# Patient Record
Sex: MALE | Race: WHITE | HISPANIC OR LATINO | Employment: UNEMPLOYED | URBAN - METROPOLITAN AREA
[De-identification: names, ages, dates, MRNs, and addresses within clinical notes are randomized per-mention and may not be internally consistent; named-entity substitution may affect disease eponyms.]

---

## 2017-08-07 ENCOUNTER — TELEPHONE (OUTPATIENT)
Dept: CARDIOLOGY | Facility: MEDICAL CENTER | Age: 52
End: 2017-08-07

## 2017-08-07 NOTE — TELEPHONE ENCOUNTER
I called [facility] HOPES/ TEL: [phone] and left a voicemail for  Al who arranged the visit/appointment. We have no records for the new patient referral and I asked Al to fax us the records for the referral to FAX: [phone]. I asked for a callback.NOLAN.

## 2017-08-10 ENCOUNTER — TELEPHONE (OUTPATIENT)
Dept: CARDIOLOGY | Facility: MEDICAL CENTER | Age: 52
End: 2017-08-10

## 2017-08-10 NOTE — TELEPHONE ENCOUNTER
I called [facility] Medical Records Department: [phone], EXT [phone] and left a voicemail regarding the referral and needing records for the visit. I asked Medical Records to call me back with this information.NOLAN.

## 2017-08-16 ENCOUNTER — OFFICE VISIT (OUTPATIENT)
Dept: CARDIOLOGY | Facility: MEDICAL CENTER | Age: 52
End: 2017-08-16
Payer: MEDICAID

## 2017-08-16 VITALS
OXYGEN SATURATION: 99 % | DIASTOLIC BLOOD PRESSURE: 84 MMHG | HEART RATE: 72 BPM | SYSTOLIC BLOOD PRESSURE: 134 MMHG | HEIGHT: 64 IN | BODY MASS INDEX: 24.41 KG/M2 | WEIGHT: 143 LBS

## 2017-08-16 DIAGNOSIS — B20 HIV (HUMAN IMMUNODEFICIENCY VIRUS INFECTION) (HCC): ICD-10-CM

## 2017-08-16 DIAGNOSIS — Z86.73 HISTORY OF CVA (CEREBROVASCULAR ACCIDENT): ICD-10-CM

## 2017-08-16 DIAGNOSIS — I50.20 ACC/AHA STAGE C SYSTOLIC HEART FAILURE (HCC): ICD-10-CM

## 2017-08-16 DIAGNOSIS — R06.09 DYSPNEA ON EXERTION: ICD-10-CM

## 2017-08-16 DIAGNOSIS — I50.9 HEART FAILURE, NYHA CLASS 2 (HCC): ICD-10-CM

## 2017-08-16 PROCEDURE — 99244 OFF/OP CNSLTJ NEW/EST MOD 40: CPT | Mod: 25 | Performed by: INTERNAL MEDICINE

## 2017-08-16 PROCEDURE — 94620 PR PULMONARY STRESS TESTING,SIMPLE: CPT | Performed by: INTERNAL MEDICINE

## 2017-08-16 RX ORDER — SIMVASTATIN 10 MG
10 TABLET ORAL
Refills: 2 | COMMUNITY
Start: 2017-08-10 | End: 2017-08-23 | Stop reason: CLARIF

## 2017-08-16 RX ORDER — LISINOPRIL 20 MG/1
TABLET ORAL
Refills: 2 | COMMUNITY
Start: 2017-06-01 | End: 2017-09-13

## 2017-08-16 RX ORDER — ABACAVIR SULFATE, DOLUTEGRAVIR SODIUM, LAMIVUDINE 600; 50; 300 MG/1; MG/1; MG/1
1 TABLET, FILM COATED ORAL DAILY
Refills: 5 | COMMUNITY
Start: 2017-08-10 | End: 2019-03-13 | Stop reason: CLARIF

## 2017-08-16 RX ORDER — SULFAMETHOXAZOLE AND TRIMETHOPRIM 800; 160 MG/1; MG/1
1 TABLET ORAL DAILY
COMMUNITY
End: 2019-03-13

## 2017-08-16 ASSESSMENT — MINNESOTA LIVING WITH HEART FAILURE QUESTIONNAIRE (MLHF)
HAVING TO SIT OR LIE DOWN DURING THE DAY: 0
GIVING YOU SIDE EFFECTS FROM TREATMENTS: 0
DIFFICULTY TO CONCENTRATE OR REMEMBERING THINGS: 0
DIFFICULTY SLEEPING WELL AT NIGHT: 1
WALKING ABOUT OR CLIMBING STAIRS DIFFICULT: 0
MAKING YOU STAY IN A HOSPITAL: 0
DIFFICULTY SOCIALIZING WITH FAMILY OR FRIENDS: 0
DIFFICULTY WITH RECREATIONAL PASTIMES, SPORTS, HOBBIES: 0
DIFFICULTY WORKING TO EARN A LIVING: 0
DIFFICULTY WITH SEXUAL ACTIVITIES: 1
MAKING YOU FEEL DEPRESSED: 0
MAKING YOU SHORT OF BREATH: 0
TOTAL_SCORE: 5
COSTING YOU MONEY FOR MEDICAL CARE: 0
DIFFICULTY GOING AWAY FROM HOME: 0
WORKING AROUND THE HOUSE OR YARD DIFFICULT: 0
LOSS OF SELF CONTROL IN YOUR LIFE: 1
FEELING LIKE A BURDEN TO FAMILY AND FRIENDS: 0
EATING LESS FOODS YOU LIKE: 0
SWELLING IN ANKLES OR LEGS: 1
MAKING YOU WORRY: 1
TIRED, FATIGUED OR LOW ON ENERGY: 0

## 2017-08-16 ASSESSMENT — ENCOUNTER SYMPTOMS
WEIGHT LOSS: 0
FALLS: 0
CLAUDICATION: 0
FEVER: 0
EYE PAIN: 0
COUGH: 0
DEPRESSION: 0
ABDOMINAL PAIN: 0
MYALGIAS: 0
BLURRED VISION: 0
BLOOD IN STOOL: 0
CHILLS: 0
PALPITATIONS: 0
SPEECH CHANGE: 0
SHORTNESS OF BREATH: 1
DOUBLE VISION: 0
LOSS OF CONSCIOUSNESS: 0
VOMITING: 0
EYE DISCHARGE: 0
NAUSEA: 0
HALLUCINATIONS: 0
PND: 0
HEADACHES: 0
BRUISES/BLEEDS EASILY: 0
SENSORY CHANGE: 0
ORTHOPNEA: 0
DIZZINESS: 0

## 2017-08-16 ASSESSMENT — NEW YORK HEART ASSOCIATION (NYHA) CLASSIFICATION: NYHA FUNCTIONAL CLASS: CLASS II

## 2017-08-16 ASSESSMENT — 6 MINUTE WALK TEST (6MWT): TOTAL DISTANCE WALKED (METERS): 396

## 2017-08-16 NOTE — MR AVS SNAPSHOT
"        Seamus Palencia   2017 12:45 PM   Office Visit   MRN: [id number]    Department:  Heart [facility]   Dept Phone:  [phone]    Description:  Male : 1965   Provider:  Scooby Marie M.D.           Reason for Visit     New Patient           Allergies as of 2017     No Known Allergies      You were diagnosed with     ACC/AHA stage C systolic heart failure (CMS-[facility])   [0805930]       Heart failure, NYHA class 2 (CMS-[facility])   [928456]       HIV (human immunodeficiency virus infection) (CMS-HCC)   [340377]       History of CVA (cerebrovascular accident)   [270892]       Dyspnea on exertion   [969409]         Vital Signs     Blood Pressure Pulse Height Weight Body Mass Index Oxygen Saturation    134/84 mmHg 72 1.626 m (5' 4.02\") 64.864 kg (143 lb) 24.53 kg/m2 99%    Smoking Status                   Former Smoker           Basic Information     Date Of Birth Sex Race Ethnicity Preferred Language    1965 Male  or , Other  Origin (Latvian,Palauan,Belarusian,Rolando, etc) English      Your appointments     Aug 23, 2017  3:20 PM   Heart Failure Established with EDEN Bell   [facility] (--)    [address]  [place] [address] [phone]            Aug 30, 2017  4:15 PM   ECHO with ECHO [facility] EXAM 10   ECHOCARDIOLOGY [facility] ([facility])    [address]  [place] [address]   [phone]            Aug 31, 2017  3:15 PM   Heart Failure Established with Joni Jaime M.D.   [facility] (--)    [address]  [place] [address] [phone]            Sep 13, 2017  3:30 PM   Heart Failure Established with Scooby Marie M.D.   [facility] (--)    [address]  [place] [address]   [phone]              Problem List              ICD-10-CM Priority Class Noted - Resolved    Hyponatremia E87.1   " 8/5/2013 - Present    Pneumonia J18.9   8/5/2013 - Present    HIV (human immunodeficiency virus infection) (CMS-HCC) Z21 High  8/11/2013 - Present    Stroke (CMS-HCC) I63.9 High  12/25/2015 - Present    Normochromic normocytic anemia D64.9 High  12/26/2015 - Present    Elevated troponin R74.8 Medium  12/26/2015 - Present    Encephalopathy G93.40 Low  12/26/2015 - Present    Hypocalcemia E83.51 Low  12/26/2015 - Present    Hypoalbuminemia E88.09 Low  12/26/2015 - Present    Elevated liver enzymes R74.8 Medium  12/26/2015 - Present    Scabies infestation B86 Medium  12/26/2015 - Present    Hematuria R31.9   1/11/2016 - Present    Seizure (CMS-HCC) R56.9   1/11/2016 - Present    Thrombocytopenia (CMS-HCC) D69.6   1/11/2016 - Present    PAD (peripheral artery disease) (CMS-HCC) I73.9   1/11/2016 - Present    Hypomagnesemia E83.42   1/11/2016 - Present    HTN (hypertension), malignant I10   1/11/2016 - Present    Cerebral infarction (CMS-HCC) I63.9   1/11/2016 - Present    Transient weakness of left lower extremity R29.898 High  8/18/2016 - Present    Hypokalemia E87.6 High  8/18/2016 - Present    Leukocytosis D72.829 High  8/18/2016 - Present    Thrombocytosis (CMS-HCC) D47.3 High  8/18/2016 - Present      Health Maintenance        Date Due Completion Dates    IMM DTaP/Tdap/Td Vaccine (1 - Tdap) 4/29/1983 ---    IMM PNEUMOCOCCAL 19-64 (ADULT) HIGHEST RISK SERIES (2 of 3 - PCV13) 2/5/2014 2/5/2013    COLONOSCOPY 4/29/2014 ---    IMM INFLUENZA (1) 9/1/2017 1/1/2016, 2/5/2013            Current Immunizations     Influenza TIV (IM) 2/5/2013    Influenza Vaccine Quad Inj (Preserved) 1/1/2016  5:25 PM    Pneumococcal polysaccharide vaccine (PPSV-23) 2/5/2013      Below and/or attached are the medications your provider expects you to take. Review all of your home medications and newly ordered medications with your provider and/or pharmacist. Follow medication instructions as directed by your provider and/or pharmacist. Please  keep your medication list with you and share with your provider. Update the information when medications are discontinued, doses are changed, or new medications (including over-the-counter products) are added; and carry medication information at all times in the event of emergency situations     Allergies:  No Known Allergies          Medications  Valid as of: August 16, 2017 -  1:51 PM    Generic Name Brand Name Tablet Size Instructions for use    Abacavir-Dolutegravir-Lamivud (Tab) TRIUMEQ 600- MG Take 1 Tab by mouth.        AmLODIPine Besylate (Tab) NORVASC 10 MG Take 10 mg by mouth every day.        Atorvastatin Calcium (Tab) LIPITOR 40 MG Take 1 Tab by mouth every bedtime.        Carvedilol (Tab) COREG 3.125 MG Take 2 Tabs by mouth 2 times a day, with meals.        LevETIRAcetam (Tab) KEPPRA 500 MG Take 1 Tab by mouth every 12 hours.        Lisinopril (Tab) PRINIVIL 20 MG TAKE ONE TABLET BY MOUTH DAILY        RisperiDONE (Tab) RISPERDAL 0.25 MG Take 1 Tab by mouth ONE-HALF HOUR AFTER DINNER.        Simvastatin (Tab) ZOCOR 10 MG Take 10 mg by mouth.        Sulfamethoxazole-Trimethoprim (Tab) BACTRIM -160 MG Take 1 Tab by mouth 2 times a day.        Terazosin HCl (Cap) HYTRIN 2 MG Take 1 Cap by mouth every evening.        .                 Medicines prescribed today were sent to:     [id number] - [facility] [address] [facility] [address] [facility]    [address] [place] [address]    Phone: [phone] Fax: [phone]    Open 24 Hours?: No    [facility] [place] - [address]    [address] [place] [address]    Phone: [phone] Fax: [phone]    Open 24 Hours?: No      Medication refill instructions:       If your prescription bottle indicates you have medication refills left, it is not necessary to call your provider’s office. Please contact your pharmacy and they will refill your medication.    If your prescription bottle indicates you  do not have any refills left, you may request refills at any time through one of the following ways: The online [employer] system (except Urgent Care), by calling your provider’s office, or by asking your pharmacy to contact your provider’s office with a refill request. Medication refills are processed only during regular business hours and may not be available until the next business day. Your provider may request additional information or to have a follow-up visit with you prior to refilling your medication.   *Please Note: Medication refills are assigned a new Rx number when refilled electronically. Your pharmacy may indicate that no refills were authorized even though a new prescription for the same medication is available at the pharmacy. Please request the medicine by name with the pharmacy before contacting your provider for a refill.        Your To Do List     Future Labs/Procedures Complete By Expires    ECHOCARDIOGRAM COMP W/O CONT  As directed 8/17/2018      Instructions    c4            [employer] Access Code: [email or web address]  Expires: 9/15/2017  1:51 PM    [employer] [facility]  A secure, online tool to manage your health information     [employer]’s [employer]® is a secure, online tool that connects you to your personalized health information from the privacy of your home -- day or night - making it very easy for you to manage your healthcare. Once the activation process is completed, you can even access your medical information using the [employer] fer, which is available for free in the Apple Fer store or Google Play store.     [employer] provides the following levels of access (as shown below):   My Chart Features   [facility] Doctor [facility]  Specialists [facility]  Urgent  Care Non-[facility]  Primary Care  Doctor   Email your healthcare team securely and privately 24/7 X X X    Manage appointments: schedule your next appointment; view details of past/upcoming appointments X      Request prescription refills. X      View  recent personal medical records, including lab and immunizations X X X X   View health record, including health history, allergies, medications X X X X   Read reports about your outpatient visits, procedures, consult and ER notes X X X X   See your discharge summary, which is a recap of your hospital and/or ER visit that includes your diagnosis, lab results, and care plan. X X       How to register for [employer]:  1. Go to  [email or web address].  2. Click on the Sign Up Now box, which takes you to the New Member Sign Up page. You will need to provide the following information:  a. Enter your [employer] Access Code exactly as it appears at the top of this page. (You will not need to use this code after you’ve completed the sign-up process. If you do not sign up before the expiration date, you must request a new code.)   b. Enter your date of birth.   c. Enter your home email address.   d. Click Submit, and follow the next screen’s instructions.  3. Create a [employer] ID. This will be your [employer] login ID and cannot be changed, so think of one that is secure and easy to remember.  4. Create a [employer] password. You can change your password at any time.  5. Enter your Password Reset Question and Answer. This can be used at a later time if you forget your password.   6. Enter your e-mail address. This allows you to receive e-mail notifications when new information is available in [employer].  7. Click Sign Up. You can now view your health information.    For assistance activating your [employer] account, call [phone]

## 2017-08-16 NOTE — PROGRESS NOTES
Subjective:   Seamus Palencia is a 52 y.o. male who presents today for cardiac care and evaluation in our heart failure clinic as a referral from [facility] clinic. Patient does have history of hypertension, HIV infection, history of stroke. Patient is actually a poor historian. He does not know what medication he is taking. He does not know the details of his medical history. He doesn't have any residual neurological deficits however. He does report of shortness of breath if walking upstairs. No symptom at rest or with daily living activities. He did have a transthoracic echocardiogram done in 2015 which shows LV function of 45%. No significant valvular disease seems at that time.    Patient was able to complete 396 m during his 6 minute walk test. his O2 saturation at baseline was 99% and at the end of the test, the O2 saturation was 100%. he reported 3 level of dyspnea on Danny scale.      Past Medical History   Diagnosis Date   • Hypertension    • HIV disease (CMS-HCC) 1995   • Seizure (CMS-HCC)    • Stroke (CMS-HCC)    • Heart failure (CMS-HCC)      Past Surgical History   Procedure Laterality Date   • Cholecystectomy  1980s     Family History   Problem Relation Age of Onset   • Diabetes Sister    • Other Brother      down syndrome   • No Known Problems Sister    • No Known Problems Sister    • No Known Problems Sister      History   Smoking status   • Former Smoker -- 0.50 packs/day   • Types: Cigarettes   • Quit date: 08/05/2011   Smokeless tobacco   • Never Used     No Known Allergies  Outpatient Encounter Prescriptions as of 8/16/2017   Medication Sig Dispense Refill   • TRIUMEQ 600- MG Tab Take 1 Tab by mouth.  5   • lisinopril (PRINIVIL) 20 MG Tab TAKE ONE TABLET BY MOUTH DAILY  2   • sulfamethoxazole-trimethoprim (BACTRIM DS) 800-160 MG tablet Take 1 Tab by mouth 2 times a day.     • levetiracetam (KEPPRA) 500 MG Tab Take 1 Tab by mouth every 12 hours. 60 Tab 3   • carvedilol (COREG) 3.125 MG Tab Take  "2 Tabs by mouth 2 times a day, with meals. 60 Tab 3   • amlodipine (NORVASC) 10 MG Tab Take 10 mg by mouth every day.     • simvastatin (ZOCOR) 10 MG Tab Take 10 mg by mouth.  2   • atorvastatin (LIPITOR) 40 MG Tab Take 1 Tab by mouth every bedtime. 30 Tab 1   • terazosin (HYTRIN) 2 MG Cap Take 1 Cap by mouth every evening. 30 Cap 3   • risperidone (RISPERDAL) 0.25 MG Tab Take 1 Tab by mouth ONE-HALF HOUR AFTER DINNER. 60 Tab 3   • [DISCONTINUED] lisinopril (PRINIVIL, ZESTRIL) 40 MG tablet Take 1 Tab by mouth every day. (Patient not taking: Reported on 8/16/2017) 30 Tab 1   • [DISCONTINUED] aspirin EC (ECOTRIN) 81 MG Tablet Delayed Response Take 81 mg by mouth every day.       No facility-administered encounter medications on file as of 8/16/2017.     Review of Systems   Constitutional: Negative for fever, chills, weight loss and malaise/fatigue.   HENT: Negative for ear discharge, ear pain, hearing loss and nosebleeds.    Eyes: Negative for blurred vision, double vision, pain and discharge.   Respiratory: Positive for shortness of breath. Negative for cough.    Cardiovascular: Negative for chest pain, palpitations, orthopnea, claudication, leg swelling and PND.   Gastrointestinal: Negative for nausea, vomiting, abdominal pain, blood in stool and melena.   Genitourinary: Negative for dysuria and hematuria.   Musculoskeletal: Negative for myalgias, joint pain and falls.   Skin: Negative for itching and rash.   Neurological: Negative for dizziness, sensory change, speech change, loss of consciousness and headaches.   Endo/Heme/Allergies: Negative for environmental allergies. Does not bruise/bleed easily.   Psychiatric/Behavioral: Negative for depression, suicidal ideas and hallucinations.        Objective:   /84 mmHg  Pulse 72  Ht 1.626 m (5' 4.02\")  Wt 64.864 kg (143 lb)  BMI 24.53 kg/m2  SpO2 99%    Physical Exam   Constitutional: He is oriented to person, place, and time. He appears well-developed and " well-nourished.   HENT:   Head: Normocephalic and atraumatic.   Eyes: EOM are normal.   Neck: Normal range of motion. No JVD present.   Cardiovascular: Normal rate, regular rhythm, normal heart sounds and intact distal pulses.  Exam reveals no gallop and no friction rub.    No murmur heard.  Bilateral femoral pulses are 2+, bilateral dorsalis pedis pulses are 2+, bilateral posterior tibialis pulses are 2+.   Pulmonary/Chest: No respiratory distress. He has no wheezes. He has no rales. He exhibits no tenderness.   Abdominal: Soft. Bowel sounds are normal. There is no tenderness. There is no rebound and no guarding.   The is no presence of abdominal bruits   Musculoskeletal: Normal range of motion.   Neurological: He is alert and oriented to person, place, and time.   Skin: Skin is warm and dry.   Psychiatric: He has a normal mood and affect.   Nursing note and vitals reviewed.      Assessment:     1. ACC/AHA stage C systolic heart failure (CMS-[facility])  ECHOCARDIOGRAM COMP W/O CONT    KS PULMONARY STRESS TESTING,SIMPLE   2. Heart failure, NYHA class 2 (CMS-[facility])  ECHOCARDIOGRAM COMP W/O CONT    KS PULMONARY STRESS TESTING,SIMPLE   3. HIV (human immunodeficiency virus infection) (CMS-HCC)     4. History of CVA (cerebrovascular accident)     5. Dyspnea on exertion  KS PULMONARY STRESS TESTING,SIMPLE       Medical Decision Making:  Today's Assessment / Status / Plan:     Today, based on physical examination findings, patient is euvolemic. No JVD, lungs are clear to auscultation, no pitting edema in bilateral lower extremities, no ascites.    At this time, I am not sure about his cardiac condition. Patient again is a poor historian. I think the most reasonable thing is to obtain a transthoracic echocardiogram to assess his cardiac functions and valvular functions.    In the meantime, I am not going to change his medications at this time because patient does not know what his taking at home. Advised patient to bring his  medication here next time he is here.

## 2017-08-23 ENCOUNTER — TELEPHONE (OUTPATIENT)
Dept: CARDIOLOGY | Facility: MEDICAL CENTER | Age: 52
End: 2017-08-23

## 2017-08-23 ENCOUNTER — OFFICE VISIT (OUTPATIENT)
Dept: CARDIOLOGY | Facility: MEDICAL CENTER | Age: 52
End: 2017-08-23
Payer: MEDICAID

## 2017-08-23 VITALS
HEIGHT: 64 IN | BODY MASS INDEX: 24.41 KG/M2 | HEART RATE: 69 BPM | OXYGEN SATURATION: 99 % | DIASTOLIC BLOOD PRESSURE: 92 MMHG | SYSTOLIC BLOOD PRESSURE: 142 MMHG | WEIGHT: 143 LBS

## 2017-08-23 DIAGNOSIS — I50.9 HEART FAILURE, NYHA CLASS 2 (HCC): ICD-10-CM

## 2017-08-23 DIAGNOSIS — B20 HIV (HUMAN IMMUNODEFICIENCY VIRUS INFECTION) (HCC): ICD-10-CM

## 2017-08-23 DIAGNOSIS — I50.20 ACC/AHA STAGE C SYSTOLIC HEART FAILURE (HCC): ICD-10-CM

## 2017-08-23 DIAGNOSIS — Z86.73 HISTORY OF CVA (CEREBROVASCULAR ACCIDENT): ICD-10-CM

## 2017-08-23 PROCEDURE — 99214 OFFICE O/P EST MOD 30 MIN: CPT | Performed by: NURSE PRACTITIONER

## 2017-08-23 RX ORDER — ACETAMINOPHEN 325 MG/1
650 TABLET ORAL EVERY 4 HOURS PRN
Status: ON HOLD | COMMUNITY
End: 2020-07-10

## 2017-08-23 RX ORDER — CARVEDILOL 6.25 MG/1
6.25 TABLET ORAL 2 TIMES DAILY WITH MEALS
Qty: 60 TAB | Refills: 11 | Status: SHIPPED | OUTPATIENT
Start: 2017-08-23 | End: 2017-08-31

## 2017-08-23 RX ORDER — ATORVASTATIN CALCIUM 10 MG/1
10 TABLET, FILM COATED ORAL NIGHTLY
COMMUNITY
End: 2018-02-22 | Stop reason: SDUPTHER

## 2017-08-23 ASSESSMENT — ENCOUNTER SYMPTOMS
SHORTNESS OF BREATH: 0
PND: 0
ORTHOPNEA: 0
COUGH: 0
MYALGIAS: 0
FEVER: 0
PALPITATIONS: 0
DIZZINESS: 0
CLAUDICATION: 0
ABDOMINAL PAIN: 0

## 2017-08-23 NOTE — PROGRESS NOTES
Subjective:   Seamus Palencia is a 52 y.o. male who presents today for follow up on his heart failure.    Patient of Dr. Marie. Patient was last seen 8/16/17 initially in the Heart Failure. Pt to be sent for Echo 8/30/17.  Over the week, pt continues to feel well. Patient denies chest pain, shortness of breath at rest, ADLs, Exertion, palpitations, dizziness/lightheadedness, orthopnea, PND or Edema.     Pt not taking Home weights.     Additonally, patient has the following medical problems:    -HIV infection followed by [facility]    -HTN: takes carvedilol, lisinopril, amlodipine      Past Medical History   Diagnosis Date   • Hypertension    • HIV disease (CMS-[facility]) 1995   • Seizure (CMS-[facility])    • Stroke (CMS-[facility])    • Heart failure (CMS-HCC)      Past Surgical History   Procedure Laterality Date   • Cholecystectomy  1980s     Family History   Problem Relation Age of Onset   • Diabetes Sister    • Other Brother      down syndrome   • No Known Problems Sister    • No Known Problems Sister    • No Known Problems Sister      History   Smoking status   • Former Smoker -- 0.50 packs/day   • Types: Cigarettes   • Quit date: 08/05/2011   Smokeless tobacco   • Never Used     No Known Allergies  Outpatient Encounter Prescriptions as of 8/23/2017   Medication Sig Dispense Refill   • acetaminophen (TYLENOL) 325 MG Tab Take 650 mg by mouth every four hours as needed.     • atorvastatin (LIPITOR) 10 MG Tab Take 10 mg by mouth every evening.     • TRIUMEQ 600- MG Tab Take 1 Tab by mouth.  5   • lisinopril (PRINIVIL) 20 MG Tab TAKE ONE TABLET BY MOUTH DAILY  2   • sulfamethoxazole-trimethoprim (BACTRIM DS) 800-160 MG tablet Take 1 Tab by mouth every day.     • levetiracetam (KEPPRA) 500 MG Tab Take 1 Tab by mouth every 12 hours. 60 Tab 3   • carvedilol (COREG) 3.125 MG Tab Take 2 Tabs by mouth 2 times a day, with meals. 60 Tab 3   • amlodipine (NORVASC) 10 MG Tab Take 10 mg by mouth every day.     • simvastatin (ZOCOR) 10  "MG Tab Take 10 mg by mouth.  2   • terazosin (HYTRIN) 2 MG Cap Take 1 Cap by mouth every evening. 30 Cap 3   • risperidone (RISPERDAL) 0.25 MG Tab Take 1 Tab by mouth ONE-HALF HOUR AFTER DINNER. 60 Tab 3   • [DISCONTINUED] atorvastatin (LIPITOR) 40 MG Tab Take 1 Tab by mouth every bedtime. 30 Tab 1     No facility-administered encounter medications on file as of 8/23/2017.     Review of Systems   Constitutional: Negative for fever and malaise/fatigue.   Respiratory: Negative for cough and shortness of breath.    Cardiovascular: Negative for chest pain, palpitations, orthopnea, claudication, leg swelling and PND.   Gastrointestinal: Negative for abdominal pain.   Musculoskeletal: Negative for myalgias.   Neurological: Negative for dizziness.   All other systems reviewed and are negative.       Objective:   /92 mmHg  Pulse 69  Ht 1.626 m (5' 4\")  Wt 64.864 kg (143 lb)  BMI 24.53 kg/m2  SpO2 99%    Physical Exam   Constitutional: He is oriented to person, place, and time. He appears well-developed and well-nourished.   HENT:   Head: Normocephalic and atraumatic.   Eyes: EOM are normal.   Neck: Normal range of motion. Neck supple. No JVD present.   Cardiovascular: Normal rate, regular rhythm, normal heart sounds and intact distal pulses.    No murmur heard.  Pulmonary/Chest: Effort normal and breath sounds normal. No respiratory distress. He has no wheezes. He has no rales.   Abdominal: Soft. Bowel sounds are normal.   Musculoskeletal: Normal range of motion. He exhibits no edema.   Neurological: He is alert and oriented to person, place, and time.   Skin: Skin is warm and dry.   Psychiatric: He has a normal mood and affect.   Nursing note and vitals reviewed.    Lab Results   Component Value Date/Time    CHOLESTEROL,TOT 49* 08/19/2016 01:15 AM    LDL 16 08/19/2016 01:15 AM    HDL 17* 08/19/2016 01:15 AM    TRIGLYCERIDES 80 08/19/2016 01:15 AM       Lab Results   Component Value Date/Time    SODIUM 139 " 08/22/2016 04:31 AM    POTASSIUM 4.0 08/22/2016 04:31 AM    CHLORIDE 110 08/22/2016 04:31 AM    CO2 22 08/22/2016 04:31 AM    GLUCOSE 96 08/22/2016 04:31 AM    BUN 5* 08/22/2016 04:31 AM    CREATININE 0.67 08/22/2016 04:31 AM     Lab Results   Component Value Date/Time    ALKALINE PHOSPHATASE 89 08/18/2016 02:38 AM    AST(SGOT) 7* 08/18/2016 02:38 AM    ALT(SGPT) 7 08/18/2016 02:38 AM    TOTAL BILIRUBIN 0.3 08/18/2016 02:38 AM      Transthoracic Echo Report 12/27/15  Left ventricular ejection fraction is visually estimated to be 45%.  Septum hyopkinesis, Basal inferiolateral and basal to mid anterior wall   akinesis, grade III diastolic dysfunction.  Mild concentric left ventricular hypertrophy.  Small pericardial effusion without evidence of hemodynamic compromise.  Estimated right ventricular systolic pressure  is 60 mmHg.  Moderate mitral regurgitation.  Normal inferior vena cava size and inspiratory collapse.  No prior study is available for comparison.     Assessment:     1. ACC/AHA stage C systolic heart failure (CMS-HCC)     2. Heart failure, NYHA class 2 (CMS-HCC)     3. HIV (human immunodeficiency virus infection) (CMS-HCC)     4. History of CVA (cerebrovascular accident)         Medical Decision Making:  Today's Assessment / Status / Plan:   1.HFrEF, Stage C, class 1: Based on physical examination findings, patient is euvolemic. No JVD, lungs are clear to auscultation, no pitting edema in bilateral lower extremities, no ascites.  -Increase carvedilol to 6.25 mg twice a day  -Continue lisinopril 20 mg daily  -We'll wait for the results of echo this next week  -Encouraged patient to start weighing himself. Discussed importance of weight monitoring. Patient to monitor weights at home daily. Pt to call office if weight increasing >3 lbs in 1 day or >5 lbs in 1 week.   -Reinforced s/sx of worsening heart failure with patient and weight monitoring. Pt verbalizes understanding. Pt to call office or RTC if  present.     FU in clinic in 1 week with Dr. Jaime. Sooner if needed.    Patient verbalizes understanding and agrees with the plan of care.     Collaborating MD: Lm Marie MD

## 2017-08-23 NOTE — MR AVS SNAPSHOT
"        Seamus Palencia   2017 3:20 PM   Office Visit   MRN: [id number]    Department:  [facility]   Dept Phone:  [phone]    Description:  Male : 1965   Provider:  EDEN Bell           Reason for Visit     Follow-Up medication update      Allergies as of 2017     No Known Allergies      You were diagnosed with     ACC/AHA stage C systolic heart failure (CMS-HCC)   [2262777]       Heart failure, NYHA class 2 (CMS-HCC)   [787370]       HIV (human immunodeficiency virus infection) (CMS-HCC)   [083646]       History of CVA (cerebrovascular accident)   [095199]         Vital Signs     Blood Pressure Pulse Height Weight Body Mass Index Oxygen Saturation    142/92 mmHg 69 1.626 m (5' 4\") 64.864 kg (143 lb) 24.53 kg/m2 99%    Smoking Status                   Former Smoker           Basic Information     Date Of Birth Sex Race Ethnicity Preferred Language    1965 Male  or , Other  Origin (Romanian,Citizen of [place], etc) English      Your appointments     Aug 30, 2017  4:15 PM   ECHO with [facility]CHO [facility] EXAM 10   ECHOCARDIOLOGY [facility] ([facility])    [address] [facility]  [place] [address]   [phone]            Aug 31, 2017  3:15 PM   Heart Failure Established with Joni Jaime M.D.   [facility] (--)    [address] , [facility] [address]  [place] [id number] [phone]            Sep 13, 2017  3:30 PM   Heart Failure Established with Scooby Marie M.D.   [facility] (--)    [address], [address]  [place] [address] [phone]              Problem List              ICD-10-CM Priority Class Noted - Resolved    Hyponatremia E87.1   2013 - Present    Pneumonia J18.9   2013 - Present    HIV (human immunodeficiency virus infection) (CMS-HCC) Z21 High  2013 - Present    Stroke (CMS-HCC) I63.9 High  2015 - Present    Normochromic normocytic anemia D64.9 High  " 12/26/2015 - Present    Elevated troponin R74.8 Medium  12/26/2015 - Present    Encephalopathy G93.40 Low  12/26/2015 - Present    Hypocalcemia E83.51 Low  12/26/2015 - Present    Hypoalbuminemia E88.09 Low  12/26/2015 - Present    Elevated liver enzymes R74.8 Medium  12/26/2015 - Present    Scabies infestation B86 Medium  12/26/2015 - Present    Hematuria R31.9   1/11/2016 - Present    Seizure (CMS-HCC) R56.9   1/11/2016 - Present    Thrombocytopenia (CMS-HCC) D69.6   1/11/2016 - Present    PAD (peripheral artery disease) (CMS-HCC) I73.9   1/11/2016 - Present    Hypomagnesemia E83.42   1/11/2016 - Present    HTN (hypertension), malignant I10   1/11/2016 - Present    Cerebral infarction (CMS-HCC) I63.9   1/11/2016 - Present    Transient weakness of left lower extremity R29.898 High  8/18/2016 - Present    Hypokalemia E87.6 High  8/18/2016 - Present    Leukocytosis D72.829 High  8/18/2016 - Present    Thrombocytosis (CMS-HCC) D47.3 High  8/18/2016 - Present      Health Maintenance        Date Due Completion Dates    IMM DTaP/Tdap/Td Vaccine (1 - Tdap) 4/29/1983 ---    IMM PNEUMOCOCCAL 19-64 (ADULT) HIGHEST RISK SERIES (2 of 3 - PCV13) 2/5/2014 2/5/2013    COLONOSCOPY 4/29/2014 ---    IMM INFLUENZA (1) 9/1/2017 1/1/2016, 2/5/2013            Current Immunizations     Influenza TIV (IM) 2/5/2013    Influenza Vaccine Quad Inj (Preserved) 1/1/2016  5:25 PM    Pneumococcal polysaccharide vaccine (PPSV-23) 2/5/2013      Below and/or attached are the medications your provider expects you to take. Review all of your home medications and newly ordered medications with your provider and/or pharmacist. Follow medication instructions as directed by your provider and/or pharmacist. Please keep your medication list with you and share with your provider. Update the information when medications are discontinued, doses are changed, or new medications (including over-the-counter products) are added; and carry medication information at  all times in the event of emergency situations     Allergies:  No Known Allergies          Medications  Valid as of: August 23, 2017 -  3:54 PM    Generic Name Brand Name Tablet Size Instructions for use    Abacavir-Dolutegravir-Lamivud (Tab) TRIUMEQ 600- MG Take 1 Tab by mouth.        Acetaminophen (Tab) TYLENOL 325 MG Take 650 mg by mouth every four hours as needed.        AmLODIPine Besylate (Tab) NORVASC 10 MG Take 10 mg by mouth every day.        Atorvastatin Calcium (Tab) LIPITOR 10 MG Take 10 mg by mouth every evening.        Carvedilol (Tab) COREG 6.25 MG Take 1 Tab by mouth 2 times a day, with meals.        LevETIRAcetam (Tab) KEPPRA 500 MG Take 1 Tab by mouth every 12 hours.        Lisinopril (Tab) PRINIVIL 20 MG TAKE ONE TABLET BY MOUTH DAILY        Sulfamethoxazole-Trimethoprim (Tab) BACTRIM -160 MG Take 1 Tab by mouth every day.        .                 Medicines prescribed today were sent to:     [address] [address]    Phone: [phone] Fax: [phone]    Open 24 Hours?: No      Medication refill instructions:       If your prescription bottle indicates you have medication refills left, it is not necessary to call your provider’s office. Please contact your pharmacy and they will refill your medication.    If your prescription bottle indicates you do not have any refills left, you may request refills at any time through one of the following ways: The online [employer] system (except Urgent Care), by calling your provider’s office, or by asking your pharmacy to contact your provider’s office with a refill request. Medication refills are processed only during regular business hours and may not be available until the next business day. Your provider may request additional information or to have a follow-up visit with you prior to refilling your medication.   *Please Note: Medication refills are assigned a new Rx number  when refilled electronically. Your pharmacy may indicate that no refills were authorized even though a new prescription for the same medication is available at the pharmacy. Please request the medicine by name with the pharmacy before contacting your provider for a refill.           [employer] Access Code: [email or web address]  Expires: 9/15/2017  1:51 PM    [employer]  A secure, online tool to manage your health information     [employer]’s [employer]® is a secure, online tool that connects you to your personalized health information from the privacy of your home -- day or night - making it very easy for you to manage your healthcare. Once the activation process is completed, you can even access your medical information using the [employer] fer, which is available for free in the Apple Fer store or Google Play store.     [employer] provides the following levels of access (as shown below):   My Chart Features   [facility] Primary Care Doctor [facility]  Specialists [facility]  Urgent  Care Non-[facility]  Primary Care  Doctor   Email your healthcare team securely and privately 24/7 X X X    Manage appointments: schedule your next appointment; view details of past/upcoming appointments X      Request prescription refills. X      View recent personal medical records, including lab and immunizations X X X X   View health record, including health history, allergies, medications X X X X   Read reports about your outpatient visits, procedures, consult and ER notes X X X X   See your discharge summary, which is a recap of your hospital and/or ER visit that includes your diagnosis, lab results, and care plan. X X       How to register for [employer]:  1. Go to  [email or web address].  2. Click on the Sign Up Now box, which takes you to the New Member Sign Up page. You will need to provide the following information:  a. Enter your [employer] Access Code exactly as it appears at the top of this page. (You will not need to use this code after you’ve  completed the sign-up process. If you do not sign up before the expiration date, you must request a new code.)   b. Enter your date of birth.   c. Enter your home email address.   d. Click Submit, and follow the next screen’s instructions.  3. Create a [employer] ID. This will be your [employer] login ID and cannot be changed, so think of one that is secure and easy to remember.  4. Create a [employer] password. You can change your password at any time.  5. Enter your Password Reset Question and Answer. This can be used at a later time if you forget your password.   6. Enter your e-mail address. This allows you to receive e-mail notifications when new information is available in [employer].  7. Click Sign Up. You can now view your health information.    For assistance activating your [employer] account, call [phone]

## 2017-08-23 NOTE — TELEPHONE ENCOUNTER
pharmacy can't fill prescription for Carvedilol  Received: Today       Christelle Pennington R.N.                     NEREIDA/Carli Paula @ [facility] pharmacy called, she said patient hasn't been seen since 2012 and they aren't able to fill the Carvedilol prescription. She can be reached at [phone].       Pt called to find out preferred pharmacy to send scripts to. Pt has a voicemail box that has not been set up and unable to leave a message.

## 2017-08-26 NOTE — PROGRESS NOTES
"Heart Failure New Appointment     6MWT- 396 meters  MLWHF- 5    OP Heart Failure  Vitals  Appointment Type: Heart Failure New  Weight: 64.9 kg (143 lb)  Height: 162.6 cm (5' 4.02\")  BMI (Calculated): 24.53  Blood Pressure: 134/84  Pulse: 72    System Assessment  NYHA Functional Class Assessment: Class II  ACC/AHA HF Stage: C    Smoking Hx  Have you Ever Smoked: Yes  Have you Smoked in the Last 12 Mos: No  Confirm Quit Date: 11     Alcohol Hx  Do you Drink?: No     Illicit Drug Hx  Illicit Drug History: No    Social Hx  Social History: Lives Alone  Level of Support: Unknown  Advance Directives: Began discussion, Paperwork provided    Education  Symptoms: Verbalizes understanding  Weighing: Verbalizes Understanding  Weight Gain Response: Verbalizes Understanding   Recording Data: Verbalizes understanding  Teach Back Failures: Teach Back Successful  Compliance: Patient is Compliant  Comments: Plans to be compliant from now on    Medications  Medication Reconciliation : Clarification Required  Medication Counseling Provided: Needs Reinforcement  Able to Accurately Identify Medication Indications: Some  Medication Discrepancies: None  Is Patient on an Evidence Based Beta Blocker: Yes  Is Patient on ACE-1 or ARB: Yes    Dietary Assessment  Food Labels: Verbalizes Understanding  Foods High in Sodium: Verbalizes Understanding  Daily Sodium Intake: Needs Reinforcement  Diet: Needs Reinforcement  Food Preparation: Needs Reinforcement  Eating Out Plan: Needs Reinforcement  Healthier Options: Needs Reinforcement  Fluid Restriction: Not Applicable    MN Living with Heart Failure  Swelling in Ankles or Legs: 1  Having to Sit or Lie Down During the Day: 0  Walking About or Climbing Stairs Difficult: 0  Working Around the House or Yard Difficult: 0  Difficulty Going Away from Home: 0  Difficulty Sleeping Well at Night: 1  Difficulty Socializing with Family or Friends: 0  Difficulty Working to Earn a Livin  Difficulty with " Recreational Pastimes, Sports, Hobbies: 0  Difficulty with Sexual Activities: 1  Eating Less Foods You Like: 0  Making you Short of Breath: 0  Tired, Fatigued or Low on Energy: 0  Making you Stay in a Hospital: 0  Costing you Money for Medical Care?: 0  Giving you Side Effects from Treatments: 0  Feeling like a [place] to Family and Friends: 0  Loss of Self Control in your Life: 1  Making You Worry: 1  Difficulty to Concentrate or Remembering Things: 0  Making you Feel Depressed: 0  MLHF Total Score : 5    6 Minute Walk Test  Baseline to end of test: 6:00  Total meters walked: 396     Education Narrative  Reviewed anatomy and physiology of heart failure with patient. Went over heart failure worksheet and patient's individual HF diagnosis, EF, risk factors, general medication classes and indications, as well as personal goals.  Goals: Patient's primary goal is to improve his EF by being compliant with his medications.    Discussed daily weights, sodium restriction, worsening signs and symptoms to report to physician, heart medications, and importance of adherence to medication regimen. Emphasized recommendation from AHA/AAHFN to keep daily sodium intake between 1500mg-2000mg.     Reviewed dietary handouts, advance directive planning handout, and informed patient of HF new appointment follow-up phone call. Discussed dietary considerations and reviewed Seven Day Heart Healthy Meal Plan by [employer].    It is unclear as to what type of HF patient has, the MD has ordered an Echo. Discussed HF in general terms and told patient more specific education will be performed once the MD reads the Echo-patient v/u.    Invited patient and family members/friends to HF support group and encouraged patient to call Heart Failure clinic during normal business hours with any questions.  Heart Failure program card with number given to patient.        Patient states full understanding of all information given.     ESTEBAN Saunders  NAMRATA  [phone]

## 2017-08-30 ENCOUNTER — HOSPITAL ENCOUNTER (OUTPATIENT)
Dept: CARDIOLOGY | Facility: MEDICAL CENTER | Age: 52
End: 2017-08-30
Attending: INTERNAL MEDICINE
Payer: MEDICAID

## 2017-08-30 DIAGNOSIS — I50.9 HEART FAILURE, NYHA CLASS 2 (HCC): ICD-10-CM

## 2017-08-30 DIAGNOSIS — I50.20 ACC/AHA STAGE C SYSTOLIC HEART FAILURE (HCC): ICD-10-CM

## 2017-08-30 LAB
LV EJECT FRACT  99904: 65
LV EJECT FRACT MOD 2C 99903: 62.08
LV EJECT FRACT MOD 4C 99902: 71.33
LV EJECT FRACT MOD BP 99901: 68.07

## 2017-08-30 PROCEDURE — 93306 TTE W/DOPPLER COMPLETE: CPT

## 2017-08-30 PROCEDURE — 93306 TTE W/DOPPLER COMPLETE: CPT | Mod: 26 | Performed by: INTERNAL MEDICINE

## 2017-08-31 ENCOUNTER — OFFICE VISIT (OUTPATIENT)
Dept: CARDIOLOGY | Facility: MEDICAL CENTER | Age: 52
End: 2017-08-31
Payer: MEDICAID

## 2017-08-31 VITALS
HEART RATE: 72 BPM | BODY MASS INDEX: 24.59 KG/M2 | HEIGHT: 64 IN | OXYGEN SATURATION: 97 % | WEIGHT: 144 LBS | SYSTOLIC BLOOD PRESSURE: 140 MMHG | DIASTOLIC BLOOD PRESSURE: 92 MMHG

## 2017-08-31 DIAGNOSIS — I10 ESSENTIAL HYPERTENSION, BENIGN: ICD-10-CM

## 2017-08-31 DIAGNOSIS — R93.1 ABNORMAL ECHOCARDIOGRAM: ICD-10-CM

## 2017-08-31 PROCEDURE — 99214 OFFICE O/P EST MOD 30 MIN: CPT | Performed by: INTERNAL MEDICINE

## 2017-08-31 RX ORDER — CARVEDILOL 12.5 MG/1
12.5 TABLET ORAL 2 TIMES DAILY WITH MEALS
Qty: 60 TAB | Refills: 11 | Status: SHIPPED | OUTPATIENT
Start: 2017-08-31 | End: 2017-09-13

## 2017-08-31 NOTE — PROGRESS NOTES
"Subjective:   Seamus Palencia is a 52 y.o. male who presents today For follow-up evaluation of heart failure clinic. He was found to have reduced left ventricle systolic function on echocardiography in 2015. He has a history of hypertension but was not taking medication until about a year ago.    He denies any chest discomfort, dyspnea, edema, palpitations or lightheadedness.    Past Medical History:   Diagnosis Date   • HIV disease (CMS-HCC) 1995   • Heart failure (CMS-HCC)    • Hypertension    • Seizure (CMS-HCC)    • Stroke (CMS-HCC)      Past Surgical History:   Procedure Laterality Date   • CHOLECYSTECTOMY  1980s     Family History   Problem Relation Age of Onset   • Diabetes Sister    • Other Brother      down syndrome   • No Known Problems Sister    • No Known Problems Sister    • No Known Problems Sister      History   Smoking Status   • Current Some Day Smoker   • Packs/day: 0.25   • Types: Cigarettes   • Last attempt to quit: 8/5/2011   Smokeless Tobacco   • Never Used     No Known Allergies  Outpatient Encounter Prescriptions as of 8/31/2017   Medication Sig Dispense Refill   • acetaminophen (TYLENOL) 325 MG Tab Take 650 mg by mouth every four hours as needed.     • atorvastatin (LIPITOR) 10 MG Tab Take 10 mg by mouth every evening.     • carvedilol (COREG) 6.25 MG Tab Take 1 Tab by mouth 2 times a day, with meals. 60 Tab 11   • TRIUMEQ 600- MG Tab Take 1 Tab by mouth.  5   • lisinopril (PRINIVIL) 20 MG Tab TAKE ONE TABLET BY MOUTH DAILY  2   • sulfamethoxazole-trimethoprim (BACTRIM DS) 800-160 MG tablet Take 1 Tab by mouth every day.     • levetiracetam (KEPPRA) 500 MG Tab Take 1 Tab by mouth every 12 hours. 60 Tab 3   • amlodipine (NORVASC) 10 MG Tab Take 10 mg by mouth every day.       No facility-administered encounter medications on file as of 8/31/2017.      ROS     Objective:   /92   Pulse 72   Ht 1.626 m (5' 4\")   Wt 65.3 kg (144 lb)   SpO2 97%   BMI 24.72 kg/m²     Physical Exam "   Neck: No JVD present.   Cardiovascular: Normal rate and regular rhythm.  Exam reveals no gallop.    Murmur (2/6 systolic at the base) heard.  Pulmonary/Chest: Effort normal. He has no rales.   Abdominal: Soft. There is no tenderness.   Musculoskeletal: He exhibits no edema.     Echocardiogram:  CONCLUSIONS  No prior study is available for comparison.   Normal left ventricular systolic function.   No evidence of valvular abnormality based on Doppler evaluation.     Exam Date:         08/30/2017    Echocardiogram review: Patient's echocardiogram was reviewed by myself and he has normal diastolic function based on 2016 criteria.                  Assessment:     1. Essential hypertension, benign     2. Abnormal echocardiogram: LVEF 45% in 2015         Medical Decision Making:  Today's Assessment / Status / Plan:     Mr. Palencia has had significant difficulty with hypertension. He did have LV dysfunction in 2015 when his hypertension was not controlled. He has been taking medication and his blood pressure appears to be controlled. He is euvolemic on examination and his echocardiogram shows no evidence of systolic or diastolic dysfunction. His blood pressure is not under optimal control and we will have him increase carvedilol to 12.5 mg twice a day. I would like him to check his blood pressures 3 times a week at various times a day. He will follow-up in about a month and we'll consider further adjustment of his antihypertensive therapy.

## 2017-09-13 ENCOUNTER — OFFICE VISIT (OUTPATIENT)
Dept: CARDIOLOGY | Facility: MEDICAL CENTER | Age: 52
End: 2017-09-13
Payer: MEDICAID

## 2017-09-13 VITALS
SYSTOLIC BLOOD PRESSURE: 180 MMHG | WEIGHT: 146 LBS | HEIGHT: 64 IN | HEART RATE: 80 BPM | BODY MASS INDEX: 24.92 KG/M2 | RESPIRATION RATE: 9 BRPM | OXYGEN SATURATION: 97 % | DIASTOLIC BLOOD PRESSURE: 100 MMHG

## 2017-09-13 DIAGNOSIS — I51.89 LEFT VENTRICULAR DIASTOLIC DYSFUNCTION, NYHA CLASS 1: ICD-10-CM

## 2017-09-13 DIAGNOSIS — Z86.73 HISTORY OF CVA (CEREBROVASCULAR ACCIDENT): ICD-10-CM

## 2017-09-13 DIAGNOSIS — I50.30 ACC/AHA STAGE C HEART FAILURE WITH PRESERVED EJECTION FRACTION (HCC): ICD-10-CM

## 2017-09-13 DIAGNOSIS — I10 HTN (HYPERTENSION), MALIGNANT: ICD-10-CM

## 2017-09-13 PROCEDURE — 99214 OFFICE O/P EST MOD 30 MIN: CPT | Performed by: INTERNAL MEDICINE

## 2017-09-13 RX ORDER — CARVEDILOL 25 MG/1
25 TABLET ORAL 2 TIMES DAILY WITH MEALS
Qty: 180 TAB | Refills: 3 | Status: SHIPPED | OUTPATIENT
Start: 2017-09-13 | End: 2017-10-20 | Stop reason: SDUPTHER

## 2017-09-13 RX ORDER — LISINOPRIL 40 MG/1
40 TABLET ORAL DAILY
Qty: 90 TAB | Refills: 3 | Status: SHIPPED | OUTPATIENT
Start: 2017-09-13 | End: 2017-10-11 | Stop reason: SDUPTHER

## 2017-09-13 ASSESSMENT — ENCOUNTER SYMPTOMS
SHORTNESS OF BREATH: 0
PND: 0
EYE PAIN: 0
WEIGHT LOSS: 0
VOMITING: 0
HEADACHES: 0
EYE DISCHARGE: 0
DIZZINESS: 0
MYALGIAS: 0
NAUSEA: 0
FALLS: 0
CLAUDICATION: 0
CHILLS: 0
PALPITATIONS: 0
DOUBLE VISION: 0
HALLUCINATIONS: 0
BLOOD IN STOOL: 0
ORTHOPNEA: 0
SPEECH CHANGE: 0
BRUISES/BLEEDS EASILY: 0
BLURRED VISION: 0
COUGH: 0
DEPRESSION: 0
ABDOMINAL PAIN: 0
FEVER: 0
SENSORY CHANGE: 0
LOSS OF CONSCIOUSNESS: 0

## 2017-10-11 ENCOUNTER — OFFICE VISIT (OUTPATIENT)
Dept: CARDIOLOGY | Facility: MEDICAL CENTER | Age: 52
End: 2017-10-11
Payer: MEDICAID

## 2017-10-11 VITALS
SYSTOLIC BLOOD PRESSURE: 188 MMHG | HEIGHT: 64 IN | BODY MASS INDEX: 25.1 KG/M2 | OXYGEN SATURATION: 97 % | HEART RATE: 64 BPM | WEIGHT: 147 LBS | DIASTOLIC BLOOD PRESSURE: 106 MMHG

## 2017-10-11 DIAGNOSIS — I51.89 LEFT VENTRICULAR DIASTOLIC DYSFUNCTION, NYHA CLASS 1: ICD-10-CM

## 2017-10-11 DIAGNOSIS — I50.30 ACC/AHA STAGE C HEART FAILURE WITH PRESERVED EJECTION FRACTION (HCC): ICD-10-CM

## 2017-10-11 DIAGNOSIS — I10 HTN (HYPERTENSION), MALIGNANT: ICD-10-CM

## 2017-10-11 PROCEDURE — 99214 OFFICE O/P EST MOD 30 MIN: CPT | Performed by: NURSE PRACTITIONER

## 2017-10-11 RX ORDER — LISINOPRIL 40 MG/1
40 TABLET ORAL 2 TIMES DAILY
Qty: 60 TAB | Refills: 11 | Status: SHIPPED | OUTPATIENT
Start: 2017-10-11 | End: 2018-02-22 | Stop reason: SDUPTHER

## 2017-10-11 RX ORDER — SPIRONOLACTONE 50 MG/1
50 TABLET, FILM COATED ORAL DAILY
Qty: 30 TAB | Refills: 3 | Status: SHIPPED | OUTPATIENT
Start: 2017-10-11 | End: 2018-02-22 | Stop reason: SDUPTHER

## 2017-10-11 ASSESSMENT — ENCOUNTER SYMPTOMS
DIZZINESS: 0
CLAUDICATION: 0
FEVER: 0
ABDOMINAL PAIN: 0
MYALGIAS: 0
PND: 0
ORTHOPNEA: 0
PALPITATIONS: 0
SHORTNESS OF BREATH: 0
COUGH: 0

## 2017-10-11 NOTE — PROGRESS NOTES
Subjective:   Seamus Palencia is a 52 y.o. male who presents today for follow up on his HTN and heart failure.    Patient of Dr. Marie. Patient was last seen 9/13/17.  During his last visit, his medications were titrated for his HTN. Lisinopril increased to 40 mg daily and carvedilol to 25 mg BID. Pt reports he was sent to ER by someone for elevated BP at [place]. Pt reports they didn't do anything.     Patient denies chest pain, shortness of breath at rest, ADLs, Exertion, palpitations, dizziness/lightheadedness, orthopnea, PND or Edema.     Pt not taking Home weights.     Additonally, patient has the following medical problems:    -HIV infection [facility] by [facility]    -HTN: takes carvedilol, lisinopril, amlodipine    Past Medical History:   Diagnosis Date   • HIV disease (CMS-HCC) 1995   • Heart failure (CMS-HCC)    • Hypertension    • Seizure (CMS-HCC)    • Stroke (CMS-HCC)      Past Surgical History:   Procedure Laterality Date   • CHOLECYSTECTOMY  1980s     Family History   Problem Relation Age of Onset   • Diabetes Sister    • Other Brother      down syndrome   • No Known Problems Sister    • No Known Problems Sister    • No Known Problems Sister      History   Smoking Status   • Current Some Day Smoker   • Packs/day: 0.25   • Types: Cigarettes   • Last attempt to quit: 8/5/2011   Smokeless Tobacco   • Never Used     No Known Allergies  Outpatient Encounter Prescriptions as of 10/11/2017   Medication Sig Dispense Refill   • carvedilol (COREG) 25 MG Tab Take 1 Tab by mouth 2 times a day, with meals. 180 Tab 3   • lisinopril (PRINIVIL, ZESTRIL) 40 MG tablet Take 1 Tab by mouth every day. 90 Tab 3   • acetaminophen (TYLENOL) 325 MG Tab Take 650 mg by mouth every four hours as needed.     • atorvastatin (LIPITOR) 10 MG Tab Take 10 mg by mouth every evening.     • TRIUMEQ 600- MG Tab Take 1 Tab by mouth.  5   • sulfamethoxazole-trimethoprim (BACTRIM DS) 800-160 MG tablet Take 1 Tab by mouth every  "day.     • levetiracetam (KEPPRA) 500 MG Tab Take 1 Tab by mouth every 12 hours. 60 Tab 3   • amlodipine (NORVASC) 10 MG Tab Take 10 mg by mouth every day.       No facility-administered encounter medications on file as of 10/11/2017.      Review of Systems   Constitutional: Negative for fever and malaise/fatigue.   Respiratory: Negative for cough and shortness of breath.    Cardiovascular: Negative for chest pain, palpitations, orthopnea, claudication, leg swelling and PND.   Gastrointestinal: Negative for abdominal pain.   Musculoskeletal: Negative for myalgias.   Neurological: Negative for dizziness.   All other systems reviewed and are negative.       Objective:   BP (!) 188/106   Pulse 64   Ht 1.626 m (5' 4\")   Wt 66.7 kg (147 lb)   SpO2 97%   BMI 25.23 kg/m²     Physical Exam   Constitutional: He is oriented to person, place, and time. He appears well-developed and well-nourished.   HENT:   Head: Normocephalic and atraumatic.   Eyes: EOM are normal.   Neck: Normal range of motion. Neck supple. No JVD present.   Cardiovascular: Normal rate, regular rhythm, normal heart sounds and intact distal pulses.    No murmur heard.  Pulmonary/Chest: Effort normal and breath sounds normal. No respiratory distress. He has no wheezes. He has no rales.   Abdominal: Soft. Bowel sounds are normal.   Musculoskeletal: Normal range of motion. He exhibits no edema.   Neurological: He is alert and oriented to person, place, and time.   Skin: Skin is warm and dry.   Psychiatric: He has a normal mood and affect.   Nursing note and vitals reviewed.    Lab Results   Component Value Date/Time    CHOLSTRLTOT 49 (L) 08/19/2016 01:15 AM    LDL 16 08/19/2016 01:15 AM    HDL 17 (A) 08/19/2016 01:15 AM    TRIGLYCERIDE 80 08/19/2016 01:15 AM       Lab Results   Component Value Date/Time    SODIUM 139 08/22/2016 04:31 AM    POTASSIUM 4.0 08/22/2016 04:31 AM    CHLORIDE 110 08/22/2016 04:31 AM    CO2 22 08/22/2016 04:31 AM    GLUCOSE 96 " 08/22/2016 04:31 AM    BUN 5 (L) 08/22/2016 04:31 AM    CREATININE 0.67 08/22/2016 04:31 AM     Lab Results   Component Value Date/Time    ALKPHOSPHAT 89 08/18/2016 02:38 AM    ASTSGOT 7 (L) 08/18/2016 02:38 AM    ALTSGPT 7 08/18/2016 02:38 AM    TBILIRUBIN 0.3 08/18/2016 02:38 AM      Transthoracic Echo Report 12/27/15  Left ventricular ejection fraction is visually estimated to be 45%.  Septum hyopkinesis, Basal inferiolateral and basal to mid anterior wall   akinesis, grade III diastolic dysfunction.  Mild concentric left ventricular hypertrophy.  Small pericardial effusion without evidence of hemodynamic compromise.  Estimated right ventricular systolic pressure  is 60 mmHg.  Moderate mitral regurgitation.  Normal inferior vena cava size and inspiratory collapse.  No prior study is available for comparison.       Transthoracic Echo Report 8/30/17  No prior study is available for comparison.   Normal left ventricular systolic function.   No evidence of valvular abnormality based on Doppler evaluation.     Assessment:     1. HTN (hypertension), malignant  spironolactone (ALDACTONE) 50 MG Tab    lisinopril (PRINIVIL, ZESTRIL) 40 MG tablet    BASIC METABOLIC PANEL   2. ACC/AHA stage C heart failure with preserved ejection fraction (CMS-HCC)  lisinopril (PRINIVIL, ZESTRIL) 40 MG tablet    BASIC METABOLIC PANEL   3. Left ventricular diastolic dysfunction, NYHA class 1  lisinopril (PRINIVIL, ZESTRIL) 40 MG tablet    BASIC METABOLIC PANEL       Medical Decision Making:  Today's Assessment / Status / Plan:   1. HTN: BP is elevated today, asymptomatic, Discussed patient with Dr. Marie.  -Increase Lisinopril to 40 mg BID  -Start Spironolactone 50 mg Daily  -Continue Carvedilol 25 mg twice a day  -Continue amlodipine 10 mg daily  -BMP in 1 week  -Monitor and log Blood pressures at home. Call office or RTC if BP increasing or >180/100 or if symptoms of elevated blood pressure present. Reviewed s/sx of stroke and heart attack.  Patient to go to ER or call 911 if present.   -will obtain records from [place] [address]. HFrEF, Stage C, class 1: Based on physical examination findings, patient is euvolemic. No JVD, lungs are clear to auscultation, no pitting edema in bilateral lower extremities, no ascites.  -Continue carvedilol 25 mg twice a day  -Continue lisinopril per above  -Reinforced s/sx of worsening heart failure with patient and weight monitoring. Pt verbalizes understanding. Pt to call office or RTC if present.      FU in clinic in 1 week. Sooner if needed.    Patient verbalizes understanding and agrees with the plan of care.     Collaborating MD: Lm Marie MD

## 2017-10-20 ENCOUNTER — OFFICE VISIT (OUTPATIENT)
Dept: CARDIOLOGY | Facility: MEDICAL CENTER | Age: 52
End: 2017-10-20
Payer: MEDICAID

## 2017-10-20 VITALS
HEART RATE: 72 BPM | WEIGHT: 148 LBS | BODY MASS INDEX: 25.27 KG/M2 | DIASTOLIC BLOOD PRESSURE: 90 MMHG | SYSTOLIC BLOOD PRESSURE: 150 MMHG | HEIGHT: 64 IN | OXYGEN SATURATION: 98 %

## 2017-10-20 DIAGNOSIS — I51.89 LEFT VENTRICULAR DIASTOLIC DYSFUNCTION, NYHA CLASS 1: ICD-10-CM

## 2017-10-20 DIAGNOSIS — I10 HTN (HYPERTENSION), MALIGNANT: ICD-10-CM

## 2017-10-20 DIAGNOSIS — I50.30 ACC/AHA STAGE C HEART FAILURE WITH PRESERVED EJECTION FRACTION (HCC): ICD-10-CM

## 2017-10-20 PROCEDURE — 99214 OFFICE O/P EST MOD 30 MIN: CPT | Performed by: NURSE PRACTITIONER

## 2017-10-20 RX ORDER — CARVEDILOL 25 MG/1
37.5 TABLET ORAL 2 TIMES DAILY WITH MEALS
Qty: 90 TAB | Refills: 11 | Status: SHIPPED | OUTPATIENT
Start: 2017-10-20 | End: 2017-11-03 | Stop reason: SDUPTHER

## 2017-10-20 ASSESSMENT — ENCOUNTER SYMPTOMS
ORTHOPNEA: 0
SHORTNESS OF BREATH: 0
FEVER: 0
MYALGIAS: 0
ABDOMINAL PAIN: 0
PND: 0
CLAUDICATION: 0
PALPITATIONS: 0
COUGH: 0
DIZZINESS: 0

## 2017-10-20 NOTE — PROGRESS NOTES
Subjective:   Seamus Palencia is a 52 y.o. male who presents today for follow up on his HTN and heart failure.    Patient of Dr. Marie. Patient was last seen 10/11/17.  During his last visit, his medications were titrated for his HTN. Lisinopril increased to 40 mg BID and started on Spironolactone 50 mg Daily.  Pt continues taking carvedilol 25 mg BID. Pt reportsNo problems with new medications.     Patient denies chest pain, shortness of breath at rest, ADLs, Exertion, palpitations, dizziness/lightheadedness, orthopnea, PND or Edema.     Home weight have been stable at 147 lbs. His blood pressure has been decreasing when he checks it at home.    Additonally, patient has the following medical problems:    -HIV infection followed by the [facility] clinic    -HTN: takes carvedilol, lisinopril, amlodipine    Past Medical History:   Diagnosis Date   • Heart failure (CMS-[facility])    • HIV disease (CMS-[facility]) 1995   • Hypertension    • Seizure (CMS-[facility])    • Stroke (CMS-HCC)      Past Surgical History:   Procedure Laterality Date   • CHOLECYSTECTOMY  1980s     Family History   Problem Relation Age of Onset   • Diabetes Sister    • Other Brother      down syndrome   • No Known Problems Sister    • No Known Problems Sister    • No Known Problems Sister      History   Smoking Status   • Current Some Day Smoker   • Packs/day: 0.25   • Types: Cigarettes   • Last attempt to quit: 8/5/2011   Smokeless Tobacco   • Never Used     No Known Allergies  Outpatient Encounter Prescriptions as of 10/20/2017   Medication Sig Dispense Refill   • carvedilol (COREG) 25 MG Tab Take 1.5 Tabs by mouth 2 times a day, with meals. 90 Tab 11   • spironolactone (ALDACTONE) 50 MG Tab Take 1 Tab by mouth every day. 30 Tab 3   • lisinopril (PRINIVIL, ZESTRIL) 40 MG tablet Take 1 Tab by mouth 2 times a day. 60 Tab 11   • acetaminophen (TYLENOL) 325 MG Tab Take 650 mg by mouth every four hours as needed.     • atorvastatin (LIPITOR) 10 MG Tab Take 10 mg by mouth  "every evening.     • TRIUMEQ 600- MG Tab Take 1 Tab by mouth.  5   • sulfamethoxazole-trimethoprim (BACTRIM DS) 800-160 MG tablet Take 1 Tab by mouth every day.     • levetiracetam (KEPPRA) 500 MG Tab Take 1 Tab by mouth every 12 hours. 60 Tab 3   • amlodipine (NORVASC) 10 MG Tab Take 10 mg by mouth every day.     • [DISCONTINUED] carvedilol (COREG) 25 MG Tab Take 1 Tab by mouth 2 times a day, with meals. 180 Tab 3     No facility-administered encounter medications on file as of 10/20/2017.      Review of Systems   Constitutional: Negative for fever and malaise/fatigue.   Respiratory: Negative for cough and shortness of breath.    Cardiovascular: Negative for chest pain, palpitations, orthopnea, claudication, leg swelling and PND.   Gastrointestinal: Negative for abdominal pain.   Musculoskeletal: Negative for myalgias.   Neurological: Negative for dizziness.   All other systems reviewed and are negative.       Objective:   /90   Pulse 72   Ht 1.626 m (5' 4\")   Wt 67.1 kg (148 lb)   SpO2 98%   BMI 25.40 kg/m²     Physical Exam   Constitutional: He is oriented to person, place, and time. He appears well-developed and well-nourished.   HENT:   Head: Normocephalic and atraumatic.   Eyes: EOM are normal.   Neck: Normal range of motion. Neck supple. No JVD present.   Cardiovascular: Normal rate, regular rhythm and intact distal pulses.    Murmur heard.   Systolic murmur is present with a grade of 2/6   Pulmonary/Chest: Effort normal and breath sounds normal. No respiratory distress. He has no wheezes. He has no rales.   Abdominal: Soft. Bowel sounds are normal.   Musculoskeletal: Normal range of motion. He exhibits no edema.   Neurological: He is alert and oriented to person, place, and time.   Skin: Skin is warm and dry.   Psychiatric: He has a normal mood and affect.   Nursing note and vitals reviewed.    Lab Results   Component Value Date/Time    CHOLSTRLTOT 49 (L) 08/19/2016 01:15 AM    LDL 16 " 08/19/2016 01:15 AM    HDL 17 (A) 08/19/2016 01:15 AM    TRIGLYCERIDE 80 08/19/2016 01:15 AM       Lab Results   Component Value Date/Time    SODIUM 139 08/22/2016 04:31 AM    POTASSIUM 4.0 08/22/2016 04:31 AM    CHLORIDE 110 08/22/2016 04:31 AM    CO2 22 08/22/2016 04:31 AM    GLUCOSE 96 08/22/2016 04:31 AM    BUN 5 (L) 08/22/2016 04:31 AM    CREATININE 0.67 08/22/2016 04:31 AM     Lab Results   Component Value Date/Time    ALKPHOSPHAT 89 08/18/2016 02:38 AM    ASTSGOT 7 (L) 08/18/2016 02:38 AM    ALTSGPT 7 08/18/2016 02:38 AM    TBILIRUBIN 0.3 08/18/2016 02:38 AM      Transthoracic Echo Report 12/27/15  Left ventricular ejection fraction is visually estimated to be 45%.  Septum hyopkinesis, Basal inferiolateral and basal to mid anterior wall   akinesis, grade III diastolic dysfunction.  Mild concentric left ventricular hypertrophy.  Small pericardial effusion without evidence of hemodynamic compromise.  Estimated right ventricular systolic pressure  is 60 mmHg.  Moderate mitral regurgitation.  Normal inferior vena cava size and inspiratory collapse.  No prior study is available for comparison.       Transthoracic Echo Report 8/30/17  No prior study is available for comparison.   Normal left ventricular systolic function.   No evidence of valvular abnormality based on Doppler evaluation.     Assessment:     1. HTN (hypertension), malignant  BASIC METABOLIC PANEL    carvedilol (COREG) 25 MG Tab   2. ACC/AHA stage C heart failure with preserved ejection fraction (CMS-HCC)  BASIC METABOLIC PANEL    carvedilol (COREG) 25 MG Tab   3. Left ventricular diastolic dysfunction, NYHA class 1  BASIC METABOLIC PANEL    carvedilol (COREG) 25 MG Tab       Medical Decision Making:  Today's Assessment / Status / Plan:   1. HTN: BP is still elevated today, asymptomatic, Discussed patient with Dr. Preciado.  -Increase Carvedilol to 37.5 mg twice a day  -Continue Lisinopril 40 mg BID  -Continue Spironolactone 50 mg Daily  -Continue  amlodipine 10 mg daily  -BMP in 1 week ( monitor Kidney function)  -Monitor and log Blood pressures at home. Call office or RTC if BP increasing or >180/100 or if symptoms of elevated blood pressure present. Reviewed s/sx of stroke and heart attack. Patient to go to ER or call 911 if present.     2. HFrEF, Stage C, class 1: Based on physical examination findings, patient is euvolemic. No JVD, lungs are clear to auscultation, no pitting edema in bilateral lower extremities, no ascites.  -Increase carvedilol to 37.5 mg twice a day  -Continue lisinopril 40 mg twice a day  -Reinforced s/sx of worsening heart failure with patient and weight monitoring. Pt verbalizes understanding. Pt to call office or RTC if present.     Maintain adequate hydration up to 2 L fluid per day.     FU in clinic in 2 weeks. Sooner if needed.    Patient verbalizes understanding and agrees with the plan of care.     Collaborating MD: Asiya Preciado MD

## 2017-10-20 NOTE — PATIENT INSTRUCTIONS
Increase carvedilol to 37.5 twice a day.   BMP in 1 week Increase fluid intake to approximately 2L (64 oz ) per day.

## 2017-10-30 ENCOUNTER — TELEPHONE (OUTPATIENT)
Dept: CARDIOLOGY | Facility: MEDICAL CENTER | Age: 52
End: 2017-10-30

## 2017-10-30 NOTE — TELEPHONE ENCOUNTER
Called patient to find out if he has had the blood work done that was ordered by Gloria hines on 10/20/17. Patient states he has not but will be going in tomorrow afternoon to get them done., Patient has a FV with Gloria on 11/3/17 @ 4:00pm*IRON

## 2017-11-03 ENCOUNTER — OFFICE VISIT (OUTPATIENT)
Dept: CARDIOLOGY | Facility: MEDICAL CENTER | Age: 52
End: 2017-11-03
Payer: MEDICAID

## 2017-11-03 VITALS
HEART RATE: 80 BPM | OXYGEN SATURATION: 98 % | SYSTOLIC BLOOD PRESSURE: 160 MMHG | DIASTOLIC BLOOD PRESSURE: 100 MMHG | HEIGHT: 64 IN | BODY MASS INDEX: 24.52 KG/M2 | WEIGHT: 143.6 LBS

## 2017-11-03 DIAGNOSIS — I51.89 LEFT VENTRICULAR DIASTOLIC DYSFUNCTION, NYHA CLASS 1: ICD-10-CM

## 2017-11-03 DIAGNOSIS — R06.09 DYSPNEA ON EXERTION: ICD-10-CM

## 2017-11-03 DIAGNOSIS — I10 HTN (HYPERTENSION), MALIGNANT: ICD-10-CM

## 2017-11-03 DIAGNOSIS — I50.30 ACC/AHA STAGE C HEART FAILURE WITH PRESERVED EJECTION FRACTION (HCC): ICD-10-CM

## 2017-11-03 DIAGNOSIS — B20 HIV (HUMAN IMMUNODEFICIENCY VIRUS INFECTION) (HCC): ICD-10-CM

## 2017-11-03 PROCEDURE — 99214 OFFICE O/P EST MOD 30 MIN: CPT | Mod: 25 | Performed by: NURSE PRACTITIONER

## 2017-11-03 PROCEDURE — 94620 PR PULMONARY STRESS TESTING,SIMPLE: CPT | Performed by: NURSE PRACTITIONER

## 2017-11-03 RX ORDER — CARVEDILOL 25 MG/1
50 TABLET ORAL 2 TIMES DAILY WITH MEALS
Qty: 180 TAB | Refills: 3 | Status: SHIPPED | OUTPATIENT
Start: 2017-11-03 | End: 2018-02-07 | Stop reason: SDUPTHER

## 2017-11-03 ASSESSMENT — MINNESOTA LIVING WITH HEART FAILURE QUESTIONNAIRE (MLHF)
DIFFICULTY WORKING TO EARN A LIVING: 0
FEELING LIKE A BURDEN TO FAMILY AND FRIENDS: 1
TOTAL_SCORE: 18
DIFFICULTY WITH RECREATIONAL PASTIMES, SPORTS, HOBBIES: 1
TIRED, FATIGUED OR LOW ON ENERGY: 1
DIFFICULTY SLEEPING WELL AT NIGHT: 0
LOSS OF SELF CONTROL IN YOUR LIFE: 2
DIFFICULTY TO CONCENTRATE OR REMEMBERING THINGS: 1
DIFFICULTY WITH SEXUAL ACTIVITIES: 0
WALKING ABOUT OR CLIMBING STAIRS DIFFICULT: 3
MAKING YOU STAY IN A HOSPITAL: 0
SWELLING IN ANKLES OR LEGS: 0
MAKING YOU SHORT OF BREATH: 1
MAKING YOU WORRY: 3
EATING LESS FOODS YOU LIKE: 0
GIVING YOU SIDE EFFECTS FROM TREATMENTS: 0
MAKING YOU FEEL DEPRESSED: 2
HAVING TO SIT OR LIE DOWN DURING THE DAY: 1
WORKING AROUND THE HOUSE OR YARD DIFFICULT: 0
COSTING YOU MONEY FOR MEDICAL CARE: 0
DIFFICULTY GOING AWAY FROM HOME: 2
DIFFICULTY SOCIALIZING WITH FAMILY OR FRIENDS: 0

## 2017-11-03 ASSESSMENT — ENCOUNTER SYMPTOMS
CLAUDICATION: 0
ABDOMINAL PAIN: 0
ORTHOPNEA: 0
DIZZINESS: 0
SHORTNESS OF BREATH: 0
FEVER: 0
MYALGIAS: 0
COUGH: 0
PND: 0
PALPITATIONS: 0

## 2017-11-03 ASSESSMENT — NEW YORK HEART ASSOCIATION (NYHA) CLASSIFICATION: NYHA FUNCTIONAL CLASS: CLASS L

## 2017-11-03 ASSESSMENT — 6 MINUTE WALK TEST (6MWT): TOTAL DISTANCE WALKED (METERS): 377.95

## 2017-11-03 NOTE — PROGRESS NOTES
"Subjective:   Seamus Palencia is a 52 y.o. male who presents today for follow up on his HTN and heart failure.    Patient of Dr. Marie. Patient was last seen 10/20/17.  During his last visit, his medications were titrated for his HTN. Carvedilol was increased to 37.5 mg BID. Pt was continued on Lisinopril 40 mg BID, Spironolactone 50 mg Daily and amlodipine 10 mg daily. Pt reports no problems with medication changes.    Pt has been  Checking his BP at home and they remain > 150 systolic after his medications.     For his heart failure, he is essentially at NYHA class 1 symptoms.      Patient denies chest pain, shortness of breath at rest, ADLs, Exertion, palpitations, dizziness/lightheadedness, orthopnea, PND or Edema.     Home weight have been stable at 147 lbs.     At 6 minute walk test re-evaluation, patient was able to complete 378 m during his 6 minute walk test. His O2 saturation at baseline was 98% and at the end of the test, the O2 saturation was 98%. He reported level 0 of dyspnea on Danny scale. MLWHF 18    Pt does report that he quit smoking \"cold turkey\" last week.     Additonally, patient has the following medical problems:    -HIV infection followed by the [facility] clinic    -HTN: takes carvedilol, lisinopril, amlodipine    Past Medical History:   Diagnosis Date   • Heart failure (CMS-[facility])    • HIV disease (CMS-HCC) 1995   • Hypertension    • Seizure (CMS-HCC)    • Stroke (CMS-HCC)      Past Surgical History:   Procedure Laterality Date   • CHOLECYSTECTOMY  1980s     Family History   Problem Relation Age of Onset   • Diabetes Sister    • Other Brother      down syndrome   • No Known Problems Sister    • No Known Problems Sister    • No Known Problems Sister      History   Smoking Status   • Former Smoker   • Packs/day: 0.25   • Types: Cigarettes   • Quit date: 10/27/2017   Smokeless Tobacco   • Never Used     Comment: Stopped smoking late OCt 2017     No Known Allergies  Outpatient Encounter Prescriptions as " "of 11/3/2017   Medication Sig Dispense Refill   • carvedilol (COREG) 25 MG Tab Take 2 Tabs by mouth 2 times a day, with meals. 180 Tab 3   • spironolactone (ALDACTONE) 50 MG Tab Take 1 Tab by mouth every day. 30 Tab 3   • lisinopril (PRINIVIL, ZESTRIL) 40 MG tablet Take 1 Tab by mouth 2 times a day. 60 Tab 11   • atorvastatin (LIPITOR) 10 MG Tab Take 10 mg by mouth every evening.     • TRIUMEQ 600- MG Tab Take 1 Tab by mouth.  5   • levetiracetam (KEPPRA) 500 MG Tab Take 1 Tab by mouth every 12 hours. 60 Tab 3   • amlodipine (NORVASC) 10 MG Tab Take 10 mg by mouth 2 Times a Day.     • [DISCONTINUED] carvedilol (COREG) 25 MG Tab Take 1.5 Tabs by mouth 2 times a day, with meals. 90 Tab 11   • acetaminophen (TYLENOL) 325 MG Tab Take 650 mg by mouth every four hours as needed.     • sulfamethoxazole-trimethoprim (BACTRIM DS) 800-160 MG tablet Take 1 Tab by mouth every day.       No facility-administered encounter medications on file as of 11/3/2017.      Review of Systems   Constitutional: Negative for fever and malaise/fatigue.   Respiratory: Negative for cough and shortness of breath.    Cardiovascular: Negative for chest pain, palpitations, orthopnea, claudication, leg swelling and PND.   Gastrointestinal: Negative for abdominal pain.   Musculoskeletal: Negative for myalgias.   Neurological: Negative for dizziness.   All other systems reviewed and are negative.       Objective:   /100   Pulse 80   Ht 1.626 m (5' 4\")   Wt 65.1 kg (143 lb 9.6 oz)   SpO2 98%   BMI 24.65 kg/m²     Physical Exam   Constitutional: He is oriented to person, place, and time. He appears well-developed and well-nourished.   HENT:   Head: Normocephalic and atraumatic.   Eyes: EOM are normal.   Neck: Normal range of motion. Neck supple. No JVD present.   Cardiovascular: Normal rate, regular rhythm and intact distal pulses.    Murmur heard.   Systolic murmur is present with a grade of 2/6   Pulmonary/Chest: Effort normal and " breath sounds normal. No respiratory distress. He has no wheezes. He has no rales.   Abdominal: Soft. Bowel sounds are normal.   Musculoskeletal: Normal range of motion. He exhibits no edema.   Neurological: He is alert and oriented to person, place, and time.   Neuro Grossly intact   Skin: Skin is warm and dry.   Psychiatric: He has a normal mood and affect.   Nursing note and vitals reviewed.    Lab Results   Component Value Date/Time    CHOLSTRLTOT 49 (L) 08/19/2016 01:15 AM    LDL 16 08/19/2016 01:15 AM    HDL 17 (A) 08/19/2016 01:15 AM    TRIGLYCERIDE 80 08/19/2016 01:15 AM       Lab Results   Component Value Date/Time    SODIUM 139 08/22/2016 04:31 AM    POTASSIUM 4.0 08/22/2016 04:31 AM    CHLORIDE 110 08/22/2016 04:31 AM    CO2 22 08/22/2016 04:31 AM    GLUCOSE 96 08/22/2016 04:31 AM    BUN 5 (L) 08/22/2016 04:31 AM    CREATININE 0.67 08/22/2016 04:31 AM     Lab Results   Component Value Date/Time    ALKPHOSPHAT 89 08/18/2016 02:38 AM    ASTSGOT 7 (L) 08/18/2016 02:38 AM    ALTSGPT 7 08/18/2016 02:38 AM    TBILIRUBIN 0.3 08/18/2016 02:38 AM      Transthoracic Echo Report 12/27/15  Left ventricular ejection fraction is visually estimated to be 45%.  Septum hyopkinesis, Basal inferiolateral and basal to mid anterior wall   akinesis, grade III diastolic dysfunction.  Mild concentric left ventricular hypertrophy.  Small pericardial effusion without evidence of hemodynamic compromise.  Estimated right ventricular systolic pressure  is 60 mmHg.  Moderate mitral regurgitation.  Normal inferior vena cava size and inspiratory collapse.  No prior study is available for comparison.       Transthoracic Echo Report 8/30/17  No prior study is available for comparison.   Normal left ventricular systolic function.   No evidence of valvular abnormality based on Doppler evaluation.     Assessment:     1. HTN (hypertension), malignant  carvedilol (COREG) 25 MG Tab   2. ACC/AHA stage C heart failure with preserved ejection  fraction (CMS-HCC)  carvedilol (COREG) 25 MG Tab   3. Left ventricular diastolic dysfunction, NYHA class 1  carvedilol (COREG) 25 MG Tab   4. HIV (human immunodeficiency virus infection) (CMS-HCC)     5. Dyspnea on exertion         Medical Decision Making:  Today's Assessment / Status / Plan:   1. HTN: BP is still elevated today, asymptomatic, Discussed patient with Dr. Marie.  -Increase Carvedilol to 50 mg twice a day  -Increase amlodipine to 10 mg BID  -Continue Lisinopril 40 mg BID  -Continue Spironolactone 50 mg Daily  -Continue to Monitor and log Blood pressures at home. Call office or RTC if BP increasing, decreasing or >180/100, <90/50 or if symptoms of elevated/low blood pressure present. Reviewed s/sx of stroke and heart attack. Patient to go to ER or call 911 if present.     2. HFrEF, Stage C, class 1: Based on physical examination findings, patient is euvolemic. No JVD, lungs are clear to auscultation, no pitting edema in bilateral lower extremities, no ascites.  -Increase carvedilol to 50 mg twice a day  -Continue lisinopril 40 mg twice a day  -Reinforced s/sx of worsening heart failure with patient and weight monitoring. Pt verbalizes understanding. Pt to call office or RTC if present.     3. HIV:  -Continue follow up with [facility]    Continue with smoking Cessation.     Maintain adequate hydration up to 2 L fluid per day.     FU in clinic in 2 weeks. Sooner if needed.    Patient verbalizes understanding and agrees with the plan of care.     Collaborating MD: Lm Marie MD

## 2017-11-03 NOTE — PATIENT INSTRUCTIONS
Increase amlodipine to 10 mg Twice a day  Increase Carvedilol to 50 mg Twice a day  Monitor BP and HR at home, call office if low or problems

## 2017-11-17 ENCOUNTER — OFFICE VISIT (OUTPATIENT)
Dept: CARDIOLOGY | Facility: MEDICAL CENTER | Age: 52
End: 2017-11-17
Payer: MEDICAID

## 2017-11-17 VITALS
DIASTOLIC BLOOD PRESSURE: 90 MMHG | OXYGEN SATURATION: 97 % | HEART RATE: 78 BPM | BODY MASS INDEX: 24.75 KG/M2 | WEIGHT: 145 LBS | RESPIRATION RATE: 14 BRPM | SYSTOLIC BLOOD PRESSURE: 144 MMHG | HEIGHT: 64 IN

## 2017-11-17 DIAGNOSIS — I10 HTN (HYPERTENSION), MALIGNANT: ICD-10-CM

## 2017-11-17 PROCEDURE — 99214 OFFICE O/P EST MOD 30 MIN: CPT | Performed by: NURSE PRACTITIONER

## 2017-11-17 RX ORDER — ASPIRIN 81 MG
1 TABLET, DELAYED RELEASE (ENTERIC COATED) ORAL DAILY
Refills: 2 | Status: ON HOLD | COMMUNITY
Start: 2017-11-03 | End: 2020-12-20

## 2017-11-17 ASSESSMENT — ENCOUNTER SYMPTOMS
MYALGIAS: 0
CLAUDICATION: 0
COUGH: 0
ABDOMINAL PAIN: 0
DIZZINESS: 0
FEVER: 0
PALPITATIONS: 0
PND: 0
ORTHOPNEA: 0
SHORTNESS OF BREATH: 0

## 2017-11-17 NOTE — PROGRESS NOTES
Subjective:   Seamus Palencia is a 52 y.o. male who presents today for follow up on his HTN and heart failure.    Patient of Dr. Marie. Patient was last seen 11/3/17.  During his last visit, his medications were titrated for his HTN. Carvedilol was increased to 50 mg BID and amlodipine to 10 mg BID. Pt was continued on Lisinopril 40 mg BID, Spironolactone 50 mg Daily. Pt reports no problems with medication changes.    Pt has been Checking his BP at home and they are between  systolic after his medications.     For his heart failure, he is essentially at NYHA class 1 symptoms.      Patient denies chest pain, shortness of breath at rest, ADLs, Exertion, palpitations, dizziness/lightheadedness, orthopnea, PND or Edema.     Home weight have been stable at 147 lbs.     At 6 minute walk test re-evaluation, patient was able to complete 378 m during his 6 minute walk test. His O2 saturation at baseline was 98% and at the end of the test, the O2 saturation was 98%. He reported level 0 of dyspnea on Danny scale. MLWHF 18    Pt does reports the he is smoking a few cigarettes per day and is going to smoking cessation program.     Additonally, patient has the following medical problems:    -HIV infection followed by the [facility]    -HTN: takes carvedilol, lisinopril, amlodipine    Past Medical History:   Diagnosis Date   • Heart failure (CMS-[facility])    • HIV disease (CMS-HCC) 1995   • Hypertension    • Seizure (CMS-HCC)    • Stroke (CMS-HCC)      Past Surgical History:   Procedure Laterality Date   • CHOLECYSTECTOMY  1980s     Family History   Problem Relation Age of Onset   • Diabetes Sister    • Other Brother      down syndrome   • No Known Problems Sister    • No Known Problems Sister    • No Known Problems Sister      History   Smoking Status   • Former Smoker   • Packs/day: 0.25   • Types: Cigarettes   • Quit date: 10/27/2017   Smokeless Tobacco   • Never Used     Comment: Stopped smoking late OCt 2017     No Known  "Allergies  Outpatient Encounter Prescriptions as of 11/17/2017   Medication Sig Dispense Refill   • ASPIRIN LOW DOSE 81 MG EC tablet Take 1 Tab by mouth every day.  2   • carvedilol (COREG) 25 MG Tab Take 2 Tabs by mouth 2 times a day, with meals. 180 Tab 3   • spironolactone (ALDACTONE) 50 MG Tab Take 1 Tab by mouth every day. 30 Tab 3   • lisinopril (PRINIVIL, ZESTRIL) 40 MG tablet Take 1 Tab by mouth 2 times a day. 60 Tab 11   • atorvastatin (LIPITOR) 10 MG Tab Take 10 mg by mouth every evening.     • TRIUMEQ 600- MG Tab Take 1 Tab by mouth every day.  5   • sulfamethoxazole-trimethoprim (BACTRIM DS) 800-160 MG tablet Take 1 Tab by mouth every day.     • levetiracetam (KEPPRA) 500 MG Tab Take 1 Tab by mouth every 12 hours. 60 Tab 3   • amlodipine (NORVASC) 10 MG Tab Take 10 mg by mouth 2 Times a Day.     • acetaminophen (TYLENOL) 325 MG Tab Take 650 mg by mouth every four hours as needed.       No facility-administered encounter medications on file as of 11/17/2017.      Review of Systems   Constitutional: Negative for fever and malaise/fatigue.   Respiratory: Negative for cough and shortness of breath.    Cardiovascular: Negative for chest pain, palpitations, orthopnea, claudication, leg swelling and PND.   Gastrointestinal: Negative for abdominal pain.   Musculoskeletal: Negative for myalgias.   Neurological: Negative for dizziness.   All other systems reviewed and are negative.       Objective:   /90   Pulse 78   Resp 14   Ht 1.626 m (5' 4\")   Wt 65.8 kg (145 lb)   SpO2 97%   BMI 24.89 kg/m²     Physical Exam   Constitutional: He is oriented to person, place, and time. He appears well-developed and well-nourished.   HENT:   Head: Normocephalic and atraumatic.   Eyes: EOM are normal.   Neck: Normal range of motion. Neck supple. No JVD present.   Cardiovascular: Normal rate, regular rhythm and intact distal pulses.    Murmur heard.   Systolic murmur is present with a grade of 2/6 "   Pulmonary/Chest: Effort normal and breath sounds normal. No respiratory distress. He has no wheezes. He has no rales.   Abdominal: Soft. Bowel sounds are normal.   Musculoskeletal: Normal range of motion. He exhibits no edema.   Neurological: He is alert and oriented to person, place, and time.   Neuro Grossly intact   Skin: Skin is warm and dry.   Psychiatric: He has a normal mood and affect.   Nursing note and vitals reviewed.    Lab Results   Component Value Date/Time    CHOLSTRLTOT 49 (L) 08/19/2016 01:15 AM    LDL 16 08/19/2016 01:15 AM    HDL 17 (A) 08/19/2016 01:15 AM    TRIGLYCERIDE 80 08/19/2016 01:15 AM       Lab Results   Component Value Date/Time    SODIUM 139 08/22/2016 04:31 AM    POTASSIUM 4.0 08/22/2016 04:31 AM    CHLORIDE 110 08/22/2016 04:31 AM    CO2 22 08/22/2016 04:31 AM    GLUCOSE 96 08/22/2016 04:31 AM    BUN 5 (L) 08/22/2016 04:31 AM    CREATININE 0.67 08/22/2016 04:31 AM     Lab Results   Component Value Date/Time    ALKPHOSPHAT 89 08/18/2016 02:38 AM    ASTSGOT 7 (L) 08/18/2016 02:38 AM    ALTSGPT 7 08/18/2016 02:38 AM    TBILIRUBIN 0.3 08/18/2016 02:38 AM      Transthoracic Echo Report 12/27/15  Left ventricular ejection fraction is visually estimated to be 45%.  Septum hyopkinesis, Basal inferiolateral and basal to mid anterior wall   akinesis, grade III diastolic dysfunction.  Mild concentric left ventricular hypertrophy.  Small pericardial effusion without evidence of hemodynamic compromise.  Estimated right ventricular systolic pressure  is 60 mmHg.  Moderate mitral regurgitation.  Normal inferior vena cava size and inspiratory collapse.  No prior study is available for comparison.       Transthoracic Echo Report 8/30/17  No prior study is available for comparison.   Normal left ventricular systolic function.   No evidence of valvular abnormality based on Doppler evaluation.     Assessment:     No diagnosis found.    Medical Decision Making:  Today's Assessment / Status / Plan:   1.  HTN: BP is better today, asymptomatic, Discussed patient with Dr. Marie.  -Obtain Renal Ultrasound to look for Renal artery stenosis.  -Continue Carvedilol 50 mg twice a day  -Continue amlodipine 10 mg BID  -Continue Lisinopril 40 mg BID  -Continue Spironolactone 50 mg Daily  -Continue to Monitor and log Blood pressures at home. Call office or RTC if BP increasing, decreasing or >180/100, <90/50 or if symptoms of elevated/low blood pressure present. Reviewed s/sx of stroke and heart attack. Patient to go to ER or call 911 if present.     2. HFrEF, Stage C, class 1: Based on physical examination findings, patient is euvolemic. No JVD, lungs are clear to auscultation, no pitting edema in bilateral lower extremities, no ascites.  -Continue carvedilol 50 mg twice a day  -Continue lisinopril 40 mg twice a day  -Reinforced s/sx of worsening heart failure with patient and weight monitoring. Pt verbalizes understanding. Pt to call office or RTC if present.     3. HIV:  -Continue follow up with [facility]    Continue with smoking Cessation. Pt to start class in December    Maintain adequate hydration up to 2 L fluid per day.     FU in clinic in 2 months with Dr. Marie. Sooner if needed.    Patient verbalizes understanding and agrees with the plan of care.     Collaborating MD: Lm Marie MD

## 2017-11-30 ENCOUNTER — HOSPITAL ENCOUNTER (OUTPATIENT)
Dept: RADIOLOGY | Facility: MEDICAL CENTER | Age: 52
End: 2017-11-30
Attending: NURSE PRACTITIONER
Payer: MEDICAID

## 2017-11-30 DIAGNOSIS — I10 HTN (HYPERTENSION), MALIGNANT: ICD-10-CM

## 2017-11-30 PROCEDURE — 93975 VASCULAR STUDY: CPT

## 2017-12-06 ENCOUNTER — TELEPHONE (OUTPATIENT)
Dept: CARDIOLOGY | Facility: MEDICAL CENTER | Age: 52
End: 2017-12-06

## 2017-12-07 NOTE — TELEPHONE ENCOUNTER
clarification asap on amlodipine   Received: Today   Message Contents   Lyla Pennington R.N.   Phone Number: [phone]             NEREIDA/hannah     (Pt at [facility] before 5pm to  script)   Marcellus at [facility] calling for clarification of NEREIDA orders for amlodipine 10 mg written as BID.   10 mg QD is typical dose.     [phone] hit prompt for pharmacy.    Please call asap.      Returned call. Confirmed dosing. Marcellus questions re: no benefit above 10 mg daily. He would like [facility] to call him back to discuss. Advised Marcellus that NEREIDA will be back in office tomorrow and I would alert her to request. He states he will hold off on fill until he speaks to her.

## 2018-02-06 ENCOUNTER — OFFICE VISIT (OUTPATIENT)
Dept: CARDIOLOGY | Facility: MEDICAL CENTER | Age: 53
End: 2018-02-06
Payer: MEDICAID

## 2018-02-06 ENCOUNTER — TELEPHONE (OUTPATIENT)
Dept: CARDIOLOGY | Facility: MEDICAL CENTER | Age: 53
End: 2018-02-06

## 2018-02-06 VITALS
HEART RATE: 78 BPM | DIASTOLIC BLOOD PRESSURE: 100 MMHG | OXYGEN SATURATION: 97 % | SYSTOLIC BLOOD PRESSURE: 210 MMHG | WEIGHT: 143 LBS | HEIGHT: 64 IN | BODY MASS INDEX: 24.41 KG/M2

## 2018-02-06 DIAGNOSIS — I10 HTN (HYPERTENSION), MALIGNANT: ICD-10-CM

## 2018-02-06 DIAGNOSIS — I50.30 ACC/AHA STAGE C HEART FAILURE WITH PRESERVED EJECTION FRACTION (HCC): ICD-10-CM

## 2018-02-06 DIAGNOSIS — B20 HIV (HUMAN IMMUNODEFICIENCY VIRUS INFECTION) (HCC): ICD-10-CM

## 2018-02-06 DIAGNOSIS — I51.89 LEFT VENTRICULAR DIASTOLIC DYSFUNCTION, NYHA CLASS 1: ICD-10-CM

## 2018-02-06 PROCEDURE — 99215 OFFICE O/P EST HI 40 MIN: CPT | Performed by: INTERNAL MEDICINE

## 2018-02-06 ASSESSMENT — ENCOUNTER SYMPTOMS
COUGH: 0
ABDOMINAL PAIN: 0
VOMITING: 0
LOSS OF CONSCIOUSNESS: 0
FEVER: 0
FALLS: 0
EYE DISCHARGE: 0
BLOOD IN STOOL: 0
PND: 0
SENSORY CHANGE: 0
MYALGIAS: 0
CLAUDICATION: 0
DIZZINESS: 0
ORTHOPNEA: 0
DOUBLE VISION: 0
WEIGHT LOSS: 0
BLURRED VISION: 0
EYE PAIN: 0
SPEECH CHANGE: 0
HEADACHES: 0
HALLUCINATIONS: 0
NAUSEA: 0
DEPRESSION: 0
SHORTNESS OF BREATH: 0
PALPITATIONS: 0
CHILLS: 0
BRUISES/BLEEDS EASILY: 0

## 2018-02-06 NOTE — PROGRESS NOTES
Subjective:   Seamus Palencia is a 52 y.o. male who presents today for cardiac care and evaluation in our heart failure clinic as a referral from [facility]. Patient does have history of hypertension, HIV infection, history of stroke. Patient is actually a poor historian. He does not know what medication he is taking. He does not know the details of his medical history. He doesn't have any residual neurological deficits however. He does report of shortness of breath if walking upstairs. No symptom at rest or with daily living activities. He did have a transthoracic echocardiogram done in 2015 which shows LV function of 45%. No significant valvular disease seems at that time.     Patient was able to complete 396 m during his 6 minute walk test. his O2 saturation at baseline was 99% and at the end of the test, the O2 saturation was 100%. he reported 3 level of dyspnea on Danny scale.     LV function has improved and normalized after medical therapy.     Today his blood pressure is not controlled again. I suspect the patient is not taking his medications.    He also smells like ETOH intoxication.    Chief Complaint: HTN.    Past Medical History:   Diagnosis Date   • Heart failure (CMS-HCC)    • HIV disease (CMS-HCC) 1995   • Hypertension    • Seizure (CMS-HCC)    • Stroke (CMS-HCC)      Past Surgical History:   Procedure Laterality Date   • CHOLECYSTECTOMY  1980s     Family History   Problem Relation Age of Onset   • Diabetes Sister    • Other Brother      down syndrome   • No Known Problems Sister    • No Known Problems Sister    • No Known Problems Sister      History   Smoking Status   • Former Smoker   • Packs/day: 0.25   • Types: Cigarettes   • Quit date: 10/27/2017   Smokeless Tobacco   • Never Used     Comment: Stopped smoking late OCt 2017     No Known Allergies  Outpatient Encounter Prescriptions as of 2/6/2018   Medication Sig Dispense Refill   • carvedilol (COREG) 25 MG Tab Take 2 Tabs by mouth 2 times a  "day, with meals. 180 Tab 3   • spironolactone (ALDACTONE) 50 MG Tab Take 1 Tab by mouth every day. 30 Tab 3   • lisinopril (PRINIVIL, ZESTRIL) 40 MG tablet Take 1 Tab by mouth 2 times a day. 60 Tab 11   • atorvastatin (LIPITOR) 10 MG Tab Take 10 mg by mouth every evening.     • TRIUMEQ 600- MG Tab Take 1 Tab by mouth every day.  5   • levetiracetam (KEPPRA) 500 MG Tab Take 1 Tab by mouth every 12 hours. 60 Tab 3   • amlodipine (NORVASC) 10 MG Tab Take 10 mg by mouth 2 Times a Day.     • ASPIRIN LOW DOSE 81 MG EC tablet Take 1 Tab by mouth every day.  2   • acetaminophen (TYLENOL) 325 MG Tab Take 650 mg by mouth every four hours as needed.     • sulfamethoxazole-trimethoprim (BACTRIM DS) 800-160 MG tablet Take 1 Tab by mouth every day.       No facility-administered encounter medications on file as of 2/6/2018.      Review of Systems   Constitutional: Negative for chills, fever, malaise/fatigue and weight loss.   HENT: Negative for ear discharge, ear pain, hearing loss and nosebleeds.    Eyes: Negative for blurred vision, double vision, pain and discharge.   Respiratory: Negative for cough and shortness of breath.    Cardiovascular: Negative for chest pain, palpitations, orthopnea, claudication, leg swelling and PND.   Gastrointestinal: Negative for abdominal pain, blood in stool, melena, nausea and vomiting.   Genitourinary: Negative for dysuria and hematuria.   Musculoskeletal: Negative for falls, joint pain and myalgias.   Skin: Negative for itching and rash.   Neurological: Negative for dizziness, sensory change, speech change, loss of consciousness and headaches.   Endo/Heme/Allergies: Negative for environmental allergies. Does not bruise/bleed easily.   Psychiatric/Behavioral: Negative for depression, hallucinations and suicidal ideas.        Objective:   BP (!) 210/100   Pulse 78   Ht 1.626 m (5' 4\")   Wt 64.9 kg (143 lb)   SpO2 97%   BMI 24.55 kg/m²     Physical Exam   Constitutional: He is " oriented to person, place, and time. No distress.   Not well kept. Smells like alcohol.   HENT:   Head: Normocephalic and atraumatic.   Eyes: EOM are normal.   Neck: Normal range of motion. No JVD present.   Cardiovascular: Normal rate, regular rhythm, normal heart sounds and intact distal pulses.  Exam reveals no gallop and no friction rub.    No murmur heard.  Bilateral femoral pulses are 2+, bilateral dorsalis pedis pulses are 2+, bilateral posterior tibialis pulses are 2+.   Pulmonary/Chest: No respiratory distress. He has no wheezes. He has no rales. He exhibits no tenderness.   Abdominal: Soft. Bowel sounds are normal. There is no tenderness. There is no rebound and no guarding.   The is no presence of abdominal bruits   Musculoskeletal: Normal range of motion.   Neurological: He is alert and oriented to person, place, and time.   Skin: Skin is warm and dry.   Psychiatric: He has a normal mood and affect.   Nursing note and vitals reviewed.      Assessment:     1. HTN (hypertension), malignant     2. ACC/AHA stage C heart failure with preserved ejection fraction (CMS-[facility])     3. Left ventricular diastolic dysfunction, NYHA class 1     4. HIV (human immunodeficiency virus infection) (CMS-HCC)         Medical Decision Making:  Today's Assessment / Status / Plan:   Patient does not have any symptoms at this point.  I do think that his blood pressure is very dangerously high. I advised patient to go to the ER for further evaluation and treatment. However, patient refused to do so. He does not want to go to the ER. I explained to him the risk of having a big stroke and potentially death however patient accepts it and do not want to go to the ER.    At this point, the best thing I could think of is to bring him back tomorrow and have him bring all of his bottles of medications with him. This way, I would know for sure if he is taking his medications or not and also ??? at the correct dosage.    Again, overall, I do  suspect that patient is not taking his medication on a regular basis and he is actively using alcohol.    Very challenging patients. Poor prognosis overall.

## 2018-02-06 NOTE — TELEPHONE ENCOUNTER
Per APRN, call patient to remind him to bring all his medication bottles with him to tomorrow's appointment.     Pt called. Pt advised of above. Appt date/time confirmed with patient. Pt states he remembers but thank you for calling to remind him again. Very pleasant man.

## 2018-02-06 NOTE — TELEPHONE ENCOUNTER
Per APRN, call patient to remind him to bring all his medication bottles with him to tomorrow's appointment.     Attempted to contact patient, no answer.  No voicemail set up

## 2018-02-06 NOTE — LETTER
[facility]   [address] [place] [address]  LAURY Mckeon [address]  Phone: [phone]  Fax: [phone]              Seamus Palencia  1965    Encounter Date: 2/6/2018    Scooby Marie M.D.          PROGRESS NOTE:  Subjective:   Seamus Palencia is a 52 y.o. male who presents today for cardiac care and evaluation in our heart failure clinic as a referral from [facility]. Patient does have history of hypertension, HIV infection, history of stroke. Patient is actually a poor historian. He does not know what medication he is taking. He does not know the details of his medical history. He doesn't have any residual neurological deficits however. He does report of shortness of breath if walking upstairs. No symptom at rest or with daily living activities. He did have a transthoracic echocardiogram done in 2015 which shows LV function of 45%. No significant valvular disease seems at that time.     Patient was able to complete 396 m during his 6 minute walk test. his O2 saturation at baseline was 99% and at the end of the test, the O2 saturation was 100%. he reported 3 level of dyspnea on Danny scale.     LV function has improved and normalized after medical therapy.     Today his blood pressure is not controlled again. I suspect the patient is not taking his medications.    He also smells like ETOH intoxication.    Chief Complaint: HTN.    Past Medical History:   Diagnosis Date   • Heart failure (CMS-[facility])    • HIV disease (CMS-[facility]) 1995   • Hypertension    • Seizure (CMS-[facility])    • Stroke (CMS-[facility])      Past Surgical History:   Procedure Laterality Date   • CHOLECYSTECTOMY  1980s     Family History   Problem Relation Age of Onset   • Diabetes Sister    • Other Brother      down syndrome   • No Known Problems Sister    • No Known Problems Sister    • No Known Problems Sister      History   Smoking Status   • Former Smoker   • Packs/day: 0.25   • Types: Cigarettes   • Quit  date: 10/27/2017   Smokeless Tobacco   • Never Used     Comment: Stopped smoking late OCt 2017     No Known Allergies  Outpatient Encounter Prescriptions as of 2/6/2018   Medication Sig Dispense Refill   • carvedilol (COREG) 25 MG Tab Take 2 Tabs by mouth 2 times a day, with meals. 180 Tab 3   • spironolactone (ALDACTONE) 50 MG Tab Take 1 Tab by mouth every day. 30 Tab 3   • lisinopril (PRINIVIL, ZESTRIL) 40 MG tablet Take 1 Tab by mouth 2 times a day. 60 Tab 11   • atorvastatin (LIPITOR) 10 MG Tab Take 10 mg by mouth every evening.     • TRIUMEQ 600- MG Tab Take 1 Tab by mouth every day.  5   • levetiracetam (KEPPRA) 500 MG Tab Take 1 Tab by mouth every 12 hours. 60 Tab 3   • amlodipine (NORVASC) 10 MG Tab Take 10 mg by mouth 2 Times a Day.     • ASPIRIN LOW DOSE 81 MG EC tablet Take 1 Tab by mouth every day.  2   • acetaminophen (TYLENOL) 325 MG Tab Take 650 mg by mouth every four hours as needed.     • sulfamethoxazole-trimethoprim (BACTRIM DS) 800-160 MG tablet Take 1 Tab by mouth every day.       No facility-administered encounter medications on file as of 2/6/2018.      Review of Systems   Constitutional: Negative for chills, fever, malaise/fatigue and weight loss.   HENT: Negative for ear discharge, ear pain, hearing loss and nosebleeds.    Eyes: Negative for blurred vision, double vision, pain and discharge.   Respiratory: Negative for cough and shortness of breath.    Cardiovascular: Negative for chest pain, palpitations, orthopnea, claudication, leg swelling and PND.   Gastrointestinal: Negative for abdominal pain, blood in stool, melena, nausea and vomiting.   Genitourinary: Negative for dysuria and hematuria.   Musculoskeletal: Negative for falls, joint pain and myalgias.   Skin: Negative for itching and rash.   Neurological: Negative for dizziness, sensory change, speech change, loss of consciousness and headaches.   Endo/Heme/Allergies: Negative for environmental allergies. Does not bruise/bleed  "easily.   Psychiatric/Behavioral: Negative for depression, hallucinations and suicidal ideas.        Objective:   BP (!) 210/100   Pulse 78   Ht 1.626 m (5' 4\")   Wt 64.9 kg (143 lb)   SpO2 97%   BMI 24.55 kg/m²      Physical Exam   Constitutional: He is oriented to person, place, and time. No distress.   Not well kept. Smells like alcohol.   HENT:   Head: Normocephalic and atraumatic.   Eyes: EOM are normal.   Neck: Normal range of motion. No JVD present.   Cardiovascular: Normal rate, regular rhythm, normal heart sounds and intact distal pulses.  Exam reveals no gallop and no friction rub.    No murmur heard.  Bilateral femoral pulses are 2+, bilateral dorsalis pedis pulses are 2+, bilateral posterior tibialis pulses are 2+.   Pulmonary/Chest: No respiratory distress. He has no wheezes. He has no rales. He exhibits no tenderness.   Abdominal: Soft. Bowel sounds are normal. There is no tenderness. There is no rebound and no guarding.   The is no presence of abdominal bruits   Musculoskeletal: Normal range of motion.   Neurological: He is alert and oriented to person, place, and time.   Skin: Skin is warm and dry.   Psychiatric: He has a normal mood and affect.   Nursing note and vitals reviewed.      Assessment:     1. HTN (hypertension), malignant     2. ACC/AHA stage C heart failure with preserved ejection fraction (CMS-HCC)     3. Left ventricular diastolic dysfunction, NYHA class 1     4. HIV (human immunodeficiency virus infection) (CMS-HCC)         Medical Decision Making:  Today's Assessment / Status / Plan:   Patient does not have any symptoms at this point.  I do think that his blood pressure is very dangerously high. I advised patient to go to the ER for further evaluation and treatment. However, patient refused to do so. He does not want to go to the ER. I explained to him the risk of having a big stroke and potentially death however patient accepts it and do not want to go to the ER.    At this " point, the best thing I could think of is to bring him back tomorrow and have him bring all of his bottles of medications with him. This way, I would know for sure if he is taking his medications or not and also ??? at the correct dosage.    Again, overall, I do suspect that patient is not taking his medication on a regular basis and he is actively using alcohol.    Very challenging patients. Poor prognosis overall.      KERRI Corbett  [address] [place] [address]  Mike SCHAEFFER [id number]  VIA Facsimile: [phone]

## 2018-02-07 ENCOUNTER — OFFICE VISIT (OUTPATIENT)
Dept: CARDIOLOGY | Facility: MEDICAL CENTER | Age: 53
End: 2018-02-07
Payer: MEDICAID

## 2018-02-07 ENCOUNTER — TELEPHONE (OUTPATIENT)
Dept: CARDIOLOGY | Facility: MEDICAL CENTER | Age: 53
End: 2018-02-07

## 2018-02-07 VITALS
HEIGHT: 64 IN | WEIGHT: 146 LBS | BODY MASS INDEX: 24.92 KG/M2 | HEART RATE: 80 BPM | DIASTOLIC BLOOD PRESSURE: 90 MMHG | OXYGEN SATURATION: 97 % | SYSTOLIC BLOOD PRESSURE: 170 MMHG

## 2018-02-07 DIAGNOSIS — I51.89 LEFT VENTRICULAR DIASTOLIC DYSFUNCTION, NYHA CLASS 1: ICD-10-CM

## 2018-02-07 DIAGNOSIS — I10 HTN (HYPERTENSION), MALIGNANT: ICD-10-CM

## 2018-02-07 DIAGNOSIS — I50.30 ACC/AHA STAGE C HEART FAILURE WITH PRESERVED EJECTION FRACTION (HCC): ICD-10-CM

## 2018-02-07 PROCEDURE — 99213 OFFICE O/P EST LOW 20 MIN: CPT | Performed by: NURSE PRACTITIONER

## 2018-02-07 RX ORDER — CARVEDILOL 25 MG/1
75 TABLET ORAL 2 TIMES DAILY WITH MEALS
Qty: 180 TAB | Refills: 3 | Status: SHIPPED | OUTPATIENT
Start: 2018-02-07 | End: 2018-02-08 | Stop reason: SDUPTHER

## 2018-02-07 ASSESSMENT — ENCOUNTER SYMPTOMS
SPUTUM PRODUCTION: 0
VOMITING: 0
COUGH: 0
DIAPHORESIS: 0
INSOMNIA: 0
WHEEZING: 0
DIARRHEA: 0
ORTHOPNEA: 0
PALPITATIONS: 0
SHORTNESS OF BREATH: 0
NAUSEA: 0
CHILLS: 0
HEADACHES: 0
DIZZINESS: 0
ABDOMINAL PAIN: 0
BLOOD IN STOOL: 0
FOCAL WEAKNESS: 0
FEVER: 0
FALLS: 0
HEMOPTYSIS: 0
PND: 0
SPEECH CHANGE: 0
BRUISES/BLEEDS EASILY: 0
SINUS PAIN: 0
SENSORY CHANGE: 0
MYALGIAS: 0
NERVOUS/ANXIOUS: 0
SORE THROAT: 0
CONSTIPATION: 0
CLAUDICATION: 0
WEAKNESS: 0
DEPRESSION: 0

## 2018-02-07 NOTE — PROGRESS NOTES
Subjective:     Chief Complaint:  Uncontrolled hypertension    Seamus Palencia is a 52 y.o. male who presents today at the request of Dr. Marie for follow up of his uncontrolled HTN. He was just seen by Dr. Marie in the heart failure clinic yesterday, where he was profoundly hypertensive (/100) but refused to go to the ER despite having the risks of not doing so explained to him.  There is question of compliance with his medication regime in addition to ETOH abuse. He has a PMH of HIV, heart failure (ACC/AHA stage C with preserved EF, NYHA class 1), HTN, stroke (no residual deficits), & seizures.    Some improvement in BP today, currently 170/90 .   Says he has been feeling great.  Home BPs have been mostly 140s.  Denies ETOH & recreational drugs. Still smoking, appox 3-4 cigarettes/day.  Not following a salt restricted diet.  Works out regularly.  Denies CP, SOB, palpitations, HA. No stroke s/s.    Current BP regime is as follows:  Coreg 50 mg BID  Spironolactone 50 mg daily  Lisinopril 40 mg BID  Amlodipine 10 mg BID    States he has been compliant with all his medications.  Has not missed any doses.    Past Medical History:   Diagnosis Date   • Heart failure (CMS-HCC)    • HIV disease (CMS-HCC) 1995   • Hypertension    • Seizure (CMS-HCC)    • Stroke (CMS-HCC)      Past Surgical History:   Procedure Laterality Date   • CHOLECYSTECTOMY  1980s     Family History   Problem Relation Age of Onset   • Diabetes Sister    • Other Brother      down syndrome   • No Known Problems Sister    • No Known Problems Sister    • No Known Problems Sister      History   Smoking Status   • Former Smoker   • Packs/day: 0.25   • Types: Cigarettes   • Quit date: 10/27/2017   Smokeless Tobacco   • Never Used     Comment: Stopped smoking late OCt 2017     No Known Allergies  Outpatient Encounter Prescriptions as of 2/7/2018   Medication Sig Dispense Refill   • carvedilol (COREG) 25 MG Tab Take 3 Tabs by mouth 2 times a day, with meals.  "180 Tab 3   • ASPIRIN LOW DOSE 81 MG EC tablet Take 1 Tab by mouth every day.  2   • [DISCONTINUED] carvedilol (COREG) 25 MG Tab Take 2 Tabs by mouth 2 times a day, with meals. 180 Tab 3   • spironolactone (ALDACTONE) 50 MG Tab Take 1 Tab by mouth every day. 30 Tab 3   • lisinopril (PRINIVIL, ZESTRIL) 40 MG tablet Take 1 Tab by mouth 2 times a day. 60 Tab 11   • acetaminophen (TYLENOL) 325 MG Tab Take 650 mg by mouth every four hours as needed.     • atorvastatin (LIPITOR) 10 MG Tab Take 10 mg by mouth every evening.     • TRIUMEQ 600- MG Tab Take 1 Tab by mouth every day.  5   • sulfamethoxazole-trimethoprim (BACTRIM DS) 800-160 MG tablet Take 1 Tab by mouth every day.     • levetiracetam (KEPPRA) 500 MG Tab Take 1 Tab by mouth every 12 hours. 60 Tab 3   • amlodipine (NORVASC) 10 MG Tab Take 10 mg by mouth 2 Times a Day.       No facility-administered encounter medications on file as of 2/7/2018.      Review of Systems   Constitutional: Negative for chills, diaphoresis, fever and malaise/fatigue.   HENT: Negative for congestion, nosebleeds, sinus pain and sore throat.    Respiratory: Negative for cough, hemoptysis, sputum production, shortness of breath and wheezing.    Cardiovascular: Negative for chest pain, palpitations, orthopnea, claudication, leg swelling and PND.   Gastrointestinal: Negative for abdominal pain, blood in stool, constipation, diarrhea, melena, nausea and vomiting.   Genitourinary: Negative for dysuria, frequency, hematuria and urgency.   Musculoskeletal: Negative for falls and myalgias.   Neurological: Negative for dizziness, sensory change, speech change, focal weakness, weakness and headaches.   Endo/Heme/Allergies: Does not bruise/bleed easily.   Psychiatric/Behavioral: Negative for depression. The patient is not nervous/anxious and does not have insomnia.    All other systems reviewed and are negative.     Objective:   BP (!) 170/90   Pulse 80   Ht 1.626 m (5' 4.02\")   Wt 66.2 kg " (146 lb)   SpO2 97%   BMI 25.05 kg/m²     Physical Exam   Constitutional: He is oriented to person, place, and time. He appears well-developed and well-nourished. No distress.   Poor dentition   HENT:   Head: Normocephalic and atraumatic.   Eyes: EOM are normal. Pupils are equal, round, and reactive to light. Right eye exhibits no discharge. Left eye exhibits no discharge. No scleral icterus.   Neck: Normal range of motion. Neck supple. No JVD present.   Cardiovascular: Normal rate, regular rhythm and intact distal pulses.  Exam reveals no gallop and no friction rub.    Murmur heard.  Pulmonary/Chest: Effort normal and breath sounds normal. No stridor. No respiratory distress. He has no wheezes. He has no rales.   Abdominal: Soft. Bowel sounds are normal. He exhibits no distension. There is no tenderness. There is no rebound and no guarding.   Musculoskeletal: Normal range of motion. He exhibits no edema.   Neurological: He is alert and oriented to person, place, and time.   Skin: Skin is warm and dry. No rash noted. He is not diaphoretic. No erythema. No pallor.   Psychiatric: He has a normal mood and affect. His behavior is normal. Judgment and thought content normal.   Nursing note and vitals reviewed.    Assessment:     1. HTN (hypertension), malignant  carvedilol (COREG) 25 MG Tab   2. ACC/AHA stage C heart failure with preserved ejection fraction (CMS-HCC)  carvedilol (COREG) 25 MG Tab   3. Left ventricular diastolic dysfunction, NYHA class 1  carvedilol (COREG) 25 MG Tab     Medical Decision Making:  Today's Assessment / Status / Plan:     Case discussed with Dr. Marie.  Increased coreg to 75 mg BID.  Continue all other meds as listed above for now.  Encouraged low-sodium diet & smoking cessation.    RTC in 2 weeks for follow up or sooner if needed.      Collaborating MD:  Dr. Mata Walker

## 2018-02-07 NOTE — TELEPHONE ENCOUNTER
Received Rx List from [facility]. Patient is given Rx via Pill box medication adherence  Prepared by nurse see attached records sent to [facility]-am

## 2018-02-07 NOTE — TELEPHONE ENCOUNTER
Patient came to appointment with medication pill box with week worth of medications am/pm states he dispensed as such from the [facility]. I called to verify as patient is extremely angry pharmacy confirms such and transferred me to (Kiarra) whom I left a message for along with request for Rx list and how patients Rx is dispensed.-am

## 2018-02-07 NOTE — LETTER
[facility]   [address] [place] [address] 400  [place] [address]  Phone: [phone]  Fax: [phone]              Seamus Palencia  1965    Encounter Date: 2/7/2018    EDEN Escalera          PROGRESS NOTE:  Subjective:     Chief Complaint:  Uncontrolled hypertension    Seamus Palencia is a 52 y.o. male who presents today at the request of Dr. Marie for follow up of his uncontrolled HTN. He was just seen by Dr. Marie in the heart failure clinic yesterday, where he was profoundly hypertensive (/100) but refused to go to the ER despite having the risks of not doing so explained to him.  There is question of compliance with his medication regime in addition to ETOH abuse. He has a PMH of HIV, heart failure (ACC/AHA stage C with preserved EF, NYHA class 1), HTN, stroke (no residual deficits), & seizures.    Some improvement in BP today, currently 170/90 .   Says he has been feeling great.  Home BPs have been mostly 140s.  Denies ETOH & recreational drugs. Still smoking, appox 3-4 cigarettes/day.  Not following a salt restricted diet.  Works out regularly.  Denies CP, SOB, palpitations, HA. No stroke s/s.    Current BP regime is as follows:  Coreg 50 mg BID  Spironolactone 50 mg daily  Lisinopril 40 mg BID  Amlodipine 10 mg BID    States he has been compliant with all his medications.  Has not missed any doses.    Past Medical History:   Diagnosis Date   • Heart failure (CMS-HCC)    • HIV disease (CMS-HCC) 1995   • Hypertension    • Seizure (CMS-HCC)    • Stroke (CMS-HCC)      Past Surgical History:   Procedure Laterality Date   • CHOLECYSTECTOMY  1980s     Family History   Problem Relation Age of Onset   • Diabetes Sister    • Other Brother      down syndrome   • No Known Problems Sister    • No Known Problems Sister    • No Known Problems Sister      History   Smoking Status   • Former Smoker   • Packs/day: 0.25   • Types: Cigarettes   • Quit date:  10/27/2017   Smokeless Tobacco   • Never Used     Comment: Stopped smoking late OCt 2017     No Known Allergies  Outpatient Encounter Prescriptions as of 2/7/2018   Medication Sig Dispense Refill   • carvedilol (COREG) 25 MG Tab Take 3 Tabs by mouth 2 times a day, with meals. 180 Tab 3   • ASPIRIN LOW DOSE 81 MG EC tablet Take 1 Tab by mouth every day.  2   • [DISCONTINUED] carvedilol (COREG) 25 MG Tab Take 2 Tabs by mouth 2 times a day, with meals. 180 Tab 3   • spironolactone (ALDACTONE) 50 MG Tab Take 1 Tab by mouth every day. 30 Tab 3   • lisinopril (PRINIVIL, ZESTRIL) 40 MG tablet Take 1 Tab by mouth 2 times a day. 60 Tab 11   • acetaminophen (TYLENOL) 325 MG Tab Take 650 mg by mouth every four hours as needed.     • atorvastatin (LIPITOR) 10 MG Tab Take 10 mg by mouth every evening.     • TRIUMEQ 600- MG Tab Take 1 Tab by mouth every day.  5   • sulfamethoxazole-trimethoprim (BACTRIM DS) 800-160 MG tablet Take 1 Tab by mouth every day.     • levetiracetam (KEPPRA) 500 MG Tab Take 1 Tab by mouth every 12 hours. 60 Tab 3   • amlodipine (NORVASC) 10 MG Tab Take 10 mg by mouth 2 Times a Day.       No facility-administered encounter medications on file as of 2/7/2018.      Review of Systems   Constitutional: Negative for chills, diaphoresis, fever and malaise/fatigue.   HENT: Negative for congestion, nosebleeds, sinus pain and sore throat.    Respiratory: Negative for cough, hemoptysis, sputum production, shortness of breath and wheezing.    Cardiovascular: Negative for chest pain, palpitations, orthopnea, claudication, leg swelling and PND.   Gastrointestinal: Negative for abdominal pain, blood in stool, constipation, diarrhea, melena, nausea and vomiting.   Genitourinary: Negative for dysuria, frequency, hematuria and urgency.   Musculoskeletal: Negative for falls and myalgias.   Neurological: Negative for dizziness, sensory change, speech change, focal weakness, weakness and headaches.    "  Endo/Heme/Allergies: Does not bruise/bleed easily.   Psychiatric/Behavioral: Negative for depression. The patient is not nervous/anxious and does not have insomnia.    All other systems reviewed and are negative.     Objective:   BP (!) 170/90   Pulse 80   Ht 1.626 m (5' 4.02\")   Wt 66.2 kg (146 lb)   SpO2 97%   BMI 25.05 kg/m²      Physical Exam   Constitutional: He is oriented to person, place, and time. He appears well-developed and well-nourished. No distress.   Poor dentition   HENT:   Head: Normocephalic and atraumatic.   Eyes: EOM are normal. Pupils are equal, round, and reactive to light. Right eye exhibits no discharge. Left eye exhibits no discharge. No scleral icterus.   Neck: Normal range of motion. Neck supple. No JVD present.   Cardiovascular: Normal rate, regular rhythm and intact distal pulses.  Exam reveals no gallop and no friction rub.    Murmur heard.  Pulmonary/Chest: Effort normal and breath sounds normal. No stridor. No respiratory distress. He has no wheezes. He has no rales.   Abdominal: Soft. Bowel sounds are normal. He exhibits no distension. There is no tenderness. There is no rebound and no guarding.   Musculoskeletal: Normal range of motion. He exhibits no edema.   Neurological: He is alert and oriented to person, place, and time.   Skin: Skin is warm and dry. No rash noted. He is not diaphoretic. No erythema. No pallor.   Psychiatric: He has a normal mood and affect. His behavior is normal. Judgment and thought content normal.   Nursing note and vitals reviewed.    Assessment:     1. HTN (hypertension), malignant  carvedilol (COREG) 25 MG Tab   2. ACC/AHA stage C heart failure with preserved ejection fraction (CMS-HCC)  carvedilol (COREG) 25 MG Tab   3. Left ventricular diastolic dysfunction, NYHA class 1  carvedilol (COREG) 25 MG Tab     Medical Decision Making:  Today's Assessment / Status / Plan:     Case discussed with Dr. Marie.  Increased coreg to 75 mg BID.  Continue all " other meds as listed above for now.  Encouraged low-sodium diet & smoking cessation.    RTC in 2 weeks for follow up or sooner if needed.      Collaborating MD:  Dr. Mata Walker      No Recipients

## 2018-02-08 ENCOUNTER — TELEPHONE (OUTPATIENT)
Dept: CARDIOLOGY | Facility: MEDICAL CENTER | Age: 53
End: 2018-02-08

## 2018-02-08 DIAGNOSIS — I51.89 LEFT VENTRICULAR DIASTOLIC DYSFUNCTION, NYHA CLASS 1: ICD-10-CM

## 2018-02-08 DIAGNOSIS — I10 HTN (HYPERTENSION), MALIGNANT: ICD-10-CM

## 2018-02-08 DIAGNOSIS — I50.30 ACC/AHA STAGE C HEART FAILURE WITH PRESERVED EJECTION FRACTION (HCC): ICD-10-CM

## 2018-02-08 RX ORDER — CARVEDILOL 25 MG/1
50 TABLET ORAL 2 TIMES DAILY WITH MEALS
Qty: 180 TAB | Refills: 3
Start: 2018-02-08 | End: 2018-02-22 | Stop reason: SDUPTHER

## 2018-02-08 NOTE — TELEPHONE ENCOUNTER
Coreg 75 mg BID exceeds max dose.  Discussed with Dr. Marie.  He will evaluate the patient's medication regime & make additional adjustments to his antihypertensives as he deems necessary.

## 2018-02-22 ENCOUNTER — OFFICE VISIT (OUTPATIENT)
Dept: CARDIOLOGY | Facility: MEDICAL CENTER | Age: 53
End: 2018-02-22
Payer: MEDICAID

## 2018-02-22 VITALS
SYSTOLIC BLOOD PRESSURE: 132 MMHG | DIASTOLIC BLOOD PRESSURE: 80 MMHG | HEART RATE: 60 BPM | WEIGHT: 146 LBS | HEIGHT: 64 IN | OXYGEN SATURATION: 98 % | BODY MASS INDEX: 24.92 KG/M2

## 2018-02-22 DIAGNOSIS — I51.89 LEFT VENTRICULAR DIASTOLIC DYSFUNCTION, NYHA CLASS 1: ICD-10-CM

## 2018-02-22 DIAGNOSIS — I50.30 ACC/AHA STAGE C HEART FAILURE WITH PRESERVED EJECTION FRACTION (HCC): ICD-10-CM

## 2018-02-22 DIAGNOSIS — I10 HTN (HYPERTENSION), MALIGNANT: ICD-10-CM

## 2018-02-22 DIAGNOSIS — B20 HIV (HUMAN IMMUNODEFICIENCY VIRUS INFECTION) (HCC): ICD-10-CM

## 2018-02-22 PROCEDURE — 99214 OFFICE O/P EST MOD 30 MIN: CPT | Performed by: INTERNAL MEDICINE

## 2018-02-22 RX ORDER — AMLODIPINE BESYLATE 10 MG/1
10 TABLET ORAL 2 TIMES DAILY
Qty: 30 TAB | Refills: 11 | Status: SHIPPED | OUTPATIENT
Start: 2018-02-22 | End: 2018-05-11 | Stop reason: SDUPTHER

## 2018-02-22 RX ORDER — ATORVASTATIN CALCIUM 10 MG/1
10 TABLET, FILM COATED ORAL DAILY
Qty: 30 TAB | Refills: 11 | Status: SHIPPED | OUTPATIENT
Start: 2018-02-22 | End: 2018-10-18

## 2018-02-22 RX ORDER — LISINOPRIL 40 MG/1
40 TABLET ORAL 2 TIMES DAILY
Qty: 60 TAB | Refills: 11 | Status: SHIPPED | OUTPATIENT
Start: 2018-02-22 | End: 2018-05-04 | Stop reason: SDUPTHER

## 2018-02-22 RX ORDER — SPIRONOLACTONE 50 MG/1
50 TABLET, FILM COATED ORAL DAILY
Qty: 30 TAB | Refills: 3 | Status: SHIPPED | OUTPATIENT
Start: 2018-02-22 | End: 2018-05-04 | Stop reason: SDUPTHER

## 2018-02-22 RX ORDER — CARVEDILOL 25 MG/1
50 TABLET ORAL 2 TIMES DAILY WITH MEALS
Qty: 180 TAB | Refills: 3 | Status: SHIPPED | OUTPATIENT
Start: 2018-02-22 | End: 2018-05-30 | Stop reason: SDUPTHER

## 2018-02-22 ASSESSMENT — ENCOUNTER SYMPTOMS
WEIGHT LOSS: 0
FEVER: 0
EYE DISCHARGE: 0
BRUISES/BLEEDS EASILY: 0
PALPITATIONS: 0
DIZZINESS: 0
SENSORY CHANGE: 0
CHILLS: 0
HEADACHES: 0
COUGH: 0
VOMITING: 0
FALLS: 0
CLAUDICATION: 0
BLOOD IN STOOL: 0
SPEECH CHANGE: 0
LOSS OF CONSCIOUSNESS: 0
PND: 0
DEPRESSION: 0
DOUBLE VISION: 0
EYE PAIN: 0
BLURRED VISION: 0
NAUSEA: 0
ORTHOPNEA: 0
SHORTNESS OF BREATH: 0
HALLUCINATIONS: 0
MYALGIAS: 0
ABDOMINAL PAIN: 0

## 2018-02-22 NOTE — PROGRESS NOTES
Subjective:   Seamus Palencia is a 52 y.o. male who presents today for cardiac care and evaluation in our heart failure clinic as a referral from [facility] clinic. Patient does have history of hypertension, HIV infection, history of stroke. Patient is actually a poor historian. He does not know what medication he is taking. He does not know the details of his medical history. He doesn't have any residual neurological deficits however. He does report of shortness of breath if walking upstairs. No symptom at rest or with daily living activities. He did have a transthoracic echocardiogram done in 2015 which shows LV function of 45%. No significant valvular disease seems at that time.     Patient was able to complete 396 m during his 6 minute walk test. his O2 saturation at baseline was 99% and at the end of the test, the O2 saturation was 100%. he reported 3 level of dyspnea on Danny scale.     LV function has improved and normalized after medical therapy.     Today his blood pressure is better. He again did not bring his medications with him.    Past Medical History:   Diagnosis Date   • Heart failure (CMS-HCC)    • HIV disease (CMS-HCC) 1995   • Hypertension    • Seizure (CMS-HCC)    • Stroke (CMS-HCC)      Past Surgical History:   Procedure Laterality Date   • CHOLECYSTECTOMY  1980s     Family History   Problem Relation Age of Onset   • Diabetes Sister    • Other Brother      down syndrome   • No Known Problems Sister    • No Known Problems Sister    • No Known Problems Sister      History   Smoking Status   • Former Smoker   • Packs/day: 0.25   • Types: Cigarettes   • Quit date: 10/27/2017   Smokeless Tobacco   • Never Used     Comment: Stopped smoking late OCt 2017     No Known Allergies  Outpatient Encounter Prescriptions as of 2/22/2018   Medication Sig Dispense Refill   • amLODIPine (NORVASC) 10 MG Tab Take 1 Tab by mouth 2 Times a Day. 30 Tab 11   • atorvastatin (LIPITOR) 10 MG Tab Take 1 Tab by mouth every day.  30 Tab 11   • carvedilol (COREG) 25 MG Tab Take 2 Tabs by mouth 2 times a day, with meals. 180 Tab 3   • lisinopril (PRINIVIL, ZESTRIL) 40 MG tablet Take 1 Tab by mouth 2 times a day. 60 Tab 11   • spironolactone (ALDACTONE) 50 MG Tab Take 1 Tab by mouth every day. 30 Tab 3   • [DISCONTINUED] carvedilol (COREG) 25 MG Tab Take 2 Tabs by mouth 2 times a day, with meals. 180 Tab 3   • [DISCONTINUED] carvedilol (COREG) 25 MG Tab Take 3 Tabs by mouth 2 times a day, with meals. 180 Tab 3   • ASPIRIN LOW DOSE 81 MG EC tablet Take 1 Tab by mouth every day.  2   • [DISCONTINUED] spironolactone (ALDACTONE) 50 MG Tab Take 1 Tab by mouth every day. 30 Tab 3   • [DISCONTINUED] lisinopril (PRINIVIL, ZESTRIL) 40 MG tablet Take 1 Tab by mouth 2 times a day. 60 Tab 11   • acetaminophen (TYLENOL) 325 MG Tab Take 650 mg by mouth every four hours as needed.     • [DISCONTINUED] atorvastatin (LIPITOR) 10 MG Tab Take 10 mg by mouth every evening.     • TRIUMEQ 600- MG Tab Take 1 Tab by mouth every day.  5   • sulfamethoxazole-trimethoprim (BACTRIM DS) 800-160 MG tablet Take 1 Tab by mouth every day.     • levetiracetam (KEPPRA) 500 MG Tab Take 1 Tab by mouth every 12 hours. 60 Tab 3   • [DISCONTINUED] amlodipine (NORVASC) 10 MG Tab Take 10 mg by mouth 2 Times a Day.       No facility-administered encounter medications on file as of 2/22/2018.      Review of Systems   Constitutional: Negative for chills, fever, malaise/fatigue and weight loss.   HENT: Negative for ear discharge, ear pain, hearing loss and nosebleeds.    Eyes: Negative for blurred vision, double vision, pain and discharge.   Respiratory: Negative for cough and shortness of breath.    Cardiovascular: Negative for chest pain, palpitations, orthopnea, claudication, leg swelling and PND.   Gastrointestinal: Negative for abdominal pain, blood in stool, melena, nausea and vomiting.   Genitourinary: Negative for dysuria and hematuria.   Musculoskeletal: Negative for falls,  "joint pain and myalgias.   Skin: Negative for itching and rash.   Neurological: Negative for dizziness, sensory change, speech change, loss of consciousness and headaches.   Endo/Heme/Allergies: Negative for environmental allergies. Does not bruise/bleed easily.   Psychiatric/Behavioral: Negative for depression, hallucinations and suicidal ideas.        Objective:   /80   Pulse 60   Ht 1.626 m (5' 4\")   Wt 66.2 kg (146 lb)   SpO2 98%   BMI 25.06 kg/m²     Physical Exam   Constitutional: He is oriented to person, place, and time. He appears well-developed and well-nourished.   HENT:   Head: Normocephalic and atraumatic.   Eyes: EOM are normal.   Neck: Normal range of motion. No JVD present.   Cardiovascular: Normal rate, regular rhythm, normal heart sounds and intact distal pulses.  Exam reveals no gallop and no friction rub.    No murmur heard.  Bilateral femoral pulses are 2+, bilateral dorsalis pedis pulses are 2+, bilateral posterior tibialis pulses are 2+.   Pulmonary/Chest: No respiratory distress. He has no wheezes. He has no rales. He exhibits no tenderness.   Abdominal: Soft. Bowel sounds are normal. There is no tenderness. There is no rebound and no guarding.   The is no presence of abdominal bruits   Musculoskeletal: Normal range of motion.   Neurological: He is alert and oriented to person, place, and time.   Skin: Skin is warm and dry.   Psychiatric: He has a normal mood and affect.   Nursing note and vitals reviewed.      Assessment:     1. HTN (hypertension), malignant  carvedilol (COREG) 25 MG Tab    lisinopril (PRINIVIL, ZESTRIL) 40 MG tablet    spironolactone (ALDACTONE) 50 MG Tab   2. ACC/AHA stage C heart failure with preserved ejection fraction (CMS-HCC)  carvedilol (COREG) 25 MG Tab    lisinopril (PRINIVIL, ZESTRIL) 40 MG tablet   3. Left ventricular diastolic dysfunction, NYHA class 1  carvedilol (COREG) 25 MG Tab    lisinopril (PRINIVIL, ZESTRIL) 40 MG tablet   4. HIV (human " immunodeficiency virus infection) (CMS-[facility])         Medical Decision Making:  Today's Assessment / Status / Plan:   Today, based on physical examination findings, patient is euvolemic. No JVD, lungs are clear to auscultation, no pitting edema in bilateral lower extremities, no ascites.    Dry weight is 146 lbs.    Continue current meds Coreg 50 mg po bid, Norvasc 10 mg po daily, Lisinopril 40 mg po daily, Spironolactone 25 mg po daily.    I will see patient back in clinic with lab tests and studies results in 12 months.    I thank you for referring patient to our Cardiology Clinic today.

## 2018-02-22 NOTE — LETTER
[facility]   [address] [facility] [address]  [place] [address]  Phone: [phone]  Fax: [phone]              Seamus Palencia  1965    Encounter Date: 2/22/2018    Scooby Marie M.D.          PROGRESS NOTE:  Subjective:   Seamus Palencia is a 52 y.o. male who presents today for cardiac care and evaluation in our heart failure clinic as a referral from [facility]. Patient does have history of hypertension, HIV infection, history of stroke. Patient is actually a poor historian. He does not know what medication he is taking. He does not know the details of his medical history. He doesn't have any residual neurological deficits however. He does report of shortness of breath if walking upstairs. No symptom at rest or with daily living activities. He did have a transthoracic echocardiogram done in 2015 which shows LV function of 45%. No significant valvular disease seems at that time.     Patient was able to complete 396 m during his 6 minute walk test. his O2 saturation at baseline was 99% and at the end of the test, the O2 saturation was 100%. he reported 3 level of dyspnea on Danny scale.     LV function has improved and normalized after medical therapy.     Today his blood pressure is better. He again did not bring his medications with him.    Past Medical History:   Diagnosis Date   • Heart failure (CMS-[facility])    • HIV disease (CMS-HCC) 1995   • Hypertension    • Seizure (CMS-HCC)    • Stroke (CMS-HCC)      Past Surgical History:   Procedure Laterality Date   • CHOLECYSTECTOMY  1980s     Family History   Problem Relation Age of Onset   • Diabetes Sister    • Other Brother      down syndrome   • No Known Problems Sister    • No Known Problems Sister    • No Known Problems Sister      History   Smoking Status   • Former Smoker   • Packs/day: 0.25   • Types: Cigarettes   • Quit date: 10/27/2017   Smokeless Tobacco   • Never Used     Comment: Stopped smoking late  OCt 2017     No Known Allergies  Outpatient Encounter Prescriptions as of 2/22/2018   Medication Sig Dispense Refill   • amLODIPine (NORVASC) 10 MG Tab Take 1 Tab by mouth 2 Times a Day. 30 Tab 11   • atorvastatin (LIPITOR) 10 MG Tab Take 1 Tab by mouth every day. 30 Tab 11   • carvedilol (COREG) 25 MG Tab Take 2 Tabs by mouth 2 times a day, with meals. 180 Tab 3   • lisinopril (PRINIVIL, ZESTRIL) 40 MG tablet Take 1 Tab by mouth 2 times a day. 60 Tab 11   • spironolactone (ALDACTONE) 50 MG Tab Take 1 Tab by mouth every day. 30 Tab 3   • [DISCONTINUED] carvedilol (COREG) 25 MG Tab Take 2 Tabs by mouth 2 times a day, with meals. 180 Tab 3   • [DISCONTINUED] carvedilol (COREG) 25 MG Tab Take 3 Tabs by mouth 2 times a day, with meals. 180 Tab 3   • ASPIRIN LOW DOSE 81 MG EC tablet Take 1 Tab by mouth every day.  2   • [DISCONTINUED] spironolactone (ALDACTONE) 50 MG Tab Take 1 Tab by mouth every day. 30 Tab 3   • [DISCONTINUED] lisinopril (PRINIVIL, ZESTRIL) 40 MG tablet Take 1 Tab by mouth 2 times a day. 60 Tab 11   • acetaminophen (TYLENOL) 325 MG Tab Take 650 mg by mouth every four hours as needed.     • [DISCONTINUED] atorvastatin (LIPITOR) 10 MG Tab Take 10 mg by mouth every evening.     • TRIUMEQ 600- MG Tab Take 1 Tab by mouth every day.  5   • sulfamethoxazole-trimethoprim (BACTRIM DS) 800-160 MG tablet Take 1 Tab by mouth every day.     • levetiracetam (KEPPRA) 500 MG Tab Take 1 Tab by mouth every 12 hours. 60 Tab 3   • [DISCONTINUED] amlodipine (NORVASC) 10 MG Tab Take 10 mg by mouth 2 Times a Day.       No facility-administered encounter medications on file as of 2/22/2018.      Review of Systems   Constitutional: Negative for chills, fever, malaise/fatigue and weight loss.   HENT: Negative for ear discharge, ear pain, hearing loss and nosebleeds.    Eyes: Negative for blurred vision, double vision, pain and discharge.   Respiratory: Negative for cough and shortness of breath.    Cardiovascular:  "Negative for chest pain, palpitations, orthopnea, claudication, leg swelling and PND.   Gastrointestinal: Negative for abdominal pain, blood in stool, melena, nausea and vomiting.   Genitourinary: Negative for dysuria and hematuria.   Musculoskeletal: Negative for falls, joint pain and myalgias.   Skin: Negative for itching and rash.   Neurological: Negative for dizziness, sensory change, speech change, loss of consciousness and headaches.   Endo/Heme/Allergies: Negative for environmental allergies. Does not bruise/bleed easily.   Psychiatric/Behavioral: Negative for depression, hallucinations and suicidal ideas.        Objective:   /80   Pulse 60   Ht 1.626 m (5' 4\")   Wt 66.2 kg (146 lb)   SpO2 98%   BMI 25.06 kg/m²      Physical Exam   Constitutional: He is oriented to person, place, and time. He appears well-developed and well-nourished.   HENT:   Head: Normocephalic and atraumatic.   Eyes: EOM are normal.   Neck: Normal range of motion. No JVD present.   Cardiovascular: Normal rate, regular rhythm, normal heart sounds and intact distal pulses.  Exam reveals no gallop and no friction rub.    No murmur heard.  Bilateral femoral pulses are 2+, bilateral dorsalis pedis pulses are 2+, bilateral posterior tibialis pulses are 2+.   Pulmonary/Chest: No respiratory distress. He has no wheezes. He has no rales. He exhibits no tenderness.   Abdominal: Soft. Bowel sounds are normal. There is no tenderness. There is no rebound and no guarding.   The is no presence of abdominal bruits   Musculoskeletal: Normal range of motion.   Neurological: He is alert and oriented to person, place, and time.   Skin: Skin is warm and dry.   Psychiatric: He has a normal mood and affect.   Nursing note and vitals reviewed.      Assessment:     1. HTN (hypertension), malignant  carvedilol (COREG) 25 MG Tab    lisinopril (PRINIVIL, ZESTRIL) 40 MG tablet    spironolactone (ALDACTONE) 50 MG Tab   2. ACC/AHA stage C heart failure with " preserved ejection fraction (CMS-[facility])  carvedilol (COREG) 25 MG Tab    lisinopril (PRINIVIL, ZESTRIL) 40 MG tablet   3. Left ventricular diastolic dysfunction, NYHA class 1  carvedilol (COREG) 25 MG Tab    lisinopril (PRINIVIL, ZESTRIL) 40 MG tablet   4. HIV (human immunodeficiency virus infection) (CMS-HCC)         Medical Decision Making:  Today's Assessment / Status / Plan:   Today, based on physical examination findings, patient is euvolemic. No JVD, lungs are clear to auscultation, no pitting edema in bilateral lower extremities, no ascites.    Dry weight is 146 lbs.    Continue current meds Coreg 50 mg po bid, Norvasc 10 mg po daily, Lisinopril 40 mg po daily, Spironolactone 25 mg po daily.    I will see patient back in clinic with lab tests and studies results in 12 months.    I thank you for referring patient to our Cardiology Clinic today.          KERRI Corbett  [address] [place] [address] [place] [id number]  VIA Facsimile: [phone]

## 2018-04-02 NOTE — PROGRESS NOTES
"Reevaluation of 6MWT/MLWHF Questionnaire    Date: 2017  6MWT: 396 meters  MLWHF: 5    Date: 11/3/2017  6MWT: 377.95 meters  MLWHF: 18    OP Heart Failure  Vitals  Appointment Type: Heart Failure Established  Weight: 65.1 kg (143 lb 9.6 oz)  How Weight Obtained: Stand Up Scale  Height: 162.6 cm (5' 4\")  BMI (Calculated): 24.65  Blood Pressure: 160/100  Pulse: 80    System Assessment  NYHA Functional Class Assessment: Class l  ACC/AHA HF Stage: C    Smoking Hx  Have you Ever Smoked: Yes  Have you Smoked in the Last 12 Mos: Yes  Have you Quit Smoking: Yes  Confirm Quit Date: 10/27/17     Alcohol Hx  Do you Drink?: No     Illicit Drug Hx  Illicit Drug History: No    Medications  Is Patient on an Evidence Based Beta Blocker: Yes  Is Patient on ACE-1 or ARB: Yes    MN Living with Heart Failure  Swelling in Ankles or Legs: 0  Having to Sit or Lie Down During the Day: 1  Walking About or Climbing Stairs Difficult: 3  Working Around the House or Yard Difficult: 0  Difficulty Going Away from Home: 2  Difficulty Sleeping Well at Night: 0  Difficulty Socializing with Family or Friends: 0  Difficulty Working to Earn a Livin  Difficulty with Recreational Pastimes, Sports, Hobbies: 1  Difficulty with Sexual Activities: 0  Eating Less Foods You Like: 0  Making you Short of Breath: 1  Tired, Fatigued or Low on Energy: 1  Making you Stay in a Hospital: 0  Costing you Money for Medical Care?: 0  Giving you Side Effects from Treatments: 0  Feeling like a [place] to Family and Friends: 1  Loss of Self Control in your Life: 2  Making You Worry: 3  Difficulty to Concentrate or Remembering Things: 1  Making you Feel Depressed: 2  MLHF Total Score : 18    6 Minute Walk Test  Baseline to end of test: 6:00  Total meters walked: 377.95       ESTEBAN Saunders RN  [phone]  "

## 2018-04-25 ENCOUNTER — OFFICE VISIT (OUTPATIENT)
Dept: CARDIOLOGY | Facility: MEDICAL CENTER | Age: 53
End: 2018-04-25
Payer: MEDICAID

## 2018-04-25 VITALS
WEIGHT: 148.2 LBS | BODY MASS INDEX: 25.3 KG/M2 | SYSTOLIC BLOOD PRESSURE: 218 MMHG | HEIGHT: 64 IN | DIASTOLIC BLOOD PRESSURE: 130 MMHG | HEART RATE: 98 BPM | OXYGEN SATURATION: 96 %

## 2018-04-25 DIAGNOSIS — Z79.899 HIGH RISK MEDICATION USE: ICD-10-CM

## 2018-04-25 DIAGNOSIS — I10 HTN (HYPERTENSION), MALIGNANT: ICD-10-CM

## 2018-04-25 DIAGNOSIS — Z91.148 NON COMPLIANCE W MEDICATION REGIMEN: ICD-10-CM

## 2018-04-25 DIAGNOSIS — I50.30 ACC/AHA STAGE C HEART FAILURE WITH PRESERVED EJECTION FRACTION (HCC): ICD-10-CM

## 2018-04-25 DIAGNOSIS — Z86.73 HISTORY OF CVA (CEREBROVASCULAR ACCIDENT): ICD-10-CM

## 2018-04-25 DIAGNOSIS — I51.89 LEFT VENTRICULAR DIASTOLIC DYSFUNCTION, NYHA CLASS 1: ICD-10-CM

## 2018-04-25 DIAGNOSIS — B20 HIV (HUMAN IMMUNODEFICIENCY VIRUS INFECTION) (HCC): ICD-10-CM

## 2018-04-25 PROCEDURE — 99215 OFFICE O/P EST HI 40 MIN: CPT | Performed by: INTERNAL MEDICINE

## 2018-04-25 RX ORDER — HYDRALAZINE HYDROCHLORIDE 50 MG/1
50 TABLET, FILM COATED ORAL 3 TIMES DAILY
Qty: 90 TAB | Refills: 11 | Status: SHIPPED | OUTPATIENT
Start: 2018-04-25 | End: 2018-06-15 | Stop reason: DRUGHIGH

## 2018-04-25 RX ORDER — ISOSORBIDE DINITRATE 20 MG/1
20 TABLET ORAL 3 TIMES DAILY
Qty: 90 TAB | Refills: 11 | Status: SHIPPED | OUTPATIENT
Start: 2018-04-25 | End: 2018-12-13 | Stop reason: SDUPTHER

## 2018-04-25 ASSESSMENT — ENCOUNTER SYMPTOMS
LOSS OF CONSCIOUSNESS: 0
BLOOD IN STOOL: 0
DEPRESSION: 0
HALLUCINATIONS: 0
ABDOMINAL PAIN: 0
CLAUDICATION: 0
DOUBLE VISION: 0
BRUISES/BLEEDS EASILY: 0
VOMITING: 0
EYE PAIN: 0
FALLS: 0
EYE DISCHARGE: 0
WEIGHT LOSS: 0
CHILLS: 0
FEVER: 0
PALPITATIONS: 0
ORTHOPNEA: 0
COUGH: 0
DIZZINESS: 0
MYALGIAS: 0
BLURRED VISION: 0
NAUSEA: 0
HEADACHES: 0
PND: 0
SENSORY CHANGE: 0
SHORTNESS OF BREATH: 1
SPEECH CHANGE: 0

## 2018-04-25 NOTE — LETTER
[facility]   [address] [place] [address] [address]  LA[place] [address]  Phone: [phone]  Fax: [phone]              Seamus Palencia  1965    Encounter Date: 4/25/2018    Scooby Marie M.D.          PROGRESS NOTE:  Chief Complaint   Patient presents with   • Hypertension       Subjective:   Seamus Palencia is a 52 y.o. male who presents today for cardiac care and evaluation in our heart failure clinic as a referral from hospitals clinic. Patient does have history of hypertension, HIV infection, history of stroke. Patient is actually a poor historian. He does not know what medication he is taking. He does not know the details of his medical history. He doesn't have any residual neurological deficits however. He does report of shortness of breath if walking upstairs. No symptom at rest or with daily living activities. He did have a transthoracic echocardiogram done in 2015 which shows LV function of 45%. No significant valvular disease seems at that time.     Patient was able to complete 396 m during his 6 minute walk test. his O2 saturation at baseline was 99% and at the end of the test, the O2 saturation was 100%. he reported 3 level of dyspnea on Danny scale.     LV function has improved and normalized after medical therapy.     Today his blood pressure is not controlled again. I suspect the patient is not taking his medications. Even though patient says that he has been taking all of his medications. In the past, I had asked him to come back to our clinic with the actual bottles of his medications. Again today, patient does not have his bottles with him.    Past Medical History:   Diagnosis Date   • Heart failure (CMS-[facility])    • HIV disease (CMS-[facility]) 1995   • Hypertension    • Seizure (CMS-[facility])    • Stroke (CMS-[facility])      Past Surgical History:   Procedure Laterality Date   • CHOLECYSTECTOMY  1980s     Family History   Problem Relation Age of Onset   • Diabetes Sister     • Other Brother      down syndrome   • No Known Problems Sister    • No Known Problems Sister    • No Known Problems Sister      Social History     Social History   • Marital status: Single     Spouse name: N/A   • Number of children: N/A   • Years of education: N/A     Occupational History   • Not on file.     Social History Main Topics   • Smoking status: Former Smoker     Packs/day: 0.25     Types: Cigarettes     Quit date: 10/27/2017   • Smokeless tobacco: Never Used      Comment: Stopped smoking late OCt 2017   • Alcohol use No   • Drug use: No   • Sexual activity: Not on file     Other Topics Concern   • Not on file     Social History Narrative   • No narrative on file     No Known Allergies  Outpatient Encounter Prescriptions as of 4/25/2018   Medication Sig Dispense Refill   • amLODIPine (NORVASC) 10 MG Tab Take 1 Tab by mouth 2 Times a Day. 30 Tab 11   • atorvastatin (LIPITOR) 10 MG Tab Take 1 Tab by mouth every day. 30 Tab 11   • carvedilol (COREG) 25 MG Tab Take 2 Tabs by mouth 2 times a day, with meals. 180 Tab 3   • lisinopril (PRINIVIL, ZESTRIL) 40 MG tablet Take 1 Tab by mouth 2 times a day. 60 Tab 11   • spironolactone (ALDACTONE) 50 MG Tab Take 1 Tab by mouth every day. 30 Tab 3   • ASPIRIN LOW DOSE 81 MG EC tablet Take 1 Tab by mouth every day.  2   • acetaminophen (TYLENOL) 325 MG Tab Take 650 mg by mouth every four hours as needed.     • TRIUMEQ 600- MG Tab Take 1 Tab by mouth every day.  5   • sulfamethoxazole-trimethoprim (BACTRIM DS) 800-160 MG tablet Take 1 Tab by mouth every day.     • levetiracetam (KEPPRA) 500 MG Tab Take 1 Tab by mouth every 12 hours. 60 Tab 3     No facility-administered encounter medications on file as of 4/25/2018.      Review of Systems   Constitutional: Negative for chills, fever, malaise/fatigue and weight loss.   HENT: Negative for ear discharge, ear pain, hearing loss and nosebleeds.    Eyes: Negative for blurred vision, double vision, pain and discharge.   "  Respiratory: Positive for shortness of breath. Negative for cough.    Cardiovascular: Negative for chest pain, palpitations, orthopnea, claudication, leg swelling and PND.   Gastrointestinal: Negative for abdominal pain, blood in stool, melena, nausea and vomiting.   Genitourinary: Negative for dysuria and hematuria.   Musculoskeletal: Negative for falls, joint pain and myalgias.   Skin: Negative for itching and rash.   Neurological: Negative for dizziness, sensory change, speech change, loss of consciousness and headaches.   Endo/Heme/Allergies: Negative for environmental allergies. Does not bruise/bleed easily.   Psychiatric/Behavioral: Negative for depression, hallucinations and suicidal ideas.        Objective:   BP (!) 218/130   Pulse 98   Ht 1.626 m (5' 4\")   Wt 67.2 kg (148 lb 3.2 oz)   SpO2 96%   BMI 25.44 kg/m²      Physical Exam   Constitutional: He is oriented to person, place, and time. No distress.   HENT:   Head: Normocephalic and atraumatic.   Right Ear: External ear normal.   Left Ear: External ear normal.   Eyes: Right eye exhibits no discharge. Left eye exhibits no discharge.   Neck: No JVD present. No thyromegaly present.   Cardiovascular: Normal rate, regular rhythm, normal heart sounds and intact distal pulses.  Exam reveals no gallop and no friction rub.    No murmur heard.  Pulmonary/Chest: Breath sounds normal. No respiratory distress.   Abdominal: Bowel sounds are normal. He exhibits no distension. There is no tenderness.   Musculoskeletal: He exhibits no edema or tenderness.   Neurological: He is alert and oriented to person, place, and time. No cranial nerve deficit.   Skin: Skin is warm and dry. He is not diaphoretic.   Psychiatric: He has a normal mood and affect. His behavior is normal.   Nursing note and vitals reviewed.      Assessment:     1. ACC/AHA stage C heart failure with preserved ejection fraction (CMS-HCC)     2. Left ventricular diastolic dysfunction, NYHA class 1   "   3. HIV (human immunodeficiency virus infection) (CMS-[facility])     4. HTN (hypertension), malignant     5. History of CVA (cerebrovascular accident)     6. High risk medication use         Medical Decision Making:  Today's Assessment / Status / Plan:   Patient is asymptomatic.  No evidence of renal arterial stenosis on duplex.  No indication for hospitalization at this time.  I will give patient the benefit of the doubt that he has been taking his medications. Therefore, I will add Isordil 20 mg by mouth 3 times a day and hydralazine 50 mg 3 times a day.    I will need to see him back in one week. I also asked patient to bring all the bottles of medication with him at the next visit.    Today, based on physical examination findings, patient is euvolemic. No JVD, lungs are clear to auscultation, no pitting edema in bilateral lower extremities, no ascites.    Dry weight is 148 lbs.            Ellie Brown, A.P.N.  [address] [place] [address]  Mike SCHAEFFER [id number]  VIA Facsimile: [phone]

## 2018-04-25 NOTE — PROGRESS NOTES
Chief Complaint   Patient presents with   • Hypertension       Subjective:   Seamus Palencia is a 52 y.o. male who presents today for cardiac care and evaluation in our heart failure clinic as a referral from \[facility]\"" clinic. Patient does have history of hypertension, HIV infection, history of stroke. Patient is actually a poor historian. He does not know what medication he is taking. He does not know the details of his medical history. He doesn't have any residual neurological deficits however. He does report of shortness of breath if walking upstairs. No symptom at rest or with daily living activities. He did have a transthoracic echocardiogram done in 2015 which shows LV function of 45%. No significant valvular disease seems at that time.     Patient was able to complete 396 m during his 6 minute walk test. his O2 saturation at baseline was 99% and at the end of the test, the O2 saturation was 100%. he reported 3 level of dyspnea on Danny scale.     LV function has improved and normalized after medical therapy.     Today his blood pressure is not controlled again. I suspect the patient is not taking his medications. Even though patient says that he has been taking all of his medications. In the past, I had asked him to come back to our clinic with the actual bottles of his medications. Again today, patient does not have his bottles with him.    Past Medical History:   Diagnosis Date   • Heart failure (CMS-[facility])    • HIV disease (CMS-HCC) 1995   • Hypertension    • Seizure (CMS-[facility])    • Stroke (CMS-HCC)      Past Surgical History:   Procedure Laterality Date   • CHOLECYSTECTOMY  1980s     Family History   Problem Relation Age of Onset   • Diabetes Sister    • Other Brother      down syndrome   • No Known Problems Sister    • No Known Problems Sister    • No Known Problems Sister      Social History     Social History   • Marital status: Single     Spouse name: N/A   • Number of children: N/A   • Years of education:  N/A     Occupational History   • Not on file.     Social History Main Topics   • Smoking status: Former Smoker     Packs/day: 0.25     Types: Cigarettes     Quit date: 10/27/2017   • Smokeless tobacco: Never Used      Comment: Stopped smoking late OCt 2017   • Alcohol use No   • Drug use: No   • Sexual activity: Not on file     Other Topics Concern   • Not on file     Social History Narrative   • No narrative on file     No Known Allergies  Outpatient Encounter Prescriptions as of 4/25/2018   Medication Sig Dispense Refill   • amLODIPine (NORVASC) 10 MG Tab Take 1 Tab by mouth 2 Times a Day. 30 Tab 11   • atorvastatin (LIPITOR) 10 MG Tab Take 1 Tab by mouth every day. 30 Tab 11   • carvedilol (COREG) 25 MG Tab Take 2 Tabs by mouth 2 times a day, with meals. 180 Tab 3   • lisinopril (PRINIVIL, ZESTRIL) 40 MG tablet Take 1 Tab by mouth 2 times a day. 60 Tab 11   • spironolactone (ALDACTONE) 50 MG Tab Take 1 Tab by mouth every day. 30 Tab 3   • ASPIRIN LOW DOSE 81 MG EC tablet Take 1 Tab by mouth every day.  2   • acetaminophen (TYLENOL) 325 MG Tab Take 650 mg by mouth every four hours as needed.     • TRIUMEQ 600- MG Tab Take 1 Tab by mouth every day.  5   • sulfamethoxazole-trimethoprim (BACTRIM DS) 800-160 MG tablet Take 1 Tab by mouth every day.     • levetiracetam (KEPPRA) 500 MG Tab Take 1 Tab by mouth every 12 hours. 60 Tab 3     No facility-administered encounter medications on file as of 4/25/2018.      Review of Systems   Constitutional: Negative for chills, fever, malaise/fatigue and weight loss.   HENT: Negative for ear discharge, ear pain, hearing loss and nosebleeds.    Eyes: Negative for blurred vision, double vision, pain and discharge.   Respiratory: Positive for shortness of breath. Negative for cough.    Cardiovascular: Negative for chest pain, palpitations, orthopnea, claudication, leg swelling and PND.   Gastrointestinal: Negative for abdominal pain, blood in stool, melena, nausea and  "vomiting.   Genitourinary: Negative for dysuria and hematuria.   Musculoskeletal: Negative for falls, joint pain and myalgias.   Skin: Negative for itching and rash.   Neurological: Negative for dizziness, sensory change, speech change, loss of consciousness and headaches.   Endo/Heme/Allergies: Negative for environmental allergies. Does not bruise/bleed easily.   Psychiatric/Behavioral: Negative for depression, hallucinations and suicidal ideas.        Objective:   BP (!) 218/130   Pulse 98   Ht 1.626 m (5' 4\")   Wt 67.2 kg (148 lb 3.2 oz)   SpO2 96%   BMI 25.44 kg/m²     Physical Exam   Constitutional: He is oriented to person, place, and time. No distress.   HENT:   Head: Normocephalic and atraumatic.   Right Ear: External ear normal.   Left Ear: External ear normal.   Eyes: Right eye exhibits no discharge. Left eye exhibits no discharge.   Neck: No JVD present. No thyromegaly present.   Cardiovascular: Normal rate, regular rhythm, normal heart sounds and intact distal pulses.  Exam reveals no gallop and no friction rub.    No murmur heard.  Pulmonary/Chest: Breath sounds normal. No respiratory distress.   Abdominal: Bowel sounds are normal. He exhibits no distension. There is no tenderness.   Musculoskeletal: He exhibits no edema or tenderness.   Neurological: He is alert and oriented to person, place, and time. No cranial nerve deficit.   Skin: Skin is warm and dry. He is not diaphoretic.   Psychiatric: He has a normal mood and affect. His behavior is normal.   Nursing note and vitals reviewed.      Assessment:     1. ACC/AHA stage C heart failure with preserved ejection fraction (CMS-HCC)     2. Left ventricular diastolic dysfunction, NYHA class 1     3. HIV (human immunodeficiency virus infection) (CMS-[facility])     4. HTN (hypertension), malignant     5. History of CVA (cerebrovascular accident)     6. High risk medication use         Medical Decision Making:  Today's Assessment / Status / Plan:   Patient is " asymptomatic.  No evidence of renal arterial stenosis on duplex.  No indication for hospitalization at this time.  I will give patient the benefit of the doubt that he has been taking his medications. Therefore, I will add Isordil 20 mg by mouth 3 times a day and hydralazine 50 mg 3 times a day.    I will need to see him back in one week. I also asked patient to bring all the bottles of medication with him at the next visit.    Today, based on physical examination findings, patient is euvolemic. No JVD, lungs are clear to auscultation, no pitting edema in bilateral lower extremities, no ascites.    Dry weight is 148 lbs.

## 2018-05-04 ENCOUNTER — OFFICE VISIT (OUTPATIENT)
Dept: CARDIOLOGY | Facility: MEDICAL CENTER | Age: 53
End: 2018-05-04
Payer: MEDICAID

## 2018-05-04 VITALS
HEIGHT: 64 IN | BODY MASS INDEX: 26.29 KG/M2 | SYSTOLIC BLOOD PRESSURE: 146 MMHG | OXYGEN SATURATION: 95 % | WEIGHT: 154 LBS | DIASTOLIC BLOOD PRESSURE: 88 MMHG | HEART RATE: 92 BPM

## 2018-05-04 DIAGNOSIS — I10 HTN (HYPERTENSION), MALIGNANT: ICD-10-CM

## 2018-05-04 DIAGNOSIS — B20 HIV (HUMAN IMMUNODEFICIENCY VIRUS INFECTION) (HCC): ICD-10-CM

## 2018-05-04 DIAGNOSIS — Z79.899 HIGH RISK MEDICATION USE: ICD-10-CM

## 2018-05-04 DIAGNOSIS — I50.30 ACC/AHA STAGE C HEART FAILURE WITH PRESERVED EJECTION FRACTION (HCC): ICD-10-CM

## 2018-05-04 DIAGNOSIS — I51.89 LEFT VENTRICULAR DIASTOLIC DYSFUNCTION, NYHA CLASS 1: ICD-10-CM

## 2018-05-04 PROCEDURE — 99214 OFFICE O/P EST MOD 30 MIN: CPT | Performed by: NURSE PRACTITIONER

## 2018-05-04 RX ORDER — SPIRONOLACTONE 50 MG/1
50 TABLET, FILM COATED ORAL DAILY
Qty: 30 TAB | Refills: 3 | Status: SHIPPED | OUTPATIENT
Start: 2018-05-04 | End: 2018-09-04 | Stop reason: SDUPTHER

## 2018-05-04 RX ORDER — LISINOPRIL 40 MG/1
40 TABLET ORAL DAILY
Qty: 90 TAB | Refills: 3 | Status: SHIPPED | OUTPATIENT
Start: 2018-05-04 | End: 2018-06-15 | Stop reason: DRUGHIGH

## 2018-05-04 ASSESSMENT — ENCOUNTER SYMPTOMS
COUGH: 0
FEVER: 0
PND: 0
SHORTNESS OF BREATH: 0
MYALGIAS: 0
ABDOMINAL PAIN: 0
CLAUDICATION: 0
PALPITATIONS: 0
ORTHOPNEA: 0
DIZZINESS: 0

## 2018-05-04 NOTE — PROGRESS NOTES
Chief Complaint   Patient presents with   • HTN (Uncontrolled)     F/V: 1 WEEK       Subjective:   Seamus Palencia is a 53 y.o. male who presents today for follow up on his HTN and heart failure.     Patient of Dr. Marie. Patient was last seen 4/25/18.  During his last visit, patient continued to have elevated blood pressure.  Patient was started on hydralazine 50 mg 3 times daily and isosorbide dinitrate 20 mg 3 times daily.  Patient reports no problems with the new medications.      Patient did bring in his medications today.  All were verified except spironolactone.  Patient reports he may have run out of this a few weeks ago.        For his heart failure, he is essentially at NYHA class 1 symptoms.    Patient denies chest pain, shortness of breath at rest, ADLs, Exertion, palpitations, dizziness/lightheadedness, orthopnea, PND or Edema.      Home weights have been stable.      Pt does reports the he is smoking a few cigarettes per day and will try to quit again.     Additonally, patient has the following medical problems:     -HIV infection followed by the [facility]     -HTN: takes carvedilol, lisinopril, amlodipine, hydralazine, isosorbide, Spironolactone    Past Medical History:   Diagnosis Date   • Heart failure (HCC)    • HIV disease (HCC) 1995   • Hypertension    • Seizure (HCC)    • Stroke (HCC)      Past Surgical History:   Procedure Laterality Date   • CHOLECYSTECTOMY  1980s     Family History   Problem Relation Age of Onset   • Diabetes Sister    • Other Brother      down syndrome   • No Known Problems Sister    • No Known Problems Sister    • No Known Problems Sister      Social History     Social History   • Marital status: Single     Spouse name: N/A   • Number of children: N/A   • Years of education: N/A     Occupational History   • Not on file.     Social History Main Topics   • Smoking status: Former Smoker     Packs/day: 0.25     Types: Cigarettes     Quit date: 10/27/2017   • Smokeless tobacco:  Never Used      Comment: Stopped smoking late OCt 2017   • Alcohol use No   • Drug use: No   • Sexual activity: Not on file     Other Topics Concern   • Not on file     Social History Narrative   • No narrative on file     No Known Allergies  Outpatient Encounter Prescriptions as of 5/4/2018   Medication Sig Dispense Refill   • hydrALAZINE (APRESOLINE) 50 MG Tab Take 1 Tab by mouth 3 times a day. 90 Tab 11   • isosorbide dinitrate (ISORDIL) 20 MG Tab Take 1 Tab by mouth 3 times a day. 90 Tab 11   • amLODIPine (NORVASC) 10 MG Tab Take 1 Tab by mouth 2 Times a Day. 30 Tab 11   • atorvastatin (LIPITOR) 10 MG Tab Take 1 Tab by mouth every day. 30 Tab 11   • carvedilol (COREG) 25 MG Tab Take 2 Tabs by mouth 2 times a day, with meals. 180 Tab 3   • lisinopril (PRINIVIL, ZESTRIL) 40 MG tablet Take 1 Tab by mouth 2 times a day. (Patient taking differently: Take 40 mg by mouth every day.) 60 Tab 11   • ASPIRIN LOW DOSE 81 MG EC tablet Take 1 Tab by mouth every day.  2   • TRIUMEQ 600- MG Tab Take 1 Tab by mouth every day.  5   • sulfamethoxazole-trimethoprim (BACTRIM DS) 800-160 MG tablet Take 1 Tab by mouth every day.     • levetiracetam (KEPPRA) 500 MG Tab Take 1 Tab by mouth every 12 hours. 60 Tab 3   • spironolactone (ALDACTONE) 50 MG Tab Take 1 Tab by mouth every day. 30 Tab 3   • acetaminophen (TYLENOL) 325 MG Tab Take 650 mg by mouth every four hours as needed.       No facility-administered encounter medications on file as of 5/4/2018.      Review of Systems   Constitutional: Negative for fever and malaise/fatigue.   Respiratory: Negative for cough and shortness of breath.    Cardiovascular: Negative for chest pain, palpitations, orthopnea, claudication, leg swelling and PND.   Gastrointestinal: Negative for abdominal pain.   Musculoskeletal: Negative for myalgias.   Neurological: Negative for dizziness.   All other systems reviewed and are negative.       Objective:   /88   Pulse 92   Ht 1.626 m (5'  "4\")   Wt 69.9 kg (154 lb)   SpO2 95%   BMI 26.43 kg/m²     Physical Exam   Constitutional: He is oriented to person, place, and time. He appears well-developed and well-nourished.   HENT:   Head: Normocephalic and atraumatic.   Eyes: EOM are normal. Pupils are equal, round, and reactive to light.   Neck: Normal range of motion. Neck supple. No JVD present.   Cardiovascular: Normal rate, regular rhythm and normal heart sounds.    Pulmonary/Chest: Effort normal and breath sounds normal. No respiratory distress. He has no wheezes. He has no rales.   Musculoskeletal: He exhibits no edema.   Neurological: He is alert and oriented to person, place, and time.   Skin: Skin is warm and dry.   Psychiatric: He has a normal mood and affect. His behavior is normal.     Lab Results   Component Value Date/Time    CHOLSTRLTOT 49 (L) 08/19/2016 01:15 AM    LDL 16 08/19/2016 01:15 AM    HDL 17 (A) 08/19/2016 01:15 AM    TRIGLYCERIDE 80 08/19/2016 01:15 AM       Lab Results   Component Value Date/Time    SODIUM 139 08/22/2016 04:31 AM    POTASSIUM 4.0 08/22/2016 04:31 AM    CHLORIDE 110 08/22/2016 04:31 AM    CO2 22 08/22/2016 04:31 AM    GLUCOSE 96 08/22/2016 04:31 AM    BUN 5 (L) 08/22/2016 04:31 AM    CREATININE 0.67 08/22/2016 04:31 AM     Lab Results   Component Value Date/Time    ALKPHOSPHAT 89 08/18/2016 02:38 AM    ASTSGOT 7 (L) 08/18/2016 02:38 AM    ALTSGPT 7 08/18/2016 02:38 AM    TBILIRUBIN 0.3 08/18/2016 02:38 AM        Transthoracic Echo Report 12/27/15  Left ventricular ejection fraction is visually estimated to be 45%.  Septum hyopkinesis, Basal inferiolateral and basal to mid anterior wall   akinesis, grade III diastolic dysfunction.  Mild concentric left ventricular hypertrophy.  Small pericardial effusion without evidence of hemodynamic compromise.  Estimated right ventricular systolic pressure  is 60 mmHg.  Moderate mitral regurgitation.  Normal inferior vena cava size and inspiratory collapse.  No prior study " is available for comparison.         Transthoracic Echo Report 8/30/17  No prior study is available for comparison.   Normal left ventricular systolic function.   No evidence of valvular abnormality based on Doppler evaluation  Assessment:     1. HTN (hypertension), malignant  lisinopril (PRINIVIL, ZESTRIL) 40 MG tablet    spironolactone (ALDACTONE) 50 MG Tab    BASIC METABOLIC PANEL   2. ACC/AHA stage C heart failure with preserved ejection fraction (HCC)  lisinopril (PRINIVIL, ZESTRIL) 40 MG tablet    BASIC METABOLIC PANEL   3. Left ventricular diastolic dysfunction, NYHA class 1  lisinopril (PRINIVIL, ZESTRIL) 40 MG tablet    BASIC METABOLIC PANEL   4. HIV (human immunodeficiency virus infection) (HCC)     5. High risk medication use         Medical Decision Making:  Today's Assessment / Status / Plan:   1. HTN: BP is better today, asymptomatic.  -Resume Spironolactone 50 mg Daily (Refill sent to Pharmacy)  -Continue Carvedilol 50 mg twice a day  -Continue amlodipine 10 mg BID  -Continue Lisinopril 40 mg Daily  -Continue Hydralazine 50 mg TID  -Continue Isosorbide dinitrate 20 mg TID  -BMP in 1 week  -Continue to Monitor and log Blood pressures at home. Call office or RTC if BP increasing, decreasing or >180/100, <90/50 or if symptoms of elevated/low blood pressure present. Reviewed s/sx of stroke and heart attack. Patient to go to ER or call 911 if present.      2. HFrEF, Stage C, class 1: Based on physical examination findings, patient is euvolemic. No JVD, lungs are clear to auscultation, no pitting edema in bilateral lower extremities, no ascites.  -Continue carvedilol 50 mg twice a day  -Continue lisinopril 40 mg twice a day  -Reinforced s/sx of worsening heart failure with patient and weight monitoring. Pt verbalizes understanding. Pt to call office or RTC if present.      3. HIV:  -Continue follow up with [facility]     Continue with smoking Cessation.     FU in clinic in 1 month. Sooner if  needed.     Patient verbalizes understanding and agrees with the plan of care.      Collaborating MD: Yannick Rodgers MD

## 2018-05-11 DIAGNOSIS — I10 ESSENTIAL HYPERTENSION: Primary | ICD-10-CM

## 2018-05-11 RX ORDER — AMLODIPINE BESYLATE 10 MG/1
10 TABLET ORAL 2 TIMES DAILY
Qty: 60 TAB | Refills: 11 | Status: SHIPPED | OUTPATIENT
Start: 2018-05-11 | End: 2018-12-07 | Stop reason: SDUPTHER

## 2018-05-14 ENCOUNTER — HOSPITAL ENCOUNTER (OUTPATIENT)
Dept: LAB | Facility: MEDICAL CENTER | Age: 53
End: 2018-05-14
Attending: NURSE PRACTITIONER
Payer: MEDICAID

## 2018-05-14 DIAGNOSIS — I50.30 ACC/AHA STAGE C HEART FAILURE WITH PRESERVED EJECTION FRACTION (HCC): ICD-10-CM

## 2018-05-14 DIAGNOSIS — I51.89 LEFT VENTRICULAR DIASTOLIC DYSFUNCTION, NYHA CLASS 1: ICD-10-CM

## 2018-05-14 DIAGNOSIS — I10 HTN (HYPERTENSION), MALIGNANT: ICD-10-CM

## 2018-05-14 PROCEDURE — 80048 BASIC METABOLIC PNL TOTAL CA: CPT

## 2018-05-14 PROCEDURE — 36415 COLL VENOUS BLD VENIPUNCTURE: CPT

## 2018-05-15 LAB
ANION GAP SERPL CALC-SCNC: 8 MMOL/L (ref 0–11.9)
BUN SERPL-MCNC: 11 MG/DL (ref 8–22)
CALCIUM SERPL-MCNC: 9.6 MG/DL (ref 8.5–10.5)
CHLORIDE SERPL-SCNC: 108 MMOL/L (ref 96–112)
CO2 SERPL-SCNC: 22 MMOL/L (ref 20–33)
CREAT SERPL-MCNC: 1.52 MG/DL (ref 0.5–1.4)
GLUCOSE SERPL-MCNC: 100 MG/DL (ref 65–99)
POTASSIUM SERPL-SCNC: 3.9 MMOL/L (ref 3.6–5.5)
SODIUM SERPL-SCNC: 138 MMOL/L (ref 135–145)

## 2018-05-18 ENCOUNTER — OFFICE VISIT (OUTPATIENT)
Dept: NEPHROLOGY | Facility: MEDICAL CENTER | Age: 53
End: 2018-05-18
Payer: MEDICAID

## 2018-05-18 VITALS
OXYGEN SATURATION: 97 % | BODY MASS INDEX: 26.12 KG/M2 | DIASTOLIC BLOOD PRESSURE: 88 MMHG | RESPIRATION RATE: 14 BRPM | HEIGHT: 64 IN | TEMPERATURE: 98.2 F | HEART RATE: 78 BPM | WEIGHT: 153 LBS | SYSTOLIC BLOOD PRESSURE: 188 MMHG

## 2018-05-18 DIAGNOSIS — I10 HTN (HYPERTENSION), MALIGNANT: ICD-10-CM

## 2018-05-18 DIAGNOSIS — N18.30 STAGE 3 CHRONIC KIDNEY DISEASE (HCC): ICD-10-CM

## 2018-05-18 DIAGNOSIS — B20 HIV (HUMAN IMMUNODEFICIENCY VIRUS INFECTION) (HCC): ICD-10-CM

## 2018-05-18 PROCEDURE — 99202 OFFICE O/P NEW SF 15 MIN: CPT | Performed by: INTERNAL MEDICINE

## 2018-05-18 RX ORDER — FUROSEMIDE 20 MG/1
20 TABLET ORAL 2 TIMES DAILY
Qty: 60 TAB | Refills: 11 | Status: SHIPPED | OUTPATIENT
Start: 2018-05-18 | End: 2018-09-14

## 2018-05-18 ASSESSMENT — ENCOUNTER SYMPTOMS
CHILLS: 0
FEVER: 0
PALPITATIONS: 0

## 2018-05-18 NOTE — PROGRESS NOTES
"Subjective:      Seamus Palencia is a 53 y.o. male who presents with New Patient            HPI  53 year old with HIV, HTN, CKD 3. He started treatment for HIV in 2010. He is currently taking Triumeq (600-) daily. He does not remember when he was first told he had an abnormal Cr. In our system he has a Cr of 1.5 this month. Last Cr values are from 2016.     He has no problems urinating. No nausea or vomiting. No blood in the urine. He does not take any advil. States his BP has been high for a long time. Despite the antihypertensives, he still has high Blood pressure. Of note, takes carvedilol, lisinopril, Norvasc, hydralazine, and spirinolactone.    Review of Systems   Constitutional: Negative for chills and fever.   Cardiovascular: Negative for chest pain and palpitations.   All other systems reviewed and are negative.         Objective:     BP (!) 188/88   Pulse 78   Temp 36.8 °C (98.2 °F)   Resp 14   Ht 1.626 m (5' 4\")   Wt 69.4 kg (153 lb)   SpO2 97%   BMI 26.26 kg/m²      Physical Exam   Constitutional: He is oriented to person, place, and time. He appears well-developed and well-nourished.   HENT:   Head: Normocephalic and atraumatic.   Cardiovascular: Normal rate and regular rhythm.    Pulmonary/Chest: Effort normal and breath sounds normal.   Musculoskeletal: He exhibits no edema or deformity.   Neurological: He is alert and oriented to person, place, and time.   Skin: Skin is warm and dry.               Assessment/Plan:     1. Stage 3 chronic kidney disease  Suspect this is due to HTN renal disease. Although CKD due to medications has not been ruled out. Given the findings, will follow with CKD labs, repeat UA. Will see if we can get some additional labs to see the progression.    2. HIV (human immunodeficiency virus infection) (HCC)  On triumeq, though has been stable.     3. HTN  Add a low dose diuretic to help with BP control.   Check BP at home, and bring to next appointment    "

## 2018-05-30 DIAGNOSIS — I10 HTN (HYPERTENSION), MALIGNANT: ICD-10-CM

## 2018-05-30 DIAGNOSIS — I50.30 ACC/AHA STAGE C HEART FAILURE WITH PRESERVED EJECTION FRACTION (HCC): ICD-10-CM

## 2018-05-30 DIAGNOSIS — I51.89 LEFT VENTRICULAR DIASTOLIC DYSFUNCTION, NYHA CLASS 1: ICD-10-CM

## 2018-05-30 RX ORDER — CARVEDILOL 25 MG/1
50 TABLET ORAL 2 TIMES DAILY WITH MEALS
Qty: 360 TAB | Refills: 3 | Status: SHIPPED | OUTPATIENT
Start: 2018-05-30 | End: 2018-09-14

## 2018-06-15 ENCOUNTER — OFFICE VISIT (OUTPATIENT)
Dept: CARDIOLOGY | Facility: MEDICAL CENTER | Age: 53
End: 2018-06-15
Payer: MEDICAID

## 2018-06-15 VITALS
OXYGEN SATURATION: 97 % | HEART RATE: 77 BPM | HEIGHT: 64 IN | BODY MASS INDEX: 25.44 KG/M2 | DIASTOLIC BLOOD PRESSURE: 102 MMHG | SYSTOLIC BLOOD PRESSURE: 188 MMHG | WEIGHT: 149 LBS

## 2018-06-15 DIAGNOSIS — I51.89 LEFT VENTRICULAR DIASTOLIC DYSFUNCTION, NYHA CLASS 1: ICD-10-CM

## 2018-06-15 DIAGNOSIS — I50.30 ACC/AHA STAGE C HEART FAILURE WITH PRESERVED EJECTION FRACTION (HCC): ICD-10-CM

## 2018-06-15 DIAGNOSIS — I10 HTN (HYPERTENSION), MALIGNANT: ICD-10-CM

## 2018-06-15 PROCEDURE — 99214 OFFICE O/P EST MOD 30 MIN: CPT | Performed by: NURSE PRACTITIONER

## 2018-06-15 RX ORDER — HYDRALAZINE HYDROCHLORIDE 100 MG/1
100 TABLET, FILM COATED ORAL 3 TIMES DAILY
Qty: 90 TAB | Refills: 11 | Status: SHIPPED | OUTPATIENT
Start: 2018-06-15 | End: 2018-12-13 | Stop reason: SDUPTHER

## 2018-06-15 RX ORDER — LISINOPRIL 40 MG/1
40 TABLET ORAL 2 TIMES DAILY
Qty: 60 TAB | Refills: 11 | Status: SHIPPED | OUTPATIENT
Start: 2018-06-15 | End: 2018-07-25 | Stop reason: SDUPTHER

## 2018-06-15 ASSESSMENT — ENCOUNTER SYMPTOMS
ORTHOPNEA: 0
ROS SKIN COMMENTS: HAIR LOSS
CLAUDICATION: 0
FEVER: 0
DIZZINESS: 0
ABDOMINAL PAIN: 0
PALPITATIONS: 0
MYALGIAS: 0
PND: 0
COUGH: 0
SHORTNESS OF BREATH: 0

## 2018-06-15 NOTE — PROGRESS NOTES
Chief Complaint   Patient presents with   • HTN (Controlled)     sudden hair loss(1 month ago-continuous), high blood pressure       Subjective:   Seamus Palencia is a 53 y.o. male who presents today for follow up on his HTN and heart failure.     Patient of Dr. Marie. Patient was last seen 5/4/18.  During his last visit, patient continued to have elevated blood pressure.  Patient was restarted on Spironolactone 50 mg daily.  Patient reported no problems restarting the medication.     Since his last visit, patient did establish with a nephrologist.  Patient was seen on 5/18/2018, patient was was started on furosemide 20 mg twice a day for hypertension.    His BP in the clinic continues to be elevated.  Patient reports he is taking all of his medications as directed.        For his heart failure, he is essentially at NYHA class 1 symptoms.    Patient denies chest pain, shortness of breath at rest, ADLs, Exertion, palpitations, dizziness/lightheadedness, orthopnea, PND or Edema.  Patient also denies headaches, vision changes.    Patient does report he has been experiencing hair loss.  Patient is following up with his PCP regarding this.     Patient has not been monitoring his home weights.      Pt does reports the he is smoking a few cigarettes per day and will try to quit again.     Additonally, patient has the following medical problems:     -HIV infection followed by the [facility] clinic     -HTN: takes carvedilol, lisinopril, amlodipine, hydralazine, isosorbide, Spironolactone    Past Medical History:   Diagnosis Date   • Heart failure (HCC)    • HIV disease (HCC) 1995   • Hypertension    • Seizure (HCC)    • Stroke (HCC)      Past Surgical History:   Procedure Laterality Date   • CHOLECYSTECTOMY  1980s     Family History   Problem Relation Age of Onset   • Diabetes Sister    • Other Brother      down syndrome   • No Known Problems Sister    • No Known Problems Sister    • No Known Problems Sister      Social History      Social History   • Marital status: Single     Spouse name: N/A   • Number of children: N/A   • Years of education: N/A     Occupational History   • Not on file.     Social History Main Topics   • Smoking status: Current Every Day Smoker     Packs/day: 0.25     Types: Cigarettes     Last attempt to quit: 10/27/2017   • Smokeless tobacco: Never Used      Comment: 3-4 CIGARETTES A DAY   • Alcohol use No   • Drug use: No   • Sexual activity: Not on file     Other Topics Concern   • Not on file     Social History Narrative   • No narrative on file     No Known Allergies  Outpatient Encounter Prescriptions as of 6/15/2018   Medication Sig Dispense Refill   • carvedilol (COREG) 25 MG Tab Take 2 Tabs by mouth 2 times a day, with meals. 360 Tab 3   • furosemide (LASIX) 20 MG Tab Take 1 Tab by mouth 2 times a day. 60 Tab 11   • amLODIPine (NORVASC) 10 MG Tab Take 1 Tab by mouth 2 Times a Day. 60 Tab 11   • lisinopril (PRINIVIL, ZESTRIL) 40 MG tablet Take 1 Tab by mouth every day. (Patient taking differently: Take 40 mg by mouth 2 Times a Day.) 90 Tab 3   • spironolactone (ALDACTONE) 50 MG Tab Take 1 Tab by mouth every day. 30 Tab 3   • hydrALAZINE (APRESOLINE) 50 MG Tab Take 1 Tab by mouth 3 times a day. 90 Tab 11   • isosorbide dinitrate (ISORDIL) 20 MG Tab Take 1 Tab by mouth 3 times a day. 90 Tab 11   • atorvastatin (LIPITOR) 10 MG Tab Take 1 Tab by mouth every day. 30 Tab 11   • ASPIRIN LOW DOSE 81 MG EC tablet Take 1 Tab by mouth every day.  2   • TRIUMEQ 600- MG Tab Take 1 Tab by mouth every day.  5   • sulfamethoxazole-trimethoprim (BACTRIM DS) 800-160 MG tablet Take 1 Tab by mouth every day.     • levetiracetam (KEPPRA) 500 MG Tab Take 1 Tab by mouth every 12 hours. 60 Tab 3   • acetaminophen (TYLENOL) 325 MG Tab Take 650 mg by mouth every four hours as needed.       No facility-administered encounter medications on file as of 6/15/2018.      Review of Systems   Constitutional: Negative for fever and  "malaise/fatigue.   Respiratory: Negative for cough and shortness of breath.    Cardiovascular: Negative for chest pain, palpitations, orthopnea, claudication, leg swelling and PND.   Gastrointestinal: Negative for abdominal pain.   Musculoskeletal: Negative for myalgias.   Skin:        Hair loss   Neurological: Negative for dizziness.   All other systems reviewed and are negative.       Objective:   BP (!) 188/102   Pulse 77   Ht 1.626 m (5' 4\")   Wt 67.6 kg (149 lb)   SpO2 97%   BMI 25.58 kg/m²     Physical Exam   Constitutional: He is oriented to person, place, and time. He appears well-developed and well-nourished.   HENT:   Head: Normocephalic and atraumatic.   Eyes: EOM are normal. Pupils are equal, round, and reactive to light.   Neck: Normal range of motion. Neck supple. No JVD present.   Cardiovascular: Normal rate, regular rhythm and normal heart sounds.    Pulmonary/Chest: Effort normal and breath sounds normal. No respiratory distress. He has no wheezes. He has no rales.   Musculoskeletal: He exhibits no edema.   Neurological: He is alert and oriented to person, place, and time.   Skin: Skin is warm and dry.   Psychiatric: He has a normal mood and affect. His behavior is normal.     Lab Results   Component Value Date/Time    CHOLSTRLTOT 49 (L) 08/19/2016 01:15 AM    LDL 16 08/19/2016 01:15 AM    HDL 17 (A) 08/19/2016 01:15 AM    TRIGLYCERIDE 80 08/19/2016 01:15 AM       Lab Results   Component Value Date/Time    SODIUM 138 05/14/2018 01:32 PM    POTASSIUM 3.9 05/14/2018 01:32 PM    CHLORIDE 108 05/14/2018 01:32 PM    CO2 22 05/14/2018 01:32 PM    GLUCOSE 100 (H) 05/14/2018 01:32 PM    BUN 11 05/14/2018 01:32 PM    CREATININE 1.52 (H) 05/14/2018 01:32 PM     Lab Results   Component Value Date/Time    ALKPHOSPHAT 89 08/18/2016 02:38 AM    ASTSGOT 7 (L) 08/18/2016 02:38 AM    ALTSGPT 7 08/18/2016 02:38 AM    TBILIRUBIN 0.3 08/18/2016 02:38 AM        Transthoracic Echo Report 12/27/15  Left ventricular " ejection fraction is visually estimated to be 45%.  Septum hyopkinesis, Basal inferiolateral and basal to mid anterior wall   akinesis, grade III diastolic dysfunction.  Mild concentric left ventricular hypertrophy.  Small pericardial effusion without evidence of hemodynamic compromise.  Estimated right ventricular systolic pressure  is 60 mmHg.  Moderate mitral regurgitation.  Normal inferior vena cava size and inspiratory collapse.  No prior study is available for comparison.         Transthoracic Echo Report 8/30/17  No prior study is available for comparison.   Normal left ventricular systolic function.   No evidence of valvular abnormality based on Doppler evaluation  Assessment:     1. ACC/AHA stage C heart failure with preserved ejection fraction (HCC)     2. Left ventricular diastolic dysfunction, NYHA class 1     3. HTN (hypertension), malignant         Medical Decision Making:  Today's Assessment / Status / Plan:   1. HTN: BP elevated, asymptomatic.  -Increase Hydralazine to 100 mg TID  -Continue spironolactone 50 mg Daily (Refill sent to Pharmacy)  -Continue Carvedilol 50 mg twice a day  -Continue amlodipine 10 mg BID  -Continue Lisinopril 40 mg BID  -Continue isosorbide dinitrate 20 mg 3 times a day  -Continue furosemide 20 mg twice a day  -Monitor and log Blood pressures at home. Call office or RTC if BP increasing or >180/100 or if symptoms of elevated blood pressure present. Reviewed s/sx of stroke and heart attack. Patient to go to ER or call 911 if present.   -Will refer patient over to hypertension clinic for further evaluation and management.  -Patient reports that he might be seeing somebody at the [facility] clinic for his blood pressure as well on 7/6/2018.       2. HFrEF, Stage C, class 1: Based on physical examination findings, patient is euvolemic. No JVD, lungs are clear to auscultation, no pitting edema in bilateral lower extremities, no ascites.  -Continue carvedilol 50 mg twice a  day  -Continue lisinopril 40 mg twice a day  -Reinforced s/sx of worsening heart failure with patient and weight monitoring. Pt verbalizes understanding. Pt to call office or RTC if present.      3. HIV:  -Continue follow up with [facility] clinic    Recommend to patient to ask for referral to dermatology for hair loss with his PCP     Continue with smoking Cessation.     FU in clinic in 1 month if no appointment with hypertension clinic, 3 months in heart failure clinic. Sooner if needed.     Patient verbalizes understanding and agrees with the plan of care.      Collaborating MD: Yannick Rodgers MD

## 2018-06-15 NOTE — LETTER
[facility]   [address] [place] [address]  LA[place] Mckeon [address]  Phone: [phone]  Fax: [phone]              Seamus Palencia  1965    Encounter Date: 6/15/2018    EDEN Bell          PROGRESS NOTE:  Chief Complaint   Patient presents with   • HTN (Controlled)     sudden hair loss(1 month ago-continuous), high blood pressure       Subjective:   Seamus Palencia is a 53 y.o. male who presents today for follow up on his HTN and heart failure.     Patient of Dr. Marie. Patient was last seen 5/4/18.  During his last visit, patient continued to have elevated blood pressure.  Patient was restarted on Spironolactone 50 mg daily.  Patient reported no problems restarting the medication.     Since his last visit, patient did establish with a nephrologist.  Patient was seen on 5/18/2018, patient was was started on furosemide 20 mg twice a day for hypertension.    His BP in the clinic continues to be elevated.  Patient reports he is taking all of his medications as directed.        For his heart failure, he is essentially at NYHA class 1 symptoms.    Patient denies chest pain, shortness of breath at rest, ADLs, Exertion, palpitations, dizziness/lightheadedness, orthopnea, PND or Edema.  Patient also denies headaches, vision changes.    Patient does report he has been experiencing hair loss.  Patient is following up with his PCP regarding this.     Patient has not been monitoring his home weights.      Pt does reports the he is smoking a few cigarettes per day and will try to quit again.     Additonally, patient has the following medical problems:     -HIV infection followed by the [facility]     -HTN: takes carvedilol, lisinopril, amlodipine, hydralazine, isosorbide, Spironolactone    Past Medical History:   Diagnosis Date   • Heart failure (HCC)    • HIV disease (HCC) 1995   • Hypertension    • Seizure (HCC)    • Stroke (HCC)      Past Surgical History:      Procedure Laterality Date   • CHOLECYSTECTOMY  1980s     Family History   Problem Relation Age of Onset   • Diabetes Sister    • Other Brother      down syndrome   • No Known Problems Sister    • No Known Problems Sister    • No Known Problems Sister      Social History     Social History   • Marital status: Single     Spouse name: N/A   • Number of children: N/A   • Years of education: N/A     Occupational History   • Not on file.     Social History Main Topics   • Smoking status: Current Every Day Smoker     Packs/day: 0.25     Types: Cigarettes     Last attempt to quit: 10/27/2017   • Smokeless tobacco: Never Used      Comment: 3-4 CIGARETTES A DAY   • Alcohol use No   • Drug use: No   • Sexual activity: Not on file     Other Topics Concern   • Not on file     Social History Narrative   • No narrative on file     No Known Allergies  Outpatient Encounter Prescriptions as of 6/15/2018   Medication Sig Dispense Refill   • carvedilol (COREG) 25 MG Tab Take 2 Tabs by mouth 2 times a day, with meals. 360 Tab 3   • furosemide (LASIX) 20 MG Tab Take 1 Tab by mouth 2 times a day. 60 Tab 11   • amLODIPine (NORVASC) 10 MG Tab Take 1 Tab by mouth 2 Times a Day. 60 Tab 11   • lisinopril (PRINIVIL, ZESTRIL) 40 MG tablet Take 1 Tab by mouth every day. (Patient taking differently: Take 40 mg by mouth 2 Times a Day.) 90 Tab 3   • spironolactone (ALDACTONE) 50 MG Tab Take 1 Tab by mouth every day. 30 Tab 3   • hydrALAZINE (APRESOLINE) 50 MG Tab Take 1 Tab by mouth 3 times a day. 90 Tab 11   • isosorbide dinitrate (ISORDIL) 20 MG Tab Take 1 Tab by mouth 3 times a day. 90 Tab 11   • atorvastatin (LIPITOR) 10 MG Tab Take 1 Tab by mouth every day. 30 Tab 11   • ASPIRIN LOW DOSE 81 MG EC tablet Take 1 Tab by mouth every day.  2   • TRIUMEQ 600- MG Tab Take 1 Tab by mouth every day.  5   • sulfamethoxazole-trimethoprim (BACTRIM DS) 800-160 MG tablet Take 1 Tab by mouth every day.     • levetiracetam (KEPPRA) 500 MG Tab Take 1  "Tab by mouth every 12 hours. 60 Tab 3   • acetaminophen (TYLENOL) 325 MG Tab Take 650 mg by mouth every four hours as needed.       No facility-administered encounter medications on file as of 6/15/2018.      Review of Systems   Constitutional: Negative for fever and malaise/fatigue.   Respiratory: Negative for cough and shortness of breath.    Cardiovascular: Negative for chest pain, palpitations, orthopnea, claudication, leg swelling and PND.   Gastrointestinal: Negative for abdominal pain.   Musculoskeletal: Negative for myalgias.   Skin:        Hair loss   Neurological: Negative for dizziness.   All other systems reviewed and are negative.       Objective:   BP (!) 188/102   Pulse 77   Ht 1.626 m (5' 4\")   Wt 67.6 kg (149 lb)   SpO2 97%   BMI 25.58 kg/m²      Physical Exam   Constitutional: He is oriented to person, place, and time. He appears well-developed and well-nourished.   HENT:   Head: Normocephalic and atraumatic.   Eyes: EOM are normal. Pupils are equal, round, and reactive to light.   Neck: Normal range of motion. Neck supple. No JVD present.   Cardiovascular: Normal rate, regular rhythm and normal heart sounds.    Pulmonary/Chest: Effort normal and breath sounds normal. No respiratory distress. He has no wheezes. He has no rales.   Musculoskeletal: He exhibits no edema.   Neurological: He is alert and oriented to person, place, and time.   Skin: Skin is warm and dry.   Psychiatric: He has a normal mood and affect. His behavior is normal.     Lab Results   Component Value Date/Time    CHOLSTRLTOT 49 (L) 08/19/2016 01:15 AM    LDL 16 08/19/2016 01:15 AM    HDL 17 (A) 08/19/2016 01:15 AM    TRIGLYCERIDE 80 08/19/2016 01:15 AM       Lab Results   Component Value Date/Time    SODIUM 138 05/14/2018 01:32 PM    POTASSIUM 3.9 05/14/2018 01:32 PM    CHLORIDE 108 05/14/2018 01:32 PM    CO2 22 05/14/2018 01:32 PM    GLUCOSE 100 (H) 05/14/2018 01:32 PM    BUN 11 05/14/2018 01:32 PM    CREATININE 1.52 (H) " 05/14/2018 01:32 PM     Lab Results   Component Value Date/Time    ALKPHOSPHAT 89 08/18/2016 02:38 AM    ASTSGOT 7 (L) 08/18/2016 02:38 AM    ALTSGPT 7 08/18/2016 02:38 AM    TBILIRUBIN 0.3 08/18/2016 02:38 AM        Transthoracic Echo Report 12/27/15  Left ventricular ejection fraction is visually estimated to be 45%.  Septum hyopkinesis, Basal inferiolateral and basal to mid anterior wall   akinesis, grade III diastolic dysfunction.  Mild concentric left ventricular hypertrophy.  Small pericardial effusion without evidence of hemodynamic compromise.  Estimated right ventricular systolic pressure  is 60 mmHg.  Moderate mitral regurgitation.  Normal inferior vena cava size and inspiratory collapse.  No prior study is available for comparison.         Transthoracic Echo Report 8/30/17  No prior study is available for comparison.   Normal left ventricular systolic function.   No evidence of valvular abnormality based on Doppler evaluation  Assessment:     1. ACC/AHA stage C heart failure with preserved ejection fraction (HCC)     2. Left ventricular diastolic dysfunction, NYHA class 1     3. HTN (hypertension), malignant         Medical Decision Making:  Today's Assessment / Status / Plan:   1. HTN: BP elevated, asymptomatic.  -Increase Hydralazine to 100 mg TID  -Continue spironolactone 50 mg Daily (Refill sent to Pharmacy)  -Continue Carvedilol 50 mg twice a day  -Continue amlodipine 10 mg BID  -Continue Lisinopril 40 mg BID  -Continue isosorbide dinitrate 20 mg 3 times a day  -Continue furosemide 20 mg twice a day  -Monitor and log Blood pressures at home. Call office or RTC if BP increasing or >180/100 or if symptoms of elevated blood pressure present. Reviewed s/sx of stroke and heart attack. Patient to go to ER or call 911 if present.   -Will refer patient over to hypertension clinic for further evaluation and management.  -Patient reports that he might be seeing somebody at the [facility] clinic for his blood  pressure as well on 7/6/2018.       2. HFrEF, Stage C, class 1: Based on physical examination findings, patient is euvolemic. No JVD, lungs are clear to auscultation, no pitting edema in bilateral lower extremities, no ascites.  -Continue carvedilol 50 mg twice a day  -Continue lisinopril 40 mg twice a day  -Reinforced s/sx of worsening heart failure with patient and weight monitoring. Pt verbalizes understanding. Pt to call office or RTC if present.      3. HIV:  -Continue follow up with [facility]    Recommend to patient to ask for referral to dermatology for hair loss with his PCP     Continue with smoking Cessation.     FU in clinic in 1 month if no appointment with hypertension clinic, 3 months in heart failure clinic. Sooner if needed.     Patient verbalizes understanding and agrees with the plan of care.      Collaborating MD: MD Ellie Gonzalez A.P.N.  [address] [place] [id number]  VIA Facsimile: [phone]

## 2018-06-20 ENCOUNTER — TELEPHONE (OUTPATIENT)
Dept: CARDIOLOGY | Facility: MEDICAL CENTER | Age: 53
End: 2018-06-20

## 2018-06-20 NOTE — TELEPHONE ENCOUNTER
From: Mary Jo Briones   Sent: 6/18/2018  11:34 AM   To: Carli Pennington R.N.   Subject: REFERRAL TO VASCULAR MEDICINE                      The referral to vascular medicine will be sent to [facility]. The contact number is [phone].     S/W patient about above info. I also gave patient the contact info as above and he said that he will give them a call to set up an appt.

## 2018-07-16 ENCOUNTER — OFFICE VISIT (OUTPATIENT)
Dept: CARDIOLOGY | Facility: MEDICAL CENTER | Age: 53
End: 2018-07-16
Payer: MEDICAID

## 2018-07-16 VITALS
HEART RATE: 92 BPM | HEIGHT: 64 IN | OXYGEN SATURATION: 97 % | DIASTOLIC BLOOD PRESSURE: 130 MMHG | WEIGHT: 146 LBS | SYSTOLIC BLOOD PRESSURE: 226 MMHG | BODY MASS INDEX: 24.92 KG/M2

## 2018-07-16 DIAGNOSIS — I51.89 LEFT VENTRICULAR DIASTOLIC DYSFUNCTION, NYHA CLASS 1: ICD-10-CM

## 2018-07-16 DIAGNOSIS — I10 ESSENTIAL HYPERTENSION: ICD-10-CM

## 2018-07-16 DIAGNOSIS — I50.20 ACC/AHA STAGE C SYSTOLIC HEART FAILURE (HCC): ICD-10-CM

## 2018-07-16 PROCEDURE — 99214 OFFICE O/P EST MOD 30 MIN: CPT | Performed by: NURSE PRACTITIONER

## 2018-07-16 RX ORDER — CLONIDINE HYDROCHLORIDE 0.1 MG/1
0.1 TABLET ORAL 2 TIMES DAILY
Qty: 60 TAB | Refills: 11 | Status: SHIPPED | OUTPATIENT
Start: 2018-07-16 | End: 2018-07-24 | Stop reason: SDUPTHER

## 2018-07-16 ASSESSMENT — ENCOUNTER SYMPTOMS
PHOTOPHOBIA: 0
ORTHOPNEA: 0
MYALGIAS: 0
PALPITATIONS: 0
BLURRED VISION: 0
COUGH: 0
CLAUDICATION: 0
DIZZINESS: 0
PND: 0
DOUBLE VISION: 0
SHORTNESS OF BREATH: 0
FEVER: 0
HEADACHES: 0
ABDOMINAL PAIN: 0

## 2018-07-16 ASSESSMENT — MINNESOTA LIVING WITH HEART FAILURE QUESTIONNAIRE (MLHF)
DIFFICULTY SLEEPING WELL AT NIGHT: 1
FEELING LIKE A BURDEN TO FAMILY AND FRIENDS: 0
DIFFICULTY TO CONCENTRATE OR REMEMBERING THINGS: 0
MAKING YOU FEEL DEPRESSED: 0
TOTAL_SCORE: 7
DIFFICULTY WITH RECREATIONAL PASTIMES, SPORTS, HOBBIES: 0
WALKING ABOUT OR CLIMBING STAIRS DIFFICULT: 1
SWELLING IN ANKLES OR LEGS: 1
HAVING TO SIT OR LIE DOWN DURING THE DAY: 0
MAKING YOU STAY IN A HOSPITAL: 0
DIFFICULTY GOING AWAY FROM HOME: 0
MAKING YOU WORRY: 0
DIFFICULTY WORKING TO EARN A LIVING: 1
DIFFICULTY WITH SEXUAL ACTIVITIES: 2
EATING LESS FOODS YOU LIKE: 0
DIFFICULTY SOCIALIZING WITH FAMILY OR FRIENDS: 0
LOSS OF SELF CONTROL IN YOUR LIFE: 0
MAKING YOU SHORT OF BREATH: 0
GIVING YOU SIDE EFFECTS FROM TREATMENTS: 0
WORKING AROUND THE HOUSE OR YARD DIFFICULT: 0
TIRED, FATIGUED OR LOW ON ENERGY: 1
COSTING YOU MONEY FOR MEDICAL CARE: 0

## 2018-07-16 NOTE — PROGRESS NOTES
Chief Complaint   Patient presents with   • Congestive Heart Failure     F/V: 1 MO       Subjective:   Seamus Palencia is a 53 y.o. male who presents today for follow-up on his hypertension and his heart failure.    Patient Dr. Marie.  Patient was last seen in clinic on 6/15/2018.  During his last visit, hydralazine was increased to 100 mg 3 times daily and patient was referred over to the hypertension clinic for further evaluation and management.  Patient reports no problems with the dose increase.  Patient does state his appointment with the hypertension clinic is not until mid August.    Patient feels well today.  He denies fatigue, vision changes, chest pain, palpitations, orthopnea, PND, edema,, any shortness of breath, dizziness or lightheadedness or headaches.  Patient has not having any gait problems or unsteadiness.    Patient does report continued hair loss.  Patient has followed up with his PCP and is being referred over to dermatology.    His blood pressure is extremely elevated in the clinic today.    Patient reports he is taking medications as directed, is monitoring his sodium intake.    Patient is not monitoring his home weights.    Additonally, patient has the following medical problems:     -HIV infection followed by the [facility] clinic     -HTN: takes carvedilol, lisinopril, amlodipine, hydralazine, isosorbide, Spironolactone       Past Medical History:   Diagnosis Date   • Heart failure (HCC)    • HIV disease (HCC) 1995   • Hypertension    • Seizure (HCC)    • Stroke (HCC)      Past Surgical History:   Procedure Laterality Date   • CHOLECYSTECTOMY  1980s     Family History   Problem Relation Age of Onset   • Diabetes Sister    • Other Brother      down syndrome   • No Known Problems Sister    • No Known Problems Sister    • No Known Problems Sister      Social History     Social History   • Marital status: Single     Spouse name: N/A   • Number of children: N/A   • Years of education: N/A      Occupational History   • Not on file.     Social History Main Topics   • Smoking status: Current Every Day Smoker     Packs/day: 0.25     Types: Cigarettes   • Smokeless tobacco: Never Used      Comment: 3-4 CIGARETTES A DAY   • Alcohol use No   • Drug use: No   • Sexual activity: Not on file     Other Topics Concern   • Not on file     Social History Narrative   • No narrative on file     No Known Allergies  Outpatient Encounter Prescriptions as of 7/16/2018   Medication Sig Dispense Refill   • hydrALAZINE (APRESOLINE) 100 MG tablet Take 1 Tab by mouth 3 times a day. 90 Tab 11   • lisinopril (PRINIVIL, ZESTRIL) 40 MG tablet Take 1 Tab by mouth 2 times a day. 60 Tab 11   • carvedilol (COREG) 25 MG Tab Take 2 Tabs by mouth 2 times a day, with meals. 360 Tab 3   • furosemide (LASIX) 20 MG Tab Take 1 Tab by mouth 2 times a day. 60 Tab 11   • amLODIPine (NORVASC) 10 MG Tab Take 1 Tab by mouth 2 Times a Day. 60 Tab 11   • spironolactone (ALDACTONE) 50 MG Tab Take 1 Tab by mouth every day. 30 Tab 3   • isosorbide dinitrate (ISORDIL) 20 MG Tab Take 1 Tab by mouth 3 times a day. 90 Tab 11   • atorvastatin (LIPITOR) 10 MG Tab Take 1 Tab by mouth every day. 30 Tab 11   • ASPIRIN LOW DOSE 81 MG EC tablet Take 1 Tab by mouth every day.  2   • acetaminophen (TYLENOL) 325 MG Tab Take 650 mg by mouth every four hours as needed.     • TRIUMEQ 600- MG Tab Take 1 Tab by mouth every day.  5   • sulfamethoxazole-trimethoprim (BACTRIM DS) 800-160 MG tablet Take 1 Tab by mouth every day.     • levetiracetam (KEPPRA) 500 MG Tab Take 1 Tab by mouth every 12 hours. 60 Tab 3     No facility-administered encounter medications on file as of 7/16/2018.      Review of Systems   Constitutional: Negative for fever and malaise/fatigue.   HENT:        Hair loss   Eyes: Negative for blurred vision, double vision and photophobia.   Respiratory: Negative for cough and shortness of breath.    Cardiovascular: Negative for chest pain,  "palpitations, orthopnea, claudication, leg swelling and PND.   Gastrointestinal: Negative for abdominal pain.   Musculoskeletal: Negative for myalgias.   Neurological: Negative for dizziness and headaches.   All other systems reviewed and are negative.       Objective:   BP (!) 226/130   Pulse 92   Ht 1.626 m (5' 4\")   Wt 66.2 kg (146 lb)   SpO2 97%   BMI 25.06 kg/m²     Physical Exam   Constitutional: He is oriented to person, place, and time. He appears well-developed and well-nourished.   HENT:   Head: Normocephalic and atraumatic. Hair is abnormal (Patches of hair loss).   Eyes: EOM are normal. Pupils are equal, round, and reactive to light.   Neck: Normal range of motion. Neck supple. No JVD present.   Cardiovascular: Normal rate, regular rhythm and normal heart sounds.    Pulmonary/Chest: Effort normal and breath sounds normal. No respiratory distress. He has no wheezes. He has no rales.   Abdominal: Soft. Bowel sounds are normal.   Musculoskeletal: He exhibits no edema.   Neurological: He is alert and oriented to person, place, and time.   No focal neuro deficits   Skin: Skin is warm and dry.   Psychiatric: He has a normal mood and affect. His behavior is normal.   Vitals reviewed.    Lab Results   Component Value Date/Time    CHOLSTRLTOT 49 (L) 08/19/2016 01:15 AM    LDL 16 08/19/2016 01:15 AM    HDL 17 (A) 08/19/2016 01:15 AM    TRIGLYCERIDE 80 08/19/2016 01:15 AM       Lab Results   Component Value Date/Time    SODIUM 138 05/14/2018 01:32 PM    POTASSIUM 3.9 05/14/2018 01:32 PM    CHLORIDE 108 05/14/2018 01:32 PM    CO2 22 05/14/2018 01:32 PM    GLUCOSE 100 (H) 05/14/2018 01:32 PM    BUN 11 05/14/2018 01:32 PM    CREATININE 1.52 (H) 05/14/2018 01:32 PM     Lab Results   Component Value Date/Time    ALKPHOSPHAT 89 08/18/2016 02:38 AM    ASTSGOT 7 (L) 08/18/2016 02:38 AM    ALTSGPT 7 08/18/2016 02:38 AM    TBILIRUBIN 0.3 08/18/2016 02:38 AM    Transthoracic Echo Report 12/27/15  Left ventricular " ejection fraction is visually estimated to be 45%.  Septum hyopkinesis, Basal inferiolateral and basal to mid anterior wall   akinesis, grade III diastolic dysfunction.  Mild concentric left ventricular hypertrophy.  Small pericardial effusion without evidence of hemodynamic compromise.  Estimated right ventricular systolic pressure  is 60 mmHg.  Moderate mitral regurgitation.  Normal inferior vena cava size and inspiratory collapse.  No prior study is available for comparison.         Transthoracic Echo Report 8/30/17  No prior study is available for comparison.   Normal left ventricular systolic function.   No evidence of valvular abnormality based on Doppler evaluation    labs from lab core on 6/11/2018, labs reviewed and in media.    Assessment:     1. Essential hypertension  cloNIDine (CATAPRES) 0.1 MG Tab   2. ACC/AHA stage C systolic heart failure (HCC)     3. Left ventricular diastolic dysfunction, NYHA class 1         Medical Decision Making:  Today's Assessment / Status / Plan:   1.  Hypertension: BP continues to be extremely elevated in the clinic today.  BP recheck was 220/130  -Discussed patient with Dr. Preciado.  -Start clonidine 0.1 mg twice a day  -Continue hydralazine 100 mg 3 times daily  -Continue carvedilol 50 mg twice daily  -Continue amlodipine 10 mg twice daily  -Continue lisinopril 40 mg twice daily  -Continue isosorbide dinitrate 20 mg 3 times daily  -Continue spironolactone 50 mg daily  -Continue furosemide 20 mg twice a day  -Monitor and log Blood pressures at home. Call office or RTC if BP increasing or >180/100 or if symptoms of elevated blood pressure present. Reviewed s/sx of stroke and heart attack. Patient to go to ER or call 911 if present.   -Hypertension clinic notified and his appointment was moved up 1 week.    2. HFrEF, Stage C, Class 1, LVEF 65%: Based on physical examination findings, patient is euvolemic. No JVD, lungs are clear to auscultation, no pitting edema in bilateral  lower extremities, no ascites.  -Continue carvedilol, lisinopril and furosemide per above  -Reinforced s/sx of worsening heart failure with patient and weight monitoring. Pt verbalizes understanding. Pt to call office or RTC if present.     FU in clinic in 1.5 weeks. Sooner if needed.    Patient verbalizes understanding and agrees with the plan of care.     Collaborating MD: TATE Preciado MD

## 2018-07-24 ENCOUNTER — OFFICE VISIT (OUTPATIENT)
Dept: CARDIOLOGY | Facility: MEDICAL CENTER | Age: 53
End: 2018-07-24
Payer: MEDICAID

## 2018-07-24 VITALS
HEIGHT: 64 IN | DIASTOLIC BLOOD PRESSURE: 110 MMHG | OXYGEN SATURATION: 96 % | WEIGHT: 150 LBS | SYSTOLIC BLOOD PRESSURE: 202 MMHG | BODY MASS INDEX: 25.61 KG/M2 | HEART RATE: 108 BPM

## 2018-07-24 DIAGNOSIS — I10 ESSENTIAL HYPERTENSION: ICD-10-CM

## 2018-07-24 DIAGNOSIS — I50.9 HEART FAILURE, NYHA CLASS 1 (HCC): ICD-10-CM

## 2018-07-24 DIAGNOSIS — N18.30 STAGE 3 CHRONIC KIDNEY DISEASE (HCC): ICD-10-CM

## 2018-07-24 DIAGNOSIS — I50.20 ACC/AHA STAGE C SYSTOLIC HEART FAILURE (HCC): ICD-10-CM

## 2018-07-24 PROCEDURE — 99214 OFFICE O/P EST MOD 30 MIN: CPT | Performed by: NURSE PRACTITIONER

## 2018-07-24 RX ORDER — CLONIDINE HYDROCHLORIDE 0.2 MG/1
0.2 TABLET ORAL 2 TIMES DAILY
Qty: 60 TAB | Refills: 11 | Status: SHIPPED | OUTPATIENT
Start: 2018-07-24 | End: 2018-09-28

## 2018-07-24 ASSESSMENT — ENCOUNTER SYMPTOMS
COUGH: 0
CLAUDICATION: 0
DIZZINESS: 0
FEVER: 0
PALPITATIONS: 0
ORTHOPNEA: 0
MYALGIAS: 0
ROS SKIN COMMENTS: HAIR LOSS
PND: 0
SHORTNESS OF BREATH: 0
ABDOMINAL PAIN: 0

## 2018-07-24 NOTE — PROGRESS NOTES
Chief Complaint   Patient presents with   • Evaluation Of Medication Change       Subjective:   Seamus Palencia is a 53 y.o. male who presents today for follow-up on his hypertension and his heart failure.    Patient was last seen in clinic on 7/16/2018.  During his last visit, patient started on clonidine 0.1 mg twice a day for his hypertension.  Patient reports no problems with the new medication.    Patient reports his blood pressures continue to be elevated at home.  When he checks his BP is in the 200s, systolic.    Patient continues to remain symptomless. Patient feels well, denies chest pain, shortness of breath, palpitations, dizziness/lightheadedness, orthopnea, PND or Edema.     He does continue to have hair loss.    Patient states he is taking all of his medications as directed and prescribed.    His home weights remain stable at 147 pounds.    Additonally, patient has the following medical problems:     -HIV infection followed by the [facility] clinic     -HTN: takes carvedilol, lisinopril, amlodipine, hydralazine, isosorbide, Spironolactone, clonidine    Past Medical History:   Diagnosis Date   • Heart failure (HCC)    • HIV disease (HCC) 1995   • Hypertension    • Seizure (HCC)    • Stroke (HCC)      Past Surgical History:   Procedure Laterality Date   • CHOLECYSTECTOMY  1980s     Family History   Problem Relation Age of Onset   • Diabetes Sister    • Other Brother      down syndrome   • No Known Problems Sister    • No Known Problems Sister    • No Known Problems Sister      Social History     Social History   • Marital status: Single     Spouse name: N/A   • Number of children: N/A   • Years of education: N/A     Occupational History   • Not on file.     Social History Main Topics   • Smoking status: Current Every Day Smoker     Packs/day: 0.25     Types: Cigarettes   • Smokeless tobacco: Never Used      Comment: 3-4 CIGARETTES A DAY   • Alcohol use No   • Drug use: No   • Sexual activity: Not on file      Other Topics Concern   • Not on file     Social History Narrative   • No narrative on file     No Known Allergies  Outpatient Encounter Prescriptions as of 7/24/2018   Medication Sig Dispense Refill   • cloNIDine (CATAPRESS) 0.2 MG Tab Take 1 Tab by mouth 2 times a day. 60 Tab 11   • hydrALAZINE (APRESOLINE) 100 MG tablet Take 1 Tab by mouth 3 times a day. 90 Tab 11   • lisinopril (PRINIVIL, ZESTRIL) 40 MG tablet Take 1 Tab by mouth 2 times a day. 60 Tab 11   • carvedilol (COREG) 25 MG Tab Take 2 Tabs by mouth 2 times a day, with meals. 360 Tab 3   • furosemide (LASIX) 20 MG Tab Take 1 Tab by mouth 2 times a day. 60 Tab 11   • amLODIPine (NORVASC) 10 MG Tab Take 1 Tab by mouth 2 Times a Day. 60 Tab 11   • spironolactone (ALDACTONE) 50 MG Tab Take 1 Tab by mouth every day. 30 Tab 3   • isosorbide dinitrate (ISORDIL) 20 MG Tab Take 1 Tab by mouth 3 times a day. 90 Tab 11   • atorvastatin (LIPITOR) 10 MG Tab Take 1 Tab by mouth every day. 30 Tab 11   • ASPIRIN LOW DOSE 81 MG EC tablet Take 1 Tab by mouth every day.  2   • TRIUMEQ 600- MG Tab Take 1 Tab by mouth every day.  5   • sulfamethoxazole-trimethoprim (BACTRIM DS) 800-160 MG tablet Take 1 Tab by mouth every day.     • levetiracetam (KEPPRA) 500 MG Tab Take 1 Tab by mouth every 12 hours. 60 Tab 3   • [DISCONTINUED] cloNIDine (CATAPRES) 0.1 MG Tab Take 1 Tab by mouth 2 times a day. 60 Tab 11   • acetaminophen (TYLENOL) 325 MG Tab Take 650 mg by mouth every four hours as needed.       No facility-administered encounter medications on file as of 7/24/2018.      Review of Systems   Constitutional: Negative for fever and malaise/fatigue.   Respiratory: Negative for cough and shortness of breath.    Cardiovascular: Negative for chest pain, palpitations, orthopnea, claudication, leg swelling and PND.   Gastrointestinal: Negative for abdominal pain.   Musculoskeletal: Negative for myalgias.   Skin:        Hair loss   Neurological: Negative for dizziness.   All  "other systems reviewed and are negative.       Objective:   BP (!) 202/110   Pulse (!) 108   Ht 1.626 m (5' 4\")   Wt 68 kg (150 lb)   SpO2 96%   BMI 25.75 kg/m²     Physical Exam   Constitutional: He is oriented to person, place, and time. He appears well-developed and well-nourished.   HENT:   Head: Normocephalic and atraumatic.   Eyes: EOM are normal. Pupils are equal, round, and reactive to light.   Neck: Normal range of motion. Neck supple. No JVD present.   Cardiovascular: Normal rate, regular rhythm and normal heart sounds.    Pulmonary/Chest: Effort normal and breath sounds normal. No respiratory distress. He has no wheezes. He has no rales.   Abdominal: Soft. Bowel sounds are normal.   Musculoskeletal: He exhibits no edema.   Neurological: He is alert and oriented to person, place, and time.   Skin: Skin is warm and dry.   Patchy hair loss   Psychiatric: He has a normal mood and affect. His behavior is normal.   Vitals reviewed.      Lab Results   Component Value Date/Time    CHOLSTRLTOT 49 (L) 08/19/2016 01:15 AM    LDL 16 08/19/2016 01:15 AM    HDL 17 (A) 08/19/2016 01:15 AM    TRIGLYCERIDE 80 08/19/2016 01:15 AM       Lab Results   Component Value Date/Time    SODIUM 138 05/14/2018 01:32 PM    POTASSIUM 3.9 05/14/2018 01:32 PM    CHLORIDE 108 05/14/2018 01:32 PM    CO2 22 05/14/2018 01:32 PM    GLUCOSE 100 (H) 05/14/2018 01:32 PM    BUN 11 05/14/2018 01:32 PM    CREATININE 1.52 (H) 05/14/2018 01:32 PM     Lab Results   Component Value Date/Time    ALKPHOSPHAT 89 08/18/2016 02:38 AM    ASTSGOT 7 (L) 08/18/2016 02:38 AM    ALTSGPT 7 08/18/2016 02:38 AM    TBILIRUBIN 0.3 08/18/2016 02:38 AM      Transthoracic Echo Report 12/27/15  Left ventricular ejection fraction is visually estimated to be 45%.  Septum hyopkinesis, Basal inferiolateral and basal to mid anterior wall   akinesis, grade III diastolic dysfunction.  Mild concentric left ventricular hypertrophy.  Small pericardial effusion without evidence " of hemodynamic compromise.  Estimated right ventricular systolic pressure  is 60 mmHg.  Moderate mitral regurgitation.  Normal inferior vena cava size and inspiratory collapse.  No prior study is available for comparison.         Transthoracic Echo Report 8/30/17  No prior study is available for comparison.   Normal left ventricular systolic function.   No evidence of valvular abnormality based on Doppler evaluation     labs from lab core on 6/11/2018, labs reviewed and in media.  Assessment:     1. Essential hypertension  cloNIDine (CATAPRESS) 0.2 MG Tab   2. ACC/AHA stage C systolic heart failure (HCC)     3. Heart failure, NYHA class 1 (HCC)     4. Stage 3 chronic kidney disease         Medical Decision Making:  Today's Assessment / Status / Plan:   1.  Hypertension: BP continues to be elevated in clinic  -Increase clonidine to 0.2 mg twice a day  -Continue amlodipine 10 mg twice a day  -Continue carvedilol 50 mg twice a day  -Continue furosemide 20 mg twice a day  -Continue hydralazine 100 mg 3 times a day  -Continue isosorbide 20 mg 3 times a day  -Continue lisinopril 40 mg twice a day  -Continue spironolactone 50 mg daily  -Continue to monitor BP and if symptoms, patient to go to the emergency room  -Patient has follow-up with the hypertension clinic on 8/9/2018.    2. HFrEF, Stage C, Class 1, LVEF 65%: Based on physical examination findings, patient is euvolemic. No JVD, lungs are clear to auscultation, no pitting edema in bilateral lower extremities, no ascites.  -Continue carvedilol, lisinopril, furosemide per above  -Reinforced s/sx of worsening heart failure with patient and weight monitoring. Pt verbalizes understanding. Pt to call office or RTC if present.     3. CKD: Patient started to develop kidney disease  -Follow-up with nephrology on 8/24/2018    FU in clinic in 6 weeks in the heart failure clinic.  Patient to follow-up with hypertension clinic in 2 weeks. Sooner if needed.    Patient verbalizes  understanding and agrees with the plan of care.     Collaborating MD: Rajan Cesar MD

## 2018-07-24 NOTE — LETTER
[facility]   [address] [place] [address]  LAURY Mckeon [address]  Phone: [phone]  Fax: [phone]              Seamus Palencia  1965    Encounter Date: 7/24/2018    EDEN Bell          PROGRESS NOTE:  Chief Complaint   Patient presents with   • Evaluation Of Medication Change       Subjective:   Seamus Palencia is a 53 y.o. male who presents today for follow-up on his hypertension and his heart failure.    Patient was last seen in clinic on 7/16/2018.  During his last visit, patient started on clonidine 0.1 mg twice a day for his hypertension.  Patient reports no problems with the new medication.    Patient reports his blood pressures continue to be elevated at home.  When he checks his BP is in the 200s, systolic.    Patient continues to remain symptomless. Patient feels well, denies chest pain, shortness of breath, palpitations, dizziness/lightheadedness, orthopnea, PND or Edema.     He does continue to have hair loss.    Patient states he is taking all of his medications as directed and prescribed.    His home weights remain stable at 147 pounds.    Additonally, patient has the following medical problems:     -HIV infection followed by the [facility] clinic     -HTN: takes carvedilol, lisinopril, amlodipine, hydralazine, isosorbide, Spironolactone, clonidine    Past Medical History:   Diagnosis Date   • Heart failure (HCC)    • HIV disease (HCC) 1995   • Hypertension    • Seizure (HCC)    • Stroke (HCC)      Past Surgical History:   Procedure Laterality Date   • CHOLECYSTECTOMY  1980s     Family History   Problem Relation Age of Onset   • Diabetes Sister    • Other Brother      down syndrome   • No Known Problems Sister    • No Known Problems Sister    • No Known Problems Sister      Social History     Social History   • Marital status: Single     Spouse name: N/A   • Number of children: N/A   • Years of education: N/A     Occupational History   • Not on  file.     Social History Main Topics   • Smoking status: Current Every Day Smoker     Packs/day: 0.25     Types: Cigarettes   • Smokeless tobacco: Never Used      Comment: 3-4 CIGARETTES A DAY   • Alcohol use No   • Drug use: No   • Sexual activity: Not on file     Other Topics Concern   • Not on file     Social History Narrative   • No narrative on file     No Known Allergies  Outpatient Encounter Prescriptions as of 7/24/2018   Medication Sig Dispense Refill   • cloNIDine (CATAPRESS) 0.2 MG Tab Take 1 Tab by mouth 2 times a day. 60 Tab 11   • hydrALAZINE (APRESOLINE) 100 MG tablet Take 1 Tab by mouth 3 times a day. 90 Tab 11   • lisinopril (PRINIVIL, ZESTRIL) 40 MG tablet Take 1 Tab by mouth 2 times a day. 60 Tab 11   • carvedilol (COREG) 25 MG Tab Take 2 Tabs by mouth 2 times a day, with meals. 360 Tab 3   • furosemide (LASIX) 20 MG Tab Take 1 Tab by mouth 2 times a day. 60 Tab 11   • amLODIPine (NORVASC) 10 MG Tab Take 1 Tab by mouth 2 Times a Day. 60 Tab 11   • spironolactone (ALDACTONE) 50 MG Tab Take 1 Tab by mouth every day. 30 Tab 3   • isosorbide dinitrate (ISORDIL) 20 MG Tab Take 1 Tab by mouth 3 times a day. 90 Tab 11   • atorvastatin (LIPITOR) 10 MG Tab Take 1 Tab by mouth every day. 30 Tab 11   • ASPIRIN LOW DOSE 81 MG EC tablet Take 1 Tab by mouth every day.  2   • TRIUMEQ 600- MG Tab Take 1 Tab by mouth every day.  5   • sulfamethoxazole-trimethoprim (BACTRIM DS) 800-160 MG tablet Take 1 Tab by mouth every day.     • levetiracetam (KEPPRA) 500 MG Tab Take 1 Tab by mouth every 12 hours. 60 Tab 3   • [DISCONTINUED] cloNIDine (CATAPRES) 0.1 MG Tab Take 1 Tab by mouth 2 times a day. 60 Tab 11   • acetaminophen (TYLENOL) 325 MG Tab Take 650 mg by mouth every four hours as needed.       No facility-administered encounter medications on file as of 7/24/2018.      Review of Systems   Constitutional: Negative for fever and malaise/fatigue.   Respiratory: Negative for cough and shortness of breath.     "  Cardiovascular: Negative for chest pain, palpitations, orthopnea, claudication, leg swelling and PND.   Gastrointestinal: Negative for abdominal pain.   Musculoskeletal: Negative for myalgias.   Skin:        Hair loss   Neurological: Negative for dizziness.   All other systems reviewed and are negative.       Objective:   BP (!) 202/110   Pulse (!) 108   Ht 1.626 m (5' 4\")   Wt 68 kg (150 lb)   SpO2 96%   BMI 25.75 kg/m²      Physical Exam   Constitutional: He is oriented to person, place, and time. He appears well-developed and well-nourished.   HENT:   Head: Normocephalic and atraumatic.   Eyes: EOM are normal. Pupils are equal, round, and reactive to light.   Neck: Normal range of motion. Neck supple. No JVD present.   Cardiovascular: Normal rate, regular rhythm and normal heart sounds.    Pulmonary/Chest: Effort normal and breath sounds normal. No respiratory distress. He has no wheezes. He has no rales.   Abdominal: Soft. Bowel sounds are normal.   Musculoskeletal: He exhibits no edema.   Neurological: He is alert and oriented to person, place, and time.   Skin: Skin is warm and dry.   Patchy hair loss   Psychiatric: He has a normal mood and affect. His behavior is normal.   Vitals reviewed.      Lab Results   Component Value Date/Time    CHOLSTRLTOT 49 (L) 08/19/2016 01:15 AM    LDL 16 08/19/2016 01:15 AM    HDL 17 (A) 08/19/2016 01:15 AM    TRIGLYCERIDE 80 08/19/2016 01:15 AM       Lab Results   Component Value Date/Time    SODIUM 138 05/14/2018 01:32 PM    POTASSIUM 3.9 05/14/2018 01:32 PM    CHLORIDE 108 05/14/2018 01:32 PM    CO2 22 05/14/2018 01:32 PM    GLUCOSE 100 (H) 05/14/2018 01:32 PM    BUN 11 05/14/2018 01:32 PM    CREATININE 1.52 (H) 05/14/2018 01:32 PM     Lab Results   Component Value Date/Time    ALKPHOSPHAT 89 08/18/2016 02:38 AM    ASTSGOT 7 (L) 08/18/2016 02:38 AM    ALTSGPT 7 08/18/2016 02:38 AM    TBILIRUBIN 0.3 08/18/2016 02:38 AM      Transthoracic Echo Report 12/27/15  Left " ventricular ejection fraction is visually estimated to be 45%.  Septum hyopkinesis, Basal inferiolateral and basal to mid anterior wall   akinesis, grade III diastolic dysfunction.  Mild concentric left ventricular hypertrophy.  Small pericardial effusion without evidence of hemodynamic compromise.  Estimated right ventricular systolic pressure  is 60 mmHg.  Moderate mitral regurgitation.  Normal inferior vena cava size and inspiratory collapse.  No prior study is available for comparison.         Transthoracic Echo Report 8/30/17  No prior study is available for comparison.   Normal left ventricular systolic function.   No evidence of valvular abnormality based on Doppler evaluation     labs from lab core on 6/11/2018, labs reviewed and in media.  Assessment:     1. Essential hypertension  cloNIDine (CATAPRESS) 0.2 MG Tab   2. ACC/AHA stage C systolic heart failure (HCC)     3. Heart failure, NYHA class 1 (HCC)     4. Stage 3 chronic kidney disease         Medical Decision Making:  Today's Assessment / Status / Plan:   1.  Hypertension: BP continues to be elevated in clinic  -Increase clonidine to 0.2 mg twice a day  -Continue amlodipine 10 mg twice a day  -Continue carvedilol 50 mg twice a day  -Continue furosemide 20 mg twice a day  -Continue hydralazine 100 mg 3 times a day  -Continue isosorbide 20 mg 3 times a day  -Continue lisinopril 40 mg twice a day  -Continue spironolactone 50 mg daily  -Continue to monitor BP and if symptoms, patient to go to the emergency room  -Patient has follow-up with the hypertension clinic on 8/9/2018.    2. HFrEF, Stage C, Class 1, LVEF 65%: Based on physical examination findings, patient is euvolemic. No JVD, lungs are clear to auscultation, no pitting edema in bilateral lower extremities, no ascites.  -Continue carvedilol, lisinopril, furosemide per above  -Reinforced s/sx of worsening heart failure with patient and weight monitoring. Pt verbalizes understanding. Pt to call  office or RTC if present.     3. CKD: Patient started to develop kidney disease  -Follow-up with nephrology on 8/24/2018    FU in clinic in 6 weeks in the heart failure clinic.  Patient to follow-up with hypertension clinic in 2 weeks. Sooner if needed.    Patient verbalizes understanding and agrees with the plan of care.     Collaborating MD: MD Ellie Bush, A.PMeghnaNMeghna  [address] [place] [address] [place] [id number]  VIA Facsimile: [phone]

## 2018-07-25 DIAGNOSIS — I51.89 LEFT VENTRICULAR DIASTOLIC DYSFUNCTION, NYHA CLASS 1: ICD-10-CM

## 2018-07-25 DIAGNOSIS — I50.30 ACC/AHA STAGE C HEART FAILURE WITH PRESERVED EJECTION FRACTION (HCC): ICD-10-CM

## 2018-07-25 DIAGNOSIS — I10 HTN (HYPERTENSION), MALIGNANT: ICD-10-CM

## 2018-07-25 RX ORDER — LISINOPRIL 40 MG/1
40 TABLET ORAL 2 TIMES DAILY
Qty: 180 TAB | Refills: 3 | Status: SHIPPED | OUTPATIENT
Start: 2018-07-25 | End: 2018-12-07 | Stop reason: SDUPTHER

## 2018-08-02 ENCOUNTER — TELEPHONE (OUTPATIENT)
Dept: CARDIOLOGY | Facility: MEDICAL CENTER | Age: 53
End: 2018-08-02

## 2018-08-02 NOTE — TELEPHONE ENCOUNTER
"----- Message from Christelle Mejia sent at 8/2/2018 11:00 AM PDT -----  Regarding: [facility]ldon needs prescription faxed again        Ryder at Dr. Brown's office is requesting Clonidine prescription be faxed to [facility] again. He said his records show patient is still on 0.1, 2x's daily, but our records shows it was increased to 0.2. He was told it was faxed and confirmed on 07/24. He said \"I don't know anything about that.\" He would not give me his #, said he doesn't need a call back, just needs it faxed over.    "

## 2018-08-02 NOTE — TELEPHONE ENCOUNTER
Dose increased by Gloria on 7/24/18  Spoke with the pharmacy and they have the RX on file for the patient for the updated dose.    Our records show that the dose was increased from the 0.1mg BID

## 2018-08-09 ENCOUNTER — APPOINTMENT (OUTPATIENT)
Dept: VASCULAR LAB | Facility: MEDICAL CENTER | Age: 53
End: 2018-08-09
Payer: MEDICAID

## 2018-08-23 ENCOUNTER — APPOINTMENT (OUTPATIENT)
Dept: NEPHROLOGY | Facility: MEDICAL CENTER | Age: 53
End: 2018-08-23
Payer: COMMERCIAL

## 2018-09-04 DIAGNOSIS — I10 HTN (HYPERTENSION), MALIGNANT: ICD-10-CM

## 2018-09-04 RX ORDER — SPIRONOLACTONE 50 MG/1
TABLET, FILM COATED ORAL
Qty: 30 TAB | Refills: 6 | Status: SHIPPED | OUTPATIENT
Start: 2018-09-04 | End: 2018-12-13 | Stop reason: SDUPTHER

## 2018-09-14 ENCOUNTER — OFFICE VISIT (OUTPATIENT)
Dept: CARDIOLOGY | Facility: MEDICAL CENTER | Age: 53
End: 2018-09-14
Payer: MEDICAID

## 2018-09-14 VITALS
DIASTOLIC BLOOD PRESSURE: 124 MMHG | HEART RATE: 100 BPM | BODY MASS INDEX: 24.55 KG/M2 | WEIGHT: 143 LBS | OXYGEN SATURATION: 97 % | SYSTOLIC BLOOD PRESSURE: 232 MMHG

## 2018-09-14 DIAGNOSIS — I50.30 ACC/AHA STAGE C HEART FAILURE WITH PRESERVED EJECTION FRACTION (HCC): ICD-10-CM

## 2018-09-14 DIAGNOSIS — I10 HTN (HYPERTENSION), MALIGNANT: ICD-10-CM

## 2018-09-14 DIAGNOSIS — I51.89 LEFT VENTRICULAR DIASTOLIC DYSFUNCTION, NYHA CLASS 1: ICD-10-CM

## 2018-09-14 DIAGNOSIS — N18.30 STAGE 3 CHRONIC KIDNEY DISEASE (HCC): ICD-10-CM

## 2018-09-14 LAB — EKG IMPRESSION: NORMAL

## 2018-09-14 PROCEDURE — 99214 OFFICE O/P EST MOD 30 MIN: CPT | Performed by: NURSE PRACTITIONER

## 2018-09-14 PROCEDURE — 93000 ELECTROCARDIOGRAM COMPLETE: CPT | Performed by: NURSE PRACTITIONER

## 2018-09-14 RX ORDER — CHLORTHALIDONE 25 MG/1
25 TABLET ORAL DAILY
Qty: 30 TAB | Refills: 11 | Status: SHIPPED | OUTPATIENT
Start: 2018-09-14 | End: 2018-12-13 | Stop reason: SDUPTHER

## 2018-09-14 RX ORDER — METOPROLOL TARTRATE 100 MG/1
100 TABLET ORAL 2 TIMES DAILY
Qty: 60 TAB | Refills: 11 | Status: CANCELLED | OUTPATIENT
Start: 2018-09-14

## 2018-09-14 RX ORDER — CARVEDILOL 25 MG/1
50 TABLET ORAL 2 TIMES DAILY WITH MEALS
Qty: 360 TAB | Refills: 3 | Status: SHIPPED | OUTPATIENT
Start: 2018-09-14 | End: 2018-12-13 | Stop reason: SDUPTHER

## 2018-09-14 ASSESSMENT — MINNESOTA LIVING WITH HEART FAILURE QUESTIONNAIRE (MLHF)
DIFFICULTY WITH SEXUAL ACTIVITIES: 5
DIFFICULTY TO CONCENTRATE OR REMEMBERING THINGS: 0
EATING LESS FOODS YOU LIKE: 0
TOTAL_SCORE: 11
DIFFICULTY SOCIALIZING WITH FAMILY OR FRIENDS: 0
MAKING YOU FEEL DEPRESSED: 0
HAVING TO SIT OR LIE DOWN DURING THE DAY: 0
MAKING YOU STAY IN A HOSPITAL: 0
SWELLING IN ANKLES OR LEGS: 1
DIFFICULTY WITH RECREATIONAL PASTIMES, SPORTS, HOBBIES: 1
LOSS OF SELF CONTROL IN YOUR LIFE: 0
DIFFICULTY WORKING TO EARN A LIVING: 0
MAKING YOU SHORT OF BREATH: 0
FEELING LIKE A BURDEN TO FAMILY AND FRIENDS: 0
DIFFICULTY SLEEPING WELL AT NIGHT: 1
COSTING YOU MONEY FOR MEDICAL CARE: 0
WORKING AROUND THE HOUSE OR YARD DIFFICULT: 0
DIFFICULTY GOING AWAY FROM HOME: 1
GIVING YOU SIDE EFFECTS FROM TREATMENTS: 0
WALKING ABOUT OR CLIMBING STAIRS DIFFICULT: 0
TIRED, FATIGUED OR LOW ON ENERGY: 1
MAKING YOU WORRY: 1

## 2018-09-14 ASSESSMENT — ENCOUNTER SYMPTOMS
DIZZINESS: 0
COUGH: 0
SHORTNESS OF BREATH: 0
FEVER: 0
PND: 0
ABDOMINAL PAIN: 0
PALPITATIONS: 0
CLAUDICATION: 0
ORTHOPNEA: 0
MYALGIAS: 0

## 2018-09-14 ASSESSMENT — 6 MINUTE WALK TEST (6MWT): TOTAL DISTANCE WALKED (METERS): 0

## 2018-09-14 NOTE — PROGRESS NOTES
Chief Complaint   Patient presents with   • HTN (Controlled)       Subjective:   Seamus Palencia is a 53 y.o. male who presents today for follow up on his HTN, heart failure.    Patient was last seen in clinic on 7/24/2018.  During his last visit clonidine was increased to 0.2 mg twice a day.  Patient reports any taking medications as directed and notices no problems.    Patient does report his home blood pressures continue to be elevated above 200, systolic.    Patient reports he has been taking all of his medications as directed.    Patient did have an appointment with hypertension clinic but he was late and he had to be rescheduled.  His next appointment is not until 9/28/2018.    For symptoms, Patient feels well, denies chest pain, shortness of breath at rest, with ADLs and exertion, palpitations, dizziness/lightheadedness, orthopnea, PND or Edema.     Patient reports his home weights have been stable.    Additonally, patient has the following medical problems:     -HIV infection followed by the [facility] clinic     -HTN: takes carvedilol, lisinopril, amlodipine, hydralazine, isosorbide, Spironolactone, clonidine      Past Medical History:   Diagnosis Date   • Heart failure (HCC)    • HIV disease (HCC) 1995   • Hypertension    • Seizure (HCC)    • Stroke (HCC)      Past Surgical History:   Procedure Laterality Date   • CHOLECYSTECTOMY  1980s     Family History   Problem Relation Age of Onset   • Diabetes Sister    • Other Brother         down syndrome   • No Known Problems Sister    • No Known Problems Sister    • No Known Problems Sister      Social History     Social History   • Marital status: Single     Spouse name: N/A   • Number of children: N/A   • Years of education: N/A     Occupational History   • Not on file.     Social History Main Topics   • Smoking status: Current Every Day Smoker     Packs/day: 0.25     Types: Cigarettes   • Smokeless tobacco: Never Used      Comment: 3-4 CIGARETTES A DAY   • Alcohol  use No   • Drug use: No   • Sexual activity: Not on file     Other Topics Concern   • Not on file     Social History Narrative   • No narrative on file     No Known Allergies  Outpatient Encounter Prescriptions as of 9/14/2018   Medication Sig Dispense Refill   • spironolactone (ALDACTONE) 50 MG Tab TAKE ONE TABLET BY MOUTH DAILY 30 Tab 6   • lisinopril (PRINIVIL, ZESTRIL) 40 MG tablet Take 1 Tab by mouth 2 times a day. 180 Tab 3   • cloNIDine (CATAPRESS) 0.2 MG Tab Take 1 Tab by mouth 2 times a day. 60 Tab 11   • hydrALAZINE (APRESOLINE) 100 MG tablet Take 1 Tab by mouth 3 times a day. (Patient taking differently: Take 50 mg by mouth 3 times a day.) 90 Tab 11   • carvedilol (COREG) 25 MG Tab Take 2 Tabs by mouth 2 times a day, with meals. 360 Tab 3   • furosemide (LASIX) 20 MG Tab Take 1 Tab by mouth 2 times a day. 60 Tab 11   • amLODIPine (NORVASC) 10 MG Tab Take 1 Tab by mouth 2 Times a Day. 60 Tab 11   • isosorbide dinitrate (ISORDIL) 20 MG Tab Take 1 Tab by mouth 3 times a day. 90 Tab 11   • atorvastatin (LIPITOR) 10 MG Tab Take 1 Tab by mouth every day. 30 Tab 11   • ASPIRIN LOW DOSE 81 MG EC tablet Take 1 Tab by mouth every day.  2   • TRIUMEQ 600- MG Tab Take 1 Tab by mouth every day.  5   • sulfamethoxazole-trimethoprim (BACTRIM DS) 800-160 MG tablet Take 1 Tab by mouth every day.     • levetiracetam (KEPPRA) 500 MG Tab Take 1 Tab by mouth every 12 hours. 60 Tab 3   • acetaminophen (TYLENOL) 325 MG Tab Take 650 mg by mouth every four hours as needed.       No facility-administered encounter medications on file as of 9/14/2018.      Review of Systems   Constitutional: Negative for fever and malaise/fatigue.   Respiratory: Negative for cough and shortness of breath.    Cardiovascular: Negative for chest pain, palpitations, orthopnea, claudication, leg swelling and PND.   Gastrointestinal: Negative for abdominal pain.   Musculoskeletal: Negative for myalgias.   Neurological: Negative for dizziness.   All  other systems reviewed and are negative.       Objective:   BP (!) 232/124   Pulse 100   Wt 64.9 kg (143 lb)   SpO2 97%   BMI 24.55 kg/m²     Physical Exam   Constitutional: He is oriented to person, place, and time. He appears well-developed and well-nourished.   HENT:   Head: Normocephalic and atraumatic.   Eyes: Pupils are equal, round, and reactive to light. EOM are normal.   Neck: Normal range of motion. Neck supple. No JVD present.   Cardiovascular: Normal rate, regular rhythm and normal heart sounds.    Pulmonary/Chest: Effort normal and breath sounds normal. No respiratory distress. He has no wheezes. He has no rales.   Abdominal: Soft. Bowel sounds are normal.   Musculoskeletal: He exhibits no edema.   Neurological: He is alert and oriented to person, place, and time.   Skin: Skin is warm and dry.   Psychiatric: He has a normal mood and affect. His behavior is normal.   Vitals reviewed.    Lab Results   Component Value Date/Time    CHOLSTRLTOT 49 (L) 08/19/2016 01:15 AM    LDL 16 08/19/2016 01:15 AM    HDL 17 (A) 08/19/2016 01:15 AM    TRIGLYCERIDE 80 08/19/2016 01:15 AM       Lab Results   Component Value Date/Time    SODIUM 138 05/14/2018 01:32 PM    POTASSIUM 3.9 05/14/2018 01:32 PM    CHLORIDE 108 05/14/2018 01:32 PM    CO2 22 05/14/2018 01:32 PM    GLUCOSE 100 (H) 05/14/2018 01:32 PM    BUN 11 05/14/2018 01:32 PM    CREATININE 1.52 (H) 05/14/2018 01:32 PM     Lab Results   Component Value Date/Time    ALKPHOSPHAT 89 08/18/2016 02:38 AM    ASTSGOT 7 (L) 08/18/2016 02:38 AM    ALTSGPT 7 08/18/2016 02:38 AM    TBILIRUBIN 0.3 08/18/2016 02:38 AM      Transthoracic Echo Report 12/27/15  Left ventricular ejection fraction is visually estimated to be 45%.  Septum hyopkinesis, Basal inferiolateral and basal to mid anterior wall   akinesis, grade III diastolic dysfunction.  Mild concentric left ventricular hypertrophy.  Small pericardial effusion without evidence of hemodynamic compromise.  Estimated right  ventricular systolic pressure  is 60 mmHg.  Moderate mitral regurgitation.  Normal inferior vena cava size and inspiratory collapse.  No prior study is available for comparison.         Transthoracic Echo Report 8/30/17  No prior study is available for comparison.   Normal left ventricular systolic function.   No evidence of valvular abnormality based on Doppler evaluation     labs from [facility] on 6/11/2018    Assessment:     1. HTN (hypertension), malignant  EKG    carvedilol (COREG) 25 MG Tab    chlorthalidone (HYGROTON) 25 MG Tab    COMP METABOLIC PANEL   2. ACC/AHA stage C heart failure with preserved ejection fraction (HCC)  carvedilol (COREG) 25 MG Tab   3. Left ventricular diastolic dysfunction, NYHA class 1  carvedilol (COREG) 25 MG Tab   4. Stage 3 chronic kidney disease         Medical Decision Making:  Today's Assessment / Status / Plan:   1.  Hypertension: Patient's blood pressure continues to be extremely elevated.  -Discussed with patient about sending him to the emergency room for further management and evaluation.  Patient refusing to go to the emergency room.  Patient states he denies any symptoms and would like to continue to follow-up with the hypertension clinic.  Patient verbalizes risks and benefits.  Discussed patient with Dr. Rodgers.    -Patient to start chlorthalidone 25 mg daily  -Stop furosemide  -Continue amlodipine 10 mg twice a day  -Continue carvedilol 50 mg twice a day  -Continue clonidine 0.2 mg twice a day  -Continue hydralazine 100 mg 3 times a day  -Continue isosorbide 20 mg 3 times a day  -Continue lisinopril 40 mg twice a day  -Continue spironolactone 50 mg daily  -Patient obtain a CMP today or tomorrow  -Reinforced with patient to continue to monitor and log Blood pressures at home. Call office or RTC if BP increasing or >180/100 or if symptoms of elevated blood pressure present. Reviewed s/sx of stroke and heart attack. Patient to go to ER or call 911 if present.     2. HFrEF,  Stage C, Class 1, LVEF 65% improved from 45%: Based on physical examination findings, patient is euvolemic. No JVD, lungs are clear to auscultation, no pitting edema in bilateral lower extremities, no ascites.  -Continue carvedilol, lisinopril and spironolactone per above  -Reinforced s/sx of worsening heart failure with patient and weight monitoring. Pt verbalizes understanding. Pt to call office or RTC if present.     3. CKD:   -Continue to follow-up with nephrology    Discussed with patient that his heart failure is stable however his hypertension is uncontrolled.  Patient to follow-up with hypertension clinic. If follow-up does not occur, patient to come to cardiology office otherwise, follow up in heart failure clinic in 3 months with Dr. Marie.     Sooner if needed.    Patient verbalizes understanding and agrees with the plan of care.     Collaborating MD: Maurice Motley MD

## 2018-09-28 ENCOUNTER — OFFICE VISIT (OUTPATIENT)
Dept: VASCULAR LAB | Facility: MEDICAL CENTER | Age: 53
End: 2018-09-28
Attending: FAMILY MEDICINE
Payer: MEDICAID

## 2018-09-28 ENCOUNTER — HOSPITAL ENCOUNTER (OUTPATIENT)
Dept: LAB | Facility: MEDICAL CENTER | Age: 53
End: 2018-09-28
Attending: FAMILY MEDICINE
Payer: MEDICAID

## 2018-09-28 VITALS
WEIGHT: 144.3 LBS | HEIGHT: 64 IN | SYSTOLIC BLOOD PRESSURE: 241 MMHG | DIASTOLIC BLOOD PRESSURE: 139 MMHG | BODY MASS INDEX: 24.63 KG/M2 | HEART RATE: 104 BPM

## 2018-09-28 DIAGNOSIS — I16.0 ASYMPTOMATIC HYPERTENSIVE URGENCY: ICD-10-CM

## 2018-09-28 DIAGNOSIS — I1A.0 RESISTANT HYPERTENSION: ICD-10-CM

## 2018-09-28 DIAGNOSIS — B20 HIV (HUMAN IMMUNODEFICIENCY VIRUS INFECTION) (HCC): ICD-10-CM

## 2018-09-28 DIAGNOSIS — I73.9 PAD (PERIPHERAL ARTERY DISEASE) (HCC): ICD-10-CM

## 2018-09-28 DIAGNOSIS — N18.31 CHRONIC KIDNEY DISEASE (CKD) STAGE G3A/A1, MODERATELY DECREASED GLOMERULAR FILTRATION RATE (GFR) BETWEEN 45-59 ML/MIN/1.73 SQUARE METER AND ALBUMINURIA CREATININE RATIO LESS THAN 30 MG/G (HCC): ICD-10-CM

## 2018-09-28 DIAGNOSIS — Z86.73 HISTORY OF CVA (CEREBROVASCULAR ACCIDENT): ICD-10-CM

## 2018-09-28 DIAGNOSIS — E78.5 DYSLIPIDEMIA: ICD-10-CM

## 2018-09-28 LAB
APAP SERPL-MCNC: <10 UG/ML (ref 10–30)
BASOPHILS # BLD AUTO: 0.7 % (ref 0–1.8)
BASOPHILS # BLD: 0.08 K/UL (ref 0–0.12)
CREAT UR-MCNC: 197.4 MG/DL
EOSINOPHIL # BLD AUTO: 0.27 K/UL (ref 0–0.51)
EOSINOPHIL NFR BLD: 2.2 % (ref 0–6.9)
ERYTHROCYTE [DISTWIDTH] IN BLOOD BY AUTOMATED COUNT: 50.4 FL (ref 35.9–50)
HCT VFR BLD AUTO: 50 % (ref 42–52)
HGB BLD-MCNC: 16.9 G/DL (ref 14–18)
IMM GRANULOCYTES # BLD AUTO: 0.06 K/UL (ref 0–0.11)
IMM GRANULOCYTES NFR BLD AUTO: 0.5 % (ref 0–0.9)
LYMPHOCYTES # BLD AUTO: 2.65 K/UL (ref 1–4.8)
LYMPHOCYTES NFR BLD: 22 % (ref 22–41)
MCH RBC QN AUTO: 32.2 PG (ref 27–33)
MCHC RBC AUTO-ENTMCNC: 33.8 G/DL (ref 33.7–35.3)
MCV RBC AUTO: 95.2 FL (ref 81.4–97.8)
MICROALBUMIN UR-MCNC: 655.7 MG/DL
MICROALBUMIN/CREAT UR: 3322 MG/G (ref 0–30)
MONOCYTES # BLD AUTO: 0.63 K/UL (ref 0–0.85)
MONOCYTES NFR BLD AUTO: 5.2 % (ref 0–13.4)
NEUTROPHILS # BLD AUTO: 8.35 K/UL (ref 1.82–7.42)
NEUTROPHILS NFR BLD: 69.4 % (ref 44–72)
NRBC # BLD AUTO: 0 K/UL
NRBC BLD-RTO: 0 /100 WBC
PLATELET # BLD AUTO: 237 K/UL (ref 164–446)
PMV BLD AUTO: 11.1 FL (ref 9–12.9)
RBC # BLD AUTO: 5.25 M/UL (ref 4.7–6.1)
SALICYLATES SERPL-MCNC: 0 MG/DL (ref 15–25)
WBC # BLD AUTO: 12 K/UL (ref 4.8–10.8)

## 2018-09-28 PROCEDURE — 99204 OFFICE O/P NEW MOD 45 MIN: CPT | Performed by: FAMILY MEDICINE

## 2018-09-28 PROCEDURE — 85025 COMPLETE CBC W/AUTO DIFF WBC: CPT

## 2018-09-28 PROCEDURE — 82043 UR ALBUMIN QUANTITATIVE: CPT

## 2018-09-28 PROCEDURE — 82570 ASSAY OF URINE CREATININE: CPT | Mod: XU

## 2018-09-28 PROCEDURE — 80307 DRUG TEST PRSMV CHEM ANLYZR: CPT

## 2018-09-28 PROCEDURE — 84244 ASSAY OF RENIN: CPT

## 2018-09-28 PROCEDURE — 99212 OFFICE O/P EST SF 10 MIN: CPT | Performed by: FAMILY MEDICINE

## 2018-09-28 PROCEDURE — 83835 ASSAY OF METANEPHRINES: CPT

## 2018-09-28 PROCEDURE — 36415 COLL VENOUS BLD VENIPUNCTURE: CPT

## 2018-09-28 PROCEDURE — 82088 ASSAY OF ALDOSTERONE: CPT

## 2018-09-28 PROCEDURE — 84443 ASSAY THYROID STIM HORMONE: CPT

## 2018-09-28 PROCEDURE — 80061 LIPID PANEL: CPT

## 2018-09-28 PROCEDURE — G0480 DRUG TEST DEF 1-7 CLASSES: HCPCS

## 2018-09-28 PROCEDURE — 80053 COMPREHEN METABOLIC PANEL: CPT

## 2018-09-28 RX ORDER — DOXAZOSIN MESYLATE 1 MG/1
1 TABLET ORAL DAILY
Qty: 90 TAB | Refills: 3 | Status: SHIPPED | OUTPATIENT
Start: 2018-09-28 | End: 2018-12-13 | Stop reason: SDUPTHER

## 2018-09-28 ASSESSMENT — ENCOUNTER SYMPTOMS
BRUISES/BLEEDS EASILY: 0
PALPITATIONS: 0
SEIZURES: 0
DEPRESSION: 0
HEMOPTYSIS: 0
WHEEZING: 0
NERVOUS/ANXIOUS: 0
HEADACHES: 0
FOCAL WEAKNESS: 0
DOUBLE VISION: 0
ABDOMINAL PAIN: 0
TREMORS: 0
VOMITING: 0
DIZZINESS: 0
BLOOD IN STOOL: 0
INSOMNIA: 0
CHILLS: 0
SORE THROAT: 0
WEAKNESS: 0
FEVER: 0
BLURRED VISION: 0
NAUSEA: 0
DIARRHEA: 0
COUGH: 0
MYALGIAS: 0
ORTHOPNEA: 0
SHORTNESS OF BREATH: 0

## 2018-09-28 NOTE — PATIENT INSTRUCTIONS
1) check labwork   2) see pulmonology to evaluate for sleep apnea   3) start clonidine patch, once weekly.  Stop clonidine tablets   4) start doxazosin 1mg at bedtime   5) visit with pharmacist to review your medications   6) recommend evaluation for TRIO study - Dr. Bloch or Anay will call you about the information

## 2018-09-28 NOTE — PROGRESS NOTES
RESISTANT HTN CLINIC - INITIAL VISIT   9/28/18    Assessment / Plan:   1. Blood Pressure Control:  Office BP Goal Based ACC/AHA (2017) goal <130/80  Meets criteria for resistant HTN.  Has long-standing h/o severe HTN w/ CVA.  On > 5 meds w/o control.  Must eval for pseudoresistance.  Triumeq does not carry ADR of elevated BP.  Lengthy hx of uncontrolled, severe HTN w/o indications of end-organ damage.    Home BP at goal:  no  Office BP at goal:  no  Plan:   - continue home BP monitoring, reviewed correct technique:  Yes   - order 24h ABPM:  YES, to confirm out of ofiice BP range due to impressively high BPs    2. Work up of Secondary Causes of Resistant Hypertension:   Renovascular HTN: Excluded, renal arter duplex negative   Primary Aldosteronism: Not Yet Assessed  Thyroid Function: elevated TSH in 2016, no recent TSH  Obstructive Sleep Apnea: Not Yet Assessed, no prior testing, reported to be snoring  Pheochromocytoma: Not Yet Assessed  Other:  Recommended for eval for the TRIO [place]-HTN study and will review case with Dr. Bloch, appears to meet inclusion criteria, needs to have ROB ruled-out   Recommendations At This Visit: check labs and refer for sleep study to r/o ROB        3. Medication Use / Adherence:  Assessment: Complete  Recommendations: Instructed to Continue Taking All Medications As Prescribed and Referred for Evaluation with Clinical Pharmacist         4. End Organ Damage:   Left Ventricular Hypertrophy: Absent on  Echocardiogram Date: 8/2017  Albuminuria: Not Yet Assessed on Date: order  Renal Function: Chronic Kidney Disease  Stage 3a  Established Cardiovascular Disease: Present: hx of CVA    5. Lifestyle Recommendations:  Advised to engage in walking 1.5mi daily at the park  DASH Diet    6. Standard HTN Pharmacotherapy:  ACEI/ARB: continue lisinopril 40mg daily  Thiazide Type Diuretic: continue chlorthalidone 25mg daily   Calcium Channel Blocker (CCB): continue amlodipine 10mg daily     7.  Additional Agents:  Aldosterone Antagonist: continue spironolactone 50mg daily   Loop Diuretic: ? Furosemide in the future   Perpheral Alpha Blocker: add doxazosin 1mg QHS, titrate to 4mg   Other CCB:  Avoid due to use of carvedilol and atorvastatin   Direct Vasodilator: continue hydralazine 100mg TID with isodil 20mg TID  Centrally Acting Alpha Agonist:   Switch to from PO tabs to clonidine 0.3mg patch Qweek  Other:   - continue carvedilol 25mg BID, consider transition to bystolic   - consider addition of minoxidil 5mg daily monitoring of edema and tachycardia     8. Other CV Risk Factors:   Lipids:  NLA Risk Category:   Very high:  Evidence of ASCVD or T1/T2D with 2 or more RFs or evidence of end-org damage (CKD, ACR>29, retinopathy)  Tx threshold:  non-HDL-C >99, LDL-C >69  Tx goal: non-HDL-C <100,  LDL-C <70  (optional: apoB <80)  At goal:  Unknown   Tx plan:   - check lipids   - continue atorvastatin 10mg, increase to high-intensity pending labs     DM: update labs, continue TLC measures     Smoking: reviewed d/c of cigarettes, pt is contemplative, reviewed tobacco cessation program and other resources     Antiplatelet Therapy: continue ASA 81mg daily     9. Other Issues:  1) HIV - unknown viral load, does not appear to have AIDS-defining illness  - defer mgmt to [facility]     2) hx of CVA  - continue optimal management with aggressive BP lowering, statin, antiplat  - monitor for s/s of CVA and seek prompt attention at ED     3) abnormal TSH   - recommend recheck and consider tx for hypothyroidism based upon results     Studies Ordered At This Visit: None  Blood Work to Be Obtained Prior to Next Visit: PRA, ainsley, TSH, CBC, plasm metaneph, ACR, drugs of abuse screen, lipid, CMP  Disposition: RTC in 3 weeks    Diagnosis:  1. Resistant hypertension  REFERRAL TO PHARMACOTHERAPY SERVICE    RENIN PLASMA    ALDOSTERONE    TSH WITH REFLEX TO FT4    CBC WITH DIFFERENTIAL    REFERRAL TO PULMONOLOGY    METANEPHRINES PLASMA     LIPID PANEL    COMP METABOLIC PANEL    MICROALBUMIN CREAT RATIO URINE RANDOM    DRUGS OF ABUSE SERUM   2. History of CVA (cerebrovascular accident)     3. PAD (peripheral artery disease) ([facility])     4. HIV (human immunodeficiency virus infection) ([facility])     5. Dyslipidemia     6. Chronic kidney disease (CKD) stage G3a/A1, moderately decreased glomerular filtration rate (GFR) between 45-59 mL/min/1.73 square meter and albuminuria creatinine ratio less than 30 mg/g ([facility])     7. Asymptomatic hypertensive urgency  REFERRAL TO PHARMACOTHERAPY SERVICE    REFERRAL TO PULMONOLOGY    cloNIDine (CATAPRES) 0.3 MG/24HR PATCH WEEKLY    doxazosin (CARDURA) 1 MG Tab        History of Present Illness:   Seamus Palencia is a 53 y.o. male who was seen 9/28/2018 for evaluation of resistant HTN and management.     Seamus Palencia is referred by: Gloria Cole A.P.R.N.. (cardilogy)    Key HTN History:    Has been seeing cardiology (Dr. Marie) and nephrology (DR. Ray, 5/2018) and had worsening GFR    Current HTN concerns: Denies   HTN sx:  No current blurred or changed vision, chest pain, shortness of breath, headache, nausea, dizziness/vertigo   Home BP log:  Arm cuff - 280/146 most days   Adherence to current HTN meds: compliant all of the time, denies ADRs   Antiplatelet/anticoagulation: Yes, Details: ASA 81mg , no bleeding/bruising   Hyperlipidemia: Yes, Details: atorvastatin 10mg, no myalgias   24h ABPM:  Never checked     Pertinent HTN hx:   Age at HTN dx:   23 years     Antihypertensive medication review:    RAAS-mediators:   1) ACEI:  Lisinopril 40mg, no cough   2) ARB: No   3) DRI: No  CCBs:   1) DHP: amlodipine 10mg, no leg swelling    2) non-DHP:  No  Diuretics:   1) Thiazide-type diuretic: chlorthalidone 25mg, no issues    2) Loop diuretic:  No   3) Aldosterone antagonist: spironolactone 50mg daily   4) K+ - sparing diuretics: No  Beta-blockers:   1) cardioselective (atenolol, metoprolol): No    2)  cardioselective/vasodilatory (nebivolol): No   3) non-cardioselective (propranolol): No   4) combined alpha/beta (carvedilol, labetolol): carvedilol 25mg BID   5) intrinsic sympathomimetic (generally not recommended): No  Peripheral alpha-blocker:  No  Central-actin) clonidine: 0.2mg BID   2) methyldopa/guanfacine: No  Direct vasodilator:    1) hydralazine: hydralazine 100mg TID   2) minoxidil:  No   3) isodil:  20mg BID    HTN crises:   Multiple visits, hx of CVA  ASCVD risk factors:     DM: No   CKD:  Yes, Details: currently G3a, A?   Lifestyle factors:     High salt: No, no salt    EtOH: No, no stimulants or drugs   Interfering substances:     NSAIDs: No    Decongestants. No    ASCVD calculator (contraindicated if ASCVD+, [id number])   10yr-risk calc: n/a    Clinical evidence of ASCVD:     1) hx of MI/ACS:  No   2) coronary or other revascularization procedure: No   3) TIA/ischemic CVA: Yes, Details: , see MRI brain, no residual sx    4) PAD (including ADRIAN <0.9): No   5) documented (CAD, Renal athero, AA due to athero, carotid plaque >50% stenosis: No    Major RFs:     1) Age (Men>44, women>54):  yes   2) Fhx early CAD (<55 men, <65 women):  no   3) cigarette smoking:  Yes, Details: as noted    4) high BP >139/89 or on tx: yes   5) Low HDL-C (<40 men, <50 women):  no    Past Medical History:   Diagnosis Date   • HIV disease ([facility])    • Heart failure ([facility])    • Hypertension    • Seizure ([facility])    • Stroke ([facility])         Problem List:     Patient Active Problem List    Diagnosis Date Noted   • Transient weakness of left lower extremity 2016     Priority: High   • Hypokalemia 2016     Priority: High   • Leukocytosis 2016     Priority: High   • Thrombocytosis ([facility]) 2016     Priority: High   • Normochromic normocytic anemia 2015     Priority: High   • Stroke ([facility]) 2015     Priority: High   • HIV (human immunodeficiency virus infection) ([facility]) 2013     Priority: High    • Elevated troponin 12/26/2015     Priority: Medium   • Elevated liver enzymes 12/26/2015     Priority: Medium   • Scabies infestation 12/26/2015     Priority: Medium   • Encephalopathy 12/26/2015     Priority: Low   • Hypocalcemia 12/26/2015     Priority: Low   • Hypoalbuminemia 12/26/2015     Priority: Low   • Dyslipidemia 09/28/2018   • History of CVA (cerebrovascular accident) 09/28/2018   • Resistant hypertension 09/28/2018   • Stage 3 chronic kidney disease 05/18/2018   • Essential hypertension, benign 08/31/2017   • Abnormal echocardiogram: LVEF 45% in 2015 08/31/2017   • Hematuria 01/11/2016   • Seizure (HCC) 01/11/2016   • Thrombocytopenia (HCC) 01/11/2016   • PAD (peripheral artery disease) (HCC) 01/11/2016   • Hypomagnesemia 01/11/2016   • HTN (hypertension), malignant 01/11/2016   • Cerebral infarction (HCC) 01/11/2016   • Hyponatremia 08/05/2013   • Pneumonia 08/05/2013        Surgical History:     Past Surgical History:   Procedure Laterality Date   • CHOLECYSTECTOMY  1980s        Social History:     Social History     Social History   • Marital status: Single     Spouse name: N/A   • Number of children: N/A   • Years of education: N/A     Social History Main Topics   • Smoking status: Current Every Day Smoker     Packs/day: 0.25     Types: Cigarettes   • Smokeless tobacco: Never Used      Comment: 3-4 CIGARETTES A DAY   • Alcohol use No   • Drug use: No   • Sexual activity: Not on file     Other Topics Concern   • Not on file     Social History Narrative   • No narrative on file        Family History:     Family History   Problem Relation Age of Onset   • Diabetes Mother         cause of death   • Diabetes Father         cause of death   • Diabetes Sister    • Other Brother         down syndrome   • No Known Problems Sister    • No Known Problems Sister    • No Known Problems Sister    • Other Daughter         5 daughters, 11 sons    • Clotting Disorder Neg Hx    • Stroke Neg Hx         Review of  "Systems:   Review of Systems   Constitutional: Negative for chills, fever and malaise/fatigue.   HENT: Negative for nosebleeds, sore throat and tinnitus.    Eyes: Negative for blurred vision and double vision.   Respiratory: Negative for cough, hemoptysis, shortness of breath and wheezing.    Cardiovascular: Negative for chest pain, palpitations, orthopnea and leg swelling.   Gastrointestinal: Negative for abdominal pain, blood in stool, diarrhea, melena, nausea and vomiting.   Genitourinary: Negative for hematuria.   Musculoskeletal: Negative for joint pain and myalgias.   Skin: Negative for itching and rash.   Neurological: Negative for dizziness, tremors, focal weakness, seizures, weakness and headaches.   Endo/Heme/Allergies: Does not bruise/bleed easily.   Psychiatric/Behavioral: Negative for depression. The patient is not nervous/anxious and does not have insomnia.         Objective:   Allergies:  Patient has no known allergies.    Vitals:    09/28/18 1435 09/28/18 1444   BP: (!) 239/142 (!) 241/139   BP Location: Left arm Left arm   Patient Position: Sitting Sitting   BP Cuff Size: Small adult Small adult   Pulse: (!) 103 (!) 104   Weight: 65.4 kg (144 lb 3.2 oz) 65.5 kg (144 lb 4.8 oz)   Height: 1.626 m (5' 4\") 1.626 m (5' 4\")     Body mass index is 24.77 kg/m².   BMI Readings from Last 4 Encounters:   09/28/18 24.77 kg/m²   09/14/18 24.55 kg/m²   07/24/18 25.75 kg/m²   07/16/18 25.06 kg/m²      BP Readings from Last 4 Encounters:   09/28/18 (!) 241/139   09/14/18 (!) 232/124   07/24/18 (!) 202/110   07/16/18 (!) 226/130        Outpatient Encounter Prescriptions as of 9/28/2018   Medication Sig Dispense Refill   • cloNIDine (CATAPRES) 0.3 MG/24HR PATCH WEEKLY Apply 1 Patch to skin as directed every 7 days for 360 days. 12 Patch 3   • doxazosin (CARDURA) 1 MG Tab Take 1 Tab by mouth every day for 360 days. 90 Tab 3   • carvedilol (COREG) 25 MG Tab Take 2 Tabs by mouth 2 times a day, with meals. 360 Tab 3   • " chlorthalidone (HYGROTON) 25 MG Tab Take 1 Tab by mouth every day. 30 Tab 11   • spironolactone (ALDACTONE) 50 MG Tab TAKE ONE TABLET BY MOUTH DAILY 30 Tab 6   • lisinopril (PRINIVIL, ZESTRIL) 40 MG tablet Take 1 Tab by mouth 2 times a day. 180 Tab 3   • hydrALAZINE (APRESOLINE) 100 MG tablet Take 1 Tab by mouth 3 times a day. 90 Tab 11   • amLODIPine (NORVASC) 10 MG Tab Take 1 Tab by mouth 2 Times a Day. 60 Tab 11   • isosorbide dinitrate (ISORDIL) 20 MG Tab Take 1 Tab by mouth 3 times a day. 90 Tab 11   • atorvastatin (LIPITOR) 10 MG Tab Take 1 Tab by mouth every day. 30 Tab 11   • ASPIRIN LOW DOSE 81 MG EC tablet Take 1 Tab by mouth every day.  2   • TRIUMEQ 600- MG Tab Take 1 Tab by mouth every day.  5   • sulfamethoxazole-trimethoprim (BACTRIM DS) 800-160 MG tablet Take 1 Tab by mouth every day.     • levetiracetam (KEPPRA) 500 MG Tab Take 1 Tab by mouth every 12 hours. 60 Tab 3   • [DISCONTINUED] cloNIDine (CATAPRESS) 0.2 MG Tab Take 1 Tab by mouth 2 times a day. 60 Tab 11   • acetaminophen (TYLENOL) 325 MG Tab Take 650 mg by mouth every four hours as needed.       No facility-administered encounter medications on file as of 9/28/2018.      Physical Exam   Constitutional: He is oriented to person, place, and time. He appears well-developed and well-nourished. He is cooperative. No distress.   HENT:   Head: Normocephalic and atraumatic.   Mouth/Throat: Oropharynx is clear and moist and mucous membranes are normal.   Eyes: Pupils are equal, round, and reactive to light. Conjunctivae are normal.   Neck: Trachea normal and normal range of motion. Neck supple. Normal carotid pulses and no JVD present. Carotid bruit is not present. No thyromegaly present.   Cardiovascular: Normal rate, regular rhythm, normal heart sounds and intact distal pulses.  Exam reveals no gallop and no friction rub.    No murmur heard.  Pulses:       Carotid pulses are 2+ on the right side, and 2+ on the left side.       Radial pulses  are 2+ on the right side, and 2+ on the left side.        Dorsalis pedis pulses are 2+ on the right side, and 2+ on the left side.        Posterior tibial pulses are 2+ on the right side, and 2+ on the left side.   Edema:    RLE: none     LLE: none   Spider telangectasia:       RLE:  None      LLE: none   Varicosities:         RLE: none      LLE: none   Corona phlebectatica:      RLE:  None        LLE:  None   Cording:         RLE:  None     LLE: None    Pulmonary/Chest: Effort normal and breath sounds normal. No respiratory distress.   Abdominal: Soft. Bowel sounds are normal. He exhibits no distension and no mass. There is no hepatosplenomegaly. There is no tenderness. There is no rebound and no guarding.   Musculoskeletal: He exhibits no edema.   Lymphadenopathy:     He has no cervical adenopathy.   Neurological: He is alert and oriented to person, place, and time. No cranial nerve deficit. Coordination and gait normal.   Skin: Skin is warm and dry. He is not diaphoretic. No cyanosis. No pallor. Nails show no clubbing.   Psychiatric: He has a normal mood and affect.        Accessory Clinical Findings:   Echocardiogram:  Results for orders placed or performed during the hospital encounter of 17   ECHOCARDIOGRAM COMP W/O CONT   Result Value Ref Range    Eject.Frac. MOD BP 68.07     Eject.Frac. MOD 4C 71.33     Eject.Frac. MOD 2C 62.08     Left Ventrical Ejection Fraction 65                Lab Results   Component Value Date    SODIUM 138 2018    POTASSIUM 3.9 2018    CHLORIDE 108 2018    CO2 22 2018    GLUCOSE 100 (H) 2018    BUN 11 2018    CREATININE 1.52 (H) 2018           Lab Results   Component Value Date    STMTRPT  2018     [facility]    Test Date:  2018  Pt Name:    SONI ABBASI                  Department: [facility]  MRN:        [id number]                      Room:  Gender:     Male                         Technician: ANYI  :         1965                   Requested By:MARISELA MANE  Order #:    [id number]                    Reading MD: Asiya Preciado MD    Measurements  Intervals                                Axis  Rate:       83                           P:          42  GA:         157                          QRS:        45  QRSD:       95                           T:          205  QT:         382  QTc:        449    Interpretive Statements  Sinus rhythm  Probable left atrial enlargement  NON-SPECIFIC ST-T CHANGES  Compared to ECG 08/17/2016 21:09:42  T-wave abnormality no longer present  Possible ischemia no longer present    Electronically Signed On 9- 20:48:03 PDT by Asiya Preciado MD       VASCULAR IMAGING:     CT abd 8/2013  Low-attenuation liver.  Normal size spleen.  No pancreatic mass identified.  No hydronephrosis or urolithiasis.  No adrenal gland masses identified.  Surgical absence gallbladder.  No evidence of bowel obstruction.  Appendix not delineated.  No aneurysmal dilatation aorta.  No retroperitoneal adenopathy identified.  Mild mesenteric and retroperitoneal edema.  Diffuse subcutaneous edema.    Carotid duplex 12/27/15  50-69% right ICA stenosis  <50% left ICA stenosis  Right vertebral not seen  Antegrade left vertebral  Nl subclavians    Echo 12/27/15  Left ventricular ejection fraction is visually estimated to be 45%.  Septum hyopkinesis, Basal inferiolateral and basal to mid anterior wall   akinesis, grade III diastolic dysfunction.  Mild concentric left ventricular hypertrophy.  Small pericardial effusion without evidence of hemodynamic compromise.  Estimated right ventricular systolic pressure is 60 mmHg.  Moderate mitral regurgitation.  Normal inferior vena cava size and inspiratory collapse.  No prior study is available for comparison.     MR brain 1/2016  Multifocal areas of restricted diffusion in the right frontal, parietal, temporal and occipital lobes and rodney likely representing subacute infarcts. There  has been no significant interval change. In view of presence of subcortical edema and multiple   arterial distribution postcontrast MRI was recommended to rule out any underlying enhancing pathology. However the patient refused to undergo contrast-enhanced study. Followup study is recommended with and without contrast.    CT head 8/17/18  1. No acute intracranial abnormality. No evidence of acute hemorrhage or mass lesion.  2. Prior infarct in the right posterior parietal and occipital lobe. Please note, hyperacute ischemia can remain occult on CT. If ischemia is clinically suspected, MRI is suggested for further evaluation.    MRI brain 8/2016  1. Multifocal areas of chronic gray matter infarcts in the right frontal, parietal and occipital lobes and rodney. Minimal petechial hemorrhage is noted in the occipital infarct.  2. There is no acute infarct.  3. Mild-to-moderate cerebral atrophy.  4. Mucosal thickening in the bilateral maxillary sinuses and right mastoid air cells.    Echo 8/2017  No prior study is available for comparison.   Normal left ventricular systolic function.   No evidence of valvular abnormality based on Doppler evaluation.     Renal art duplex 11/2017   Normal bilateral renal arterial study with the exception that the right   kidney  is on the small side of normal.          Ulysses Watt M.D.

## 2018-09-29 LAB
ALBUMIN SERPL BCP-MCNC: 3.7 G/DL (ref 3.2–4.9)
ALBUMIN/GLOB SERPL: 0.7 G/DL
ALP SERPL-CCNC: 121 U/L (ref 30–99)
ALT SERPL-CCNC: 14 U/L (ref 2–50)
ANION GAP SERPL CALC-SCNC: 7 MMOL/L (ref 0–11.9)
AST SERPL-CCNC: 19 U/L (ref 12–45)
BILIRUB SERPL-MCNC: 0.8 MG/DL (ref 0.1–1.5)
BUN SERPL-MCNC: 15 MG/DL (ref 8–22)
CALCIUM SERPL-MCNC: 10 MG/DL (ref 8.5–10.5)
CHLORIDE SERPL-SCNC: 108 MMOL/L (ref 96–112)
CHOLEST SERPL-MCNC: 171 MG/DL (ref 100–199)
CO2 SERPL-SCNC: 25 MMOL/L (ref 20–33)
CREAT SERPL-MCNC: 1.8 MG/DL (ref 0.5–1.4)
GLOBULIN SER CALC-MCNC: 5.4 G/DL (ref 1.9–3.5)
GLUCOSE SERPL-MCNC: 89 MG/DL (ref 65–99)
HDLC SERPL-MCNC: 37 MG/DL
LDLC SERPL CALC-MCNC: 97 MG/DL
POTASSIUM SERPL-SCNC: 3.4 MMOL/L (ref 3.6–5.5)
PROT SERPL-MCNC: 9.1 G/DL (ref 6–8.2)
SODIUM SERPL-SCNC: 140 MMOL/L (ref 135–145)
TRIGL SERPL-MCNC: 186 MG/DL (ref 0–149)
TSH SERPL DL<=0.005 MIU/L-ACNC: 4.31 UIU/ML (ref 0.38–5.33)

## 2018-10-01 DIAGNOSIS — I10 ESSENTIAL HYPERTENSION: ICD-10-CM

## 2018-10-01 LAB
ALDOST SERPL-MCNC: 6.1 NG/DL
BENZODIAZ SERPL QL: NEGATIVE
TRICYCLICS SERPL QL: NEGATIVE

## 2018-10-01 NOTE — PROGRESS NOTES
Renal function deteriorated on most recent blood work.  Will recheck bmp in a few weeks.     Michael Bloch, MD  [facility]

## 2018-10-02 ENCOUNTER — TELEPHONE (OUTPATIENT)
Dept: VASCULAR LAB | Facility: MEDICAL CENTER | Age: 53
End: 2018-10-02

## 2018-10-02 LAB
METANEPHS SERPL-SCNC: 0.26 NMOL/L (ref 0–0.49)
NORMETANEPHRINE SERPL-SCNC: 0.54 NMOL/L (ref 0–0.89)
RENIN PLAS-CCNC: 2.8 NG/ML/HR

## 2018-10-02 NOTE — TELEPHONE ENCOUNTER
Called patient to inform he needs to get repeat labs done between 10/11 and 10/15. Pt did not answer and I was unable to LM. Will call back at a later time.    Rony Purcell. Ass't  [facility]

## 2018-10-04 ENCOUNTER — APPOINTMENT (OUTPATIENT)
Dept: VASCULAR LAB | Facility: MEDICAL CENTER | Age: 53
End: 2018-10-04
Attending: FAMILY MEDICINE
Payer: MEDICAID

## 2018-10-04 LAB
AMOBARBITAL SERPL-MCNC: <50 NG/ML
BUTALBITAL SERPL-MCNC: <50 NG/ML
PENTOBARB SERPL-MCNC: <50 NG/ML
PHENOBARB SERPL-MCNC: <50 NG/ML
SECOBARBITAL SERPL-MCNC: <50 NG/ML

## 2018-10-18 ENCOUNTER — OFFICE VISIT (OUTPATIENT)
Dept: VASCULAR LAB | Facility: MEDICAL CENTER | Age: 53
End: 2018-10-18
Attending: INTERNAL MEDICINE
Payer: MEDICAID

## 2018-10-18 VITALS
WEIGHT: 142.6 LBS | HEIGHT: 64 IN | SYSTOLIC BLOOD PRESSURE: 213 MMHG | DIASTOLIC BLOOD PRESSURE: 126 MMHG | HEART RATE: 91 BPM | BODY MASS INDEX: 24.34 KG/M2

## 2018-10-18 DIAGNOSIS — B20 HIV (HUMAN IMMUNODEFICIENCY VIRUS INFECTION) (HCC): ICD-10-CM

## 2018-10-18 DIAGNOSIS — E78.5 DYSLIPIDEMIA: ICD-10-CM

## 2018-10-18 DIAGNOSIS — Z86.73 HISTORY OF CVA (CEREBROVASCULAR ACCIDENT): ICD-10-CM

## 2018-10-18 DIAGNOSIS — I73.9 PAD (PERIPHERAL ARTERY DISEASE) (HCC): ICD-10-CM

## 2018-10-18 DIAGNOSIS — I1A.0 RESISTANT HYPERTENSION: ICD-10-CM

## 2018-10-18 DIAGNOSIS — N18.31 CHRONIC KIDNEY DISEASE (CKD) STAGE G3A/A1, MODERATELY DECREASED GLOMERULAR FILTRATION RATE (GFR) BETWEEN 45-59 ML/MIN/1.73 SQUARE METER AND ALBUMINURIA CREATININE RATIO LESS THAN 30 MG/G (HCC): ICD-10-CM

## 2018-10-18 PROCEDURE — 99212 OFFICE O/P EST SF 10 MIN: CPT | Performed by: FAMILY MEDICINE

## 2018-10-18 PROCEDURE — 99214 OFFICE O/P EST MOD 30 MIN: CPT | Performed by: FAMILY MEDICINE

## 2018-10-18 RX ORDER — ATORVASTATIN CALCIUM 40 MG/1
40 TABLET, FILM COATED ORAL DAILY
Qty: 90 TAB | Refills: 3 | Status: SHIPPED | OUTPATIENT
Start: 2018-10-18 | End: 2018-12-06

## 2018-10-18 ASSESSMENT — ENCOUNTER SYMPTOMS
FOCAL WEAKNESS: 0
SORE THROAT: 0
COUGH: 0
NAUSEA: 0
ABDOMINAL PAIN: 0
VOMITING: 0
HEMOPTYSIS: 0
DOUBLE VISION: 0
DEPRESSION: 0
WHEEZING: 0
BLOOD IN STOOL: 0
DIARRHEA: 0
INSOMNIA: 0
ORTHOPNEA: 0
TREMORS: 0
BRUISES/BLEEDS EASILY: 0
MYALGIAS: 0
CHILLS: 0
SHORTNESS OF BREATH: 0
WEAKNESS: 0
DIZZINESS: 0
HEADACHES: 0
SEIZURES: 0
NERVOUS/ANXIOUS: 0
BLURRED VISION: 0
FEVER: 0
PALPITATIONS: 0

## 2018-10-18 NOTE — PATIENT INSTRUCTIONS
1) see pharmacist for eval of medicationst  2) I have ordered a 24 hour BP monitor - [facility] will contact you, call [phone] if you do not get a call   3) check labs in 6 weeks   4) start atorvastatin 40mg   5) Anay or Dr. Bloch will talk to you about the TRIO study     1) sulaiman a la farmacéutica para la evaluación del medicamento  2) he pedido un monitor de BP de 24 horas-[facility] se pondrá en contacto con leny, llame [phone] si usted no recibe faby llamada 3) check Labs en 6 semanas 4) Start atorvastatin 40mg 5) Anay o el Dr. Bloch hablará con leny sobre el estudio del trío

## 2018-10-18 NOTE — PROGRESS NOTES
[facility]IST[facility] HTN CLINIC - INITIAL VISIT   10/18/18    Assessment / Plan:   1. Blood Pressure Control:  Office BP Goal Based ACC/AHA (2017) goal <130/80  Meets criteria for resistant HTN.  Has long-standing h/o severe HTN w/ CVA.  On > 5 meds w/o control.  Must eval for pseudoresistance.  Triumeq does not carry ADR of elevated BP.  Lengthy hx of uncontrolled, severe HTN w/o indications of end-organ damage.    Home BP at goal:  no  Office BP at goal:  no  Plan:   - continue home BP monitoring, reviewed correct technique:  Yes   - order 24h ABPM:  YES, to confirm out of ofiice BP range due to impressively high BPs  - recommend eval for entry into the TRIO trial     2. Work up of Secondary Causes of Resistant Hypertension:   Renovascular HTN: Excluded, renal arter duplex negative   Primary Aldosteronism: Excluded, ARR <20, normal PRA and ainsley, 9/2018   Thyroid Function: Excluded, normal TSH 9/2018  Obstructive Sleep Apnea: Not Yet Assessed, no prior testing, reported to be snoring  Pheochromocytoma: Excluded, normal screening 9/2018  Other:  Recommended for eval for the TRIO [place]-HTN study and will review case with Dr. Bloch, appears to meet inclusion criteria, needs to have ROB ruled-out   Recommendations At This Visit: pending ROB evaluation with kathleen LIND, needs eval prior to entry to TRIO trial         3. Medication Use / Adherence:  Assessment: Complete  Recommendations: Instructed to Continue Taking All Medications As Prescribed and Referred for Evaluation with Clinical Pharmacist pending 2 weeks          4. End Organ Damage:   Left Ventricular Hypertrophy: Absent on  Echocardiogram Date: 8/2017  Albuminuria: Macroalbuminuria on Date: 9/2018  Renal Function: Chronic Kidney Disease  Stage 3a/A3, 9/2018  (may preclude from TRIO)  Established Cardiovascular Disease: Present: hx of CVA    5. Lifestyle Recommendations:  Advised to engage in walking 1.5mi daily at the park  DASH Diet    6. Standard HTN  Pharmacotherapy:  ACEI/ARB: continue lisinopril 40mg daily  Thiazide Type Diuretic: continue chlorthalidone 25mg daily   Calcium Channel Blocker (CCB): continue amlodipine 10mg daily   - holding all meds stable until eval for entry into TRIO trial     7. Additional Agents:  Aldosterone Antagonist: continue spironolactone 50mg daily   Loop Diuretic: ? Furosemide in the future   Perpheral Alpha Blocker: add doxazosin 1mg QHS, titrate to 4mg   Other CCB:  Avoid due to use of carvedilol and atorvastatin   Direct Vasodilator: continue hydralazine 100mg TID with isodil 20mg TID  Centrally Acting Alpha Agonist:   Switch to from PO tabs to clonidine 0.3mg patch Qweek  B-blocker:  Continue carvedilol   Other:   - continue carvedilol 25mg BID, consider transition to bystolic   - consider addition of minoxidil 5mg daily monitoring of edema and tachycardia  - if does not qualify for TRIO will consider addition of bumex, stop chlorthiadone and consider switch to bystolic      8. Other CV Risk Factors:   Lipids:  NLA Risk Category:   Very high:  Evidence of ASCVD or T1/T2D with 2 or more RFs or evidence of end-org damage (CKD, ACR>29, retinopathy)  Tx threshold:  non-HDL-C >99, LDL-C >69  Tx goal: non-HDL-C <100,  LDL-C <70  (optional: apoB <80)  At goal:  No, per 9/2018   Tx plan:   - check lipids   - increase atorvastatin to 40mg, check labs in 6 weeks     DM: update labs, continue TLC measures     Smoking: reviewed d/c of cigarettes, pt is contemplative, reviewed tobacco cessation program and other resources     Antiplatelet Therapy: continue ASA 81mg daily     9. Other Issues:  1) HIV - unknown viral load, does not appear to have AIDS-defining illness  - defer mgmt to [facility]     2) hx of CVA  - continue optimal management with aggressive BP lowering, statin, antiplat  - monitor for s/s of CVA and seek prompt attention at ED     3) abnormal TSH, resolved, normal TSH     4) carotid artery stenosis, R > L.  50-69% right ICA  stenosis  <50% left ICA stenosis by 2015 duplex  - recommend interval f/u, ordered     Studies Ordered At This Visit: 24h ABPM, carotid duplex   Blood Work to Be Obtained Prior to Next Visit:  Repeat lipid in 6 weeks  Disposition: 4 weeks with BP log and ABPM     Diagnosis:  1. Resistant hypertension  REFERRAL TO PHARMACOTHERAPY SERVICE    REFERRAL TO 24-HOUR BLOOD PRESSURE MONITORING   2. History of CVA (cerebrovascular accident)     3. PAD (peripheral artery disease) ([facility])     4. HIV (human immunodeficiency virus infection) ([facility])     5. Dyslipidemia  atorvastatin (LIPITOR) 40 MG Tab    LIPID PROFILE    COMP METABOLIC PANEL   6. Chronic kidney disease (CKD) stage G3a/A1, moderately decreased glomerular filtration rate (GFR) between 45-59 mL/min/1.73 square meter and albuminuria creatinine ratio less than 30 mg/g ([facility])          History of Present Illness:   Seamus Palencia is a 53 y.o. male who was seen 9/28/2018 for evaluation of resistant HTN and management.     Seamus Palencia is referred by: Gloria Cole A.P.RMeghnaNMeghna. (cardilogy)    Reports no major c/o today.  Had labs completed.  Tolerating all meds including doxazosin.  Reports has not started clonidine patch as [facility] pharmacy did not have in stock and pending order.        Current HTN concerns: Denies   HTN sx:  No current blurred or changed vision, chest pain, shortness of breath, headache, nausea, dizziness/vertigo   Home BP log:  Arm cuff - 280/146 most days   Adherence to current HTN meds: compliant all of the time, denies ADRs   Antiplatelet/anticoagulation: Yes, Details: ASA 81mg , no bleeding/bruising   Hyperlipidemia: Yes, Details: atorvastatin 10mg, no myalgias   24h ABPM:  Ordered today     Key HTN History:  Has been seeing cardiology (Dr. Marie) and nephrology (DR. Ray, 5/2018) and had worsening GFR  Pertinent HTN hx:   Age at HTN dx:   23 years     Antihypertensive medication review:    RAAS-mediators:   1) ACEI:  Lisinopril 40mg, no cough   2)  ARB: No   3) DRI: No  CCBs:   1) DHP: amlodipine 10mg, no leg swelling    2) non-DHP:  No  Diuretics:   1) Thiazide-type diuretic: chlorthalidone 25mg, no issues    2) Loop diuretic:  No   3) Aldosterone antagonist: spironolactone 50mg daily   4) K+ - sparing diuretics: No  Beta-blockers:   1) cardioselective (atenolol, metoprolol): No    2) cardioselective/vasodilatory (nebivolol): No   3) non-cardioselective (propranolol): No   4) combined alpha/beta (carvedilol, labetolol): carvedilol 25mg BID   5) intrinsic sympathomimetic (generally not recommended): No  Peripheral alpha-blocker:  No  Central-actin) clonidine: 0.2mg BID   2) methyldopa/guanfacine: No  Direct vasodilator:    1) hydralazine: hydralazine 100mg TID   2) minoxidil:  No   3) isodil:  20mg BID    HTN crises:   Multiple visits, hx of CVA  ASCVD risk factors:     DM: No   CKD:  Yes, Details: currently G3a, A?   Lifestyle factors:     High salt: No, no salt    EtOH: No, no stimulants or drugs   Interfering substances:     NSAIDs: No    Decongestants. No    ASCVD calculator (contraindicated if ASCVD+, CKD4N-7)   10yr-risk calc: n/a    Clinical evidence of ASCVD:     1) hx of MI/ACS:  No   2) coronary or other revascularization procedure: No   3) TIA/ischemic CVA: Yes, Details: , see MRI brain, no residual sx    4) PAD (including ADRIAN <0.9): No   5) documented (CAD, Renal athero, AA due to athero, carotid plaque >50% stenosis: Yes, carotid stenosis     Major RFs:     1) Age (Men>44, women>54):  yes   2) Fhx early CAD (<55 men, <65 women):  no   3) cigarette smoking:  Yes, Details: as noted    4) high BP >139/89 or on tx: yes   5) Low HDL-C (<40 men, <50 women):  no    Past Medical History:   Diagnosis Date   • HIV disease (HCC)    • Heart failure (HCC)    • Hypertension    • Seizure (HCC)    • Stroke (HCC)         Problem List:     Patient Active Problem List    Diagnosis Date Noted   • Transient weakness of left lower extremity 2016      Priority: High   • Hypokalemia 08/18/2016     Priority: High   • Leukocytosis 08/18/2016     Priority: High   • Thrombocytosis (HCC) 08/18/2016     Priority: High   • Normochromic normocytic anemia 12/26/2015     Priority: High   • Stroke (HCC) 12/25/2015     Priority: High   • HIV (human immunodeficiency virus infection) ([facility]) 08/11/2013     Priority: High   • Elevated troponin 12/26/2015     Priority: Medium   • Elevated liver enzymes 12/26/2015     Priority: Medium   • Scabies infestation 12/26/2015     Priority: Medium   • Encephalopathy 12/26/2015     Priority: Low   • Hypocalcemia 12/26/2015     Priority: Low   • Hypoalbuminemia 12/26/2015     Priority: Low   • Dyslipidemia 09/28/2018   • History of CVA (cerebrovascular accident) 09/28/2018   • Resistant hypertension 09/28/2018   • Stage 3 chronic kidney disease ([facility]) 05/18/2018   • Essential hypertension, benign 08/31/2017   • Abnormal echocardiogram: LVEF 45% in 2015 08/31/2017   • Hematuria 01/11/2016   • Seizure ([facility]) 01/11/2016   • Thrombocytopenia (HCC) 01/11/2016   • PAD (peripheral artery disease) ([facility]) 01/11/2016   • Hypomagnesemia 01/11/2016   • HTN (hypertension), malignant 01/11/2016   • Cerebral infarction ([facility]) 01/11/2016   • Hyponatremia 08/05/2013   • Pneumonia 08/05/2013        Surgical History:     Past Surgical History:   Procedure Laterality Date   • CHOLECYSTECTOMY  1980s        Social History:     Social History     Social History   • Marital status: Single     Spouse name: N/A   • Number of children: N/A   • Years of education: N/A     Social History Main Topics   • Smoking status: Current Every Day Smoker     Packs/day: 0.25     Types: Cigarettes   • Smokeless tobacco: Never Used      Comment: 3-4 CIGARETTES A DAY   • Alcohol use No   • Drug use: No   • Sexual activity: Not on file     Other Topics Concern   • Not on file     Social History Narrative   • No narrative on file        Family History:     Family History   Problem Relation  "Age of Onset   • Diabetes Mother         cause of death   • Diabetes Father         cause of death   • Diabetes Sister    • Other Brother         down syndrome   • No Known Problems Sister    • No Known Problems Sister    • No Known Problems Sister    • Other Daughter         5 daughters, 11 sons    • Clotting Disorder Neg Hx    • Stroke Neg Hx         Review of Systems:   Review of Systems   Constitutional: Negative for chills, fever and malaise/fatigue.   HENT: Negative for nosebleeds, sore throat and tinnitus.    Eyes: Negative for blurred vision and double vision.   Respiratory: Negative for cough, hemoptysis, shortness of breath and wheezing.    Cardiovascular: Negative for chest pain, palpitations, orthopnea and leg swelling.   Gastrointestinal: Negative for abdominal pain, blood in stool, diarrhea, melena, nausea and vomiting.   Genitourinary: Negative for hematuria.   Musculoskeletal: Negative for joint pain and myalgias.   Skin: Negative for itching and rash.   Neurological: Negative for dizziness, tremors, focal weakness, seizures, weakness and headaches.   Endo/Heme/Allergies: Does not bruise/bleed easily.   Psychiatric/Behavioral: Negative for depression. The patient is not nervous/anxious and does not have insomnia.         Objective:   Allergies:  Patient has no known allergies.    Vitals:    10/18/18 0820 10/18/18 0828   BP: (!) 225/129 (!) 213/126   BP Location: Left arm Left arm   Patient Position: Sitting Sitting   BP Cuff Size: Small adult Small adult   Pulse: 88 91   Weight: 64.7 kg (142 lb 9.6 oz) 64.7 kg (142 lb 9.6 oz)   Height: 1.626 m (5' 4\") 1.626 m (5' 4\")     Body mass index is 24.48 kg/m².   BMI Readings from Last 4 Encounters:   10/18/18 24.48 kg/m²   09/28/18 24.77 kg/m²   09/14/18 24.55 kg/m²   07/24/18 25.75 kg/m²      BP Readings from Last 4 Encounters:   10/18/18 (!) 213/126   09/28/18 (!) 241/139   09/14/18 (!) 232/124   07/24/18 (!) 202/110        Outpatient Encounter " Prescriptions as of 10/18/2018   Medication Sig Dispense Refill   • atorvastatin (LIPITOR) 40 MG Tab Take 1 Tab by mouth every day for 360 days. 90 Tab 3   • cloNIDine (CATAPRES) 0.3 MG/24HR PATCH WEEKLY Apply 1 Patch to skin as directed every 7 days for 360 days. 12 Patch 3   • doxazosin (CARDURA) 1 MG Tab Take 1 Tab by mouth every day for 360 days. 90 Tab 3   • carvedilol (COREG) 25 MG Tab Take 2 Tabs by mouth 2 times a day, with meals. 360 Tab 3   • chlorthalidone (HYGROTON) 25 MG Tab Take 1 Tab by mouth every day. 30 Tab 11   • lisinopril (PRINIVIL, ZESTRIL) 40 MG tablet Take 1 Tab by mouth 2 times a day. 180 Tab 3   • hydrALAZINE (APRESOLINE) 100 MG tablet Take 1 Tab by mouth 3 times a day. 90 Tab 11   • amLODIPine (NORVASC) 10 MG Tab Take 1 Tab by mouth 2 Times a Day. 60 Tab 11   • isosorbide dinitrate (ISORDIL) 20 MG Tab Take 1 Tab by mouth 3 times a day. 90 Tab 11   • ASPIRIN LOW DOSE 81 MG EC tablet Take 1 Tab by mouth every day.  2   • TRIUMEQ 600- MG Tab Take 1 Tab by mouth every day.  5   • levetiracetam (KEPPRA) 500 MG Tab Take 1 Tab by mouth every 12 hours. 60 Tab 3   • spironolactone (ALDACTONE) 50 MG Tab TAKE ONE TABLET BY MOUTH DAILY 30 Tab 6   • [DISCONTINUED] atorvastatin (LIPITOR) 10 MG Tab Take 1 Tab by mouth every day. 30 Tab 11   • acetaminophen (TYLENOL) 325 MG Tab Take 650 mg by mouth every four hours as needed.     • sulfamethoxazole-trimethoprim (BACTRIM DS) 800-160 MG tablet Take 1 Tab by mouth every day.       No facility-administered encounter medications on file as of 10/18/2018.      Physical Exam   Constitutional: He is oriented to person, place, and time. He appears well-developed and well-nourished. He is cooperative. No distress.   HENT:   Head: Normocephalic and atraumatic.   Mouth/Throat: Oropharynx is clear and moist and mucous membranes are normal.   Eyes: Pupils are equal, round, and reactive to light. Conjunctivae are normal.   Neck: Trachea normal and normal range of  motion. Neck supple. Normal carotid pulses and no JVD present. Carotid bruit is not present. No thyromegaly present.   Cardiovascular: Normal rate, regular rhythm, normal heart sounds and intact distal pulses.  Exam reveals no gallop and no friction rub.    No murmur heard.  Pulses:       Carotid pulses are 2+ on the right side, and 2+ on the left side.       Radial pulses are 2+ on the right side, and 2+ on the left side.        Dorsalis pedis pulses are 2+ on the right side, and 2+ on the left side.        Posterior tibial pulses are 2+ on the right side, and 2+ on the left side.   Edema:    RLE: none     LLE: none   Spider telangectasia:       RLE:  None      LLE: none   Varicosities:         RLE: none      LLE: none   Corona phlebectatica:      RLE:  None        LLE:  None   Cording:         RLE:  None     LLE: None    Pulmonary/Chest: Effort normal and breath sounds normal. No respiratory distress.   Abdominal: Soft. Bowel sounds are normal. He exhibits no distension and no mass. There is no hepatosplenomegaly. There is no tenderness. There is no rebound and no guarding.   Musculoskeletal: He exhibits no edema.   Lymphadenopathy:     He has no cervical adenopathy.   Neurological: He is alert and oriented to person, place, and time. No cranial nerve deficit. Coordination and gait normal.   Skin: Skin is warm and dry. He is not diaphoretic. No cyanosis. No pallor. Nails show no clubbing.   Psychiatric: He has a normal mood and affect.        Accessory Clinical Findings:   Echocardiogram:  Results for orders placed or performed during the hospital encounter of 08/30/17   ECHOCARDIOGRAM COMP W/O CONT   Result Value Ref Range    Eject.Frac. MOD BP 68.07     Eject.Frac. MOD 4C 71.33     Eject.Frac. MOD 2C 62.08     Left Ventrical Ejection Fraction 65       Lab Results   Component Value Date    CHOLSTRLTOT 171 09/28/2018    LDL 97 09/28/2018    HDL 37 (A) 09/28/2018    TRIGLYCERIDE 186 (H) 09/28/2018           Lab  Results   Component Value Date    SODIUM 140 2018    POTASSIUM 3.4 (L) 2018    CHLORIDE 108 2018    CO2 25 2018    GLUCOSE 89 2018    BUN 15 2018    CREATININE 1.80 (H) 2018      Lab Results   Component Value Date    ALDOSTERONE 6.1 2018      Lab Results   Component Value Date    URCREAT 197.40 2018    MICROALBUR 655.7 2018    MALBCRT 3322 (H) 2018    METANEPHPL 0.26 2018     Lab Results   Component Value Date    STMTRPT  2018     [facility]    Test Date:  2018  Pt Name:    SONI ABBASI                  Department: [facility]  MRN:        [id number]                      Room:  Gender:     Male                         Technician: ANYI  :        1965                   Requested By:MARISELA MANE  Order #:    [id number]                    Reading MD: Asiya Preciado MD    Measurements  Intervals                                Axis  Rate:       83                           P:          42  ME:         157                          QRS:        45  QRSD:       95                           T:          205  QT:         382  QTc:        449    Interpretive Statements  Sinus rhythm  Probable left atrial enlargement  NON-SPECIFIC ST-T CHANGES  Compared to ECG 2016 21:09:42  T-wave abnormality no longer present  Possible ischemia no longer present    Electronically Signed On 2018 20:48:03 PDT by Asiya Preciado MD       VASCULAR IMAGING:     CT abd 2013  Low-attenuation liver.  Normal size spleen.  No pancreatic mass identified.  No hydronephrosis or urolithiasis.  No adrenal gland masses identified.  Surgical absence gallbladder.  No evidence of bowel obstruction.  Appendix not delineated.  No aneurysmal dilatation aorta.  No retroperitoneal adenopathy identified.  Mild mesenteric and retroperitoneal edema.  Diffuse subcutaneous edema.    Carotid duplex 12/27/15  50-69% right ICA stenosis  <50% left ICA stenosis  Right vertebral  not seen  Antegrade left vertebral  Nl subclavians    Echo 12/27/15  Left ventricular ejection fraction is visually estimated to be 45%.  Septum hyopkinesis, Basal inferiolateral and basal to mid anterior wall   akinesis, grade III diastolic dysfunction.  Mild concentric left ventricular hypertrophy.  Small pericardial effusion without evidence of hemodynamic compromise.  Estimated right ventricular systolic pressure is 60 mmHg.  Moderate mitral regurgitation.  Normal inferior vena cava size and inspiratory collapse.  No prior study is available for comparison.     MR brain 1/2016  Multifocal areas of restricted diffusion in the right frontal, parietal, temporal and occipital lobes and rodney likely representing subacute infarcts. There has been no significant interval change. In view of presence of subcortical edema and multiple   arterial distribution postcontrast MRI was recommended to rule out any underlying enhancing pathology. However the patient refused to undergo contrast-enhanced study. Followup study is recommended with and without contrast.    CT head 8/17/18  1. No acute intracranial abnormality. No evidence of acute hemorrhage or mass lesion.  2. Prior infarct in the right posterior parietal and occipital lobe. Please note, hyperacute ischemia can remain occult on CT. If ischemia is clinically suspected, MRI is suggested for further evaluation.    MRI brain 8/2016  1. Multifocal areas of chronic gray matter infarcts in the right frontal, parietal and occipital lobes and rodney. Minimal petechial hemorrhage is noted in the occipital infarct.  2. There is no acute infarct.  3. Mild-to-moderate cerebral atrophy.  4. Mucosal thickening in the bilateral maxillary sinuses and right mastoid air cells.    Echo 8/2017  No prior study is available for comparison.   Normal left ventricular systolic function.   No evidence of valvular abnormality based on Doppler evaluation.     Renal art duplex 11/2017   Normal  bilateral renal arterial study with the exception that the right   kidney  is on the small side of normal.          Ulysses Watt M.D.

## 2018-10-24 ENCOUNTER — HOSPITAL ENCOUNTER (OUTPATIENT)
Dept: LAB | Facility: MEDICAL CENTER | Age: 53
End: 2018-10-24
Attending: FAMILY MEDICINE
Payer: COMMERCIAL

## 2018-10-24 DIAGNOSIS — E78.5 DYSLIPIDEMIA: ICD-10-CM

## 2018-10-24 LAB
ALBUMIN SERPL BCP-MCNC: 3.9 G/DL (ref 3.2–4.9)
ALBUMIN/GLOB SERPL: 0.8 G/DL
ALP SERPL-CCNC: 121 U/L (ref 30–99)
ALT SERPL-CCNC: 34 U/L (ref 2–50)
ANION GAP SERPL CALC-SCNC: 9 MMOL/L (ref 0–11.9)
AST SERPL-CCNC: 31 U/L (ref 12–45)
BILIRUB SERPL-MCNC: 0.5 MG/DL (ref 0.1–1.5)
BUN SERPL-MCNC: 18 MG/DL (ref 8–22)
CALCIUM SERPL-MCNC: 9.6 MG/DL (ref 8.5–10.5)
CHLORIDE SERPL-SCNC: 103 MMOL/L (ref 96–112)
CHOLEST SERPL-MCNC: 164 MG/DL (ref 100–199)
CO2 SERPL-SCNC: 23 MMOL/L (ref 20–33)
CREAT SERPL-MCNC: 1.69 MG/DL (ref 0.5–1.4)
GLOBULIN SER CALC-MCNC: 4.6 G/DL (ref 1.9–3.5)
GLUCOSE SERPL-MCNC: 86 MG/DL (ref 65–99)
HDLC SERPL-MCNC: 38 MG/DL
LDLC SERPL CALC-MCNC: 96 MG/DL
POTASSIUM SERPL-SCNC: 3.3 MMOL/L (ref 3.6–5.5)
PROT SERPL-MCNC: 8.5 G/DL (ref 6–8.2)
SODIUM SERPL-SCNC: 135 MMOL/L (ref 135–145)
TRIGL SERPL-MCNC: 150 MG/DL (ref 0–149)

## 2018-10-24 PROCEDURE — 80053 COMPREHEN METABOLIC PANEL: CPT

## 2018-10-24 PROCEDURE — 80061 LIPID PANEL: CPT

## 2018-10-24 PROCEDURE — 36415 COLL VENOUS BLD VENIPUNCTURE: CPT

## 2018-10-25 ENCOUNTER — TELEPHONE (OUTPATIENT)
Dept: VASCULAR LAB | Facility: MEDICAL CENTER | Age: 53
End: 2018-10-25

## 2018-10-25 DIAGNOSIS — I77.9 BILATERAL CAROTID ARTERY DISEASE, UNSPECIFIED TYPE (HCC): ICD-10-CM

## 2018-10-25 NOTE — TELEPHONE ENCOUNTER
Unable to reach the pt as his phone is not excepting calls. LM on VM with the  Pt's son found in demographics. Requested that the son contact his father to have a carotid duplex done before the 12/9 appt with Dr. Watt. Phone number for the office and the scheduling office given on VM. DICKSON Nevarez.

## 2018-10-31 ENCOUNTER — NON-PROVIDER VISIT (OUTPATIENT)
Dept: VASCULAR LAB | Facility: MEDICAL CENTER | Age: 53
End: 2018-10-31
Attending: INTERNAL MEDICINE
Payer: MEDICAID

## 2018-10-31 VITALS — HEART RATE: 98 BPM | DIASTOLIC BLOOD PRESSURE: 144 MMHG | SYSTOLIC BLOOD PRESSURE: 209 MMHG

## 2018-10-31 DIAGNOSIS — I10 ESSENTIAL HYPERTENSION, BENIGN: ICD-10-CM

## 2018-10-31 PROCEDURE — 99212 OFFICE O/P EST SF 10 MIN: CPT

## 2018-10-31 PROCEDURE — 93788 AMBL BP MNTR W/SW A/R: CPT | Performed by: INTERNAL MEDICINE

## 2018-10-31 PROCEDURE — 93786 AMBL BP MNTR W/SW REC ONLY: CPT | Performed by: INTERNAL MEDICINE

## 2018-10-31 NOTE — PROGRESS NOTES
[facility]    HPI: Pt referred for HTN:    Pt presented for HTN clinic today, but discovered pt did not make his ABPM appointment on 10/29/18.  MA is very graciously helping today to help pt obtain his ABPM to help guide medication therapy.  BP today in clinic was severely elevated 217/149 HR 99. Second BP confirmed hypertensive urgency, but apparently pt stays in this range.      A/:  Pt reports that he does feel very nervous when he is at the hospital.    He is unable to confirm his medicaitons, very poor historian.   -Will have pt return tomorrow to review his medication. (pt will bring in all medication bottles.)   -Pt to seek medical help for any s/s of hypertensive emergency.    Follow up in 24 hours.    Ulysses Godinez, PharmD

## 2018-11-01 ENCOUNTER — NON-PROVIDER VISIT (OUTPATIENT)
Dept: VASCULAR LAB | Facility: MEDICAL CENTER | Age: 53
End: 2018-11-01
Attending: INTERNAL MEDICINE
Payer: COMMERCIAL

## 2018-11-01 VITALS — SYSTOLIC BLOOD PRESSURE: 194 MMHG | HEART RATE: 90 BPM | DIASTOLIC BLOOD PRESSURE: 130 MMHG

## 2018-11-01 PROCEDURE — 99212 OFFICE O/P EST SF 10 MIN: CPT

## 2018-11-01 NOTE — PROGRESS NOTES
Pharmacotherapy Hypertension Visit  11/01/18     Type of Visit:  Initial Visit    Seamus Palencia has been referred for evaluation and management of hypertension    HPI:   Vitals:    11/01/18 1442 11/01/18 1446   BP: (!) 206/142 (!) 194/130   Pulse: 96 90     Both arms - in future use arm with higher reading    Age at Initial Diagnosis: unknown      Home BP readings:   None  Any readings above  180/110:  Yes  Zonia symptoms of high blood pressure: reports no s/s of end organ damage.     Current Prescription HTN Medications - including dose:    See list below, primarily all HTN medications.     Current side effects potentially related to antihypertensive medications (lightheaded, dizziness, leg swelling):  Reports not feeling well when he uses doxazosin in the morning, he now takes it at night.    Current Adherence to Blood Pressure Medications Partial    Previusly attempted BP medications:   n/a    Any current interfering substances: unknown    Any current lifestyle issues affecting BP:  None    In the past 6 months have pt had the following (if no, then order)  EKG  Microalbumin:  3,322 from 9/28/18  Electrolytes and eGFR  TSH 4.3 from 9/28/18  Fasting lipid panel 10/24  Fasting glucose stable      Lab Results   Component Value Date/Time    SODIUM 135 10/24/2018 02:03 PM    POTASSIUM 3.3 (L) 10/24/2018 02:03 PM    CHLORIDE 103 10/24/2018 02:03 PM    CO2 23 10/24/2018 02:03 PM    GLUCOSE 86 10/24/2018 02:03 PM    BUN 18 10/24/2018 02:03 PM    CREATININE 1.69 (H) 10/24/2018 02:03 PM       Medications Reconciled  CURRENT MEDICATIONS:   Current Outpatient Prescriptions:   •  atorvastatin, 40 mg, Oral, DAILY  •  cloNIDine, 1 Patch, Transdermal, Q7 DAYS, Taking  •  doxazosin, 1 mg, Oral, DAILY, Taking  •  carvedilol, 50 mg, Oral, BID WITH MEALS, Taking  •  chlorthalidone, 25 mg, Oral, DAILY, Taking  •  spironolactone, TAKE ONE TABLET BY MOUTH DAILY  •  lisinopril, 40 mg, Oral, BID, Taking  •  hydrALAZINE, 100 mg, Oral,  "TID, Taking  •  amLODIPine, 10 mg, Oral, BID, Taking  •  isosorbide dinitrate, 20 mg, Oral, TID, Taking  •  ASPIRIN LOW DOSE, 1 Tab, Oral, DAILY, Taking  •  acetaminophen, 650 mg, Oral, Q4HRS PRN, PRN  •  TRIUMEQ, 1 Tab, Oral, DAILY, Taking  •  sulfamethoxazole-trimethoprim, 1 Tab, Oral, DAILY, Taking  •  levETIRAcetam, 500 mg, Oral, Q12HRS, Taking  ALLERGIES: Patient has no known allergies.    SOCIAL HISTORY   History   Smoking Status   • Current Every Day Smoker   • Packs/day: 0.25   • Types: Cigarettes   Smokeless Tobacco   • Never Used     Comment: 3-4 CIGARETTES A DAY     Change in weight: Stable  Exercise habits: no regular exercise program  Diet: common adult      Most recent 24 hours ABPM results if available:  Obtained results today, will await interpretation.     ASSESSMENT AND PLAN    BP is elevated    BP Goal 130/80     Refuses to take BP at home  Perfomed Med Rec today.   -Not taking clonidine patch   -Not taking isordil    BP is better today than any previous reading in our clinic. Pt reports he is completely adherent to his BP medications, but states he \"took a break\" from all his medications today.  It's odd the day he didn't take any medications is the day his BP was the lowest in clinic.     Confirms he has only been taking 1mg of doxazosin daily.  Will have pt increase to 2 mg nightly then begin 3 mg nightly thereafter.    Discussed adherence at length today, focusing on how he takes this large amount of medications.  He claims he places the medications in a pill organizer/medset.      Main goal of today was to provide medication reconciliation and obtain results of 24 hr ABPM.    BP Monitoring Recommendations - Home BP     Refuses to take home BP.     Lifestyle Recommendations From Today’s Visit:    Did not discuss     Medication recommendations from today's visit: Increase Doxazosin to 3 mg nightly   ARB/ACEI: Lisinopril 40 mg BID  Diuretic:    Spironolactone 50 mg daily   Chlorthalidone 25 mg " daily  Calcium Channel Blocker: Amlodipine 10 BID  Other class:    Hydralazine 100 TID   Carvedilol 50 mg BID   Doxazosin 3 mg QPM    **Note: not taking clonidine PO or patch; not taking isosorbide    Importance of adherence discussed    Other recommendations: none, will await 24 hr ABPM    Studies Ordered at Todays Visit: None  Blood Work Ordered At Today’s visit: None order BMP if not done in last 6 months  Follow-Up: 4 weeks with Dr Shukri Godinez, PharmD   CC Dr Bloch, Dr Crawford

## 2018-11-01 NOTE — Clinical Note
If this pt's 24 hr ABPM comes back showing he still has extremely elevated BP would you like me to see him before he meets with Dr Watt on 12/6/18?  I only scheduled him out that far because his BP has always been extremely high and is likely stable at these readings (I question his adherence).    Thanks for the input on this very complex patient.   Areli Arora

## 2018-11-06 NOTE — PROGRESS NOTES
Ambulatory blood pressure monitor report reviewed  Suboptimal number of readings, but available readings confirms high out of office blood pressure readings  We will discuss at follow-up visit    Michael Bloch, MD  [facility]

## 2018-11-21 ENCOUNTER — NON-PROVIDER VISIT (OUTPATIENT)
Dept: VASCULAR LAB | Facility: MEDICAL CENTER | Age: 53
End: 2018-11-21
Attending: INTERNAL MEDICINE
Payer: MEDICAID

## 2018-11-21 VITALS — HEART RATE: 95 BPM | SYSTOLIC BLOOD PRESSURE: 228 MMHG | DIASTOLIC BLOOD PRESSURE: 154 MMHG

## 2018-11-21 PROCEDURE — 99212 OFFICE O/P EST SF 10 MIN: CPT | Performed by: PHARMACIST

## 2018-11-21 NOTE — PROGRESS NOTES
Pharmacotherapy Hypertension Visit  11/21/18     Type of Visit:  Follow Up    Seamus Palencia has been referred for evaluation and management of hypertension    HPI:   Vitals:    11/21/18 1418   BP: (!) 228/154   BP Location: Right arm   Patient Position: Sitting   Pulse: 95     Both arms - in future use arm with higher reading    Age at Initial Diagnosis: Unknown      Home BP readings:   180-190's, can spike to 240, lowest he has seen is 130's  Any readings above  180/110:  several   Zonia symptoms of high blood pressure (TIA, Stroke, Head ache, vision changes): Reports no s/s end organ damage.      Current Prescription HTN Medications - including dose:    See list below      Current side effects potentially related to antihypertensive medications (lightheaded, dizziness, leg swelling): None    Current Adherence to Blood Pressure Medications:  Patient states he has been compliant, is able to verify current dosing, did not bring pill organizer as asked during last visit     Previusly attempted BP medications:   Many    Any current interfering substances: Caffeine, enters exam room with bottle of soda, states he drinks on regular basis     Any current lifestyle issues affecting BP:  None    In the past 6 months have pt had the following (if no, then order)  EKG  Microalbumin:  3,322 from 9/28/18  Electrolytes and eGFR  TSH 4.3 from 9/28/18  Fasting lipid panel 10/24  Fasting glucose stable    Since last visit any of the below   Creatinine increase > 0.5  Potassium > 5 or < 3.5  New edema present  Hyponatremia    Lab Results   Component Value Date/Time    SODIUM 135 10/24/2018 02:03 PM    POTASSIUM 3.3 (L) 10/24/2018 02:03 PM    CHLORIDE 103 10/24/2018 02:03 PM    CO2 23 10/24/2018 02:03 PM    GLUCOSE 86 10/24/2018 02:03 PM    BUN 18 10/24/2018 02:03 PM    CREATININE 1.69 (H) 10/24/2018 02:03 PM        X Medications Reconciled  CURRENT MEDICATIONS:   Current Outpatient Prescriptions:   •  atorvastatin, 40 mg, Oral,  DAILY  •  cloNIDine, 1 Patch, Transdermal, Q7 DAYS, Taking  •  doxazosin, 1 mg, Oral, DAILY, Taking  •  carvedilol, 50 mg, Oral, BID WITH MEALS, Taking  •  chlorthalidone, 25 mg, Oral, DAILY, Taking  •  spironolactone, TAKE ONE TABLET BY MOUTH DAILY  •  lisinopril, 40 mg, Oral, BID, Taking  •  hydrALAZINE, 100 mg, Oral, TID, Taking  •  amLODIPine, 10 mg, Oral, BID, Taking  •  isosorbide dinitrate, 20 mg, Oral, TID, Taking  •  ASPIRIN LOW DOSE, 1 Tab, Oral, DAILY, Taking  •  acetaminophen, 650 mg, Oral, Q4HRS PRN, PRN  •  TRIUMEQ, 1 Tab, Oral, DAILY, Taking  •  sulfamethoxazole-trimethoprim, 1 Tab, Oral, DAILY, Taking  •  levETIRAcetam, 500 mg, Oral, Q12HRS, Taking  ALLERGIES: Patient has no known allergies.    SOCIAL HISTORY   History   Smoking Status   • Current Every Day Smoker   • Packs/day: 0.25   • Types: Cigarettes   Smokeless Tobacco   • Never Used     Comment: 3-4 CIGARETTES A DAY     Change in weight: Stable  Exercise habits: no regular exercise program  Diet: common adult      Most recent 24 hours ABPM results if available:  Per Dr. Bloch:  Ambulatory blood pressure monitor report reviewed  Suboptimal number of readings, but available readings confirms high out of office blood pressure readings  We will discuss at follow-up visit    ASSESSMENT AND PLAN    BP is Elevated    BP Goal 130/80    Does pt have known end organ damage? (ventricular hypertension [LVH], hypertensive retinopathy, ischemic cardiovascular disease)    Discussed adherence to current HTN regimen with patient today and importance of compliance.  He states he has been taking all his medications as directed.  Confirmed dosing on each medication.    Currently being managed by [facility] with Dr. Watt.  This seems to be more appropriate setting for this case.    Patient instructed to increase Doxazosin dose to 4mg every evening until next follow up with Dr. Watt in two weeks.    Lifestyle Recommendations From Today’s Visit:     Discussed the effect of caffeine on BP, he will attempt to cut back on consumption    Medication recommendations from today's visit: Increase Doxazosin to 4mg every evening  ARB/ACEI: Lisinopril 40 mg BID  Diuretic:               Spironolactone 50 mg daily              Chlorthalidone 25 mg daily  Calcium Channel Blocker: Amlodipine 10 BID  Other class:               Hydralazine 100 TID              Carvedilol 50 mg BID              Doxazosin 4 mg QPM     **Note: not taking clonidine PO or patch; not taking isosorbide     Importance of adherence discussed    Studies Ordered at Todays Visit: None    Follow-Up: 2 weeks (every 1-4 weeks until at goal) in Vascular Medicine with Dr. Shukri Babin, PharmD    CC:  KERRI Corbett Jason P, M.D.

## 2018-12-04 ENCOUNTER — TELEPHONE (OUTPATIENT)
Dept: VASCULAR LAB | Facility: MEDICAL CENTER | Age: 53
End: 2018-12-04

## 2018-12-04 NOTE — TELEPHONE ENCOUNTER
Called pt to remind that the  Carotid duplex  is due for surveillance. Scheduling office and our office phone numbers provided.Transfered pt to scheduling office DICKSON Nevarez.

## 2018-12-06 ENCOUNTER — OFFICE VISIT (OUTPATIENT)
Dept: VASCULAR LAB | Facility: MEDICAL CENTER | Age: 53
End: 2018-12-06
Attending: FAMILY MEDICINE
Payer: MEDICAID

## 2018-12-06 VITALS
HEIGHT: 64 IN | HEART RATE: 100 BPM | DIASTOLIC BLOOD PRESSURE: 128 MMHG | BODY MASS INDEX: 24.31 KG/M2 | WEIGHT: 142.4 LBS | SYSTOLIC BLOOD PRESSURE: 193 MMHG

## 2018-12-06 DIAGNOSIS — Z86.73 HISTORY OF CVA (CEREBROVASCULAR ACCIDENT): ICD-10-CM

## 2018-12-06 DIAGNOSIS — I77.9 BILATERAL CAROTID ARTERY DISEASE, UNSPECIFIED TYPE (HCC): ICD-10-CM

## 2018-12-06 DIAGNOSIS — R06.83 SNORING: ICD-10-CM

## 2018-12-06 DIAGNOSIS — N18.31 CHRONIC KIDNEY DISEASE (CKD) STAGE G3A/A1, MODERATELY DECREASED GLOMERULAR FILTRATION RATE (GFR) BETWEEN 45-59 ML/MIN/1.73 SQUARE METER AND ALBUMINURIA CREATININE RATIO LESS THAN 30 MG/G (HCC): ICD-10-CM

## 2018-12-06 DIAGNOSIS — I1A.0 RESISTANT HYPERTENSION: ICD-10-CM

## 2018-12-06 DIAGNOSIS — B20 HIV (HUMAN IMMUNODEFICIENCY VIRUS INFECTION) (HCC): ICD-10-CM

## 2018-12-06 DIAGNOSIS — E78.5 DYSLIPIDEMIA: ICD-10-CM

## 2018-12-06 DIAGNOSIS — I73.9 PAD (PERIPHERAL ARTERY DISEASE) (HCC): ICD-10-CM

## 2018-12-06 PROCEDURE — 99212 OFFICE O/P EST SF 10 MIN: CPT | Performed by: FAMILY MEDICINE

## 2018-12-06 PROCEDURE — 99214 OFFICE O/P EST MOD 30 MIN: CPT | Performed by: FAMILY MEDICINE

## 2018-12-06 RX ORDER — ROSUVASTATIN CALCIUM 40 MG/1
40 TABLET, COATED ORAL DAILY
Qty: 90 TAB | Refills: 3 | Status: SHIPPED | OUTPATIENT
Start: 2018-12-06 | End: 2019-09-11 | Stop reason: SDUPTHER

## 2018-12-06 ASSESSMENT — ENCOUNTER SYMPTOMS
INSOMNIA: 0
WEAKNESS: 0
DEPRESSION: 0
TREMORS: 0
BLURRED VISION: 0
FOCAL WEAKNESS: 0
HEADACHES: 0
WHEEZING: 0
HEMOPTYSIS: 0
FEVER: 0
PALPITATIONS: 0
BRUISES/BLEEDS EASILY: 0
NAUSEA: 0
DOUBLE VISION: 0
SEIZURES: 0
BLOOD IN STOOL: 0
CHILLS: 0
NERVOUS/ANXIOUS: 0
ABDOMINAL PAIN: 0
MYALGIAS: 0
DIZZINESS: 0
COUGH: 0
ORTHOPNEA: 0
DIARRHEA: 0
SORE THROAT: 0
VOMITING: 0
SHORTNESS OF BREATH: 0

## 2018-12-06 NOTE — PATIENT INSTRUCTIONS
1) needs to restart clonidine patch   2) restart isosorbide   3) continue all other medications   4) need to get sleep apnea testing - referral placed   5) follow-up 1 month

## 2018-12-06 NOTE — PROGRESS NOTES
RESISTANT HTN CLINIC - FOLLOW-UP VISIT   12/6/18    Assessment / Plan:   1. Blood Pressure Control:  Office BP Goal Based ACC/AHA (2017) goal <130/80  Meets criteria for resistant HTN.  Has long-standing h/o severe HTN w/ CVA.  On > 5 meds w/o control.  Must eval for pseudoresistance.  Triume[facility] does not carry ADR of elevated BP.  Lengthy hx of uncontrolled, severe HTN w/o indications of end-organ damage.    Home BP at goal:  no  Office BP at goal:  no  Plan:   - continue home BP monitoring, reviewed correct technique:  Yes   - order 24h ABPM:  Completed 10/31/18, wake = 190/118 with blunted nocturnal dip  - recommend eval for entry into the TRIO trial     2. Work up of Secondary Causes of Resistant Hypertension:   Renovascular HTN: Excluded, renal artery duplex negative   Primary Aldosteronism: Excluded, ARR <20, normal PRA and ainsley, 9/2018   Thyroid Function: Excluded, normal TSH 9/2018  Obstructive Sleep Apnea: Not Yet Assessed, referral for sleep eval placed 12/6/18  Pheochromocytoma: Excluded, normal screening 9/2018  Other:  Recommended for eval for the TRIO [place]-HTN study and will review case with Dr. Bloch, appears to meet inclusion criteria, needs to have ROB ruled-out   Recommendations At This Visit: pending ORB evaluation with kathleen LIND, needs eval prior to entry to TRIO trial         3. Medication Use / Adherence:  Assessment: Complete  Recommendations: Recommend to continue adherence to all medications   - had multiple visits with pharmacist  - pharmacist to contact [facility] pharmacy to review med lists and verify patient has at least picked up all meds including clonidine patch and isosorbide          4. End Organ Damage:   Left Ventricular Hypertrophy: Absent on  Echocardiogram Date: 8/2017  Albuminuria: Macroalbuminuria on Date: 9/2018  Renal Function: Chronic Kidney Disease  Stage 3a/A3, 9/2018  (may preclude from TRIO)  Established Cardiovascular Disease: Present: hx of CVA    5. Lifestyle  Recommendations:  Advised to engage in walking 1.5mi daily at the [facility]    6. Standard HTN Pharmacotherapy:  ACEI/ARB: continue lisinopril 40mg daily  Thiazide Type Diuretic: continue chlorthalidone 25mg daily   Calcium Channel Blocker (CCB): continue amlodipine 10mg daily   - holding all meds stable until eval for entry into TRIO trial     7. Additional Agents:  Aldosterone Antagonist: continue spironolactone 50mg daily   Loop Diuretic: ? Furosemide in the future   Perpheral Alpha Blocker: increase doxazosin to 8mg QHS  Other CCB:  Avoid due to use of carvedilol and atorvastatin   Direct Vasodilator: continue hydralazine 100mg TID with isodil 20mg TID  Centrally Acting Alpha Agonist:   Needs to start clonidine 0.3mg patch Qweek  B-blocker:  Continue carvedilol   Other:   - continue carvedilol 25mg BID, consider transition to bystolic   - consider addition of minoxidil 5mg daily monitoring of edema and tachycardia  - if does not qualify for TRIO will consider addition of bumex, stop chlorthiadone and consider switch to bystolic      8. Other CV Risk Factors:   Lipids:  NLA Risk Category:   Very high:  Evidence of ASCVD or T1/T2D with 2 or more RFs or evidence of end-org damage (CKD, ACR>29, retinopathy)  Tx threshold:  non-HDL-C >99, LDL-C >69  Tx goal: non-HDL-C <100,  LDL-C <70  (optional: apoB <80)  At goal:  No, per 10/2018  Tx plan:   - stop atorvastatin, switch to Crestor 40mg   - recheck labs in 6 weeks     DM: update labs, continue TLC measures     Smoking: reviewed d/c of cigarettes, pt is contemplative, reviewed tobacco cessation program and other resources     Antiplatelet Therapy: continue ASA 81mg daily     9. Other Issues:  1) HIV - unknown viral load, does not appear to have AIDS-defining illness  - defer mgmt to [facility]     2) hx of CVA  - continue optimal management with aggressive BP lowering, statin, antiplat  - monitor for s/s of CVA and seek prompt attention at ED     3) abnormal TSH,  resolved, normal TSH     4) carotid artery stenosis, R > L.  50-69% right ICA stenosis  <50% left ICA stenosis by 2015 duplex  - recommend interval f/u, ordered but not completed    Studies Ordered At This Visit: carotid duplex ASAP  Blood Work to Be Obtained Prior to Next Visit:  None   Disposition: 6 weeks with me     Diagnosis:  1. Resistant hypertension  REFERRAL TO SLEEP STUDIES   2. Bilateral carotid artery disease, unspecified type ([facility])     3. History of CVA (cerebrovascular accident)     4. PAD (peripheral artery disease) ([facility])     5. HIV (human immunodeficiency virus infection) ([facility])     6. Chronic kidney disease (CKD) stage G3a/A1, moderately decreased glomerular filtration rate (GFR) between 45-59 mL/min/1.73 square meter and albuminuria creatinine ratio less than 30 mg/g ([facility])     7. Snoring  REFERRAL TO SLEEP STUDIES        History of Present Illness:   Seamus Palencia seen for evaluation of resistant HTN and management.     Seamus Palencia is referred by: Gloria Cole A.P.R.N.. (cardilogy)    Current HTN concerns: Reports no major c/o today.  Had labs completed.  Tolerating all meds including doxazosin.  Reports has not started clonidine patch as [facility] pharmacy did not have in stock and pending order.  Has not received a call- back from them.   HTN sx:  No current blurred or changed vision, chest pain, shortness of breath, headache, nausea, dizziness/vertigo   Home BP log:  Arm cuff - 180-200/100-120  Adherence to current HTN meds: compliant all of the time, denies ADRs   Antiplatelet/anticoagulation: Yes, Details: ASA 81mg , no bleeding/bruising   Hyperlipidemia: Yes, Details: atorvastatin 10mg, no myalgias   24h ABPM:  Completed 10/31/18    Key HTN History:  Has been seeing cardiology (Dr. Marie) and nephrology (DR. Ray, 5/2018) and had worsening GFR  Pertinent HTN hx:   Age at HTN dx:   23 years     Antihypertensive medication review:    RAAS-mediators:   1) ACEI:  Lisinopril 40mg, no  cough   2) ARB: No   3) DRI: No  CCBs:   1) DHP: amlodipine 10mg, no leg swelling    2) non-DHP:  No  Diuretics:   1) Thiazide-type diuretic: chlorthalidone 25mg, no issues    2) Loop diuretic:  No   3) Aldosterone antagonist: spironolactone 50mg daily   4) K+ - sparing diuretics: No  Beta-blockers:   1) cardioselective (atenolol, metoprolol): No    2) cardioselective/vasodilatory (nebivolol): No   3) non-cardioselective (propranolol): No   4) combined alpha/beta (carvedilol, labetolol): carvedilol 25mg BID   5) intrinsic sympathomimetic (generally not recommended): No  Peripheral alpha-blocker:  No  Central-actin) clonidine: 0.2mg BID   2) methyldopa/guanfacine: No  Direct vasodilator:    1) hydralazine: hydralazine 100mg TID   2) minoxidil:  No   3) isodil:  20mg BID    HTN crises:   Multiple visits, hx of CVA  ASCVD risk factors:     DM: No   CKD:  Yes, Details: currently G3a, A?   Lifestyle factors:     High salt: No, no salt    EtOH: No, no stimulants or drugs   Interfering substances:     NSAIDs: No    Decongestants. No    ASCVD calculator (contraindicated if ASCVD+, [id number])   10yr-risk calc: n/a    Clinical evidence of ASCVD:     1) hx of MI/ACS:  No   2) coronary or other revascularization procedure: No   3) TIA/ischemic CVA: Yes, Details: , see MRI brain, no residual sx    4) PAD (including ADRIAN <0.9): No   5) documented (CAD, Renal athero, AA due to athero, carotid plaque >50% stenosis: Yes, carotid stenosis     Major RFs:     1) Age (Men>44, women>54):  yes   2) Fhx early CAD (<55 men, <65 women):  no   3) cigarette smoking:  Yes, Details: as noted    4) high BP >139/89 or on tx: yes   5) Low HDL-C (<40 men, <50 women):  no     Social History:     Social History     Social History   • Marital status: Single     Spouse name: N/A   • Number of children: N/A   • Years of education: N/A     Social History Main Topics   • Smoking status: Current Every Day Smoker     Packs/day: 0.25     Types:  "Cigarettes   • Smokeless tobacco: Never Used      Comment: 3-4 CIGARETTES A DAY   • Alcohol use No   • Drug use: No   • Sexual activity: Not on file     Other Topics Concern   • Not on file     Social History Narrative   • No narrative on file      Review of Systems:   Review of Systems   Constitutional: Negative for chills, fever and malaise/fatigue.   HENT: Negative for nosebleeds, sore throat and tinnitus.    Eyes: Negative for blurred vision and double vision.   Respiratory: Negative for cough, hemoptysis, shortness of breath and wheezing.    Cardiovascular: Negative for chest pain, palpitations, orthopnea and leg swelling.   Gastrointestinal: Negative for abdominal pain, blood in stool, diarrhea, melena, nausea and vomiting.   Genitourinary: Negative for hematuria.   Musculoskeletal: Negative for joint pain and myalgias.   Skin: Negative for itching and rash.   Neurological: Negative for dizziness, tremors, focal weakness, seizures, weakness and headaches.   Endo/Heme/Allergies: Does not bruise/bleed easily.   Psychiatric/Behavioral: Negative for depression. The patient is not nervous/anxious and does not have insomnia.         Objective:   Allergies:  Patient has no known allergies.    Vitals:    12/06/18 1419 12/06/18 1425   BP: (!) 208/131 (!) 193/128   BP Location: Left arm Left arm   Patient Position: Sitting Sitting   BP Cuff Size: Adult Adult   Pulse: (!) 102 100   Weight: 64.5 kg (142 lb 4.8 oz) 64.6 kg (142 lb 6.4 oz)   Height: 1.626 m (5' 4\") 1.626 m (5' 4\")     Body mass index is 24.44 kg/m².   BMI Readings from Last 4 Encounters:   12/06/18 24.44 kg/m²   10/18/18 24.48 kg/m²   09/28/18 24.77 kg/m²   09/14/18 24.55 kg/m²      BP Readings from Last 4 Encounters:   12/06/18 (!) 193/128   11/21/18 (!) 228/154   11/01/18 (!) 194/130   10/31/18 (!) 209/144        Outpatient Encounter Prescriptions as of 12/6/2018   Medication Sig Dispense Refill   • rosuvastatin (CRESTOR) 40 MG tablet Take 1 Tab by mouth " every day for 360 days. 90 Tab 3   • cloNIDine (CATAPRES) 0.3 MG/24HR PATCH WEEKLY Apply 1 Patch to skin as directed every 7 days for 360 days. 12 Patch 3   • doxazosin (CARDURA) 1 MG Tab Take 1 Tab by mouth every day for 360 days. 90 Tab 3   • carvedilol (COREG) 25 MG Tab Take 2 Tabs by mouth 2 times a day, with meals. 360 Tab 3   • chlorthalidone (HYGROTON) 25 MG Tab Take 1 Tab by mouth every day. 30 Tab 11   • spironolactone (ALDACTONE) 50 MG Tab TAKE ONE TABLET BY MOUTH DAILY 30 Tab 6   • lisinopril (PRINIVIL, ZESTRIL) 40 MG tablet Take 1 Tab by mouth 2 times a day. 180 Tab 3   • hydrALAZINE (APRESOLINE) 100 MG tablet Take 1 Tab by mouth 3 times a day. 90 Tab 11   • amLODIPine (NORVASC) 10 MG Tab Take 1 Tab by mouth 2 Times a Day. 60 Tab 11   • isosorbide dinitrate (ISORDIL) 20 MG Tab Take 1 Tab by mouth 3 times a day. 90 Tab 11   • ASPIRIN LOW DOSE 81 MG EC tablet Take 1 Tab by mouth every day.  2   • acetaminophen (TYLENOL) 325 MG Tab Take 650 mg by mouth every four hours as needed.     • TRIUMEQ 600- MG Tab Take 1 Tab by mouth every day.  5   • sulfamethoxazole-trimethoprim (BACTRIM DS) 800-160 MG tablet Take 1 Tab by mouth every day.     • levetiracetam (KEPPRA) 500 MG Tab Take 1 Tab by mouth every 12 hours. 60 Tab 3   • [DISCONTINUED] atorvastatin (LIPITOR) 40 MG Tab Take 1 Tab by mouth every day for 360 days. 90 Tab 3     No facility-administered encounter medications on file as of 12/6/2018.      Physical Exam   Constitutional: He is oriented to person, place, and time. He appears well-developed and well-nourished. He is cooperative. No distress.   HENT:   Head: Normocephalic and atraumatic.   Mouth/Throat: Oropharynx is clear and moist and mucous membranes are normal.   Eyes: Pupils are equal, round, and reactive to light. Conjunctivae are normal.   Neck: Trachea normal and normal range of motion. Neck supple. Normal carotid pulses and no JVD present. Carotid bruit is not present. No thyromegaly  present.   Cardiovascular: Normal rate, regular rhythm, normal heart sounds and intact distal pulses.  Exam reveals no gallop and no friction rub.    No murmur heard.  Pulses:       Carotid pulses are 2+ on the right side, and 2+ on the left side.       Radial pulses are 2+ on the right side, and 2+ on the left side.        Dorsalis pedis pulses are 2+ on the right side, and 2+ on the left side.        Posterior tibial pulses are 2+ on the right side, and 2+ on the left side.   Edema:    RLE: none     LLE: none   Spider telangectasia:       RLE:  None      LLE: none   Varicosities:         RLE: none      LLE: none   Corona phlebectatica:      RLE:  None        LLE:  None   Cording:         RLE:  None     LLE: None    Pulmonary/Chest: Effort normal and breath sounds normal. No respiratory distress.   Abdominal: Soft. Bowel sounds are normal. He exhibits no distension and no mass. There is no hepatosplenomegaly. There is no tenderness. There is no rebound and no guarding.   Musculoskeletal: He exhibits no edema.   Lymphadenopathy:     He has no cervical adenopathy.   Neurological: He is alert and oriented to person, place, and time. No cranial nerve deficit. Coordination and gait normal.   Skin: Skin is warm and dry. He is not diaphoretic. No cyanosis. No pallor. Nails show no clubbing.   Psychiatric: He has a normal mood and affect.        Accessory Clinical Findings:   Echocardiogram:  Results for orders placed or performed during the hospital encounter of 08/30/17   ECHOCARDIOGRAM COMP W/O CONT   Result Value Ref Range    Eject.Frac. MOD BP 68.07     Eject.Frac. MOD 4C 71.33     Eject.Frac. MOD 2C 62.08     Left Ventrical Ejection Fraction 65       Lab Results   Component Value Date    CHOLSTRLTOT 164 10/24/2018    LDL 96 10/24/2018    HDL 38 (A) 10/24/2018    TRIGLYCERIDE 150 (H) 10/24/2018           Lab Results   Component Value Date    SODIUM 135 10/24/2018    POTASSIUM 3.3 (L) 10/24/2018    CHLORIDE 103  10/24/2018    CO2 23 10/24/2018    GLUCOSE 86 10/24/2018    BUN 18 10/24/2018    CREATININE 1.69 (H) 10/24/2018      Lab Results   Component Value Date    ALDOSTERONE 6.1 2018      Lab Results   Component Value Date    URCREAT 197.40 2018    MICROALBUR 655.7 2018    MALBCRT 3322 (H) 2018    METANEPHPL 0.26 2018     Lab Results   Component Value Date    STMTRPT  2018     [facility]    Test Date:  2018  Pt Name:    SONI ABBASI                  Department: [facility]  MRN:        [id number]                      Room:  Gender:     Male                         Technician: ANYI  :        1965                   Requested By:MARISELA MANE  Order #:    [id number]                    Reading MD: Asiya Preciado MD    Measurements  Intervals                                Axis  Rate:       83                           P:          42  AZ:         157                          QRS:        45  QRSD:       95                           T:          205  QT:         382  QTc:        449    Interpretive Statements  Sinus rhythm  Probable left atrial enlargement  NON-SPECIFIC ST-T CHANGES  Compared to ECG 2016 21:09:42  T-wave abnormality no longer present  Possible ischemia no longer present    Electronically Signed On 2018 20:48:03 PDT by Asiya Preciado MD       VASCULAR IMAGING:     CT abd 2013  Low-attenuation liver.  Normal size spleen.  No pancreatic mass identified.  No hydronephrosis or urolithiasis.  No adrenal gland masses identified.  Surgical absence gallbladder.  No evidence of bowel obstruction.  Appendix not delineated.  No aneurysmal dilatation aorta.  No retroperitoneal adenopathy identified.  Mild mesenteric and retroperitoneal edema.  Diffuse subcutaneous edema.    Carotid duplex 12/27/15  50-69% right ICA stenosis  <50% left ICA stenosis  Right vertebral not seen  Antegrade left vertebral  Nl subclavians    Echo 12/27/15  Left ventricular ejection fraction  is visually estimated to be 45%.  Septum hyopkinesis, Basal inferiolateral and basal to mid anterior wall   akinesis, grade III diastolic dysfunction.  Mild concentric left ventricular hypertrophy.  Small pericardial effusion without evidence of hemodynamic compromise.  Estimated right ventricular systolic pressure is 60 mmHg.  Moderate mitral regurgitation.  Normal inferior vena cava size and inspiratory collapse.  No prior study is available for comparison.     MR brain 1/2016  Multifocal areas of restricted diffusion in the right frontal, parietal, temporal and occipital lobes and rodney likely representing subacute infarcts. There has been no significant interval change. In view of presence of subcortical edema and multiple   arterial distribution postcontrast MRI was recommended to rule out any underlying enhancing pathology. However the patient refused to undergo contrast-enhanced study. Followup study is recommended with and without contrast.    CT head 8/17/18  1. No acute intracranial abnormality. No evidence of acute hemorrhage or mass lesion.  2. Prior infarct in the right posterior parietal and occipital lobe. Please note, hyperacute ischemia can remain occult on CT. If ischemia is clinically suspected, MRI is suggested for further evaluation.    MRI brain 8/2016  1. Multifocal areas of chronic gray matter infarcts in the right frontal, parietal and occipital lobes and rodney. Minimal petechial hemorrhage is noted in the occipital infarct.  2. There is no acute infarct.  3. Mild-to-moderate cerebral atrophy.  4. Mucosal thickening in the bilateral maxillary sinuses and right mastoid air cells.    Echo 8/2017  No prior study is available for comparison.   Normal left ventricular systolic function.   No evidence of valvular abnormality based on Doppler evaluation.     Renal art duplex 11/2017   Normal bilateral renal arterial study with the exception that the right   kidney  is on the small side of  normal.    Carotid duplex - pending        Ulysses Watt M.D.

## 2018-12-07 ENCOUNTER — TELEPHONE (OUTPATIENT)
Dept: VASCULAR LAB | Facility: MEDICAL CENTER | Age: 53
End: 2018-12-07

## 2018-12-07 DIAGNOSIS — Z91.199 PERSONAL HISTORY OF NONADHERENCE TO MEDICAL TREATMENT: ICD-10-CM

## 2018-12-07 DIAGNOSIS — I1A.0 RESISTANT HYPERTENSION: ICD-10-CM

## 2018-12-07 DIAGNOSIS — I10 HTN (HYPERTENSION), MALIGNANT: ICD-10-CM

## 2018-12-07 DIAGNOSIS — I10 ESSENTIAL HYPERTENSION: ICD-10-CM

## 2018-12-07 DIAGNOSIS — I51.89 LEFT VENTRICULAR DIASTOLIC DYSFUNCTION, NYHA CLASS 1: ICD-10-CM

## 2018-12-07 DIAGNOSIS — I50.30 ACC/AHA STAGE C HEART FAILURE WITH PRESERVED EJECTION FRACTION (HCC): ICD-10-CM

## 2018-12-07 RX ORDER — AMLODIPINE BESYLATE 10 MG/1
10 TABLET ORAL DAILY
Qty: 90 TAB | Refills: 1 | Status: SHIPPED | OUTPATIENT
Start: 2018-12-07 | End: 2018-12-13 | Stop reason: SDUPTHER

## 2018-12-07 RX ORDER — LISINOPRIL 40 MG/1
40 TABLET ORAL DAILY
Qty: 90 TAB | Refills: 1 | Status: SHIPPED | OUTPATIENT
Start: 2018-12-07 | End: 2018-12-13 | Stop reason: SDUPTHER

## 2018-12-07 NOTE — TELEPHONE ENCOUNTER
[facility]    Spoke with pt's pharmacy ([facility])  Last fill for most HTN medications was May of this year (6 months ago).  Had Lisinopril and Amlodipine refilled.  Pt was instructed to  these medications today and begin taking them.  Pt will be seen 12/12/18 (earliest pt could come to the clinic).  Would like to observe pt take the medication with close follow up.  Would optimally like to have pt be given a pill organizer for BP medication.    Ulysses Godinez, PharmD

## 2018-12-07 NOTE — TELEPHONE ENCOUNTER
Med rec complete by Ulysses Godinez, Pharm D, with [facility] pharmacist indicates patient has not filled meds since May, with last fill being isosorbide in Sept.  Clonidine patch never picked up.  We have reviewed his med list and will restart ACEI and CCB with consideration of starting chlorthalidone as 3rd agent, then spironolactone vs doxazosin as 4th agent.  We will work with [facility] pharmacy and PCP to assist with med adherence ongoing.  Ulysses Watt M.D.

## 2018-12-11 ENCOUNTER — TELEPHONE (OUTPATIENT)
Dept: VASCULAR LAB | Facility: MEDICAL CENTER | Age: 53
End: 2018-12-11

## 2018-12-11 NOTE — TELEPHONE ENCOUNTER
[facility]    Spoke with pt on the phone, he has not picked up lisinopril or amlodipine yet.  Urged pt to  medication today and bring it to the appointment tomorrow 12/12/18.    Ulysses Godinez, PharmD

## 2018-12-13 ENCOUNTER — OFFICE VISIT (OUTPATIENT)
Dept: CARDIOLOGY | Facility: MEDICAL CENTER | Age: 53
End: 2018-12-13
Payer: MEDICAID

## 2018-12-13 VITALS
SYSTOLIC BLOOD PRESSURE: 180 MMHG | OXYGEN SATURATION: 98 % | BODY MASS INDEX: 24.92 KG/M2 | HEIGHT: 64 IN | HEART RATE: 99 BPM | DIASTOLIC BLOOD PRESSURE: 110 MMHG | WEIGHT: 146 LBS

## 2018-12-13 DIAGNOSIS — Z79.899 HIGH RISK MEDICATION USE: ICD-10-CM

## 2018-12-13 DIAGNOSIS — I10 ESSENTIAL HYPERTENSION: ICD-10-CM

## 2018-12-13 DIAGNOSIS — N18.30 STAGE 3 CHRONIC KIDNEY DISEASE (HCC): ICD-10-CM

## 2018-12-13 DIAGNOSIS — B20 HIV (HUMAN IMMUNODEFICIENCY VIRUS INFECTION) (HCC): ICD-10-CM

## 2018-12-13 DIAGNOSIS — I10 HTN (HYPERTENSION), MALIGNANT: ICD-10-CM

## 2018-12-13 DIAGNOSIS — I51.89 LEFT VENTRICULAR DIASTOLIC DYSFUNCTION, NYHA CLASS 1: ICD-10-CM

## 2018-12-13 DIAGNOSIS — I50.30 ACC/AHA STAGE C HEART FAILURE WITH PRESERVED EJECTION FRACTION (HCC): ICD-10-CM

## 2018-12-13 DIAGNOSIS — Z91.148 NON COMPLIANCE W MEDICATION REGIMEN: ICD-10-CM

## 2018-12-13 DIAGNOSIS — I16.0 ASYMPTOMATIC HYPERTENSIVE URGENCY: ICD-10-CM

## 2018-12-13 PROCEDURE — 99215 OFFICE O/P EST HI 40 MIN: CPT | Performed by: INTERNAL MEDICINE

## 2018-12-13 RX ORDER — ATORVASTATIN CALCIUM 40 MG/1
40 TABLET, FILM COATED ORAL NIGHTLY
COMMUNITY
End: 2018-12-13 | Stop reason: SDUPTHER

## 2018-12-13 RX ORDER — ATORVASTATIN CALCIUM 40 MG/1
40 TABLET, FILM COATED ORAL DAILY
Qty: 30 TAB | Refills: 11 | Status: SHIPPED | OUTPATIENT
Start: 2018-12-13 | End: 2018-12-26

## 2018-12-13 RX ORDER — CHLORTHALIDONE 25 MG/1
25 TABLET ORAL DAILY
Qty: 30 TAB | Refills: 11 | Status: SHIPPED | OUTPATIENT
Start: 2018-12-13 | End: 2019-01-17

## 2018-12-13 RX ORDER — DOXAZOSIN MESYLATE 1 MG/1
1 TABLET ORAL DAILY
Qty: 90 TAB | Refills: 3 | Status: SHIPPED | OUTPATIENT
Start: 2018-12-13 | End: 2019-01-17

## 2018-12-13 RX ORDER — CARVEDILOL 25 MG/1
50 TABLET ORAL 2 TIMES DAILY WITH MEALS
Qty: 360 TAB | Refills: 3 | Status: SHIPPED | OUTPATIENT
Start: 2018-12-13 | End: 2019-01-17

## 2018-12-13 RX ORDER — HYDRALAZINE HYDROCHLORIDE 100 MG/1
100 TABLET, FILM COATED ORAL 3 TIMES DAILY
Qty: 90 TAB | Refills: 11 | Status: SHIPPED | OUTPATIENT
Start: 2018-12-13 | End: 2019-01-17

## 2018-12-13 RX ORDER — LISINOPRIL 40 MG/1
40 TABLET ORAL DAILY
Qty: 90 TAB | Refills: 1 | Status: SHIPPED | OUTPATIENT
Start: 2018-12-13 | End: 2019-02-21 | Stop reason: SDUPTHER

## 2018-12-13 RX ORDER — SPIRONOLACTONE 50 MG/1
TABLET, FILM COATED ORAL
Qty: 30 TAB | Refills: 11 | Status: SHIPPED | OUTPATIENT
Start: 2018-12-13 | End: 2019-01-17

## 2018-12-13 RX ORDER — ISOSORBIDE DINITRATE 20 MG/1
20 TABLET ORAL 3 TIMES DAILY
Qty: 90 TAB | Refills: 11 | Status: SHIPPED | OUTPATIENT
Start: 2018-12-13 | End: 2019-01-17

## 2018-12-13 RX ORDER — AMLODIPINE BESYLATE 10 MG/1
10 TABLET ORAL DAILY
Qty: 90 TAB | Refills: 1 | Status: SHIPPED | OUTPATIENT
Start: 2018-12-13 | End: 2019-02-21 | Stop reason: SDUPTHER

## 2018-12-13 ASSESSMENT — ENCOUNTER SYMPTOMS
COUGH: 0
FALLS: 0
NAUSEA: 0
DOUBLE VISION: 0
HALLUCINATIONS: 0
EYE DISCHARGE: 0
CLAUDICATION: 0
WEIGHT LOSS: 0
DIZZINESS: 0
SENSORY CHANGE: 0
ABDOMINAL PAIN: 0
SHORTNESS OF BREATH: 0
BLOOD IN STOOL: 0
SPEECH CHANGE: 0
EYE PAIN: 0
BLURRED VISION: 0
LOSS OF CONSCIOUSNESS: 0
DEPRESSION: 0
PALPITATIONS: 0
BRUISES/BLEEDS EASILY: 0
HEADACHES: 0
MYALGIAS: 0
CHILLS: 0
PND: 0
FEVER: 0
ORTHOPNEA: 0
VOMITING: 0

## 2018-12-13 NOTE — LETTER
[facility]   [address] [place] [address]  [place] [address]  Phone: [phone]  Fax: [phone]              Seamus Palencia  1965    Encounter Date: 12/13/2018    Scooby Marie M.D.          PROGRESS NOTE:  Chief Complaint   Patient presents with   • Congestive Heart Failure       Subjective:   Seamus Palencia is a 53 y.o. male who presents today for cardiac care and evaluation in our heart failure clinic as a referral from [facility]. Patient does have history of hypertension, HIV infection, history of stroke. Patient is actually a poor historian. He does not know what medication he is taking. He does not know the details of his medical history. He doesn't have any residual neurological deficits however. He does report of shortness of breath if walking upstairs. No symptom at rest or with daily living activities. He did have a transthoracic echocardiogram done in 2015 which shows LV function of 45%. No significant valvular disease seems at that time.     Patient was able to complete 396 m during his 6 minute walk test. his O2 saturation at baseline was 99% and at the end of the test, the O2 saturation was 100%. he reported 3 level of dyspnea on Danny scale.     LV function has improved and normalized after medical therapy.     Today his blood pressure is not controlled again. I suspect the patient is not taking his medications. Even though patient says that he has been taking all of his medications. In the past, I had asked him to come back to our clinic with the actual bottles of his medications. Again today, patient does not have his bottles with him. Nothing really changed since last visit.    Past Medical History:   Diagnosis Date   • Heart failure (HCC)    • HIV disease (HCC) 1995   • Hypertension    • Seizure (HCC)    • Stroke (HCC)      Past Surgical History:   Procedure Laterality Date   • CHOLECYSTECTOMY  1980s     Family History   Problem  Relation Age of Onset   • Diabetes Mother         cause of death   • Diabetes Father         cause of death   • Diabetes Sister    • Other Brother         down syndrome   • No Known Problems Sister    • No Known Problems Sister    • No Known Problems Sister    • Other Daughter         5 daughters, 11 sons    • Clotting Disorder Neg Hx    • Stroke Neg Hx      Social History     Social History   • Marital status: Single     Spouse name: N/A   • Number of children: N/A   • Years of education: N/A     Occupational History   • Not on file.     Social History Main Topics   • Smoking status: Current Every Day Smoker     Packs/day: 0.25     Types: Cigarettes   • Smokeless tobacco: Never Used      Comment: 3-4 CIGARETTES A DAY   • Alcohol use No   • Drug use: No   • Sexual activity: Not on file     Other Topics Concern   • Not on file     Social History Narrative   • No narrative on file     No Known Allergies  Outpatient Encounter Prescriptions as of 12/13/2018   Medication Sig Dispense Refill   • amLODIPine (NORVASC) 10 MG Tab Take 1 Tab by mouth every day. 90 Tab 1   • atorvastatin (LIPITOR) 40 MG Tab Take 1 Tab by mouth every day. 30 Tab 11   • carvedilol (COREG) 25 MG Tab Take 2 Tabs by mouth 2 times a day, with meals. 360 Tab 3   • chlorthalidone (HYGROTON) 25 MG Tab Take 1 Tab by mouth every day. 30 Tab 11   • cloNIDine (CATAPRES) 0.3 MG/24HR PATCH WEEKLY Apply 1 Patch to skin as directed every 7 days for 360 days. 12 Patch 3   • doxazosin (CARDURA) 1 MG Tab Take 1 Tab by mouth every day for 360 days. 90 Tab 3   • hydrALAZINE (APRESOLINE) 100 MG tablet Take 1 Tab by mouth 3 times a day. 90 Tab 11   • isosorbide dinitrate (ISORDIL) 20 MG Tab Take 1 Tab by mouth 3 times a day. 90 Tab 11   • lisinopril (PRINIVIL, ZESTRIL) 40 MG tablet Take 1 Tab by mouth every day. 90 Tab 1   • spironolactone (ALDACTONE) 50 MG Tab TAKE ONE TABLET BY MOUTH DAILY 30 Tab 11   • ASPIRIN LOW DOSE 81 MG EC tablet Take 1 Tab by mouth every day.   2   • TRIUMEQ 600- MG Tab Take 1 Tab by mouth every day.  5   • sulfamethoxazole-trimethoprim (BACTRIM DS) 800-160 MG tablet Take 1 Tab by mouth every day.     • levetiracetam (KEPPRA) 500 MG Tab Take 1 Tab by mouth every 12 hours. 60 Tab 3   • [DISCONTINUED] atorvastatin (LIPITOR) 40 MG Tab Take 40 mg by mouth every evening.     • [DISCONTINUED] lisinopril (PRINIVIL, ZESTRIL) 40 MG tablet Take 1 Tab by mouth every day. 90 Tab 1   • [DISCONTINUED] amLODIPine (NORVASC) 10 MG Tab Take 1 Tab by mouth every day. 90 Tab 1   • rosuvastatin (CRESTOR) 40 MG tablet Take 1 Tab by mouth every day for 360 days. (Patient not taking: Reported on 12/13/2018) 90 Tab 3   • [DISCONTINUED] cloNIDine (CATAPRES) 0.3 MG/24HR PATCH WEEKLY Apply 1 Patch to skin as directed every 7 days for 360 days. 12 Patch 3   • [DISCONTINUED] doxazosin (CARDURA) 1 MG Tab Take 1 Tab by mouth every day for 360 days. 90 Tab 3   • [DISCONTINUED] carvedilol (COREG) 25 MG Tab Take 2 Tabs by mouth 2 times a day, with meals. 360 Tab 3   • [DISCONTINUED] chlorthalidone (HYGROTON) 25 MG Tab Take 1 Tab by mouth every day. 30 Tab 11   • [DISCONTINUED] spironolactone (ALDACTONE) 50 MG Tab TAKE ONE TABLET BY MOUTH DAILY 30 Tab 6   • [DISCONTINUED] hydrALAZINE (APRESOLINE) 100 MG tablet Take 1 Tab by mouth 3 times a day. 90 Tab 11   • [DISCONTINUED] isosorbide dinitrate (ISORDIL) 20 MG Tab Take 1 Tab by mouth 3 times a day. 90 Tab 11   • acetaminophen (TYLENOL) 325 MG Tab Take 650 mg by mouth every four hours as needed.       No facility-administered encounter medications on file as of 12/13/2018.      Review of Systems   Constitutional: Negative for chills, fever, malaise/fatigue and weight loss.   HENT: Negative for ear discharge, ear pain, hearing loss and nosebleeds.    Eyes: Negative for blurred vision, double vision, pain and discharge.   Respiratory: Negative for cough and shortness of breath.    Cardiovascular: Negative for chest pain, palpitations,  "orthopnea, claudication, leg swelling and PND.   Gastrointestinal: Negative for abdominal pain, blood in stool, melena, nausea and vomiting.   Genitourinary: Negative for dysuria and hematuria.   Musculoskeletal: Negative for falls, joint pain and myalgias.   Skin: Negative for itching and rash.   Neurological: Negative for dizziness, sensory change, speech change, loss of consciousness and headaches.   Endo/Heme/Allergies: Negative for environmental allergies. Does not bruise/bleed easily.   Psychiatric/Behavioral: Negative for depression, hallucinations and suicidal ideas.        Objective:   BP (!) 180/110 (BP Location: Left arm, Patient Position: Sitting, BP Cuff Size: Adult)   Pulse 99   Ht 1.626 m (5' 4\")   Wt 66.2 kg (146 lb)   SpO2 98%   BMI 25.06 kg/m²      Physical Exam   Constitutional: He is oriented to person, place, and time. No distress.   HENT:   Head: Normocephalic and atraumatic.   Right Ear: External ear normal.   Left Ear: External ear normal.   Eyes: Right eye exhibits no discharge. Left eye exhibits no discharge.   Neck: No JVD present. No thyromegaly present.   Cardiovascular: Normal rate, regular rhythm, normal heart sounds and intact distal pulses.  Exam reveals no gallop and no friction rub.    No murmur heard.  Pulmonary/Chest: Breath sounds normal. No respiratory distress.   Abdominal: Bowel sounds are normal. He exhibits no distension. There is no tenderness.   Musculoskeletal: He exhibits no edema or tenderness.   Neurological: He is alert and oriented to person, place, and time. No cranial nerve deficit.   Skin: Skin is warm and dry. He is not diaphoretic.   Psychiatric: He has a normal mood and affect. His behavior is normal.   Nursing note and vitals reviewed.      Assessment:     1. ACC/AHA stage C heart failure with preserved ejection fraction (HCC)  carvedilol (COREG) 25 MG Tab    hydrALAZINE (APRESOLINE) 100 MG tablet    isosorbide dinitrate (ISORDIL) 20 MG Tab    lisinopril " (PRINIVIL, ZESTRIL) 40 MG tablet    REFERRAL TO COMPLEX CARE MANAGEMENT Services Requested: Care Manager to Evaluate and Recommend   2. Left ventricular diastolic dysfunction, NYHA class 1  carvedilol (COREG) 25 MG Tab    hydrALAZINE (APRESOLINE) 100 MG tablet    isosorbide dinitrate (ISORDIL) 20 MG Tab    lisinopril (PRINIVIL, ZESTRIL) 40 MG tablet    REFERRAL TO COMPLEX CARE MANAGEMENT Services Requested: Care Manager to Evaluate and Recommend   3. HTN (hypertension), malignant  carvedilol (COREG) 25 MG Tab    chlorthalidone (HYGROTON) 25 MG Tab    hydrALAZINE (APRESOLINE) 100 MG tablet    isosorbide dinitrate (ISORDIL) 20 MG Tab    lisinopril (PRINIVIL, ZESTRIL) 40 MG tablet    spironolactone (ALDACTONE) 50 MG Tab    REFERRAL TO COMPLEX CARE MANAGEMENT Services Requested: Care Manager to Evaluate and Recommend   4. Stage 3 chronic kidney disease (HCC)  REFERRAL TO COMPLEX CARE MANAGEMENT Services Requested: Care Manager to Evaluate and Recommend   5. HIV (human immunodeficiency virus infection) (HCC)  REFERRAL TO COMPLEX CARE MANAGEMENT Services Requested: Care Manager to Evaluate and Recommend   6. High risk medication use  REFERRAL TO COMPLEX CARE MANAGEMENT Services Requested: Care Manager to Evaluate and Recommend   7. Essential hypertension  amLODIPine (NORVASC) 10 MG Tab    REFERRAL TO COMPLEX CARE MANAGEMENT Services Requested: Care Manager to Evaluate and Recommend   8. Non compliance w medication regimen  REFERRAL TO COMPLEX CARE MANAGEMENT Services Requested: Care Manager to Evaluate and Recommend   9. Asymptomatic hypertensive urgency  cloNIDine (CATAPRES) 0.3 MG/24HR PATCH WEEKLY    doxazosin (CARDURA) 1 MG Tab    REFERRAL TO COMPLEX CARE MANAGEMENT Services Requested: Care Manager to Evaluate and Recommend       Medical Decision Making:  Today's Assessment / Status / Plan:   Patient is asymptomatic.  He does not want to go to hospital.  Cont current medications at current dose.   Stress importance of  taking them consistently.  Very difficult patient.  Will refer to complex care management as well.          PORTILLO CorbettNMeghna  [address] [place] [address]  Mike [place]AE[place] [id number]  VIA Facsimile: [phone]

## 2018-12-14 ENCOUNTER — PATIENT OUTREACH (OUTPATIENT)
Dept: HEALTH INFORMATION MANAGEMENT | Facility: OTHER | Age: 53
End: 2018-12-14

## 2018-12-14 NOTE — PROGRESS NOTES
Chief Complaint   Patient presents with   • Congestive Heart Failure       Subjective:   Seamus Palencia is a 53 y.o. male who presents today for cardiac care and evaluation in our heart failure clinic as a referral from [facility] clinic. Patient does have history of hypertension, HIV infection, history of stroke. Patient is actually a poor historian. He does not know what medication he is taking. He does not know the details of his medical history. He doesn't have any residual neurological deficits however. He does report of shortness of breath if walking upstairs. No symptom at rest or with daily living activities. He did have a transthoracic echocardiogram done in 2015 which shows LV function of 45%. No significant valvular disease seems at that time.     Patient was able to complete 396 m during his 6 minute walk test. his O2 saturation at baseline was 99% and at the end of the test, the O2 saturation was 100%. he reported 3 level of dyspnea on Danny scale.     LV function has improved and normalized after medical therapy.     Today his blood pressure is not controlled again. I suspect the patient is not taking his medications. Even though patient says that he has been taking all of his medications. In the past, I had asked him to come back to our clinic with the actual bottles of his medications. Again today, patient does not have his bottles with him. Nothing really changed since last visit.    Past Medical History:   Diagnosis Date   • Heart failure (HCC)    • HIV disease (HCC) 1995   • Hypertension    • Seizure (HCC)    • Stroke (HCC)      Past Surgical History:   Procedure Laterality Date   • CHOLECYSTECTOMY  1980s     Family History   Problem Relation Age of Onset   • Diabetes Mother         cause of death   • Diabetes Father         cause of death   • Diabetes Sister    • Other Brother         down syndrome   • No Known Problems Sister    • No Known Problems Sister    • No Known Problems Sister    • Other  Daughter         5 daughters, 11 sons    • Clotting Disorder Neg Hx    • Stroke Neg Hx      Social History     Social History   • Marital status: Single     Spouse name: N/A   • Number of children: N/A   • Years of education: N/A     Occupational History   • Not on file.     Social History Main Topics   • Smoking status: Current Every Day Smoker     Packs/day: 0.25     Types: Cigarettes   • Smokeless tobacco: Never Used      Comment: 3-4 CIGARETTES A DAY   • Alcohol use No   • Drug use: No   • Sexual activity: Not on file     Other Topics Concern   • Not on file     Social History Narrative   • No narrative on file     No Known Allergies  Outpatient Encounter Prescriptions as of 12/13/2018   Medication Sig Dispense Refill   • amLODIPine (NORVASC) 10 MG Tab Take 1 Tab by mouth every day. 90 Tab 1   • atorvastatin (LIPITOR) 40 MG Tab Take 1 Tab by mouth every day. 30 Tab 11   • carvedilol (COREG) 25 MG Tab Take 2 Tabs by mouth 2 times a day, with meals. 360 Tab 3   • chlorthalidone (HYGROTON) 25 MG Tab Take 1 Tab by mouth every day. 30 Tab 11   • cloNIDine (CATAPRES) 0.3 MG/24HR PATCH WEEKLY Apply 1 Patch to skin as directed every 7 days for 360 days. 12 Patch 3   • doxazosin (CARDURA) 1 MG Tab Take 1 Tab by mouth every day for 360 days. 90 Tab 3   • hydrALAZINE (APRESOLINE) 100 MG tablet Take 1 Tab by mouth 3 times a day. 90 Tab 11   • isosorbide dinitrate (ISORDIL) 20 MG Tab Take 1 Tab by mouth 3 times a day. 90 Tab 11   • lisinopril (PRINIVIL, ZESTRIL) 40 MG tablet Take 1 Tab by mouth every day. 90 Tab 1   • spironolactone (ALDACTONE) 50 MG Tab TAKE ONE TABLET BY MOUTH DAILY 30 Tab 11   • ASPIRIN LOW DOSE 81 MG EC tablet Take 1 Tab by mouth every day.  2   • TRIUMEQ 600- MG Tab Take 1 Tab by mouth every day.  5   • sulfamethoxazole-trimethoprim (BACTRIM DS) 800-160 MG tablet Take 1 Tab by mouth every day.     • levetiracetam (KEPPRA) 500 MG Tab Take 1 Tab by mouth every 12 hours. 60 Tab 3   • [DISCONTINUED]  atorvastatin (LIPITOR) 40 MG Tab Take 40 mg by mouth every evening.     • [DISCONTINUED] lisinopril (PRINIVIL, ZESTRIL) 40 MG tablet Take 1 Tab by mouth every day. 90 Tab 1   • [DISCONTINUED] amLODIPine (NORVASC) 10 MG Tab Take 1 Tab by mouth every day. 90 Tab 1   • rosuvastatin (CRESTOR) 40 MG tablet Take 1 Tab by mouth every day for 360 days. (Patient not taking: Reported on 12/13/2018) 90 Tab 3   • [DISCONTINUED] cloNIDine (CATAPRES) 0.3 MG/24HR PATCH WEEKLY Apply 1 Patch to skin as directed every 7 days for 360 days. 12 Patch 3   • [DISCONTINUED] doxazosin (CARDURA) 1 MG Tab Take 1 Tab by mouth every day for 360 days. 90 Tab 3   • [DISCONTINUED] carvedilol (COREG) 25 MG Tab Take 2 Tabs by mouth 2 times a day, with meals. 360 Tab 3   • [DISCONTINUED] chlorthalidone (HYGROTON) 25 MG Tab Take 1 Tab by mouth every day. 30 Tab 11   • [DISCONTINUED] spironolactone (ALDACTONE) 50 MG Tab TAKE ONE TABLET BY MOUTH DAILY 30 Tab 6   • [DISCONTINUED] hydrALAZINE (APRESOLINE) 100 MG tablet Take 1 Tab by mouth 3 times a day. 90 Tab 11   • [DISCONTINUED] isosorbide dinitrate (ISORDIL) 20 MG Tab Take 1 Tab by mouth 3 times a day. 90 Tab 11   • acetaminophen (TYLENOL) 325 MG Tab Take 650 mg by mouth every four hours as needed.       No facility-administered encounter medications on file as of 12/13/2018.      Review of Systems   Constitutional: Negative for chills, fever, malaise/fatigue and weight loss.   HENT: Negative for ear discharge, ear pain, hearing loss and nosebleeds.    Eyes: Negative for blurred vision, double vision, pain and discharge.   Respiratory: Negative for cough and shortness of breath.    Cardiovascular: Negative for chest pain, palpitations, orthopnea, claudication, leg swelling and PND.   Gastrointestinal: Negative for abdominal pain, blood in stool, melena, nausea and vomiting.   Genitourinary: Negative for dysuria and hematuria.   Musculoskeletal: Negative for falls, joint pain and myalgias.   Skin:  "Negative for itching and rash.   Neurological: Negative for dizziness, sensory change, speech change, loss of consciousness and headaches.   Endo/Heme/Allergies: Negative for environmental allergies. Does not bruise/bleed easily.   Psychiatric/Behavioral: Negative for depression, hallucinations and suicidal ideas.        Objective:   BP (!) 180/110 (BP Location: Left arm, Patient Position: Sitting, BP Cuff Size: Adult)   Pulse 99   Ht 1.626 m (5' 4\")   Wt 66.2 kg (146 lb)   SpO2 98%   BMI 25.06 kg/m²     Physical Exam   Constitutional: He is oriented to person, place, and time. No distress.   HENT:   Head: Normocephalic and atraumatic.   Right Ear: External ear normal.   Left Ear: External ear normal.   Eyes: Right eye exhibits no discharge. Left eye exhibits no discharge.   Neck: No JVD present. No thyromegaly present.   Cardiovascular: Normal rate, regular rhythm, normal heart sounds and intact distal pulses.  Exam reveals no gallop and no friction rub.    No murmur heard.  Pulmonary/Chest: Breath sounds normal. No respiratory distress.   Abdominal: Bowel sounds are normal. He exhibits no distension. There is no tenderness.   Musculoskeletal: He exhibits no edema or tenderness.   Neurological: He is alert and oriented to person, place, and time. No cranial nerve deficit.   Skin: Skin is warm and dry. He is not diaphoretic.   Psychiatric: He has a normal mood and affect. His behavior is normal.   Nursing note and vitals reviewed.      Assessment:     1. ACC/AHA stage C heart failure with preserved ejection fraction (HCC)  carvedilol (COREG) 25 MG Tab    hydrALAZINE (APRESOLINE) 100 MG tablet    isosorbide dinitrate (ISORDIL) 20 MG Tab    lisinopril (PRINIVIL, ZESTRIL) 40 MG tablet    REFERRAL TO COMPLEX CARE MANAGEMENT Services Requested: Care Manager to Evaluate and Recommend   2. Left ventricular diastolic dysfunction, NYHA class 1  carvedilol (COREG) 25 MG Tab    hydrALAZINE (APRESOLINE) 100 MG tablet    " isosorbide dinitrate (ISORDIL) 20 MG Tab    lisinopril (PRINIVIL, ZESTRIL) 40 MG tablet    REFERRAL TO COMPLEX CARE MANAGEMENT Services Requested: Care Manager to Evaluate and Recommend   3. HTN (hypertension), malignant  carvedilol (COREG) 25 MG Tab    chlorthalidone (HYGROTON) 25 MG Tab    hydrALAZINE (APRESOLINE) 100 MG tablet    isosorbide dinitrate (ISORDIL) 20 MG Tab    lisinopril (PRINIVIL, ZESTRIL) 40 MG tablet    spironolactone (ALDACTONE) 50 MG Tab    REFERRAL TO COMPLEX CARE MANAGEMENT Services Requested: Care Manager to Evaluate and Recommend   4. Stage 3 chronic kidney disease (HCC)  REFERRAL TO COMPLEX CARE MANAGEMENT Services Requested: Care Manager to Evaluate and Recommend   5. HIV (human immunodeficiency virus infection) (HCC)  REFERRAL TO COMPLEX CARE MANAGEMENT Services Requested: Care Manager to Evaluate and Recommend   6. High risk medication use  REFERRAL TO COMPLEX CARE MANAGEMENT Services Requested: Care Manager to Evaluate and Recommend   7. Essential hypertension  amLODIPine (NORVASC) 10 MG Tab    REFERRAL TO COMPLEX CARE MANAGEMENT Services Requested: Care Manager to Evaluate and Recommend   8. Non compliance w medication regimen  REFERRAL TO COMPLEX CARE MANAGEMENT Services Requested: Care Manager to Evaluate and Recommend   9. Asymptomatic hypertensive urgency  cloNIDine (CATAPRES) 0.3 MG/24HR PATCH WEEKLY    doxazosin (CARDURA) 1 MG Tab    REFERRAL TO COMPLEX CARE MANAGEMENT Services Requested: Care Manager to Evaluate and Recommend       Medical Decision Making:  Today's Assessment / Status / Plan:   Patient is asymptomatic.  He does not want to go to hospital.  Cont current medications at current dose.   Stress importance of taking them consistently.  Very difficult patient.  Will refer to complex care management as well.

## 2018-12-18 ENCOUNTER — TELEPHONE (OUTPATIENT)
Dept: VASCULAR LAB | Facility: MEDICAL CENTER | Age: 53
End: 2018-12-18

## 2018-12-18 NOTE — TELEPHONE ENCOUNTER
[facility]    Pt missed appt for 12/12.  Made another appt for 12/26, states he did  lisinopril and amlodipine.     Ulysses Godinez, PharmD

## 2018-12-26 ENCOUNTER — NON-PROVIDER VISIT (OUTPATIENT)
Dept: VASCULAR LAB | Facility: MEDICAL CENTER | Age: 53
End: 2018-12-26
Attending: INTERNAL MEDICINE
Payer: COMMERCIAL

## 2018-12-26 VITALS — DIASTOLIC BLOOD PRESSURE: 135 MMHG | HEART RATE: 95 BPM | SYSTOLIC BLOOD PRESSURE: 223 MMHG

## 2018-12-26 DIAGNOSIS — Z23 NEED FOR VACCINATION: ICD-10-CM

## 2018-12-26 PROCEDURE — 99212 OFFICE O/P EST SF 10 MIN: CPT | Mod: 25

## 2018-12-26 PROCEDURE — 90686 IIV4 VACC NO PRSV 0.5 ML IM: CPT

## 2018-12-26 NOTE — PROGRESS NOTES
"[facility]    Pt is here to evaluate if he is taking amlodipine and lisinopril.  Brought in all of his medications.  Amlodipine vial has 54 tablets left and lisinopril vial has 69 tablets left.  I was about to give pt a pill organizer, but pt states he has one at home.  Will have pt return in 1 week to count his pills and see if he is utilizing pill organizer correctly.     He had an Rx for amlodipine, lisinopril, and carvedilol that were  that I disposed for the pt.  He has tablets for all of these medications that were not .  Pt will continue with current regimen.  Will count his tablets at next visit and follow up with his \"pill organizer\" that he brings from home.     BP still not controlled.  Denies s/s of end organ damage.     Pt consented to receiving immunization today.      Ulysses Godinez, PharmD   "

## 2019-01-10 ENCOUNTER — TELEPHONE (OUTPATIENT)
Dept: VASCULAR LAB | Facility: MEDICAL CENTER | Age: 54
End: 2019-01-10

## 2019-01-10 NOTE — TELEPHONE ENCOUNTER
Called pt to remind that the  Carotid duplex  is due for surveillance, However no VM is set up.  DICKSON Nevarez.

## 2019-01-17 ENCOUNTER — OFFICE VISIT (OUTPATIENT)
Dept: VASCULAR LAB | Facility: MEDICAL CENTER | Age: 54
End: 2019-01-17
Attending: FAMILY MEDICINE
Payer: MEDICAID

## 2019-01-17 VITALS
SYSTOLIC BLOOD PRESSURE: 213 MMHG | HEART RATE: 78 BPM | WEIGHT: 143 LBS | DIASTOLIC BLOOD PRESSURE: 115 MMHG | BODY MASS INDEX: 24.41 KG/M2 | HEIGHT: 64 IN

## 2019-01-17 DIAGNOSIS — I50.30 ACC/AHA STAGE C HEART FAILURE WITH PRESERVED EJECTION FRACTION (HCC): ICD-10-CM

## 2019-01-17 DIAGNOSIS — Z86.73 HISTORY OF CVA (CEREBROVASCULAR ACCIDENT): ICD-10-CM

## 2019-01-17 DIAGNOSIS — I10 HTN (HYPERTENSION), MALIGNANT: ICD-10-CM

## 2019-01-17 DIAGNOSIS — I77.9 BILATERAL CAROTID ARTERY DISEASE, UNSPECIFIED TYPE (HCC): ICD-10-CM

## 2019-01-17 DIAGNOSIS — I1A.0 RESISTANT HYPERTENSION: ICD-10-CM

## 2019-01-17 DIAGNOSIS — I51.89 LEFT VENTRICULAR DIASTOLIC DYSFUNCTION, NYHA CLASS 1: ICD-10-CM

## 2019-01-17 DIAGNOSIS — B20 HIV (HUMAN IMMUNODEFICIENCY VIRUS INFECTION) (HCC): ICD-10-CM

## 2019-01-17 DIAGNOSIS — I73.9 PAD (PERIPHERAL ARTERY DISEASE) (HCC): ICD-10-CM

## 2019-01-17 DIAGNOSIS — N18.31 CHRONIC KIDNEY DISEASE (CKD) STAGE G3A/A3, MODERATELY DECREASED GLOMERULAR FILTRATION RATE (GFR) BETWEEN 45-59 ML/MIN/1.73 SQUARE METER AND ALBUMINURIA CREATININE RATIO GREATER THAN 300 MG/G (HCC): ICD-10-CM

## 2019-01-17 DIAGNOSIS — E78.5 DYSLIPIDEMIA: ICD-10-CM

## 2019-01-17 DIAGNOSIS — Z91.199 PERSONAL HISTORY OF NONADHERENCE TO MEDICAL TREATMENT: ICD-10-CM

## 2019-01-17 PROCEDURE — 99215 OFFICE O/P EST HI 40 MIN: CPT | Performed by: FAMILY MEDICINE

## 2019-01-17 PROCEDURE — 99212 OFFICE O/P EST SF 10 MIN: CPT | Performed by: FAMILY MEDICINE

## 2019-01-17 RX ORDER — CARVEDILOL 25 MG/1
25 TABLET ORAL 2 TIMES DAILY WITH MEALS
Qty: 360 TAB | Refills: 3 | Status: SHIPPED | OUTPATIENT
Start: 2019-01-17 | End: 2019-09-11 | Stop reason: SDUPTHER

## 2019-01-17 RX ORDER — SPIRONOLACTONE 50 MG/1
50 TABLET, FILM COATED ORAL DAILY
Qty: 90 TAB | Refills: 3 | Status: SHIPPED | OUTPATIENT
Start: 2019-01-17 | End: 2019-09-11 | Stop reason: SDUPTHER

## 2019-01-17 ASSESSMENT — ENCOUNTER SYMPTOMS
DEPRESSION: 0
CHILLS: 0
MYALGIAS: 0
BRUISES/BLEEDS EASILY: 0
FEVER: 0
COUGH: 0
ORTHOPNEA: 0
DOUBLE VISION: 0
DIZZINESS: 0
WEAKNESS: 0
ABDOMINAL PAIN: 0
NAUSEA: 0
WHEEZING: 0
BLURRED VISION: 0
SORE THROAT: 0
BLOOD IN STOOL: 0
SHORTNESS OF BREATH: 0
NERVOUS/ANXIOUS: 0
TREMORS: 0
SEIZURES: 0
HEADACHES: 0
DIARRHEA: 0
VOMITING: 0
INSOMNIA: 0
FOCAL WEAKNESS: 0
HEMOPTYSIS: 0
PALPITATIONS: 0

## 2019-01-17 NOTE — PATIENT INSTRUCTIONS
1) continue lisinopril, carvedilol, and amlodipine   2) start spironolactone 50mg daily  3) check labs in 3 weeks   4) check carotid ultrasound     1) continuar con lisinopril, carvedilol y amlodipina  2) iniciar la espironolactona 50 mg al día  3) revisar laboratorios en 3 semanas  4) comprobar ultrasonido carotídeo

## 2019-01-17 NOTE — PROGRESS NOTES
RESISTANT HTN CLINIC - FOLLOW-UP VISIT   1/17/19    Assessment / Plan:   1. Blood Pressure Control:  Office BP Goal Based ACC/AHA (2017) goal <130/80, considering difficulty with current case and complexities of care would accept <140-150/90 as initial tx goal   Meets criteria for resistant HTN.  Pseudoresistance due to non-compliance. Triumeq does not carry ADR of elevated BP.  Lengthy hx of uncontrolled, severe HTN w/o indications of end-organ damage.    Home BP at goal:  no  Office BP at goal:  no  Plan:   - continue home BP monitoring, reviewed correct technique:  Yes   - order 24h ABPM:  Completed 10/31/18, wake = 190/118 with blunted nocturnal dip    2. Work up of Secondary Causes of Resistant Hypertension:   Renovascular HTN: Excluded, renal artery duplex negative   Primary Aldosteronism: Excluded, ARR <20, normal PRA and ainsley, 9/2018   Thyroid Function: Excluded, normal TSH 9/2018  Obstructive Sleep Apnea: Not Yet Assessed, referral for sleep eval placed 12/6/18  Pheochromocytoma: Excluded, normal screening 9/2018  Other:  Recommended for eval for the TRIO Radiance-HTN study and will review case with Dr. Bloch, appears to meet inclusion criteria, needs to have ROB ruled-out   Recommendations At This Visit: consider TRIO trial in the future but adherence is a bid concern and relative contraindication to this trial         3. Medication Use / Adherence:  Assessment: Non-Adherent  Recommendations: Recommend to continue adherence to all medications   - had multiple visits with pharmacist  - pharmacist to contact [facility] pharmacy to review med lists and verify patient has at least picked up all meds including clonidine patch and isosorbide   - Compliance issues leading to pseudoresistance is a challenge for this case despite our best efforts with pharmacotherapy adherence visits with pharmD regularly.  - schedule f/u with pharmacotherapy in 4 weeks to review meds, complete adherence evaluation and continue  medication titration as per tx plan below   - may require psychologist to assess for root causes of non-adherence          4. End Organ Damage:   Left Ventricular Hypertrophy: Absent on  Echocardiogram Date: 8/2017  Albuminuria: Macroalbuminuria on Date: 9/2018  Renal Function: Chronic Kidney Disease  Stage 3a/A3, 9/2018  (may preclude from TRIO)  Established Cardiovascular Disease: Present: hx of CVA    5. Lifestyle Recommendations:  Advised to engage in walking 1.5mi daily at the [facility]    6. Standard HTN Pharmacotherapy:  ACEI/ARB: continue lisinopril 40mg daily (1st agent)  Thiazide Type Diuretic: 5th agent - add chlorthalidone 12.5mg if gfr >30, titrate to 25mg if BP uncontrolled and lytes/gfr stable.  If gfr <30, start loop instead as noted below    Calcium Channel Blocker (CCB): continue amlodipine 10mg daily (2nd agent)    7. Additional Agents:  Aldosterone Antagonist: start spironolactone 50mg (4th agent), update labs in 3 weeks  Loop Diuretic: if gfr <30, then stop thiazide and start torsemide 5mg and titrate to 10-20mg if gfr/lytes stable on repeat testing   Peripheral Alpha Blocker: 6th agent - add doxazosin 1mg QHS, titrate up to 8mg if BP uncontrolled   Other CCB:  Avoid due to use of carvedilol and atorvastatin   Direct Vasodilator: 7th agent - add hydralazine 25mg TID, consider addition of isosorbide   Centrally Acting Alpha Agonist:   8th agent - clonidine 0.1mg/week patch   B-blocker:  Continue carvedilol 25mg BID (3rd agent)   Other: not good candidate for minoxidil due to non-adherence and high potential for ADRs due to renal function      8. Other CV Risk Factors:   Lipids:  NLA Risk Category:   Very high:  Evidence of ASCVD or T1/T2D with 2 or more RFs or evidence of end-org damage (CKD, ACR>29, retinopathy)  Tx threshold:  non-HDL-C >99, LDL-C >69  Tx goal: non-HDL-C <100,  LDL-C <70  (optional: apoB <80)  At goal:  No, per 10/2018  Tx plan:   - continue rosuvastatin 40mg   - update  labs     Smoking: reviewed d/c of cigarettes, pt is contemplative, reviewed tobacco cessation program and other resources     Antiplatelet Therapy: continue ASA 81mg daily     9. Other Issues:  1) HIV - unknown viral load, does not appear to have AIDS-defining illness  - defer mgmt to [facility]     2) hx of CVA  - continue optimal management with aggressive BP lowering, statin, antiplat  - monitor for s/s of CVA and seek prompt attention at ED     3) abnormal TSH, resolved, normal TSH     4) carotid artery stenosis, R > L.  50-69% right ICA stenosis  <50% left ICA stenosis by 2015 duplex  - recommend interval f/u, pt has been non-adherent and difficult to contact for scheduling  - MA to assist with scheduling     Studies Ordered At This Visit: carotid duplex ASAP  Blood Work to Be Obtained Prior to Next Visit:  CMP, lipid in 3 weeks  Disposition:    1) 4 weeks and ongoing - pharmacotherapy follow-up for ongoing med titrations and continued contact with [facility] pharmacy to review adherence.   2) 3mo with me     Time:  Counseling for >50% of 40min appt on the above topics including full med review, adherence assessment, discussion of prognosis and outcomes w/o adherence to tx plan, and discussion of future treatment and f/u plans with pharmD and myself.      Diagnosis:  1. Resistant hypertension  COMP METABOLIC PANEL    Lipid Profile    MICROALBUMIN CREAT RATIO URINE RANDOM   2. Bilateral carotid artery disease, unspecified type (HCC)     3. Personal history of nonadherence to medical treatment     4. PAD (peripheral artery disease) ([facility])     5. HIV (human immunodeficiency virus infection) ([facility])     6. History of CVA (cerebrovascular accident)     7. Dyslipidemia  Lipid Profile   8. Chronic kidney disease (CKD) stage G3a/A3, moderately decreased glomerular filtration rate (GFR) between 45-59 mL/min/1.73 square meter and albuminuria creatinine ratio greater than 300 mg/g ([facility])     9. HTN (hypertension), malignant  carvedilol  (COREG) 25 MG Tab   10. ACC/AHA stage C heart failure with preserved ejection fraction (HCC)  carvedilol (COREG) 25 MG Tab   11. Left ventricular diastolic dysfunction, NYHA class 1  carvedilol (COREG) 25 MG Tab        History of Present Illness:   Seamus Palencia seen for evaluation of resistant HTN and management.     Seamus Palencia is referred by: Gloria Cole A.P.R.N.. (cardilogy), [facility] PCP     Current HTN concerns:  None   ADRs:  None   Recent lab/studies:  Pending carotid duplex   HTN sx:  No current blurred or changed vision, chest pain, shortness of breath, headache, nausea, dizziness/vertigo   Home BP log:  Arm cuff - 180-200/100-120  24h ABPM:  Completed 10/31/18    Adherence to current HTN meds: 12/7/18: Med rec complete by Ulysses Godinez Pharm D, with [facility] pharmacist indicates patient has not filled meds since May, with last fill being isosorbide in Sept.  Clonidine patch never picked up.  We have reviewed his med list and will restart ACEI and CCB with consideration of starting chlorthalidone as 3rd agent, then spironolactone vs doxazosin as 4th agent.  We will work with [facility] pharmacy and PCP to assist with med adherence ongoing.    12/26/18:  F/u with pharmD.  Had Rx's for amlodipine, lisinopril, and carvedilol.      Antiplatelet/anticoagulation:   Yes, Details: ASA 81mg , no bleeding/bruising     Hyperlipidemia:    Stable, no current concerns  Current treatment: High intensity statin  rosuva 40mg   Myalgias? No  Other adverse drug reactions? No  Lipid profile:   LDL (mg/dL)   Date Value   10/24/2018 96   09/28/2018 97     HDL (mg/dL)   Date Value   10/24/2018 38 (A)   09/28/2018 37 (A)     Key HTN History:  Has been seeing cardiology (Dr. Marie) and nephrology (DR. Ray, 5/2018) and had worsening GFR  Pertinent HTN hx:   Age at HTN dx:   23 years     Antihypertensive medication review:    RAAS-mediators:   1) ACEI:  Lisinopril 40mg, no cough   2) ARB: No   3) DRI: No  CCBs:   1) DHP:  amlodipine 10mg, no leg swelling    2) non-DHP:  No  Diuretics:   1) Thiazide-type diuretic: chlorthalidone 25mg, no issues    2) Loop diuretic:  No   3) Aldosterone antagonist: spironolactone 50mg daily   4) K+ - sparing diuretics: No  Beta-blockers:   1) cardioselective (atenolol, metoprolol): No    2) cardioselective/vasodilatory (nebivolol): No   3) non-cardioselective (propranolol): No   4) combined alpha/beta (carvedilol, labetolol): carvedilol 25mg BID   5) intrinsic sympathomimetic (generally not recommended): No  Peripheral alpha-blocker:  No  Central-actin) clonidine: 0.2mg BID   2) methyldopa/guanfacine: No  Direct vasodilator:    1) hydralazine: hydralazine 100mg TID   2) minoxidil:  No   3) isodil:  20mg BID    HTN crises:   Multiple visits, hx of CVA  ASCVD risk factors:     DM: No   CKD:  Yes, Details: currently G3a, A?   Lifestyle factors:     High salt: No, no salt    EtOH: No, no stimulants or drugs   Interfering substances:     NSAIDs: No    Decongestants. No    ASCVD calculator (contraindicated if ASCVD+, [id number])   10yr-risk calc: n/a    Clinical evidence of ASCVD:     1) hx of MI/ACS:  No   2) coronary or other revascularization procedure: No   3) TIA/ischemic CVA: Yes, Details: , see MRI brain, no residual sx    4) PAD (including ADRIAN <0.9): No   5) documented (CAD, Renal athero, AA due to athero, carotid plaque >50% stenosis: Yes, carotid stenosis     Major RFs:     1) Age (Men>44, women>54):  yes   2) Fhx early CAD (<55 men, <65 women):  no   3) cigarette smoking:  Yes, Details: as noted    4) high BP >139/89 or on tx: yes   5) Low HDL-C (<40 men, <50 women):  no     Social History:     Social History     Social History   • Marital status: Single     Spouse name: N/A   • Number of children: N/A   • Years of education: N/A     Social History Main Topics   • Smoking status: Current Every Day Smoker     Packs/day: 0.25     Types: Cigarettes   • Smokeless tobacco: Never Used       "Comment: 3-4 CIGARETTES A DAY   • Alcohol use No   • Drug use: No   • Sexual activity: Not on file     Other Topics Concern   • Not on file     Social History Narrative   • No narrative on file      Review of Systems:   Review of Systems   Constitutional: Negative for chills, fever and malaise/fatigue.   HENT: Negative for nosebleeds, sore throat and tinnitus.    Eyes: Negative for blurred vision and double vision.   Respiratory: Negative for cough, hemoptysis, shortness of breath and wheezing.    Cardiovascular: Negative for chest pain, palpitations, orthopnea and leg swelling.   Gastrointestinal: Negative for abdominal pain, blood in stool, diarrhea, melena, nausea and vomiting.   Genitourinary: Negative for hematuria.   Musculoskeletal: Negative for joint pain and myalgias.   Skin: Negative for itching and rash.   Neurological: Negative for dizziness, tremors, focal weakness, seizures, weakness and headaches.   Endo/Heme/Allergies: Does not bruise/bleed easily.   Psychiatric/Behavioral: Negative for depression. The patient is not nervous/anxious and does not have insomnia.         Objective:   Allergies:  Patient has no known allergies.    Vitals:    01/17/19 1408 01/17/19 1411   BP: (!) 234/120 (!) 213/115   BP Location: Left arm Left arm   Patient Position: Sitting Sitting   BP Cuff Size: Adult Adult   Pulse: 82 78   Weight: 65 kg (143 lb 4.8 oz) 64.9 kg (143 lb)   Height: 1.626 m (5' 4\") 1.626 m (5' 4\")     Body mass index is 24.55 kg/m².   BMI Readings from Last 4 Encounters:   01/17/19 24.55 kg/m²   12/13/18 25.06 kg/m²   12/06/18 24.44 kg/m²   10/18/18 24.48 kg/m²      BP Readings from Last 4 Encounters:   01/17/19 (!) 213/115   12/26/18 (!) 223/135   12/13/18 (!) 180/110   12/06/18 (!) 193/128        Outpatient Encounter Prescriptions as of 1/17/2019   Medication Sig Dispense Refill   • spironolactone (ALDACTONE) 50 MG Tab Take 1 Tab by mouth every day for 360 days. 90 Tab 3   • carvedilol (COREG) 25 MG " Tab Take 1 Tab by mouth 2 times a day, with meals. 360 Tab 3   • amLODIPine (NORVASC) 10 MG Tab Take 1 Tab by mouth every day. 90 Tab 1   • lisinopril (PRINIVIL, ZESTRIL) 40 MG tablet Take 1 Tab by mouth every day. 90 Tab 1   • rosuvastatin (CRESTOR) 40 MG tablet Take 1 Tab by mouth every day for 360 days. 90 Tab 3   • ASPIRIN LOW DOSE 81 MG EC tablet Take 1 Tab by mouth every day.  2   • acetaminophen (TYLENOL) 325 MG Tab Take 650 mg by mouth every four hours as needed.     • TRIUMEQ 600- MG Tab Take 1 Tab by mouth every day.  5   • sulfamethoxazole-trimethoprim (BACTRIM DS) 800-160 MG tablet Take 1 Tab by mouth every day.     • levetiracetam (KEPPRA) 500 MG Tab Take 1 Tab by mouth every 12 hours. 60 Tab 3   • [DISCONTINUED] carvedilol (COREG) 25 MG Tab Take 2 Tabs by mouth 2 times a day, with meals. 360 Tab 3   • [DISCONTINUED] chlorthalidone (HYGROTON) 25 MG Tab Take 1 Tab by mouth every day. 30 Tab 11   • [DISCONTINUED] cloNIDine (CATAPRES) 0.3 MG/24HR PATCH WEEKLY Apply 1 Patch to skin as directed every 7 days for 360 days. 12 Patch 3   • [DISCONTINUED] doxazosin (CARDURA) 1 MG Tab Take 1 Tab by mouth every day for 360 days. 90 Tab 3   • [DISCONTINUED] hydrALAZINE (APRESOLINE) 100 MG tablet Take 1 Tab by mouth 3 times a day. 90 Tab 11   • [DISCONTINUED] isosorbide dinitrate (ISORDIL) 20 MG Tab Take 1 Tab by mouth 3 times a day. 90 Tab 11   • [DISCONTINUED] spironolactone (ALDACTONE) 50 MG Tab TAKE ONE TABLET BY MOUTH DAILY 30 Tab 11     No facility-administered encounter medications on file as of 1/17/2019.      Physical Exam   Constitutional: He is oriented to person, place, and time. He appears well-developed and well-nourished. He is cooperative. No distress.   HENT:   Head: Normocephalic and atraumatic.   Mouth/Throat: Oropharynx is clear and moist and mucous membranes are normal.   Eyes: Pupils are equal, round, and reactive to light. Conjunctivae are normal.   Neck: Trachea normal and normal range  of motion. Neck supple. Normal carotid pulses and no JVD present. Carotid bruit is not present. No thyromegaly present.   Cardiovascular: Normal rate, regular rhythm, normal heart sounds and intact distal pulses.  Exam reveals no gallop and no friction rub.    No murmur heard.  Pulses:       Carotid pulses are 2+ on the right side, and 2+ on the left side.       Radial pulses are 2+ on the right side, and 2+ on the left side.        Dorsalis pedis pulses are 2+ on the right side, and 2+ on the left side.        Posterior tibial pulses are 2+ on the right side, and 2+ on the left side.   Edema:    RLE: none     LLE: none   Spider telangectasia:       RLE:  None      LLE: none   Varicosities:         RLE: none      LLE: none   Corona phlebectatica:      RLE:  None        LLE:  None   Cording:         RLE:  None     LLE: None    Pulmonary/Chest: Effort normal and breath sounds normal. No respiratory distress.   Abdominal: Soft. Bowel sounds are normal. He exhibits no distension and no mass. There is no hepatosplenomegaly. There is no tenderness. There is no rebound and no guarding.   Musculoskeletal: He exhibits no edema.   Lymphadenopathy:     He has no cervical adenopathy.   Neurological: He is alert and oriented to person, place, and time. No cranial nerve deficit. Coordination and gait normal.   Skin: Skin is warm and dry. He is not diaphoretic. No cyanosis. No pallor. Nails show no clubbing.   Psychiatric: He has a normal mood and affect.        Accessory Clinical Findings:   Echocardiogram:  Results for orders placed or performed during the hospital encounter of 08/30/17   ECHOCARDIOGRAM COMP W/O CONT   Result Value Ref Range    Eject.Frac. MOD BP 68.07     Eject.Frac. MOD 4C 71.33     Eject.Frac. MOD 2C 62.08     Left Ventrical Ejection Fraction 65       Lab Results   Component Value Date    CHOLSTRLTOT 164 10/24/2018    LDL 96 10/24/2018    HDL 38 (A) 10/24/2018    TRIGLYCERIDE 150 (H) 10/24/2018           Lab  Results   Component Value Date    SODIUM 135 10/24/2018    POTASSIUM 3.3 (L) 10/24/2018    CHLORIDE 103 10/24/2018    CO2 23 10/24/2018    GLUCOSE 86 10/24/2018    BUN 18 10/24/2018    CREATININE 1.69 (H) 10/24/2018      Lab Results   Component Value Date    ALDOSTERONE 6.1 2018      Lab Results   Component Value Date    URCREAT 197.40 2018    MICROALBUR 655.7 2018    MALBCRT 3322 (H) 2018    METANEPHPL 0.26 2018     Lab Results   Component Value Date    STMTRPT  2018     [facility]    Test Date:  2018  Pt Name:    SONI ABBASI                  Department: [facility]  MRN:        [id number]                      Room:  Gender:     Male                         Technician: ANYI  :        1965                   Requested By:MARISELA MANE  Order #:    [id number]                    Reading MD: Asiya Preciado MD    Measurements  Intervals                                Axis  Rate:       83                           P:          42  IN:         157                          QRS:        45  QRSD:       95                           T:          205  QT:         382  QTc:        449    Interpretive Statements  Sinus rhythm  Probable left atrial enlargement  NON-SPECIFIC ST-T CHANGES  Compared to ECG 2016 21:09:42  T-wave abnormality no longer present  Possible ischemia no longer present    Electronically Signed On 2018 20:48:03 PDT by Asiya Preciado MD       VASCULAR IMAGING:     CT abd 2013  Low-attenuation liver.  Normal size spleen.  No pancreatic mass identified.  No hydronephrosis or urolithiasis.  No adrenal gland masses identified.  Surgical absence gallbladder.  No evidence of bowel obstruction.  Appendix not delineated.  No aneurysmal dilatation aorta.  No retroperitoneal adenopathy identified.  Mild mesenteric and retroperitoneal edema.  Diffuse subcutaneous edema.    Carotid duplex 12/27/15  50-69% right ICA stenosis  <50% left ICA stenosis  Right vertebral  not seen  Antegrade left vertebral  Nl subclavians    Echo 12/27/15  Left ventricular ejection fraction is visually estimated to be 45%.  Septum hyopkinesis, Basal inferiolateral and basal to mid anterior wall   akinesis, grade III diastolic dysfunction.  Mild concentric left ventricular hypertrophy.  Small pericardial effusion without evidence of hemodynamic compromise.  Estimated right ventricular systolic pressure is 60 mmHg.  Moderate mitral regurgitation.  Normal inferior vena cava size and inspiratory collapse.  No prior study is available for comparison.     MR brain 1/2016  Multifocal areas of restricted diffusion in the right frontal, parietal, temporal and occipital lobes and rodney likely representing subacute infarcts. There has been no significant interval change. In view of presence of subcortical edema and multiple   arterial distribution postcontrast MRI was recommended to rule out any underlying enhancing pathology. However the patient refused to undergo contrast-enhanced study. Followup study is recommended with and without contrast.    CT head 8/17/18  1. No acute intracranial abnormality. No evidence of acute hemorrhage or mass lesion.  2. Prior infarct in the right posterior parietal and occipital lobe. Please note, hyperacute ischemia can remain occult on CT. If ischemia is clinically suspected, MRI is suggested for further evaluation.    MRI brain 8/2016  1. Multifocal areas of chronic gray matter infarcts in the right frontal, parietal and occipital lobes and rodney. Minimal petechial hemorrhage is noted in the occipital infarct.  2. There is no acute infarct.  3. Mild-to-moderate cerebral atrophy.  4. Mucosal thickening in the bilateral maxillary sinuses and right mastoid air cells.    Echo 8/2017  No prior study is available for comparison.   Normal left ventricular systolic function.   No evidence of valvular abnormality based on Doppler evaluation.     Renal art duplex 11/2017   Normal  bilateral renal arterial study with the exception that the right   kidney  is on the small side of normal.    Carotid duplex - pending        Ulysses Watt M.D.

## 2019-01-24 ENCOUNTER — TELEPHONE (OUTPATIENT)
Dept: VASCULAR LAB | Facility: MEDICAL CENTER | Age: 54
End: 2019-01-24

## 2019-02-14 ENCOUNTER — TELEPHONE (OUTPATIENT)
Dept: VASCULAR LAB | Facility: MEDICAL CENTER | Age: 54
End: 2019-02-14

## 2019-02-14 NOTE — TELEPHONE ENCOUNTER
Called pt to remind that the  Carotid duplex is over due for surveillance. Transferred to scheduling office. DICKSON Nevarez.

## 2019-02-15 ENCOUNTER — TELEPHONE (OUTPATIENT)
Dept: MEDICAL GROUP | Facility: PHYSICIAN GROUP | Age: 54
End: 2019-02-15

## 2019-02-16 NOTE — TELEPHONE ENCOUNTER
[facility]    Pt missed appointment with me to assess adherence to BP medications.  Spoke with pt and he is willing to come in next week for follow up.    Ulysses Godinez, PharmD

## 2019-02-21 ENCOUNTER — TELEPHONE (OUTPATIENT)
Dept: VASCULAR LAB | Facility: MEDICAL CENTER | Age: 54
End: 2019-02-21

## 2019-02-21 ENCOUNTER — TELEPHONE (OUTPATIENT)
Dept: CARDIOLOGY | Facility: MEDICAL CENTER | Age: 54
End: 2019-02-21

## 2019-02-21 ENCOUNTER — NON-PROVIDER VISIT (OUTPATIENT)
Dept: VASCULAR LAB | Facility: MEDICAL CENTER | Age: 54
End: 2019-02-21
Attending: INTERNAL MEDICINE
Payer: COMMERCIAL

## 2019-02-21 VITALS — DIASTOLIC BLOOD PRESSURE: 146 MMHG | HEART RATE: 88 BPM | SYSTOLIC BLOOD PRESSURE: 226 MMHG

## 2019-02-21 DIAGNOSIS — I10 HTN (HYPERTENSION), MALIGNANT: ICD-10-CM

## 2019-02-21 DIAGNOSIS — I51.89 LEFT VENTRICULAR DIASTOLIC DYSFUNCTION, NYHA CLASS 1: ICD-10-CM

## 2019-02-21 DIAGNOSIS — I50.30 ACC/AHA STAGE C HEART FAILURE WITH PRESERVED EJECTION FRACTION (HCC): ICD-10-CM

## 2019-02-21 DIAGNOSIS — I10 ESSENTIAL HYPERTENSION: ICD-10-CM

## 2019-02-21 PROCEDURE — 99212 OFFICE O/P EST SF 10 MIN: CPT

## 2019-02-21 RX ORDER — LISINOPRIL 40 MG/1
40 TABLET ORAL DAILY
Qty: 90 TAB | Refills: 1 | Status: SHIPPED | OUTPATIENT
Start: 2019-02-21 | End: 2019-09-11 | Stop reason: SDUPTHER

## 2019-02-21 RX ORDER — ISOSORBIDE DINITRATE 20 MG/1
20 TABLET ORAL 3 TIMES DAILY
Qty: 90 TAB | Refills: 0 | Status: SHIPPED | OUTPATIENT
Start: 2019-02-21 | End: 2019-02-25 | Stop reason: SDUPTHER

## 2019-02-21 RX ORDER — AMLODIPINE BESYLATE 10 MG/1
10 TABLET ORAL DAILY
Qty: 90 TAB | Refills: 1 | Status: SHIPPED | OUTPATIENT
Start: 2019-02-21 | End: 2019-09-11 | Stop reason: SDUPTHER

## 2019-02-21 NOTE — TELEPHONE ENCOUNTER
Message sent to PRASHANT young Regarding new Rx request:     Hi Carli,   We received a new Rx for isosorbide dinitrate (ISORDIL) 20MG Tab 90 day supply 3 refills but epic shows medication was (Discontinued) on 1/17/2019. Although 's last note states patient should continue medication, please advise   Thank You.

## 2019-02-21 NOTE — TELEPHONE ENCOUNTER
----- Message from Carli Pennington R.N. sent at 2/21/2019 10:08 AM PST -----  Regarding: RE: Medication refill  It may have been a 1 year auto stop  ----- Message -----  From: Jackie Palacios, Med Ass't  Sent: 2/21/2019   9:18 AM  To: Carli Pennington R.N.  Subject: Medication refill                                Hi Carli,  We received a new Rx for isosorbide dinitrate (ISORDIL) 20MG Tab 90 day supply 3 refills but epic shows medication was (Discontinued) on 1/17/2019. Although 's last note states patient should continue medication, please advise  Thank You.

## 2019-02-21 NOTE — TELEPHONE ENCOUNTER
Pt was in the clinic today spoke with him about  Carotid duplex is over due for surveillance.Phone number for scheduling provided KERRI Nevarez

## 2019-02-22 NOTE — PROGRESS NOTES
Pharmacotherapy Hypertension Visit  02/21/19     Type of Visit:  Follow Up    Seamus Palencia has been referred for evaluation and management of hypertension    HPI:   There were no vitals filed for this visit.  Both arms - in future use arm with higher reading    Age at Initial Diagnosis: > 5years ago      Home BP readings:   None  Any readings above  180/110:  Yes, consistently  Zonia symptoms of high blood pressure (TIA, Stroke, Head ache, vision changes): not since last visit.     Current Prescription HTN Medications - including dose:    Lisinopril 40 mg daily -Did not bring with him, he is not taking this one anymore  Amlodipine 10 mg daily -Did not bring with him, he is not taking this one anymore  Spironolactone 50 mg -Brought the bottle with him Count of 30 tablets of 90 prescribed.  Furosemide 20mg BID -Brought in the botte, count of 170 of 180 prescribed.    Current side effects potentially related to antihypertensive medications (lightheaded, dizziness, leg swelling): None    Current Adherence to Blood Pressure Medications: Completely non-adherent    Previusly attempted BP medications:   N/A    Any current interfering substances: Caffeine, smoking    Any current lifestyle issues affecting BP:  Smoking      Lab Results   Component Value Date/Time    SODIUM 135 10/24/2018 02:03 PM    POTASSIUM 3.3 (L) 10/24/2018 02:03 PM    CHLORIDE 103 10/24/2018 02:03 PM    CO2 23 10/24/2018 02:03 PM    GLUCOSE 86 10/24/2018 02:03 PM    BUN 18 10/24/2018 02:03 PM    CREATININE 1.69 (H) 10/24/2018 02:03 PM        CURRENT MEDICATIONS:   Current Outpatient Prescriptions:   •  lisinopril, 40 mg, Oral, DAILY  •  amLODIPine, 10 mg, Oral, DAILY  •  spironolactone, 50 mg, Oral, DAILY  •  carvedilol, 25 mg, Oral, BID WITH MEALS  •  rosuvastatin, 40 mg, Oral, DAILY, Taking  •  ASPIRIN LOW DOSE, 1 Tab, Oral, DAILY, Taking  •  acetaminophen, 650 mg, Oral, Q4HRS PRN, PRN  •  TRIUMEQ, 1 Tab, Oral, DAILY, Taking  •   sulfamethoxazole-trimethoprim, 1 Tab, Oral, DAILY, Taking  •  levETIRAcetam, 500 mg, Oral, Q12HRS, Taking  ALLERGIES: Patient has no known allergies.    SOCIAL HISTORY   History   Smoking Status   • Current Every Day Smoker   • Packs/day: 0.25   • Types: Cigarettes   Smokeless Tobacco   • Never Used     Comment: 3-4 CIGARETTES A DAY     Change in weight: Stable  Exercise habits: no regular exercise program  Diet: common adult    ASSESSMENT AND PLAN    BP is above goal  Would accept <140-150/90 as initial tx goal     Does pt have known end organ damage? Yes       Patient continues non-adherence.  He is a pleasant individual but I'm uncertain if he isn't taking his medications because he does not care, forgets easily, or is confused when medications change.  Pt brought in medications and pill counts were made.  He is no longer taking lisinopril or amlodipine.  Provided refill of both medications and requested pt start these immediately.      Discussed at length that I would like him to come every two weeks so that we can keep his medications on track with pill counts and appropriate lab work.  He is open to this idea and committed to coming back in two weeks.     I realize lisinopril was given in conjunction with spironolactone.   CMP ordered, will trend labs.  Also, very non-adherent but will monitor electrolytes as best as we can.    Will reach out to [facility] in 1 week to see if pt picked up new prescriptions.     Lifestyle Recommendations From Today’s Visit:        Medication recommendations from today's visit:   ARB/ACEI: Restart lisinopril  Diuretic: Continue with furosemide  Calcium Channel Blocker: Restart amlodipine  Other class: spirolactone: continue     Blood Work Ordered At Today’s visit: CMP    Follow-Up: 2 weeks (every 1-4 weeks until at goal)     Ulysses Godinez, PharmD    CC:  Ulysses Watt M.D.

## 2019-02-25 DIAGNOSIS — I51.89 LEFT VENTRICULAR DIASTOLIC DYSFUNCTION, NYHA CLASS 1: ICD-10-CM

## 2019-02-25 DIAGNOSIS — I10 HTN (HYPERTENSION), MALIGNANT: ICD-10-CM

## 2019-02-25 DIAGNOSIS — I50.30 ACC/AHA STAGE C HEART FAILURE WITH PRESERVED EJECTION FRACTION (HCC): ICD-10-CM

## 2019-02-25 RX ORDER — ISOSORBIDE DINITRATE 20 MG/1
20 TABLET ORAL 3 TIMES DAILY
Qty: 270 TAB | Refills: 0 | Status: SHIPPED | OUTPATIENT
Start: 2019-02-25 | End: 2019-03-13

## 2019-02-26 ENCOUNTER — TELEPHONE (OUTPATIENT)
Dept: CARDIOLOGY | Facility: MEDICAL CENTER | Age: 54
End: 2019-02-26

## 2019-02-26 NOTE — TELEPHONE ENCOUNTER
PAR sent to plan:  SONI ABBASI (Key: [id number]) - [id number]       Status   Sent to Mark   Next Steps   The plan will fax you a determination, typically within 1 to 5 business days.  How do I follow up?   DrugIsosorbide Dinitrate 20MG tablets   Form[place] Medicaid General FormPrior Authorization Form for General [phone]   Original Claim InfoAG Days Supply Minimum for this fill is 90.Days Supply Minimum for this fill is 90.

## 2019-02-28 ENCOUNTER — TELEPHONE (OUTPATIENT)
Dept: VASCULAR LAB | Facility: MEDICAL CENTER | Age: 54
End: 2019-02-28

## 2019-02-28 NOTE — TELEPHONE ENCOUNTER
[facility]    Called [facility] pharmacy, pt has not picked up lisinopril or amlodipine.  Appears he continues to disregard medical intervention.      Ulysses Godinez, PharmD

## 2019-03-06 ENCOUNTER — TELEPHONE (OUTPATIENT)
Dept: VASCULAR LAB | Facility: MEDICAL CENTER | Age: 54
End: 2019-03-06

## 2019-03-06 NOTE — TELEPHONE ENCOUNTER
Spoke with the pt's son transferred to scheduling so that he can reschedule the appt for the carotid duplex. Pt does not understand English. DICKSON Nevarez.

## 2019-03-12 ENCOUNTER — TELEPHONE (OUTPATIENT)
Dept: VASCULAR LAB | Facility: MEDICAL CENTER | Age: 54
End: 2019-03-12

## 2019-03-12 NOTE — TELEPHONE ENCOUNTER
We have been unable to contact the pt regarding the surveillance vascular studies. We have made multiple attempts and have left messages on the pt's VM. Certified letter sent today explaining the importance of the studies. We will wait for the pt to contact us for any further care. DICKSON Nevarez.

## 2019-03-13 ENCOUNTER — TELEPHONE (OUTPATIENT)
Dept: VASCULAR LAB | Facility: MEDICAL CENTER | Age: 54
End: 2019-03-13

## 2019-03-13 ENCOUNTER — OFFICE VISIT (OUTPATIENT)
Dept: CARDIOLOGY | Facility: MEDICAL CENTER | Age: 54
End: 2019-03-13
Payer: COMMERCIAL

## 2019-03-13 VITALS
WEIGHT: 148.8 LBS | HEART RATE: 92 BPM | OXYGEN SATURATION: 99 % | DIASTOLIC BLOOD PRESSURE: 121 MMHG | SYSTOLIC BLOOD PRESSURE: 218 MMHG | HEIGHT: 64 IN | BODY MASS INDEX: 25.4 KG/M2

## 2019-03-13 DIAGNOSIS — Z79.899 HIGH RISK MEDICATION USE: ICD-10-CM

## 2019-03-13 DIAGNOSIS — I10 HTN (HYPERTENSION), MALIGNANT: ICD-10-CM

## 2019-03-13 DIAGNOSIS — I16.0 ASYMPTOMATIC HYPERTENSIVE URGENCY: ICD-10-CM

## 2019-03-13 DIAGNOSIS — I65.23 BILATERAL CAROTID ARTERY STENOSIS: ICD-10-CM

## 2019-03-13 DIAGNOSIS — I50.30 ACC/AHA STAGE C HEART FAILURE WITH PRESERVED EJECTION FRACTION (HCC): ICD-10-CM

## 2019-03-13 DIAGNOSIS — Z86.73 HISTORY OF CVA (CEREBROVASCULAR ACCIDENT): ICD-10-CM

## 2019-03-13 DIAGNOSIS — I50.9 HEART FAILURE, NYHA CLASS 1 (HCC): ICD-10-CM

## 2019-03-13 PROCEDURE — 99214 OFFICE O/P EST MOD 30 MIN: CPT | Performed by: NURSE PRACTITIONER

## 2019-03-13 RX ORDER — DOXAZOSIN MESYLATE 1 MG/1
1 TABLET ORAL DAILY
Refills: 3 | COMMUNITY
Start: 2019-02-21 | End: 2019-04-18

## 2019-03-13 RX ORDER — CHLORTHALIDONE 25 MG/1
25 TABLET ORAL
Refills: 11 | COMMUNITY
Start: 2019-02-21 | End: 2019-04-18

## 2019-03-13 RX ORDER — BICTEGRAVIR SODIUM, EMTRICITABINE, AND TENOFOVIR ALAFENAMIDE FUMARATE 50; 200; 25 MG/1; MG/1; MG/1
1 TABLET ORAL DAILY
Refills: 3 | COMMUNITY
Start: 2019-02-27 | End: 2020-07-30

## 2019-03-13 ASSESSMENT — ENCOUNTER SYMPTOMS
ABDOMINAL PAIN: 0
CLAUDICATION: 0
SHORTNESS OF BREATH: 0
PALPITATIONS: 0
ORTHOPNEA: 0
MYALGIAS: 0
COUGH: 0
BLURRED VISION: 0
FEVER: 0
PND: 0
DIZZINESS: 0
DOUBLE VISION: 0

## 2019-03-13 NOTE — PROGRESS NOTES
Chief Complaint   Patient presents with   • Hypertension       Subjective:   Seamus Palencia is a 53 y.o. male who presents today for follow-up on his heart failure and hypertension.    Patient of Dr. Marie.  Patient was last seen in clinic on 12/13/2018.  Patient also seen in the hypertension clinic with Dr. Watt on 1/17/2019.    Patient continues to be managed by Dr. Watt for his resistant hypertension.    For his symptoms, patient feels well, denies any chest pain, shortness of breath at rest, with ADLs and exertion, palpitations, orthopnea, PND, edema, dizziness/lightheadedness, headaches, vision changes or other strokelike symptoms.    Patient reports he is taking his medications as directed.    His home weights have been stable between 148-150 pounds.    [facility]  Patient's medications were recently changed due to kidney dysfunction.    Patient reports difficulty scheduling his carotid studies.    Additonally, patient has the following medical problems:     -[facility] infection [facility]     -HTN: takes carvedilol, lisinopril, amlodipine, chlorthalidone, Spironolactone, clonidine patch, being followed by the hypertension clinic    -History of stroke    -Seizures: Taking Keppra  Past Medical History:   Diagnosis Date   • Heart failure (HCC)    • HIV disease (HCC) 1995   • Hypertension    • Seizure (HCC)    • Stroke (HCC)      Past Surgical History:   Procedure Laterality Date   • CHOLECYSTECTOMY  1980s     Family History   Problem Relation Age of Onset   • Diabetes Mother         cause of death   • Diabetes Father         cause of death   • Diabetes Sister    • Other Brother         down syndrome   • No Known Problems Sister    • No Known Problems Sister    • No Known Problems Sister    • Other Daughter         5 daughters, 11 sons    • Clotting Disorder Neg Hx    • Stroke Neg Hx      Social History     Social History   • Marital status:  Single     Spouse name: N/A   • Number of children: N/A   • Years of education: N/A     Occupational History   • Not on file.     Social History Main Topics   • Smoking status: Current Every Day Smoker     Packs/day: 0.25     Types: Cigarettes   • Smokeless tobacco: Never Used      Comment: 3-4 CIGARETTES A DAY   • Alcohol use No   • Drug use: No   • Sexual activity: Not on file     Other Topics Concern   • Not on file     Social History Narrative   • No narrative on file     No Known Allergies  Outpatient Encounter Prescriptions as of 3/13/2019   Medication Sig Dispense Refill   • lisinopril (PRINIVIL, ZESTRIL) 40 MG tablet Take 1 Tab by mouth every day. 90 Tab 1   • amLODIPine (NORVASC) 10 MG Tab Take 1 Tab by mouth every day. 90 Tab 1   • spironolactone (ALDACTONE) 50 MG Tab Take 1 Tab by mouth every day for 360 days. 90 Tab 3   • carvedilol (COREG) 25 MG Tab Take 1 Tab by mouth 2 times a day, with meals. 360 Tab 3   • rosuvastatin (CRESTOR) 40 MG tablet Take 1 Tab by mouth every day for 360 days. 90 Tab 3   • ASPIRIN LOW DOSE 81 MG EC tablet Take 1 Tab by mouth every day.  2   • TRIUMEQ 600- MG Tab Take 1 Tab by mouth every day.  5   • sulfamethoxazole-trimethoprim (BACTRIM DS) 800-160 MG tablet Take 1 Tab by mouth every day.     • levetiracetam (KEPPRA) 500 MG Tab Take 1 Tab by mouth every 12 hours. 60 Tab 3   • isosorbide dinitrate (ISORDIL) 20 MG Tab Take 1 Tab by mouth 3 times a day. (Patient not taking: Reported on 3/13/2019) 270 Tab 0   • acetaminophen (TYLENOL) 325 MG Tab Take 650 mg by mouth every four hours as needed.       No facility-administered encounter medications on file as of 3/13/2019.      Review of Systems   Constitutional: Negative for fever and malaise/fatigue.   Eyes: Negative for blurred vision and double vision.   Respiratory: Negative for cough and shortness of breath.    Cardiovascular: Negative for chest pain, palpitations, orthopnea, claudication, leg swelling and PND.  "  Gastrointestinal: Negative for abdominal pain.   Musculoskeletal: Negative for myalgias.   Neurological: Negative for dizziness.   All other systems reviewed and are negative.       Objective:   BP (!) 218/121 (BP Location: Left arm, Patient Position: Sitting, BP Cuff Size: Adult)   Pulse 92   Ht 1.626 m (5' 4\")   Wt 67.5 kg (148 lb 12.8 oz)   SpO2 99%   BMI 25.54 kg/m²     Physical Exam   Constitutional: He is oriented to person, place, and time. He appears well-developed and well-nourished.   HENT:   Head: Normocephalic and atraumatic.   Eyes: Pupils are equal, round, and reactive to light. EOM are normal.   Neck: Normal range of motion. Neck supple. No JVD present.   Cardiovascular: Normal rate, regular rhythm and normal heart sounds.    Pulmonary/Chest: Effort normal and breath sounds normal. No respiratory distress. He has no wheezes. He has no rales.   Abdominal: Soft. Bowel sounds are normal.   Musculoskeletal: He exhibits no edema.   Neurological: He is alert and oriented to person, place, and time.   Skin: Skin is warm and dry.   Psychiatric: He has a normal mood and affect. His behavior is normal.   Vitals reviewed.    Lab Results   Component Value Date/Time    CHOLSTRLTOT 164 10/24/2018 02:03 PM    LDL 96 10/24/2018 02:03 PM    HDL 38 (A) 10/24/2018 02:03 PM    TRIGLYCERIDE 150 (H) 10/24/2018 02:03 PM       Lab Results   Component Value Date/Time    SODIUM 135 10/24/2018 02:03 PM    POTASSIUM 3.3 (L) 10/24/2018 02:03 PM    CHLORIDE 103 10/24/2018 02:03 PM    CO2 23 10/24/2018 02:03 PM    GLUCOSE 86 10/24/2018 02:03 PM    BUN 18 10/24/2018 02:03 PM    CREATININE 1.69 (H) 10/24/2018 02:03 PM     Lab Results   Component Value Date/Time    ALKPHOSPHAT 121 (H) 10/24/2018 02:03 PM    ASTSGOT 31 10/24/2018 02:03 PM    ALTSGPT 34 10/24/2018 02:03 PM    TBILIRUBIN 0.5 10/24/2018 02:03 PM      Carotid Duplex Report 12/27/2015     Vascular Laboratory   CONCLUSIONS   50-69% right ICA stenosis   <50% left ICA " stenosis   Right vertebral not seen   Antegrade left vertebral   Nl subclavians    Transthoracic Echo Report 12/27/15  Left ventricular ejection fraction is visually estimated to be 45%.  Septum hyopkinesis, Basal inferiolateral and basal to mid anterior wall   akinesis, grade III diastolic dysfunction.  Mild concentric left ventricular hypertrophy.  Small pericardial effusion without evidence of hemodynamic compromise.  Estimated right ventricular systolic pressure  is 60 mmHg.  Moderate mitral regurgitation.  Normal inferior vena cava size and inspiratory collapse.  No prior study is available for comparison.         Transthoracic Echo Report 8/30/17  No prior study is available for comparison.   Normal left ventricular systolic function.   No evidence of valvular abnormality based on Doppler evaluation     Assessment:     1. ACC/AHA stage C heart failure with preserved ejection fraction (HCC)     2. Heart failure, NYHA class 1 (HCC)     3. HTN (hypertension), malignant     4. High risk medication use     5. Asymptomatic hypertensive urgency     6. History of CVA (cerebrovascular accident)     7. Bilateral carotid artery stenosis         Medical Decision Making:  Today's Assessment / Status / Plan:   1. HFrEF, Stage C, Class 1, LVEF 65% improved from 45%: Based on physical examination findings, patient is euvolemic. No JVD, lungs are clear to auscultation, no pitting edema in bilateral lower extremities, no ascites.  -Continue carvedilol 25 mg twice a day  -Continue lisinopril 40 mg daily  -Continue spironolactone 50 mg daily  -Reinforced s/sx of worsening heart failure with patient and weight monitoring. Pt verbalizes understanding. Pt to call office or RTC if present.     2.  Resistant hypertension: Followed by the hypertension clinic  -Pt will continue management by hypertension clinic, upcoming appointment with pharmacist next week  -Continue carvedilol 25 mg twice a day  -Continue amlodipine 10 mg  daily  -Continue clonidine patch 0.3 mg every 7 days  -Continue doxazosin 1 mg daily  -Continue lisinopril 40 mg daily  -Continue spironolactone 50 mg daily  -Monitor and log Blood pressures at home. Call office or RTC if BP increasing or >180/100 or if symptoms of elevated blood pressure present. Reviewed s/sx of stroke and heart attack. Patient to go to ER or call 911 if present.   -Patient reassures me that he is taking his medications as directed.    3.  Dyslipidemia/carotid stenosis: Last LDL 96 on 1210 9518 10/24/2018  -Patient to be assisted to schedule carotid study before he leaves office today  -Continue rosuvastatin 40 mg daily  -Continue aspirin 81 mg daily  -Continue follow-up with vascular NP    FU in clinic in 3 months with Dr. Marie. Sooner if needed.    Patient verbalizes understanding and agrees with the plan of care.     Collaborating MD: Rajan Cesar MD

## 2019-03-13 NOTE — TELEPHONE ENCOUNTER
[facility]    Called [facility], pt has not filled lisinopril or amlodipine. Called pt to reschedule next follow up, scheduled for next week.     Ulysses Godinez, PharmD

## 2019-03-14 ENCOUNTER — HOSPITAL ENCOUNTER (OUTPATIENT)
Dept: LAB | Facility: MEDICAL CENTER | Age: 54
End: 2019-03-14
Attending: FAMILY MEDICINE
Payer: COMMERCIAL

## 2019-03-14 DIAGNOSIS — E78.5 DYSLIPIDEMIA: ICD-10-CM

## 2019-03-14 DIAGNOSIS — I1A.0 RESISTANT HYPERTENSION: ICD-10-CM

## 2019-03-14 LAB
ALBUMIN SERPL BCP-MCNC: 3.9 G/DL (ref 3.2–4.9)
ALBUMIN/GLOB SERPL: 0.8 G/DL
ALP SERPL-CCNC: 123 U/L (ref 30–99)
ALT SERPL-CCNC: 15 U/L (ref 2–50)
ANION GAP SERPL CALC-SCNC: 8 MMOL/L (ref 0–11.9)
AST SERPL-CCNC: 19 U/L (ref 12–45)
BILIRUB SERPL-MCNC: 0.4 MG/DL (ref 0.1–1.5)
BUN SERPL-MCNC: 32 MG/DL (ref 8–22)
CALCIUM SERPL-MCNC: 9 MG/DL (ref 8.5–10.5)
CHLORIDE SERPL-SCNC: 109 MMOL/L (ref 96–112)
CHOLEST SERPL-MCNC: 189 MG/DL (ref 100–199)
CO2 SERPL-SCNC: 22 MMOL/L (ref 20–33)
CREAT SERPL-MCNC: 2.36 MG/DL (ref 0.5–1.4)
GLOBULIN SER CALC-MCNC: 5.2 G/DL (ref 1.9–3.5)
GLUCOSE SERPL-MCNC: 96 MG/DL (ref 65–99)
HDLC SERPL-MCNC: 39 MG/DL
LDLC SERPL CALC-MCNC: 111 MG/DL
POTASSIUM SERPL-SCNC: 3.9 MMOL/L (ref 3.6–5.5)
PROT SERPL-MCNC: 9.1 G/DL (ref 6–8.2)
SODIUM SERPL-SCNC: 139 MMOL/L (ref 135–145)
TRIGL SERPL-MCNC: 193 MG/DL (ref 0–149)

## 2019-03-14 PROCEDURE — 80061 LIPID PANEL: CPT

## 2019-03-14 PROCEDURE — 36415 COLL VENOUS BLD VENIPUNCTURE: CPT

## 2019-03-14 PROCEDURE — 80053 COMPREHEN METABOLIC PANEL: CPT

## 2019-03-20 ENCOUNTER — NON-PROVIDER VISIT (OUTPATIENT)
Dept: VASCULAR LAB | Facility: MEDICAL CENTER | Age: 54
End: 2019-03-20
Attending: INTERNAL MEDICINE
Payer: COMMERCIAL

## 2019-03-20 VITALS — HEART RATE: 75 BPM | SYSTOLIC BLOOD PRESSURE: 180 MMHG | DIASTOLIC BLOOD PRESSURE: 117 MMHG

## 2019-03-20 DIAGNOSIS — I10 ESSENTIAL HYPERTENSION, BENIGN: ICD-10-CM

## 2019-03-20 PROCEDURE — 99212 OFFICE O/P EST SF 10 MIN: CPT

## 2019-03-20 PROCEDURE — 99213 OFFICE O/P EST LOW 20 MIN: CPT

## 2019-03-20 NOTE — PROGRESS NOTES
Pharmacotherapy Hypertension Visit  03/20/19     Type of Visit:  Follow Up    Seamus Palencia has been referred for evaluation and management of hypertension    HPI:   Vitals:    03/20/19 1624 03/20/19 1632   BP: (!) 168/115 (!) 180/117   BP Location: Left arm Right arm   Pulse: 93 75     Both arms - in future use arm with higher reading    Age at Initial Diagnosis: >5 years ago      Home BP readings: States consistently >150/100  Any readings above  180/110:  Yes  Zonia symptoms of high blood pressure: pt denies    Current Prescription HTN Medications - including dose by provider with pill count today:    Dr Watt:  Lisinopril 40 mg daily 66/90 tabs  Amlodipine 10 mg daily 76/90 tabs  Doxazosin 1 mg daily 49/90 tabs    Gloria Cole APRN:  Chlorthalidone 25 mg daily 58/90 tabs    Dr High:  Furosemide 20 mg /180 tabs    Dr Marie:  Spironolactone 50 mg daily 67/90 tabs  Isosorbide 20 mg /270 tabs      Current side effects potentially related to antihypertensive medications (lightheaded, dizziness, leg swelling): reports some dizziness with lasix, but is transient and tolerable.  Pt to continue monitoring.     Current Adherence to Blood Pressure Medications: Historically completely non-adherent, however I will call [facility] to confirm  dates of medications.     Previusly attempted BP medications:   n/a    Any current interfering substances:     Any current lifestyle issues affecting BP:  None    In the past 6 months have pt had the following (if no, then order)  EKG  Microalbumin  Electrolytes and eGFR  TSH  CBC  Fasting lipid panel  Fasting glucose    Since last visit any of the below   *Creatinine increase > 0.5*  Potassium > 5 or < 3.5      Lab Results   Component Value Date/Time    SODIUM 139 03/14/2019 03:39 PM    POTASSIUM 3.9 03/14/2019 03:39 PM    CHLORIDE 109 03/14/2019 03:39 PM    CO2 22 03/14/2019 03:39 PM    GLUCOSE 96 03/14/2019 03:39 PM    BUN 32 (H) 03/14/2019 03:39 PM     CREATININE 2.36 (H) 03/14/2019 03:39 PM         Medications Reconciled    Pt was taking atorvastatin and rosuvastatin.  Requested pt to discontinue atorvastatin and discarded medication for pt.    CURRENT MEDICATIONS:   Current Outpatient Prescriptions:   •  BIKTARVY, 1 tablet, Oral, DAILY  •  chlorthalidone, 25 mg, Oral, QDAY  •  cloNIDine, 1 Patch, Transdermal, Q7 DAYS  •  doxazosin, 1 Tab, Oral, DAILY  •  lisinopril, 40 mg, Oral, DAILY, Taking  •  amLODIPine, 10 mg, Oral, DAILY, Taking  •  spironolactone, 50 mg, Oral, DAILY, Taking  •  carvedilol, 25 mg, Oral, BID WITH MEALS, Taking  •  rosuvastatin, 40 mg, Oral, DAILY, Taking  •  ASPIRIN LOW DOSE, 1 Tab, Oral, DAILY, Taking  •  acetaminophen, 650 mg, Oral, Q4HRS PRN, PRN  •  levETIRAcetam, 500 mg, Oral, Q12HRS, Taking  ALLERGIES: Patient has no known allergies.    SOCIAL HISTORY   History   Smoking Status   • Current Every Day Smoker   • Packs/day: 0.25   • Types: Cigarettes   Smokeless Tobacco   • Never Used     Comment: 3-4 CIGARETTES A DAY     Change in weight: Stable  Exercise habits: no regular exercise program, not interested in changing.   Diet: common adult      ASSESSMENT AND PLAN    BP is above goal of <130/80    At our last visit, Seamus only brought in two medications but at today's visit he brought in seven blood pressure medications in addition to other medications.  This may prompt me to think he is trying to be more adherent.  Reminded pt that we are planning on meeting every 2 weeks so that we can review his medications.  He forgot to bring his daily pill organizer again.  Stressed the importance of him bringing the organizer each time in addition to his medications.     The pill counts are promising, but I will be calling [facility] to find out the last fill dates for the above listed medications.      I am concerned about the rise in SCr, will discuss the issue with Dr Watt. Pt reports dramatic increase of urine production.  Uncertain if this is  due to furosemide.  Rise in SCr seems high for a typical increase due to ACE-I initiation, but possible multiple BP medication initiation?      BP Monitoring Recommendations - Home BP     Proper technique for blood pressure monitoring reviewed with patient.  Pt instructed to check BP at varying times through out the day 4 times per week.  Provided pt log for blood pressure monitoring and pt instructed to bring in at each visit for reviews    Medication recommendations from today's visit: Continue all previous medications, return in 2 weeks to assess for adherence.     Dr Watt:  Lisinopril 40 mg daily 66/90 tabs  Amlodipine 10 mg daily 76/90 tabs  Doxazosin 1 mg daily 49/90 tabs    Gloria GILLETTE:  Chlorthalidone 25 mg daily 58/90 tabs    Dr High:  Furosemide 20 mg /180 tabs    Dr Marie:  Spironolactone 50 mg daily 67/90 tabs  Isosorbide 20 mg /270 tabs    Studies Ordered at Todays Visit: None  Blood Work Ordered At Today’s visit: CMP    Follow-Up: 2 weeks     Ulysses Godinez, PharmD    CC:  Gloria Cole, LETA Watt

## 2019-03-27 ENCOUNTER — TELEPHONE (OUTPATIENT)
Dept: VASCULAR LAB | Facility: MEDICAL CENTER | Age: 54
End: 2019-03-27

## 2019-03-27 NOTE — TELEPHONE ENCOUNTER
[facility]    Called [facility] pharmacy and found the following fill dates for HTN medications.     Dr Watt:  Lisinopril 40 mg daily 66/90 tabs (3/14/19)  Amlodipine 10 mg daily 76/90 tabs (3/14/19)  Doxazosin 1 mg daily 49/90 tabs (2/26/19)     Gloria Cole APRN:  Chlorthalidone 25 mg daily 58/90 tabs (2/26/19)     Dr High:  Furosemide 20 mg /180 tabs (1/23/19)     Dr Marie:  Spironolactone 50 mg daily 67/90 tabs (November 2018)  Isosorbide 20 mg /270 tabs (3/1/19)    Requested pt to contact Dr Ray concerning increased renal indices and pt will obtain [facility] ASAP.     Ulysses Godinez, PharmD

## 2019-03-29 ENCOUNTER — TELEPHONE (OUTPATIENT)
Dept: VASCULAR LAB | Facility: MEDICAL CENTER | Age: 54
End: 2019-03-29

## 2019-03-29 NOTE — TELEPHONE ENCOUNTER
[facility]    Unable to reach pt to remind him to get labs ASAP.    Ulysses Godinez, PharmD

## 2019-04-02 ENCOUNTER — TELEPHONE (OUTPATIENT)
Dept: VASCULAR LAB | Facility: MEDICAL CENTER | Age: 54
End: 2019-04-02

## 2019-04-02 NOTE — TELEPHONE ENCOUNTER
[facility]     Unable to reach pt by phone.  Left message with emergency contact to get labs ASAP.    Ulysses Godinez, PharmD

## 2019-04-18 ENCOUNTER — OFFICE VISIT (OUTPATIENT)
Dept: VASCULAR LAB | Facility: MEDICAL CENTER | Age: 54
End: 2019-04-18
Attending: FAMILY MEDICINE
Payer: COMMERCIAL

## 2019-04-18 VITALS
BODY MASS INDEX: 25.27 KG/M2 | HEART RATE: 90 BPM | WEIGHT: 148 LBS | HEIGHT: 64 IN | DIASTOLIC BLOOD PRESSURE: 109 MMHG | SYSTOLIC BLOOD PRESSURE: 202 MMHG

## 2019-04-18 DIAGNOSIS — B20 HIV (HUMAN IMMUNODEFICIENCY VIRUS INFECTION) (HCC): ICD-10-CM

## 2019-04-18 DIAGNOSIS — I73.9 PAD (PERIPHERAL ARTERY DISEASE) (HCC): ICD-10-CM

## 2019-04-18 DIAGNOSIS — Z86.73 HISTORY OF CVA (CEREBROVASCULAR ACCIDENT): ICD-10-CM

## 2019-04-18 DIAGNOSIS — I10 HTN (HYPERTENSION), MALIGNANT: ICD-10-CM

## 2019-04-18 DIAGNOSIS — E78.00 HYPERCHOLESTEROLEMIA: ICD-10-CM

## 2019-04-18 DIAGNOSIS — I77.9 BILATERAL CAROTID ARTERY DISEASE, UNSPECIFIED TYPE (HCC): ICD-10-CM

## 2019-04-18 DIAGNOSIS — Z91.199 PERSONAL HISTORY OF NONADHERENCE TO MEDICAL TREATMENT: ICD-10-CM

## 2019-04-18 PROCEDURE — 99214 OFFICE O/P EST MOD 30 MIN: CPT | Performed by: FAMILY MEDICINE

## 2019-04-18 PROCEDURE — 99212 OFFICE O/P EST SF 10 MIN: CPT | Performed by: FAMILY MEDICINE

## 2019-04-18 RX ORDER — FUROSEMIDE 20 MG/1
20 TABLET ORAL DAILY
Status: SHIPPED | DISCHARGE
Start: 2019-04-18 | End: 2019-04-18

## 2019-04-18 RX ORDER — TORSEMIDE 10 MG/1
10 TABLET ORAL DAILY
Qty: 90 TAB | Refills: 3 | Status: SHIPPED
Start: 2019-04-18 | End: 2020-04-12

## 2019-04-18 RX ORDER — ISOSORBIDE DINITRATE 20 MG/1
20 TABLET ORAL 3 TIMES DAILY
Qty: 270 TAB | Refills: 3 | Status: SHIPPED | DISCHARGE
Start: 2019-04-18 | End: 2019-09-11 | Stop reason: SDUPTHER

## 2019-04-18 RX ORDER — DOXAZOSIN MESYLATE 4 MG/1
4 TABLET ORAL DAILY
Qty: 90 TAB | Refills: 3 | Status: SHIPPED
Start: 2019-04-18 | End: 2020-04-12

## 2019-04-18 ASSESSMENT — ENCOUNTER SYMPTOMS
BLOOD IN STOOL: 0
WEAKNESS: 0
MYALGIAS: 0
DOUBLE VISION: 0
WHEEZING: 0
ABDOMINAL PAIN: 0
VOMITING: 0
BLURRED VISION: 0
DIZZINESS: 0
HEADACHES: 0
INSOMNIA: 0
TREMORS: 0
COUGH: 0
HEMOPTYSIS: 0
FEVER: 0
SEIZURES: 0
DEPRESSION: 0
DIARRHEA: 0
PALPITATIONS: 0
BRUISES/BLEEDS EASILY: 0
FOCAL WEAKNESS: 0
NAUSEA: 0
SORE THROAT: 0
CHILLS: 0
NERVOUS/ANXIOUS: 0
ORTHOPNEA: 0
SHORTNESS OF BREATH: 0

## 2019-04-18 NOTE — PROGRESS NOTES
RESISTANT HTN CLINIC - FOLLOW-UP VISIT   4/18/19    Assessment / Plan:   1. Blood Pressure Control:  Office BP Goal Based ACC/AHA (2017) goal <130/80, considering difficulty with current case and complexities of care would accept <140-150/90 as initial tx goal   Meets criteria for resistant HTN.  Pseudoresistance due to non-compliance. Biktarvy and Triumeq does not carry ADR of elevated BP.  Lengthy hx of uncontrolled, severe HTN w/o indications of end-organ damage.    Home BP at goal:  no  Office BP at goal:  no  Plan:   - continue home BP monitoring, reviewed correct technique:  Yes   - order 24h ABPM:  Completed 10/31/18, wake = 190/118 with blunted nocturnal dip    2. Work up of Secondary Causes of Resistant Hypertension:   Renovascular HTN: Excluded, renal artery duplex negative   Primary Aldosteronism: Excluded, ARR <20, normal PRA and ainsley, 9/2018   Thyroid Function: Excluded, normal TSH 9/2018  Obstructive Sleep Apnea: Not Yet Assessed, referral for sleep eval placed 12/6/18  Pheochromocytoma: Excluded, normal screening 9/2018  Other:  Recommended for eval for the TRIO Radiance-HTN study and will review case with Dr. Bloch, appears to meet inclusion criteria, needs to have ROB ruled-out   Recommendations At This Visit: consider TRIO trial in the future but adherence is a bid concern and relative contraindication to this trial         3. Medication Use / Adherence:  Assessment: Non-Adherent  Recommendations: Recommend to continue adherence to all medications   - had multiple visits with pharmacist  - pharmacist to contact [facility] pharmacy to review med lists and verify patient has at least picked up all meds including clonidine patch and isosorbide   - Compliance issues leading to pseudoresistance is a challenge for this case despite our best efforts with pharmacotherapy adherence visits with pharmD regularly.  - continue with pharmacotherapy regularly to review meds, complete adherence evaluation and  continue medication titration as per tx plan below   - may require psychologist to assess for root causes of non-adherence     List of meds with pill count and fill dates from [facility] pharmacy as of 3/27/19:  Dr Watt:  Lisinopril 40 mg daily 66/90 tabs (3/14/19)  Amlodipine 10 mg daily 76/90 tabs (3/14/19)  Doxazosin 1 mg daily 49/90 tabs (2/26/19)  Missing: clonidine patch, carvedilol 25mg BID       Gloria GILLETTE:  Chlorthalidone 25 mg daily 58/90 tabs (2/26/19)     Dr High:  Furosemide 20 mg /180 tabs (1/23/19) - added to Epic med list     Dr Marie:  Spironolactone 50 mg daily 67/90 tabs (November 2018)  Isosorbide 20 mg /270 tabs (3/1/19) - added to Epic med list         4. End Organ Damage:   Left Ventricular Hypertrophy: Absent on  Echocardiogram Date: 8/2017  Albuminuria: Macroalbuminuria on Date: 9/2018  Renal Function: Chronic Kidney Disease  Stage 3b/A3, 9/2018  (may preclude from TRIO) - sees nephrology  Established Cardiovascular Disease: Present: hx of CVA    5. Lifestyle Recommendations:  Advised to engage in walking 1.5mi daily at the [employer] [place]    6. Standard HTN Pharmacotherapy:  ACEI/ARB: continue lisinopril 40mg daily (1st agent)  Thiazide Type Diuretic: stop chlorthalidone due to gfr <30  Calcium Channel Blocker (CCB): continue amlodipine 10mg daily (2nd)    7. Additional Agents:  Aldosterone Antagonist: continue spironolactone 50mg, monitor labs closely  Loop Diuretic: start torsemide 10mg daily, stop furosemide  Peripheral Alpha Blocker: increase doxazosin to 4mg QHS  Other CCB:  Avoid due to use of carvedilol and atorvastatin   Direct Vasodilator: isosorbide 20mg TID   Centrally Acting Alpha Agonist:  Continue clonidine 0.3mg weekly   B-blocker:  Currently off  carvedilol 25mg BID - updated med list  Other: not good candidate for minoxidil due to non-adherence and high potential for ADRs due to renal function      8. Other CV Risk Factors:   Lipids:  NLA Risk  Category:   Very high:  Evidence of ASCVD or T1/T2D with 2 or more RFs or evidence of end-org damage (CKD, ACR>29, retinopathy)  Tx threshold:  non-HDL-C >99, LDL-C >69  Tx goal: non-HDL-C <100,  LDL-C <70  (optional: apoB <80)  At goal:  No, per 10/2018  Tx plan:   - continue rosuvastatin 40mg   - update labs     Smoking: reviewed d/c of cigarettes, pt is contemplative, reviewed tobacco cessation program and other resources     Antiplatelet Therapy: continue ASA 81mg daily     9. Other Issues:  1) HIV - unknown viral load, does not appear to have AIDS-defining illness  - defer mgmt to [facility]     2) hx of CVA  - continue optimal management with aggressive BP lowering, statin, antiplat  - monitor for s/s of CVA and seek prompt attention at ED     3) abnormal TSH, resolved, normal TSH     4) carotid artery stenosis, R > L.  50-69% right ICA stenosis  <50% left ICA stenosis by 2015 duplex  - recommend interval f/u, pt has been non-adherent and difficult to contact for scheduling  - MA to assist with scheduling     Studies Ordered At This Visit: carotid duplex ASAP  Blood Work to Be Obtained Prior to Next Visit:  BMP in 3 weeks  Disposition:    1) 4 weeks and ongoing - pharmacotherapy follow-up for ongoing med titrations and continued contact with [facility] pharmacy to review adherence.   2) 3mo with me     Diagnosis:  1. HTN (hypertension), malignant  Basic Metabolic Panel   2. HIV (human immunodeficiency virus infection) (HCC)     3. Personal history of nonadherence to medical treatment     4. History of CVA (cerebrovascular accident)     5. Bilateral carotid artery disease, unspecified type (HCC)     6. PAD (peripheral artery disease) (HCC)     7. Hypercholesterolemia          History of Present Illness:   Seamus Palencia seen for evaluation of resistant HTN and management.     Seamus Palencia is referred by: Gloria Cole, PORTILLORMeghnaNMeghna. (cardilogy), [facility] PCP     Current HTN concerns:  None, has been working with Ulysses  Dent, PharmD, for pharmacotx visit.  Has list of adherence and fill dates above.   ADRs:  None   Recent lab/studies:  Pending carotid duplex   HTN sx:  No current blurred or changed vision, chest pain, shortness of breath, headache, nausea, dizziness/vertigo   Home BP log:  Arm cuff - 180-200/100-120  24h ABPM:  Completed 10/31/18    Adherence to current HTN meds:  Med list differs from Epic list, however pill counts are reassuring    Antiplatelet/anticoagulation:   Yes, Details: ASA 81mg , no bleeding/bruising     Hyperlipidemia:    Stable, no current concerns  Current treatment: High intensity statin  rosuva 40mg   Myalgias? No  Other adverse drug reactions? No  Lipid profile:   LDL (mg/dL)   Date Value   10/24/2018 96   2018 97     HDL (mg/dL)   Date Value   10/24/2018 38 (A)   2018 37 (A)     Key HTN History:  Has been seeing cardiology (Dr. Marie) and nephrology (DR. Ray, 2018) and had worsening GFR  Pertinent HTN hx:   Age at HTN dx:   23 years     Antihypertensive medication review:    RAAS-mediators:   1) ACEI:  Lisinopril 40mg, no cough   2) ARB: No   3) DRI: No  CCBs:   1) DHP: amlodipine 10mg, no leg swelling    2) non-DHP:  No  Diuretics:   1) Thiazide-type diuretic: chlorthalidone 25mg, no issues    2) Loop diuretic:  No   3) Aldosterone antagonist: spironolactone 50mg daily   4) K+ - sparing diuretics: No  Beta-blockers:   1) cardioselective (atenolol, metoprolol): No    2) cardioselective/vasodilatory (nebivolol): No   3) non-cardioselective (propranolol): No   4) combined alpha/beta (carvedilol, labetolol): carvedilol 25mg BID   5) intrinsic sympathomimetic (generally not recommended): No  Peripheral alpha-blocker:  No  Central-actin) clonidine: 0.2mg BID   2) methyldopa/guanfacine: No  Direct vasodilator:    1) hydralazine: hydralazine 100mg TID   2) minoxidil:  No   3) isodil:  20mg BID    HTN crises:   Multiple visits, hx of CVA  ASCVD risk factors:     DM: No   CKD:  Yes,  Details: currently G3a, A?   Lifestyle factors:     High salt: No, no salt    EtOH: No, no stimulants or drugs   Interfering substances:     NSAIDs: No    Decongestants. No    ASCVD calculator (contraindicated if ASCVD+, [id number])   10yr-risk calc: n/a    Clinical evidence of ASCVD:     1) hx of MI/ACS:  No   2) coronary or other revascularization procedure: No   3) TIA/ischemic CVA: Yes, Details: 2013, see MRI brain, no residual sx    4) PAD (including ADRIAN <0.9): No   5) documented (CAD, Renal athero, AA due to athero, carotid plaque >50% stenosis: Yes, carotid stenosis     Major RFs:     1) Age (Men>44, women>54):  yes   2) Fhx early CAD (<55 men, <65 women):  no   3) cigarette smoking:  Yes, Details: as noted    4) high BP >139/89 or on tx: yes   5) Low HDL-C (<40 men, <50 women):  no     Social History:     Social History     Social History   • Marital status: Single     Spouse name: N/A   • Number of children: N/A   • Years of education: N/A     Social History Main Topics   • Smoking status: Current Every Day Smoker     Packs/day: 0.25     Types: Cigarettes   • Smokeless tobacco: Never Used      Comment: 3-4 CIGARETTES A DAY   • Alcohol use No   • Drug use: No   • Sexual activity: Not on file     Other Topics Concern   • Not on file     Social History Narrative   • No narrative on file      Review of Systems:   Review of Systems   Constitutional: Negative for chills, fever and malaise/fatigue.   HENT: Negative for nosebleeds, sore throat and tinnitus.    Eyes: Negative for blurred vision and double vision.   Respiratory: Negative for cough, hemoptysis, shortness of breath and wheezing.    Cardiovascular: Negative for chest pain, palpitations, orthopnea and leg swelling.   Gastrointestinal: Negative for abdominal pain, blood in stool, diarrhea, melena, nausea and vomiting.   Genitourinary: Negative for hematuria.   Musculoskeletal: Negative for joint pain and myalgias.   Skin: Negative for itching and rash.  "  Neurological: Negative for dizziness, tremors, focal weakness, seizures, weakness and headaches.   Endo/Heme/Allergies: Does not bruise/bleed easily.   Psychiatric/Behavioral: Negative for depression. The patient is not nervous/anxious and does not have insomnia.         Objective:   Allergies:  Patient has no known allergies.    Vitals:    04/18/19 1418 04/18/19 1426   BP: (!) 218/118 (!) 202/109   BP Location: Left arm Left arm   Patient Position: Sitting Sitting   BP Cuff Size: Adult Adult   Pulse: 93 90   Weight: 67.1 kg (148 lb)    Height: 1.626 m (5' 4\")      Body mass index is 25.4 kg/m².   BMI Readings from Last 4 Encounters:   04/18/19 25.40 kg/m²   03/13/19 25.54 kg/m²   01/17/19 24.55 kg/m²   12/13/18 25.06 kg/m²      BP Readings from Last 4 Encounters:   04/18/19 (!) 202/109   03/20/19 (!) 180/117   03/13/19 (!) 218/121   02/21/19 (!) 226/146        Outpatient Encounter Prescriptions as of 4/18/2019   Medication Sig Dispense Refill   • isosorbide dinitrate (ISORDIL) 20 MG Tab Take 1 Tab by mouth 3 times a day for 360 days. 270 Tab 3   • torsemide (DEMADEX) 10 MG tablet Take 1 Tab by mouth every day for 360 days. 90 Tab 3   • doxazosin (CARDURA) 4 MG Tab Take 1 Tab by mouth every day for 360 days. 90 Tab 3   • BIKTARVY -25 MG Tab Take 1 tablet by mouth every day. TAKE ONE TABLET BY MOUTH DAILY  3   • cloNIDine (CATAPRES) 0.3 MG/24HR PATCH WEEKLY Apply 1 Patch to skin as directed every 7 days.  3   • lisinopril (PRINIVIL, ZESTRIL) 40 MG tablet Take 1 Tab by mouth every day. 90 Tab 1   • amLODIPine (NORVASC) 10 MG Tab Take 1 Tab by mouth every day. 90 Tab 1   • spironolactone (ALDACTONE) 50 MG Tab Take 1 Tab by mouth every day for 360 days. 90 Tab 3   • carvedilol (COREG) 25 MG Tab Take 1 Tab by mouth 2 times a day, with meals. 360 Tab 3   • rosuvastatin (CRESTOR) 40 MG tablet Take 1 Tab by mouth every day for 360 days. 90 Tab 3   • ASPIRIN LOW DOSE 81 MG EC tablet Take 1 Tab by mouth every day.  2 "   • acetaminophen (TYLENOL) 325 MG Tab Take 650 mg by mouth every four hours as needed.     • levetiracetam (KEPPRA) 500 MG Tab Take 1 Tab by mouth every 12 hours. 60 Tab 3   • [DISCONTINUED] furosemide (LASIX) 20 MG Tab Take 1 Tab by mouth every day.     • [DISCONTINUED] chlorthalidone (HYGROTON) 25 MG Tab Take 25 mg by mouth every day.  11   • [DISCONTINUED] doxazosin (CARDURA) 1 MG Tab Take 1 Tab by mouth every day. TAKE ONE TABLET BY MOUTH DAILY  3     No facility-administered encounter medications on file as of 4/18/2019.      Physical Exam   Constitutional: He is oriented to person, place, and time. He appears well-developed and well-nourished. He is cooperative. No distress.   HENT:   Head: Normocephalic and atraumatic.   Mouth/Throat: Oropharynx is clear and moist and mucous membranes are normal.   Eyes: Pupils are equal, round, and reactive to light. Conjunctivae are normal.   Neck: Trachea normal and normal range of motion. Neck supple. Normal carotid pulses and no JVD present. Carotid bruit is not present. No thyromegaly present.   Cardiovascular: Normal rate, regular rhythm, normal heart sounds and intact distal pulses.  Exam reveals no gallop and no friction rub.    No murmur heard.  Pulses:       Carotid pulses are 2+ on the right side, and 2+ on the left side.       Radial pulses are 2+ on the right side, and 2+ on the left side.        Dorsalis pedis pulses are 2+ on the right side, and 2+ on the left side.        Posterior tibial pulses are 2+ on the right side, and 2+ on the left side.   Edema:    RLE: none     LLE: none   Spider telangectasia:       RLE:  None      LLE: none   Varicosities:         RLE: none      LLE: none   Corona phlebectatica:      RLE:  None        LLE:  None   Cording:         RLE:  None     LLE: None    Pulmonary/Chest: Effort normal and breath sounds normal. No respiratory distress.   Abdominal: Soft. Bowel sounds are normal. He exhibits no distension and no mass. There is  no hepatosplenomegaly. There is no tenderness. There is no rebound and no guarding.   Musculoskeletal: He exhibits no edema.   Lymphadenopathy:     He has no cervical adenopathy.   Neurological: He is alert and oriented to person, place, and time. No cranial nerve deficit. Coordination and gait normal.   Skin: Skin is warm and dry. He is not diaphoretic. No cyanosis. No pallor. Nails show no clubbing.   Psychiatric: He has a normal mood and affect.        Accessory Clinical Findings:   Echocardiogram:  Results for orders placed or performed during the hospital encounter of 17   ECHOCARDIOGRAM COMP W/O CONT   Result Value Ref Range    Eject.Frac. MOD BP 68.07     Eject.Frac. MOD 4C 71.33     Eject.Frac. MOD 2C 62.08     Left Ventrical Ejection Fraction 65       Lab Results   Component Value Date    CHOLSTRLTOT 189 2019     (H) 2019    HDL 39 (A) 2019    TRIGLYCERIDE 193 (H) 2019           Lab Results   Component Value Date    SODIUM 139 2019    POTASSIUM 3.9 2019    CHLORIDE 109 2019    CO2 22 2019    GLUCOSE 96 2019    BUN 32 (H) 2019    CREATININE 2.36 (H) 2019      Lab Results   Component Value Date    ALDOSTERONE 6.1 2018      Lab Results   Component Value Date    URCREAT 197.40 2018    MICROALBUR 655.7 2018    MALBCRT 3322 (H) 2018    METANEPHPL 0.26 2018     Lab Results   Component Value Date    STMTRPT  2018     [facility]    Test Date:  2018  Pt Name:    SONI ABBASI                  Department: [facility]  MRN:        [id number]                      Room:  Gender:     Male                         Technician: ANYI  :        1965                   Requested By:MARISELA MANE  Order #:    [id number]                    Reading MD: Asiya Preciado MD    Measurements  Intervals                                Axis  Rate:       83                           P:          42  IL:         157                           QRS:        45  QRSD:       95                           T:          205  QT:         382  QTc:        449    Interpretive Statements  Sinus rhythm  Probable left atrial enlargement  NON-SPECIFIC ST-T CHANGES  Compared to ECG 08/17/2016 21:09:42  T-wave abnormality no longer present  Possible ischemia no longer present    Electronically Signed On 9- 20:48:03 PDT by Asiya Preciado MD       VASCULAR IMAGING:     CT abd 8/2013  Low-attenuation liver.  Normal size spleen.  No pancreatic mass identified.  No hydronephrosis or urolithiasis.  No adrenal gland masses identified.  Surgical absence gallbladder.  No evidence of bowel obstruction.  Appendix not delineated.  No aneurysmal dilatation aorta.  No retroperitoneal adenopathy identified.  Mild mesenteric and retroperitoneal edema.  Diffuse subcutaneous edema.    Carotid duplex 12/27/15  50-69% right ICA stenosis  <50% left ICA stenosis  Right vertebral not seen  Antegrade left vertebral  Nl subclavians    Echo 12/27/15  Left ventricular ejection fraction is visually estimated to be 45%.  Septum hyopkinesis, Basal inferiolateral and basal to mid anterior wall   akinesis, grade III diastolic dysfunction.  Mild concentric left ventricular hypertrophy.  Small pericardial effusion without evidence of hemodynamic compromise.  Estimated right ventricular systolic pressure is 60 mmHg.  Moderate mitral regurgitation.  Normal inferior vena cava size and inspiratory collapse.  No prior study is available for comparison.     MR brain 1/2016  Multifocal areas of restricted diffusion in the right frontal, parietal, temporal and occipital lobes and rodney likely representing subacute infarcts. There has been no significant interval change. In view of presence of subcortical edema and multiple   arterial distribution postcontrast MRI was recommended to rule out any underlying enhancing pathology. However the patient refused to undergo contrast-enhanced study.  Followup study is recommended with and without contrast.    CT head 8/17/18  1. No acute intracranial abnormality. No evidence of acute hemorrhage or mass lesion.  2. Prior infarct in the right posterior parietal and occipital lobe. Please note, hyperacute ischemia can remain occult on CT. If ischemia is clinically suspected, MRI is suggested for further evaluation.    MRI brain 8/2016  1. Multifocal areas of chronic gray matter infarcts in the right frontal, parietal and occipital lobes and rodney. Minimal petechial hemorrhage is noted in the occipital infarct.  2. There is no acute infarct.  3. Mild-to-moderate cerebral atrophy.  4. Mucosal thickening in the bilateral maxillary sinuses and right mastoid air cells.    Echo 8/2017  No prior study is available for comparison.   Normal left ventricular systolic function.   No evidence of valvular abnormality based on Doppler evaluation.     Renal art duplex 11/2017   Normal bilateral renal arterial study with the exception that the right   kidney  is on the small side of normal.    Carotid duplex - pending        Ulysses Watt M.D.

## 2019-09-11 ENCOUNTER — OFFICE VISIT (OUTPATIENT)
Dept: CARDIOLOGY | Facility: MEDICAL CENTER | Age: 54
End: 2019-09-11
Payer: COMMERCIAL

## 2019-09-11 VITALS
BODY MASS INDEX: 24.24 KG/M2 | OXYGEN SATURATION: 98 % | DIASTOLIC BLOOD PRESSURE: 120 MMHG | HEART RATE: 106 BPM | WEIGHT: 142 LBS | HEIGHT: 64 IN | SYSTOLIC BLOOD PRESSURE: 220 MMHG

## 2019-09-11 DIAGNOSIS — Z91.199 NON-COMPLIANCE: ICD-10-CM

## 2019-09-11 DIAGNOSIS — I50.30 ACC/AHA STAGE C HEART FAILURE WITH PRESERVED EJECTION FRACTION (HCC): ICD-10-CM

## 2019-09-11 DIAGNOSIS — I10 ESSENTIAL HYPERTENSION: ICD-10-CM

## 2019-09-11 DIAGNOSIS — I50.9 HEART FAILURE, NYHA CLASS 1 (HCC): ICD-10-CM

## 2019-09-11 DIAGNOSIS — I65.23 BILATERAL CAROTID ARTERY STENOSIS: ICD-10-CM

## 2019-09-11 DIAGNOSIS — I10 HTN (HYPERTENSION), MALIGNANT: ICD-10-CM

## 2019-09-11 DIAGNOSIS — Z79.899 HIGH RISK MEDICATION USE: ICD-10-CM

## 2019-09-11 DIAGNOSIS — Z86.73 HISTORY OF CVA (CEREBROVASCULAR ACCIDENT): ICD-10-CM

## 2019-09-11 DIAGNOSIS — I16.0 HYPERTENSIVE URGENCY: ICD-10-CM

## 2019-09-11 DIAGNOSIS — I51.89 LEFT VENTRICULAR DIASTOLIC DYSFUNCTION, NYHA CLASS 1: ICD-10-CM

## 2019-09-11 LAB — EKG IMPRESSION: NORMAL

## 2019-09-11 PROCEDURE — 93000 ELECTROCARDIOGRAM COMPLETE: CPT | Performed by: INTERNAL MEDICINE

## 2019-09-11 PROCEDURE — 99215 OFFICE O/P EST HI 40 MIN: CPT | Performed by: INTERNAL MEDICINE

## 2019-09-11 RX ORDER — AMLODIPINE BESYLATE 10 MG/1
10 TABLET ORAL DAILY
Qty: 90 TAB | Refills: 1 | Status: SHIPPED
Start: 2019-09-11 | End: 2020-07-11

## 2019-09-11 RX ORDER — LISINOPRIL 40 MG/1
40 TABLET ORAL DAILY
Qty: 90 TAB | Refills: 1 | Status: SHIPPED | OUTPATIENT
Start: 2019-09-11 | End: 2020-01-07 | Stop reason: SDUPTHER

## 2019-09-11 RX ORDER — CARVEDILOL 25 MG/1
25 TABLET ORAL 2 TIMES DAILY WITH MEALS
Qty: 360 TAB | Refills: 3 | Status: SHIPPED | OUTPATIENT
Start: 2019-09-11 | End: 2020-07-11 | Stop reason: SDUPTHER

## 2019-09-11 RX ORDER — SPIRONOLACTONE 50 MG/1
50 TABLET, FILM COATED ORAL DAILY
Qty: 90 TAB | Refills: 3 | Status: SHIPPED
Start: 2019-09-11 | End: 2020-07-11

## 2019-09-11 RX ORDER — ISOSORBIDE DINITRATE 20 MG/1
20 TABLET ORAL 3 TIMES DAILY
Qty: 270 TAB | Refills: 3 | Status: SHIPPED
Start: 2019-09-11 | End: 2020-07-11

## 2019-09-11 RX ORDER — ROSUVASTATIN CALCIUM 40 MG/1
40 TABLET, COATED ORAL DAILY
Qty: 90 TAB | Refills: 3 | Status: ON HOLD
Start: 2019-09-11 | End: 2020-07-10

## 2019-09-11 ASSESSMENT — ENCOUNTER SYMPTOMS
SENSORY CHANGE: 0
NAUSEA: 0
CLAUDICATION: 0
BLOOD IN STOOL: 0
DEPRESSION: 0
SPEECH CHANGE: 0
BRUISES/BLEEDS EASILY: 0
DIZZINESS: 0
COUGH: 0
BLURRED VISION: 0
ORTHOPNEA: 0
LOSS OF CONSCIOUSNESS: 0
FEVER: 0
ABDOMINAL PAIN: 0
PALPITATIONS: 0
HEADACHES: 0
EYE PAIN: 0
MYALGIAS: 0
VOMITING: 0
HALLUCINATIONS: 0
FALLS: 0
DOUBLE VISION: 0
PND: 0
EYE DISCHARGE: 0
WEIGHT LOSS: 0
CHILLS: 0
SHORTNESS OF BREATH: 0

## 2019-09-11 NOTE — PROGRESS NOTES
Chief Complaint   Patient presents with   • CHF (Chronic)       Subjective:   Seamus Palencia is a 54 y.o. male who presents today for cardiac care and evaluation in our heart failure clinic as a referral from [facility]. Patient does have history of hypertension, HIV infection, history of stroke. Patient is actually a poor historian. He does not know what medication he is taking. He does not know the details of his medical history. He doesn't have any residual neurological deficits however. He does report of shortness of breath if walking upstairs. No symptom at rest or with daily living activities. He did have a transthoracic echocardiogram done in 2015 which shows LV function of 45%. No significant valvular disease seems at that time.     12/2018 Patient was able to complete 396 m during his 6 minute walk test. his O2 saturation at baseline was 99% and at the end of the test, the O2 saturation was 100%. he reported 3 level of dyspnea on Danny scale.     LV function has improved and normalized after medical therapy but he is not taking his medications consistently since 2018.     Today his blood pressure is not controlled again. I suspect the patient is not taking his medications. Even though patient says that he has been taking all of his medications. In the past, I had asked him to come back to our clinic with the actual bottles of his medications. Again today, patient does not have his bottles with him. Nothing really changed since last visit.    Past Medical History:   Diagnosis Date   • Heart failure (HCC)    • HIV disease (HCC) 1995   • Hypertension    • Seizure (HCC)    • Stroke (HCC)      Past Surgical History:   Procedure Laterality Date   • CHOLECYSTECTOMY  1980s     Family History   Problem Relation Age of Onset   • Diabetes Mother         cause of death   • Diabetes Father         cause of death   • Diabetes Sister    • Other Brother         down syndrome   • No Known Problems Sister    • No Known  Problems Sister    • No Known Problems Sister    • Other Daughter         5 daughters, 11 sons    • Clotting Disorder Neg Hx    • Stroke Neg Hx      Social History     Socioeconomic History   • Marital status: Single     Spouse name: Not on file   • Number of children: Not on file   • Years of education: Not on file   • Highest education level: Not on file   Occupational History   • Not on file   Social Needs   • Financial resource strain: Not on file   • Food insecurity:     Worry: Not on file     Inability: Not on file   • Transportation needs:     Medical: Not on file     Non-medical: Not on file   Tobacco Use   • Smoking status: Current Every Day Smoker     Packs/day: 0.25     Types: Cigarettes   • Smokeless tobacco: Never Used   • Tobacco comment: 3-4 CIGARETTES A DAY   Substance and Sexual Activity   • Alcohol use: No   • Drug use: No   • Sexual activity: Not on file   Lifestyle   • Physical activity:     Days per week: Not on file     Minutes per session: Not on file   • Stress: Not on file   Relationships   • Social connections:     Talks on phone: Not on file     Gets together: Not on file     Attends Jainism service: Not on file     Active member of club or organization: Not on file     Attends meetings of clubs or organizations: Not on file     Relationship status: Not on file   • Intimate partner violence:     Fear of current or ex partner: Not on file     Emotionally abused: Not on file     Physically abused: Not on file     Forced sexual activity: Not on file   Other Topics Concern   • Not on file   Social History Narrative   • Not on file     No Known Allergies  Outpatient Encounter Medications as of 9/11/2019   Medication Sig Dispense Refill   • amLODIPine (NORVASC) 10 MG Tab Take 1 Tab by mouth every day. 90 Tab 1   • isosorbide dinitrate (ISORDIL) 20 MG Tab Take 1 Tab by mouth 3 times a day for 360 days. 270 Tab 3   • carvedilol (COREG) 25 MG Tab Take 1 Tab by mouth 2 times a day, with meals. 360  Tab 3   • lisinopril (PRINIVIL, ZESTRIL) 40 MG tablet Take 1 Tab by mouth every day. 90 Tab 1   • rosuvastatin (CRESTOR) 40 MG tablet Take 1 Tab by mouth every day for 360 days. 90 Tab 3   • spironolactone (ALDACTONE) 50 MG Tab Take 1 Tab by mouth every day for 360 days. 90 Tab 3   • torsemide (DEMADEX) 10 MG tablet Take 1 Tab by mouth every day for 360 days. 90 Tab 3   • doxazosin (CARDURA) 4 MG Tab Take 1 Tab by mouth every day for 360 days. 90 Tab 3   • BIKTARVY -25 MG Tab Take 1 tablet by mouth every day. TAKE ONE TABLET BY MOUTH DAILY  3   • cloNIDine (CATAPRES) 0.3 MG/24HR PATCH WEEKLY Apply 1 Patch to skin as directed every 7 days.  3   • ASPIRIN LOW DOSE 81 MG EC tablet Take 1 Tab by mouth every day.  2   • acetaminophen (TYLENOL) 325 MG Tab Take 650 mg by mouth every four hours as needed.     • levetiracetam (KEPPRA) 500 MG Tab Take 1 Tab by mouth every 12 hours. 60 Tab 3   • [DISCONTINUED] isosorbide dinitrate (ISORDIL) 20 MG Tab Take 1 Tab by mouth 3 times a day for 360 days. 270 Tab 3   • [DISCONTINUED] lisinopril (PRINIVIL, ZESTRIL) 40 MG tablet Take 1 Tab by mouth every day. 90 Tab 1   • [DISCONTINUED] amLODIPine (NORVASC) 10 MG Tab Take 1 Tab by mouth every day. 90 Tab 1   • [DISCONTINUED] spironolactone (ALDACTONE) 50 MG Tab Take 1 Tab by mouth every day for 360 days. 90 Tab 3   • [DISCONTINUED] carvedilol (COREG) 25 MG Tab Take 1 Tab by mouth 2 times a day, with meals. 360 Tab 3   • [DISCONTINUED] rosuvastatin (CRESTOR) 40 MG tablet Take 1 Tab by mouth every day for 360 days. 90 Tab 3     No facility-administered encounter medications on file as of 9/11/2019.      Review of Systems   Constitutional: Negative for chills, fever, malaise/fatigue and weight loss.   HENT: Negative for ear discharge, ear pain, hearing loss and nosebleeds.    Eyes: Negative for blurred vision, double vision, pain and discharge.   Respiratory: Negative for cough and shortness of breath.    Cardiovascular: Negative for  "chest pain, palpitations, orthopnea, claudication, leg swelling and PND.   Gastrointestinal: Negative for abdominal pain, blood in stool, melena, nausea and vomiting.   Genitourinary: Negative for dysuria and hematuria.   Musculoskeletal: Negative for falls, joint pain and myalgias.   Skin: Negative for itching and rash.   Neurological: Negative for dizziness, sensory change, speech change, loss of consciousness and headaches.   Endo/Heme/Allergies: Negative for environmental allergies. Does not bruise/bleed easily.   Psychiatric/Behavioral: Negative for depression, hallucinations and suicidal ideas.        Objective:   BP (!) 220/120 (BP Location: Left arm, Patient Position: Sitting, BP Cuff Size: Adult)   Pulse (!) 106   Ht 1.626 m (5' 4\")   Wt 64.4 kg (142 lb)   SpO2 98%   BMI 24.37 kg/m²     Physical Exam   Constitutional: He is oriented to person, place, and time. No distress.   HENT:   Head: Normocephalic and atraumatic.   Right Ear: External ear normal.   Left Ear: External ear normal.   Eyes: Right eye exhibits no discharge. Left eye exhibits no discharge.   Neck: No JVD present. No thyromegaly present.   Cardiovascular: Normal rate, regular rhythm, normal heart sounds and intact distal pulses. Exam reveals no gallop and no friction rub.   No murmur heard.  Pulmonary/Chest: Breath sounds normal. No respiratory distress.   Abdominal: Bowel sounds are normal. He exhibits no distension. There is no tenderness.   Musculoskeletal: He exhibits no edema or tenderness.   Neurological: He is alert and oriented to person, place, and time. No cranial nerve deficit.   Skin: Skin is warm and dry. He is not diaphoretic.   Psychiatric: He has a normal mood and affect. His behavior is normal.   Nursing note and vitals reviewed.      Assessment:     1. Hypertensive urgency  EKG    REFERRAL TO COMPLEX CARE MANAGEMENT Services Requested: Care Manager to Evaluate and Recommend   2. ACC/AHA stage C heart failure with " preserved ejection fraction (HCC)  REFERRAL TO COMPLEX CARE MANAGEMENT Services Requested: Care Manager to Evaluate and Recommend    carvedilol (COREG) 25 MG Tab    lisinopril (PRINIVIL, ZESTRIL) 40 MG tablet   3. Heart failure, NYHA class 1 (HCC)  REFERRAL TO COMPLEX CARE MANAGEMENT Services Requested: Care Manager to Evaluate and Recommend   4. History of CVA (cerebrovascular accident)  REFERRAL TO COMPLEX CARE MANAGEMENT Services Requested: Care Manager to Evaluate and Recommend   5. Bilateral carotid artery stenosis  REFERRAL TO COMPLEX CARE MANAGEMENT Services Requested: Care Manager to Evaluate and Recommend   6. High risk medication use  REFERRAL TO COMPLEX CARE MANAGEMENT Services Requested: Care Manager to Evaluate and Recommend   7. Non-compliance     8. Essential hypertension  amLODIPine (NORVASC) 10 MG Tab   9. HTN (hypertension), malignant  carvedilol (COREG) 25 MG Tab    lisinopril (PRINIVIL, ZESTRIL) 40 MG tablet   10. Left ventricular diastolic dysfunction, NYHA class 1  carvedilol (COREG) 25 MG Tab    lisinopril (PRINIVIL, ZESTRIL) 40 MG tablet       Medical Decision Making:  Today's Assessment / Status / Plan:   Patient is asymptomatic.  He still does not want to go to hospital.  Cont current medications at current dose.   Stress importance of taking them consistently.  Very difficult patient.  Will refer to complex care management as well.

## 2020-01-07 DIAGNOSIS — I51.89 LEFT VENTRICULAR DIASTOLIC DYSFUNCTION, NYHA CLASS 1: ICD-10-CM

## 2020-01-07 DIAGNOSIS — I50.30 ACC/AHA STAGE C HEART FAILURE WITH PRESERVED EJECTION FRACTION (HCC): ICD-10-CM

## 2020-01-07 DIAGNOSIS — I10 HTN (HYPERTENSION), MALIGNANT: ICD-10-CM

## 2020-01-07 RX ORDER — LISINOPRIL 40 MG/1
40 TABLET ORAL DAILY
Qty: 90 TAB | Refills: 3 | Status: ON HOLD
Start: 2020-01-07 | End: 2020-07-10

## 2020-02-06 ENCOUNTER — TELEPHONE (OUTPATIENT)
Dept: VASCULAR LAB | Facility: MEDICAL CENTER | Age: 55
End: 2020-02-06

## 2020-02-14 ENCOUNTER — TELEPHONE (OUTPATIENT)
Dept: VASCULAR LAB | Facility: MEDICAL CENTER | Age: 55
End: 2020-02-14

## 2020-02-18 ENCOUNTER — TELEPHONE (OUTPATIENT)
Dept: VASCULAR LAB | Facility: MEDICAL CENTER | Age: 55
End: 2020-02-18

## 2020-02-18 NOTE — TELEPHONE ENCOUNTER
Patient overdue for follow-up appt with vascular care.  Medical assistant has been unable to reach and reschedule  Multiple messages have been left  Await further patient contact.  Pending further contact, will defer all vascular care, including management of cardiac vascular risk factors, to PCP    Michael J. Bloch, MD  Vascular Care    Cc:  FRANCISCO Brown

## 2020-07-03 ENCOUNTER — HOSPITAL ENCOUNTER (INPATIENT)
Facility: MEDICAL CENTER | Age: 55
LOS: 7 days | DRG: 291 | End: 2020-07-10
Attending: EMERGENCY MEDICINE | Admitting: INTERNAL MEDICINE
Payer: COMMERCIAL

## 2020-07-03 ENCOUNTER — APPOINTMENT (OUTPATIENT)
Dept: RADIOLOGY | Facility: MEDICAL CENTER | Age: 55
DRG: 291 | End: 2020-07-03
Attending: EMERGENCY MEDICINE
Payer: COMMERCIAL

## 2020-07-03 ENCOUNTER — APPOINTMENT (OUTPATIENT)
Dept: RADIOLOGY | Facility: MEDICAL CENTER | Age: 55
DRG: 291 | End: 2020-07-03
Attending: INTERNAL MEDICINE
Payer: COMMERCIAL

## 2020-07-03 DIAGNOSIS — U07.1 COVID-19 VIRUS INFECTION: ICD-10-CM

## 2020-07-03 DIAGNOSIS — A41.9 SEPSIS WITH ACUTE RESPIRATORY FAILURE, DUE TO UNSPECIFIED ORGANISM, UNSPECIFIED WHETHER HYPOXIA OR HYPERCAPNIA PRESENT, UNSPECIFIED WHETHER SEPTIC SHOCK PRESENT (HCC): ICD-10-CM

## 2020-07-03 DIAGNOSIS — R65.20 SEPSIS WITH ACUTE RESPIRATORY FAILURE, DUE TO UNSPECIFIED ORGANISM, UNSPECIFIED WHETHER HYPOXIA OR HYPERCAPNIA PRESENT, UNSPECIFIED WHETHER SEPTIC SHOCK PRESENT (HCC): ICD-10-CM

## 2020-07-03 DIAGNOSIS — J96.00 SEPSIS WITH ACUTE RESPIRATORY FAILURE, DUE TO UNSPECIFIED ORGANISM, UNSPECIFIED WHETHER HYPOXIA OR HYPERCAPNIA PRESENT, UNSPECIFIED WHETHER SEPTIC SHOCK PRESENT (HCC): ICD-10-CM

## 2020-07-03 DIAGNOSIS — N17.9 AKI (ACUTE KIDNEY INJURY) (HCC): ICD-10-CM

## 2020-07-03 DIAGNOSIS — I50.9 ACUTE ON CHRONIC CONGESTIVE HEART FAILURE, UNSPECIFIED HEART FAILURE TYPE (HCC): ICD-10-CM

## 2020-07-03 DIAGNOSIS — I16.1 HYPERTENSIVE EMERGENCY: ICD-10-CM

## 2020-07-03 DIAGNOSIS — B20 HIV INFECTION, UNSPECIFIED SYMPTOM STATUS (HCC): ICD-10-CM

## 2020-07-03 PROBLEM — J96.90 RESPIRATORY FAILURE (HCC): Status: ACTIVE | Noted: 2020-07-03

## 2020-07-03 PROBLEM — E87.29 HIGH ANION GAP METABOLIC ACIDOSIS: Status: ACTIVE | Noted: 2020-07-03

## 2020-07-03 LAB
ACTION RANGE TRIGGERED IACRT: YES
ALBUMIN SERPL BCP-MCNC: 4.3 G/DL (ref 3.2–4.9)
ALBUMIN/GLOB SERPL: 0.9 G/DL
ALP SERPL-CCNC: 169 U/L (ref 30–99)
ALT SERPL-CCNC: 23 U/L (ref 2–50)
AMPHET UR QL SCN: NEGATIVE
ANION GAP SERPL CALC-SCNC: 15 MMOL/L (ref 7–16)
ANION GAP SERPL CALC-SCNC: 19 MMOL/L (ref 7–16)
ANION GAP SERPL CALC-SCNC: 25 MMOL/L (ref 7–16)
APPEARANCE UR: CLEAR
AST SERPL-CCNC: 24 U/L (ref 12–45)
BACTERIA #/AREA URNS HPF: NEGATIVE /HPF
BARBITURATES UR QL SCN: NEGATIVE
BASE EXCESS BLDA CALC-SCNC: -11 MMOL/L (ref -4–3)
BASOPHILS # BLD AUTO: 0.4 % (ref 0–1.8)
BASOPHILS # BLD: 0.05 K/UL (ref 0–0.12)
BENZODIAZ UR QL SCN: NEGATIVE
BILIRUB SERPL-MCNC: 0.3 MG/DL (ref 0.1–1.5)
BILIRUB UR QL STRIP.AUTO: NEGATIVE
BODY TEMPERATURE: ABNORMAL DEGREES
BUN SERPL-MCNC: 57 MG/DL (ref 8–22)
BUN SERPL-MCNC: 57 MG/DL (ref 8–22)
BUN SERPL-MCNC: 59 MG/DL (ref 8–22)
BUN SERPL-MCNC: 60 MG/DL (ref 8–22)
BUN SERPL-MCNC: 61 MG/DL (ref 8–22)
BZE UR QL SCN: NEGATIVE
CALCIUM SERPL-MCNC: 6.6 MG/DL (ref 8.5–10.5)
CALCIUM SERPL-MCNC: 6.6 MG/DL (ref 8.5–10.5)
CALCIUM SERPL-MCNC: 6.7 MG/DL (ref 8.5–10.5)
CALCIUM SERPL-MCNC: 6.7 MG/DL (ref 8.5–10.5)
CALCIUM SERPL-MCNC: 7 MG/DL (ref 8.5–10.5)
CANNABINOIDS UR QL SCN: NEGATIVE
CHLORIDE SERPL-SCNC: 102 MMOL/L (ref 96–112)
CHLORIDE SERPL-SCNC: 103 MMOL/L (ref 96–112)
CHLORIDE SERPL-SCNC: 105 MMOL/L (ref 96–112)
CHLORIDE SERPL-SCNC: 105 MMOL/L (ref 96–112)
CHLORIDE SERPL-SCNC: 106 MMOL/L (ref 96–112)
CHLORIDE UR-SCNC: 64 MMOL/L
CK SERPL-CCNC: 238 U/L (ref 0–154)
CO2 BLDA-SCNC: 16 MMOL/L (ref 20–33)
CO2 SERPL-SCNC: 11 MMOL/L (ref 20–33)
CO2 SERPL-SCNC: 14 MMOL/L (ref 20–33)
CO2 SERPL-SCNC: 16 MMOL/L (ref 20–33)
CO2 SERPL-SCNC: 16 MMOL/L (ref 20–33)
CO2 SERPL-SCNC: 17 MMOL/L (ref 20–33)
COLOR UR: YELLOW
CORTIS SERPL-MCNC: 22.8 UG/DL (ref 0–23)
COVID ORDER STATUS COVID19: NORMAL
CREAT SERPL-MCNC: 7.55 MG/DL (ref 0.5–1.4)
CREAT SERPL-MCNC: 7.87 MG/DL (ref 0.5–1.4)
CREAT SERPL-MCNC: 8.18 MG/DL (ref 0.5–1.4)
CREAT SERPL-MCNC: 8.21 MG/DL (ref 0.5–1.4)
CREAT SERPL-MCNC: 8.3 MG/DL (ref 0.5–1.4)
CREAT UR-MCNC: 49.87 MG/DL
CREAT UR-MCNC: 52.43 MG/DL
CRP SERPL HS-MCNC: 0.31 MG/DL (ref 0–0.75)
D DIMER PPP IA.FEU-MCNC: 2.58 UG/ML (FEU) (ref 0–0.5)
EKG IMPRESSION: NORMAL
EOSINOPHIL # BLD AUTO: 0.36 K/UL (ref 0–0.51)
EOSINOPHIL NFR BLD: 3 % (ref 0–6.9)
EPI CELLS #/AREA URNS HPF: NEGATIVE /HPF
ERYTHROCYTE [DISTWIDTH] IN BLOOD BY AUTOMATED COUNT: 63.7 FL (ref 35.9–50)
FERRITIN SERPL-MCNC: 148 NG/ML (ref 22–322)
GLOBULIN SER CALC-MCNC: 4.8 G/DL (ref 1.9–3.5)
GLUCOSE BLD-MCNC: 191 MG/DL (ref 65–99)
GLUCOSE SERPL-MCNC: 104 MG/DL (ref 65–99)
GLUCOSE SERPL-MCNC: 111 MG/DL (ref 65–99)
GLUCOSE SERPL-MCNC: 112 MG/DL (ref 65–99)
GLUCOSE SERPL-MCNC: 190 MG/DL (ref 65–99)
GLUCOSE SERPL-MCNC: 95 MG/DL (ref 65–99)
GLUCOSE UR STRIP.AUTO-MCNC: NEGATIVE MG/DL
HAV IGM SERPL QL IA: NORMAL
HBV CORE IGM SER QL: NORMAL
HBV SURFACE AB SERPL IA-ACNC: <3.5 MIU/ML (ref 0–10)
HBV SURFACE AG SER QL: NORMAL
HCO3 BLDA-SCNC: 14.6 MMOL/L (ref 17–25)
HCT VFR BLD AUTO: 36.5 % (ref 42–52)
HCV AB SER QL: NORMAL
HGB BLD-MCNC: 11.4 G/DL (ref 14–18)
HIV 1+2 AB+HIV1 P24 AG SERPL QL IA: ABNORMAL
HOROWITZ INDEX BLDA+IHG-RTO: 154 MM[HG]
HYALINE CASTS #/AREA URNS LPF: ABNORMAL /LPF
IMM GRANULOCYTES # BLD AUTO: 0.06 K/UL (ref 0–0.11)
IMM GRANULOCYTES NFR BLD AUTO: 0.5 % (ref 0–0.9)
INST. QUALIFIED PATIENT IIQPT: YES
KETONES UR STRIP.AUTO-MCNC: NEGATIVE MG/DL
LACTATE BLD-SCNC: 1.2 MMOL/L (ref 0.5–2)
LACTATE BLD-SCNC: 1.5 MMOL/L (ref 0.5–2)
LACTATE BLD-SCNC: 1.5 MMOL/L (ref 0.5–2)
LACTATE BLD-SCNC: 6.5 MMOL/L (ref 0.5–2)
LDH SERPL L TO P-CCNC: 271 U/L (ref 107–266)
LEUKOCYTE ESTERASE UR QL STRIP.AUTO: NEGATIVE
LYMPHOCYTES # BLD AUTO: 2.03 K/UL (ref 1–4.8)
LYMPHOCYTES NFR BLD: 16.8 % (ref 22–41)
MCH RBC QN AUTO: 31.3 PG (ref 27–33)
MCHC RBC AUTO-ENTMCNC: 31.2 G/DL (ref 33.7–35.3)
MCV RBC AUTO: 100.3 FL (ref 81.4–97.8)
METHADONE UR QL SCN: NEGATIVE
MICRO URNS: ABNORMAL
MONOCYTES # BLD AUTO: 0.62 K/UL (ref 0–0.85)
MONOCYTES NFR BLD AUTO: 5.1 % (ref 0–13.4)
NEUTROPHILS # BLD AUTO: 8.93 K/UL (ref 1.82–7.42)
NEUTROPHILS NFR BLD: 74.2 % (ref 44–72)
NITRITE UR QL STRIP.AUTO: NEGATIVE
NRBC # BLD AUTO: 0 K/UL
NRBC BLD-RTO: 0 /100 WBC
NT-PROBNP SERPL IA-MCNC: ABNORMAL PG/ML (ref 0–125)
O2/TOTAL GAS SETTING VFR VENT: 80 %
OPIATES UR QL SCN: NEGATIVE
OXYCODONE UR QL SCN: NEGATIVE
PCO2 BLDA: 31.5 MMHG (ref 26–37)
PCP UR QL SCN: NEGATIVE
PH BLDA: 7.27 [PH] (ref 7.4–7.5)
PH UR STRIP.AUTO: 6 [PH] (ref 5–8)
PLATELET # BLD AUTO: 203 K/UL (ref 164–446)
PMV BLD AUTO: 10.1 FL (ref 9–12.9)
PO2 BLDA: 123 MMHG (ref 64–87)
POTASSIUM SERPL-SCNC: 4.3 MMOL/L (ref 3.6–5.5)
POTASSIUM SERPL-SCNC: 4.6 MMOL/L (ref 3.6–5.5)
POTASSIUM SERPL-SCNC: 4.7 MMOL/L (ref 3.6–5.5)
POTASSIUM SERPL-SCNC: 5 MMOL/L (ref 3.6–5.5)
POTASSIUM SERPL-SCNC: 5.3 MMOL/L (ref 3.6–5.5)
POTASSIUM UR-SCNC: 23 MMOL/L
PROPOXYPH UR QL SCN: NEGATIVE
PROT SERPL-MCNC: 9.1 G/DL (ref 6–8.2)
PROT UR QL STRIP: 300 MG/DL
PROT UR-MCNC: 148 MG/DL (ref 0–15)
RBC # BLD AUTO: 3.64 M/UL (ref 4.7–6.1)
RBC # URNS HPF: ABNORMAL /HPF
RBC UR QL AUTO: ABNORMAL
SAO2 % BLDA: 98 % (ref 93–99)
SARS-COV-2 RNA RESP QL NAA+PROBE: DETECTED
SODIUM SERPL-SCNC: 136 MMOL/L (ref 135–145)
SODIUM SERPL-SCNC: 136 MMOL/L (ref 135–145)
SODIUM SERPL-SCNC: 137 MMOL/L (ref 135–145)
SODIUM SERPL-SCNC: 137 MMOL/L (ref 135–145)
SODIUM SERPL-SCNC: 138 MMOL/L (ref 135–145)
SODIUM UR-SCNC: 78 MMOL/L
SODIUM UR-SCNC: 83 MMOL/L
SP GR UR STRIP.AUTO: 1.01
SPECIMEN DRAWN FROM PATIENT: ABNORMAL
SPECIMEN SOURCE: ABNORMAL
T4 FREE SERPL-MCNC: 0.82 NG/DL (ref 0.93–1.7)
TROPONIN T SERPL-MCNC: 97 NG/L (ref 6–19)
TSH SERPL DL<=0.005 MIU/L-ACNC: 5.86 UIU/ML (ref 0.38–5.33)
UROBILINOGEN UR STRIP.AUTO-MCNC: 0.2 MG/DL
WBC # BLD AUTO: 12.1 K/UL (ref 4.8–10.8)
WBC #/AREA URNS HPF: ABNORMAL /HPF

## 2020-07-03 PROCEDURE — 80074 ACUTE HEPATITIS PANEL: CPT

## 2020-07-03 PROCEDURE — 87186 SC STD MICRODIL/AGAR DIL: CPT

## 2020-07-03 PROCEDURE — 86360 T CELL ABSOLUTE COUNT/RATIO: CPT

## 2020-07-03 PROCEDURE — 84300 ASSAY OF URINE SODIUM: CPT

## 2020-07-03 PROCEDURE — 86702 HIV-2 ANTIBODY: CPT

## 2020-07-03 PROCEDURE — 82570 ASSAY OF URINE CREATININE: CPT

## 2020-07-03 PROCEDURE — 82533 TOTAL CORTISOL: CPT

## 2020-07-03 PROCEDURE — 700105 HCHG RX REV CODE 258: Performed by: EMERGENCY MEDICINE

## 2020-07-03 PROCEDURE — 86359 T CELLS TOTAL COUNT: CPT

## 2020-07-03 PROCEDURE — 86701 HIV-1ANTIBODY: CPT

## 2020-07-03 PROCEDURE — 82962 GLUCOSE BLOOD TEST: CPT

## 2020-07-03 PROCEDURE — 80048 BASIC METABOLIC PNL TOTAL CA: CPT

## 2020-07-03 PROCEDURE — A9270 NON-COVERED ITEM OR SERVICE: HCPCS | Performed by: INTERNAL MEDICINE

## 2020-07-03 PROCEDURE — 87150 DNA/RNA AMPLIFIED PROBE: CPT

## 2020-07-03 PROCEDURE — 700102 HCHG RX REV CODE 250 W/ 637 OVERRIDE(OP): Performed by: EMERGENCY MEDICINE

## 2020-07-03 PROCEDURE — 36600 WITHDRAWAL OF ARTERIAL BLOOD: CPT

## 2020-07-03 PROCEDURE — 96368 THER/DIAG CONCURRENT INF: CPT

## 2020-07-03 PROCEDURE — 99223 1ST HOSP IP/OBS HIGH 75: CPT | Mod: 25 | Performed by: INTERNAL MEDICINE

## 2020-07-03 PROCEDURE — 5A09357 ASSISTANCE WITH RESPIRATORY VENTILATION, LESS THAN 24 CONSECUTIVE HOURS, CONTINUOUS POSITIVE AIRWAY PRESSURE: ICD-10-PCS | Performed by: EMERGENCY MEDICINE

## 2020-07-03 PROCEDURE — 700102 HCHG RX REV CODE 250 W/ 637 OVERRIDE(OP): Performed by: INTERNAL MEDICINE

## 2020-07-03 PROCEDURE — 85379 FIBRIN DEGRADATION QUANT: CPT

## 2020-07-03 PROCEDURE — 82550 ASSAY OF CK (CPK): CPT

## 2020-07-03 PROCEDURE — 700101 HCHG RX REV CODE 250: Performed by: EMERGENCY MEDICINE

## 2020-07-03 PROCEDURE — 99292 CRITICAL CARE ADDL 30 MIN: CPT | Mod: CS | Performed by: INTERNAL MEDICINE

## 2020-07-03 PROCEDURE — 81001 URINALYSIS AUTO W/SCOPE: CPT

## 2020-07-03 PROCEDURE — 96366 THER/PROPH/DIAG IV INF ADDON: CPT

## 2020-07-03 PROCEDURE — 94660 CPAP INITIATION&MGMT: CPT

## 2020-07-03 PROCEDURE — 84133 ASSAY OF URINE POTASSIUM: CPT

## 2020-07-03 PROCEDURE — 86706 HEP B SURFACE ANTIBODY: CPT

## 2020-07-03 PROCEDURE — 83880 ASSAY OF NATRIURETIC PEPTIDE: CPT

## 2020-07-03 PROCEDURE — 99406 BEHAV CHNG SMOKING 3-10 MIN: CPT | Performed by: INTERNAL MEDICINE

## 2020-07-03 PROCEDURE — 700102 HCHG RX REV CODE 250 W/ 637 OVERRIDE(OP)

## 2020-07-03 PROCEDURE — A9270 NON-COVERED ITEM OR SERVICE: HCPCS | Performed by: EMERGENCY MEDICINE

## 2020-07-03 PROCEDURE — 700111 HCHG RX REV CODE 636 W/ 250 OVERRIDE (IP): Performed by: EMERGENCY MEDICINE

## 2020-07-03 PROCEDURE — 36415 COLL VENOUS BLD VENIPUNCTURE: CPT

## 2020-07-03 PROCEDURE — 84439 ASSAY OF FREE THYROXINE: CPT

## 2020-07-03 PROCEDURE — 84156 ASSAY OF PROTEIN URINE: CPT

## 2020-07-03 PROCEDURE — 84484 ASSAY OF TROPONIN QUANT: CPT

## 2020-07-03 PROCEDURE — 71045 X-RAY EXAM CHEST 1 VIEW: CPT

## 2020-07-03 PROCEDURE — 83615 LACTATE (LD) (LDH) ENZYME: CPT

## 2020-07-03 PROCEDURE — A9270 NON-COVERED ITEM OR SERVICE: HCPCS

## 2020-07-03 PROCEDURE — G0475 HIV COMBINATION ASSAY: HCPCS

## 2020-07-03 PROCEDURE — 700111 HCHG RX REV CODE 636 W/ 250 OVERRIDE (IP): Performed by: INTERNAL MEDICINE

## 2020-07-03 PROCEDURE — 80307 DRUG TEST PRSMV CHEM ANLYZR: CPT

## 2020-07-03 PROCEDURE — 84443 ASSAY THYROID STIM HORMONE: CPT

## 2020-07-03 PROCEDURE — 99291 CRITICAL CARE FIRST HOUR: CPT | Mod: CS | Performed by: INTERNAL MEDICINE

## 2020-07-03 PROCEDURE — 87077 CULTURE AEROBIC IDENTIFY: CPT | Mod: 91

## 2020-07-03 PROCEDURE — 86140 C-REACTIVE PROTEIN: CPT

## 2020-07-03 PROCEDURE — 82728 ASSAY OF FERRITIN: CPT

## 2020-07-03 PROCEDURE — C9803 HOPD COVID-19 SPEC COLLECT: HCPCS | Performed by: EMERGENCY MEDICINE

## 2020-07-03 PROCEDURE — 82436 ASSAY OF URINE CHLORIDE: CPT

## 2020-07-03 PROCEDURE — 96365 THER/PROPH/DIAG IV INF INIT: CPT

## 2020-07-03 PROCEDURE — 80053 COMPREHEN METABOLIC PANEL: CPT

## 2020-07-03 PROCEDURE — U0004 COV-19 TEST NON-CDC HGH THRU: HCPCS

## 2020-07-03 PROCEDURE — 87040 BLOOD CULTURE FOR BACTERIA: CPT | Mod: 91

## 2020-07-03 PROCEDURE — 85025 COMPLETE CBC W/AUTO DIFF WBC: CPT

## 2020-07-03 PROCEDURE — 93005 ELECTROCARDIOGRAM TRACING: CPT | Performed by: EMERGENCY MEDICINE

## 2020-07-03 PROCEDURE — 96367 TX/PROPH/DG ADDL SEQ IV INF: CPT

## 2020-07-03 PROCEDURE — 770022 HCHG ROOM/CARE - ICU (200)

## 2020-07-03 PROCEDURE — 83605 ASSAY OF LACTIC ACID: CPT | Mod: 91

## 2020-07-03 PROCEDURE — 99291 CRITICAL CARE FIRST HOUR: CPT

## 2020-07-03 PROCEDURE — 82803 BLOOD GASES ANY COMBINATION: CPT

## 2020-07-03 RX ORDER — ALBUMIN (HUMAN) 12.5 G/50ML
12.5 SOLUTION INTRAVENOUS
Status: DISCONTINUED | OUTPATIENT
Start: 2020-07-03 | End: 2020-07-10 | Stop reason: HOSPADM

## 2020-07-03 RX ORDER — BISACODYL 10 MG
10 SUPPOSITORY, RECTAL RECTAL
Status: DISCONTINUED | OUTPATIENT
Start: 2020-07-03 | End: 2020-07-10 | Stop reason: HOSPADM

## 2020-07-03 RX ORDER — CARVEDILOL 25 MG/1
25 TABLET ORAL 2 TIMES DAILY WITH MEALS
Status: DISCONTINUED | OUTPATIENT
Start: 2020-07-03 | End: 2020-07-03

## 2020-07-03 RX ORDER — LEVETIRACETAM 500 MG/1
500 TABLET ORAL EVERY 12 HOURS
Status: DISCONTINUED | OUTPATIENT
Start: 2020-07-03 | End: 2020-07-03

## 2020-07-03 RX ORDER — PROCHLORPERAZINE EDISYLATE 5 MG/ML
5-10 INJECTION INTRAMUSCULAR; INTRAVENOUS EVERY 4 HOURS PRN
Status: DISCONTINUED | OUTPATIENT
Start: 2020-07-03 | End: 2020-07-10 | Stop reason: HOSPADM

## 2020-07-03 RX ORDER — AZITHROMYCIN 500 MG/1
500 INJECTION, POWDER, LYOPHILIZED, FOR SOLUTION INTRAVENOUS ONCE
Status: COMPLETED | OUTPATIENT
Start: 2020-07-03 | End: 2020-07-03

## 2020-07-03 RX ORDER — CARVEDILOL 25 MG/1
25 TABLET ORAL 2 TIMES DAILY WITH MEALS
Status: DISCONTINUED | OUTPATIENT
Start: 2020-07-03 | End: 2020-07-10 | Stop reason: HOSPADM

## 2020-07-03 RX ORDER — PROMETHAZINE HYDROCHLORIDE 25 MG/1
12.5-25 SUPPOSITORY RECTAL EVERY 4 HOURS PRN
Status: DISCONTINUED | OUTPATIENT
Start: 2020-07-03 | End: 2020-07-10 | Stop reason: HOSPADM

## 2020-07-03 RX ORDER — ONDANSETRON 2 MG/ML
4 INJECTION INTRAMUSCULAR; INTRAVENOUS EVERY 4 HOURS PRN
Status: DISCONTINUED | OUTPATIENT
Start: 2020-07-03 | End: 2020-07-10 | Stop reason: HOSPADM

## 2020-07-03 RX ORDER — ISOSORBIDE DINITRATE 20 MG/1
20 TABLET ORAL 3 TIMES DAILY
Status: DISCONTINUED | OUTPATIENT
Start: 2020-07-03 | End: 2020-07-10 | Stop reason: HOSPADM

## 2020-07-03 RX ORDER — LABETALOL HYDROCHLORIDE 5 MG/ML
10-20 INJECTION, SOLUTION INTRAVENOUS EVERY 4 HOURS PRN
Status: DISCONTINUED | OUTPATIENT
Start: 2020-07-03 | End: 2020-07-10 | Stop reason: HOSPADM

## 2020-07-03 RX ORDER — HEPARIN SODIUM 5000 [USP'U]/ML
5000 INJECTION, SOLUTION INTRAVENOUS; SUBCUTANEOUS EVERY 8 HOURS
Status: DISCONTINUED | OUTPATIENT
Start: 2020-07-03 | End: 2020-07-04

## 2020-07-03 RX ORDER — NITROGLYCERIN 0.4 MG/1
TABLET SUBLINGUAL
Status: COMPLETED
Start: 2020-07-03 | End: 2020-07-03

## 2020-07-03 RX ORDER — LEVETIRACETAM 500 MG/1
500 TABLET ORAL EVERY 12 HOURS
Status: DISCONTINUED | OUTPATIENT
Start: 2020-07-03 | End: 2020-07-10 | Stop reason: HOSPADM

## 2020-07-03 RX ORDER — POLYETHYLENE GLYCOL 3350 17 G/17G
1 POWDER, FOR SOLUTION ORAL
Status: DISCONTINUED | OUTPATIENT
Start: 2020-07-03 | End: 2020-07-10 | Stop reason: HOSPADM

## 2020-07-03 RX ORDER — AMLODIPINE BESYLATE 10 MG/1
10 TABLET ORAL ONCE
Status: DISPENSED | OUTPATIENT
Start: 2020-07-03 | End: 2020-07-04

## 2020-07-03 RX ORDER — ACETAMINOPHEN 325 MG/1
650 TABLET ORAL EVERY 4 HOURS PRN
Status: DISCONTINUED | OUTPATIENT
Start: 2020-07-03 | End: 2020-07-10 | Stop reason: HOSPADM

## 2020-07-03 RX ORDER — ROSUVASTATIN CALCIUM 5 MG/1
10 TABLET, COATED ORAL DAILY
Status: DISCONTINUED | OUTPATIENT
Start: 2020-07-03 | End: 2020-07-10 | Stop reason: HOSPADM

## 2020-07-03 RX ORDER — SODIUM CHLORIDE 9 MG/ML
250 INJECTION, SOLUTION INTRAVENOUS
Status: DISCONTINUED | OUTPATIENT
Start: 2020-07-03 | End: 2020-07-10 | Stop reason: HOSPADM

## 2020-07-03 RX ORDER — SODIUM CHLORIDE, SODIUM LACTATE, POTASSIUM CHLORIDE, AND CALCIUM CHLORIDE .6; .31; .03; .02 G/100ML; G/100ML; G/100ML; G/100ML
30 INJECTION, SOLUTION INTRAVENOUS
Status: DISCONTINUED | OUTPATIENT
Start: 2020-07-03 | End: 2020-07-03

## 2020-07-03 RX ORDER — AMOXICILLIN 250 MG
2 CAPSULE ORAL 2 TIMES DAILY
Status: DISCONTINUED | OUTPATIENT
Start: 2020-07-03 | End: 2020-07-10 | Stop reason: HOSPADM

## 2020-07-03 RX ORDER — ROSUVASTATIN CALCIUM 20 MG/1
10 TABLET, COATED ORAL ONCE
Status: COMPLETED | OUTPATIENT
Start: 2020-07-03 | End: 2020-07-03

## 2020-07-03 RX ORDER — NITROGLYCERIN 0.4 MG/1
0.4 TABLET SUBLINGUAL
Status: DISCONTINUED | OUTPATIENT
Start: 2020-07-03 | End: 2020-07-10 | Stop reason: HOSPADM

## 2020-07-03 RX ORDER — NITROGLYCERIN 20 MG/100ML
0-200 INJECTION INTRAVENOUS CONTINUOUS
Status: DISCONTINUED | OUTPATIENT
Start: 2020-07-03 | End: 2020-07-05 | Stop reason: ALTCHOICE

## 2020-07-03 RX ORDER — HYDRALAZINE HYDROCHLORIDE 20 MG/ML
20 INJECTION INTRAMUSCULAR; INTRAVENOUS EVERY 6 HOURS PRN
Status: DISCONTINUED | OUTPATIENT
Start: 2020-07-03 | End: 2020-07-10 | Stop reason: HOSPADM

## 2020-07-03 RX ORDER — PROMETHAZINE HYDROCHLORIDE 25 MG/1
12.5-25 TABLET ORAL EVERY 4 HOURS PRN
Status: DISCONTINUED | OUTPATIENT
Start: 2020-07-03 | End: 2020-07-10 | Stop reason: HOSPADM

## 2020-07-03 RX ORDER — CITRIC ACID/SODIUM CITRATE 334-500MG
30 SOLUTION, ORAL ORAL
Status: DISCONTINUED | OUTPATIENT
Start: 2020-07-03 | End: 2020-07-10 | Stop reason: HOSPADM

## 2020-07-03 RX ORDER — AMLODIPINE BESYLATE 5 MG/1
10 TABLET ORAL DAILY
Status: DISCONTINUED | OUTPATIENT
Start: 2020-07-03 | End: 2020-07-10 | Stop reason: HOSPADM

## 2020-07-03 RX ORDER — ONDANSETRON 4 MG/1
4 TABLET, ORALLY DISINTEGRATING ORAL EVERY 4 HOURS PRN
Status: DISCONTINUED | OUTPATIENT
Start: 2020-07-03 | End: 2020-07-10 | Stop reason: HOSPADM

## 2020-07-03 RX ADMIN — ISOSORBIDE DINITRATE 20 MG: 20 TABLET ORAL at 13:22

## 2020-07-03 RX ADMIN — ISOSORBIDE DINITRATE 20 MG: 20 TABLET ORAL at 21:28

## 2020-07-03 RX ADMIN — HEPARIN SODIUM 5000 UNITS: 5000 INJECTION, SOLUTION INTRAVENOUS; SUBCUTANEOUS at 17:26

## 2020-07-03 RX ADMIN — ASPIRIN 81 MG: 81 TABLET, COATED ORAL at 13:23

## 2020-07-03 RX ADMIN — SODIUM CHLORIDE 5 MG/HR: 9 INJECTION, SOLUTION INTRAVENOUS at 03:01

## 2020-07-03 RX ADMIN — DOCUSATE SODIUM 50 MG AND SENNOSIDES 8.6 MG 2 TABLET: 8.6; 5 TABLET, FILM COATED ORAL at 17:26

## 2020-07-03 RX ADMIN — AMLODIPINE BESYLATE 10 MG: 10 TABLET ORAL at 13:22

## 2020-07-03 RX ADMIN — ACETAMINOPHEN 650 MG: 325 TABLET, FILM COATED ORAL at 22:26

## 2020-07-03 RX ADMIN — HEPARIN SODIUM 5000 UNITS: 5000 INJECTION, SOLUTION INTRAVENOUS; SUBCUTANEOUS at 09:55

## 2020-07-03 RX ADMIN — NITROGLYCERIN 0.4 MG: 0.4 TABLET, ORALLY DISINTEGRATING SUBLINGUAL at 00:20

## 2020-07-03 RX ADMIN — AZITHROMYCIN MONOHYDRATE 500 MG: 500 INJECTION, POWDER, LYOPHILIZED, FOR SOLUTION INTRAVENOUS at 03:01

## 2020-07-03 RX ADMIN — HEPARIN SODIUM 5000 UNITS: 5000 INJECTION, SOLUTION INTRAVENOUS; SUBCUTANEOUS at 21:27

## 2020-07-03 RX ADMIN — CARVEDILOL 25 MG: 25 TABLET, FILM COATED ORAL at 13:23

## 2020-07-03 RX ADMIN — CEFTRIAXONE SODIUM 2 G: 2 INJECTION, POWDER, FOR SOLUTION INTRAMUSCULAR; INTRAVENOUS at 01:12

## 2020-07-03 RX ADMIN — SODIUM CITRATE AND CITRIC ACID MONOHYDRATE 30 ML: 500; 334 SOLUTION ORAL at 17:27

## 2020-07-03 RX ADMIN — ROSUVASTATIN CALCIUM 10 MG: 20 TABLET, FILM COATED ORAL at 13:22

## 2020-07-03 RX ADMIN — SODIUM CITRATE AND CITRIC ACID MONOHYDRATE 30 ML: 500; 334 SOLUTION ORAL at 16:50

## 2020-07-03 RX ADMIN — SODIUM CITRATE AND CITRIC ACID MONOHYDRATE 30 ML: 500; 334 SOLUTION ORAL at 21:28

## 2020-07-03 RX ADMIN — NITROGLYCERIN 0.4 MG: 0.4 TABLET, ORALLY DISINTEGRATING SUBLINGUAL at 00:31

## 2020-07-03 RX ADMIN — LEVETIRACETAM 500 MG: 500 TABLET, FILM COATED ORAL at 14:06

## 2020-07-03 ASSESSMENT — COGNITIVE AND FUNCTIONAL STATUS - GENERAL
DAILY ACTIVITIY SCORE: 24
SUGGESTED CMS G CODE MODIFIER DAILY ACTIVITY: CH
SUGGESTED CMS G CODE MODIFIER MOBILITY: CH
MOBILITY SCORE: 24

## 2020-07-03 ASSESSMENT — LIFESTYLE VARIABLES
CONSUMPTION TOTAL: NEGATIVE
HAVE YOU EVER FELT YOU SHOULD CUT DOWN ON YOUR DRINKING: NO
TOTAL SCORE: 0
DOES PATIENT WANT TO STOP DRINKING: NO
TOTAL SCORE: 0
EVER FELT BAD OR GUILTY ABOUT YOUR DRINKING: NO
EVER HAD A DRINK FIRST THING IN THE MORNING TO STEADY YOUR NERVES TO GET RID OF A HANGOVER: NO
CONSUMPTION TOTAL: INCOMPLETE
HAVE PEOPLE ANNOYED YOU BY CRITICIZING YOUR DRINKING: NO
TOTAL SCORE: 0
AVERAGE NUMBER OF DAYS PER WEEK YOU HAVE A DRINK CONTAINING ALCOHOL: 0
TOTAL SCORE: 0
DO YOU DRINK ALCOHOL: YES
HOW MANY TIMES IN THE PAST YEAR HAVE YOU HAD 5 OR MORE DRINKS IN A DAY: 0
EVER_SMOKED: YES
ALCOHOL_USE: NO
EVER_SMOKED: YES
TOTAL SCORE: 0
EVER FELT BAD OR GUILTY ABOUT YOUR DRINKING: NO
TOTAL SCORE: 0
ON A TYPICAL DAY WHEN YOU DRINK ALCOHOL HOW MANY DRINKS DO YOU HAVE: 0
EVER_SMOKED: YES
HAVE YOU EVER FELT YOU SHOULD CUT DOWN ON YOUR DRINKING: NO
HAVE PEOPLE ANNOYED YOU BY CRITICIZING YOUR DRINKING: NO
EVER HAD A DRINK FIRST THING IN THE MORNING TO STEADY YOUR NERVES TO GET RID OF A HANGOVER: NO

## 2020-07-03 ASSESSMENT — ENCOUNTER SYMPTOMS
CHILLS: 0
BLURRED VISION: 0
ABDOMINAL PAIN: 0
SPUTUM PRODUCTION: 0
SHORTNESS OF BREATH: 1
COUGH: 0
DOUBLE VISION: 0
DEPRESSION: 0
WHEEZING: 0
SHORTNESS OF BREATH: 0
FEVER: 0
NAUSEA: 0
VOMITING: 0
FOCAL WEAKNESS: 0

## 2020-07-03 ASSESSMENT — FIBROSIS 4 INDEX
FIB4 SCORE: 1.16
FIB4 SCORE: 1.38
FIB4 SCORE: 1.38

## 2020-07-03 ASSESSMENT — PULMONARY FUNCTION TESTS
EPAP_CMH2O: 8

## 2020-07-03 ASSESSMENT — COPD QUESTIONNAIRES
DURING THE PAST 4 WEEKS HOW MUCH DID YOU FEEL SHORT OF BREATH: NONE/LITTLE OF THE TIME
COPD SCREENING SCORE: 3
DO YOU EVER COUGH UP ANY MUCUS OR PHLEGM?: NO/ONLY WITH OCCASIONAL COLDS OR INFECTIONS
HAVE YOU SMOKED AT LEAST 100 CIGARETTES IN YOUR ENTIRE LIFE: YES

## 2020-07-03 ASSESSMENT — PATIENT HEALTH QUESTIONNAIRE - PHQ9
1. LITTLE INTEREST OR PLEASURE IN DOING THINGS: NOT AT ALL
SUM OF ALL RESPONSES TO PHQ9 QUESTIONS 1 AND 2: 0
2. FEELING DOWN, DEPRESSED, IRRITABLE, OR HOPELESS: NOT AT ALL

## 2020-07-03 NOTE — CONSULTS
Critical Care Consultation    Date of consult: 7/3/2020    Referring Physician  Erin Tinoco M.D.    Reason for Consultation  Acute hypoxic respiratory failure    History of Presenting Illness  55 y.o. male with known history of HLD, HTN, HIV, seizure, and history of noncompliance with medications disorder who presented 7/3/2020 with shortness of breath.  Patient was brought in by friends and dropped off, noted to be respiratory distress, satting 83 on room air and initial /163 with HR of 144.  Patient was immediately placed on BiPAP which improved his underlying distress as well as oxygenation.  Patient indicates that he was in his normal state of health until this afternoon when he had sudden onset of difficulty catching his breath and taking a deep breath stating he had some heaviness.  He denied any lightheadedness dizziness arm numbness or tingling, no sharp/tearing/tearing pain or radiation.  Denies any recent fever, chills, sweats, cough, sputum production.  He lives with 2 remains.  He denies any recent sick contacts.  He currently states that he is quite compliant with his medications, denies any alcohol or drug use, does use tobacco.    Work-up  WBC 12.1 with minimal left shift, CO2 11, AG 25, BUN 57, CR 7.5 (baseline 1.8-2.3), LA 6.5, troponin 97, BNP greater than 35,000, ABG 7.27/31/123/98.80, reported COVID positive    CXR with interstitial edema, prominent pulmonary vasculature      Code Status  Prior    Review of Systems  Review of Systems   Constitutional: Positive for malaise/fatigue. Negative for chills and fever.   HENT: Negative for congestion.    Eyes: Negative for blurred vision and double vision.   Respiratory: Positive for shortness of breath. Negative for cough, sputum production and wheezing.    Cardiovascular: Negative for chest pain and leg swelling.   Gastrointestinal: Negative for nausea and vomiting.   Neurological: Negative for focal weakness.   Psychiatric/Behavioral:  Negative for depression.   All other systems reviewed and are negative.      Past Medical History   has a past medical history of Heart failure (HCC), HIV disease (HCC) (1995), Hypertension, Seizure (HCC), and Stroke (HCC).    Surgical History   has a past surgical history that includes cholecystectomy (1980s).    Family History  family history includes Diabetes in his father, mother, and sister; No Known Problems in his sister, sister, and sister; Other in his brother and daughter.    Social History   reports that he has been smoking cigarettes. He has been smoking about 0.25 packs per day. He has never used smokeless tobacco. He reports that he does not drink alcohol or use drugs.    Medications  Home Medications    **Home medications have not yet been reviewed for this encounter**       Current Facility-Administered Medications   Medication Dose Route Frequency Provider Last Rate Last Dose   • nitroglycerin (NITROSTAT) tablet 0.4 mg  0.4 mg Sublingual Q5 MIN PRN Erin Tinoco M.D.   0.4 mg at 07/03/20 0031   • lactated ringers infusion (BOLUS): BMI less than or equal to 30  30 mL/kg Intravenous Once PRN Erin Tinoco M.D.       • azithromycin (ZITHROMAX) injection 500 mg  500 mg Intravenous Once Erin Tinoco M.D.   500 mg at 07/03/20 0301   • niCARdipine (CARDENE) 25 mg in  mL Infusion  0-15 mg/hr Intravenous Continuous Erin Tinoco M.D. 50 mL/hr at 07/03/20 0301 5 mg/hr at 07/03/20 0301     Current Outpatient Medications   Medication Sig Dispense Refill   • lisinopril (PRINIVIL) 40 MG tablet Take 1 Tab by mouth every day. 90 Tab 3   • amLODIPine (NORVASC) 10 MG Tab Take 1 Tab by mouth every day. 90 Tab 1   • isosorbide dinitrate (ISORDIL) 20 MG Tab Take 1 Tab by mouth 3 times a day for 360 days. 270 Tab 3   • carvedilol (COREG) 25 MG Tab Take 1 Tab by mouth 2 times a day, with meals. 360 Tab 3   • rosuvastatin (CRESTOR) 40 MG tablet Take 1 Tab by mouth every day for 360 days. 90 Tab 3   •  spironolactone (ALDACTONE) 50 MG Tab Take 1 Tab by mouth every day for 360 days. 90 Tab 3   • BIKTARVY -25 MG Tab Take 1 tablet by mouth every day. TAKE ONE TABLET BY MOUTH DAILY  3   • cloNIDine (CATAPRES) 0.3 MG/24HR PATCH WEEKLY Apply 1 Patch to skin as directed every 7 days.  3   • ASPIRIN LOW DOSE 81 MG EC tablet Take 1 Tab by mouth every day.  2   • acetaminophen (TYLENOL) 325 MG Tab Take 650 mg by mouth every four hours as needed.     • levetiracetam (KEPPRA) 500 MG Tab Take 1 Tab by mouth every 12 hours. 60 Tab 3       Allergies  No Known Allergies    Vital Signs last 24 hours  Pulse:  [101-144] 105  Resp:  [24-40] 24  BP: (118-278)/(102-163) 202/120  SpO2:  [83 %-100 %] 100 %    Physical Exam  Physical Exam  Vitals signs and nursing note reviewed.   Constitutional:       General: He is in acute distress.      Appearance: He is ill-appearing. He is not diaphoretic.   HENT:      Head: Normocephalic and atraumatic.   Eyes:      Conjunctiva/sclera: Conjunctivae normal.      Pupils: Pupils are equal, round, and reactive to light.   Cardiovascular:      Rate and Rhythm: Tachycardia present.      Pulses: Normal pulses.   Pulmonary:      Breath sounds: No wheezing or rhonchi.      Comments: Very diminished throughout  Abdominal:      General: There is no distension.      Palpations: Abdomen is soft.      Tenderness: There is no abdominal tenderness.   Musculoskeletal:         General: No swelling.   Skin:     General: Skin is warm and dry.   Neurological:      Mental Status: He is alert.      Cranial Nerves: No cranial nerve deficit.   Psychiatric:         Mood and Affect: Mood normal.         Fluids    Intake/Output Summary (Last 24 hours) at 7/3/2020 0307  Last data filed at 7/3/2020 0142  Gross per 24 hour   Intake 100 ml   Output --   Net 100 ml       Laboratory  Recent Results (from the past 48 hour(s))   ACCU-CHEK GLUCOSE    Collection Time: 07/03/20 12:16 AM   Result Value Ref Range    Glucose -  Accu-Ck 191 (H) 65 - 99 mg/dL   EKG (NOW)    Collection Time: 20 12:17 AM   Result Value Ref Range    Report       [facility] Emergency Dept[facility]    Test Date:  2020  Pt Name:    SONI ABBASI                  Department: ER  MRN:        [id number]                      Room:        06  Gender:     Male                         Technician: [id number]  :        1965                   Requested By:KOLBY ROUSSEAU  Order #:    [id number]                    Reading MD:    Measurements  Intervals                                Axis  Rate:       143                          P:          0  VT:         86                           QRS:        11  QRSD:       92                           T:          182  QT:         312  QTc:        481    Interpretive Statements  SINUS TACHYCARDIA  PROBABLE LVH WITH SECONDARY REPOL ABNRM  ST DEPRESSION, CONSIDER ISCHEMIA, ANT-LAT LDS  ANTERIOR ST ELEVATION, PROBABLY DUE TO LVH  BORDERLINE PROLONGED QT INTERVAL  ARTIFACT IN LEAD(S) I,II,III,aVR,aVL,aVF,V1,V3,V4,V5,V6 AND BASELINE WANDER  IN LEAD(S) V5  Compared to ECG 2019 16:23:31  Possible ischemi a now present  ST (T wave) deviation now present     CBC w/ Differential    Collection Time: 20 12:20 AM   Result Value Ref Range    WBC 12.1 (H) 4.8 - 10.8 K/uL    RBC 3.64 (L) 4.70 - 6.10 M/uL    Hemoglobin 11.4 (L) 14.0 - 18.0 g/dL    Hematocrit 36.5 (L) 42.0 - 52.0 %    .3 (H) 81.4 - 97.8 fL    MCH 31.3 27.0 - 33.0 pg    MCHC 31.2 (L) 33.7 - 35.3 g/dL    RDW 63.7 (H) 35.9 - 50.0 fL    Platelet Count 203 164 - 446 K/uL    MPV 10.1 9.0 - 12.9 fL    Neutrophils-Polys 74.20 (H) 44.00 - 72.00 %    Lymphocytes 16.80 (L) 22.00 - 41.00 %    Monocytes 5.10 0.00 - 13.40 %    Eosinophils 3.00 0.00 - 6.90 %    Basophils 0.40 0.00 - 1.80 %    Immature Granulocytes 0.50 0.00 - 0.90 %    Nucleated RBC 0.00 /100 WBC    Neutrophils (Absolute) 8.93 (H) 1.82 - 7.42 K/uL    Lymphs (Absolute) 2.03 1.00 - 4.80 K/uL     Monos (Absolute) 0.62 0.00 - 0.85 K/uL    Eos (Absolute) 0.36 0.00 - 0.51 K/uL    Baso (Absolute) 0.05 0.00 - 0.12 K/uL    Immature Granulocytes (abs) 0.06 0.00 - 0.11 K/uL    NRBC (Absolute) 0.00 K/uL   Complete Metabolic Panel (CMP)    Collection Time: 07/03/20 12:20 AM   Result Value Ref Range    Sodium 138 135 - 145 mmol/L    Potassium 4.3 3.6 - 5.5 mmol/L    Chloride 102 96 - 112 mmol/L    Co2 11 (L) 20 - 33 mmol/L    Anion Gap 25.0 (H) 7.0 - 16.0    Glucose 190 (H) 65 - 99 mg/dL    Bun 57 (H) 8 - 22 mg/dL    Creatinine 7.55 (HH) 0.50 - 1.40 mg/dL    Calcium 7.0 (L) 8.5 - 10.5 mg/dL    AST(SGOT) 24 12 - 45 U/L    ALT(SGPT) 23 2 - 50 U/L    Alkaline Phosphatase 169 (H) 30 - 99 U/L    Total Bilirubin 0.3 0.1 - 1.5 mg/dL    Albumin 4.3 3.2 - 4.9 g/dL    Total Protein 9.1 (H) 6.0 - 8.2 g/dL    Globulin 4.8 (H) 1.9 - 3.5 g/dL    A-G Ratio 0.9 g/dL   proBrain Natriuretic Peptide, NT    Collection Time: 07/03/20 12:20 AM   Result Value Ref Range    NT-proBNP >54285 (H) 0 - 125 pg/mL   Troponin STAT    Collection Time: 07/03/20 12:20 AM   Result Value Ref Range    Troponin T 97 (H) 6 - 19 ng/L   LACTIC ACID    Collection Time: 07/03/20 12:20 AM   Result Value Ref Range    Lactic Acid 6.5 (HH) 0.5 - 2.0 mmol/L   ESTIMATED GFR    Collection Time: 07/03/20 12:20 AM   Result Value Ref Range    GFR If  9 (A) >60 mL/min/1.73 m 2    GFR If Non African American 7 (A) >60 mL/min/1.73 m 2   COVID/SARS CoV-2 PCR    Collection Time: 07/03/20 12:33 AM    Specimen: Nasopharyngeal; Respirate   Result Value Ref Range    COVID Order Status Received    SARS-CoV-2, PCR (In-House)    Collection Time: 07/03/20 12:33 AM   Result Value Ref Range    SARS-CoV-2 Source NP Swab    ISTAT ARTERIAL BLOOD GAS    Collection Time: 07/03/20  1:06 AM   Result Value Ref Range    Ph 7.273 (LL) 7.400 - 7.500    Pco2 31.5 26.0 - 37.0 mmHg    Po2 123 (H) 64 - 87 mmHg    Tco2 16 (L) 20 - 33 mmol/L    S02 98 93 - 99 %    Hco3 14.6 (L) 17.0 -  25.0 mmol/L    BE -11 (L) -4 - 3 mmol/L    Body Temp see below degrees    O2 Therapy 80 %    iPF Ratio 154     Specimen Arterial     Action Range Triggered YES     Inst. Qualified Patient YES        Imaging  DX-CHEST-PORTABLE (1 VIEW)   Final Result      BILATERAL mixed interstitial and airspace disease which could be pneumonia or pulmonary edema. Covid 19 is not excluded.          Assessment/Plan  * Respiratory failure (HCC)  Assessment & Plan  Clinically consistent with flash pulmonary edema in setting hypertensive urgency/emergency, however he is also reported covid positive and this may be the primary VS associated component versus unrelated finding  RT/O2 protocols  Continue BiPAP  Maintain euvolemic to dry balance  BP control  Echo  Unlikely to be PE but will further evaluate with echo  If no improvement consider further evaluation with CT imaging    Hypertensive emergency  Assessment & Plan  Presenting pressure systolic 270  History of medical noncompliance  Check TSH, cortisol, UDS  Goal SBP less than 180  Continue BiPAP  Echocardiogram    HIV (human immunodeficiency virus infection) ([facility])- (present on admission)  Assessment & Plan  Continue home regimen  Check viral load and CD4 count  If noncompliant with medications consider opportunistic infections    High anion gap metabolic acidosis  Assessment & Plan  Multifactorial with VAIBHAV as well as lactic acidosis  ABG primarily metabolic  Trend  Evaluate for need of HD    VAIBHAV (acute kidney injury) ([facility])  Assessment & Plan  Baseline CR 1.8-2.3, currently 7.5  Check UA, lites, CPK  Renal ultrasound  Renally dose medications minimize nephrotoxic substances  Monitor urine output  K presently wnl  Question related to HTN versus medications versus other  Nephrology consultation in a.m.    COVID-19 virus detected  Assessment & Plan  Reported positive  Uncertain whether this is the mainstay of his process or just an additional finding  Supportive care  Euvolemic to dry  balance  Continue with BiPAP at this time as he is tolerating well  Contact/droplet/airborne precautions  Check inflammatory markers CRP/ferritin/LDH  Check IL-6  Consider RDV and convalescent antibodies  Consider Decadron 6 mg daily x10 days  Check d-dimer greater than 1 consider weight-based heparin given renal function    HTN (hypertension), malignant- (present on admission)  Assessment & Plan  Reported history of noncompliance  Resume home regimen    Seizure (HCC)- (present on admission)  Assessment & Plan  Seizure precautions  Continue Humberto      Discussed patient condition and risk of morbidity and/or mortality with RN, RT, Patient and erp.    The patient remains critically ill.  Critical care time = 50 minutes in directly providing and coordinating critical care and extensive data review.  No time overlap and excludes procedures.

## 2020-07-03 NOTE — ED PROVIDER NOTES
"ED Provider Note    Chief Complaint:   Shortness of breath    HPI:  Seamus Palencia is a 55 y.o. male who presents to the emergency department with shortness of breath and hypoxia.  I was called to evaluate the patient emergently, he was brought by private vehicle who drove to the ambulance bay and explained that the patient needed help.  [facility] paramedics were in the ambulance bay and kindly brought him into the emergency department.  He was found to be in acute respiratory distress, room air oxygen saturation on arrival was in the 70s.  Due to severe respiratory distress, he was unable to provide any further history.    Further HPI is limited by the patient's acute respiratory distress in critical condition.    Review of Systems:  See HPI for pertinent positives and negatives.  Further ROS is limited by severe respiratory distress in critical condition.    Past Medical History:   has a past medical history of Heart failure ([facility]), HIV disease ([facility]) (1995), Hypertension, Seizure ([facility]), and Stroke ([facility]).    Social History:  Social History     Tobacco Use   • Smoking status: Current Every Day Smoker     Packs/day: 0.25     Types: Cigarettes   • Smokeless tobacco: Never Used   • Tobacco comment: 3-4 CIGARETTES A DAY   Substance and Sexual Activity   • Alcohol use: No   • Drug use: No   • Sexual activity: Not on file       Surgical History:   has a past surgical history that includes cholecystectomy (1980s).    Current Medications:  Home Medications    **Home medications have not yet been reviewed for this encounter**         Allergies:  No Known Allergies    Physical Exam:  Vital Signs: /76   Pulse 93   Resp 17   Ht 1.626 m (5' 4\")   Wt 64.4 kg (142 lb)   SpO2 100%   BMI 24.37 kg/m²   Constitutional: Severe respiratory distress  HENT: Pale, atraumatic  Eyes: Pupils equal and reactive, pale conjunctiva  Neck: Supple, normal range of motion, no stridor  Cardiovascular: Tachycardic rate, auscultation limited " by ambient noise, hypertensive on arrival  Pulmonary: Respiratory distress with tachypnea and accessory muscle usage, room air oxygen saturation is in the 70s which corrects to mid 90s on nonrebreather, patient is not able to speak due to severity of respiratory distress  Abdomen: Soft, non-distended, non-tender to palpation, no peritoneal signs  Skin: Pale, ashen in coloration on arrival, dry  Musculoskeletal: Normal range of motion in all extremities, no swelling or deformity noted, no visible lower extremity edema, bilateral posterior calves and thighs are nontender to palpation, lower extremities are symmetric  Neurologic: Awake, alert, will follow commands  Psychiatric: Anxious affect    Medical records reviewed for continuity of care.  Cardiology clinic note reviewed from 9/11/2019.  The patient was seen by Dr. Marie.  He was referred from [facility], noted to have a history of hypertension, HIV infection, as well as history of stroke.  Recent transthoracic echocardiogram noted was done in 2015 which showed LV function of 45%.  There was concern that he was not taking his medications appropriately.  He declined any type of hospital evaluation, though was asymptomatic at the time of this visit.  He is noted to have a past medical history significant for hypertensive emergency.    EKG: Rate 143, sinus tachycardia, diffuse ST depressions consistent with global ischemia, no ST elevation    Labs:  All labs reviewed.  Pertinent abnormal lab values documented in MDM.    Radiology:  DX-CHEST-PORTABLE (1 VIEW)   Final Result      BILATERAL mixed interstitial and airspace disease which could be pneumonia or pulmonary edema. Covid 19 is not excluded.      EC-ECHOCARDIOGRAM COMPLETE W/O CONT    (Results Pending)   US-RENAL ARTERY DUPLEX COMP    (Results Pending)        Differential diagnosis:  Hypertensive emergency, fluid overload, heart failure, renal insufficiency, viral illness including pneumonia or COVID-19,  electrolyte abnormality    MDM:  Patient presents brought by family member in acute respiratory distress.  Pulmonary exam revealed coarse breath sounds bilaterally, given his history of hypertensive emergency and severe hypertension on arrival to the emergency department, he was treated with nitro.  BiPAP was initiated as well.  On reassessment after 2 doses of sublingual nitroglycerin as well as BiPAP treatment, he is much more comfortable, respiratory rate has improved, he is much more alert and awake.  Blood pressure is significantly improved as well.    On laboratory evaluation lactic acid is elevated to 6.5, white blood count is elevated to 12.1.   His hemoglobin is 11.4, this is consistent with his baseline from several years ago, we do not have any recent values in our system.  CMP with multiple abnormalities including CO2 of 11, anion gap of 25, and creatinine of 7.55 with normal potassium.  Calcium is low at 7.0.  proBNP is over 35,000, troponin is 97.  Chest x-ray demonstrates bilateral mixed interstitial airspace disease, possibly pneumonia or pulmonary edema.  COVID-19 test was positive.  Patient has a known history of HIV.     Due to elevated lactic acid, elevated white blood count, and acute respiratory distress with hypoxia, sepsis protocol was initiated with presumed pulmonary source.  Rocephin and azithromycin administered in the emergency department.     I discussed the patient with Dr. Jackson, critical care specialist.  We will initiate nicardipine in the emergency department for blood pressure control.  He kindly agrees to admit the patient.    After receiving antihypertensive medications and BiPAP he became much more comfortable.  His breathing normalized, and blood pressure improved.  On my reassessment he is resting comfortably on BiPAP without any further evidence of distress.    Plan at this time is for admission to critical care unit for treatment of renal insufficiency, respiratory  failure, hypertensive emergency, as well as COVID-19.    Patient is critically ill.   The patient continues to have: Sepsis, hypertensive emergency, respiratory distress  The vital organ system that is affected is the: Circulatory, respiratory  If untreated there is a high chance of deterioration into: Hypoxia, cardiac arrhythmia  And eventually death.   The critical care that I am providing today is: Subspecialist consultation, noninvasive positive pressure ventilation, hemodynamic monitoring and management, interpretation of labs and imaging  The critical that has been undertaken is medically complex.   There has been no overlap in critical care time.   Critical Care Time not including procedures: 50 min      Disposition:  Admit to ICU in critical condition    Final Impression:  1. Hypertensive emergency    2. Acute on chronic congestive heart failure, unspecified heart failure type (HCC)    3. Sepsis with acute respiratory failure, due to unspecified organism, unspecified whether hypoxia or hypercapnia present, unspecified whether septic shock present (HCC)    4. COVID-19 virus infection        Electronically signed by: Erin Tinoco MD, 7/3/2020 7:05 AM

## 2020-07-03 NOTE — CONSULTS
Nephrology Consultation    Date of Service  7/3/2020    Referring Physician  Amadou Dixon Jr., D.O.    Consulting Physician  Bernabe Beasley M.D.    Reason for Consultation  VAIBHAV on CKD    History of Presenting Illness  55 y.o. male with HIV, CKD3-4 who presented 7/3/2020 with shortness of breath.    The patient was brought in early this morning by colleagues for shortness of breath.  On my evaluation, he says the shortness of breath developed over the last day.  He denies any known contacts with coronavirus.    Of note, the patient has known chronic kidney disease.  Last known creatinine in March 2019 of 2.4, with GFR of 29.  The patient denies recent uremic symptoms such as headache, nausea, vomiting, loss of appetite, or metallic taste in the mouth.  The patient denies taking NSAIDs.  He has not seen a kidney doctor since his last appointment with Dr. Ray.  The patient denies difficulty urinating.    The patient was originally admitted to the ICU, as he was found to be coronavirus positive.  He initially required BiPAP, but has been weaned down to room air.  He says his shortness of breath is much improved.  He says he is urinating.    I had a long discussion with patient about dialysis.  I discussed that I am worried that he needs dialysis due to worsening kidney function.  The patient declines dialysis, and says he does not want dialysis, even if it means he would need to be intubated or die.  I asked the patient who would be his surrogate decision maker if he does get intubated, and he does not have an answer for me.      Review of Systems  Review of Systems   Constitutional: Negative for fever.   Respiratory: Negative for shortness of breath.    Cardiovascular: Negative for chest pain.   Gastrointestinal: Negative for abdominal pain.   All other systems reviewed and are negative.      Past Medical History  Past Medical History:   Diagnosis Date   • Heart failure (HCC)    • HIV disease (HCC) 1995   •  Hypertension    • Seizure (HCC)    • Stroke (HCC)        Surgical History  Past Surgical History:   Procedure Laterality Date   • CHOLECYSTECTOMY  1980s       Family History  Family History   Problem Relation Age of Onset   • Diabetes Mother         cause of death   • Diabetes Father         cause of death   • Diabetes Sister    • Other Brother         down syndrome   • No Known Problems Sister    • No Known Problems Sister    • No Known Problems Sister    • Other Daughter         5 daughters, 11 sons    • Clotting Disorder Neg Hx    • Stroke Neg Hx        Social History  Social History     Socioeconomic History   • Marital status: Single     Spouse name: Not on file   • Number of children: Not on file   • Years of education: Not on file   • Highest education level: Not on file   Occupational History   • Not on file   Social Needs   • Financial resource strain: Not on file   • Food insecurity     Worry: Not on file     Inability: Not on file   • Transportation needs     Medical: Not on file     Non-medical: Not on file   Tobacco Use   • Smoking status: Current Every Day Smoker     Packs/day: 0.25     Types: Cigarettes   • Smokeless tobacco: Never Used   • Tobacco comment: 3-4 CIGARETTES A DAY   Substance and Sexual Activity   • Alcohol use: No   • Drug use: No   • Sexual activity: Not on file   Lifestyle   • Physical activity     Days per week: Not on file     Minutes per session: Not on file   • Stress: Not on file   Relationships   • Social connections     Talks on phone: Not on file     Gets together: Not on file     Attends Worship service: Not on file     Active member of club or organization: Not on file     Attends meetings of clubs or organizations: Not on file     Relationship status: Not on file   • Intimate partner violence     Fear of current or ex partner: Not on file     Emotionally abused: Not on file     Physically abused: Not on file     Forced sexual activity: Not on file   Other Topics Concern    • Not on file   Social History Narrative   • Not on file       Medications  Current Facility-Administered Medications   Medication Dose Route Frequency Provider Last Rate Last Dose   • nitroglycerin (NITROSTAT) tablet 0.4 mg  0.4 mg Sublingual Q5 MIN PRMEMO Tinoco M.D.   0.4 mg at 07/03/20 0031   • senna-docusate (PERICOLACE or SENOKOT S) 8.6-50 MG per tablet 2 Tab  2 Tab Oral BID Chip Jackson M.D.        And   • polyethylene glycol/lytes (MIRALAX) PACKET 1 Packet  1 Packet Oral QDAY PRN Chip Jackson M.D.        And   • magnesium hydroxide (MILK OF MAGNESIA) suspension 30 mL  30 mL Oral QDAY PRN Chip Jackson M.D.        And   • bisacodyl (DULCOLAX) suppository 10 mg  10 mg Rectal QDAY PRN Chip Jackson M.D.       • Respiratory Therapy Consult   Nebulization Continuous RT Chip Jackson M.D.       • heparin injection 5,000 Units  5,000 Units Subcutaneous Q8HRS Chip Jacskon M.D.   5,000 Units at 07/03/20 0955   • labetalol (NORMODYNE/TRANDATE) injection 10-20 mg  10-20 mg Intravenous Q4HRS PRN Chip Jackson M.D.       • ondansetron (ZOFRAN) syringe/vial injection 4 mg  4 mg Intravenous Q4HRS PRN Chip Jackson M.D.       • ondansetron (ZOFRAN ODT) dispertab 4 mg  4 mg Oral Q4HRS PRN Chip Jackson M.D.       • promethazine (PHENERGAN) tablet 12.5-25 mg  12.5-25 mg Oral Q4HRS PRN Chip Jackson M.D.       • promethazine (PHENERGAN) suppository 12.5-25 mg  12.5-25 mg Rectal Q4HRS PRN Chip Jackson M.D.       • prochlorperazine (COMPAZINE) injection 5-10 mg  5-10 mg Intravenous Q4HRS PRN Chip Jackson M.D.       • hydrALAZINE (APRESOLINE) injection 20 mg  20 mg Intravenous Q6HRS PRN Chip Jackson M.D.       • nitroglycerin 50 mg in D5W 250 ml infusion  0-200 mcg/min Intravenous Continuous Chip Jackson M.D.       • amLODIPine (NORVASC) tablet 10 mg  10 mg Oral DAILY Chip Jackson M.D.   10 mg at 07/03/20 1322   • aspirin EC (ECOTRIN) tablet 81 mg  81 mg Oral DAILY  Chip Jackson M.D.   81 mg at 07/03/20 1323   • isosorbide dinitrate (ISORDIL) tablet 20 mg  20 mg Oral TID Chip Jackson M.D.   20 mg at 07/03/20 1322   • rosuvastatin (CRESTOR) tablet 10 mg  10 mg Oral DAILY Chip Jackson M.D.       • NS (BOLUS) infusion 250 mL  250 mL Intravenous DIALYSIS PRN Bernabe Beasley M.D.       • albumin human 25% solution 12.5 g  12.5 g Intravenous DIALYSIS PRN Bernabe Beasley M.D.       • amLODIPine (NORVASC) tablet 10 mg  10 mg Oral Once Chip Jackson M.D.   Stopped at 07/03/20 1330   • aspirin EC (ECOTRIN) tablet 81 mg  81 mg Oral Once Chip Jackson M.D.   Stopped at 07/03/20 1330   • carvedilol (COREG) tablet 25 mg  25 mg Oral BID WITH MEALS Chip Jackson M.D.   25 mg at 07/03/20 1323   • levETIRAcetam (KEPPRA) tablet 500 mg  500 mg Oral Q12HRS Chip Jackson M.D.   500 mg at 07/03/20 1406       Allergies  No Known Allergies    Physical Exam  Temp:  [36.2 °C (97.2 °F)-37.6 °C (99.6 °F)] 36.2 °C (97.2 °F)  Pulse:  [] 92  Resp:  [13-40] 18  BP: (118-278)/() 121/76  SpO2:  [83 %-100 %] 97 %    Physical Exam   Constitutional: He is oriented to person, place, and time. He appears well-developed. No distress.   HENT:   Mouth/Throat: Oropharynx is clear and moist. No oropharyngeal exudate.   Eyes: EOM are normal. No scleral icterus.   Neck: No tracheal deviation present.   Cardiovascular: Normal rate and normal heart sounds.   No murmur heard.  Pulmonary/Chest: Effort normal. No stridor. He has rales.   Abdominal: Soft. He exhibits no distension. There is no abdominal tenderness.   Musculoskeletal: Normal range of motion.         General: No edema.   Neurological: He is alert and oriented to person, place, and time.   Skin: Skin is warm and dry. He is not diaphoretic.   Psychiatric: He has a normal mood and affect.       Fluids  Date 07/03/20 0700 - 07/04/20 0659   Shift 5319-7289 9740-2116 9401-6869 24 Hour Total   INTAKE   P.O. 240   240   I.V. 220   220   Shift  Total 460   460   OUTPUT   Urine 740   740   Shift Total 740   740   Weight (kg) 66.7 66.7 66.7 66.7       Laboratory  Labs reviewed, pertinent labs below.  Recent Labs     07/03/20  0020   WBC 12.1*   RBC 3.64*   HEMOGLOBIN 11.4*   HEMATOCRIT 36.5*   .3*   MCH 31.3   MCHC 31.2*   RDW 63.7*   PLATELETCT 203   MPV 10.1     Recent Labs     07/03/20  0020 07/03/20  0410 07/03/20  1030   SODIUM 138 136 137   POTASSIUM 4.3 5.0 4.7   CHLORIDE 102 103 106   CO2 11* 14* 16*   GLUCOSE 190* 111* 95   BUN 57* 57* 59*   CREATININE 7.55* 7.87* 8.21*   CALCIUM 7.0* 6.6* 6.7*                URINALYSIS:  Lab Results   Component Value Date/Time    COLORURINE Yellow 07/03/2020 0410    CLARITY Clear 07/03/2020 0410    SPECGRAVITY 1.011 07/03/2020 0410    PHURINE 6.0 07/03/2020 0410    KETONES Negative 07/03/2020 0410    PROTEINURIN 300 (A) 07/03/2020 0410    BILIRUBINUR Negative 07/03/2020 0410    UROBILU 0.2 07/03/2020 0410    NITRITE Negative 07/03/2020 0410    LEUKESTERAS Negative 07/03/2020 0410    OCCULTBLOOD Small (A) 07/03/2020 0410     [facility] [place]  Lab Results   Component Value Date/Time    TOTPROTUR 148.0 (H) 07/03/2020 1315      Lab Results   Component Value Date/Time    CREATININEU 49.87 07/03/2020 1315       Imaging reviewed  DX-CHEST-PORTABLE (1 VIEW)   Final Result      BILATERAL mixed interstitial and airspace disease which could be pneumonia or pulmonary edema. Covid 19 is not excluded.      EC-ECHOCARDIOGRAM COMPLETE W/O CONT    (Results Pending)   US-RENAL ARTERY DUPLEX COMP    (Results Pending)         Assessment/Plan  55 y.o. male with HIV, CKD3-4 who presented 7/3/2020 with shortness of breath, found to have COVID-19 pneumonia.    1.  Acute kidney injury versus progression of CKD.  Patient is nonoliguric, denies uremic symptoms, and does not have any metabolic emergencies.  Given high risk of respiratory decompensation, I recommended dialysis, but the patient declined dialysis.  Recommend Lasix 80 mg IV 3 times  daily to help manage volume status in the setting of acute kidney injury.  Check chemistry panel at least once daily.  Avoid nephrotoxins.  Monitor urine output.    2.  COVID-19 pneumonia, diagnosed on admission.  Initially requiring BiPAP, now room air.  Management per pulmonary critical care.    3.  Metabolic acidosis, likely due to CKD.  Start Bicitra 30 mL p.o. 4 times daily.  Check labs daily.    4.  HIV infection, chronic.  Continue patient's outpatient antiretrovirals, with dose adjustment by pharmacy.    5.  Macrocytic anemia, unclear etiology, noted on admission.  Check CBC daily.    6.  Leukocytosis, noted on admission, unclear etiology.  Check CBC daily.    7.  Hypertension, with accelerated hypertension.  Likely due to occult volume overload.  Recommend Lasix as above.    8. Nicotine dependence- I spent over 3 minutes discussing the need for tobacco cessation. I discussed pharmacotherapy measures for quitting including replacements such as nicotine gum / nicotine patch.     Discussed with Dr. Destiny Beasley MD  Nephrology

## 2020-07-03 NOTE — CARE PLAN
Problem: Communication  Goal: The ability to communicate needs accurately and effectively will improve  Outcome: PROGRESSING AS EXPECTED     Problem: Safety  Goal: Will remain free from injury  Outcome: PROGRESSING AS EXPECTED  Goal: Will remain free from falls  Outcome: PROGRESSING AS EXPECTED     Problem: Infection  Goal: Will remain free from infection  Outcome: PROGRESSING AS EXPECTED     Problem: Venous Thromboembolism (VTW)/Deep Vein Thrombosis (DVT) Prevention:  Goal: Patient will participate in Venous Thrombosis (VTE)/Deep Vein Thrombosis (DVT)Prevention Measures  Outcome: PROGRESSING AS EXPECTED     Problem: Bowel/Gastric:  Goal: Normal bowel function is maintained or improved  Outcome: PROGRESSING AS EXPECTED  Goal: Will not experience complications related to bowel motility  Outcome: PROGRESSING AS EXPECTED     Problem: Knowledge Deficit  Goal: Knowledge of disease process/condition, treatment plan, diagnostic tests, and medications will improve  Outcome: PROGRESSING AS EXPECTED  Goal: Knowledge of the prescribed therapeutic regimen will improve  Outcome: PROGRESSING AS EXPECTED     Problem: Discharge Barriers/Planning  Goal: Patient's continuum of care needs will be met  Outcome: PROGRESSING AS EXPECTED     Problem: Respiratory:  Goal: Respiratory status will improve  Outcome: PROGRESSING AS EXPECTED     Problem: Fluid Volume:  Goal: Will maintain balanced intake and output  Outcome: PROGRESSING AS EXPECTED     Problem: Urinary Elimination:  Goal: Ability to reestablish a normal urinary elimination pattern will improve  Outcome: PROGRESSING AS EXPECTED

## 2020-07-03 NOTE — ASSESSMENT & PLAN NOTE
RT/O2 Protocols  Titrate supplemental FiO2 to maintain SpO2 >85%  Off of BiPAP  Maintain euvolemic to dry balance  BP control   Echo: LVEF 40% with grade 2 diastolic dysfunction

## 2020-07-03 NOTE — ASSESSMENT & PLAN NOTE
Incidental finding  Supportive care  Obtain euvolemic to dry balance  Contact/droplet/airborne precautions  Follow inflammatory markers   No steroids given room air status  Consider RDV and convalescent antibodies for any worsening  Hold anticoagulation given worsening anemia

## 2020-07-03 NOTE — ASSESSMENT & PLAN NOTE
Baseline CR 1.8-2.3, currently 8.74  Renal ultrasound: Resistive indices at the bilateral renal hilum are abnormally high consistent with intrinsic renal parenchymal disease.   Renally dose medications minimize nephrotoxic substances  Monitor urine output  Electrolytes within normal limits  Question related to HTN versus medications versus other  Nephrology on board  Patient refusing dialysis  Continue high-dose Lasix to maintain euvolumic status

## 2020-07-03 NOTE — RESPIRATORY CARE
Bipap/Cpap mask placed.  Can the patient demonstrate removal of mask? Yes, RN at bedside  If no, please notify physician.

## 2020-07-03 NOTE — ED NOTES
Tech from [facility] called with critical result of COVID/SARS at 0243. Critical lab result read back to Tech.   Dr. Tinoco notified of critical lab result at 0245.  Critical lab result read back by Dr. Tinoco.

## 2020-07-03 NOTE — ED NOTES
Unable to complete med rec at this time. Pt is unavailable for interview and pt home pharmacy is closed. Med rec tech will call pt home pharmacy when they reopen.

## 2020-07-03 NOTE — ED TRIAGE NOTES
"Chief Complaint   Patient presents with   • Shortness of Breath     pt brought in by family then excorted into the building by EMS from there. pt severely SOB and tachycardic      .BP (!) 216/115   Pulse (!) 131   Resp (!) 40   Ht 1.626 m (5' 4\")   Wt 64.4 kg (142 lb)   SpO2 94%   BMI 24.37 kg/m²   Pt has hx oc CHF and non compliance with medication   "

## 2020-07-03 NOTE — ED NOTES
[facility]. Plan for patient to discontinue BIPAP at time of transfer.

## 2020-07-03 NOTE — ED NOTES
Break RN note: IV antibiotics infusing per ERP orders. Pt tolerating bipap well. Call light in reach.

## 2020-07-03 NOTE — ED NOTES
Pt aaox4, resting comfortably, denies any pain including CP. On bipap. Cardene stopped at this time per order. Pt's SBP currently less than 160 so will wait to start nitro drip and continue to monitor BP closely. Waiting for ICU

## 2020-07-03 NOTE — ED NOTES
Pt was seen by intensivist at the bedside. Patient has improved significantly since arrival to the ED. All additional labs drawn and sent. No other needs at this time.

## 2020-07-04 ENCOUNTER — APPOINTMENT (OUTPATIENT)
Dept: RADIOLOGY | Facility: MEDICAL CENTER | Age: 55
DRG: 291 | End: 2020-07-04
Attending: INTERNAL MEDICINE
Payer: COMMERCIAL

## 2020-07-04 ENCOUNTER — APPOINTMENT (OUTPATIENT)
Dept: CARDIOLOGY | Facility: MEDICAL CENTER | Age: 55
DRG: 291 | End: 2020-07-04
Attending: INTERNAL MEDICINE
Payer: COMMERCIAL

## 2020-07-04 LAB
ANION GAP SERPL CALC-SCNC: 11 MMOL/L (ref 7–16)
ANNOTATION COMMENT IMP: ABNORMAL
BASOPHILS # BLD AUTO: 0.2 % (ref 0–1.8)
BASOPHILS # BLD: 0.01 K/UL (ref 0–0.12)
BUN SERPL-MCNC: 64 MG/DL (ref 8–22)
CALCIUM SERPL-MCNC: 6.7 MG/DL (ref 8.5–10.5)
CD3 CELLS # BLD: 356 CELLS/UL (ref 570–2400)
CD3+CD4+ CELLS # BLD: 40 CELLS/UL (ref 430–1800)
CD3+CD4+ CELLS/CD3+CD8+ CLL BLD: 0.13 RATIO (ref 0.8–3.9)
CD3+CD8+ CELLS # BLD: 307 CELLS/UL (ref 210–1200)
CHLORIDE SERPL-SCNC: 107 MMOL/L (ref 96–112)
CO2 SERPL-SCNC: 18 MMOL/L (ref 20–33)
CREAT SERPL-MCNC: 8.17 MG/DL (ref 0.5–1.4)
EOSINOPHIL # BLD AUTO: 0.23 K/UL (ref 0–0.51)
EOSINOPHIL NFR BLD: 3.9 % (ref 0–6.9)
ERYTHROCYTE [DISTWIDTH] IN BLOOD BY AUTOMATED COUNT: 60.3 FL (ref 35.9–50)
GLUCOSE SERPL-MCNC: 95 MG/DL (ref 65–99)
HCT VFR BLD AUTO: 26.2 % (ref 42–52)
HCT VFR BLD AUTO: 26.4 % (ref 42–52)
HGB BLD-MCNC: 7.7 G/DL (ref 14–18)
HGB BLD-MCNC: 7.7 G/DL (ref 14–18)
HGB BLD-MCNC: 8.2 G/DL (ref 14–18)
HGB BLD-MCNC: 8.3 G/DL (ref 14–18)
IMM GRANULOCYTES # BLD AUTO: 0.04 K/UL (ref 0–0.11)
IMM GRANULOCYTES NFR BLD AUTO: 0.7 % (ref 0–0.9)
LV EJECT FRACT  99904: 40
LV EJECT FRACT MOD 2C 99903: 42.9
LV EJECT FRACT MOD 4C 99902: 42.77
LV EJECT FRACT MOD BP 99901: 43.77
LYMPHOCYTES # BLD AUTO: 0.18 K/UL (ref 1–4.8)
LYMPHOCYTES NFR BLD: 3 % (ref 22–41)
MAGNESIUM SERPL-MCNC: 1.6 MG/DL (ref 1.5–2.5)
MCH RBC QN AUTO: 31.5 PG (ref 27–33)
MCHC RBC AUTO-ENTMCNC: 31.8 G/DL (ref 33.7–35.3)
MCV RBC AUTO: 99.2 FL (ref 81.4–97.8)
MONOCYTES # BLD AUTO: 0.3 K/UL (ref 0–0.85)
MONOCYTES NFR BLD AUTO: 5 % (ref 0–13.4)
NEUTROPHILS # BLD AUTO: 5.19 K/UL (ref 1.82–7.42)
NEUTROPHILS NFR BLD: 87.2 % (ref 44–72)
NRBC # BLD AUTO: 0 K/UL
NRBC BLD-RTO: 0 /100 WBC
PLATELET # BLD AUTO: 131 K/UL (ref 164–446)
PMV BLD AUTO: 10.7 FL (ref 9–12.9)
POTASSIUM SERPL-SCNC: 4.5 MMOL/L (ref 3.6–5.5)
RBC # BLD AUTO: 2.6 M/UL (ref 4.7–6.1)
SODIUM SERPL-SCNC: 136 MMOL/L (ref 135–145)
WBC # BLD AUTO: 6 K/UL (ref 4.8–10.8)

## 2020-07-04 PROCEDURE — 93975 VASCULAR STUDY: CPT | Mod: 26 | Performed by: INTERNAL MEDICINE

## 2020-07-04 PROCEDURE — 80048 BASIC METABOLIC PNL TOTAL CA: CPT

## 2020-07-04 PROCEDURE — 700102 HCHG RX REV CODE 250 W/ 637 OVERRIDE(OP): Performed by: INTERNAL MEDICINE

## 2020-07-04 PROCEDURE — 87040 BLOOD CULTURE FOR BACTERIA: CPT | Mod: 91

## 2020-07-04 PROCEDURE — 93306 TTE W/DOPPLER COMPLETE: CPT | Mod: 26 | Performed by: INTERNAL MEDICINE

## 2020-07-04 PROCEDURE — 83735 ASSAY OF MAGNESIUM: CPT

## 2020-07-04 PROCEDURE — 700111 HCHG RX REV CODE 636 W/ 250 OVERRIDE (IP): Performed by: INTERNAL MEDICINE

## 2020-07-04 PROCEDURE — 700105 HCHG RX REV CODE 258: Performed by: INTERNAL MEDICINE

## 2020-07-04 PROCEDURE — 85025 COMPLETE CBC W/AUTO DIFF WBC: CPT

## 2020-07-04 PROCEDURE — A9270 NON-COVERED ITEM OR SERVICE: HCPCS | Performed by: INTERNAL MEDICINE

## 2020-07-04 PROCEDURE — 770022 HCHG ROOM/CARE - ICU (200)

## 2020-07-04 PROCEDURE — 85018 HEMOGLOBIN: CPT

## 2020-07-04 PROCEDURE — 85014 HEMATOCRIT: CPT

## 2020-07-04 PROCEDURE — 93975 VASCULAR STUDY: CPT

## 2020-07-04 PROCEDURE — 99233 SBSQ HOSP IP/OBS HIGH 50: CPT | Performed by: INTERNAL MEDICINE

## 2020-07-04 PROCEDURE — 82270 OCCULT BLOOD FECES: CPT

## 2020-07-04 PROCEDURE — 99291 CRITICAL CARE FIRST HOUR: CPT | Mod: CS | Performed by: INTERNAL MEDICINE

## 2020-07-04 PROCEDURE — 93306 TTE W/DOPPLER COMPLETE: CPT

## 2020-07-04 RX ORDER — FUROSEMIDE 10 MG/ML
80 INJECTION INTRAMUSCULAR; INTRAVENOUS 3 TIMES DAILY
Status: DISCONTINUED | OUTPATIENT
Start: 2020-07-04 | End: 2020-07-04

## 2020-07-04 RX ORDER — MAGNESIUM SULFATE HEPTAHYDRATE 40 MG/ML
2 INJECTION, SOLUTION INTRAVENOUS ONCE
Status: COMPLETED | OUTPATIENT
Start: 2020-07-04 | End: 2020-07-04

## 2020-07-04 RX ORDER — CALCIUM GLUCONATE 94 MG/ML
2 INJECTION, SOLUTION INTRAVENOUS ONCE
Status: DISCONTINUED | OUTPATIENT
Start: 2020-07-04 | End: 2020-07-04

## 2020-07-04 RX ORDER — LAMIVUDINE 10 MG/ML
50 SOLUTION ORAL DAILY
Status: DISCONTINUED | OUTPATIENT
Start: 2020-07-05 | End: 2020-07-07

## 2020-07-04 RX ORDER — FUROSEMIDE 10 MG/ML
80 INJECTION INTRAMUSCULAR; INTRAVENOUS 3 TIMES DAILY
Status: DISCONTINUED | OUTPATIENT
Start: 2020-07-04 | End: 2020-07-05

## 2020-07-04 RX ORDER — LAMIVUDINE 150 MG/1
150 TABLET, FILM COATED ORAL ONCE
Status: COMPLETED | OUTPATIENT
Start: 2020-07-04 | End: 2020-07-04

## 2020-07-04 RX ADMIN — AMLODIPINE BESYLATE 10 MG: 10 TABLET ORAL at 05:17

## 2020-07-04 RX ADMIN — LEVETIRACETAM 500 MG: 500 TABLET, FILM COATED ORAL at 05:17

## 2020-07-04 RX ADMIN — ASPIRIN 81 MG: 81 TABLET, COATED ORAL at 05:17

## 2020-07-04 RX ADMIN — ACETAMINOPHEN 650 MG: 325 TABLET, FILM COATED ORAL at 12:08

## 2020-07-04 RX ADMIN — MAGNESIUM SULFATE IN WATER 2 G: 40 INJECTION, SOLUTION INTRAVENOUS at 09:24

## 2020-07-04 RX ADMIN — LAMIVUDINE 150 MG: 150 TABLET, FILM COATED ORAL at 12:07

## 2020-07-04 RX ADMIN — SODIUM CITRATE AND CITRIC ACID MONOHYDRATE 30 ML: 500; 334 SOLUTION ORAL at 12:07

## 2020-07-04 RX ADMIN — FUROSEMIDE 80 MG: 10 INJECTION, SOLUTION INTRAMUSCULAR; INTRAVENOUS at 09:24

## 2020-07-04 RX ADMIN — DOCUSATE SODIUM 50 MG AND SENNOSIDES 8.6 MG 2 TABLET: 8.6; 5 TABLET, FILM COATED ORAL at 17:19

## 2020-07-04 RX ADMIN — CALCIUM GLUCONATE 2 G: 98 INJECTION, SOLUTION INTRAVENOUS at 08:29

## 2020-07-04 RX ADMIN — CARVEDILOL 25 MG: 25 TABLET, FILM COATED ORAL at 06:23

## 2020-07-04 RX ADMIN — ISOSORBIDE DINITRATE 20 MG: 20 TABLET ORAL at 05:21

## 2020-07-04 RX ADMIN — LEVETIRACETAM 500 MG: 500 TABLET, FILM COATED ORAL at 17:16

## 2020-07-04 RX ADMIN — HEPARIN SODIUM 5000 UNITS: 5000 INJECTION, SOLUTION INTRAVENOUS; SUBCUTANEOUS at 05:19

## 2020-07-04 RX ADMIN — DOLUTEGRAVIR SODIUM 50 MG: 50 TABLET, FILM COATED ORAL at 12:07

## 2020-07-04 RX ADMIN — SODIUM CITRATE AND CITRIC ACID MONOHYDRATE 30 ML: 500; 334 SOLUTION ORAL at 17:16

## 2020-07-04 RX ADMIN — ROSUVASTATIN CALCIUM 10 MG: 20 TABLET, FILM COATED ORAL at 05:17

## 2020-07-04 RX ADMIN — AMPICILLIN SODIUM AND SULBACTAM SODIUM 3 G: 2; 1 INJECTION, POWDER, FOR SOLUTION INTRAMUSCULAR; INTRAVENOUS at 09:25

## 2020-07-04 RX ADMIN — SODIUM CITRATE AND CITRIC ACID MONOHYDRATE 30 ML: 500; 334 SOLUTION ORAL at 20:41

## 2020-07-04 RX ADMIN — VANCOMYCIN HYDROCHLORIDE 1750 MG: 500 INJECTION, POWDER, LYOPHILIZED, FOR SOLUTION INTRAVENOUS at 10:09

## 2020-07-04 ASSESSMENT — ENCOUNTER SYMPTOMS
ABDOMINAL PAIN: 0
FEVER: 0
SHORTNESS OF BREATH: 0

## 2020-07-04 ASSESSMENT — FIBROSIS 4 INDEX: FIB4 SCORE: 1.38

## 2020-07-04 NOTE — PROGRESS NOTES
Patient temperature 101.6F. Dr. Ardon paged for Tylenol. Awaiting for orders.     2211: Paged Dr. Jackson for Tylenol orders. Orders received.

## 2020-07-04 NOTE — CARE PLAN
Patient resting overnight with minimal interruptions; sleep hygiene promoted. Patient encouraged to reposition self to prevent skin breakdown. Patient able to do his own pulmonary toileting with a good, effective cough. SBP maintained <160 per orders since restarting home medications. Patient ambulating to restroom without assistance. Patient refusing SCDs but educated on reasoning for them. Patient continues to refuse bath but encouraged to help maintain good skin hygiene and prevent breakdown; will try again in morning. Education provided on medications administered, skin breakdown, pulmonary toileting, and ambulation.       Problem: Communication  Goal: The ability to communicate needs accurately and effectively will improve  Outcome: PROGRESSING AS EXPECTED     Problem: Safety  Goal: Will remain free from injury  Outcome: PROGRESSING AS EXPECTED  Goal: Will remain free from falls  Outcome: PROGRESSING AS EXPECTED     Problem: Infection  Goal: Will remain free from infection  Outcome: PROGRESSING AS EXPECTED     Problem: Venous Thromboembolism (VTW)/Deep Vein Thrombosis (DVT) Prevention:  Goal: Patient will participate in Venous Thrombosis (VTE)/Deep Vein Thrombosis (DVT)Prevention Measures  Outcome: PROGRESSING AS EXPECTED     Problem: Bowel/Gastric:  Goal: Normal bowel function is maintained or improved  Outcome: PROGRESSING AS EXPECTED  Goal: Will not experience complications related to bowel motility  Outcome: PROGRESSING AS EXPECTED     Problem: Knowledge Deficit  Goal: Knowledge of disease process/condition, treatment plan, diagnostic tests, and medications will improve  Outcome: PROGRESSING AS EXPECTED  Goal: Knowledge of the prescribed therapeutic regimen will improve  Outcome: PROGRESSING AS EXPECTED     Problem: Discharge Barriers/Planning  Goal: Patient's continuum of care needs will be met  Outcome: PROGRESSING AS EXPECTED     Problem: Respiratory:  Goal: Respiratory status will improve  Outcome:  PROGRESSING AS EXPECTED     Problem: Fluid Volume:  Goal: Will maintain balanced intake and output  Outcome: PROGRESSING AS EXPECTED     Problem: Urinary Elimination:  Goal: Ability to reestablish a normal urinary elimination pattern will improve  Outcome: PROGRESSING AS EXPECTED

## 2020-07-04 NOTE — PROGRESS NOTES
"Pharmacy Kinetics 2020  55 y.o. male on vancomycin day # 1     New start vancomycin  Provider specified end date: TBD    Indication for Treatment: CoNS bacteremia    Pertinent history per medical record: Admitted on 7/3/2020 for shortness of breath. Patient was dropped off at the ER by a friend due to increased work of breathing and inability to catch his breath. The patient has a history of HIV and reports compliance with his HAART regiment. He was found to be significantly hypertensive with possible edema in his lungs and tested positive for COVID-19. Labs indicate he had new acute on chronic renal failure. Antibiotics were started once blood cultures returned with a coag negative staph species.     Other antibiotics: unasyn 3g IV q12h    Allergies: Patient has no known allergies.     List concerns for renal function: VAIBHAV, COVID-19, history of CKD    Pertinent cultures to date:   7/3/20 Blood x 2 - CONS in 3/4 bottles    MRSA nares swab if pneumonia is a concern: n/a    Recent Labs     20  0020 20  0520   WBC 12.1* 6.0   NEUTSPOLYS 74.20* 87.20*     Recent Labs     20  0020 20  0410 20  1030 20  1540 20  2130 20  0355   BUN 57* 57* 59* 60* 61* 64*   CREATININE 7.55* 7.87* 8.21* 8.30* 8.18* 8.17*   ALBUMIN 4.3  --   --   --   --   --      No results for input(s): VANCOTROUGH, VANCOPEAK, VANCORANDOM in the last 72 hours.    Intake/Output Summary (Last 24 hours) at 2020 1407  Last data filed at 2020 1200  Gross per 24 hour   Intake 1480 ml   Output 1200 ml   Net 280 ml      /60   Pulse 73   Temp 37.5 °C (99.5 °F) (Temporal)   Resp 18   Ht 1.626 m (5' 4\")   Wt 67.4 kg (148 lb 9.4 oz)   SpO2 96%  Temp (24hrs), Av.8 °C (100.1 °F), Min:37.3 °C (99.1 °F), Max:38.7 °C (101.6 °F)      A/P   1. Vancomycin dose change: Pulse dose, start vancomycin 25 mg/kg (1,750 mg) IV x 1  2. Next vancomycin level: 7/5 @ 1000 (24 hour level)  3. Goal trough: 12-16 " mcg/mL  4. Comments: Will initiate pulse dose given current renal indices. Patient has adequate urine output, but may not be filtering adequately. Renal failure may be secondary to COVID-19 infection. Patient has refused dialysis at this time. Will obtain 24 hour level to assess for next dose. Sensitivities on cultures still pending, patient does not have an obvious source from chart review (no chronic lines or hardware, lungs much improved from admission w/out antibiotics, staph not associated with UTI in men). Pharmacy will continue to follow.      Balbir Arenas, HilarioD

## 2020-07-04 NOTE — PROGRESS NOTES
"Critical Care Progress Note    Date of admission  7/3/2020    Chief Complaint  55 y.o. male admitted 7/3/2020 with acute hypoxic respiratory failure    Hospital Course    \"55 y.o. male with known history of HLD, HTN, HIV, seizure, and history of noncompliance with medications disorder who presented 7/3/2020 with shortness of breath.  Patient was brought in by friends and dropped off, noted to be respiratory distress, satting 83 on room air and initial /163 with HR of 144.  Patient was immediately placed on BiPAP which improved his underlying distress as well as oxygenation.  Patient indicates that he was in his normal state of health until this afternoon when he had sudden onset of difficulty catching his breath and taking a deep breath stating he had some heaviness.  He denied any lightheadedness dizziness arm numbness or tingling, no sharp/tearing/tearing pain or radiation.  Denies any recent fever, chills, sweats, cough, sputum production.  He lives with 2 remains.  He denies any recent sick contacts.  He currently states that he is quite compliant with his medications, denies any alcohol or drug use, does use tobacco.\"      Interval Problem Update  Reviewed last 24 hour events:   - continues to refuse dialysis   - Hemoglobin decreased from 11.4 -> 8.2   - Neuro: Alert and oriented x4   - HR: 70s-80s   - SBP: 100s-130s   - GI: ADAT to renal/cardiac   - UOP: 1.7L/24 hrs   - Wong: no   - Tm: 38.7   - Lines: PIV   - PPx: GI not indicated, DVT due to hemoglobin drop   -On room air   - CXR (personally reviewed and compared to prior): no new   -Renal ultrasound consistent with intrinsic renal disease   -Echo with LVEF of 40%, global hypokinesis and grade 2 diastolic dysfunction    Review of Systems  Review of Systems   Constitutional: Positive for malaise/fatigue. Negative for chills and fever.   HENT: Negative for congestion.    Eyes: Negative for blurred vision and double vision.   Respiratory: Negative for " cough, sputum production, shortness of breath (resolved) and wheezing.    Cardiovascular: Negative for chest pain and leg swelling.   Gastrointestinal: Negative for nausea and vomiting.   Neurological: Negative for focal weakness.   Psychiatric/Behavioral: Negative for depression.   All other systems reviewed and are negative.       Vital Signs for last 24 hours   Temp:  [36.2 °C (97.2 °F)-38.7 °C (101.6 °F)] 37.6 °C (99.7 °F)  Pulse:  [] 79  Resp:  [18-20] 18  BP: ()/(51-96) 95/55  SpO2:  [90 %-100 %] 94 %    Hemodynamic parameters for last 24 hours       Respiratory Information for the last 24 hours       Physical Exam   Physical Exam  Vitals signs and nursing note reviewed.   Constitutional:       General: He is not in acute distress.     Appearance: He is ill-appearing. He is not diaphoretic.   HENT:      Head: Normocephalic and atraumatic.      Right Ear: External ear normal.      Left Ear: External ear normal.      Nose: Nose normal.      Mouth/Throat:      Mouth: Mucous membranes are dry.      Pharynx: Oropharynx is clear.   Eyes:      Conjunctiva/sclera: Conjunctivae normal.      Pupils: Pupils are equal, round, and reactive to light.   Neck:      Musculoskeletal: Normal range of motion and neck supple.   Cardiovascular:      Rate and Rhythm: Regular rhythm. Tachycardia present.      Pulses: Normal pulses.   Pulmonary:      Effort: Pulmonary effort is normal.      Breath sounds: Rales present. No wheezing or rhonchi.   Abdominal:      General: There is no distension.      Palpations: Abdomen is soft.      Tenderness: There is no abdominal tenderness.   Musculoskeletal:         General: No swelling.   Skin:     General: Skin is warm and dry.      Capillary Refill: Capillary refill takes less than 2 seconds.   Neurological:      Mental Status: He is alert.      Cranial Nerves: No cranial nerve deficit.   Psychiatric:         Mood and Affect: Mood normal.         Medications  Current  Facility-Administered Medications   Medication Dose Route Frequency Provider Last Rate Last Dose   • calcium GLUConate 2 g in  mL IVPB  2 g Intravenous Once CARYL Ortega Jr..O.   2 g at 07/04/20 0829   • MD Alert...Vancomycin per Pharmacy   Other PHARMACY TO DOSE CARYL Ortega Jr..O.       • ampicillin/sulbactam (UNASYN) 3 g in  mL IVPB  3 g Intravenous Q12HRS CARYL Ortega Jr..O.       • vancomycin (VANCOCIN) 1,750 mg in  mL IVPB  25 mg/kg Intravenous Once CARYL Ortega Jr..O.       • magnesium sulfate IVPB premix 2 g  2 g Intravenous Once CARYL Ortega Jr..O.       • nitroglycerin (NITROSTAT) tablet 0.4 mg  0.4 mg Sublingual Q5 MIN PRN Erin Tinoco M.D.   0.4 mg at 07/03/20 0031   • senna-docusate (PERICOLACE or SENOKOT S) 8.6-50 MG per tablet 2 Tab  2 Tab Oral BID Chip Jackson M.D.   Stopped at 07/04/20 0600    And   • polyethylene glycol/lytes (MIRALAX) PACKET 1 Packet  1 Packet Oral QDAY PRN Chip Jackson M.D.        And   • magnesium hydroxide (MILK OF MAGNESIA) suspension 30 mL  30 mL Oral QDAY PRN Chip Jackson M.D.        And   • bisacodyl (DULCOLAX) suppository 10 mg  10 mg Rectal QDAY PRN Chip Jackson M.D.       • Respiratory Therapy Consult   Nebulization Continuous RT Chip Jackson M.D.       • heparin injection 5,000 Units  5,000 Units Subcutaneous Q8HRS Chip Jackson M.D.   5,000 Units at 07/04/20 0519   • labetalol (NORMODYNE/TRANDATE) injection 10-20 mg  10-20 mg Intravenous Q4HRS PRN Chip Jackson M.D.       • ondansetron (ZOFRAN) syringe/vial injection 4 mg  4 mg Intravenous Q4HRS PRN Chip Jackson M.D.       • ondansetron (ZOFRAN ODT) dispertab 4 mg  4 mg Oral Q4HRS PRN Chip Jackson M.D.       • promethazine (PHENERGAN) tablet 12.5-25 mg  12.5-25 mg Oral Q4HRS PRMEMO Jackson M.D.       • promethazine (PHENERGAN) suppository 12.5-25 mg  12.5-25 mg Rectal Q4HRS PRN Chip Jackson M.D.       • prochlorperazine  (COMPAZINE) injection 5-10 mg  5-10 mg Intravenous Q4HRS PRN Chip Jackson M.D.       • hydrALAZINE (APRESOLINE) injection 20 mg  20 mg Intravenous Q6HRS PRN Chip Jackson M.D.       • nitroglycerin 50 mg in D5W 250 ml infusion  0-200 mcg/min Intravenous Continuous Chip Jackson M.D.       • amLODIPine (NORVASC) tablet 10 mg  10 mg Oral DAILY Chip Jackson M.D.   10 mg at 07/04/20 0517   • aspirin EC (ECOTRIN) tablet 81 mg  81 mg Oral DAILY Chip Jackson M.D.   81 mg at 07/04/20 0517   • isosorbide dinitrate (ISORDIL) tablet 20 mg  20 mg Oral TID Chip Jackson M.D.   20 mg at 07/04/20 0521   • rosuvastatin (CRESTOR) tablet 10 mg  10 mg Oral DAILY Chip Jackson M.D.   10 mg at 07/04/20 0517   • NS (BOLUS) infusion 250 mL  250 mL Intravenous DIALYSIS PRN Bernabe Beasley M.D.       • albumin human 25% solution 12.5 g  12.5 g Intravenous DIALYSIS PRN Bernabe Beasley M.D.       • amLODIPine (NORVASC) tablet 10 mg  10 mg Oral Once Cihp Jackson M.D.   Stopped at 07/03/20 1330   • aspirin EC (ECOTRIN) tablet 81 mg  81 mg Oral Once Chip Jackson M.D.   Stopped at 07/03/20 1330   • carvedilol (COREG) tablet 25 mg  25 mg Oral BID WITH MEALS Chip Jackson M.D.   25 mg at 07/04/20 0623   • levETIRAcetam (KEPPRA) tablet 500 mg  500 mg Oral Q12HRS Chip Jackson M.D.   500 mg at 07/04/20 0517   • Na citrate-citric acid (BICITRA) 500-334 MG/5ML solution 30 mL  30 mL Oral 4X/DAY WITH MEALS + NIGHTLY Bernabe Beasley M.D.   Stopped at 07/04/20 0800   • acetaminophen (TYLENOL) tablet 650 mg  650 mg Oral Q4HRS PRN Chip Jackson M.D.   650 mg at 07/03/20 2226       Fluids    Intake/Output Summary (Last 24 hours) at 7/4/2020 0901  Last data filed at 7/4/2020 0829  Gross per 24 hour   Intake 540 ml   Output 1350 ml   Net -810 ml       Laboratory  Recent Labs     07/03/20  0106   ISTATAPH 7.273*   ISTATAPCO2 31.5   ISTATAPO2 123*   ISTATATCO2 16*   ZSYZGHO2YMM 98   ISTATARTHCO3 14.6*   ISTATARTBE -11*   ISTATTEMP see  below   ISTATFIO2 80   ISTATSPEC Arterial     Recent Labs     07/03/20  0410   CPKTOTAL 238*     Recent Labs     07/03/20  1540 07/03/20 2130 07/04/20  0355   SODIUM 136 137 136   POTASSIUM 5.3 4.6 4.5   CHLORIDE 105 105 107   CO2 16* 17* 18*   BUN 60* 61* 64*   CREATININE 8.30* 8.18* 8.17*   MAGNESIUM  --   --  1.6   CALCIUM 6.6* 6.7* 6.7*     Recent Labs     07/03/20  0020 07/03/20  1540 07/03/20 2130 07/04/20  0355   ALTSGPT 23  --   --   --   --    ASTSGOT 24  --   --   --   --    ALKPHOSPHAT 169*  --   --   --   --    TBILIRUBIN 0.3  --   --   --   --    GLUCOSE 190*   < > 112* 104* 95    < > = values in this interval not displayed.     Recent Labs     07/03/20 0020 07/04/20  0520   WBC 12.1* 6.0   NEUTSPOLYS 74.20* 87.20*   LYMPHOCYTES 16.80* 3.00*   MONOCYTES 5.10 5.00   EOSINOPHILS 3.00 3.90   BASOPHILS 0.40 0.20   ASTSGOT 24  --    ALTSGPT 23  --    ALKPHOSPHAT 169*  --    TBILIRUBIN 0.3  --      Recent Labs     07/03/20 0020 07/04/20  0520   RBC 3.64* 2.60*   HEMOGLOBIN 11.4* 8.2*   HEMATOCRIT 36.5* 26.2*   PLATELETCT 203 131*   FERRITIN 148.0  --        Imaging  X-Ray:  I have personally reviewed the images and compared with prior images.  Echo:   Reviewed  Ultrasound:  Reviewed    Assessment/Plan  * Respiratory failure (HCC)  Assessment & Plan  RT/O2 Protocols  Titrate supplemental FiO2 to maintain SpO2 >85%  Off of BiPAP  Maintain euvolemic to dry balance  BP control  Echo: LVEF 40% with grade 2 diastolic dysfunction    Hypertensive emergency- (present on admission)  Assessment & Plan  Resolved  Weaned off BiPAP  Not requiring nitroglycerin  High-dose Lasix for volume control    HIV (human immunodeficiency virus infection) (HCC)- (present on admission)  Assessment & Plan  Condition Biktarvy to renally dosed Tivicay and Epivir  CD4 count 40  Discussed with Dr. Whyte    HFrEF (heart failure with reduced ejection fraction) (HCC)- (present on admission)  Assessment & Plan  LVEF 40%  Continue  beta-blocker, and diuresis.  Start ACE inhibitor when renal function improved    Gram-positive bacteremia- (present on admission)  Assessment & Plan  Start Unasyn  Follow-up cultures for speciation  Echo without evidence of valvular disease  Recheck blood cultures tomorrow a.m.    High anion gap metabolic acidosis- (present on admission)  Assessment & Plan  Continue Bicitra    VAIBHAV (acute kidney injury) (HCC)- (present on admission)  Assessment & Plan  Baseline CR 1.8-2.3, currently 8.17  Renal ultrasound with intrinsic renal disease  Renally dose medications minimize nephrotoxic substances  Monitor urine output  Lecture lites within normal limits  Question related to HTN versus medications versus other  Nephrology on board  Patient refusing dialysis    COVID-19 virus detected- (present on admission)  Assessment & Plan  Incidental finding  Supportive care  Obtain euvolemic to dry balance  Contact/droplet/airborne precautions  Follow inflammatory markers   No steroids given room air status  consider RDV and convalescent antibodies for any worsening  Hold anticoagulation given worsening anemia    HTN (hypertension), malignant- (present on admission)  Assessment & Plan  Reported history of noncompliance  Resume home regimen    Seizure (HCC)- (present on admission)  Assessment & Plan  Seizure precautions  Continue Keppra       VTE:  Contraindicated  Ulcer: Not Indicated  Lines: None    I have performed a physical exam and reviewed and updated ROS and Plan today (7/4/2020). In review of yesterday's note (7/3/2020), there are no changes except as documented above.     Evaluating and dressing recent worsening anemia as well as bacteremia. This patient is critically ill, at high risk for decompensation leading to worsening vital organ dysfunction and death without critical care interventions.    Discussed patient condition and risk of morbidity and/or mortality with RN, RT, Pharmacy, Code status disscussed, Charge nurse /  hot rounds, Patient and infectious disease and nephrology     The patient remains critically ill.  Critical care time = 33 minutes in directly providing and coordinating critical care and extensive data review.  No time overlap and excludes procedures.

## 2020-07-04 NOTE — PROGRESS NOTES
Nephrology Daily Progress Note    Date of Service  7/4/2020    Chief Complaint  55 y.o. male with HIV, CKD3-4 who presented 7/3/2020 with shortness of breath, found to have COVID-19 pneumonia.    Interval Problem Update  7/4 - UOP 1.7 L in last 24 hours. Patient remains in ICU but off oxygen support. Denies chest pain, SOB. Denies uremic symptoms such as headache, N/V, metallic taste, loss of appetite. Remains insistent that he doesn't want HD even if it means he would die.    Review of Systems  Review of Systems   Constitutional: Negative for fever.   Respiratory: Negative for shortness of breath.    Cardiovascular: Negative for chest pain.   Gastrointestinal: Negative for abdominal pain.   All other systems reviewed and are negative.       Physical Exam  Temp:  [37.3 °C (99.1 °F)-38.7 °C (101.6 °F)] 37.5 °C (99.5 °F)  Pulse:  [] 67  BP: ()/(49-77) 100/55  SpO2:  [90 %-96 %] 94 %    Physical Exam   Constitutional: He is oriented to person, place, and time. He appears well-developed. No distress.   HENT:   Mouth/Throat: Oropharynx is clear and moist. No oropharyngeal exudate.   Poor dentition   Eyes: EOM are normal. No scleral icterus.   Neck: No tracheal deviation present.   Cardiovascular: Normal rate and normal heart sounds.   No murmur heard.  Pulmonary/Chest: Effort normal and breath sounds normal. No stridor. He has no rales.   Abdominal: Soft. He exhibits no distension. There is no abdominal tenderness.   Musculoskeletal: Normal range of motion.         General: No edema.   Neurological: He is alert and oriented to person, place, and time.   Skin: Skin is warm and dry. He is not diaphoretic.   Psychiatric: He has a normal mood and affect.       Fluids    Intake/Output Summary (Last 24 hours) at 7/4/2020 1505  Last data filed at 7/4/2020 1200  Gross per 24 hour   Intake 1380 ml   Output 1000 ml   Net 380 ml       Laboratory  Labs reviewed, pertinent labs below.  Recent Labs     07/03/20  0020  07/04/20  0520 07/04/20  0922   WBC 12.1* 6.0  --    RBC 3.64* 2.60*  --    HEMOGLOBIN 11.4* 8.2* 7.7*   HEMATOCRIT 36.5* 26.2*  --    .3* 99.2*  --    MCH 31.3 31.5  --    MCHC 31.2* 31.8*  --    RDW 63.7* 60.3*  --    PLATELETCT 203 131*  --    MPV 10.1 10.7  --      Recent Labs     07/03/20  1540 07/03/20  2130 07/04/20  0355   SODIUM 136 137 136   POTASSIUM 5.3 4.6 4.5   CHLORIDE 105 105 107   CO2 16* 17* 18*   GLUCOSE 112* 104* 95   BUN 60* 61* 64*   CREATININE 8.30* 8.18* 8.17*   CALCIUM 6.6* 6.7* 6.7*               URINALYSIS:  Lab Results   Component Value Date/Time    COLORURINE Yellow 07/03/2020 0410    CLARITY Clear 07/03/2020 0410    SPECGRAVITY 1.011 07/03/2020 0410    PHURINE 6.0 07/03/2020 0410    KETONES Negative 07/03/2020 0410    PROTEINURIN 300 (A) 07/03/2020 0410    BILIRUBINUR Negative 07/03/2020 0410    UROBILU 0.2 07/03/2020 0410    NITRITE Negative 07/03/2020 0410    LEUKESTERAS Negative 07/03/2020 0410    OCCULTBLOOD Small (A) 07/03/2020 0410     [facility] [place]   Component Value Date/Time    TOTPROTUR 148.0 (H) 07/03/2020 1315      Lab Results   Component Value Date/Time    CREATININEU 49.87 07/03/2020 1315         Imaging reviewed  EC-ECHOCARDIOGRAM COMPLETE W/O CONT   Final Result      US-RENAL ARTERY DUPLEX COMP   Final Result      DX-CHEST-PORTABLE (1 VIEW)   Final Result      BILATERAL mixed interstitial and airspace disease which could be pneumonia or pulmonary edema. Covid 19 is not excluded.            Current Facility-Administered Medications   Medication Dose Route Frequency Provider Last Rate Last Dose   • MD Alert...Vancomycin per Pharmacy   Other PHARMACY TO DOSE Amadou Dixon Jr., D.O.       • ampicillin/sulbactam (UNASYN) 3 g in  mL IVPB  3 g Intravenous Q12HRS Amadou Dixon Jr., D.O.   Stopped at 07/04/20 0955   • furosemide (LASIX) injection 80 mg  80 mg Intravenous TID Bernabe Beasley M.D.   80 mg at 07/04/20 0924   • [START ON 7/5/2020] lamiVUDine (Epivir)  oral soln 50 mg  50 mg Oral DAILY Amadou Dixon Jr., D.O.       • dolutegravir (TIVICAY) tablet 50 mg  50 mg Oral DAILY Amadou Dixon Jr., D.O.   50 mg at 07/04/20 1207   • nitroglycerin (NITROSTAT) tablet 0.4 mg  0.4 mg Sublingual Q5 MIN PRN Erin Tinoco M.D.   0.4 mg at 07/03/20 0031   • senna-docusate (PERICOLACE or SENOKOT S) 8.6-50 MG per tablet 2 Tab  2 Tab Oral BID Chip Jackson M.D.   Stopped at 07/04/20 0600    And   • polyethylene glycol/lytes (MIRALAX) PACKET 1 Packet  1 Packet Oral QDAY PRN Chip Jackson M.D.        And   • magnesium hydroxide (MILK OF MAGNESIA) suspension 30 mL  30 mL Oral QDAY PRN Chip Jackson M.D.        And   • bisacodyl (DULCOLAX) suppository 10 mg  10 mg Rectal QDAY PRN Chip Jackson M.D.       • Respiratory Therapy Consult   Nebulization Continuous RT Chip Jackson M.D.       • labetalol (NORMODYNE/TRANDATE) injection 10-20 mg  10-20 mg Intravenous Q4HRS PRN Chip Jackson M.D.       • ondansetron (ZOFRAN) syringe/vial injection 4 mg  4 mg Intravenous Q4HRS PRN Chip Jackson M.D.       • ondansetron (ZOFRAN ODT) dispertab 4 mg  4 mg Oral Q4HRS PRN Chip Jackson M.D.       • promethazine (PHENERGAN) tablet 12.5-25 mg  12.5-25 mg Oral Q4HRS PRMEMO Jackson M.D.       • promethazine (PHENERGAN) suppository 12.5-25 mg  12.5-25 mg Rectal Q4HRS PRN Chip Jackson M.D.       • prochlorperazine (COMPAZINE) injection 5-10 mg  5-10 mg Intravenous Q4HRS PRN Chip Jackson M.D.       • hydrALAZINE (APRESOLINE) injection 20 mg  20 mg Intravenous Q6HRS PRN Chip Jackson M.D.       • nitroglycerin 50 mg in D5W 250 ml infusion  0-200 mcg/min Intravenous Continuous Chip Jackson M.D.       • amLODIPine (NORVASC) tablet 10 mg  10 mg Oral DAILY Chip Jackson M.D.   10 mg at 07/04/20 0517   • aspirin EC (ECOTRIN) tablet 81 mg  81 mg Oral DAILY Chip Jackson M.D.   81 mg at 07/04/20 0517   • isosorbide dinitrate (ISORDIL) tablet 20 mg  20 mg Oral TID  Chip Jackson M.D.   Stopped at 07/04/20 1230   • rosuvastatin (CRESTOR) tablet 10 mg  10 mg Oral DAILY Chip Jackson M.D.   10 mg at 07/04/20 0517   • NS (BOLUS) infusion 250 mL  250 mL Intravenous DIALYSIS PRN Bernabe Beasley M.D.       • albumin human 25% solution 12.5 g  12.5 g Intravenous DIALYSIS PRN Bernabe Beasley M.D.       • carvedilol (COREG) tablet 25 mg  25 mg Oral BID WITH MEALS Chip Jackson M.D.   25 mg at 07/04/20 0623   • levETIRAcetam (KEPPRA) tablet 500 mg  500 mg Oral Q12HRS Chip Jackson M.D.   500 mg at 07/04/20 0517   • Na citrate-citric acid (BICITRA) 500-334 MG/5ML solution 30 mL  30 mL Oral 4X/DAY WITH MEALS + NIGHTLY Bernabe Beasley M.D.   30 mL at 07/04/20 1207   • acetaminophen (TYLENOL) tablet 650 mg  650 mg Oral Q4HRS PRN Chip Jackson M.D.   650 mg at 07/04/20 1208         Assessment/Plan  55 y.o. male with HIV, CKD3-4 who presented 7/3/2020 with shortness of breath, found to have COVID-19 pneumonia.     1.  Acute kidney injury versus progression of CKD.  Non-oliguric. Worsening. Possible ATN from Covid-19 sepsis vs progression of CKD (HIV nephropathy with proteinuria?). No uremic symptoms, so no emergent need for dialysis, but I do recommend dialysis given worsening trend. Patient declines dialysis and is able to articulate the risks of declining dialysis including death. Continue lasix 80mg IV TID, but hold if systolic BP is less than 100 mmHg. Avoid nephrotoxins. Check labs daily.      2.  COVID-19 pneumonia, diagnosed on admission. Remains stable on room air. Continue management per pulm/critical care.     3.  Metabolic acidosis, improving. Continue bicitra 30ml QID. Check labs daily.      4.  HIV infection, chronic. Patient with CD4 count <50 for 3 years. HAART meds per primary / ID / pharmacy.     5.  Macrocytic anemia, unclear etiology, noted on admission.  Check CBC daily.     6.  Leukocytosis, improved. Check CBC daily.     7.  Hypertension, accelerated HTN on admission,  becoming more hypotensive. Lasix as above. Consider holding amlodipine.       Discussed with Dr. Destiny Beasley MD  Nephrology

## 2020-07-04 NOTE — PROGRESS NOTES
Patient refusing to take a bath throughout shift or remove pants. Patient encouraged again this morning without avail. Linen changed while patient up to commode. Patient educated on needs for bathing consistently and benefits to keeping good hygiene. Will continues to encourage patient.

## 2020-07-04 NOTE — PROGRESS NOTES
Pulmonary/Critical Care Medicine   Progress Note    Date of service: 7/3/2020  Time: 0900    Admitted by Dr. Jackson earlier in the day, transferred to the ICU on BiPAP.  Progressively weaned from BiPAP to room air throughout the day.  Nephrology has been consulted for acute on chronic kidney disease.  Dr. Beasley has seen the patient and recommended dialysis however patient has clearly declined dialysis.  Will attempt high-dose Lasix for volume control.  Given the patient's room air status this afternoon, I will not start steroids.  Echo and renal ultrasound pending.  Off nitroglycerin infusion, he has been started on Bicitra for his metabolic acidosis    I spent 30 min in reviewing the patient's condition, physical examination, laboratory and imaging data, prior documentation, in discussion with RN, RT, pharmacy, nephrology, patient, and in formulating an assessment/plan.    Critical Care time: 30 min. No time overlap, procedures not included in time.  [id number]

## 2020-07-05 PROBLEM — I50.20 HFREF (HEART FAILURE WITH REDUCED EJECTION FRACTION) (HCC): Status: ACTIVE | Noted: 2020-07-05

## 2020-07-05 PROBLEM — R78.81 GRAM-POSITIVE BACTEREMIA: Status: ACTIVE | Noted: 2020-07-05

## 2020-07-05 LAB
ANION GAP SERPL CALC-SCNC: 18 MMOL/L (ref 7–16)
BASOPHILS # BLD AUTO: 0.1 % (ref 0–1.8)
BASOPHILS # BLD: 0.01 K/UL (ref 0–0.12)
BUN SERPL-MCNC: 65 MG/DL (ref 8–22)
CALCIUM SERPL-MCNC: 7.2 MG/DL (ref 8.5–10.5)
CHLORIDE SERPL-SCNC: 101 MMOL/L (ref 96–112)
CO2 SERPL-SCNC: 18 MMOL/L (ref 20–33)
CREAT SERPL-MCNC: 8.74 MG/DL (ref 0.5–1.4)
EOSINOPHIL # BLD AUTO: 0.39 K/UL (ref 0–0.51)
EOSINOPHIL NFR BLD: 5.8 % (ref 0–6.9)
ERYTHROCYTE [DISTWIDTH] IN BLOOD BY AUTOMATED COUNT: 58.7 FL (ref 35.9–50)
GLUCOSE SERPL-MCNC: 94 MG/DL (ref 65–99)
HCT VFR BLD AUTO: 27 % (ref 42–52)
HGB BLD-MCNC: 8.6 G/DL (ref 14–18)
HIV 1 & 2 AB SER-IMP: ABNORMAL
HIV 1 & 2 AB SERPL IA.RAPID: ABNORMAL
HIV 2 AB SERPL QL IA: NEGATIVE
HIV1 AB SERPL QL IA: POSITIVE
IMM GRANULOCYTES # BLD AUTO: 0.04 K/UL (ref 0–0.11)
IMM GRANULOCYTES NFR BLD AUTO: 0.6 % (ref 0–0.9)
LYMPHOCYTES # BLD AUTO: 0.41 K/UL (ref 1–4.8)
LYMPHOCYTES NFR BLD: 6.1 % (ref 22–41)
MAGNESIUM SERPL-MCNC: 2.2 MG/DL (ref 1.5–2.5)
MCH RBC QN AUTO: 30.9 PG (ref 27–33)
MCHC RBC AUTO-ENTMCNC: 31.9 G/DL (ref 33.7–35.3)
MCV RBC AUTO: 97.1 FL (ref 81.4–97.8)
MONOCYTES # BLD AUTO: 0.4 K/UL (ref 0–0.85)
MONOCYTES NFR BLD AUTO: 6 % (ref 0–13.4)
NEUTROPHILS # BLD AUTO: 5.47 K/UL (ref 1.82–7.42)
NEUTROPHILS NFR BLD: 81.4 % (ref 44–72)
NRBC # BLD AUTO: 0 K/UL
NRBC BLD-RTO: 0 /100 WBC
PLATELET # BLD AUTO: 137 K/UL (ref 164–446)
PMV BLD AUTO: 10.9 FL (ref 9–12.9)
POTASSIUM SERPL-SCNC: 4.5 MMOL/L (ref 3.6–5.5)
RBC # BLD AUTO: 2.78 M/UL (ref 4.7–6.1)
SODIUM SERPL-SCNC: 137 MMOL/L (ref 135–145)
VANCOMYCIN TROUGH SERPL-MCNC: 19.3 UG/ML (ref 10–20)
WBC # BLD AUTO: 6.7 K/UL (ref 4.8–10.8)

## 2020-07-05 PROCEDURE — 99222 1ST HOSP IP/OBS MODERATE 55: CPT | Mod: CS | Performed by: HOSPITALIST

## 2020-07-05 PROCEDURE — 700111 HCHG RX REV CODE 636 W/ 250 OVERRIDE (IP): Performed by: INTERNAL MEDICINE

## 2020-07-05 PROCEDURE — 700105 HCHG RX REV CODE 258: Performed by: INTERNAL MEDICINE

## 2020-07-05 PROCEDURE — 80048 BASIC METABOLIC PNL TOTAL CA: CPT

## 2020-07-05 PROCEDURE — 85025 COMPLETE CBC W/AUTO DIFF WBC: CPT

## 2020-07-05 PROCEDURE — 99233 SBSQ HOSP IP/OBS HIGH 50: CPT | Performed by: INTERNAL MEDICINE

## 2020-07-05 PROCEDURE — A9270 NON-COVERED ITEM OR SERVICE: HCPCS | Performed by: INTERNAL MEDICINE

## 2020-07-05 PROCEDURE — 700102 HCHG RX REV CODE 250 W/ 637 OVERRIDE(OP): Performed by: INTERNAL MEDICINE

## 2020-07-05 PROCEDURE — 99233 SBSQ HOSP IP/OBS HIGH 50: CPT | Mod: CS | Performed by: INTERNAL MEDICINE

## 2020-07-05 PROCEDURE — 80202 ASSAY OF VANCOMYCIN: CPT

## 2020-07-05 PROCEDURE — 770021 HCHG ROOM/CARE - ISO PRIVATE

## 2020-07-05 PROCEDURE — 83735 ASSAY OF MAGNESIUM: CPT

## 2020-07-05 RX ORDER — FUROSEMIDE 10 MG/ML
80 INJECTION INTRAMUSCULAR; INTRAVENOUS
Status: DISCONTINUED | OUTPATIENT
Start: 2020-07-06 | End: 2020-07-06

## 2020-07-05 RX ADMIN — CARVEDILOL 25 MG: 25 TABLET, FILM COATED ORAL at 17:22

## 2020-07-05 RX ADMIN — SODIUM CITRATE AND CITRIC ACID MONOHYDRATE 30 ML: 500; 334 SOLUTION ORAL at 17:22

## 2020-07-05 RX ADMIN — DOLUTEGRAVIR SODIUM 50 MG: 50 TABLET, FILM COATED ORAL at 05:21

## 2020-07-05 RX ADMIN — DOCUSATE SODIUM 50 MG AND SENNOSIDES 8.6 MG 2 TABLET: 8.6; 5 TABLET, FILM COATED ORAL at 05:20

## 2020-07-05 RX ADMIN — AMLODIPINE BESYLATE 10 MG: 10 TABLET ORAL at 05:19

## 2020-07-05 RX ADMIN — ROSUVASTATIN CALCIUM 10 MG: 20 TABLET, FILM COATED ORAL at 05:19

## 2020-07-05 RX ADMIN — SODIUM CITRATE AND CITRIC ACID MONOHYDRATE 30 ML: 500; 334 SOLUTION ORAL at 12:16

## 2020-07-05 RX ADMIN — SODIUM CHLORIDE 3 G: 900 INJECTION INTRAVENOUS at 05:22

## 2020-07-05 RX ADMIN — SODIUM CITRATE AND CITRIC ACID MONOHYDRATE 30 ML: 500; 334 SOLUTION ORAL at 20:37

## 2020-07-05 RX ADMIN — ASPIRIN 81 MG: 81 TABLET, COATED ORAL at 05:19

## 2020-07-05 RX ADMIN — LEVETIRACETAM 500 MG: 500 TABLET, FILM COATED ORAL at 05:20

## 2020-07-05 RX ADMIN — ISOSORBIDE DINITRATE 20 MG: 20 TABLET ORAL at 05:21

## 2020-07-05 RX ADMIN — LAMIVUDINE 50 MG: 10 SOLUTION ORAL at 05:21

## 2020-07-05 RX ADMIN — ISOSORBIDE DINITRATE 20 MG: 20 TABLET ORAL at 20:43

## 2020-07-05 RX ADMIN — LEVETIRACETAM 500 MG: 500 TABLET, FILM COATED ORAL at 17:22

## 2020-07-05 RX ADMIN — FUROSEMIDE 80 MG: 10 INJECTION, SOLUTION INTRAMUSCULAR; INTRAVENOUS at 05:20

## 2020-07-05 RX ADMIN — CARVEDILOL 25 MG: 25 TABLET, FILM COATED ORAL at 07:52

## 2020-07-05 RX ADMIN — VANCOMYCIN HYDROCHLORIDE 1000 MG: 500 INJECTION, POWDER, LYOPHILIZED, FOR SOLUTION INTRAVENOUS at 17:22

## 2020-07-05 RX ADMIN — SODIUM CITRATE AND CITRIC ACID MONOHYDRATE 30 ML: 500; 334 SOLUTION ORAL at 07:52

## 2020-07-05 ASSESSMENT — ENCOUNTER SYMPTOMS
HEADACHES: 0
LOSS OF CONSCIOUSNESS: 0
NAUSEA: 0
BLURRED VISION: 0
FLANK PAIN: 0
PALPITATIONS: 0
BLOOD IN STOOL: 0
FALLS: 0
WHEEZING: 0
COUGH: 0
DOUBLE VISION: 0
COUGH: 1
VOMITING: 0
SHORTNESS OF BREATH: 0
CHILLS: 0
PSYCHIATRIC NEGATIVE: 1
INSOMNIA: 0
FEVER: 0
FOCAL WEAKNESS: 0
NERVOUS/ANXIOUS: 0
SPUTUM PRODUCTION: 0
DEPRESSION: 0
MYALGIAS: 0
ABDOMINAL PAIN: 0

## 2020-07-05 ASSESSMENT — FIBROSIS 4 INDEX: FIB4 SCORE: 2.05

## 2020-07-05 NOTE — ASSESSMENT & PLAN NOTE
?contamination  Follow-up cultures for speciation  Echo without evidence of valvular disease  Repeat blood cultures NGTD  Consider antimicrobial holiday

## 2020-07-05 NOTE — ASSESSMENT & PLAN NOTE
On admission  Now resolved  Continue coreg, isosorbide dinitrate, amlodipine with holding parameters  Following

## 2020-07-05 NOTE — PROGRESS NOTES
Pharmacy Kinetics 55 y.o. male on vancomycin day # 2 7/5/2020    Currently on Vancomycin Pulse Dosing  Dosing history:   7/4 (1009):  1750 mg     Provider specified end date: TBD    Indication for Treatment: CoNS Bacteremia    Pertinent history per medical record: Admitted on 7/3/2020 for shortness of breath. Patient was dropped off at the ER by a friend due to increased work of breathing and inability to catch his breath. The patient has a history of HIV and reports compliance with his HAART regiment. He was found to be significantly hypertensive with possible edema in his lungs and tested positive for COVID-19. Labs indicate he had new acute on chronic renal failure. Antibiotics were started once blood cultures returned with a coag negative staph species.     Other antibiotics: ampicillin/sulbactam 3 grams iv q24h    Allergies: Patient has no known allergies.     List concerns for renal function (possible concerns include abnormal LFTs, BUN/SCr ratio > 20:1, CHF, obesity, malnutrition/low albumin, hypermetabolic state (SIRS), pressors/hypotension, nephrotoxic drugs, etc.): acute on chronic renal failure refusing HD, COVID-19    Pertinent cultures to date:   7/4 blood-peripheral x 2:  NGTD  7/3 blood-peripheral x 2:  CoNS x 3 of 4 bottles    MRSA nares swab if pneumonia is a concern (ordered/positive/negative/n-a): n-a    Recent Labs     07/03/20  0020 07/04/20  0520 07/05/20  0410   WBC 12.1* 6.0 6.7   NEUTSPOLYS 74.20* 87.20* 81.40*     Recent Labs     07/03/20  0020  07/03/20  1030 07/03/20  1540 07/03/20  2130 07/04/20  0355 07/05/20  0410   BUN 57*   < > 59* 60* 61* 64* 65*   CREATININE 7.55*   < > 8.21* 8.30* 8.18* 8.17* 8.74*   ALBUMIN 4.3  --   --   --   --   --   --     < > = values in this interval not displayed.     Recent Labs     07/05/20  1145   VANCOTROUGH 19.3       Intake/Output Summary (Last 24 hours) at 7/5/2020 1503  Last data filed at 7/5/2020 1100  Gross per 24 hour   Intake 240 ml   Output  "2750 ml   Net -2510 ml      BP (!) 96/59   Pulse 67   Temp 36.9 °C (98.5 °F) (Temporal)   Resp 16   Ht 1.626 m (5' 4\")   Wt 67.6 kg (149 lb 0.5 oz)   SpO2 98%  Temp (24hrs), Av.8 °C (98.3 °F), Min:36.7 °C (98 °F), Max:37.1 °C (98.8 °F)      A/P   1. Vancomycin dose change: yes, vancomycin 1000 mg iv x 1  2. Next vancomycin level: 2 days  3. Goal trough: 12-16 mcg/mL  4. Assessment: 3 of 4 peripheral blood culture bottles are preliminary with CoNS per discussion with micro, issues with blood culture labeling.  Patient continues to refuse HD.  Nephrology is consulting.  26-hr follow up level after the loading dose is supra-therapeutic.  Repeat cultures are negative thus far.  5. Plan:  Ordered a 15 mg/kg pulse dose with a recommended follow up level in 2 days.    Angelique Jaime, Pharm.D., BCPS        "

## 2020-07-05 NOTE — PROGRESS NOTES
"Critical Care Progress Note    Date of admission  7/3/2020    Chief Complaint  55 y.o. male admitted 7/3/2020 with acute hypoxic respiratory failure    Hospital Course    \"55 y.o. male with known history of HLD, HTN, HIV, seizure, and history of noncompliance with medications disorder who presented 7/3/2020 with shortness of breath.  Patient was brought in by friends and dropped off, noted to be respiratory distress, satting 83 on room air and initial /163 with HR of 144.  Patient was immediately placed on BiPAP which improved his underlying distress as well as oxygenation.  Patient indicates that he was in his normal state of health until this afternoon when he had sudden onset of difficulty catching his breath and taking a deep breath stating he had some heaviness.  He denied any lightheadedness dizziness arm numbness or tingling, no sharp/tearing/tearing pain or radiation.  Denies any recent fever, chills, sweats, cough, sputum production.  He lives with 2 remains.  He denies any recent sick contacts.  He currently states that he is quite compliant with his medications, denies any alcohol or drug use, does use tobacco.\"      Interval Problem Update  Reviewed last 24 hour events:   - Hg stable   - Neuro: A&O x4   - HR: 60s-80s   - SBP: 100s-140   - GI: Tolerating diet   - UOP: 1.9L/24 hrs   - Wong: no   - Tm: 37.1   - Lines: PIV   - PPx: GI not indicated, DVT held due to anemia and hemoglobin drop   -On room air   - CXR (personally reviewed and compared to prior): No new    Yesterday   - continues to refuse dialysis   - Hemoglobin decreased from 11.4 -> 8.2   - Neuro: Alert and oriented x4   - HR: 70s-80s   - SBP: 100s-130s   - GI: ADAT to renal/cardiac   - UOP: 1.7L/24 hrs   - Wong: no   - Tm: 38.7   - Lines: PIV   - PPx: GI not indicated, DVT due to hemoglobin drop   -On room air   - CXR (personally reviewed and compared to prior): no new   -Renal ultrasound consistent with intrinsic renal " disease   -Echo with LVEF of 40%, global hypokinesis and grade 2 diastolic dysfunction    Review of Systems  Review of Systems   Constitutional: Positive for malaise/fatigue. Negative for chills and fever.   HENT: Negative for congestion.    Eyes: Negative for blurred vision and double vision.   Respiratory: Negative for cough, sputum production, shortness of breath (resolved) and wheezing.    Cardiovascular: Negative for chest pain and leg swelling.   Gastrointestinal: Negative for nausea and vomiting.   Genitourinary: Negative for dysuria.   Musculoskeletal: Negative for myalgias.   Neurological: Negative for focal weakness.   Psychiatric/Behavioral: Negative for depression.        Vital Signs for last 24 hours   Temp:  [36.7 °C (98 °F)-37.1 °C (98.8 °F)] 36.9 °C (98.5 °F)  Pulse:  [62-84] 67  Resp:  [16-25] 16  BP: ()/(52-77) 96/59  SpO2:  [91 %-98 %] 98 %    Hemodynamic parameters for last 24 hours       Respiratory Information for the last 24 hours       Physical Exam   Physical Exam  Vitals signs and nursing note reviewed.   Constitutional:       General: He is not in acute distress.     Appearance: He is ill-appearing. He is not diaphoretic.   HENT:      Head: Normocephalic and atraumatic.      Right Ear: External ear normal.      Left Ear: External ear normal.      Nose: Nose normal.      Mouth/Throat:      Mouth: Mucous membranes are dry.      Pharynx: Oropharynx is clear.   Eyes:      Conjunctiva/sclera: Conjunctivae normal.      Pupils: Pupils are equal, round, and reactive to light.   Neck:      Musculoskeletal: Normal range of motion and neck supple.   Cardiovascular:      Rate and Rhythm: Normal rate and regular rhythm.      Pulses: Normal pulses.   Pulmonary:      Effort: Pulmonary effort is normal.      Breath sounds: Rales (improved) present. No wheezing or rhonchi.   Abdominal:      General: There is no distension.      Palpations: Abdomen is soft.      Tenderness: There is no abdominal  tenderness.   Musculoskeletal:         General: No swelling.   Skin:     General: Skin is warm and dry.      Capillary Refill: Capillary refill takes less than 2 seconds.   Neurological:      Mental Status: He is alert.      Cranial Nerves: No cranial nerve deficit.   Psychiatric:         Mood and Affect: Mood normal.         Medications  Current Facility-Administered Medications   Medication Dose Route Frequency Provider Last Rate Last Dose   • [START ON 7/6/2020] furosemide (LASIX) injection 80 mg  80 mg Intravenous BID DIURETIC Bernabe Beasley M.D.       • MD Alert...Vancomycin per Pharmacy   Other PHARMACY TO DOSE Amadou Dixon Jr., D.O.       • lamiVUDine (Epivir) oral soln 50 mg  50 mg Oral DAILY Amadou Dixon Jr. D.O.   50 mg at 07/05/20 0521   • dolutegravir (TIVICAY) tablet 50 mg  50 mg Oral DAILY Amadou Dixon Jr., D.O.   50 mg at 07/05/20 0521   • ampicillin/sulbactam (UNASYN) 3 g in  mL IVPB  3 g Intravenous Q24HRS Amadou Dixon Jr., D.O.   Stopped at 07/05/20 0552   • nitroglycerin (NITROSTAT) tablet 0.4 mg  0.4 mg Sublingual Q5 MIN PRN Erin Tinoco M.D.   0.4 mg at 07/03/20 0031   • senna-docusate (PERICOLACE or SENOKOT S) 8.6-50 MG per tablet 2 Tab  2 Tab Oral BID Chip Jackson M.D.   2 Tab at 07/05/20 0520    And   • polyethylene glycol/lytes (MIRALAX) PACKET 1 Packet  1 Packet Oral QDAY PRN Chip Jackson M.D.        And   • magnesium hydroxide (MILK OF MAGNESIA) suspension 30 mL  30 mL Oral QDAY PRN Chip Jackson M.D.        And   • bisacodyl (DULCOLAX) suppository 10 mg  10 mg Rectal QDAY PRN Chip Jackson M.D.       • Respiratory Therapy Consult   Nebulization Continuous RT Chip Jackson M.D.       • labetalol (NORMODYNE/TRANDATE) injection 10-20 mg  10-20 mg Intravenous Q4HRS PRN Chip E Wheatland, M.D.       • ondansetron (ZOFRAN) syringe/vial injection 4 mg  4 mg Intravenous Q4HRS PRN Chip Jackson M.D.       • ondansetron (ZOFRAN ODT) dispertab 4 mg  4 mg Oral  Q4HRS PRN Chip Jackson M.D.       • promethazine (PHENERGAN) tablet 12.5-25 mg  12.5-25 mg Oral Q4HRS PRN Chip Jackson M.D.       • promethazine (PHENERGAN) suppository 12.5-25 mg  12.5-25 mg Rectal Q4HRS PRN Chip Jackson M.D.       • prochlorperazine (COMPAZINE) injection 5-10 mg  5-10 mg Intravenous Q4HRS PRN Chip Jackson M.D.       • hydrALAZINE (APRESOLINE) injection 20 mg  20 mg Intravenous Q6HRS PRN Chip Jackson M.D.       • nitroglycerin 50 mg in D5W 250 ml infusion  0-200 mcg/min Intravenous Continuous Chip Jackson M.D.       • amLODIPine (NORVASC) tablet 10 mg  10 mg Oral DAILY Chip Jackson M.D.   10 mg at 07/05/20 0519   • aspirin EC (ECOTRIN) tablet 81 mg  81 mg Oral DAILY Chip Jackson M.D.   81 mg at 07/05/20 0519   • isosorbide dinitrate (ISORDIL) tablet 20 mg  20 mg Oral TID Chip Jackson M.D.   Stopped at 07/05/20 1217   • rosuvastatin (CRESTOR) tablet 10 mg  10 mg Oral DAILY Chip Jackson M.D.   10 mg at 07/05/20 0519   • NS (BOLUS) infusion 250 mL  250 mL Intravenous DIALYSIS PRN Bernabe Beasley M.D.       • albumin human 25% solution 12.5 g  12.5 g Intravenous DIALYSIS PRN Bernabe Beasley M.D.       • carvedilol (COREG) tablet 25 mg  25 mg Oral BID WITH MEALS Chip Jackson M.D.   25 mg at 07/05/20 0752   • levETIRAcetam (KEPPRA) tablet 500 mg  500 mg Oral Q12HRS Chip Jackson M.D.   500 mg at 07/05/20 0520   • Na citrate-citric acid (BICITRA) 500-334 MG/5ML solution 30 mL  30 mL Oral 4X/DAY WITH MEALS + NIGHTLY Bernabe Beasley M.D.   30 mL at 07/05/20 1216   • acetaminophen (TYLENOL) tablet 650 mg  650 mg Oral Q4HRS PRN Chip Jackson M.D.   650 mg at 07/04/20 1208       Fluids    Intake/Output Summary (Last 24 hours) at 7/5/2020 1441  Last data filed at 7/5/2020 1100  Gross per 24 hour   Intake 240 ml   Output 2750 ml   Net -2510 ml       Laboratory  Recent Labs     07/03/20  0106   ISTATAPH 7.273*   ISTATAPCO2 31.5   ISTATAPO2 123*   ISTATATCO2 16*   [id number]  98   ISTATARTHCO3 14.6*   ISTATARTBE -11*   ISTATTEMP see below   ISTATFIO2 80   ISTATSPEC Arterial     Recent Labs     07/03/20 0410   CPKTOTAL 238*     Recent Labs     07/03/20 2130 07/04/20 0355 07/05/20 0410   SODIUM 137 136 137   POTASSIUM 4.6 4.5 4.5   CHLORIDE 105 107 101   CO2 17* 18* 18*   BUN 61* 64* 65*   CREATININE 8.18* 8.17* 8.74*   MAGNESIUM  --  1.6 2.2   CALCIUM 6.7* 6.7* 7.2*     Recent Labs     07/03/20 0020 07/03/20 2130 07/04/20 0355 07/05/20 0410   ALTSGPT 23  --   --   --   --    ASTSGOT 24  --   --   --   --    ALKPHOSPHAT 169*  --   --   --   --    TBILIRUBIN 0.3  --   --   --   --    GLUCOSE 190*   < > 104* 95 94    < > = values in this interval not displayed.     Recent Labs     07/03/20 0020 07/04/20 0520 07/05/20 0410   WBC 12.1* 6.0 6.7   NEUTSPOLYS 74.20* 87.20* 81.40*   LYMPHOCYTES 16.80* 3.00* 6.10*   MONOCYTES 5.10 5.00 6.00   EOSINOPHILS 3.00 3.90 5.80   BASOPHILS 0.40 0.20 0.10   ASTSGOT 24  --   --    ALTSGPT 23  --   --    ALKPHOSPHAT 169*  --   --    TBILIRUBIN 0.3  --   --      Recent Labs     07/03/20  0020 07/04/20 0520 07/04/20  1545 07/04/20 2210 07/05/20 0410   RBC 3.64* 2.60*  --   --   --  2.78*   HEMOGLOBIN 11.4* 8.2*   < > 7.7* 8.3* 8.6*   HEMATOCRIT 36.5* 26.2*  --   --  26.4* 27.0*   PLATELETCT 203 131*  --   --   --  137*   FERRITIN 148.0  --   --   --   --   --     < > = values in this interval not displayed.       Imaging  X-Ray:  I have personally reviewed the images and compared with prior images.  Echo:   Reviewed  Ultrasound:  Reviewed    Assessment/Plan  * Respiratory failure (HCC)- (present on admission)  Assessment & Plan  RT/O2 Protocols  Titrate supplemental FiO2 to maintain SpO2 >85%  Off of BiPAP  Maintain euvolemic to dry balance  BP control   Echo: LVEF 40% with grade 2 diastolic dysfunction    COVID-19 virus detected- (present on admission)  Assessment & Plan  Incidental finding  Supportive care  Obtain euvolemic to dry  balance  Contact/droplet/airborne precautions  Follow inflammatory markers   No steroids given room air status  Consider RDV and convalescent antibodies for any worsening  Hold anticoagulation given worsening anemia    Hypertensive emergency- (present on admission)  Assessment & Plan  Resolved  Weaned off BiPAP  Not requiring nitroglycerin  High-dose Lasix for volume control    VAIBHAV (acute kidney injury) ([facility])- (present on admission)  Assessment & Plan  Baseline CR 1.8-2.3, currently 8.74  Renal ultrasound: Resistive indices at the bilateral renal hilum are abnormally high consistent with intrinsic renal parenchymal disease.   Renally dose medications minimize nephrotoxic substances  Monitor urine output  Electrolytes within normal limits  Question related to HTN versus medications versus other  Nephrology on board  Patient refusing dialysis  Continue high-dose Lasix to maintain euvolumic status    HIV (human immunodeficiency virus infection) ([facility])- (present on admission)  Assessment & Plan  Transition Biktarvy to renally dosed Tivicay and Epivir  CD4 count 40  Discussed with Dr. Whyte    HFrEF (heart failure with reduced ejection fraction) ([facility])- (present on admission)  Assessment & Plan  LVEF 40%  Continue beta-blocker, and diuresis.  Start ACE inhibitor when renal function improved    Gram-positive bacteremia- (present on admission)  Assessment & Plan  ?contamination  Follow-up cultures for speciation  Echo without evidence of valvular disease  Repeat blood cultures NGTD  Consider antimicrobial holiday    High anion gap metabolic acidosis- (present on admission)  Assessment & Plan  Stable  Continue Bicitra    HTN (hypertension), malignant  Assessment & Plan  Reported history of noncompliance  Resume home regimen    Seizure ([facility])- (present on admission)  Assessment & Plan  Seizure precautions  Continue Keppra       VTE:  Contraindicated  Ulcer: Not Indicated  Lines: None    I have performed a physical exam and  reviewed and updated ROS and Plan today (7/5/2020). In review of yesterday's note (7/4/2020), there are no changes except as documented above.     Patient will be transferred out of ICU today.  I have discussed this case with hospitalist triage officer, Dr. Harp he will assign a hospitalist to assume care at this time. [facility] will sign off. Please call with any questions.    Discussed patient condition and risk of morbidity and/or mortality with Hospitalist, RN, RT, Pharmacy, Code status disscussed, Charge nurse / hot rounds, Patient and nephrology

## 2020-07-05 NOTE — PROGRESS NOTES
Nephrology Daily Progress Note    Date of Service  7/5/2020    Chief Complaint  55 y.o. male with HIV, CKD3-4 who presented 7/3/2020 with shortness of breath, found to have COVID-19 pneumonia.    Interval Problem Update  7/4 - UOP 1.7 L in last 24 hours. Patient remains in ICU but off oxygen support. Denies chest pain, SOB. Denies uremic symptoms such as headache, N/V, metallic taste, loss of appetite. Remains insistent that he doesn't want HD even if it means he would die.  7/5 -patient had 1.8 L of urine output in the last 24 hours.  Patient denies chest pain, shortness of breath, headache, nausea, vomiting.  Patient continues to say that he would never want dialysis even if it means he would die without it.  Patient is okay with dying if it is his time.    Review of Systems  Review of Systems   Constitutional: Negative for fever.   Respiratory: Negative for shortness of breath.    Cardiovascular: Negative for chest pain.   Gastrointestinal: Negative for abdominal pain.   All other systems reviewed and are negative.       Physical Exam  Temp:  [36.7 °C (98 °F)-37.5 °C (99.5 °F)] 36.9 °C (98.5 °F)  Pulse:  [62-84] 67  Resp:  [16-25] 16  BP: ()/(52-77) 96/59  SpO2:  [91 %-98 %] 98 %    Physical Exam   Constitutional: He is oriented to person, place, and time. He appears well-developed. No distress.   HENT:   Mouth/Throat: Oropharynx is clear and moist. No oropharyngeal exudate.   Eyes: EOM are normal. No scleral icterus.   Neck: No tracheal deviation present.   Cardiovascular: Normal rate and normal heart sounds.   No murmur heard.  Pulmonary/Chest: Effort normal and breath sounds normal. No stridor. He has no rales.   Abdominal: Soft. He exhibits no distension. There is no abdominal tenderness.   Musculoskeletal: Normal range of motion.         General: No edema.   Neurological: He is alert and oriented to person, place, and time.   Skin: Skin is warm and dry. He is not diaphoretic.   Psychiatric: He has a  normal mood and affect.       Fluids    Intake/Output Summary (Last 24 hours) at 7/5/2020 1341  Last data filed at 7/5/2020 1100  Gross per 24 hour   Intake 240 ml   Output 2750 ml   Net -2510 ml       Laboratory  Labs reviewed, pertinent labs below.  Recent Labs     07/03/20  0020 07/04/20  0520  07/04/20  1545 07/04/20  2210 07/05/20  0410   WBC 12.1* 6.0  --   --   --  6.7   RBC 3.64* 2.60*  --   --   --  2.78*   HEMOGLOBIN 11.4* 8.2*   < > 7.7* 8.3* 8.6*   HEMATOCRIT 36.5* 26.2*  --   --  26.4* 27.0*   .3* 99.2*  --   --   --  97.1   MCH 31.3 31.5  --   --   --  30.9   MCHC 31.2* 31.8*  --   --   --  31.9*   RDW 63.7* 60.3*  --   --   --  58.7*   PLATELETCT 203 131*  --   --   --  137*   MPV 10.1 10.7  --   --   --  10.9    < > = values in this interval not displayed.     Recent Labs     07/03/20  2130 07/04/20  0355 07/05/20  0410   SODIUM 137 136 137   POTASSIUM 4.6 4.5 4.5   CHLORIDE 105 107 101   CO2 17* 18* 18*   GLUCOSE 104* 95 94   BUN 61* 64* 65*   CREATININE 8.18* 8.17* 8.74*   CALCIUM 6.7* 6.7* 7.2*               URINALYSIS:  Lab Results   Component Value Date/Time    COLORURINE Yellow 07/03/2020 0410    CLARITY Clear 07/03/2020 0410    SPECGRAVITY 1.011 07/03/2020 0410    PHURINE 6.0 07/03/2020 0410    KETONES Negative 07/03/2020 0410    PROTEINURIN 300 (A) 07/03/2020 0410    BILIRUBINUR Negative 07/03/2020 0410    UROBILU 0.2 07/03/2020 0410    NITRITE Negative 07/03/2020 0410    LEUKESTERAS Negative 07/03/2020 0410    OCCULTBLOOD Small (A) 07/03/2020 0410     [facility] [place]   Component Value Date/Time    TOTPROTUR 148.0 (H) 07/03/2020 1315      Lab Results   Component Value Date/Time    CREATININEU 49.87 07/03/2020 1315         Imaging reviewed  EC-ECHOCARDIOGRAM COMPLETE W/O CONT   Final Result      US-RENAL ARTERY DUPLEX COMP   Final Result      DX-CHEST-PORTABLE (1 VIEW)   Final Result      BILATERAL mixed interstitial and airspace disease which could be pneumonia or pulmonary edema.  Covid 19 is not excluded.            Current Facility-Administered Medications   Medication Dose Route Frequency Provider Last Rate Last Dose   • [START ON 7/6/2020] furosemide (LASIX) injection 80 mg  80 mg Intravenous BID DIURETIC Bernabe Beasley M.D.       • MD Alert...Vancomycin per Pharmacy   Other PHARMACY TO DOSE Amadou Dixon Jr., D.O.       • lamiVUDine (Epivir) oral soln 50 mg  50 mg Oral DAILY Amadou Dixon Jr., D.O.   50 mg at 07/05/20 0521   • dolutegravir (TIVICAY) tablet 50 mg  50 mg Oral DAILY Amadou Dixon Jr. D.O.   50 mg at 07/05/20 0521   • ampicillin/sulbactam (UNASYN) 3 g in  mL IVPB  3 g Intravenous Q24HRS Amadou Dixon Jr., D.O.   Stopped at 07/05/20 0552   • nitroglycerin (NITROSTAT) tablet 0.4 mg  0.4 mg Sublingual Q5 MIN PRN Erin Tinoco M.D.   0.4 mg at 07/03/20 0031   • senna-docusate (PERICOLACE or SENOKOT S) 8.6-50 MG per tablet 2 Tab  2 Tab Oral BID Chip Jackson M.D.   2 Tab at 07/05/20 0520    And   • polyethylene glycol/lytes (MIRALAX) PACKET 1 Packet  1 Packet Oral QDAY PRN Chip Jackson M.D.        And   • magnesium hydroxide (MILK OF MAGNESIA) suspension 30 mL  30 mL Oral QDAY PRN Chip Jackson M.D.        And   • bisacodyl (DULCOLAX) suppository 10 mg  10 mg Rectal QDAY PRN Chip Jackson M.D.       • Respiratory Therapy Consult   Nebulization Continuous RT Cihp Jackson M.D.       • labetalol (NORMODYNE/TRANDATE) injection 10-20 mg  10-20 mg Intravenous Q4HRS PRN Chip Jackson M.D.       • ondansetron (ZOFRAN) syringe/vial injection 4 mg  4 mg Intravenous Q4HRS PRN Chip Jackson M.D.       • ondansetron (ZOFRAN ODT) dispertab 4 mg  4 mg Oral Q4HRS PRN Chip Jcakson M.D.       • promethazine (PHENERGAN) tablet 12.5-25 mg  12.5-25 mg Oral Q4HRS PRN Chip Jackson M.D.       • promethazine (PHENERGAN) suppository 12.5-25 mg  12.5-25 mg Rectal Q4HRS PRN Chip Jackson M.D.       • prochlorperazine (COMPAZINE) injection 5-10 mg  5-10 mg  Intravenous Q4HRS PRN Chip Jackson M.D.       • hydrALAZINE (APRESOLINE) injection 20 mg  20 mg Intravenous Q6HRS PRN Chip Jackson M.D.       • nitroglycerin 50 mg in D5W 250 ml infusion  0-200 mcg/min Intravenous Continuous Chip Jackson M.D.       • amLODIPine (NORVASC) tablet 10 mg  10 mg Oral DAILY Chip Jackson M.D.   10 mg at 07/05/20 0519   • aspirin EC (ECOTRIN) tablet 81 mg  81 mg Oral DAILY Chip Jackson M.D.   81 mg at 07/05/20 0519   • isosorbide dinitrate (ISORDIL) tablet 20 mg  20 mg Oral TID Chip Jackson M.D.   Stopped at 07/05/20 1217   • rosuvastatin (CRESTOR) tablet 10 mg  10 mg Oral DAILY Chip Jackson M.D.   10 mg at 07/05/20 0519   • NS (BOLUS) infusion 250 mL  250 mL Intravenous DIALYSIS PRN Bernabe Beasley M.D.       • albumin human 25% solution 12.5 g  12.5 g Intravenous DIALYSIS PRN Bernabe Beasley M.D.       • carvedilol (COREG) tablet 25 mg  25 mg Oral BID WITH MEALS Chip Jackson M.D.   25 mg at 07/05/20 0752   • levETIRAcetam (KEPPRA) tablet 500 mg  500 mg Oral Q12HRS Chip Jackson M.D.   500 mg at 07/05/20 0520   • Na citrate-citric acid (BICITRA) 500-334 MG/5ML solution 30 mL  30 mL Oral 4X/DAY WITH MEALS + NIGHTLY Bernabe Beasley M.D.   30 mL at 07/05/20 1216   • acetaminophen (TYLENOL) tablet 650 mg  650 mg Oral Q4HRS PRN Chip Jackson M.D.   650 mg at 07/04/20 1208         Assessment/Plan  55 y.o. male with HIV, CKD3-4 who presented 7/3/2020 with shortness of breath, found to have COVID-19 pneumonia.     1.  Acute kidney injury versus progression of CKD.  Likely progression of CKD.  Nonoliguric.  Continues to worsen.  Patient has no uremic symptoms, so there is no emergent need for dialysis.  Further, the patient refuses dialysis, even if it would be lifesaving.  I recommend continuing high-dose Lasix as needed to maintain volume status, can reduce from Lasix 80 mg IV 3 times daily to 80 mg IV twice daily.  When transitioning to oral Lasix, patient might need  high-dose Lasix such as 80 mg p.o. twice daily or 120 mg p.o. twice daily, which ever dose is necessary to produce a diuresis.  Avoid nephrotoxins.    2.  COVID-19 pneumonia, diagnosed on admission.  Remained stable on room air.  Continue management per pulmonary critical care.    3.  Metabolic acidosis, persistent.  Continue Bicitra 30 mL p.o. 4 times daily.  When serum bicarbonate level is greater than 21, transition to sodium bicarbonate 1300 mg p.o. twice daily.  Check labs daily.     4.  HIV infection, chronic, now with AIDS. Patient with CD4 count <50 for 3 years.  HAART meds per primary/infectious disease.    5.  Macrocytic anemia, unclear etiology, slightly improved from yesterday.  Check CBC daily.     6.  Hypertension, with accelerated hypertension on admission.  Now controlled.  Continue Lasix as above.      Discussed with Dr. Dixon  As the patient declines dialysis, and medical management is detailed above, nephrology will sign off.  Please call back with further questions or concerns.    Bernabe Beasley MD  Nephrology

## 2020-07-05 NOTE — ASSESSMENT & PLAN NOTE
Etiology unclear, possible CKD with VAIBHAV contributing  Repeatedly declined HD and appropriately expressed understanding that he will likely die without it  Nephrology has signed off  Palliative asked to assist with hospice referral  Hospice consult still pending  Pt would like to d/c with hospice

## 2020-07-05 NOTE — ASSESSMENT & PLAN NOTE
"Ruled out. It was a contaminant.   \"Correct result:No growth at 24 hours   Erroneous result:Growth detected by Bactec instrument.\"  repeat blood culture from 7/4 with NGTD   abx stopped  "

## 2020-07-05 NOTE — CONSULTS
Hospital Medicine Consultation    Date of Service  7/5/2020    Referring Physician  Marta Harp M.D.    Consulting Physician  Barbara Watt M.D.    Reason for Consultation  Medical management of chronic issues.     History of Presenting Illness  55 y.o. male with HIV, HTN, HLD, seizure disorder and history of noncompliance with medications who presented 7/3/2020 with acute hypoxemic respiratory failure. On initial evaluation, /163,  and hypoxemic requiring BIPAP. He was admitted to the ICU with Critical Care consultation. Nephrology was consulted for acute on chronic kidney injury. He declined HD, despite recommendation and education. He was also found to be COVID positive.     Review of Systems  Review of Systems   Constitutional: Negative for chills, fever and malaise/fatigue.   Respiratory: Positive for cough. Negative for sputum production and shortness of breath.    Cardiovascular: Negative for chest pain, palpitations and leg swelling.   Gastrointestinal: Negative for abdominal pain, blood in stool, nausea and vomiting.   Genitourinary: Negative for dysuria, flank pain and urgency.   Musculoskeletal: Negative for falls.   Neurological: Negative for loss of consciousness and headaches.   Psychiatric/Behavioral: Negative.  The patient is not nervous/anxious and does not have insomnia.    All other systems reviewed and are negative.      Past Medical History   has a past medical history of Heart failure (HCC), HIV disease ([facility]) (1995), Hypertension, Seizure (HCC), and Stroke (HCC).    Surgical History   has a past surgical history that includes cholecystectomy (1980s).    Family History  family history includes Diabetes in his father, mother, and sister; No Known Problems in his sister, sister, and sister; Other in his brother and daughter.    Social History   reports that he has been smoking cigarettes. He has been smoking about 0.25 packs per day. He has never used smokeless tobacco. He reports  that he does not drink alcohol or use drugs.    Medications  Prior to Admission Medications   Prescriptions Last Dose Informant Patient Reported? Taking?   ASPIRIN LOW DOSE 81 MG EC tablet 7/2/2020 at 0900  Yes No   Sig: Take 1 Tab by mouth every day.   BIKTARVY -25 MG Tab 7/2/2020 at 0900  Yes No   Sig: Take 1 tablet by mouth every day. TAKE ONE TABLET BY MOUTH DAILY   acetaminophen (TYLENOL) 325 MG Tab 7/2/2020 at 0900  Yes No   Sig: Take 650 mg by mouth every four hours as needed.   amLODIPine (NORVASC) 10 MG Tab 7/2/2020 at 0900  No No   Sig: Take 1 Tab by mouth every day.   carvedilol (COREG) 25 MG Tab 7/2/2020 at 0900  No No   Sig: Take 1 Tab by mouth 2 times a day, with meals.   cloNIDine (CATAPRES) 0.3 MG/24HR PATCH WEEKLY 7/2/2020 at 0900  Yes No   Sig: Apply 1 Patch to skin as directed every 7 days.   isosorbide dinitrate (ISORDIL) 20 MG Tab 7/2/2020 at 0900  No No   Sig: Take 1 Tab by mouth 3 times a day for 360 days.   levetiracetam (KEPPRA) 500 MG Tab 7/2/2020 at 0900  No No   Sig: Take 1 Tab by mouth every 12 hours.   lisinopril (PRINIVIL) 40 MG tablet 7/2/2020 at 0900  No No   Sig: Take 1 Tab by mouth every day.   rosuvastatin (CRESTOR) 40 MG tablet 7/2/2020 at 0900  No No   Sig: Take 1 Tab by mouth every day for 360 days.   spironolactone (ALDACTONE) 50 MG Tab 7/2/2020 at 0900  No No   Sig: Take 1 Tab by mouth every day for 360 days.      Facility-Administered Medications: None       Allergies  No Known Allergies    Physical Exam  Temp:  [36.7 °C (98 °F)-37.1 °C (98.8 °F)] 36.9 °C (98.5 °F)  Pulse:  [62-84] 67  Resp:  [16-25] 16  BP: ()/(52-77) 96/59  SpO2:  [92 %-98 %] 98 %    Physical Exam  Vitals signs reviewed.   Constitutional:       General: He is not in acute distress.     Appearance: He is not diaphoretic.      Comments: Saturating well on RA   HENT:      Head: Normocephalic.      Mouth/Throat:      Mouth: Mucous membranes are moist.      Pharynx: No oropharyngeal exudate.   Eyes:       General: No scleral icterus.     Extraocular Movements: Extraocular movements intact.   Neck:      Musculoskeletal: Normal range of motion. No muscular tenderness.   Cardiovascular:      Rate and Rhythm: Normal rate and regular rhythm.      Pulses: Normal pulses.   Pulmonary:      Effort: Pulmonary effort is normal. No respiratory distress.      Breath sounds: No wheezing or rales.   Abdominal:      General: There is no distension.      Palpations: Abdomen is soft.      Tenderness: There is no abdominal tenderness. There is no guarding.   Musculoskeletal:         General: No swelling or tenderness.   Neurological:      General: No focal deficit present.      Mental Status: He is alert and oriented to person, place, and time. Mental status is at baseline.   Psychiatric:         Mood and Affect: Mood normal.         Speech: Speech normal.         Behavior: Behavior normal. Behavior is cooperative.         Fluids  Date 07/05/20 0700 - 07/06/20 0659   Shift 4745-6410 9772-4147 0695-4595 24 Hour Total   INTAKE   Shift Total       OUTPUT   Urine 1150   1150   Shift Total 1150   1150   Weight (kg) 67.6 67.6 67.6 67.6       Laboratory  Recent Labs     07/03/20  0020 07/04/20  0520  07/04/20  1545 07/04/20  2210 07/05/20  0410   WBC 12.1* 6.0  --   --   --  6.7   RBC 3.64* 2.60*  --   --   --  2.78*   HEMOGLOBIN 11.4* 8.2*   < > 7.7* 8.3* 8.6*   HEMATOCRIT 36.5* 26.2*  --   --  26.4* 27.0*   .3* 99.2*  --   --   --  97.1   MCH 31.3 31.5  --   --   --  30.9   MCHC 31.2* 31.8*  --   --   --  31.9*   RDW 63.7* 60.3*  --   --   --  58.7*   PLATELETCT 203 131*  --   --   --  137*   MPV 10.1 10.7  --   --   --  10.9    < > = values in this interval not displayed.     Recent Labs     07/03/20  2130 07/04/20  0355 07/05/20  0410   SODIUM 137 136 137   POTASSIUM 4.6 4.5 4.5   CHLORIDE 105 107 101   CO2 17* 18* 18*   GLUCOSE 104* 95 94   BUN 61* 64* 65*   CREATININE 8.18* 8.17* 8.74*   CALCIUM 6.7* 6.7* 7.2*                 "     Imaging  EC-ECHOCARDIOGRAM COMPLETE W/O CONT   Final Result      US-RENAL ARTERY DUPLEX COMP   Final Result      DX-CHEST-PORTABLE (1 VIEW)   Final Result      BILATERAL mixed interstitial and airspace disease which could be pneumonia or pulmonary edema. Covid 19 is not excluded.          Assessment/Plan  * Respiratory failure (HCC)- (present on admission)  Assessment & Plan  Resolved. S/p BIPAP and ICU stay. Secondary to fluid overload. Saturating well on RA.  - monitor clinically  - declined HD; continue to diurese with lasix IV    COVID-19 virus detected- (present on admission)  Assessment & Plan  COVID positive.   - supportive care  - continue with isolation  - trend inflammatory markers q48 hours    Hypertensive emergency- (present on admission)  Assessment & Plan  On admission. Now normotensive.   - coreg, isosorbide dinitrate, amlodipine with holding parameters    VAIBHAV (acute kidney injury) ([facility])- (present on admission)  Assessment & Plan  VAIBHAV? Progression of CKD? BUN/Cr increased today.  - Nephrology following, appreciate recs  - declines HD despite education. He has capacity to make his own medical decisions.  - continue to diurese; net negative 2.9L since admission    HIV (human immunodeficiency virus infection) ([facility])- (present on admission)  Assessment & Plan  CD 4 count 40  - started on dolutegravir and lamivudine  - outpaitent follow up    Gram-positive bacteremia- (present on admission)  Assessment & Plan  \"Correct result:No growth at 24 hours   Erroneous result:Growth detected by Bactec instrument.\"  - repeat blood culture from 7/4 with NGTD     Seizure ([facility])- (present on admission)  Assessment & Plan  - seizure precautions  - continue keppra    DVT prophylaxis: SCDs  "

## 2020-07-05 NOTE — PROGRESS NOTES
Triage officer note /transfer note     Room S138    D/W Dr Dixon     ICU course: COVID +. SOB 2/2 likely CHF. Renal failure followed up nephro. Refusing HD.currenlty euvolemic . + Bx ? Contamination .      Need to do: monitor CHF status, cont with iv abx per in ICU. Pending repeat Bx.     Admitting hospitalist is Dr Watt

## 2020-07-06 LAB
ALBUMIN SERPL BCP-MCNC: 3.3 G/DL (ref 3.2–4.9)
ALBUMIN/GLOB SERPL: 0.8 G/DL
ALP SERPL-CCNC: 106 U/L (ref 30–99)
ALT SERPL-CCNC: 15 U/L (ref 2–50)
ANION GAP SERPL CALC-SCNC: 19 MMOL/L (ref 7–16)
AST SERPL-CCNC: 16 U/L (ref 12–45)
BACTERIA BLD CULT: ABNORMAL
BACTERIA BLD CULT: ABNORMAL
BASOPHILS # BLD AUTO: 0.1 % (ref 0–1.8)
BASOPHILS # BLD: 0.01 K/UL (ref 0–0.12)
BILIRUB SERPL-MCNC: 0.2 MG/DL (ref 0.1–1.5)
BUN SERPL-MCNC: 68 MG/DL (ref 8–22)
CALCIUM SERPL-MCNC: 6.8 MG/DL (ref 8.5–10.5)
CHLORIDE SERPL-SCNC: 98 MMOL/L (ref 96–112)
CO2 SERPL-SCNC: 21 MMOL/L (ref 20–33)
CREAT SERPL-MCNC: 9.14 MG/DL (ref 0.5–1.4)
CRP SERPL HS-MCNC: 3.04 MG/DL (ref 0–0.75)
D DIMER PPP IA.FEU-MCNC: 2.33 UG/ML (FEU) (ref 0–0.5)
EOSINOPHIL # BLD AUTO: 0.43 K/UL (ref 0–0.51)
EOSINOPHIL NFR BLD: 6.3 % (ref 0–6.9)
ERYTHROCYTE [DISTWIDTH] IN BLOOD BY AUTOMATED COUNT: 58.7 FL (ref 35.9–50)
FERRITIN SERPL-MCNC: 137 NG/ML (ref 22–322)
GLOBULIN SER CALC-MCNC: 4 G/DL (ref 1.9–3.5)
GLUCOSE SERPL-MCNC: 92 MG/DL (ref 65–99)
HCT VFR BLD AUTO: 26.8 % (ref 42–52)
HEMOCCULT SP1 STL QL: NEGATIVE
HGB BLD-MCNC: 8.7 G/DL (ref 14–18)
IMM GRANULOCYTES # BLD AUTO: 0.03 K/UL (ref 0–0.11)
IMM GRANULOCYTES NFR BLD AUTO: 0.4 % (ref 0–0.9)
LYMPHOCYTES # BLD AUTO: 0.46 K/UL (ref 1–4.8)
LYMPHOCYTES NFR BLD: 6.7 % (ref 22–41)
MCH RBC QN AUTO: 31.6 PG (ref 27–33)
MCHC RBC AUTO-ENTMCNC: 32.5 G/DL (ref 33.7–35.3)
MCV RBC AUTO: 97.5 FL (ref 81.4–97.8)
MONOCYTES # BLD AUTO: 0.41 K/UL (ref 0–0.85)
MONOCYTES NFR BLD AUTO: 6 % (ref 0–13.4)
NEUTROPHILS # BLD AUTO: 5.49 K/UL (ref 1.82–7.42)
NEUTROPHILS NFR BLD: 80.5 % (ref 44–72)
NRBC # BLD AUTO: 0 K/UL
NRBC BLD-RTO: 0 /100 WBC
PLATELET # BLD AUTO: 141 K/UL (ref 164–446)
PMV BLD AUTO: 10.7 FL (ref 9–12.9)
POTASSIUM SERPL-SCNC: 4 MMOL/L (ref 3.6–5.5)
PROT SERPL-MCNC: 7.3 G/DL (ref 6–8.2)
RBC # BLD AUTO: 2.75 M/UL (ref 4.7–6.1)
SIGNIFICANT IND 70042: ABNORMAL
SITE SITE: ABNORMAL
SODIUM SERPL-SCNC: 138 MMOL/L (ref 135–145)
SOURCE SOURCE: ABNORMAL
WBC # BLD AUTO: 6.8 K/UL (ref 4.8–10.8)

## 2020-07-06 PROCEDURE — 700102 HCHG RX REV CODE 250 W/ 637 OVERRIDE(OP): Performed by: INTERNAL MEDICINE

## 2020-07-06 PROCEDURE — A9270 NON-COVERED ITEM OR SERVICE: HCPCS | Performed by: HOSPITALIST

## 2020-07-06 PROCEDURE — 700111 HCHG RX REV CODE 636 W/ 250 OVERRIDE (IP): Performed by: INTERNAL MEDICINE

## 2020-07-06 PROCEDURE — 36415 COLL VENOUS BLD VENIPUNCTURE: CPT

## 2020-07-06 PROCEDURE — 700102 HCHG RX REV CODE 250 W/ 637 OVERRIDE(OP): Performed by: HOSPITALIST

## 2020-07-06 PROCEDURE — 700105 HCHG RX REV CODE 258: Performed by: INTERNAL MEDICINE

## 2020-07-06 PROCEDURE — 80053 COMPREHEN METABOLIC PANEL: CPT

## 2020-07-06 PROCEDURE — 82728 ASSAY OF FERRITIN: CPT

## 2020-07-06 PROCEDURE — 99232 SBSQ HOSP IP/OBS MODERATE 35: CPT | Performed by: HOSPITALIST

## 2020-07-06 PROCEDURE — A9270 NON-COVERED ITEM OR SERVICE: HCPCS | Performed by: INTERNAL MEDICINE

## 2020-07-06 PROCEDURE — 770021 HCHG ROOM/CARE - ISO PRIVATE

## 2020-07-06 PROCEDURE — 85379 FIBRIN DEGRADATION QUANT: CPT

## 2020-07-06 PROCEDURE — 86140 C-REACTIVE PROTEIN: CPT

## 2020-07-06 PROCEDURE — 85025 COMPLETE CBC W/AUTO DIFF WBC: CPT

## 2020-07-06 RX ORDER — FUROSEMIDE 20 MG/1
20 TABLET ORAL
Status: DISCONTINUED | OUTPATIENT
Start: 2020-07-06 | End: 2020-07-06

## 2020-07-06 RX ORDER — FUROSEMIDE 40 MG/1
40 TABLET ORAL
Status: DISCONTINUED | OUTPATIENT
Start: 2020-07-06 | End: 2020-07-10 | Stop reason: HOSPADM

## 2020-07-06 RX ORDER — SODIUM BICARBONATE 325 MG/1
325 TABLET ORAL 3 TIMES DAILY
Status: DISCONTINUED | OUTPATIENT
Start: 2020-07-06 | End: 2020-07-10 | Stop reason: HOSPADM

## 2020-07-06 RX ADMIN — ISOSORBIDE DINITRATE 20 MG: 20 TABLET ORAL at 04:38

## 2020-07-06 RX ADMIN — SODIUM BICARBONATE 325 MG: 325 TABLET ORAL at 19:29

## 2020-07-06 RX ADMIN — SODIUM CITRATE AND CITRIC ACID MONOHYDRATE 30 ML: 500; 334 SOLUTION ORAL at 21:39

## 2020-07-06 RX ADMIN — CARVEDILOL 25 MG: 25 TABLET, FILM COATED ORAL at 16:27

## 2020-07-06 RX ADMIN — FUROSEMIDE 40 MG: 40 TABLET ORAL at 16:28

## 2020-07-06 RX ADMIN — CARVEDILOL 25 MG: 25 TABLET, FILM COATED ORAL at 07:41

## 2020-07-06 RX ADMIN — DOLUTEGRAVIR SODIUM 50 MG: 50 TABLET, FILM COATED ORAL at 08:31

## 2020-07-06 RX ADMIN — LEVETIRACETAM 500 MG: 500 TABLET, FILM COATED ORAL at 16:27

## 2020-07-06 RX ADMIN — SODIUM CITRATE AND CITRIC ACID MONOHYDRATE 30 ML: 500; 334 SOLUTION ORAL at 17:30

## 2020-07-06 RX ADMIN — LEVETIRACETAM 500 MG: 500 TABLET, FILM COATED ORAL at 06:34

## 2020-07-06 RX ADMIN — SODIUM CITRATE AND CITRIC ACID MONOHYDRATE 30 ML: 500; 334 SOLUTION ORAL at 07:41

## 2020-07-06 RX ADMIN — ISOSORBIDE DINITRATE 20 MG: 20 TABLET ORAL at 21:39

## 2020-07-06 RX ADMIN — SODIUM CHLORIDE 3 G: 900 INJECTION INTRAVENOUS at 06:33

## 2020-07-06 RX ADMIN — FUROSEMIDE 80 MG: 10 INJECTION, SOLUTION INTRAMUSCULAR; INTRAVENOUS at 06:36

## 2020-07-06 RX ADMIN — ISOSORBIDE DINITRATE 20 MG: 20 TABLET ORAL at 13:05

## 2020-07-06 RX ADMIN — ROSUVASTATIN CALCIUM 10 MG: 20 TABLET, FILM COATED ORAL at 06:35

## 2020-07-06 RX ADMIN — ASPIRIN 81 MG: 81 TABLET, COATED ORAL at 06:33

## 2020-07-06 RX ADMIN — AMLODIPINE BESYLATE 10 MG: 10 TABLET ORAL at 06:33

## 2020-07-06 RX ADMIN — DOCUSATE SODIUM 50 MG AND SENNOSIDES 8.6 MG 2 TABLET: 8.6; 5 TABLET, FILM COATED ORAL at 06:35

## 2020-07-06 RX ADMIN — LAMIVUDINE 50 MG: 10 SOLUTION ORAL at 06:34

## 2020-07-06 ASSESSMENT — ENCOUNTER SYMPTOMS
INSOMNIA: 0
COUGH: 0
PSYCHIATRIC NEGATIVE: 1
FEVER: 0
ABDOMINAL PAIN: 0
SHORTNESS OF BREATH: 0
FALLS: 0
VOMITING: 0
FLANK PAIN: 0
PALPITATIONS: 0
LOSS OF CONSCIOUSNESS: 0
NERVOUS/ANXIOUS: 0
HEADACHES: 0
CHILLS: 0
NAUSEA: 0

## 2020-07-06 NOTE — PROGRESS NOTES
Received report from NAMRATA beauchamp. Pt tx in stable condition via wheelchair. All belongings at bedside. Pt arrived with no IV access. Per report, charge ICU RN to come place new IV access. Safety maintained.

## 2020-07-06 NOTE — PROGRESS NOTES
[facility] Medicine Daily Progress Note    Date of Service  7/6/2020    Chief Complaint  55 y.o. male with HIV, HTN, HLD, and seizure disorder admitted 7/3/2020 with acute hypoxemic respiratory failure. On initial evaluation, /163,  and hypoxemic requiring BIPAP. He was admitted to the ICU with Critical Care consultation. Nephrology was consulted for acute on chronic kidney injury. He declined HD, despite recommendation and education. He was also found to be COVID positive.     Interval Problem Update  Feels good today. He is up self to the restroom and in no distress. Transferred out of the ICU yesterday.     Consultants/Specialty  Nephrology- signed off  Critical Care- signed off  Palliative Care    Code Status  Full    Disposition  Anticipate home pending palliative care consult and titration of medications.     Review of Systems  Review of Systems   Constitutional: Negative for chills, fever and malaise/fatigue.   Respiratory: Negative for cough and shortness of breath.    Cardiovascular: Negative for chest pain, palpitations and leg swelling.   Gastrointestinal: Negative for abdominal pain, nausea and vomiting.   Genitourinary: Negative for dysuria and flank pain.   Musculoskeletal: Negative for falls.   Skin: Negative for itching.   Neurological: Negative for loss of consciousness and headaches.   Psychiatric/Behavioral: Negative.  The patient is not nervous/anxious and does not have insomnia.    All other systems reviewed and are negative.       Physical Exam  Temp:  [36.7 °C (98 °F)-37.2 °C (98.9 °F)] 37.2 °C (98.9 °F)  Pulse:  [63-81] 75  Resp:  [16-20] 18  BP: ()/(52-84) 144/77  SpO2:  [93 %-98 %] 97 %    Physical Exam  Vitals signs reviewed.   Constitutional:       General: He is not in acute distress.     Appearance: He is not toxic-appearing or diaphoretic.   HENT:      Head: Normocephalic.      Mouth/Throat:      Mouth: Mucous membranes are moist.   Eyes:      General:         Right  eye: No discharge.         Left eye: No discharge.      Extraocular Movements: Extraocular movements intact.   Neck:      Musculoskeletal: Normal range of motion. No muscular tenderness.   Cardiovascular:      Rate and Rhythm: Normal rate and regular rhythm.      Pulses: Normal pulses.   Pulmonary:      Effort: Pulmonary effort is normal. No respiratory distress.   Abdominal:      General: There is no distension.      Palpations: Abdomen is soft.      Tenderness: There is no abdominal tenderness. There is no guarding.   Musculoskeletal:         General: No tenderness.      Right lower leg: No edema.      Left lower leg: No edema.   Neurological:      General: No focal deficit present.      Mental Status: He is alert and oriented to person, place, and time.   Psychiatric:         Mood and Affect: Mood normal.         Speech: Speech normal.         Behavior: Behavior normal. Behavior is cooperative.     Patient seen and examined today on 7/6, unchanged from 7/5.     Fluids    Intake/Output Summary (Last 24 hours) at 7/6/2020 0709  Last data filed at 7/5/2020 2022  Gross per 24 hour   Intake 640 ml   Output 2050 ml   Net -1410 ml       Laboratory  Recent Labs     07/04/20  0520  07/04/20  1545 07/04/20  2210 07/05/20  0410   WBC 6.0  --   --   --  6.7   RBC 2.60*  --   --   --  2.78*   HEMOGLOBIN 8.2*   < > 7.7* 8.3* 8.6*   HEMATOCRIT 26.2*  --   --  26.4* 27.0*   MCV 99.2*  --   --   --  97.1   MCH 31.5  --   --   --  30.9   MCHC 31.8*  --   --   --  31.9*   RDW 60.3*  --   --   --  58.7*   PLATELETCT 131*  --   --   --  137*   MPV 10.7  --   --   --  10.9    < > = values in this interval not displayed.     Recent Labs     07/03/20  2130 07/04/20  0355 07/05/20  0410   SODIUM 137 136 137   POTASSIUM 4.6 4.5 4.5   CHLORIDE 105 107 101   CO2 17* 18* 18*   GLUCOSE 104* 95 94   BUN 61* 64* 65*   CREATININE 8.18* 8.17* 8.74*   CALCIUM 6.7* 6.7* 7.2*                   Imaging  EC-ECHOCARDIOGRAM COMPLETE W/O CONT   Final  "Result      US-RENAL ARTERY DUPLEX COMP   Final Result      DX-CHEST-PORTABLE (1 VIEW)   Final Result      BILATERAL mixed interstitial and airspace disease which could be pneumonia or pulmonary edema. Covid 19 is not excluded.           Assessment/Plan  * Respiratory failure (HCC)- (present on admission)  Assessment & Plan  Resolved. S/p BIPAP and ICU stay. Secondary to fluid overload. Saturating well on RA.  - monitor clinically  - declined HD; continue to diurese   - change to PO lasix today    COVID-19 virus detected- (present on admission)  Assessment & Plan  COVID positive. Saturating well on RA  - supportive care  - continue with isolation  - trend inflammatory markers q48 hours    Hypertensive emergency- (present on admission)  Assessment & Plan  On admission. Now normotensive.   - coreg, isosorbide dinitrate, amlodipine with holding parameters    VAIBHAV (acute kidney injury) ([facility])- (present on admission)  Assessment & Plan  VAIBHAV? Progression of CKD? BUN/Cr increased today.  - Nephrology evaluated, appreciate recs  - will start bicarb tabs  - declines HD despite education. He has capacity to make his own medical decisions.  - continue to diurese; net negative 2.6L since admission  - palliative care consultation pending    HIV (human immunodeficiency virus infection) ([facility])- (present on admission)  Assessment & Plan  CD4 count 40.  - started on dolutegravir and lamivudine  - outpaitent follow up    Gram-positive bacteremia- (present on admission)  Assessment & Plan  Ruled out. IT was a contaminant.   \"Correct result:No growth at 24 hours   Erroneous result:Growth detected by Bactec instrument.\"  - repeat blood culture from 7/4 with NGTD   - d/c abx    Seizure ([facility])- (present on admission)  Assessment & Plan  - seizure precautions  - continue keppra       VTE prophylaxis: heparin.     "

## 2020-07-06 NOTE — CARE PLAN
Problem: Safety  Goal: Will remain free from injury  Note: Call light within reach, treaded socks in place, bed in lowest position and locked.  Hourly rounding in progress.       Problem: Respiratory:  Goal: Respiratory status will improve  Note: Assessment complete.  No s/sx of respiratory distress.

## 2020-07-06 NOTE — PROGRESS NOTES
Nephrology note  Patient COVID PNA, VAIBHAV/CKD IV  Continues refusing dialysis, exam  VS stable  NAD  Lab results reviewed  Metabolic acidosis may control with Na HCO3  D/w Dr.Amanda Watt

## 2020-07-06 NOTE — PROGRESS NOTES
Assumed pt care at 0715.  Received report from maame RN.  Assessment completed.  Pt AAOx4.  Pt has no comlaints of pain at this time.  No other s/s of discomfort or distress. Pt ambulates with standby assist.  No O2 required at this time.  VSS.  Bed in lowest position and locked.  Pt calls appropriately.  Treaded socks in place, call light within reach and staff numbers provided.  Pt needs met at this time.

## 2020-07-07 PROBLEM — F17.200 NICOTINE DEPENDENCE: Status: ACTIVE | Noted: 2020-07-07

## 2020-07-07 LAB
ANION GAP SERPL CALC-SCNC: 21 MMOL/L (ref 7–16)
BUN SERPL-MCNC: 66 MG/DL (ref 8–22)
CALCIUM SERPL-MCNC: 6.8 MG/DL (ref 8.5–10.5)
CHLORIDE SERPL-SCNC: 100 MMOL/L (ref 96–112)
CO2 SERPL-SCNC: 21 MMOL/L (ref 20–33)
CREAT SERPL-MCNC: 10.52 MG/DL (ref 0.5–1.4)
GLUCOSE SERPL-MCNC: 87 MG/DL (ref 65–99)
POTASSIUM SERPL-SCNC: 4.5 MMOL/L (ref 3.6–5.5)
SODIUM SERPL-SCNC: 142 MMOL/L (ref 135–145)

## 2020-07-07 PROCEDURE — 36415 COLL VENOUS BLD VENIPUNCTURE: CPT

## 2020-07-07 PROCEDURE — 80048 BASIC METABOLIC PNL TOTAL CA: CPT

## 2020-07-07 PROCEDURE — 700102 HCHG RX REV CODE 250 W/ 637 OVERRIDE(OP): Performed by: INTERNAL MEDICINE

## 2020-07-07 PROCEDURE — A9270 NON-COVERED ITEM OR SERVICE: HCPCS | Performed by: INTERNAL MEDICINE

## 2020-07-07 PROCEDURE — 770021 HCHG ROOM/CARE - ISO PRIVATE

## 2020-07-07 PROCEDURE — 700102 HCHG RX REV CODE 250 W/ 637 OVERRIDE(OP): Performed by: HOSPITALIST

## 2020-07-07 PROCEDURE — 99232 SBSQ HOSP IP/OBS MODERATE 35: CPT | Mod: CS | Performed by: HOSPITALIST

## 2020-07-07 PROCEDURE — A9270 NON-COVERED ITEM OR SERVICE: HCPCS | Performed by: HOSPITALIST

## 2020-07-07 RX ORDER — LAMIVUDINE 100 MG/1
50 TABLET, FILM COATED ORAL DAILY
Status: DISCONTINUED | OUTPATIENT
Start: 2020-07-08 | End: 2020-07-10 | Stop reason: HOSPADM

## 2020-07-07 RX ADMIN — SODIUM CITRATE AND CITRIC ACID MONOHYDRATE 30 ML: 500; 334 SOLUTION ORAL at 20:56

## 2020-07-07 RX ADMIN — ISOSORBIDE DINITRATE 20 MG: 20 TABLET ORAL at 20:56

## 2020-07-07 RX ADMIN — SODIUM BICARBONATE 325 MG: 325 TABLET ORAL at 05:45

## 2020-07-07 RX ADMIN — LEVETIRACETAM 500 MG: 500 TABLET, FILM COATED ORAL at 05:45

## 2020-07-07 RX ADMIN — SODIUM CITRATE AND CITRIC ACID MONOHYDRATE 30 ML: 500; 334 SOLUTION ORAL at 10:23

## 2020-07-07 RX ADMIN — LEVETIRACETAM 500 MG: 500 TABLET, FILM COATED ORAL at 18:47

## 2020-07-07 RX ADMIN — LAMIVUDINE 50 MG: 10 SOLUTION ORAL at 05:47

## 2020-07-07 RX ADMIN — CARVEDILOL 25 MG: 25 TABLET, FILM COATED ORAL at 18:47

## 2020-07-07 RX ADMIN — SODIUM CITRATE AND CITRIC ACID MONOHYDRATE 30 ML: 500; 334 SOLUTION ORAL at 14:53

## 2020-07-07 RX ADMIN — CARVEDILOL 25 MG: 25 TABLET, FILM COATED ORAL at 10:24

## 2020-07-07 RX ADMIN — ISOSORBIDE DINITRATE 20 MG: 20 TABLET ORAL at 05:45

## 2020-07-07 RX ADMIN — ASPIRIN 81 MG: 81 TABLET, COATED ORAL at 05:45

## 2020-07-07 RX ADMIN — FUROSEMIDE 40 MG: 40 TABLET ORAL at 14:53

## 2020-07-07 RX ADMIN — DOCUSATE SODIUM 50 MG AND SENNOSIDES 8.6 MG 2 TABLET: 8.6; 5 TABLET, FILM COATED ORAL at 05:45

## 2020-07-07 RX ADMIN — DOCUSATE SODIUM 50 MG AND SENNOSIDES 8.6 MG 2 TABLET: 8.6; 5 TABLET, FILM COATED ORAL at 18:46

## 2020-07-07 RX ADMIN — AMLODIPINE BESYLATE 10 MG: 10 TABLET ORAL at 05:45

## 2020-07-07 RX ADMIN — ISOSORBIDE DINITRATE 20 MG: 20 TABLET ORAL at 14:53

## 2020-07-07 RX ADMIN — ROSUVASTATIN CALCIUM 10 MG: 20 TABLET, FILM COATED ORAL at 05:45

## 2020-07-07 RX ADMIN — SODIUM BICARBONATE 325 MG: 325 TABLET ORAL at 14:56

## 2020-07-07 RX ADMIN — SODIUM CITRATE AND CITRIC ACID MONOHYDRATE 30 ML: 500; 334 SOLUTION ORAL at 18:47

## 2020-07-07 RX ADMIN — FUROSEMIDE 40 MG: 40 TABLET ORAL at 05:45

## 2020-07-07 RX ADMIN — SODIUM BICARBONATE 325 MG: 325 TABLET ORAL at 18:47

## 2020-07-07 RX ADMIN — DOLUTEGRAVIR SODIUM 50 MG: 50 TABLET, FILM COATED ORAL at 05:45

## 2020-07-07 ASSESSMENT — ENCOUNTER SYMPTOMS
GASTROINTESTINAL NEGATIVE: 1
WEAKNESS: 0
NAUSEA: 0
HEADACHES: 0
DIZZINESS: 0
FOCAL WEAKNESS: 0
VOMITING: 0
CONSTITUTIONAL NEGATIVE: 1
NEUROLOGICAL NEGATIVE: 1
INSOMNIA: 0
FEVER: 0
NERVOUS/ANXIOUS: 0
COUGH: 0
SORE THROAT: 0
LOSS OF CONSCIOUSNESS: 0
FALLS: 0
DIAPHORESIS: 0
PSYCHIATRIC NEGATIVE: 1
PALPITATIONS: 0
SHORTNESS OF BREATH: 0
ABDOMINAL PAIN: 0
WEIGHT LOSS: 0
DEPRESSION: 0
RESPIRATORY NEGATIVE: 1
EYES NEGATIVE: 1
MYALGIAS: 0
CHILLS: 0
CARDIOVASCULAR NEGATIVE: 1
MUSCULOSKELETAL NEGATIVE: 1
BRUISES/BLEEDS EASILY: 0
FLANK PAIN: 0
BLURRED VISION: 0

## 2020-07-07 ASSESSMENT — LIFESTYLE VARIABLES: SUBSTANCE_ABUSE: 0

## 2020-07-07 NOTE — PROGRESS NOTES
Hospital Medicine Daily Progress Note    Date of Service  7/7/2020    Chief Complaint  Shortness of breath    Hospital Course   Patient is a 55 y.o. male with HIV, HTN, HLD, tobacco abuse and seizure disorder who presented to the ER on 7/3/2020 with acute hypoxemic respiratory failure. On initial evaluation his oxygen saturations were in the 70's with a  /163,   He was admitted to the ICU with Critical Care consultation and placed on bipap. Nephrology was consulted for acute on chronic kidney injury. He declined HD, despite recommendation and education. He was also found to be COVID positive. He was found to have flash pulmonary edema and this improved.  An echocardiogram showed a reduced LVEF of 40% with grade 2 diastolic dysfunction.  He was transferred out of the ICU on 7/5.  He has been followed by nephrology and has repeatedly declined HD.    Interval Problem Update  He is currently axox4  He denies pain  Denies sob, no cough  No n/v  We discussed his renal failure and he verbally expressed good understanding that he is not expected to live without HD and this is what he would like. He would like to change his code status to dnr/ dni and will consider hospice  Ros otherwise negative    Consultants/Specialty  Nephrology- signed off  Critical Care- signed off  Palliative Care    Code Status  Full    Disposition  Tbd    Review of Systems  Review of Systems   Constitutional: Negative.  Negative for chills, diaphoresis, fever, malaise/fatigue and weight loss.   HENT: Negative.  Negative for sore throat.    Eyes: Negative.  Negative for blurred vision.   Respiratory: Negative.  Negative for cough and shortness of breath.    Cardiovascular: Negative.  Negative for chest pain, palpitations and leg swelling.   Gastrointestinal: Negative.  Negative for abdominal pain, nausea and vomiting.   Genitourinary: Negative.  Negative for dysuria and flank pain.   Musculoskeletal: Negative.  Negative for falls and  myalgias.   Skin: Negative.  Negative for itching and rash.   Neurological: Negative.  Negative for dizziness, focal weakness, loss of consciousness, weakness and headaches.   Endo/Heme/Allergies: Negative.  Does not bruise/bleed easily.   Psychiatric/Behavioral: Negative.  Negative for depression, substance abuse and suicidal ideas. The patient is not nervous/anxious and does not have insomnia.    All other systems reviewed and are negative.       Physical Exam  Temp:  [36.6 °C (97.8 °F)-37.1 °C (98.7 °F)] 36.7 °C (98 °F)  Pulse:  [67-73] 67  Resp:  [15-17] 17  BP: (115-142)/(69-82) 118/73  SpO2:  [95 %-98 %] 95 %    Physical Exam  Vitals signs and nursing note reviewed. Exam conducted with a chaperone present.   Constitutional:       General: He is not in acute distress.     Appearance: Normal appearance. He is not toxic-appearing or diaphoretic.   HENT:      Head: Normocephalic.      Nose: Nose normal.      Mouth/Throat:      Mouth: Mucous membranes are moist.   Eyes:      General:         Right eye: No discharge.         Left eye: No discharge.      Extraocular Movements: Extraocular movements intact.      Pupils: Pupils are equal, round, and reactive to light.   Neck:      Musculoskeletal: Normal range of motion. No muscular tenderness.   Cardiovascular:      Rate and Rhythm: Normal rate and regular rhythm.      Pulses: Normal pulses.      Heart sounds: Normal heart sounds.   Pulmonary:      Effort: Pulmonary effort is normal. No respiratory distress.      Breath sounds: Normal breath sounds.   Abdominal:      General: Abdomen is flat. Bowel sounds are normal. There is no distension.      Palpations: Abdomen is soft.      Tenderness: There is no abdominal tenderness. There is no guarding.   Musculoskeletal: Normal range of motion.         General: No swelling, tenderness or deformity.      Right lower leg: No edema.      Left lower leg: No edema.   Skin:     General: Skin is warm and dry.      Capillary Refill:  Capillary refill takes less than 2 seconds.   Neurological:      General: No focal deficit present.      Mental Status: He is alert and oriented to person, place, and time.      Cranial Nerves: No cranial nerve deficit.   Psychiatric:         Mood and Affect: Mood normal.         Speech: Speech normal.         Behavior: Behavior normal. Behavior is cooperative.         Fluids    Intake/Output Summary (Last 24 hours) at 7/7/2020 1044  Last data filed at 7/6/2020 1800  Gross per 24 hour   Intake 240 ml   Output --   Net 240 ml       Laboratory  Recent Labs     07/04/20  2210 07/05/20  0410 07/06/20  0634   WBC  --  6.7 6.8   RBC  --  2.78* 2.75*   HEMOGLOBIN 8.3* 8.6* 8.7*   HEMATOCRIT 26.4* 27.0* 26.8*   MCV  --  97.1 97.5   MCH  --  30.9 31.6   MCHC  --  31.9* 32.5*   RDW  --  58.7* 58.7*   PLATELETCT  --  137* 141*   MPV  --  10.9 10.7     Recent Labs     07/05/20  0410 07/06/20  0634 07/07/20  0405   SODIUM 137 138 142   POTASSIUM 4.5 4.0 4.5   CHLORIDE 101 98 100   CO2 18* 21 21   GLUCOSE 94 92 87   BUN 65* 68* 66*   CREATININE 8.74* 9.14* 10.52*   CALCIUM 7.2* 6.8* 6.8*                   Imaging  EC-ECHOCARDIOGRAM COMPLETE W/O CONT   Final Result      US-RENAL ARTERY DUPLEX COMP   Final Result      DX-CHEST-PORTABLE (1 VIEW)   Final Result      BILATERAL mixed interstitial and airspace disease which could be pneumonia or pulmonary edema. Covid 19 is not excluded.           Assessment/Plan  * Respiratory failure (HCC)- (present on admission)  Assessment & Plan  Resolved. S/p BIPAP and ICU stay. Secondary to fluid overload. Saturating well on RA.  - monitor clinically  - declined HD; continue to diurese   - change to PO lasix today    COVID-19 virus detected- (present on admission)  Assessment & Plan  COVID positive  Respiratory status remains stable on RA  Continue supportive care    Hypertensive emergency- (present on admission)  Assessment & Plan  On admission  Now resolved  Continue coreg, isosorbide  "dinitrate, amlodipine with holding parameters  Following    VAIBHAV (acute kidney injury) ([facility])- (present on admission)  Assessment & Plan  Etiology unclear, possible CKD with VAIBHAV contributing  Continues to decline HD and appropriately expresses understanding that he will likely die without it  Creat continues to increase  Continue supportive care  Palliative to eval    HIV (human immunodeficiency virus infection) ([facility])- (present on admission)  Assessment & Plan  CD4 count 40.  started on dolutegravir and lamivudine  Continue supportive care    Nicotine dependence  Assessment & Plan  Smoking cessation discussed and recommended    Gram-positive bacteremia- (present on admission)  Assessment & Plan  Ruled out. IT was a contaminant.   \"Correct result:No growth at 24 hours   Erroneous result:Growth detected by Bactec instrument.\"  repeat blood culture from 7/4 with NGTD   abx stopped    Seizure ([facility])- (present on admission)  Assessment & Plan  - seizure precautions  - continue keppra       VTE prophylaxis: heparin.     "

## 2020-07-07 NOTE — CARE PLAN
Problem: Safety  Goal: Will remain free from falls  Outcome: PROGRESSING AS EXPECTED  Intervention: Implement fall precautions  Note: Pt calls appropriately, treaded socks in use. Personal belongings and call light with in reach and bed is locked in lowest position.      Problem: Bowel/Gastric:  Goal: Normal bowel function is maintained or improved  Outcome: PROGRESSING AS EXPECTED

## 2020-07-07 NOTE — DISCHARGE PLANNING
Hospital Care Management Discharge Planning       Anticipated Discharge Disposition:   · TBD     Action:   · Chart reviewed, POC discussed in IDT. Pt awaiting Palliative Care consult.     Barriers to Discharge:   · Medical clearance     Plan:   · Continue to provide support services and assistance with discharge planning as needed.

## 2020-07-07 NOTE — CONSULTS
"Reason for PC Consult: Advance Care Planning    Consulted by: Dr. Dixon    Assessment:  General:   Patient is a 55 y.o. male with HIV, HTN, HLD, tobacco abuse and seizure disorder who presented to the ER on 7/3/2020 with acute hypoxemic respiratory failure. On initial evaluation his oxygen saturations were in the 70's with a  /163,   He was admitted to the ICU with Critical Care consultation and placed on bipap. Nephrology was consulted for acute on chronic kidney injury. He declined HD. COVID positive. He was found to have flash pulmonary edema which improved. Now on med/tele on room air.     Dyspnea: No  Last BM: 07/05/20  Pain: Unable to determine  Depression: Unable to determine  Dementia: No    Spiritual:  Is Islam or spirituality important for coping with this illness? Unable to determine  Has a  or spiritual provider visit been requested?      Palliative Performance Scale: 70%    Advance Directive: Not on file   DPOA: No  POLST: No    Code Status: DNR/DNI    Social: When PC RN initially asked about living and social situation pt states it is \"none of your business.\" With explanation of question's purpose pt shares that he has 14 children that he sees and that live in [place] and he rents an apartment. Does not state who he lives with or his children's names.     Outcome:  Introduced self and role of Palliative Care to pt. Pt states that he prefers speaking Colombian so iPad  is utilized.  Assessed pt's understanding of current medical status, overall health picture, and options for future care. Pt appears agitated and states he is not the doctor. He states he isn't getting dialysis and he can't have CPR. PC RN inquires if pt is able to access his medications for HIV outpatient and he states that he is. Pt states that prior to admission he was independent. He is unable to drive due to his license being suspended but he could usually get rides to his appts.     Explored pt's values, " "beliefs, and preferences in order to identify GOC. PC RN inquires about pt's conversation with the MD regarding hospice. Pt begins talking about CPR. PC RN explained that hospice and CPR and different aspects of pt's care and attempts to clarify. PC RN explains hospice as future care option. PC RN describes the extra layer of support, symptom management, and focusing on quality of life. Pt states \"if the doctors want to come to my house they can.\" PC RN explains that with hospice the pt would no longer see any physicians other than his hospice MD. Pt states that he does not want hospice because he wants to continue seeing his doctors and hopes that there is something other than dialysis that can be done for his kidneys. PC RN explains that medications may be used to help pt's kidneys, but ultimately without dialysis the pt will die. Pt becomes frustrated when PC RN discusses dialysis and states that is \"all anyone talks about.\"     PC RN then attempts to discuss code status due to pt's comment of \"not being allowed to have CPR.\" PC RN educates Seamus that every patient has a choice whether or not to have CPR after they are educated and informed on what it means. PC RN described mechanical ventilation and what resuscitation looks like. Pt states that it would be \"wrong\" for a doctor not to do CPR.  PC RN explains that refusing dialysis could hasten pt's death and due to CPR being unlikely to provide him benefit, DNR would likely align with his goals. At this point the patient tosses the iPad  to end of bed and states in English that he is \"done\" and won't discuss further. 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     Unable to assess if pt has Advance Directive or pt's NOK due to pt being unwilling to discuss further.    Active listening, reflection, reminiscing, validation & normalization, and empathic support utilized throughout this " encounter.  All questions answered.  PC contact information given.         Updated: Dr. Mariano and BS RN     Plan: unable to complete full PC consult due to pt's asking PC RN to leave. Team updated.     Thank you for allowing Palliative Care to participate in this patient's care. Please feel free to call [phone] with any questions or concerns.

## 2020-07-07 NOTE — PALLIATIVE CARE
Palliative Care follow-up  PC RN discussed pt with BS NAMRATA Marcus, pt is alert and oriented. Appreciate updates. Full consult to follow.         Thank you for allowing Palliative Care to support this patient and family. Contact [phone] for additional assistance, change in patient status, or with any questions/concerns.

## 2020-07-08 LAB
ANION GAP SERPL CALC-SCNC: 21 MMOL/L (ref 7–16)
BUN SERPL-MCNC: 69 MG/DL (ref 8–22)
CALCIUM SERPL-MCNC: 6.5 MG/DL (ref 8.5–10.5)
CHLORIDE SERPL-SCNC: 97 MMOL/L (ref 96–112)
CO2 SERPL-SCNC: 23 MMOL/L (ref 20–33)
CREAT SERPL-MCNC: 10.38 MG/DL (ref 0.5–1.4)
GLUCOSE SERPL-MCNC: 84 MG/DL (ref 65–99)
POTASSIUM SERPL-SCNC: 4.4 MMOL/L (ref 3.6–5.5)
SODIUM SERPL-SCNC: 141 MMOL/L (ref 135–145)

## 2020-07-08 PROCEDURE — 700102 HCHG RX REV CODE 250 W/ 637 OVERRIDE(OP): Performed by: INTERNAL MEDICINE

## 2020-07-08 PROCEDURE — A9270 NON-COVERED ITEM OR SERVICE: HCPCS | Performed by: INTERNAL MEDICINE

## 2020-07-08 PROCEDURE — A9270 NON-COVERED ITEM OR SERVICE: HCPCS | Performed by: HOSPITALIST

## 2020-07-08 PROCEDURE — 700102 HCHG RX REV CODE 250 W/ 637 OVERRIDE(OP): Performed by: HOSPITALIST

## 2020-07-08 PROCEDURE — 770021 HCHG ROOM/CARE - ISO PRIVATE

## 2020-07-08 PROCEDURE — 99232 SBSQ HOSP IP/OBS MODERATE 35: CPT | Mod: CS | Performed by: HOSPITALIST

## 2020-07-08 PROCEDURE — 80048 BASIC METABOLIC PNL TOTAL CA: CPT

## 2020-07-08 PROCEDURE — 36415 COLL VENOUS BLD VENIPUNCTURE: CPT

## 2020-07-08 RX ADMIN — LAMIVUDINE 50 MG: 100 TABLET, FILM COATED ORAL at 05:47

## 2020-07-08 RX ADMIN — CARVEDILOL 25 MG: 25 TABLET, FILM COATED ORAL at 08:25

## 2020-07-08 RX ADMIN — ISOSORBIDE DINITRATE 20 MG: 20 TABLET ORAL at 05:47

## 2020-07-08 RX ADMIN — ASPIRIN 81 MG: 81 TABLET, COATED ORAL at 05:47

## 2020-07-08 RX ADMIN — SODIUM BICARBONATE 325 MG: 325 TABLET ORAL at 12:56

## 2020-07-08 RX ADMIN — FUROSEMIDE 40 MG: 40 TABLET ORAL at 05:48

## 2020-07-08 RX ADMIN — ISOSORBIDE DINITRATE 20 MG: 20 TABLET ORAL at 20:42

## 2020-07-08 RX ADMIN — ISOSORBIDE DINITRATE 20 MG: 20 TABLET ORAL at 12:56

## 2020-07-08 RX ADMIN — LEVETIRACETAM 500 MG: 500 TABLET, FILM COATED ORAL at 05:47

## 2020-07-08 RX ADMIN — SODIUM CITRATE AND CITRIC ACID MONOHYDRATE 30 ML: 500; 334 SOLUTION ORAL at 12:56

## 2020-07-08 RX ADMIN — LEVETIRACETAM 500 MG: 500 TABLET, FILM COATED ORAL at 18:31

## 2020-07-08 RX ADMIN — FUROSEMIDE 40 MG: 40 TABLET ORAL at 18:32

## 2020-07-08 RX ADMIN — SODIUM CITRATE AND CITRIC ACID MONOHYDRATE 30 ML: 500; 334 SOLUTION ORAL at 18:32

## 2020-07-08 RX ADMIN — SODIUM CITRATE AND CITRIC ACID MONOHYDRATE 30 ML: 500; 334 SOLUTION ORAL at 20:42

## 2020-07-08 RX ADMIN — DOLUTEGRAVIR SODIUM 50 MG: 50 TABLET, FILM COATED ORAL at 05:47

## 2020-07-08 RX ADMIN — SODIUM CITRATE AND CITRIC ACID MONOHYDRATE 30 ML: 500; 334 SOLUTION ORAL at 08:26

## 2020-07-08 RX ADMIN — ROSUVASTATIN CALCIUM 10 MG: 20 TABLET, FILM COATED ORAL at 05:48

## 2020-07-08 RX ADMIN — DOCUSATE SODIUM 50 MG AND SENNOSIDES 8.6 MG 2 TABLET: 8.6; 5 TABLET, FILM COATED ORAL at 05:48

## 2020-07-08 RX ADMIN — DOCUSATE SODIUM 50 MG AND SENNOSIDES 8.6 MG 2 TABLET: 8.6; 5 TABLET, FILM COATED ORAL at 18:31

## 2020-07-08 RX ADMIN — SODIUM BICARBONATE 325 MG: 325 TABLET ORAL at 18:31

## 2020-07-08 RX ADMIN — SODIUM BICARBONATE 325 MG: 325 TABLET ORAL at 05:47

## 2020-07-08 RX ADMIN — AMLODIPINE BESYLATE 10 MG: 10 TABLET ORAL at 05:47

## 2020-07-08 RX ADMIN — CARVEDILOL 25 MG: 25 TABLET, FILM COATED ORAL at 18:32

## 2020-07-08 ASSESSMENT — ENCOUNTER SYMPTOMS
EYES NEGATIVE: 1
FALLS: 0
COUGH: 0
MYALGIAS: 0
FOCAL WEAKNESS: 0
RESPIRATORY NEGATIVE: 1
SHORTNESS OF BREATH: 0
FLANK PAIN: 0
DIAPHORESIS: 0
VOMITING: 0
INSOMNIA: 0
DIZZINESS: 0
HEADACHES: 0
DEPRESSION: 0
BLURRED VISION: 0
PALPITATIONS: 0
NEUROLOGICAL NEGATIVE: 1
MUSCULOSKELETAL NEGATIVE: 1
PSYCHIATRIC NEGATIVE: 1
FEVER: 0
CONSTITUTIONAL NEGATIVE: 1
SORE THROAT: 0
CARDIOVASCULAR NEGATIVE: 1
WEAKNESS: 0
NERVOUS/ANXIOUS: 0
ABDOMINAL PAIN: 0
LOSS OF CONSCIOUSNESS: 0
BRUISES/BLEEDS EASILY: 0
NAUSEA: 0
WEIGHT LOSS: 0
GASTROINTESTINAL NEGATIVE: 1
CHILLS: 0

## 2020-07-08 ASSESSMENT — LIFESTYLE VARIABLES: SUBSTANCE_ABUSE: 0

## 2020-07-08 NOTE — HEART FAILURE PROGRAM
Although SARS Co-V2 is patient's principal problem for this admission, he is also in acutely decompensated HFrEF. Please see below for measures that must be addressed prior to discharge.     Patient presented with [facility], is at the point where he needs dialysis but is refusing it. He is also refusing hospice. Thank you to providers for involving palliative care in this very challenging situation.     It is felt that patient is not taking his medications as prescribed. This is well supported by my history with patient as well as chart review. Not to mention that BP was 216/115 upon triage.    Daily Nurse: please begin to fill out the HF checklist (pink sheet in hard chart) and use it to guide your daily care.    Discharge Nurse: please ensure completeness of the HF checklist (pink sheet in hard chart) and have it co-signed by the charge RN before the patient leaves the hospital.    Many thanks, Katelyn, Cardiovascular Nurse Navigator, RN, [facility] [phone], & [employer] M-F (excluding holidays).      HF Measures:  1. Documentation of LV systolic function (echo or cath) PTA, during this hospitalization, or plan to assess post discharge or reason for not assessing documented  2. Documentation of fluid intake and urine output every nursing shift  3. 2 hour post diuretic assessment documented 2 hours after diuretic given  4. HF Patient Education using the Living Well With Heart Failure Booklet and Symptom Tracker documented every nursing shift  5. Nutrition consult for diet education  6. Daily weights (one weight documented every 24 hours) on a standing scale unless standing is contraindicated in which case bed scale can be used - have patient write weight on symptom tracker  7. For LVEF less than or equal to 40%, ACE-I, ARNI or ARB prescribed at discharge   8. For LVEF less than or equal to 40%, an Evidence Based Beta Blocker (bisoprolol, carvedilol, toprol xl) must be prescribed at discharge  9. For LVEF less than or  equal to 35% aldosterone blockade prescribed at discharge  10. The combination of hydralazine and isosorbide dinitrate is recommended to reduce morbidity and mortality for patients self-described  Americans with NYHA class III-IV HFrEF (EF 40% or less), receiving optimal therapy with ACE inhibitors and beta blockers, unless contraindicated (Class I, SAMI: A).  11. If a HF patient is diabetic or is newly diagnosed with DM: prescribed diabetes treatment at discharge in the form of glycemic control (diet or anti-hyperglycemic medication) or f/u appointment for diabetes management scheduled at discharge.  12. If a HF patient has diabetes: prescribed lipid lowering medication at discharge  13. Documented smoking cessation advice or counseling  14. If a HF patient has a-fib: anticoagulation is prescribed upon discharge or contraindication is documented  15. Screening for and administering immunizations as long as no contraindications: Pneumonia (regardless of age) and Influenza  16. Written discharge instructions include:  ? Daily weights  ? Record weight on tracker  ? Bring tracker to appointments  ? Call MD for weight gain of 3lb /day or 5lb/week  ? HF medication teaching  ? Low sodium diet  ? Follow up appointment within seven calendar days of d/c must include: date, time and location  ? Activity  ? Worsening symptoms    What if any of the above HF measures are contraindicated?  ? Request that the discharging provider document the medication/intervention and the contraindication specifically in a progress note  ? For example: “no CHF meds due to hypotension” is not enough. It needs to say: “No ACE-I, ARNI, ARB due to hypotension”; “No Beta Blockade due to bradycardia”…

## 2020-07-08 NOTE — PROGRESS NOTES
"Hospital Medicine Daily Progress Note    Date of Service  7/8/2020    Chief Complaint  Shortness of breath    Hospital Course   Patient is a 55 y.o. male with HIV, HTN, HLD, tobacco abuse and seizure disorder who presented to the ER on 7/3/2020 with acute hypoxemic respiratory failure. On initial evaluation his oxygen saturations were in the 70's with a  /163,   He was admitted to the ICU with Critical Care consultation and placed on bipap. Nephrology was consulted for acute on chronic kidney injury. He declined HD, despite recommendation and education. He was also found to be COVID positive. He was found to have flash pulmonary edema and this improved.  An echocardiogram showed a reduced LVEF of 40% with grade 2 diastolic dysfunction.  He was transferred out of the ICU on 7/5.  He has been followed by nephrology and has repeatedly declined HD.    Interval Problem Update   services were used in the patient's primary language of Sami.     Name or Number: Jacquelyn [id number]  Mode of interpretation: iPad    Patient remains axox4  Per my discussion with palliative care yesterday they had doubts that he understood his code status discussion so I repeated it today with the .  He continued to express good understanding in both English and Sami.  At first he was resistant to the conversation saying \"I'm so tired of talking about dialysis, why does everyone want to talk about dialysis\".  I then told him that what ever decision and wish he has is 100% ok, I just want him to have as much information possible to make that decision.  He was cooperative.  He asked if there were other ways to treat his kidneys other than dialysis and said it is difficult for him to imagine that he may need hospice because he has no symptoms at this time.  I explained that there were no renal treatment options other than HD and that often people with ESRD do not have symptoms until enough toxins " build up and at that time the most common symptoms are nausea and confusion.  He remained consistent with his wish not to pursue HD and again verbally expressed good understanding that this is likely to result in his death.  I stated that due to this decision I recommend hospice to be arranged now, while he is still asymptomatic so that he has a support system in place for when he does develop symptoms.  He thanked me and said he would like to enroll with hospice.  He also confirmed that he would like to be a DNR/ DNI.    No symptoms currently, no n/v  No cp, no sob  Calm and cooperative  Ros otherwise negative    Consultants/Specialty  Nephrology- signed off  Critical Care- signed off  Palliative Care  Hospice    Code Status  Full    Disposition  Tbd    Review of Systems  Review of Systems   Constitutional: Negative.  Negative for chills, diaphoresis, fever, malaise/fatigue and weight loss.   HENT: Negative.  Negative for sore throat.    Eyes: Negative.  Negative for blurred vision.   Respiratory: Negative.  Negative for cough and shortness of breath.    Cardiovascular: Negative.  Negative for chest pain, palpitations and leg swelling.   Gastrointestinal: Negative.  Negative for abdominal pain, nausea and vomiting.   Genitourinary: Negative.  Negative for dysuria and flank pain.   Musculoskeletal: Negative.  Negative for falls and myalgias.   Skin: Negative.  Negative for itching and rash.   Neurological: Negative.  Negative for dizziness, focal weakness, loss of consciousness, weakness and headaches.   Endo/Heme/Allergies: Negative.  Does not bruise/bleed easily.   Psychiatric/Behavioral: Negative.  Negative for depression, substance abuse and suicidal ideas. The patient is not nervous/anxious and does not have insomnia.    All other systems reviewed and are negative.       Physical Exam  Temp:  [36.7 °C (98 °F)-37.3 °C (99.1 °F)] 37.3 °C (99.1 °F)  Pulse:  [67-74] 71  Resp:  [16-18] 17  BP: (113-121)/(63-75)  118/75  SpO2:  [92 %-96 %] 96 %    Physical Exam  Vitals signs and nursing note reviewed. Exam conducted with a chaperone present.   Constitutional:       General: He is not in acute distress.     Appearance: Normal appearance. He is not toxic-appearing or diaphoretic.   HENT:      Head: Normocephalic.      Nose: Nose normal.      Mouth/Throat:      Mouth: Mucous membranes are moist.   Eyes:      General:         Right eye: No discharge.         Left eye: No discharge.      Extraocular Movements: Extraocular movements intact.      Pupils: Pupils are equal, round, and reactive to light.   Neck:      Musculoskeletal: Normal range of motion. No muscular tenderness.   Cardiovascular:      Rate and Rhythm: Normal rate and regular rhythm.      Pulses: Normal pulses.      Heart sounds: Normal heart sounds.   Pulmonary:      Effort: Pulmonary effort is normal. No respiratory distress.      Breath sounds: Normal breath sounds.   Abdominal:      General: Abdomen is flat. Bowel sounds are normal. There is no distension.      Palpations: Abdomen is soft.      Tenderness: There is no abdominal tenderness. There is no guarding.   Musculoskeletal: Normal range of motion.         General: No swelling, tenderness or deformity.      Right lower leg: No edema.      Left lower leg: No edema.   Skin:     General: Skin is warm and dry.      Capillary Refill: Capillary refill takes less than 2 seconds.   Neurological:      General: No focal deficit present.      Mental Status: He is alert and oriented to person, place, and time.      Cranial Nerves: No cranial nerve deficit.   Psychiatric:         Mood and Affect: Mood normal.         Speech: Speech normal.         Behavior: Behavior normal. Behavior is cooperative.         Fluids    Intake/Output Summary (Last 24 hours) at 7/8/2020 1106  Last data filed at 7/8/2020 0830  Gross per 24 hour   Intake 960 ml   Output --   Net 960 ml       Laboratory  Recent Labs     07/06/20  0634   WBC 6.8  "  RBC 2.75*   HEMOGLOBIN 8.7*   HEMATOCRIT 26.8*   MCV 97.5   MCH 31.6   MCHC 32.5*   RDW 58.7*   PLATELETCT 141*   MPV 10.7     Recent Labs     07/06/20  0634 07/07/20  0405 07/08/20  0348   SODIUM 138 142 141   POTASSIUM 4.0 4.5 4.4   CHLORIDE 98 100 97   CO2 21 21 23   GLUCOSE 92 87 84   BUN 68* 66* 69*   CREATININE 9.14* 10.52* 10.38*   CALCIUM 6.8* 6.8* 6.5*                   Imaging  EC-ECHOCARDIOGRAM COMPLETE W/O CONT   Final Result      US-RENAL ARTERY DUPLEX COMP   Final Result      DX-CHEST-PORTABLE (1 VIEW)   Final Result      BILATERAL mixed interstitial and airspace disease which could be pneumonia or pulmonary edema. Covid 19 is not excluded.           Assessment/Plan  * Respiratory failure (HCC)- (present on admission)  Assessment & Plan  Resolved. S/p BIPAP and ICU stay. Secondary to fluid overload.   Continues to saturate well on RA.      COVID-19 virus detected- (present on admission)  Assessment & Plan  COVID positive  Respiratory status remains stable on RA  Continue supportive care    Hypertensive emergency- (present on admission)  Assessment & Plan  On admission  Now resolved  Continue coreg, isosorbide dinitrate, amlodipine with holding parameters  Following    VAIBHAV (acute kidney injury) (HCC)- (present on admission)  Assessment & Plan  Etiology unclear, possible CKD with VAIBHAV contributing  Continues to decline HD and appropriately expresses understanding that he will likely die without it  Creat continues to increase  Continue supportive care  Palliative following  See above  Hospice to eval    HIV (human immunodeficiency virus infection) (HCC)- (present on admission)  Assessment & Plan  CD4 count 40.  started on dolutegravir and lamivudine  Continue supportive care    Nicotine dependence  Assessment & Plan  Smoking cessation discussed and recommended    Gram-positive bacteremia- (present on admission)  Assessment & Plan  Ruled out. It was a contaminant.   \"Correct result:No growth at 24 hours " "  Erroneous result:Growth detected by Bactec instrument.\"  repeat blood culture from 7/4 with NGTD   abx stopped    Seizure (HCC)- (present on admission)  Assessment & Plan  Continue seizure precautions  Continue keppra       VTE prophylaxis: heparin.     "

## 2020-07-08 NOTE — CARE PLAN
Problem: Fluid Volume:  Goal: Risk for excess fluid volume will decrease  Description: Monitor fluid intake and take daily weight  Outcome: PROGRESSING AS EXPECTED  Note: Discussed importance of taking Lasix as prescribed by MD.     Problem: Respiratory:  Goal: Ability to maintain a clear airway will improve  Description: Discussed ability and importance for patient to provide a strong cough to maintain airway.  Outcome: PROGRESSING AS EXPECTED  Note: Discussed importance of patient to perform strong cough to maintain patent airway.

## 2020-07-09 LAB
BACTERIA BLD CULT: NORMAL
BACTERIA BLD CULT: NORMAL
COVID ORDER STATUS COVID19: NORMAL
SIGNIFICANT IND 70042: NORMAL
SIGNIFICANT IND 70042: NORMAL
SITE SITE: NORMAL
SITE SITE: NORMAL
SOURCE SOURCE: NORMAL
SOURCE SOURCE: NORMAL

## 2020-07-09 PROCEDURE — A9270 NON-COVERED ITEM OR SERVICE: HCPCS | Performed by: HOSPITALIST

## 2020-07-09 PROCEDURE — U0003 INFECTIOUS AGENT DETECTION BY NUCLEIC ACID (DNA OR RNA); SEVERE ACUTE RESPIRATORY SYNDROME CORONAVIRUS 2 (SARS-COV-2) (CORONAVIRUS DISEASE [COVID-19]), AMPLIFIED PROBE TECHNIQUE, MAKING USE OF HIGH THROUGHPUT TECHNOLOGIES AS DESCRIBED BY CMS-2020-01-R: HCPCS

## 2020-07-09 PROCEDURE — 99232 SBSQ HOSP IP/OBS MODERATE 35: CPT | Mod: CS | Performed by: HOSPITALIST

## 2020-07-09 PROCEDURE — 700102 HCHG RX REV CODE 250 W/ 637 OVERRIDE(OP): Performed by: INTERNAL MEDICINE

## 2020-07-09 PROCEDURE — 770021 HCHG ROOM/CARE - ISO PRIVATE

## 2020-07-09 PROCEDURE — C9803 HOPD COVID-19 SPEC COLLECT: HCPCS | Performed by: HOSPITALIST

## 2020-07-09 PROCEDURE — 700102 HCHG RX REV CODE 250 W/ 637 OVERRIDE(OP): Performed by: HOSPITALIST

## 2020-07-09 PROCEDURE — A9270 NON-COVERED ITEM OR SERVICE: HCPCS | Performed by: INTERNAL MEDICINE

## 2020-07-09 RX ADMIN — ISOSORBIDE DINITRATE 20 MG: 20 TABLET ORAL at 04:45

## 2020-07-09 RX ADMIN — SODIUM BICARBONATE 325 MG: 325 TABLET ORAL at 18:31

## 2020-07-09 RX ADMIN — FUROSEMIDE 40 MG: 40 TABLET ORAL at 04:46

## 2020-07-09 RX ADMIN — LEVETIRACETAM 500 MG: 500 TABLET, FILM COATED ORAL at 04:45

## 2020-07-09 RX ADMIN — DOLUTEGRAVIR SODIUM 50 MG: 50 TABLET, FILM COATED ORAL at 04:45

## 2020-07-09 RX ADMIN — CARVEDILOL 25 MG: 25 TABLET, FILM COATED ORAL at 18:40

## 2020-07-09 RX ADMIN — SODIUM CITRATE AND CITRIC ACID MONOHYDRATE 30 ML: 500; 334 SOLUTION ORAL at 18:31

## 2020-07-09 RX ADMIN — FUROSEMIDE 40 MG: 40 TABLET ORAL at 18:31

## 2020-07-09 RX ADMIN — LAMIVUDINE 50 MG: 100 TABLET, FILM COATED ORAL at 04:46

## 2020-07-09 RX ADMIN — LEVETIRACETAM 500 MG: 500 TABLET, FILM COATED ORAL at 18:31

## 2020-07-09 RX ADMIN — CARVEDILOL 25 MG: 25 TABLET, FILM COATED ORAL at 11:00

## 2020-07-09 RX ADMIN — ROSUVASTATIN CALCIUM 10 MG: 20 TABLET, FILM COATED ORAL at 04:45

## 2020-07-09 RX ADMIN — SODIUM BICARBONATE 325 MG: 325 TABLET ORAL at 11:00

## 2020-07-09 RX ADMIN — ISOSORBIDE DINITRATE 20 MG: 20 TABLET ORAL at 11:00

## 2020-07-09 RX ADMIN — ISOSORBIDE DINITRATE 20 MG: 20 TABLET ORAL at 18:31

## 2020-07-09 RX ADMIN — SODIUM BICARBONATE 325 MG: 325 TABLET ORAL at 04:46

## 2020-07-09 RX ADMIN — AMLODIPINE BESYLATE 10 MG: 10 TABLET ORAL at 04:44

## 2020-07-09 RX ADMIN — SODIUM CITRATE AND CITRIC ACID MONOHYDRATE 30 ML: 500; 334 SOLUTION ORAL at 21:05

## 2020-07-09 RX ADMIN — ASPIRIN 81 MG: 81 TABLET, COATED ORAL at 04:44

## 2020-07-09 RX ADMIN — SODIUM CITRATE AND CITRIC ACID MONOHYDRATE 30 ML: 500; 334 SOLUTION ORAL at 11:00

## 2020-07-09 ASSESSMENT — ENCOUNTER SYMPTOMS
NAUSEA: 0
ABDOMINAL PAIN: 0
NEUROLOGICAL NEGATIVE: 1
FLANK PAIN: 0
SORE THROAT: 0
WEIGHT LOSS: 0
GASTROINTESTINAL NEGATIVE: 1
DIZZINESS: 0
SHORTNESS OF BREATH: 0
CHILLS: 0
BRUISES/BLEEDS EASILY: 0
BLURRED VISION: 0
CARDIOVASCULAR NEGATIVE: 1
DEPRESSION: 0
INSOMNIA: 0
PALPITATIONS: 0
LOSS OF CONSCIOUSNESS: 0
PSYCHIATRIC NEGATIVE: 1
WEAKNESS: 0
HEADACHES: 0
MUSCULOSKELETAL NEGATIVE: 1
FOCAL WEAKNESS: 0
FEVER: 0
COUGH: 0
DIAPHORESIS: 0
EYES NEGATIVE: 1
VOMITING: 0
NERVOUS/ANXIOUS: 0
MYALGIAS: 0
CONSTITUTIONAL NEGATIVE: 1
RESPIRATORY NEGATIVE: 1
FALLS: 0

## 2020-07-09 ASSESSMENT — FIBROSIS 4 INDEX: FIB4 SCORE: 1.64

## 2020-07-09 ASSESSMENT — LIFESTYLE VARIABLES: SUBSTANCE_ABUSE: 0

## 2020-07-09 NOTE — PROGRESS NOTES
Pt A&OX4, fatigue but expresses he is enjoying his rest at the hospital, LUE 4/5, denies N/T. Pt denies pain, denies SOB or cough, tolerating RA. Pt ambulating up self, declines to shower this evening, voiding without difficulty, BM 7/8, -N/V.     Pt given [facility] purple medication pill box labelled with days of the week and heart failure education books in English and in English. Reviewed morning HF medications to include amlodipine, carvedilol, lasix, and isosorbide dinitrate. Also discussed low salt diet, healthy options (pt given Cheerios as a snack this morning), and importance of weighing self every day. Pt was receptive to brief education and states he is agreeable to learning more in day when he is more awake.

## 2020-07-09 NOTE — PROGRESS NOTES
Hospital Medicine Daily Progress Note    Date of Service  7/9/2020    Chief Complaint  Shortness of breath    Hospital Course   Patient is a 55 y.o. male with HIV, HTN, HLD, tobacco abuse and seizure disorder who presented to the ER on 7/3/2020 with acute hypoxemic respiratory failure. On initial evaluation his oxygen saturations were in the 70's with a  /163,   He was admitted to the ICU with Critical Care consultation and placed on bipap. Nephrology was consulted for acute on chronic kidney injury. He declined HD, despite recommendation and education. He was also found to be COVID positive. He was found to have flash pulmonary edema and this improved.  An echocardiogram showed a reduced LVEF of 40% with grade 2 diastolic dysfunction.  He was transferred out of the ICU on 7/5.  He has been followed by nephrology and has repeatedly declined HD.    Interval Problem Update  He continues to do well  Denies pain  Denies sob  Remains axox4  No confusion  I spoke with palliative care yesterday and they were to meet with him to discussed hospice - pt remains interested in discharging with hospice    Consultants/Specialty  Nephrology- signed off  Critical Care- signed off  Palliative Care  Hospice    Code Status  Full    Disposition  Tbd    Review of Systems  Review of Systems   Constitutional: Negative.  Negative for chills, diaphoresis, fever, malaise/fatigue and weight loss.   HENT: Negative.  Negative for sore throat.    Eyes: Negative.  Negative for blurred vision.   Respiratory: Negative.  Negative for cough and shortness of breath.    Cardiovascular: Negative.  Negative for chest pain, palpitations and leg swelling.   Gastrointestinal: Negative.  Negative for abdominal pain, nausea and vomiting.   Genitourinary: Negative.  Negative for dysuria and flank pain.   Musculoskeletal: Negative.  Negative for falls and myalgias.   Skin: Negative.  Negative for itching and rash.   Neurological: Negative.  Negative  for dizziness, focal weakness, loss of consciousness, weakness and headaches.   Endo/Heme/Allergies: Negative.  Does not bruise/bleed easily.   Psychiatric/Behavioral: Negative.  Negative for depression, substance abuse and suicidal ideas. The patient is not nervous/anxious and does not have insomnia.    All other systems reviewed and are negative.       Physical Exam  Temp:  [36.9 °C (98.5 °F)-37.2 °C (98.9 °F)] 37.1 °C (98.8 °F)  Pulse:  [74-76] 74  Resp:  [16-19] 16  BP: (110-134)/(69-75) 134/72  SpO2:  [95 %-98 %] 98 %    Physical Exam  Vitals signs and nursing note reviewed. Exam conducted with a chaperone present.   Constitutional:       General: He is not in acute distress.     Appearance: Normal appearance. He is not toxic-appearing or diaphoretic.   HENT:      Head: Normocephalic.      Nose: Nose normal.      Mouth/Throat:      Mouth: Mucous membranes are moist.   Eyes:      General:         Right eye: No discharge.         Left eye: No discharge.      Extraocular Movements: Extraocular movements intact.      Pupils: Pupils are equal, round, and reactive to light.   Neck:      Musculoskeletal: Normal range of motion. No muscular tenderness.   Cardiovascular:      Rate and Rhythm: Normal rate and regular rhythm.      Pulses: Normal pulses.      Heart sounds: Normal heart sounds.   Pulmonary:      Effort: Pulmonary effort is normal. No respiratory distress.      Breath sounds: Normal breath sounds.   Abdominal:      General: Abdomen is flat. Bowel sounds are normal. There is no distension.      Palpations: Abdomen is soft.      Tenderness: There is no abdominal tenderness. There is no guarding.   Musculoskeletal: Normal range of motion.         General: No swelling, tenderness or deformity.      Right lower leg: No edema.      Left lower leg: No edema.   Skin:     General: Skin is warm and dry.      Capillary Refill: Capillary refill takes less than 2 seconds.   Neurological:      General: No focal deficit  present.      Mental Status: He is alert and oriented to person, place, and time.      Cranial Nerves: No cranial nerve deficit.   Psychiatric:         Mood and Affect: Mood normal.         Speech: Speech normal.         Behavior: Behavior normal. Behavior is cooperative.         Fluids    Intake/Output Summary (Last 24 hours) at 7/9/2020 1017  Last data filed at 7/8/2020 2000  Gross per 24 hour   Intake 390 ml   Output --   Net 390 ml       Laboratory      Recent Labs     07/07/20  0405 07/08/20  0348   SODIUM 142 141   POTASSIUM 4.5 4.4   CHLORIDE 100 97   CO2 21 23   GLUCOSE 87 84   BUN 66* 69*   CREATININE 10.52* 10.38*   CALCIUM 6.8* 6.5*                   Imaging  EC-ECHOCARDIOGRAM COMPLETE W/O CONT   Final Result      US-RENAL ARTERY DUPLEX COMP   Final Result      DX-CHEST-PORTABLE (1 VIEW)   Final Result      BILATERAL mixed interstitial and airspace disease which could be pneumonia or pulmonary edema. Covid 19 is not excluded.           Assessment/Plan  * Respiratory failure (HCC)- (present on admission)  Assessment & Plan  Resolved. S/p BIPAP and ICU stay.  Secondary to fluid overload.   Continues to saturate well on RA.      COVID-19 virus detected- (present on admission)  Assessment & Plan  COVID positive  Respiratory status remains stable on RA  Continue supportive care    Hypertensive emergency- (present on admission)  Assessment & Plan  On admission  Now resolved  Continue coreg, isosorbide dinitrate, amlodipine with holding parameters  Following    VAIBHAV (acute kidney injury) ([facility])- (present on admission)  Assessment & Plan  Etiology unclear, possible CKD with VAIBHAV contributing  Continues to decline HD and appropriately expresses understanding that he will likely die without it  Creat continues to increase  Nephrology has signed off  Palliative following  See above  Hospice consult pending  Pt would like to d/c with hospice    HIV (human immunodeficiency virus infection) ([facility])- (present on  "admission)  Assessment & Plan  CD4 count 40.  Continue dolutegravir and lamivudine  Continue supportive care    Nicotine dependence  Assessment & Plan  Smoking cessation discussed and recommended    Gram-positive bacteremia- (present on admission)  Assessment & Plan  Ruled out. It was a contaminant.   \"Correct result:No growth at 24 hours   Erroneous result:Growth detected by Bactec instrument.\"  repeat blood culture from 7/4 with NGTD   abx stopped    Seizure (HCC)- (present on admission)  Assessment & Plan  Continue seizure precautions  Continue keppra       VTE prophylaxis: heparin.     "

## 2020-07-09 NOTE — CARE PLAN
Problem: Communication  Goal: The ability to communicate needs accurately and effectively will improve  Outcome: PROGRESSING AS EXPECTED     Pt declines use of . Pt speaks English and Kyr[place]z.   Pt able to communicate and make needs known.     Problem: Respiratory:  Goal: Respiratory status will improve  Outcome: PROGRESSING AS EXPECTED     Pt tolerating RA.   Denies SOB.   Denies cough and sputum.

## 2020-07-09 NOTE — CARE PLAN
Problem: Infection  Goal: Will remain free from infection  Outcome: PROGRESSING AS EXPECTED     Problem: Safety  Goal: Will remain free from falls  Intervention: Implement fall precautions  Note: Pt calls appropriately, treaded socks in use. Personal belongings and call light with in reach and bed is locked in lowest position.

## 2020-07-10 ENCOUNTER — HOME CARE VISIT (OUTPATIENT)
Dept: HOSPICE | Facility: HOSPICE | Age: 55
End: 2020-07-10
Payer: COMMERCIAL

## 2020-07-10 ENCOUNTER — HOSPICE ADMISSION (OUTPATIENT)
Dept: HOSPICE | Facility: HOSPICE | Age: 55
End: 2020-07-10
Payer: COMMERCIAL

## 2020-07-10 VITALS — RESPIRATION RATE: 17 BRPM | SYSTOLIC BLOOD PRESSURE: 129 MMHG | DIASTOLIC BLOOD PRESSURE: 73 MMHG | HEART RATE: 74 BPM

## 2020-07-10 VITALS
TEMPERATURE: 97.9 F | HEIGHT: 64 IN | WEIGHT: 136.91 LBS | RESPIRATION RATE: 17 BRPM | HEART RATE: 74 BPM | BODY MASS INDEX: 23.37 KG/M2 | SYSTOLIC BLOOD PRESSURE: 129 MMHG | DIASTOLIC BLOOD PRESSURE: 73 MMHG | OXYGEN SATURATION: 97 %

## 2020-07-10 PROBLEM — J96.90 RESPIRATORY FAILURE (HCC): Status: RESOLVED | Noted: 2020-07-03 | Resolved: 2020-07-10

## 2020-07-10 PROBLEM — E87.29 HIGH ANION GAP METABOLIC ACIDOSIS: Status: RESOLVED | Noted: 2020-07-03 | Resolved: 2020-07-10

## 2020-07-10 PROBLEM — I16.1 HYPERTENSIVE EMERGENCY: Status: RESOLVED | Noted: 2020-07-03 | Resolved: 2020-07-10

## 2020-07-10 PROBLEM — R78.81 GRAM-POSITIVE BACTEREMIA: Status: RESOLVED | Noted: 2020-07-05 | Resolved: 2020-07-10

## 2020-07-10 LAB
SARS-COV-2 RNA RESP QL NAA+PROBE: DETECTED
SPECIMEN SOURCE: ABNORMAL

## 2020-07-10 PROCEDURE — 99239 HOSP IP/OBS DSCHRG MGMT >30: CPT | Mod: CS | Performed by: HOSPITALIST

## 2020-07-10 PROCEDURE — S9126 HOSPICE CARE, IN THE HOME, P: HCPCS

## 2020-07-10 PROCEDURE — A9270 NON-COVERED ITEM OR SERVICE: HCPCS | Performed by: INTERNAL MEDICINE

## 2020-07-10 PROCEDURE — 700102 HCHG RX REV CODE 250 W/ 637 OVERRIDE(OP): Performed by: INTERNAL MEDICINE

## 2020-07-10 PROCEDURE — 700102 HCHG RX REV CODE 250 W/ 637 OVERRIDE(OP): Performed by: HOSPITALIST

## 2020-07-10 PROCEDURE — A9270 NON-COVERED ITEM OR SERVICE: HCPCS | Performed by: HOSPITALIST

## 2020-07-10 PROCEDURE — G0299 HHS/HOSPICE OF RN EA 15 MIN: HCPCS

## 2020-07-10 RX ORDER — SODIUM BICARBONATE 325 MG/1
325 TABLET ORAL 3 TIMES DAILY
Qty: 120 TAB | Refills: 3 | Status: SHIPPED
Start: 2020-07-10 | End: 2020-07-11

## 2020-07-10 RX ADMIN — CARVEDILOL 25 MG: 25 TABLET, FILM COATED ORAL at 18:30

## 2020-07-10 RX ADMIN — ASPIRIN 81 MG: 81 TABLET, COATED ORAL at 04:47

## 2020-07-10 RX ADMIN — SODIUM BICARBONATE 325 MG: 325 TABLET ORAL at 04:47

## 2020-07-10 RX ADMIN — SODIUM BICARBONATE 325 MG: 325 TABLET ORAL at 13:00

## 2020-07-10 RX ADMIN — ROSUVASTATIN CALCIUM 10 MG: 20 TABLET, FILM COATED ORAL at 04:45

## 2020-07-10 RX ADMIN — ISOSORBIDE DINITRATE 20 MG: 20 TABLET ORAL at 04:46

## 2020-07-10 RX ADMIN — LAMIVUDINE 50 MG: 100 TABLET, FILM COATED ORAL at 04:46

## 2020-07-10 RX ADMIN — ISOSORBIDE DINITRATE 20 MG: 20 TABLET ORAL at 13:00

## 2020-07-10 RX ADMIN — DOLUTEGRAVIR SODIUM 50 MG: 50 TABLET, FILM COATED ORAL at 04:47

## 2020-07-10 RX ADMIN — LEVETIRACETAM 500 MG: 500 TABLET, FILM COATED ORAL at 04:47

## 2020-07-10 RX ADMIN — CARVEDILOL 25 MG: 25 TABLET, FILM COATED ORAL at 09:00

## 2020-07-10 RX ADMIN — DOCUSATE SODIUM 50 MG AND SENNOSIDES 8.6 MG 2 TABLET: 8.6; 5 TABLET, FILM COATED ORAL at 18:30

## 2020-07-10 RX ADMIN — SODIUM BICARBONATE 325 MG: 325 TABLET ORAL at 18:30

## 2020-07-10 RX ADMIN — LEVETIRACETAM 500 MG: 500 TABLET, FILM COATED ORAL at 18:30

## 2020-07-10 RX ADMIN — SODIUM CITRATE AND CITRIC ACID MONOHYDRATE 30 ML: 500; 334 SOLUTION ORAL at 09:00

## 2020-07-10 RX ADMIN — FUROSEMIDE 40 MG: 40 TABLET ORAL at 04:47

## 2020-07-10 RX ADMIN — FUROSEMIDE 40 MG: 40 TABLET ORAL at 18:15

## 2020-07-10 RX ADMIN — AMLODIPINE BESYLATE 10 MG: 10 TABLET ORAL at 04:47

## 2020-07-10 RX ADMIN — SODIUM CITRATE AND CITRIC ACID MONOHYDRATE 30 ML: 500; 334 SOLUTION ORAL at 13:00

## 2020-07-10 RX ADMIN — SODIUM CITRATE AND CITRIC ACID MONOHYDRATE 30 ML: 500; 334 SOLUTION ORAL at 18:30

## 2020-07-10 ASSESSMENT — ENCOUNTER SYMPTOMS
ABDOMINAL PAIN: 0
COUGH: 0
NERVOUS/ANXIOUS: 0
DYSPNEA ACTIVITY LEVEL: AFTER AMBULATING MORE THAN 20 FT
SHORTNESS OF BREATH: 1
DRY SKIN: 1
SORE THROAT: 0
MUSCULOSKELETAL NEGATIVE: 1
CARDIOVASCULAR NEGATIVE: 1
CHILLS: 0
BOWEL PATTERN NORMAL: 1
PALPITATIONS: 0
DYSPNEA ON EXERTION: 1
EYES NEGATIVE: 1
FATIGUES EASILY: 1
FLANK PAIN: 0
WEIGHT LOSS: 0
DIZZINESS: 0
DEPRESSION: 0
MYALGIAS: 0
VOMITING: 0
CONSTITUTIONAL NEGATIVE: 1
PSYCHIATRIC NEGATIVE: 1
GASTROINTESTINAL NEGATIVE: 1
FOCAL WEAKNESS: 0
NEUROLOGICAL NEGATIVE: 1
LOSS OF CONSCIOUSNESS: 0
STOOL FREQUENCY: LESS THAN DAILY
FEVER: 0
FALLS: 0
HYPERTENSION: 1
BRUISES/BLEEDS EASILY: 0
BLURRED VISION: 0
RESPIRATORY NEGATIVE: 1
HEADACHES: 0
NAUSEA: 0
INSOMNIA: 0
SHORTNESS OF BREATH: 0
LAST BOWEL MOVEMENT: 65568
WEAKNESS: 0
DIAPHORESIS: 0

## 2020-07-10 ASSESSMENT — ACTIVITIES OF DAILY LIVING (ADL): MONEY MANAGEMENT (EXPENSES/BILLS): INDEPENDENT

## 2020-07-10 ASSESSMENT — PATIENT HEALTH QUESTIONNAIRE - PHQ9: CLINICAL INTERPRETATION OF PHQ2 SCORE: 0

## 2020-07-10 ASSESSMENT — LIFESTYLE VARIABLES: SUBSTANCE_ABUSE: 0

## 2020-07-10 NOTE — CARE PLAN
Problem: Communication  Goal: The ability to communicate needs accurately and effectively will improve  Outcome: PROGRESSING AS EXPECTED   Seamus verbalizes all needs and concerns to nursing staff  Problem: Safety  Goal: Will remain free from injury  Outcome: PROGRESSING AS EXPECTED   Seamus mobilizes independently and notifies staff of any needs

## 2020-07-10 NOTE — PROGRESS NOTES
Report received. Patient oriented x4. Pain level 0 out of 10. Denies SOB. Fall risk interventions in place, bed in low position, pt upself. Assessment completed.

## 2020-07-10 NOTE — DISCHARGE SUMMARY
Discharge Summary    CHIEF COMPLAINT ON ADMISSION  Chief Complaint   Patient presents with   • Shortness of Breath     pt brought in by family then excorted into the building by EMS from there. pt severely SOB and tachycardic        Reason for Admission  EMS     Admission Date  7/3/2020    CODE STATUS  DNAR/DNI    HPI & HOSPITAL COURSE  Patient is a 55 y.o. male with HIV, HTN, HLD, tobacco abuse and seizure disorder who presented to the ER on 7/3/2020 with acute hypoxemic respiratory failure. On initial evaluation his oxygen saturations were in the 70's with a  /163,   He was admitted to the ICU with Critical Care consultation and placed on bipap. Nephrology was consulted for acute on chronic kidney injury. He declined HD, despite recommendation and education. He was also found to be COVID positive. He was found to have flash pulmonary edema and this improved.  An echocardiogram showed a reduced LVEF of 40% with grade 2 diastolic dysfunction.  He was transferred out of the ICU on 7/5.  He has been followed by nephrology and has repeatedly declined HD. He ultimately asked to be discharged on hospice.  He states he feels great now but is aware that without HD that he is likely to become uremic and confused, he would like hospice in place for when that occurs.  COVID precautions were discussed and he agrees to comply.  He will also follow up with his pcp at [facility] next week.       Therefore, he is discharged in fair and stable condition to hospice.    The patient met 2-midnight criteria for an inpatient stay at the time of discharge.    Discharge Date  7/10/20    FOLLOW UP ITEMS POST DISCHARGE  Hospice  Hopes    DISCHARGE DIAGNOSES  Principal Problem (Resolved):    Respiratory failure (HCC) POA: Yes  Active Problems:    COVID-19 virus detected POA: Yes    HIV (human immunodeficiency virus infection) (HCC) POA: Yes    VAIBHAV (acute kidney injury) (HCC) POA: Yes    Seizure (HCC) POA: Yes    HFrEF (heart failure  with reduced ejection fraction) (HCC) POA: Yes    Nicotine dependence POA: Unknown  Resolved Problems:    Hypertensive emergency POA: Yes    High anion gap metabolic acidosis POA: Yes    Gram-positive bacteremia POA: Yes      FOLLOW UP  No future appointments.  [facility]  [address]  [place] [address]  [phone]        [facility]  [address]  [place] [address]  [phone]  In 1 week        MEDICATIONS ON DISCHARGE     Medication List      START taking these medications      Instructions   sodium bicarbonate 325 MG Tabs   Take 1 Tab by mouth 3 times a day.  Dose:  325 mg        CONTINUE taking these medications      Instructions   amLODIPine 10 MG Tabs  Commonly known as:  NORVASC   Doctor's comments:  Patient's dose is 10mg BID  Take 1 Tab by mouth every day.  Dose:  10 mg     ASPIRIN LOW DOSE 81 MG EC tablet  Generic drug:  aspirin   Take 1 Tab by mouth every day.  Dose:  1 Tab     Biktarvy -25 mg Tabs tablet  Generic drug:  bictegravir-emtricitab-TAF   Take 1 tablet by mouth every day. TAKE ONE TABLET BY MOUTH DAILY  Dose:  1 tablet     carvedilol 25 MG Tabs  Commonly known as:  COREG   Take 1 Tab by mouth 2 times a day, with meals.  Dose:  25 mg     isosorbide dinitrate 20 MG Tabs  Commonly known as:  ISORDIL   Doctor's comments:  Dr. Marie  Take 1 Tab by mouth 3 times a day for 360 days.  Dose:  20 mg     levETIRAcetam 500 MG Tabs  Commonly known as:  KEPPRA   Take 1 Tab by mouth every 12 hours.  Dose:  500 mg     spironolactone 50 MG Tabs  Commonly known as:  ALDACTONE   Doctor's comments:  Patient should be on the following:  Lisinopril 40mg, carvedilol 25mg BID, amlodipine 10mg  Take 1 Tab by mouth every day for 360 days.  Dose:  50 mg        STOP taking these medications    cloNIDine 0.3 MG/24HR Ptwk  Commonly known as:  CATAPRES     lisinopril 40 MG tablet  Commonly known as:  PRINIVIL     rosuvastatin 40 MG tablet  Commonly known as:  CRESTOR     Tylenol 325 MG  Tabs  Generic drug:  acetaminophen            Allergies  No Known Allergies    DIET  Orders Placed This Encounter   Procedures   • Diet Order Cardiac, Renal     Standing Status:   Standing     Number of Occurrences:   1     Order Specific Question:   Diet:     Answer:   Cardiac [6]     Order Specific Question:   Diet:     Answer:   Renal [8]       ACTIVITY  As tolerated.  Weight bearing as tolerated    CONSULTATIONS  Nephrology  Critical Care    PROCEDURES  EC-ECHOCARDIOGRAM COMPLETE W/O CONT   Final Result      US-RENAL ARTERY DUPLEX COMP   Final Result      DX-CHEST-PORTABLE (1 VIEW)   Final Result      BILATERAL mixed interstitial and airspace disease which could be pneumonia or pulmonary edema. Covid 19 is not excluded.            LABORATORY  Lab Results   Component Value Date    SODIUM 141 07/08/2020    POTASSIUM 4.4 07/08/2020    CHLORIDE 97 07/08/2020    CO2 23 07/08/2020    GLUCOSE 84 07/08/2020    BUN 69 (H) 07/08/2020    CREATININE 10.38 (HH) 07/08/2020        Lab Results   Component Value Date    WBC 6.8 07/06/2020    HEMOGLOBIN 8.7 (L) 07/06/2020    HEMATOCRIT 26.8 (L) 07/06/2020    PLATELETCT 141 (L) 07/06/2020        Total time of the discharge process exceeds 45 minutes.

## 2020-07-10 NOTE — DISCHARGE PLANNING
Anticipated Discharge Disposition: home w/ hospice    Action: spoke w/ pt on phone. Pt states he moved from [place] to [place] years ago. Updated facesheet info. Pt currently renting apt and lives w/ roommates. Brien Watters and Sridhar live nearby. Pt states he's independent w/ ADL/IADL's. Pt agreeable to hospice. Faxed hospice choice to [facility].    Barriers to Discharge: hospice acceptance-accepted by [facility]    Plan: home w/ [facility]. BSRMEMO ibrahim.

## 2020-07-10 NOTE — PROGRESS NOTES
Received call from Cindy, pallative RN, notified this RN that case management to set up hospice referral for patient.

## 2020-07-10 NOTE — CARE PLAN
Problem: Safety  Goal: Will remain free from injury  Outcome: PROGRESSING AS EXPECTED     Problem: Infection  Goal: Will remain free from infection  Intervention: Implement standard precautions and perform hand washing before and after patient contact  Note: Proper use of PPE at all times  hand hygiene implemented upon entry and exit of patients room.  Education provided on keeping hands clean

## 2020-07-10 NOTE — DISCHARGE INSTRUCTIONS
Enfermedad renal en etapa terminal  End-Stage Kidney Disease  La enfermedad renal en etapa terminal ocurre cuando los riñones están medrano dañados que jessica de funcionar y ya no tienen mejoría. Esta enfermedad se conoce wyatt enfermedad renal en etapa terminal. Los riñones son dos órganos que desempeñan muchas funciones importantes en el cuerpo, que incluyen las siguientes:  · Eliminar desechos y el exceso de líquido de la ronny.  · Producir hormonas que mantienen la cantidad de líquido en los tejidos y los vasos sanguíneos.  · Mantener la cantidad correcta de líquidos y de sustancias químicas en el cuerpo.  Sin un buen funcionamiento de los riñones, las toxinas se acumulan en la ronny y pueden ocurrir complicaciones que ponen en riesgo la ray.  ¿Cuáles son las causas?  Esta enfermedad generalmente ocurre cuando faby enfermedad renal a uday plazo (crónica) empeora y provoca un daño permanente en los riñones. También puede originarse por un daño repentino en los riñones (lesión renal aguda).  Las causas de esta afección incluyen lo siguiente:  · Tener antecedentes familiares de enfermedad renal crónica (ERC).  · Tener enfermedad renal crónica arabella muchos años.  · Otras afecciones médicas crónicas que afectan los riñones, tales wyatt:  ? Enfermedad cardiovascular, incluida hipertensión arterial.  ? Diabetes.  ? Algunas enfermedades que afectan el sistema de defensa del organismo contra enfermedades (inmunitario).  · Abuso de medicamentos de venta lukas para calmar el dolor.  · Estar cerca de sustancias venenosas (tóxicas) o entrar en contacto con ellas.  ¿Qué incrementa el riesgo?  Los siguientes factores pueden hacer que usted sea más propenso a tener esta afección:  · Ser mayor de 60 años.  · Ser varón.  · Ser descendiente de afroamericanos, nativos americanos, hispanos, asiáticos o isleños del Pacífico.  · Fumar o tener antecedentes de tabaquismo.  · Obesidad.  ¿Cuáles son los signos o los síntomas?  Los síntomas  de esta afección incluyen los siguientes:  · Hinchazón (edema) de la deny, las piernas, los tobillos o los pies.  · Adormecimiento, hormigueo o pérdida de la sensibilidad en las christiano o los pies.  · Cansancio (letargo).  · Náuseas o vómitos.  · Confusión, dificultad para concentrarse o pérdida de la conciencia.  · Dolor en el pecho.  · Falta de aire.  · Orinar poco o no orinar.  · Contracciones y calambres musculares, especialmente en las piernas.  · Piel seca y que pica.  · Pérdida del apetito.  · Piel pálida debido a la anemia, lo que incluye la piel y el tejido alrededor del ness (conjuntiva).  · Faith de aurelia.  · La piel se oscurece o se aclara de manera anormal.  · Disminución de la masa muscular (atrofia muscular).  · Aparecen hematomas con facilidad.  · Hipo frecuente.  · Interrupción del período mensual en las mujeres.  · Movimientos espasmódicos (convulsiones).  ¿Cómo se diagnostica?  Esta afección se puede diagnosticar en función de lo siguiente:  · Un examen físico, lo que incluye mediciones de la presión arterial.  · Análisis de orina.  · Análisis de ronny.  · Estudios de diagnóstico por imágenes.  · Un estudio en el que se flash faby muestra de tejido de los riñones para examinarla con un microscopio (biopsia de riñón).  ¿Cómo se trata?  El tratamiento de esta afección puede incluir lo siguiente:  · Un procedimiento que elimina los desechos tóxicos del organismo (diálisis). Hay dos tipos de diálisis:  ? La que se realiza a través del abdomen (diálisis peritoneal). Se puede hacer varias veces al día.  ? Diálisis que se hace con ayuda de faby máquina (hemodiálisis). Se puede hacer varias veces por semana.  · Cirugía para recibir un nuevo riñón (trasplante de riñón).  Además de la diálisis o de un trasplante de riñón, aminta vez deba margarita medicamentos:  · Para controlar la presión arterial elevada (hipertensión).  · Para controlar el colesterol.  · Para tratar la diabetes.  · Para mantener un nivel  saludable de minerales en la ronny (electrolitos).  También pueden indicarle que siga faby dieta específica que incluye requerimientos o límites para lo siguiente:  · Endy (sodio).  · Proteínas.  · Fósforo.  · Potasio.  · Calcio.  Siga estas indicaciones en velez casa:  Medicamentos  · [place] los medicamentos de venta lukas y los recetados solamente wyatt se lo haya indicado el médico.  · No tome ningún medicamento, vitamina o suplemento mineral nuevo, a menos que lo haya autorizado el médico. Muchos medicamentos y suplementos pueden empeorar el daño renal.  · Siga las indicaciones del médico en cuanto al ajuste de la dosis de cualquier medicamento que tome.  Estilo de ray  · No consuma ningún producto que contenga nicotina o tabaco, wyatt cigarrillos y cigarrillos electrónicos. Si necesita ayuda para dejar de fumar, consulte al médico.  · Alcance y mantenga un peso saludable. Si necesita ayuda para lograrlo, consulte a velez médico.  · Comience o continúe un plan de ejercicios. Karli ejercicios al menos 30 minutos al día, 5 días a la semana.  · Siga el plan alimenticio prescrito.  Instrucciones generales  · Esté al día con las vacunas (inmunizaciones) wyatt se lo haya indicado el médico.  · Lleve un control de la presión arterial. Informe los cambios en la presión arterial wyatt se lo haya indicado el médico.  · Si recibe tratamiento para la diabetes, controle y realice un seguimiento de los niveles de azúcar en la ronny (glucemia) wyatt se lo haya indicado el médico.  · Concurra a todas las visitas de control wyatt se lo haya indicado el médico. [place] es importante.  Dónde encontrar más información  · Asociación Americana de Pacientes Renales (American Association of Kidney Patients, AAKP): www.aakp.org  · Fundación Nacional del Riñón (National Kidney Foundation): www.kidney.org  · Fondo Americano para Problemas Renales (American Kidney Fund): www.akfinc.org  · Programa de Rehabilitación de Life Options: www.lifeoptions.org y  www.kidneyschool.org  Comuníquese con un médico si:  · Los síntomas empeoran.  · Presenta nuevos síntomas.  Solicite ayuda de inmediato si:  · Siente debilidad en un brazo o faby pierna de un lado del cuerpo.  · Tiene dificultad para hablar o habla arrastrando las palabras.  · Tiene cambios repentinos en la visión.  · Sufre faby cefalea intensa.  · Aumenta repentinamente de peso.  · Tiene dificultad para respirar.  · Los síntomas empeoran repentinamente.  Resumen  · La enfermedad renal en etapa terminal ocurre cuando los riñones están medrano dañados que jessica de funcionar y ya no tienen mejoría.  · Sin un buen funcionamiento de los riñones, las toxinas se acumulan en la ronny y pueden ocurrir complicaciones que ponen en riesgo la ray.  · El tratamiento puede incluir diálisis o un trasplante de riñón además de medicamentos y cambios en el estilo de ray.  Esta información no tiene wyatt fin reemplazar el consejo del médico. Asegúrese de hacerle al médico cualquier pregunta que tenga.  Document Released: 03/26/2009 Document Revised: 08/13/2018 Document Reviewed: 08/13/2018  Elsevier Patient Education © 2020 Elsevier Inc.  INSTRUCCIONES PARA LA COVID-19 O CUALQUIER OTRA  ENFERMEDAD RESPIRATORIA INFECCIOSA    Los Centros para el Control y la Prevención de Enfermedades (Centers for Disease Control and Prevention, CDC) señalan que los primeros signos de la COVID-19 incluyen tos, falta de aire, dificultad para respirar o al menos dos de los siguientes síntomas: escalofríos, temblor con escalofríos, dolor muscular, dolor de aurelia, dolor de garganta y pérdida del gusto o del olfato. Los síntomas pueden ser de leves a graves y pueden manifestarse hasta dos semanas después de la exposición al virus.     La práctica del autoaislamiento y la cuarentena ayuda a proteger tanto al público wyatt a velez lourdes al evitar la exposición a personas que tienen o podrían tener faby enfermedad contagiosa. Siga las siguientes medidas de prevención  conforme a las pautas de los CDC:    CUÁNDO INTERRUMPIR EL AISLAMIENTO: Las personas con la COVID-19 o  cualquier otra enfermedad respiratoria infecciosa que presenten síntomas y que hayan recibido indicaciones de cuidarse en velez propio hogar podrán interrumpir el aislamiento domiciliario si se cumplen las siguientes condiciones:  · Villar transcurrido al menos 3 días (72 horas) desde la recuperación, definida wyatt la desaparición de la fiebre sin el uso de medicamentos destinados a reducirla; Y  · Villar charlene los síntomas respiratorios (por ejemplo, tos, falta de aire); Y  · Villar transcurrido al menos 10 días desde que aparecieron los síntomas por primera vez y no snaz habido ninguna enfermedad subsiguiente.    CONTROLE JAYLA SÍNTOMAS: Si velez enfermedad está empeorando, busque atención médica inmediata. Si tiene faby emergencia médica y necesita llamar al 911, notifique al personal de despacho que tiene o que lo están evaluando por tratarse de un mare sospechoso o confirmado de la COVID-19 u otra enfermedad respiratoria infecciosa. Use faby mascarilla si es posible.    RESTRICCIÓN DE ACTIVIDADES: Restrinja las actividades fuera de velez hogar,  excepto para recibir atención médica. No debe ir al trabajo, a la escuela ni a áreas públicas. Evite el uso de transporte público, transporte compartido o taxis.    CITAS MÉDICAS PROGRAMADAS: Notifique a velez proveedor que tiene o que lo están evaluando por tratarse de un mare sospechoso o confirmado de la COVID-19 u otra enfermedad respiratoria infecciosa. [place]ason ayudará a la oficina del proveedor de atención médica a cuidar de usted de manera richard y a evitar que otras personas se infecten o se expongan.    MASCARILLAS, cuándo usarlas: Siempre que esté fuera de velez casa o cerca de otras personas o mascotas. Si no puede usar faby mascarilla, mantenga faby distancia mínima de 6 pies de los demás.  ENTORNO DONDE VIVE: Permanezca en faby habitación separada de otras personas y mascotas. Si es  posible, use un baño separado, pídales a otras personas que cuiden de robert mascotas y evite compartir artículos del hogar. Cualquier artículo que use debe lavarse ross con agua y jabón. Limpie todas las superficies “de mucho uso” todos los días. Use un spray de limpieza doméstico o faby toallita, de acuerdo con las instrucciones de la etiqueta. Las superficies “de mucho uso” incluyen (entre otras cosas) mesones, mesas, pomos de norberto, accesorios de baño, inodoros, teléfonos, teclados, tabletas y mesitas de noche.    LAVADO DE KARELY: Lávese las karely con frecuencia con agua y jabón arabella al menos 20 segundos, especialmente después de sonarse la nariz, toser o estornudar; después de ir al baño; antes y después de interactuar con mascotas; y antes y después de las comidas o de preparar alimentos. Si las karely están visiblemente sucias, use agua y jabón. Si no hay agua y jabón disponible, use un desinfectante para karely a base de alcohol con al menos 60 % de alcohol. Evite tocarse los ojos, la nariz y la boca antes de lavarse las karely. Cúbrase la boca y la nariz con un pañuelo cuando estornude o tosa. Tire los pañuelos usados en un bote de basura con bolsa. Lávese las karely inmediatamente.    AUTOCONTROL ACTIVO/FACILITADO: Siga las instrucciones proporcionadas por el departamento de jose manuel o los profesionales médicos a nivel local, según corresponda.  Cuando trabaje con velez departamento de jose manuel local, verifique robert horarios de disponibilidad.    CONDADO NÚMERO DE TELÉFONO   [place] [phone]   [facility], [place] [phone]   [facility]   [place] [phone]     SI TIENE UN RESULTADO POSITIVO CONFIRMADO DE LA COVID-19:  Las personas que se hayan recuperado por completo de la COVID-19 pueden tener anticuerpos en velez plasma (denominado “plasma convaleciente”) que generan faby respuesta inmune, y esto podría usarse para tratar a pacientes gravemente enfermos con COVID-19.  [facility] está  entusiasmado por comenzar a trabajar con [employer] en la recolección de plasma convaleciente de personas que se vila recuperado de la COVID-19 wyatt parte de un programa para tratar a pacientes infectados con el virus. [place] “fármaco nuevo en fase de investigación clínica de emergencia” aprobado por la Administración de Alimentos y Medicamentos (Food and Drug Administration, FDA) es un producto  hemoderivado especial con anticuerpos que pueden brindar a los pacientes un refuerzo adicional para combatir el virus.    Para ser elegible para donar plasma convaleciente, debe timothy tenido un diagnóstico previo de COVID-19 documentado a partir de faby prueba de laboratorio (o un resultado positivo de anticuerpos de SARS-CoV-2) y cumplir con requisitos de elegibilidad adicionales.  Si le interesa donar plasma convaleciente o tiene preguntas, comuníquese con el coordinador del proyecto Plasma Convaleciente de Zuly harris [phone] o por correo electrónico a [email or web address].  Discharge Instructions    Discharged to home by car with relative. Discharged via wheelchair, hospital escort: Yes.  Special equipment needed: Not Applicable    Be sure to schedule a follow-up appointment with your primary care doctor or any specialists as instructed.     Discharge Plan:   Smoking Cessation Offered: Patient Counseled    I understand that a diet low in cholesterol, fat, and sodium is recommended for good health. Unless I have been given specific instructions below for another diet, I accept this instruction as my diet prescription.   Other diet: Cardiac Renal    Special Instructions: None    · Is patient discharged on Warfarin / Coumadin?   No     Depression / Suicide Risk    As you are discharged from this [facility], it is important to learn how to keep safe from harming yourself.    Recognize the warning signs:  · Abrupt changes in personality, positive or negative- including increase in energy   · Giving away  possessions  · Change in eating patterns- significant weight changes-  positive or negative  · Change in sleeping patterns- unable to sleep or sleeping all the time   · Unwillingness or inability to communicate  · Depression  · Unusual sadness, discouragement and loneliness  · Talk of wanting to die  · Neglect of personal appearance   · Rebelliousness- reckless behavior  · Withdrawal from people/activities they love  · Confusion- inability to concentrate     If you or a loved one observes any of these behaviors or has concerns about self-harm, here's what you can do:  · Talk about it- your feelings and reasons for harming yourself  · Remove any means that you might use to hurt yourself (examples: pills, rope, extension cords, firearm)  · Get professional help from the community (Mental Health, Substance Abuse, psychological counseling)  · Do not be alone:Call your Safe Contact- someone whom you trust who will be there for you.  · Call your local CRISIS HOTLINE 536-2910 or 197-704-3705  · Call your local Children's Mobile Crisis Response Team [place] (048) 513-8942 or www.Meal Ticket  · Call the toll free National Suicide Prevention Hotlines   · National Suicide Prevention Lifeline 782-741-VLPM (9902)  · National Hope Line Network 800-SUICIDE (093-0614)

## 2020-07-10 NOTE — PROGRESS NOTES
Patient's son Sridhar Ibarra, called for an update on patient. Patient ok with staff giving Sridhar updates. Sridhar asked to be the point of contact for patient and any changes or needs, Patient is agreeable to this. Patient's son is Sridhar Ibarra [phone].

## 2020-07-10 NOTE — CARE PLAN
Problem: Communication  Goal: The ability to communicate needs accurately and effectively will improve  Intervention: Educate patient and significant other/support system about the plan of care, procedures, treatments, medications and allow for questions  Flowsheets (Taken 7/9/2020 2142)  Pt & Family Have Been Educated on Methods Available to Report Concerns Related to Care, Treatment, Services, and Patient Safety Issues: Yes     Problem: Fluid Volume:  Goal: Risk for excess fluid volume will decrease  Description: Monitor fluid intake and take daily weight  Note: Patient on diuretic

## 2020-07-10 NOTE — PROGRESS NOTES
Assumed care of patient at 0700. Patient is resting in bed. Call light with in reach and will make needs known to nursing staff.

## 2020-07-10 NOTE — DISCHARGE PLANNING
Received Choice form at 0977  Agency/Facility Name: [facility]  Referral sent per Choice form @ 1929    Spoke to [place] nurse will call back once they've been in contact with patient.@[phone]

## 2020-07-10 NOTE — PROGRESS NOTES
"Hospital Medicine Daily Progress Note    Date of Service  7/10/2020    Chief Complaint  Shortness of breath    Hospital Course   Patient is a 55 y.o. male with HIV, HTN, HLD, tobacco abuse and seizure disorder who presented to the ER on 7/3/2020 with acute hypoxemic respiratory failure. On initial evaluation his oxygen saturations were in the 70's with a  /163,   He was admitted to the ICU with Critical Care consultation and placed on bipap. Nephrology was consulted for acute on chronic kidney injury. He declined HD, despite recommendation and education. He was also found to be COVID positive. He was found to have flash pulmonary edema and this improved.  An echocardiogram showed a reduced LVEF of 40% with grade 2 diastolic dysfunction.  He was transferred out of the ICU on 7/5.  He has been followed by nephrology and has repeatedly declined HD.    Interval Problem Update  Patient in a good mood today, states \"I feel good\"  Unclear why delay with hospice referral - pt continues to want to d/c with hospice  Discussed with social work   He remains axox4  Denies sob  No cp  Denies pain  Ros otherwise negative    Consultants/Specialty  Nephrology- signed off  Critical Care- signed off  Palliative Care  Hospice    Code Status  Full    Disposition  D/c with hospice    Review of Systems  Review of Systems   Constitutional: Negative.  Negative for chills, diaphoresis, fever, malaise/fatigue and weight loss.   HENT: Negative.  Negative for sore throat.    Eyes: Negative.  Negative for blurred vision.   Respiratory: Negative.  Negative for cough and shortness of breath.    Cardiovascular: Negative.  Negative for chest pain, palpitations and leg swelling.   Gastrointestinal: Negative.  Negative for abdominal pain, nausea and vomiting.   Genitourinary: Negative.  Negative for dysuria and flank pain.   Musculoskeletal: Negative.  Negative for falls and myalgias.   Skin: Negative.  Negative for itching and rash. "   Neurological: Negative.  Negative for dizziness, focal weakness, loss of consciousness, weakness and headaches.   Endo/Heme/Allergies: Negative.  Does not bruise/bleed easily.   Psychiatric/Behavioral: Negative.  Negative for depression, substance abuse and suicidal ideas. The patient is not nervous/anxious and does not have insomnia.    All other systems reviewed and are negative.       Physical Exam  Temp:  [36.5 °C (97.7 °F)-36.8 °C (98.3 °F)] 36.8 °C (98.3 °F)  Pulse:  [66-74] 74  Resp:  [16-18] 18  BP: (112-130)/(70-75) 129/75  SpO2:  [93 %-99 %] 95 %    Physical Exam  Vitals signs and nursing note reviewed. Exam conducted with a chaperone present.   Constitutional:       General: He is not in acute distress.     Appearance: Normal appearance. He is not toxic-appearing or diaphoretic.   HENT:      Head: Normocephalic.      Nose: Nose normal.      Mouth/Throat:      Mouth: Mucous membranes are moist.   Eyes:      General:         Right eye: No discharge.         Left eye: No discharge.      Extraocular Movements: Extraocular movements intact.      Pupils: Pupils are equal, round, and reactive to light.   Neck:      Musculoskeletal: Normal range of motion. No muscular tenderness.   Cardiovascular:      Rate and Rhythm: Normal rate and regular rhythm.      Pulses: Normal pulses.      Heart sounds: Normal heart sounds.   Pulmonary:      Effort: Pulmonary effort is normal. No respiratory distress.      Breath sounds: Normal breath sounds.   Abdominal:      General: Abdomen is flat. Bowel sounds are normal. There is no distension.      Palpations: Abdomen is soft.      Tenderness: There is no abdominal tenderness. There is no guarding.   Musculoskeletal: Normal range of motion.         General: No swelling, tenderness or deformity.      Right lower leg: No edema.      Left lower leg: No edema.   Skin:     General: Skin is warm and dry.      Capillary Refill: Capillary refill takes less than 2 seconds.    Neurological:      General: No focal deficit present.      Mental Status: He is alert and oriented to person, place, and time.      Cranial Nerves: No cranial nerve deficit.   Psychiatric:         Mood and Affect: Mood normal.         Speech: Speech normal.         Behavior: Behavior normal. Behavior is cooperative.         Fluids    Intake/Output Summary (Last 24 hours) at 7/10/2020 1429  Last data filed at 7/10/2020 1000  Gross per 24 hour   Intake 600 ml   Output --   Net 600 ml       Laboratory      Recent Labs     07/08/20  0348   SODIUM 141   POTASSIUM 4.4   CHLORIDE 97   CO2 23   GLUCOSE 84   BUN 69*   CREATININE 10.38*   CALCIUM 6.5*                   Imaging  EC-ECHOCARDIOGRAM COMPLETE W/O CONT   Final Result      US-RENAL ARTERY DUPLEX COMP   Final Result      DX-CHEST-PORTABLE (1 VIEW)   Final Result      BILATERAL mixed interstitial and airspace disease which could be pneumonia or pulmonary edema. Covid 19 is not excluded.           Assessment/Plan  * Respiratory failure (HCC)- (present on admission)  Assessment & Plan  Resolved. S/p BIPAP and ICU stay.  Secondary to fluid overload  Remains stable on RA    COVID-19 virus detected- (present on admission)  Assessment & Plan  COVID positive  Respiratory status remains stable on RA  Continue supportive care    Hypertensive emergency- (present on admission)  Assessment & Plan  On admission  Now resolved  Continue coreg, isosorbide dinitrate, amlodipine with holding parameters  Following    VAIBHAV (acute kidney injury) (HCC)- (present on admission)  Assessment & Plan  Etiology unclear, possible CKD with VAIBHAV contributing  Repeatedly declined HD and appropriately expressed understanding that he will likely die without it  Nephrology has signed off  Palliative asked to assist with hospice referral  Hospice consult still pending  Pt would like to d/c with hospice    HIV (human immunodeficiency virus infection) (HCC)- (present on admission)  Assessment & Plan  CD4  "count 40.  Continue dolutegravir and lamivudine  Continue supportive care    Nicotine dependence  Assessment & Plan  Smoking cessation discussed and recommended    Gram-positive bacteremia- (present on admission)  Assessment & Plan  Ruled out. It was a contaminant.   \"Correct result:No growth at 24 hours   Erroneous result:Growth detected by Bactec instrument.\"  repeat blood culture from 7/4 with NGTD   abx stopped    Seizure (HCC)- (present on admission)  Assessment & Plan  Continue seizure precautions  Continue keppra       VTE prophylaxis: heparin.     "

## 2020-07-11 PROCEDURE — 6650990 HC HOSPICE AND HOME CARE RX REV CODE 0250

## 2020-07-11 PROCEDURE — S9126 HOSPICE CARE, IN THE HOME, P: HCPCS

## 2020-07-11 RX ORDER — LEVETIRACETAM 500 MG/1
500 TABLET ORAL EVERY 12 HOURS
Qty: 60 TAB | Refills: 3 | Status: SHIPPED | OUTPATIENT
Start: 2020-07-11 | End: 2020-07-31

## 2020-07-11 RX ORDER — CARVEDILOL 25 MG/1
25 TABLET ORAL 2 TIMES DAILY WITH MEALS
Qty: 360 TAB | Refills: 3 | Status: SHIPPED | OUTPATIENT
Start: 2020-07-11 | End: 2020-07-31

## 2020-07-11 NOTE — DOCUMENTATION QUERY
[facility]                                                                       Query Response Note      PATIENT:               SONI ABBASI  ACCT #:                  [id number]  MRN:                     [id number]  :                      1965  ADMIT DATE:       7/3/2020 12:13 AM  DISCH DATE:          RESPONDING  PROVIDER #:        [id number]           QUERY TEXT:    HIV infection, chronic, now with AIDS is documented in the Nephrology Progress Notes.  Per subsequent Hospital Medicine Progress Notes; AIDS is no longer being documented. Please clarify the status of this condition.     NOTE:  If an appropriate response is not listed below, please respond with a new note.    The patient's Clinical Indicators include:  Per Nephrology Progress Notes  -HIV infection, chronic, now with AIDS  -with CD4 count <50 for 3 years    Per Hospital Medicine Progress Notes  -PMHx: HIV disease (HCC) ()    Results Review  Cd4-Cd8 Ratio: 0.13  Cd3 CT: 356  Cd4-T4 [place] Cells: 40  Cd8-T8 Suppressor Cells: 307    Treatment:   Tivicay, Epivir, azithromycin, Rocephin, Vanco, & Unasyn, & monitor labs     Risk Factors:   PHMx HIV, COVID 19 positive, acute respiratory failure, acute kidney injury, & CKD  Options provided:   -- Acquired immune deficiency syndrome (AIDS) ruled in   -- Acquired immune deficiency syndrome (AIDS) ruled out; Asymptomatic HIV infection   -- Unable to determine      Query created by: Barbara Campbell on 2020 2:45 PM    RESPONSE TEXT:    Acquired immune deficiency syndrome (AIDS) ruled in       QUERY TEXT:    COVID Pneumonia is documented in the Nephrology Progress Notes. Per subsequent Hospital Medicine Progress Notes; COVID pneumonia is no longer being documented. Please clarify the status of this condition:    NOTE:  If an appropriate response is not listed below, please respond with a new note.    The patient's Clinical Indicators  include:  Per Pulmonary Progress Note 7/5  -COVID-19 virus detected- (present on admission)   -Incidental finding   -Supportive care   -Obtain euvolemic to dry balance   -No steroids given room air status     Per Hospital Medicine Progress Notes   -Respiratory failure (HCC)- (present on admission)   -Resolved. S/p BIPAP and ICU stay.   -Secondary to fluid overload.   -Continues to saturate well on RA.     Per Nephrology Progress Notes   -COVID-19 pneumonia, diagnosed on admission. Remains stable on room air.    CXR 7/3: Bilateral mixed interstitial and airspace disease which could be pneumonia or pulmonary edema. Covid 19 is not excluded.  NT-proBNP <86147  Troponin T 97    Treatment:   Trend inflammatory markers, CXR, maintain SpO2 >85%, maintain euvolemic to dry balance, & O2/RT protocols     Risk Factors:   Acute respiratory failure, COVID 19 positive, fluid overload, hypertensive emergency, acute on chronic kidney disease, & HFrEF  Options provided:   -- COVID Pneumonia is ruled in   -- COVID Pneumonia is ruled out   -- Unable to determine      Query created by: Barbara Campbell on 7/9/2020 3:01 PM    RESPONSE TEXT:    Unable to determine       QUERY TEXT:    Per the Medical Record there appears to be conflicting information regarding the Stage of Chronic Kidney Disease. Please specify the disease stage (includes probable or suspected)    Stages are defined by the [employer] as follows:  CKD Stage I  GFR >= 90 ml / min per 1.73 m2 and persistent albuminuria  CKD Stage 2 GFR between 60 and 89 with persistent albuminuria  CKD Stage 3 GFR between 30 and 59   CKD Stage 4 GFR between 15 and 29   CKD Stage 5 GFR between <15 or End Stage Renal Disease    The patient's Clinical Indicators include:  Per Nephrology Progress Note 7/4  -CKD3-4   -Acute kidney injury versus progression of CKD.   -Likely progression of CKD.  -Nonoliguric.   -Worsening  -no emergent need for dialysis, but I do recommend dialysis  given worsening trend    Per Nephrology Progress Note 7/6  -VAIBHAV/CKD IV   -Continues refusing dialysis    Per Hospital Medicine Progress Note 7/8  -explained there were no renal treatment options other than HD   -people with ESRD do not have symptoms until enough toxins build up     Results Review:  BUN 57 - 69  Cr 7.55 - 10.38  GFR 7 - 5    Treatment:   Nephrology Consultation, recommendation for hemodialysis, high dose IV Lasix transitioned to PO, sodium bicarbonate, nicardipine, infusion, Norvasc, Coreg, isosorbide, & Hospice Consultation  Options provided:   -- Chronic kidney disease Stage 3   -- Chronic kidney disease Stage 4   -- Chronic kidney disease Stage 5   -- End Stage Renal Disease   -- Unable to determine      Query created by: Barbara Campbell on 7/9/2020 3:23 PM    RESPONSE TEXT:    End Stage Renal Disease          Electronically signed by:  GORDON DORMAN MD 7/10/2020 5:22 PM

## 2020-07-12 ENCOUNTER — HOME CARE VISIT (OUTPATIENT)
Dept: HOSPICE | Facility: HOSPICE | Age: 55
End: 2020-07-12
Payer: COMMERCIAL

## 2020-07-12 PROCEDURE — S9126 HOSPICE CARE, IN THE HOME, P: HCPCS

## 2020-07-13 ENCOUNTER — HOME CARE VISIT (OUTPATIENT)
Dept: HOSPICE | Facility: HOSPICE | Age: 55
End: 2020-07-13
Payer: COMMERCIAL

## 2020-07-13 LAB
BACTERIA BLD CULT: ABNORMAL
SIGNIFICANT IND 70042: ABNORMAL
SITE SITE: ABNORMAL
SOURCE SOURCE: ABNORMAL

## 2020-07-13 PROCEDURE — G0155 HHCP-SVS OF CSW,EA 15 MIN: HCPCS

## 2020-07-13 PROCEDURE — S9126 HOSPICE CARE, IN THE HOME, P: HCPCS

## 2020-07-13 NOTE — DOCUMENTATION QUERY
[facility]                                                                       Query Response Note      PATIENT:               SONI ABBASI  ACCT #:                  [id number]  MRN:                     [id number]  :                      1965  ADMIT DATE:       7/3/2020 12:13 AM  DISCH DATE:        7/10/2020 7:37 PM  RESPONDING  PROVIDER #:        [id number]           QUERY TEXT:    Sepsis with acute respiratory failure is documented in the ED MD Notes. Per subsequent Pulmonary Consultation; sepsis is no longer being documented. Please clarify whether:    NOTE:  If an appropriate response is not listed below, please respond with a new note.    The patient's Clinical Indicators include:  ED MD Note  -Hypertensive emergency  -Acute on chronic congestive heart failure  -Sepsis with acute respiratory failure  -COVID-19 virus infection    Per Pulmonary Consultation  -Vital Signs last 24 hours   -Pulse:  [101-144] 105   -Resp:  [24-40] 24   -BP: (118-278)/(102-163) 202/120   -SpO2:  [83 %-100 %] 100 %  -Respiratory failure  -Clinically consistent with flash pulmonary edema in setting hypertensive urgency/emergency,   -however he is also reported covid positive and this may be the primary VS associated component versus unrelated finding     Per Pulmonary Progress Notes   -Respiratory Failure   -Off of BiPAP  -COVID 19 virus  -Incidental finding   -No steroids given room air status     Results Review:   WBC 12.1, 6.0, 6.7, 6.8  Lactic acid 6.5, 1.2, 1.5, 1.5  COVID 19-detected     Treatment:   Admission to ICU, BiPAP therapy, high dose IV Lasix, trend inflammatory markers, CXR, maintain SpO2 >85%, maintain euvolemic to dry balance, & O2/RT protocols     Risk Factors:   HIV/AIDS, Acute respiratory failure, COVID 19 positive, fluid overload, hypertensive emergency, acute on chronic kidney disease, & HFrEF  Options provided:   -- Sepsis with  acute respiratory failure is ruled in   -- SIRS of non-infectious origin with acute respiratory failure   -- SIRS of non-infectious origin without acute organ dysfunction   -- Sepsis/SIRS has been ruled out   -- Unable to determine      Query created by: Barbara Campbell on 7/11/2020 4:25 PM    RESPONSE TEXT:    Sepsis/SIRS has been ruled out          Electronically signed by:  GABBI NEVILLE MD 7/13/2020 4:33 PM

## 2020-07-14 ENCOUNTER — HOME CARE VISIT (OUTPATIENT)
Dept: HOSPICE | Facility: HOSPICE | Age: 55
End: 2020-07-14
Payer: COMMERCIAL

## 2020-07-14 PROCEDURE — G0299 HHS/HOSPICE OF RN EA 15 MIN: HCPCS

## 2020-07-14 PROCEDURE — S9126 HOSPICE CARE, IN THE HOME, P: HCPCS

## 2020-07-14 NOTE — DOCUMENTATION QUERY
[facility]                                                                       Query Response Note      PATIENT:               SONI ABBASI  ACCT #:                  [id number]  MRN:                     [id number]  :                      1965  ADMIT DATE:       7/3/2020 12:13 AM  DISCH DATE:        7/10/2020 7:37 PM  RESPONDING  PROVIDER #:        [id number]           QUERY TEXT:    Heart Failure with reduced Ejection Fraction is documented in the Medical Record. Please specify the acuity (includes probable or suspected)    NOTE:  If an appropriate response is not listed below, please respond with a new note.    The patient's Clinical Indicators include:  Per Pulmonary Consultation   -had sudden onset of difficulty catching his breath and taking a deep breath   -immediately placed on BiPAP which improved his underlying distress  -No swelling  -Respiratory failure   -Clinically consistent with flash pulmonary edema in setting hypertensive urgency/emergency  -also reported covid positive and this may be the primary VS associated component versus unrelated finding     Per Nephrology Consultation  - Acute kidney injury versus progression of CKD.  - Hypertension, with accelerated hypertension.  Likely due to occult volume overload  -recommended dialysis, but the patient declined     Per Progress Note   -HFrEF (heart failure with reduced ejection fraction) (HCC)- (present on admission)  -Hypertensive emergency-resolved  -Weaned off BiPAP   -Not requiring nitroglycerin   -High-dose Lasix for volume control   -COVID 19 virus detected, incidental finding    Results Review:   NT-proBNP >54463  Troponin T 97  CXR 7/3: BILATERAL mixed interstitial and airspace disease which could be pneumonia or pulmonary edema.     ECHO:   Compared prior study 2017, LV function declined.   EF 40%.   Global hypokinesis with regional variation.  Grade II  diastolic dysfunction.  Mild to moderate mitral regurgitation.  Estimated RVSP 50 mmHg.    Treatment:   Admission to ICU, BiPAP therapy, Nephrology Consultation, high dose IV Lasix, nicardipine infusion, Coreg, Norvasc, isosorbide, echocardiogram, CXR, & NT-proBNP    Risk Factors:   Acute hypoxic respiratory failure, acute kidney injury on CKD3-4, metabolic acidosis, hypertensive emergency, volume overload, COVID 19 virus, & HIV  Options provided:   -- Acute Systolic heart failure   -- Chronic Systolic heart failure   -- Acute on Chronic Systolic heart failure   -- Unable to determine      Query created by: Barbara Campbell on 7/7/2020 7:46 AM    RESPONSE TEXT:    Unable to determine          Electronically signed by:  DRISS MONTANEZ JR, DO 7/13/2020 10:33 PM

## 2020-07-15 PROCEDURE — S9126 HOSPICE CARE, IN THE HOME, P: HCPCS

## 2020-07-15 ASSESSMENT — ACTIVITIES OF DAILY LIVING (ADL): MONEY MANAGEMENT (EXPENSES/BILLS): INDEPENDENT

## 2020-07-15 ASSESSMENT — ENCOUNTER SYMPTOMS
STOOL FREQUENCY: DAILY
LAST BOWEL MOVEMENT: 65574
BOWEL PATTERN NORMAL: 1
HYPERTENSION: 1

## 2020-07-16 PROCEDURE — S9126 HOSPICE CARE, IN THE HOME, P: HCPCS

## 2020-07-17 ENCOUNTER — HOME CARE VISIT (OUTPATIENT)
Dept: HOSPICE | Facility: HOSPICE | Age: 55
End: 2020-07-17
Payer: COMMERCIAL

## 2020-07-17 PROCEDURE — S9126 HOSPICE CARE, IN THE HOME, P: HCPCS

## 2020-07-18 PROCEDURE — S9126 HOSPICE CARE, IN THE HOME, P: HCPCS

## 2020-07-19 PROCEDURE — S9126 HOSPICE CARE, IN THE HOME, P: HCPCS

## 2020-07-20 ENCOUNTER — HOME CARE VISIT (OUTPATIENT)
Dept: HOSPICE | Facility: HOSPICE | Age: 55
End: 2020-07-20
Payer: COMMERCIAL

## 2020-07-20 PROCEDURE — S9126 HOSPICE CARE, IN THE HOME, P: HCPCS

## 2020-07-21 ENCOUNTER — HOME CARE VISIT (OUTPATIENT)
Dept: HOSPICE | Facility: HOSPICE | Age: 55
End: 2020-07-21
Payer: COMMERCIAL

## 2020-07-21 PROCEDURE — S9126 HOSPICE CARE, IN THE HOME, P: HCPCS

## 2020-07-21 PROCEDURE — G0299 HHS/HOSPICE OF RN EA 15 MIN: HCPCS

## 2020-07-22 PROCEDURE — S9126 HOSPICE CARE, IN THE HOME, P: HCPCS

## 2020-07-22 ASSESSMENT — ENCOUNTER SYMPTOMS
LAST BOWEL MOVEMENT: 65581
STOOL FREQUENCY: DAILY
HYPERTENSION: 1
BOWEL PATTERN NORMAL: 1

## 2020-07-22 ASSESSMENT — ACTIVITIES OF DAILY LIVING (ADL): MONEY MANAGEMENT (EXPENSES/BILLS): INDEPENDENT

## 2020-07-23 PROCEDURE — S9126 HOSPICE CARE, IN THE HOME, P: HCPCS

## 2020-07-24 PROCEDURE — S9126 HOSPICE CARE, IN THE HOME, P: HCPCS

## 2020-07-25 PROCEDURE — S9126 HOSPICE CARE, IN THE HOME, P: HCPCS

## 2020-07-26 PROCEDURE — S9126 HOSPICE CARE, IN THE HOME, P: HCPCS

## 2020-07-27 PROCEDURE — S9126 HOSPICE CARE, IN THE HOME, P: HCPCS

## 2020-07-28 PROCEDURE — S9126 HOSPICE CARE, IN THE HOME, P: HCPCS

## 2020-07-29 ENCOUNTER — HOME CARE VISIT (OUTPATIENT)
Dept: HOSPICE | Facility: HOSPICE | Age: 55
End: 2020-07-29
Payer: COMMERCIAL

## 2020-07-29 PROCEDURE — 6650990 HC HOSPICE AND HOME CARE RX REV CODE 0250

## 2020-07-29 PROCEDURE — S9126 HOSPICE CARE, IN THE HOME, P: HCPCS

## 2020-07-29 PROCEDURE — G0299 HHS/HOSPICE OF RN EA 15 MIN: HCPCS

## 2020-07-30 VITALS — HEART RATE: 100 BPM | SYSTOLIC BLOOD PRESSURE: 140 MMHG | DIASTOLIC BLOOD PRESSURE: 90 MMHG | RESPIRATION RATE: 16 BRPM

## 2020-07-30 PROCEDURE — S9126 HOSPICE CARE, IN THE HOME, P: HCPCS

## 2020-07-30 ASSESSMENT — ENCOUNTER SYMPTOMS
STOOL FREQUENCY: DAILY
LAST BOWEL MOVEMENT: 65588
BOWEL PATTERN NORMAL: 1

## 2020-07-30 ASSESSMENT — ACTIVITIES OF DAILY LIVING (ADL): MONEY MANAGEMENT (EXPENSES/BILLS): INDEPENDENT

## 2020-07-31 ENCOUNTER — HOME CARE VISIT (OUTPATIENT)
Dept: HOSPICE | Facility: HOSPICE | Age: 55
End: 2020-07-31
Payer: COMMERCIAL

## 2020-07-31 PROCEDURE — G0299 HHS/HOSPICE OF RN EA 15 MIN: HCPCS

## 2020-07-31 PROCEDURE — S9126 HOSPICE CARE, IN THE HOME, P: HCPCS

## 2020-07-31 SDOH — ECONOMIC STABILITY: HOUSING INSECURITY: HOME SAFETY: FOLLOWING COVID 19 PRECAUTIONS

## 2020-07-31 ASSESSMENT — ENCOUNTER SYMPTOMS: HYPERTENSION: 1

## 2020-12-20 ENCOUNTER — HOSPITAL ENCOUNTER (INPATIENT)
Facility: MEDICAL CENTER | Age: 55
LOS: 7 days | DRG: 974 | End: 2020-12-27
Attending: EMERGENCY MEDICINE | Admitting: HOSPITALIST
Payer: COMMERCIAL

## 2020-12-20 ENCOUNTER — APPOINTMENT (OUTPATIENT)
Dept: RADIOLOGY | Facility: MEDICAL CENTER | Age: 55
DRG: 974 | End: 2020-12-20
Attending: EMERGENCY MEDICINE
Payer: COMMERCIAL

## 2020-12-20 DIAGNOSIS — I16.1 HYPERTENSIVE EMERGENCY: ICD-10-CM

## 2020-12-20 DIAGNOSIS — J18.9 PNEUMONIA OF RIGHT LUNG DUE TO INFECTIOUS ORGANISM, UNSPECIFIED PART OF LUNG: ICD-10-CM

## 2020-12-20 DIAGNOSIS — J96.01 ACUTE RESPIRATORY FAILURE WITH HYPOXIA (HCC): ICD-10-CM

## 2020-12-20 DIAGNOSIS — N18.9 CHRONIC RENAL FAILURE, UNSPECIFIED CKD STAGE: ICD-10-CM

## 2020-12-20 DIAGNOSIS — B02.9 HERPES ZOSTER WITHOUT COMPLICATION: ICD-10-CM

## 2020-12-20 DIAGNOSIS — Z91.148 NONCOMPLIANCE WITH MEDICATION REGIMEN: ICD-10-CM

## 2020-12-20 DIAGNOSIS — U07.1 COVID-19 VIRUS DETECTED: ICD-10-CM

## 2020-12-20 PROBLEM — Z86.16 HISTORY OF 2019 NOVEL CORONAVIRUS DISEASE (COVID-19): Status: ACTIVE | Noted: 2020-12-20

## 2020-12-20 PROBLEM — N18.5 STAGE 5 CHRONIC KIDNEY DISEASE NOT ON CHRONIC DIALYSIS (HCC): Status: ACTIVE | Noted: 2020-12-20

## 2020-12-20 PROBLEM — Z71.89 GOALS OF CARE, COUNSELING/DISCUSSION: Status: ACTIVE | Noted: 2020-12-20

## 2020-12-20 PROBLEM — B02.8 HERPES ZOSTER COMPLICATION: Status: ACTIVE | Noted: 2020-12-20

## 2020-12-20 LAB
ALBUMIN SERPL BCP-MCNC: 3.7 G/DL (ref 3.2–4.9)
ALBUMIN/GLOB SERPL: 0.8 G/DL
ALP SERPL-CCNC: 170 U/L (ref 30–99)
ALT SERPL-CCNC: 21 U/L (ref 2–50)
ANION GAP SERPL CALC-SCNC: 18 MMOL/L (ref 7–16)
ANION GAP SERPL CALC-SCNC: 19 MMOL/L (ref 7–16)
ANISOCYTOSIS BLD QL SMEAR: ABNORMAL
APPEARANCE UR: CLEAR
APTT PPP: 47 SEC (ref 24.7–36)
AST SERPL-CCNC: 23 U/L (ref 12–45)
BACTERIA #/AREA URNS HPF: NEGATIVE /HPF
BASE EXCESS BLDA CALC-SCNC: -15 MMOL/L (ref -4–3)
BASOPHILS # BLD AUTO: 1.7 % (ref 0–1.8)
BASOPHILS # BLD: 0.23 K/UL (ref 0–0.12)
BILIRUB SERPL-MCNC: 0.2 MG/DL (ref 0.1–1.5)
BILIRUB UR QL STRIP.AUTO: NEGATIVE
BODY TEMPERATURE: ABNORMAL CENTIGRADE
BUN SERPL-MCNC: 64 MG/DL (ref 8–22)
BUN SERPL-MCNC: 66 MG/DL (ref 8–22)
BURR CELLS BLD QL SMEAR: NORMAL
CALCIUM SERPL-MCNC: 6.3 MG/DL (ref 8.5–10.5)
CALCIUM SERPL-MCNC: 6.9 MG/DL (ref 8.5–10.5)
CHLORIDE SERPL-SCNC: 103 MMOL/L (ref 96–112)
CHLORIDE SERPL-SCNC: 105 MMOL/L (ref 96–112)
CO2 SERPL-SCNC: 11 MMOL/L (ref 20–33)
CO2 SERPL-SCNC: 12 MMOL/L (ref 20–33)
COLOR UR: YELLOW
CORTIS SERPL-MCNC: 25.9 UG/DL (ref 0–23)
COVID ORDER STATUS COVID19: NORMAL
CREAT SERPL-MCNC: 10.77 MG/DL (ref 0.5–1.4)
CREAT SERPL-MCNC: 11.16 MG/DL (ref 0.5–1.4)
EKG IMPRESSION: NORMAL
EOSINOPHIL # BLD AUTO: 0.96 K/UL (ref 0–0.51)
EOSINOPHIL NFR BLD: 7 % (ref 0–6.9)
EPI CELLS #/AREA URNS HPF: NEGATIVE /HPF
ERYTHROCYTE [DISTWIDTH] IN BLOOD BY AUTOMATED COUNT: 58.4 FL (ref 35.9–50)
FLUAV RNA SPEC QL NAA+PROBE: NEGATIVE
FLUBV RNA SPEC QL NAA+PROBE: NEGATIVE
GLOBULIN SER CALC-MCNC: 4.9 G/DL (ref 1.9–3.5)
GLUCOSE SERPL-MCNC: 110 MG/DL (ref 65–99)
GLUCOSE SERPL-MCNC: 156 MG/DL (ref 65–99)
GLUCOSE UR STRIP.AUTO-MCNC: NEGATIVE MG/DL
GRAM STN SPEC: NORMAL
HCO3 BLDA-SCNC: 11 MMOL/L (ref 17–25)
HCT VFR BLD AUTO: 32.5 % (ref 42–52)
HGB BLD-MCNC: 10.1 G/DL (ref 14–18)
HYALINE CASTS #/AREA URNS LPF: ABNORMAL /LPF
INR PPP: 1.13 (ref 0.87–1.13)
KETONES UR STRIP.AUTO-MCNC: NEGATIVE MG/DL
LACTATE BLD-SCNC: 3.3 MMOL/L (ref 0.5–2)
LEUKOCYTE ESTERASE UR QL STRIP.AUTO: NEGATIVE
LYMPHOCYTES # BLD AUTO: 1.78 K/UL (ref 1–4.8)
LYMPHOCYTES NFR BLD: 13 % (ref 22–41)
MACROCYTES BLD QL SMEAR: ABNORMAL
MAGNESIUM SERPL-MCNC: 2.5 MG/DL (ref 1.5–2.5)
MANUAL DIFF BLD: NORMAL
MCH RBC QN AUTO: 30.3 PG (ref 27–33)
MCHC RBC AUTO-ENTMCNC: 31.1 G/DL (ref 33.7–35.3)
MCV RBC AUTO: 97.6 FL (ref 81.4–97.8)
METAMYELOCYTES NFR BLD MANUAL: 1.7 %
MICRO URNS: ABNORMAL
MONOCYTES # BLD AUTO: 0.96 K/UL (ref 0–0.85)
MONOCYTES NFR BLD AUTO: 7 % (ref 0–13.4)
MORPHOLOGY BLD-IMP: NORMAL
MRSA DNA SPEC QL NAA+PROBE: NORMAL
NEUTROPHILS # BLD AUTO: 9.54 K/UL (ref 1.82–7.42)
NEUTROPHILS NFR BLD: 69.6 % (ref 44–72)
NITRITE UR QL STRIP.AUTO: NEGATIVE
NRBC # BLD AUTO: 0 K/UL
NRBC BLD-RTO: 0 /100 WBC
NT-PROBNP SERPL IA-MCNC: ABNORMAL PG/ML (ref 0–125)
OVALOCYTES BLD QL SMEAR: NORMAL
PCO2 BLDA: 24.4 MMHG (ref 26–37)
PH BLDA: 7.25 [PH] (ref 7.4–7.5)
PH UR STRIP.AUTO: 5.5 [PH] (ref 5–8)
PHOSPHATE SERPL-MCNC: 7 MG/DL (ref 2.5–4.5)
PLATELET # BLD AUTO: 219 K/UL (ref 164–446)
PLATELET BLD QL SMEAR: NORMAL
PMV BLD AUTO: 11.9 FL (ref 9–12.9)
PO2 BLDA: 115.3 MMHG (ref 64–87)
POIKILOCYTOSIS BLD QL SMEAR: NORMAL
POTASSIUM SERPL-SCNC: 4.4 MMOL/L (ref 3.6–5.5)
POTASSIUM SERPL-SCNC: 4.6 MMOL/L (ref 3.6–5.5)
PROCALCITONIN SERPL-MCNC: 1.72 NG/ML
PROT SERPL-MCNC: 8.6 G/DL (ref 6–8.2)
PROT UR QL STRIP: 100 MG/DL
PROTHROMBIN TIME: 14.8 SEC (ref 12–14.6)
RBC # BLD AUTO: 3.33 M/UL (ref 4.7–6.1)
RBC # URNS HPF: ABNORMAL /HPF
RBC BLD AUTO: PRESENT
RBC UR QL AUTO: ABNORMAL
RSV RNA SPEC QL NAA+PROBE: NEGATIVE
SAO2 % BLDA: 96.9 % (ref 93–99)
SARS-COV-2 RNA RESP QL NAA+PROBE: NOTDETECTED
SIGNIFICANT IND 70042: NORMAL
SIGNIFICANT IND 70042: NORMAL
SITE SITE: NORMAL
SITE SITE: NORMAL
SODIUM SERPL-SCNC: 133 MMOL/L (ref 135–145)
SODIUM SERPL-SCNC: 135 MMOL/L (ref 135–145)
SOURCE SOURCE: NORMAL
SOURCE SOURCE: NORMAL
SP GR UR STRIP.AUTO: 1.01
SPECIMEN SOURCE: NORMAL
TROPONIN T SERPL-MCNC: 111 NG/L (ref 6–19)
UROBILINOGEN UR STRIP.AUTO-MCNC: 0.2 MG/DL
WBC # BLD AUTO: 13.7 K/UL (ref 4.8–10.8)
WBC #/AREA URNS HPF: ABNORMAL /HPF

## 2020-12-20 PROCEDURE — A9270 NON-COVERED ITEM OR SERVICE: HCPCS | Performed by: INTERNAL MEDICINE

## 2020-12-20 PROCEDURE — 87205 SMEAR GRAM STAIN: CPT

## 2020-12-20 PROCEDURE — 700101 HCHG RX REV CODE 250: Performed by: EMERGENCY MEDICINE

## 2020-12-20 PROCEDURE — 83735 ASSAY OF MAGNESIUM: CPT

## 2020-12-20 PROCEDURE — 86360 T CELL ABSOLUTE COUNT/RATIO: CPT

## 2020-12-20 PROCEDURE — 81001 URINALYSIS AUTO W/SCOPE: CPT

## 2020-12-20 PROCEDURE — 87899 AGENT NOS ASSAY W/OPTIC: CPT

## 2020-12-20 PROCEDURE — 86359 T CELLS TOTAL COUNT: CPT

## 2020-12-20 PROCEDURE — 99223 1ST HOSP IP/OBS HIGH 75: CPT | Performed by: HOSPITALIST

## 2020-12-20 PROCEDURE — 87449 NOS EACH ORGANISM AG IA: CPT

## 2020-12-20 PROCEDURE — 83880 ASSAY OF NATRIURETIC PEPTIDE: CPT

## 2020-12-20 PROCEDURE — 82533 TOTAL CORTISOL: CPT

## 2020-12-20 PROCEDURE — 700111 HCHG RX REV CODE 636 W/ 250 OVERRIDE (IP): Performed by: HOSPITALIST

## 2020-12-20 PROCEDURE — 96375 TX/PRO/DX INJ NEW DRUG ADDON: CPT

## 2020-12-20 PROCEDURE — 700105 HCHG RX REV CODE 258: Performed by: EMERGENCY MEDICINE

## 2020-12-20 PROCEDURE — 80048 BASIC METABOLIC PNL TOTAL CA: CPT

## 2020-12-20 PROCEDURE — 0241U HCHG SARS-COV-2 COVID-19 NFCT DS RESP RNA 4 TRGT MIC: CPT

## 2020-12-20 PROCEDURE — 80053 COMPREHEN METABOLIC PANEL: CPT

## 2020-12-20 PROCEDURE — 96366 THER/PROPH/DIAG IV INF ADDON: CPT

## 2020-12-20 PROCEDURE — 71045 X-RAY EXAM CHEST 1 VIEW: CPT

## 2020-12-20 PROCEDURE — A9270 NON-COVERED ITEM OR SERVICE: HCPCS | Performed by: HOSPITALIST

## 2020-12-20 PROCEDURE — 93005 ELECTROCARDIOGRAM TRACING: CPT | Performed by: EMERGENCY MEDICINE

## 2020-12-20 PROCEDURE — 84484 ASSAY OF TROPONIN QUANT: CPT

## 2020-12-20 PROCEDURE — 85027 COMPLETE CBC AUTOMATED: CPT

## 2020-12-20 PROCEDURE — 85610 PROTHROMBIN TIME: CPT

## 2020-12-20 PROCEDURE — 700105 HCHG RX REV CODE 258: Performed by: HOSPITALIST

## 2020-12-20 PROCEDURE — 770022 HCHG ROOM/CARE - ICU (200)

## 2020-12-20 PROCEDURE — 700102 HCHG RX REV CODE 250 W/ 637 OVERRIDE(OP): Performed by: HOSPITALIST

## 2020-12-20 PROCEDURE — 5A09357 ASSISTANCE WITH RESPIRATORY VENTILATION, LESS THAN 24 CONSECUTIVE HOURS, CONTINUOUS POSITIVE AIRWAY PRESSURE: ICD-10-PCS | Performed by: HOSPITALIST

## 2020-12-20 PROCEDURE — 87641 MR-STAPH DNA AMP PROBE: CPT

## 2020-12-20 PROCEDURE — 99291 CRITICAL CARE FIRST HOUR: CPT

## 2020-12-20 PROCEDURE — 99291 CRITICAL CARE FIRST HOUR: CPT | Performed by: INTERNAL MEDICINE

## 2020-12-20 PROCEDURE — 96365 THER/PROPH/DIAG IV INF INIT: CPT

## 2020-12-20 PROCEDURE — 85007 BL SMEAR W/DIFF WBC COUNT: CPT

## 2020-12-20 PROCEDURE — 700102 HCHG RX REV CODE 250 W/ 637 OVERRIDE(OP): Performed by: INTERNAL MEDICINE

## 2020-12-20 PROCEDURE — C9803 HOPD COVID-19 SPEC COLLECT: HCPCS | Performed by: EMERGENCY MEDICINE

## 2020-12-20 PROCEDURE — 85730 THROMBOPLASTIN TIME PARTIAL: CPT

## 2020-12-20 PROCEDURE — 94640 AIRWAY INHALATION TREATMENT: CPT

## 2020-12-20 PROCEDURE — 99223 1ST HOSP IP/OBS HIGH 75: CPT | Performed by: INTERNAL MEDICINE

## 2020-12-20 PROCEDURE — 87040 BLOOD CULTURE FOR BACTERIA: CPT

## 2020-12-20 PROCEDURE — 84145 PROCALCITONIN (PCT): CPT

## 2020-12-20 PROCEDURE — 700111 HCHG RX REV CODE 636 W/ 250 OVERRIDE (IP): Performed by: EMERGENCY MEDICINE

## 2020-12-20 PROCEDURE — 84100 ASSAY OF PHOSPHORUS: CPT

## 2020-12-20 PROCEDURE — 96368 THER/DIAG CONCURRENT INF: CPT

## 2020-12-20 PROCEDURE — 82803 BLOOD GASES ANY COMBINATION: CPT

## 2020-12-20 PROCEDURE — 83605 ASSAY OF LACTIC ACID: CPT

## 2020-12-20 RX ORDER — AMOXICILLIN 250 MG
2 CAPSULE ORAL 2 TIMES DAILY
Status: DISCONTINUED | OUTPATIENT
Start: 2020-12-20 | End: 2020-12-22

## 2020-12-20 RX ORDER — FUROSEMIDE 10 MG/ML
100 INJECTION INTRAMUSCULAR; INTRAVENOUS ONCE
Status: COMPLETED | OUTPATIENT
Start: 2020-12-20 | End: 2020-12-20

## 2020-12-20 RX ORDER — ACETAMINOPHEN 325 MG/1
650 TABLET ORAL EVERY 6 HOURS PRN
Status: DISCONTINUED | OUTPATIENT
Start: 2020-12-20 | End: 2020-12-28 | Stop reason: HOSPADM

## 2020-12-20 RX ORDER — PREDNISONE 20 MG/1
40 TABLET ORAL 2 TIMES DAILY
Status: DISCONTINUED | OUTPATIENT
Start: 2020-12-20 | End: 2020-12-20

## 2020-12-20 RX ORDER — AZITHROMYCIN 500 MG/1
500 INJECTION, POWDER, LYOPHILIZED, FOR SOLUTION INTRAVENOUS ONCE
Status: DISCONTINUED | OUTPATIENT
Start: 2020-12-20 | End: 2020-12-20

## 2020-12-20 RX ORDER — POLYETHYLENE GLYCOL 3350 17 G/17G
1 POWDER, FOR SOLUTION ORAL
Status: DISCONTINUED | OUTPATIENT
Start: 2020-12-20 | End: 2020-12-22

## 2020-12-20 RX ORDER — SODIUM BICARBONATE 650 MG/1
1300 TABLET ORAL EVERY 8 HOURS
Status: DISCONTINUED | OUTPATIENT
Start: 2020-12-20 | End: 2020-12-22

## 2020-12-20 RX ORDER — FUROSEMIDE 10 MG/ML
100 INJECTION INTRAMUSCULAR; INTRAVENOUS
Status: DISCONTINUED | OUTPATIENT
Start: 2020-12-20 | End: 2020-12-21

## 2020-12-20 RX ORDER — CLINDAMYCIN PHOSPHATE 900 MG/50ML
900 INJECTION, SOLUTION INTRAVENOUS EVERY 8 HOURS
Status: DISCONTINUED | OUTPATIENT
Start: 2020-12-20 | End: 2020-12-20

## 2020-12-20 RX ORDER — BISACODYL 10 MG
10 SUPPOSITORY, RECTAL RECTAL
Status: DISCONTINUED | OUTPATIENT
Start: 2020-12-20 | End: 2020-12-22

## 2020-12-20 RX ORDER — LABETALOL HYDROCHLORIDE 5 MG/ML
10-20 INJECTION, SOLUTION INTRAVENOUS
Status: DISCONTINUED | OUTPATIENT
Start: 2020-12-20 | End: 2020-12-28 | Stop reason: HOSPADM

## 2020-12-20 RX ORDER — AMLODIPINE BESYLATE 10 MG/1
10 TABLET ORAL
Status: DISCONTINUED | OUTPATIENT
Start: 2020-12-20 | End: 2020-12-26

## 2020-12-20 RX ORDER — DILTIAZEM HYDROCHLORIDE 5 MG/ML
10 INJECTION INTRAVENOUS ONCE
Status: COMPLETED | OUTPATIENT
Start: 2020-12-20 | End: 2020-12-20

## 2020-12-20 RX ORDER — HEPARIN SODIUM 5000 [USP'U]/ML
5000 INJECTION, SOLUTION INTRAVENOUS; SUBCUTANEOUS EVERY 8 HOURS
Status: DISCONTINUED | OUTPATIENT
Start: 2020-12-20 | End: 2020-12-24

## 2020-12-20 RX ORDER — DAPSONE 100 MG/1
100 TABLET ORAL DAILY
Status: DISCONTINUED | OUTPATIENT
Start: 2020-12-20 | End: 2020-12-28 | Stop reason: HOSPADM

## 2020-12-20 RX ADMIN — FUROSEMIDE 100 MG: 10 INJECTION, SOLUTION INTRAVENOUS at 11:59

## 2020-12-20 RX ADMIN — HEPARIN SODIUM 5000 UNITS: 5000 INJECTION, SOLUTION INTRAVENOUS; SUBCUTANEOUS at 14:26

## 2020-12-20 RX ADMIN — AMLODIPINE BESYLATE 10 MG: 10 TABLET ORAL at 09:40

## 2020-12-20 RX ADMIN — HEPARIN SODIUM 5000 UNITS: 5000 INJECTION, SOLUTION INTRAVENOUS; SUBCUTANEOUS at 21:43

## 2020-12-20 RX ADMIN — SODIUM BICARBONATE 1300 MG: 650 TABLET ORAL at 10:51

## 2020-12-20 RX ADMIN — FUROSEMIDE 100 MG: 10 INJECTION, SOLUTION INTRAMUSCULAR; INTRAVENOUS at 08:28

## 2020-12-20 RX ADMIN — DAPSONE 100 MG: 100 TABLET ORAL at 17:07

## 2020-12-20 RX ADMIN — PIPERACILLIN AND TAZOBACTAM 4.5 G: 4; .5 INJECTION, POWDER, LYOPHILIZED, FOR SOLUTION INTRAVENOUS; PARENTERAL at 07:58

## 2020-12-20 RX ADMIN — VANCOMYCIN HYDROCHLORIDE 1750 MG: 500 INJECTION, POWDER, LYOPHILIZED, FOR SOLUTION INTRAVENOUS at 08:31

## 2020-12-20 RX ADMIN — SODIUM BICARBONATE 1300 MG: 650 TABLET ORAL at 14:27

## 2020-12-20 RX ADMIN — SODIUM BICARBONATE 1300 MG: 650 TABLET ORAL at 21:43

## 2020-12-20 RX ADMIN — LABETALOL HYDROCHLORIDE 20 MG: 5 INJECTION, SOLUTION INTRAVENOUS at 10:48

## 2020-12-20 RX ADMIN — PIPERACILLIN SODIUM, TAZOBACTAM SODIUM 3.38 G: 3; .375 INJECTION, POWDER, LYOPHILIZED, FOR SOLUTION INTRAVENOUS at 17:06

## 2020-12-20 RX ADMIN — DILTIAZEM HYDROCHLORIDE 10 MG: 5 INJECTION INTRAVENOUS at 07:14

## 2020-12-20 RX ADMIN — PIPERACILLIN AND TAZOBACTAM 3.38 G: 3; .375 INJECTION, POWDER, LYOPHILIZED, FOR SOLUTION INTRAVENOUS; PARENTERAL at 11:59

## 2020-12-20 RX ADMIN — FUROSEMIDE 100 MG: 10 INJECTION, SOLUTION INTRAVENOUS at 17:06

## 2020-12-20 ASSESSMENT — ENCOUNTER SYMPTOMS
DOUBLE VISION: 0
STRIDOR: 0
FOCAL WEAKNESS: 0
COUGH: 1
TINGLING: 0
WEAKNESS: 0
BLOOD IN STOOL: 0
ABDOMINAL PAIN: 0
TREMORS: 0
NERVOUS/ANXIOUS: 0
HEMOPTYSIS: 0
BLURRED VISION: 0
CHILLS: 0
EYE DISCHARGE: 0
SINUS PAIN: 0
EYE REDNESS: 0
COUGH: 0
CLAUDICATION: 0
SPUTUM PRODUCTION: 0
CONSTIPATION: 0
DIARRHEA: 0
VOMITING: 0
SHORTNESS OF BREATH: 1
HALLUCINATIONS: 0
MYALGIAS: 0
PHOTOPHOBIA: 0
SEIZURES: 0
NAUSEA: 0
WEIGHT LOSS: 0
HEADACHES: 0
SPEECH CHANGE: 0
FEVER: 0
BACK PAIN: 0
DEPRESSION: 0
ORTHOPNEA: 0
EYE PAIN: 0

## 2020-12-20 ASSESSMENT — COPD QUESTIONNAIRES
DURING THE PAST 4 WEEKS HOW MUCH DID YOU FEEL SHORT OF BREATH: SOME OF THE TIME
HAVE YOU SMOKED AT LEAST 100 CIGARETTES IN YOUR ENTIRE LIFE: YES
COPD SCREENING SCORE: 5
DO YOU EVER COUGH UP ANY MUCUS OR PHLEGM?: NO/ONLY WITH OCCASIONAL COLDS OR INFECTIONS

## 2020-12-20 ASSESSMENT — FIBROSIS 4 INDEX
FIB4 SCORE: 1.28
FIB4 SCORE: 1.28

## 2020-12-20 ASSESSMENT — LIFESTYLE VARIABLES: SUBSTANCE_ABUSE: 0

## 2020-12-20 NOTE — ASSESSMENT & PLAN NOTE
In the setting of pneumonia and volume overload  Status post high flow oxygen and ICU admission.   IV diuretics and IV antibiotics  He has been tapered to 4 liters of oxygen via nasal cannula  This is not consistent with acute heart failure. His volume overload is secondary to kidney failure.  His EF by echo in July was 40% though he has elected not to take any medications for this thus refrain from starting any for now and focus on dialysis.

## 2020-12-20 NOTE — ASSESSMENT & PLAN NOTE
Patient had been on Hospice and now wants full treat and full code  Palliative consult placed  Prognosis is overall guarded

## 2020-12-20 NOTE — H&P
Hospital Medicine History & Physical Note    Date of Service  12/20/2020    Primary Care Physician  DICKSON Corbett.    Consultants  Critical care  Nephrology. I discussed with Dr. Bailey    Code Status  Full   Chief Complaint  shortness of breath    History of Presenting Illness  55 y.o. male who presented 12/20/2020 with shortness of breath.  Mr. Palencia has a history of HIV and ESRD that was most recently admitted here from 7/3-7/10 with COVID and respiratory failure. He was discharged home on Hospice. He has revoked his DNR/DNI status and has taken himself off of Hospice.   He was admitted to [facility] from 12/15-12/18 with left facial cellulitis. He had a CT face revealing left sided periorbital cellulitis and was treated with IV rocephin and clindamycin. Wound cultures grew out MSSA resistant to clindamycin. He was discharged home on oral augmentin. He had a Cr of 10.3 on admission there and was given IV fluids. It was recommended that he initiate dialysis but he adamantly refused. He was discharged on sodium bicarb tablets and his Cr was 7 upon discharge.   He states that he has been compliant with his HIV meds through the [facility] though this was not mentioned in paperwork from [facility].   He denies exposure to persons known to have COVID. His COVID is negative here. Mr. Clark will be admitted to the ICU in very guarded condition.       Review of Systems  Review of Systems   Constitutional: Negative for fever.   Eyes:        Decreased vision left eye   Respiratory: Positive for cough and shortness of breath. Negative for sputum production.    Cardiovascular: Negative for chest pain and leg swelling.   Gastrointestinal: Negative for diarrhea and vomiting.   All other systems reviewed and are negative.      Past Medical History   has a past medical history of Heart failure ([facility]), HIV disease ([facility]) (1995), Hypertension, Seizure (HCC), and Stroke ([facility]).    Surgical History   has a  past surgical history that includes cholecystectomy (1980s).     Family History  family history includes Diabetes in his father, mother, and sister; No Known Problems in his sister, sister, and sister; Other in his brother and daughter.     Social History   reports that he has been smoking cigarettes. He has been smoking about 0.25 packs per day. He has never used smokeless tobacco. He reports that he does not drink alcohol or use drugs.    Allergies  No Known Allergies    Medications  Prior to Admission Medications   Prescriptions Last Dose Informant Patient Reported? Taking?   ASPIRIN LOW DOSE 81 MG EC tablet   Yes No   Sig: Take 1 Tab by mouth every day. Indications: Hx CVA   oxyCODONE 20 MG/ML Conc   Yes No   Sig: Take 5 mg by mouth every 2 hours as needed (Moderate to severe pain, SOB).   senna-docusate (SENNA PLUS) 8.6-50 MG Tab   Yes No   Sig: Take 2 Tabs by mouth every day. Hold for loose stools  Indications: Constipation      Facility-Administered Medications: None       Physical Exam  Pulse:  [] 80  Resp:  [22-31] 22  BP: (171-291)/() 171/93  SpO2:  [88 %-100 %] 100 %    Physical Exam  Vitals signs and nursing note reviewed.   Constitutional:       Appearance: He is ill-appearing and toxic-appearing.   HENT:      Head:      Comments: Extensive scabs left face, no open lesions and no vesicles      Mouth/Throat:      Mouth: Mucous membranes are dry.      Pharynx: Oropharynx is clear.   Eyes:      General: No scleral icterus.     Comments: Left eye exam is difficult as there is significant swelling of the eyelid   Neck:      Musculoskeletal: Normal range of motion and neck supple.   Cardiovascular:      Comments: Very distant heart sounds  Sinus   Pulmonary:      Comments: Tachypnea  High flow oxygen  Increased work of breathing  Rales throughout    Musculoskeletal:      Right lower leg: No edema.      Left lower leg: No edema.   Skin:     General: Skin is warm and dry.   Neurological:       General: No focal deficit present.      Mental Status: He is alert and oriented to person, place, and time.   Psychiatric:         Mood and Affect: Mood normal.         Behavior: Behavior normal.         Laboratory:  Recent Labs     12/20/20  0640   WBC 13.7*   RBC 3.33*   HEMOGLOBIN 10.1*   HEMATOCRIT 32.5*   MCV 97.6   MCH 30.3   MCHC 31.1*   RDW 58.4*   PLATELETCT 219   MPV 11.9     Recent Labs     12/20/20  0640   SODIUM 133*   POTASSIUM 4.6   CHLORIDE 103   CO2 11*   GLUCOSE 156*   BUN 64*   CREATININE 10.77*   CALCIUM 6.9*     Recent Labs     12/20/20  0640   ALTSGPT 21   ASTSGOT 23   ALKPHOSPHAT 170*   TBILIRUBIN 0.2   GLUCOSE 156*     Recent Labs     12/20/20  0640   APTT 47.0*   INR 1.13     Recent Labs     12/20/20  0640   NTPROBNP >48501*         Recent Labs     12/20/20  0640   TROPONINT 111*       Imaging:  DX-CHEST-PORTABLE (1 VIEW)   Final Result         Worsening airspace opacity throughout the right lower lung.            Assessment/Plan:  I anticipate this patient will require at least two midnights for appropriate medical management, necessitating inpatient admission.    * Acute respiratory failure with hypoxia (HCC)- (present on admission)  Assessment & Plan  In the setting of pneumonia and volume overload  Requiring high flow oxygen and ICU admission.   IV fluids and IV antibiotics    Pneumonia- (present on admission)  Assessment & Plan  Presumptive diagnosis  He was recently admitted from 12/15 through 12/18 at [facility] and now has an xray consistent with RLL infiltrate. Consider aspiration and treat for HCAP with one dose vanco in the ER which will last until dialysis  IV zoysn  Nasal MRSA swab  Check procalcitonin level.  As he may not have been compliant with HIV regimen, he is at risk of PCP pneumonia: Dr. Whyte has recommended against treating for PCP and instead dapsone for prophylaxis.    Stage 5 chronic kidney disease not on chronic dialysis (HCC)- (present on  admission)  Assessment & Plan  Acute kidney injury with underlying chronic kidney disease   With volume overload  IV lasix 100 mg BID  Follow urine output  BMP ordered for the morning  He has previously refused dialysis and had been evaluated at [facility] last week for initiation of dialysis  Cr 10.77 though K 4.6. Cr was 7.1 at [facility] on 12/18  Nephrology consulted      HIV (human immunodeficiency virus infection) (HCC)- (present on admission)  Assessment & Plan  Followed by Dr. Whyte at [facility]  He states he has been compliant with his meds though this is not congruent with previous records obtained from his last hospitalizations.  CD4 count ordered  [facility] will be contacted on Monday to see if he is taking his meds     Goals of care, counseling/discussion- (present on admission)  Assessment & Plan  Patient had been on Hospice and now wants full treat and full code  Palliative consult placed  Prognosis is overall poor    Nicotine dependence- (present on admission)  Assessment & Plan  Cessation encouragement  Nicotine patch for withdrawal symptoms    History of 2019 novel coronavirus disease (COVID-19)- (present on admission)  Assessment & Plan  He had COVID + PCR on 7/9/20  COVID is negative in the ER  He does not need to be on isolation     Herpes zoster complication- (present on admission)  Assessment & Plan  He had been admitted to [facility] and treated for facial cellulitis though exam is consistent with healing zoster. He may require ophthalmology eval    Now crusted over and no open lesions  At risk of bacterial infection and may benefit from bactroban topically

## 2020-12-20 NOTE — PROGRESS NOTES
Pt to arrive to [facility] from the ER. Cathryn to receive report from Aide MCNEAL RN.   2 RN skin check completed with Cathryn RN  - generalized abrasions  - left face scabbing  - old abdominal incision

## 2020-12-20 NOTE — ASSESSMENT & PLAN NOTE
Nephrology consulted with plans for HD cath placement and HD today if agrees  Monitor UOP, electrolytes, renal function  Avoid nephrotoxins  Cont lasix and bicarb tabs  Conservative transfusion strategy, daily CBC

## 2020-12-20 NOTE — ED PROVIDER NOTES
ED Provider Note    ED Provider Note    Primary care provider: KERRI Corbett  Means of arrival: POV  History obtained from: patient  History limited by: None    CHIEF COMPLAINT  Shortness of breath, respiratory distress    HPI  Seamus Palencia is a 55 y.o. male who presents to the Emergency Department he states that a friend dropped him off.  He presents in respiratory distress.  Complains of shortness of breath.  He is only able to speak in a few word sentences.  States he was tested for Covid a few days ago but does not know the results.  He was recently hospitalized at [facility] but cannot give additional details.  Little history is available from the patient at this time.    REVIEW OF SYSTEMS  Review of Systems   Unable to perform ROS: Medical condition   Constitutional: Negative for fever.   Eyes: Negative for blurred vision, double vision and pain.   Respiratory: Positive for shortness of breath.    Gastrointestinal: Negative for abdominal pain.   Neurological: Negative for headaches.       PAST MEDICAL HISTORY   has a past medical history of Heart failure ([facility]), HIV disease ([facility]) (1995), Hypertension, Seizure ([facility]), and Stroke ([facility]).    SURGICAL HISTORY   has a past surgical history that includes cholecystectomy (1980s).    SOCIAL HISTORY  Social History     Tobacco Use   • Smoking status: Current Every Day Smoker     Packs/day: 0.25     Types: Cigarettes   • Smokeless tobacco: Never Used   • Tobacco comment: 3-4 CIGARETTES A DAY   Substance Use Topics   • Alcohol use: No   • Drug use: No      Social History     Substance and Sexual Activity   Drug Use No       FAMILY HISTORY  Family History   Problem Relation Age of Onset   • Diabetes Mother         cause of death   • Diabetes Father         cause of death   • Diabetes Sister    • Other Brother         down syndrome   • No Known Problems Sister    • No Known Problems Sister    • No Known Problems Sister    • Other Daughter         5  "daughters, 11 sons    • Clotting Disorder Neg Hx    • Stroke Neg Hx        CURRENT MEDICATIONS  Home Medications    **Home medications have not yet been reviewed for this encounter**         ALLERGIES  No Known Allergies    PHYSICAL EXAM  VITAL SIGNS: /89   Pulse 76   Temp 36.9 °C (98.5 °F) (Temporal)   Resp (!) 25   Ht 1.626 m (5' 4\")   Wt 66.1 kg (145 lb 11.6 oz)   SpO2 95%   BMI 25.01 kg/m²   Vitals reviewed.  Constitutional: Patient is oriented to person, place, and time. Appears well-developed and well-nourished.  Moderate to severe distress.    Head: Normocephalic and atraumatic.   Ears: Normal external ears bilaterally.   Mouth/Throat: Oropharynx is clear and moist  Eyes: Conjunctivae are normal the right. Pupils are equal, round, and reactive to light. Left conjunctiva is injected.  Neck: Normal range of motion. Neck supple.  No meningeal signs.  Cardiovascular: Tachycardia, regular rhythm and normal heart sounds. Normal peripheral pulses.  Pulmonary/Chest: Tachypnea.  Increased work of breathing.  Coarse breath sounds, rhonchi and rales posteriorly.  No wheezes noted.  Abdominal: Soft. Bowel sounds are normal. There is no tenderness. No rebound or guarding, or peritoneal signs.   Musculoskeletal: No edema and no tenderness.   Lymphadenopathy: No cervical adenopathy.   Neurological: No focal deficits.   Skin: Skin is warm and dry. No erythema. No pallor.  Skin exam normal except as noted.  Over the dermal distribution of the left forehead and periorbital area, patient has a dabbed over, erythematous rash.  There is edema to the area, including the eyelids  Psychiatric: Patient has a normal mood and affect.     LABS  Results for orders placed or performed during the hospital encounter of 12/20/20   CBC WITH DIFFERENTIAL   Result Value Ref Range    WBC 13.7 (H) 4.8 - 10.8 K/uL    RBC 3.33 (L) 4.70 - 6.10 M/uL    Hemoglobin 10.1 (L) 14.0 - 18.0 g/dL    Hematocrit 32.5 (L) 42.0 - 52.0 %    MCV 97.6 " 81.4 - 97.8 fL    MCH 30.3 27.0 - 33.0 pg    MCHC 31.1 (L) 33.7 - 35.3 g/dL    RDW 58.4 (H) 35.9 - 50.0 fL    Platelet Count 219 164 - 446 K/uL    MPV 11.9 9.0 - 12.9 fL    Neutrophils-Polys 69.60 44.00 - 72.00 %    Lymphocytes 13.00 (L) 22.00 - 41.00 %    Monocytes 7.00 0.00 - 13.40 %    Eosinophils 7.00 (H) 0.00 - 6.90 %    Basophils 1.70 0.00 - 1.80 %    Nucleated RBC 0.00 /100 WBC    Neutrophils (Absolute) 9.54 (H) 1.82 - 7.42 K/uL    Lymphs (Absolute) 1.78 1.00 - 4.80 K/uL    Monos (Absolute) 0.96 (H) 0.00 - 0.85 K/uL    Eos (Absolute) 0.96 (H) 0.00 - 0.51 K/uL    Baso (Absolute) 0.23 (H) 0.00 - 0.12 K/uL    NRBC (Absolute) 0.00 K/uL    Anisocytosis 1+     Macrocytosis 1+    COMP METABOLIC PANEL   Result Value Ref Range    Sodium 133 (L) 135 - 145 mmol/L    Potassium 4.6 3.6 - 5.5 mmol/L    Chloride 103 96 - 112 mmol/L    Co2 11 (L) 20 - 33 mmol/L    Anion Gap 19.0 (H) 7.0 - 16.0    Glucose 156 (H) 65 - 99 mg/dL    Bun 64 (H) 8 - 22 mg/dL    Creatinine 10.77 (HH) 0.50 - 1.40 mg/dL    Calcium 6.9 (LL) 8.5 - 10.5 mg/dL    AST(SGOT) 23 12 - 45 U/L    ALT(SGPT) 21 2 - 50 U/L    Alkaline Phosphatase 170 (H) 30 - 99 U/L    Total Bilirubin 0.2 0.1 - 1.5 mg/dL    Albumin 3.7 3.2 - 4.9 g/dL    Total Protein 8.6 (H) 6.0 - 8.2 g/dL    Globulin 4.9 (H) 1.9 - 3.5 g/dL    A-G Ratio 0.8 g/dL   LACTIC ACID   Result Value Ref Range    Lactic Acid 3.3 (H) 0.5 - 2.0 mmol/L   proBrain Natriuretic Peptide, NT   Result Value Ref Range    NT-proBNP >51118 (H) 0 - 125 pg/mL   CORTISOL   Result Value Ref Range    Cortisol 25.9 (H) 0.0 - 23.0 ug/dL   MAGNESIUM   Result Value Ref Range    Magnesium 2.5 1.5 - 2.5 mg/dL   PHOSPHORUS   Result Value Ref Range    Phosphorus 7.0 (H) 2.5 - 4.5 mg/dL   TROPONIN   Result Value Ref Range    Troponin T 111 (H) 6 - 19 ng/L   APTT   Result Value Ref Range    APTT 47.0 (H) 24.7 - 36.0 sec   PROTHROMBIN TIME   Result Value Ref Range    PT 14.8 (H) 12.0 - 14.6 sec    INR 1.13 0.87 - 1.13   ARTERIAL  BLOOD GAS   Result Value Ref Range    Ph 7.25 (LL) 7.40 - 7.50    Pco2 24.4 (L) 26.0 - 37.0 mmHg    Po2 115.3 (H) 64.0 - 87.0 mmHg    O2 Saturation 96.9 93.0 - 99.0 %    Hco3 11 (L) 17 - 25 mmol/L    Base Excess -15 (L) -4 - 3 mmol/L    Body Temp see below Centigrade   URINALYSIS    Specimen: Urine, Clean Catch   Result Value Ref Range    Color Yellow     Character Clear     Specific Gravity 1.011 <1.035    Ph 5.5 5.0 - 8.0    Glucose Negative Negative mg/dL    Ketones Negative Negative mg/dL    Protein 100 (A) Negative mg/dL    Bilirubin Negative Negative    Urobilinogen, Urine 0.2 Negative    Nitrite Negative Negative    Leukocyte Esterase Negative Negative    Occult Blood Small (A) Negative    Micro Urine Req Microscopic    COVID/SARS CoV-2 PCR    Specimen: Nasopharyngeal; Respirate   Result Value Ref Range    COVID Order Status Received    CoV-2, Flu A/B, And RSV by PCR   Result Value Ref Range    Influenza virus A RNA Negative Negative    Influenza virus B, PCR Negative Negative    RSV, PCR Negative Negative    SARS-CoV-2 by PCR NotDetected     SARS-CoV-2 Source NP Swab    URINE MICROSCOPIC (W/UA)   Result Value Ref Range    WBC 0-2 (A) /hpf    RBC 0-2 (A) /hpf    Bacteria Negative None /hpf    Epithelial Cells Negative /hpf    Hyaline Cast 0-2 /lpf   PLATELET ESTIMATE   Result Value Ref Range    Plt Estimation Normal    MORPHOLOGY   Result Value Ref Range    RBC Morphology Present     Poikilocytosis 1+     Ovalocytes 1+     Echinocytes 1+    PERIPHERAL SMEAR REVIEW   Result Value Ref Range    Peripheral Smear Review see below    DIFFERENTIAL MANUAL   Result Value Ref Range    Metamyelocytes 1.70 %    Manual Diff Status PERFORMED    ESTIMATED GFR   Result Value Ref Range    GFR If  6 (A) >60 mL/min/1.73 m 2    GFR If Non African American 5 (A) >60 mL/min/1.73 m 2   EKG   Result Value Ref Range    Report       [facility] Emergency Dept.    Test Date:  2020-12-20  Pt Name:     SONI ABBASI                  Department: ER  MRN:        [id number]                      Room:       RD 04  Gender:     Male                         Technician: [id number]  :        1965                   Requested By:CHRISTINE SHEPHERD  Order #:    [id number]                    Reading MD: CHRISTINE SHEPHERD DO    Measurements  Intervals                                Axis  Rate:       105                          P:          0  MO:         134                          QRS:        28  QRSD:       98                           T:          205  QT:         368  QTc:        487    Interpretive Statements  SINUS TACHYCARDIA  ABNORMAL T, CONSIDER ISCHEMIA, DIFFUSE LEADS  BORDERLINE PROLONGED QT INTERVAL  Compared to ECG 2020 00:17:03  T-wave abnormality now present  Left ventricular hypertrophy no longer present  ST (T wave) deviation no longer present  Possible ischemia still present  Electronically Signed O n 2020 9:24:08 PST by CHRISTINE SHEPHERD DO         All labs reviewed by me.    EKG Interpretation  Interpreted by me    RADIOLOGY  DX-CHEST-PORTABLE (1 VIEW)   Final Result         Worsening airspace opacity throughout the right lower lung.        The radiologist's interpretation of all radiological studies have been reviewed by me.    COURSE & MEDICAL DECISION MAKING  Pertinent Labs & Imaging studies reviewed. (See chart for details)    Obtained and reviewed past medical records.  Patient presented and was admitted to the hospital in July of this year with what appears to be a similar presentation.  He presented with acute hypoxemic respiratory failure.  He was placed on BiPAP and admitted to the ICU.  Nephrology was consulted for acute on chronic kidney injury over the patient declined hemodialysis on multiple occasions.  Noted to have an EF of 40%.  Hospice was consulted.  Patient was discharged to home with DNR/DNI in place and with plans to follow-up with hospice.    6:48 AM - Patient seen and examined at  "bedside upon arrival.  Patient presents in severe respiratory distress.  Initial concern was that patient had Covid.  Little information is available from the patient.  High flow nasal cannula initiated.  Help of Khmer-speaking ER nurse who is taking care of the patient, was able to discern, that a friend dropped the patient off.  That he does not have any visual changes per patient's report.  He does smoke but denies using drugs.  He does report a history of HIV and tells me that he is taking his HIV medications.  He also reports that he takes his hypertensive medications.  He was recently hospitalized in [facility] and we will attempt to obtain those records.  Patient is denying any pain at this time.  He reports he has had the rash over his left face for a week but denies any pain to the area.  Asked further about desire for hospice care, patient states he does not want to be on hospice patient any longer.  He is agreeable to undergoing dialysis and would like to be intubated should that be necessary.  Sepsis protocol initiated.    Records requested from [facility].  Respiratory called to the bedside.    2108 ICU paged.  Patient is maxed out on high flow nasal cannula at 100% and 60 L.  Records obtained from [facility] from December 18 of this year.  Patient discharge diagnosis includes cellulitis, end-stage renal disease, refusing dialysis according to the records, patient has history of HIV anemia, hypomagnesemia, hyperlipidemia and hypertension.  She had a troponin of 213 at that time.  Lactic acid 1.8.  Covid testing \"presumptive negative\". m patient had a CT scan of his maxillofacial area that showed left sided periorbital and left sided facial soft tissue swelling and inflammation.  No evidence of abscess.  This was completed on December 15.  Renal ultrasound showed small echogenic kidneys consistent with renal disease no hydronephrosis.  Chest x-ray on December 15 " showed cardiomegaly but no CHF wound culture of the left face, grew out MSSA.  Nephrology was consulted for his renal failure and the patient adamantly declined dialysis.  Patient did undergo vein mapping for the possible anticipation of dialysis.  Blood pressures in their documentation, noted to be as high as 122/150.    0740 D/ W Pharmacist re: therapy for HTN emergency and severely elevated blood pressure.  Patient was already treated with Cardizem, will initiate therapy with labetalol.    0747AM Nephrology paged.    801 discussed with Dr. Bailey.  We discussed lab results including a normal potassium, bicarb of 11 and his creatinine with elevated blood pressures.  She remains bicarb as needed and will plan for urgent but not emergent dialysis tomorrow.    8:05 AM discussed with Dr. Winters, ICU physician her partner, Dr. Ponce, will be down here shortly.    8:10 AM patient's reevaluated at the bedside.  He appears markedly better.  His respiratory rate and work of breathing is much improved.  He can easily converse.  His blood pressure is much improved at 182/95 this is before being given the labetalol.    0820 Dr. Ponce at bedside.    9 AM spoke with the triage hospitalist who will notify the admitting hospitalist.    Discussed with Dr. Le, hospitalist, who is in the department, at the bedside, we discussed ongoing plan of care, including possibility of PCP pneumonia.  This was also discussed with the pharmacist.    The total critical care time on this patient is 120 minutes, resuscitating patient, speaking with admitting physician, and deciphering test results. This 120 minutes is exclusive of separately billable procedures.    FINAL IMPRESSION  1. Acute respiratory failure with hypoxia (HCC)    2. Pneumonia of right lung due to infectious organism, unspecified part of lung    3. Noncompliance with medication regimen    4. Hypertensive emergency    5. Chronic renal failure, unspecified CKD stage    6.  Herpes zoster without complication       Critical care time: 120 minutes

## 2020-12-20 NOTE — ED NOTES
Critical lab called by Ernie with trop of 111 and BNP greater then 35 000, repeated back. Results to  with read back.

## 2020-12-20 NOTE — ASSESSMENT & PLAN NOTE
-Hypoxia and infiltrate seen on CXR. Procalcitonin level elevated at 1.7.  -Continue augmentin and doxycycline.  -As he has not been compliant with HIV regimen, he is at risk of PCP pneumonia: Dr. Whyte has recommended against treating for PCP and instead dapsone for prophylaxis.

## 2020-12-20 NOTE — CONSULTS
DATE OF SERVICE:  12/20/2020     NEPHROLOGY CONSULTATION     REQUESTING PHYSICIAN:   Katelyn Welsh DO     REASON FOR CONSULTATION:  To evaluate the patient with end-stage renal   disease, admitted with worsening shortness of breath.     HISTORY OF PRESENT ILLNESS:  The patient is a 55-year-old male with end-stage   renal disease who was hospitalized in 07/2020 with COVID-19 pneumonia. Refused to  start dialysis, was discharged with hospice. Revoked his hospice care on his   own, states he has been traveling to [place].  Recently hospitalized in [facility] with left periorbital cellulitis, elevated creatinine level.    Also, wished to postpone to start dialysis treatment.  Currently, still   reluctant, however, agreeable to start dialysis after [place].  Complains of   shortness of breath.  No fever or chills.  No recent sick contact.  No nausea   or vomiting.  No difficulties to urinate.     REVIEW OF SYSTEMS:  GENERAL:  Positive for fatigue, malaise.  No fever or chills.  HEENT:  No nosebleeds, no sore throat, no congestion.  NECK:  No pain or stiffness.  RESPIRATORY:  Positive for shortness of breath and cough.  No hemoptysis.  CARDIOVASCULAR:  No chest pain, no palpitations.  Positive for dyspnea on   exertion.  GASTROINTESTINAL:  No abdominal pain, no nausea or vomiting.  GENITOURINARY:  No dysuria, hematuria or flank pain.  NEUROLOGIC:  No headaches, no dizziness.  All other systems reviewed and negative.     PAST MEDICAL HISTORY:  Positive for end-stage renal disease, heart failure,   HIV disease, hypertension, seizures, stroke, COVID pneumonia, recent   periorbital cellulitis.     PAST SURGICAL HISTORY:  Cholecystectomy.     SOCIAL HISTORY:  Positive tobacco.  Current everyday smoker.  No alcohol or   drug use.     FAMILY HISTORY:  Diabetes mellitus type 2 in both parents.     ALLERGIES:  No known drug allergies.     OUTPATIENT MEDICATIONS:  Reviewed.     PHYSICAL EXAMINATION:  VITAL  SIGNS:  Blood pressure 133/89, pulse 76, temperature 36.9 Celsius.  GENERAL APPEARANCE:  Well-developed, well-nourished male, in no acute   distress.  HEENT:  Normocephalic, atraumatic.  Pupils equal, round, reactive to light.    Extraocular movement intact.  Nares patent.  Oropharynx clear. Moist mucosa.   No erythema or exudate.  NECK:  Supple, no lymphadenopathy, no thyromegaly appreciated.  LUNGS:  Tachypneic, coarse breath sounds bilaterally, scattered rhonchi.  ABDOMEN:  Soft, nontender, nondistended.  Bowel sounds present.  No palpable   mass.  No rebound tenderness.  Bowel sounds present.  MUSCULOSKELETAL:  No edema, no tenderness.  NEUROLOGICAL:  Alert, oriented x3.  No focal deficits.  SKIN:  Warm and dry.     LABORATORY DATA:  Laboratory results reviewed, revealed white blood count   13.7, hemoglobin 10.1, sodium 133, potassium 4.6, CO2 of 11, BUN 64 and   creatinine 10.7, BNP more than 35,000.     ASSESSMENT:  This is a 55-year-old male with multiple medical problems,   end-stage renal disease, admitted with worsening shortness of breath,   pneumonia.  1.  End-stage renal disease.  The patient is scheduled to place tunneled   dialysis catheter and start dialysis tomorrow unless refusing.  2.  Electrolytes, mild hyponatremia, to monitor.  3.  Metabolic acidosis.  To add sodium bicarbonate, will be correcting   acidosis with dialysis after vascular access placement.  4.  Volume.  Continue Lasix at 100 mg IV twice a day.  5.  Pneumonia.  Adjust antibiotics to renal doses.  6.  Anemia.  Hemoglobin level stable.     RECOMMENDATIONS:  1.  To start dialysis after tunneled dialysis catheter placement, needs   outpatient dialysis placement unless refusing, then consider hospice care.  2.  Monitor daily CBC and basic metabolic panel.  Adjust all medications to   renal doses.  Continue sodium bicarbonate and Lasix.  We will follow the   patient closely.     Thank you for the consult.  Above plan was discussed with   and Dr. Marc Le.        ______________________________  MD JIMMY FREEDMAN/EMILIE/DANIE    DD:  12/20/2020 13:06  DT:  12/20/2020 14:17    Job#:  [id number]

## 2020-12-20 NOTE — ASSESSMENT & PLAN NOTE
Patient with repeated behavior of noncompliance and not starting dialysis  Prior was on hospice  Now asking if needed would want to be full code, intubation and would get dialysis  Palliative care consult/hospice consult    Patient continue to say one thing but by his behavior exercise another thing

## 2020-12-20 NOTE — ASSESSMENT & PLAN NOTE
Improving  RT/O2 protocol  Fluid removal with HD today, diuresis in the meantime  Continue empiric zosyn for 5-7 days

## 2020-12-20 NOTE — ASSESSMENT & PLAN NOTE
Hx of followed at [facility]  Resume antiretroviral medication if indicated by ID

## 2020-12-20 NOTE — ASSESSMENT & PLAN NOTE
S/p crusted lesion of left face with zoster  With cellulitis  Cont abx coverage  Prn benadryl cream for pruritis

## 2020-12-20 NOTE — ASSESSMENT & PLAN NOTE
-He had been admitted to [facility] and treated for facial cellulitis though exam is consistent with healing zoster. Now crusted over and no open lesions.

## 2020-12-20 NOTE — CONSULTS
Critical Care Consultation    Date of consult: 12/20/2020    Referring Physician  Katelyn Welsh D.O.    Reason for Consultation  Hypertensive emergency, volume overload, hypoxia    History of Presenting Illness  55 y.o. male who presented 12/20/2020 with HIV, ESRD that has been refusing dialysis, HFrEF 40% w/ diastolic dysfunction, Chol, ongoing tobacco abuse, COPD, seizure d/o that was discharge on hospice in July. He also was infected with Covid in July with recovery.  He recent went to [facility] for facial cellulitis which appears to me to be zoster with underlying cellulitis MSSA. He presents today with respiratory distress and hypertension to [facility]'s placed on HFNC with improved distress. He now says he doesn't want to be on hospice and agree with dialysis but on multiple other occasions has refused. He is found to have RML pna on CXR with volume overload. I have been asked to admit patient. Patient hannah fever, chills, sputum production, sore throat, eye pain or discharge, nausea or vomiting, black stools. Nephrology has been consulted and hospitalist    Code Status  Prior    Review of Systems  Review of Systems   Constitutional: Negative for chills, fever, malaise/fatigue and weight loss.   HENT: Negative for ear pain and sinus pain.    Eyes: Negative for blurred vision, photophobia, pain, discharge and redness.   Respiratory: Positive for shortness of breath. Negative for cough, hemoptysis, sputum production and stridor.    Cardiovascular: Negative for chest pain, orthopnea, claudication and leg swelling.   Gastrointestinal: Negative for abdominal pain, blood in stool, constipation, diarrhea, nausea and vomiting.   Genitourinary: Negative for frequency, hematuria and urgency.   Musculoskeletal: Negative for back pain, joint pain and myalgias.   Neurological: Negative for tingling, tremors, speech change, focal weakness, seizures, weakness and headaches.   Psychiatric/Behavioral: Negative for depression,  hallucinations, substance abuse and suicidal ideas. The patient is not nervous/anxious.        Past Medical History   has a past medical history of Heart failure ([facility]), HIV disease ([facility]) (1995), Hypertension, Seizure ([facility]), and Stroke ([facility]).    Surgical History   has a past surgical history that includes cholecystectomy (1980s).    Family History  family history includes Diabetes in his father, mother, and sister; No Known Problems in his sister, sister, and sister; Other in his brother and daughter.    Social History   reports that he has been smoking cigarettes. He has been smoking about 0.25 packs per day. He has never used smokeless tobacco. He reports that he does not drink alcohol or use drugs.    Medications  Home Medications    **Home medications have not yet been reviewed for this encounter**       Current Facility-Administered Medications   Medication Dose Route Frequency Provider Last Rate Last Admin   • piperacillin-tazobactam (ZOSYN) 4.5 g in  mL IVPB  4.5 g Intravenous Once Katelynjim Welsh, D.O. 100 mL/hr at 12/20/20 0758 4.5 g at 12/20/20 0758   • labetalol (NORMODYNE/TRANDATE) injection 10-20 mg  10-20 mg Intravenous Q10 MIN PRN Katelynjim Welsh, D.O.       • vancomycin (VANCOCIN) 1,750 mg in  mL IVPB  25 mg/kg Intravenous Once Katelynjim Welsh, D.O. 166.7 mL/hr at 12/20/20 0831 1,750 mg at 12/20/20 0831   • sodium bicarbonate tablet 1,300 mg  1,300 mg Oral Q8HRS Chip Ponce M.D.       • amLODIPine (NORVASC) tablet 10 mg  10 mg Oral Q DAY Chip Ponce M.D.         Current Outpatient Medications   Medication Sig Dispense Refill   • oxyCODONE 20 MG/ML Conc Take 5 mg by mouth every 2 hours as needed (Moderate to severe pain, SOB).     • senna-docusate (SENNA PLUS) 8.6-50 MG Tab Take 2 Tabs by mouth every day. Hold for loose stools  Indications: Constipation     • ASPIRIN LOW DOSE 81 MG EC tablet Take 1 Tab by mouth every day. Indications: Hx CVA  2       Allergies  No Known  Allergies    Vital Signs last 24 hours  Pulse:  [] 80  Resp:  [22-31] 22  BP: (171-291)/() 171/93  SpO2:  [88 %-100 %] 100 %    Physical Exam  Physical Exam  Vitals signs and nursing note reviewed.   Constitutional:       General: He is not in acute distress.     Appearance: Normal appearance. He is ill-appearing. He is not diaphoretic.      Comments: Sitting up with HFNC no distress mild tachypnea   HENT:      Head:      Comments: Left facial crusting and redness V1 left side     Right Ear: Tympanic membrane normal.      Left Ear: Tympanic membrane normal.      Nose: No rhinorrhea.      Mouth/Throat:      Mouth: Mucous membranes are moist.   Eyes:      Pupils: Pupils are equal, round, and reactive to light.      Comments: No pain with light shined in left eye or white reflection or redness   Neck:      Musculoskeletal: No neck rigidity or muscular tenderness.   Cardiovascular:      Rate and Rhythm: Normal rate.      Heart sounds: No murmur. No friction rub.   Pulmonary:      Effort: Respiratory distress present.      Breath sounds: No stridor. Rales present. No wheezing or rhonchi.   Abdominal:      General: There is no distension.      Palpations: There is no mass.      Tenderness: There is no abdominal tenderness. There is no guarding or rebound.      Hernia: No hernia is present.   Musculoskeletal:         General: No swelling or tenderness.   Skin:     Coloration: Skin is pale. Skin is not jaundiced.      Comments: Crusting and redness to left face looks like prior scabs vescicles   Neurological:      Mental Status: He is alert and oriented to person, place, and time.      Cranial Nerves: No cranial nerve deficit.      Sensory: No sensory deficit.      Motor: No weakness.      Coordination: Coordination normal.   Psychiatric:         Mood and Affect: Mood normal.         Fluids  No intake or output data in the 24 hours ending 12/20/20 0845    Laboratory  Recent Results (from the past 48 hour(s))    CBC WITH DIFFERENTIAL    Collection Time: 12/20/20  6:40 AM   Result Value Ref Range    WBC 13.7 (H) 4.8 - 10.8 K/uL    RBC 3.33 (L) 4.70 - 6.10 M/uL    Hemoglobin 10.1 (L) 14.0 - 18.0 g/dL    Hematocrit 32.5 (L) 42.0 - 52.0 %    MCV 97.6 81.4 - 97.8 fL    MCH 30.3 27.0 - 33.0 pg    MCHC 31.1 (L) 33.7 - 35.3 g/dL    RDW 58.4 (H) 35.9 - 50.0 fL    Platelet Count 219 164 - 446 K/uL    MPV 11.9 9.0 - 12.9 fL    Neutrophils-Polys 69.60 44.00 - 72.00 %    Lymphocytes 13.00 (L) 22.00 - 41.00 %    Monocytes 7.00 0.00 - 13.40 %    Eosinophils 7.00 (H) 0.00 - 6.90 %    Basophils 1.70 0.00 - 1.80 %    Nucleated RBC 0.00 /100 WBC    Neutrophils (Absolute) 9.54 (H) 1.82 - 7.42 K/uL    Lymphs (Absolute) 1.78 1.00 - 4.80 K/uL    Monos (Absolute) 0.96 (H) 0.00 - 0.85 K/uL    Eos (Absolute) 0.96 (H) 0.00 - 0.51 K/uL    Baso (Absolute) 0.23 (H) 0.00 - 0.12 K/uL    NRBC (Absolute) 0.00 K/uL    Anisocytosis 1+     Macrocytosis 1+    COMP METABOLIC PANEL    Collection Time: 12/20/20  6:40 AM   Result Value Ref Range    Sodium 133 (L) 135 - 145 mmol/L    Potassium 4.6 3.6 - 5.5 mmol/L    Chloride 103 96 - 112 mmol/L    Co2 11 (L) 20 - 33 mmol/L    Anion Gap 19.0 (H) 7.0 - 16.0    Glucose 156 (H) 65 - 99 mg/dL    Bun 64 (H) 8 - 22 mg/dL    Creatinine 10.77 (HH) 0.50 - 1.40 mg/dL    Calcium 6.9 (LL) 8.5 - 10.5 mg/dL    AST(SGOT) 23 12 - 45 U/L    ALT(SGPT) 21 2 - 50 U/L    Alkaline Phosphatase 170 (H) 30 - 99 U/L    Total Bilirubin 0.2 0.1 - 1.5 mg/dL    Albumin 3.7 3.2 - 4.9 g/dL    Total Protein 8.6 (H) 6.0 - 8.2 g/dL    Globulin 4.9 (H) 1.9 - 3.5 g/dL    A-G Ratio 0.8 g/dL   LACTIC ACID    Collection Time: 12/20/20  6:40 AM   Result Value Ref Range    Lactic Acid 3.3 (H) 0.5 - 2.0 mmol/L   proBrain Natriuretic Peptide, NT    Collection Time: 12/20/20  6:40 AM   Result Value Ref Range    NT-proBNP >39694 (H) 0 - 125 pg/mL   CORTISOL    Collection Time: 12/20/20  6:40 AM   Result Value Ref Range    Cortisol 25.9 (H) 0.0 - 23.0 ug/dL    MAGNESIUM    Collection Time: 12/20/20  6:40 AM   Result Value Ref Range    Magnesium 2.5 1.5 - 2.5 mg/dL   PHOSPHORUS    Collection Time: 12/20/20  6:40 AM   Result Value Ref Range    Phosphorus 7.0 (H) 2.5 - 4.5 mg/dL   TROPONIN    Collection Time: 12/20/20  6:40 AM   Result Value Ref Range    Troponin T 111 (H) 6 - 19 ng/L   APTT    Collection Time: 12/20/20  6:40 AM   Result Value Ref Range    APTT 47.0 (H) 24.7 - 36.0 sec   PROTHROMBIN TIME    Collection Time: 12/20/20  6:40 AM   Result Value Ref Range    PT 14.8 (H) 12.0 - 14.6 sec    INR 1.13 0.87 - 1.13   COVID/SARS CoV-2 PCR    Collection Time: 12/20/20  6:40 AM    Specimen: Nasopharyngeal; Respirate   Result Value Ref Range    COVID Order Status Received    CoV-2, Flu A/B, And RSV by PCR    Collection Time: 12/20/20  6:40 AM   Result Value Ref Range    Influenza virus A RNA Negative Negative    Influenza virus B, PCR Negative Negative    RSV, PCR Negative Negative    SARS-CoV-2 by PCR NotDetected     SARS-CoV-2 Source NP Swab    PLATELET ESTIMATE    Collection Time: 12/20/20  6:40 AM   Result Value Ref Range    Plt Estimation Normal    MORPHOLOGY    Collection Time: 12/20/20  6:40 AM   Result Value Ref Range    RBC Morphology Present     Poikilocytosis 1+     Ovalocytes 1+     Echinocytes 1+    PERIPHERAL SMEAR REVIEW    Collection Time: 12/20/20  6:40 AM   Result Value Ref Range    Peripheral Smear Review see below    DIFFERENTIAL MANUAL    Collection Time: 12/20/20  6:40 AM   Result Value Ref Range    Metamyelocytes 1.70 %    Manual Diff Status PERFORMED    ESTIMATED GFR    Collection Time: 12/20/20  6:40 AM   Result Value Ref Range    GFR If  6 (A) >60 mL/min/1.73 m 2    GFR If Non African American 5 (A) >60 mL/min/1.73 m 2   EKG    Collection Time: 12/20/20  6:48 AM   Result Value Ref Range    Report       [facility] Emergency Dept.    Test Date:  2020-12-20  Pt Name:    SONI ABBASI                  Department:  ER  MRN:        [id number]                      Room:        04  Gender:     Male                         Technician: [id number]  :        1965                   Requested By:CHRISTINE SHEPHERD  Order #:    [id number]                    Reading MD:    Measurements  Intervals                                Axis  Rate:       105                          P:          0  MO:         134                          QRS:        28  QRSD:       98                           T:          205  QT:         368  QTc:        487    Interpretive Statements  SINUS TACHYCARDIA  ABNORMAL T, CONSIDER ISCHEMIA, DIFFUSE LEADS  BORDERLINE PROLONGED QT INTERVAL  Compared to ECG 2020 00:17:03  T-wave abnormality now present  Left ventricular hypertrophy no longer present  ST (T wave) deviation no longer present  Possible ischemia still present     URINALYSIS    Collection Time: 20  6:49 AM    Specimen: Urine, Clean Catch   Result Value Ref Range    Color Yellow     Character Clear     Specific Gravity 1.011 <1.035    Ph 5.5 5.0 - 8.0    Glucose Negative Negative mg/dL    Ketones Negative Negative mg/dL    Protein 100 (A) Negative mg/dL    Bilirubin Negative Negative    Urobilinogen, Urine 0.2 Negative    Nitrite Negative Negative    Leukocyte Esterase Negative Negative    Occult Blood Small (A) Negative    Micro Urine Req Microscopic    URINE MICROSCOPIC (W/UA)    Collection Time: 20  6:49 AM   Result Value Ref Range    WBC 0-2 (A) /hpf    RBC 0-2 (A) /hpf    Bacteria Negative None /hpf    Epithelial Cells Negative /hpf    Hyaline Cast 0-2 /lpf       Imaging  DX-CHEST-PORTABLE (1 VIEW)   Final Result         Worsening airspace opacity throughout the right lower lung.          Assessment/Plan  COVID-19 virus detected- (present on admission)  Assessment & Plan  Hx of 2020  Repeat testing negative on admission    HIV (human immunodeficiency virus infection) (HCC)- (present on admission)  Assessment & Plan  [facility]  Continue antiretroviral medication  Pharmacy consult to make sure none are nephrotoxic    Goals of care, counseling/discussion  Assessment & Plan  Patient with repeated behavior of noncompliance and not starting dialysis  Prior was on hospice  Now asking if needed would want to be full code, intubation and would get dialysis  Palliative care consult/hospice consult    Patient continue to say one thing but by his behavior exercise another thing    Acute respiratory failure with hypoxia (HCC)  Assessment & Plan  Related to pna, hypertensive emergency and pulmonary edema complicated by CKD not on dialysis  Pna antibiotics, lasix, strict blood pressure control  HFNC sat > 88%  Monitor need for intubation, Bipap or emergent dialysis catheter    Herpes zoster complication  Assessment & Plan  S/p crusted lesion of left face with zoster  With cellulitis  PNA coverage should cover cellulitis  MSSA at [facility]    Stage 5 chronic kidney disease not on chronic dialysis ([facility])  Assessment & Plan  Nephrology consulted   Daily labs monitor need for dialysis  Avoid nephrotoxins  Bicarb tabs 1300 Q8  Lasix IV x1 now  May need temp dialysis catheter for volume removal    Nicotine dependence- (present on admission)  Assessment & Plan  Recommend cessation      HFrEF (heart failure with reduced ejection fraction) ([facility])- (present on admission)  Assessment & Plan  Strict blood pressure control goal < 160  Labetalol  Norvasc  Start home coreg  Consider ace inhibitor    Seizure ([facility])- (present on admission)  Assessment & Plan  Hx of continue home medication    Pneumonia- (present on admission)  Assessment & Plan  RML infiltrate  Strep and legionella antigen  Recent hospitalization for 4 days  Check MRSA nares  Given Vanco and zosyn   De-escalate with oncoming data        Discussed patient condition and risk of morbidity and/or mortality with RN, RT, Pharmacy, Charge nurse / hot rounds and Patient.      The patient remains  critically ill with hypertension and hypoxia on HFNC.  Critical care time = 60 minutes in directly providing and coordinating critical care and extensive data review.  No time overlap and excludes procedures.

## 2020-12-20 NOTE — ASSESSMENT & PLAN NOTE
-Followed by Dr. Whyte at [facility], who states he has been off his meds for 5 months.  -CD4 count low at 36.

## 2020-12-20 NOTE — ED NOTES
Ernie from Lab called with critical result of calcium of 6.9 and creat of 10.77. at 0739. Critical lab result read back to Ernie in lab.    notified of critical lab result at 0740.  Critical lab result read back by .

## 2020-12-21 ENCOUNTER — APPOINTMENT (OUTPATIENT)
Dept: RADIOLOGY | Facility: MEDICAL CENTER | Age: 55
DRG: 974 | End: 2020-12-21
Attending: INTERNAL MEDICINE
Payer: COMMERCIAL

## 2020-12-21 PROBLEM — U07.1 COVID-19 VIRUS DETECTED: Status: RESOLVED | Noted: 2020-07-03 | Resolved: 2020-12-21

## 2020-12-21 PROBLEM — D63.1 ANEMIA DUE TO STAGE 5 CHRONIC KIDNEY DISEASE, NOT ON CHRONIC DIALYSIS (HCC): Status: ACTIVE | Noted: 2020-12-20

## 2020-12-21 LAB
ALBUMIN SERPL BCP-MCNC: 2.6 G/DL (ref 3.2–4.9)
ALBUMIN/GLOB SERPL: 0.6 G/DL
ALP SERPL-CCNC: 104 U/L (ref 30–99)
ALT SERPL-CCNC: 13 U/L (ref 2–50)
ANION GAP SERPL CALC-SCNC: 16 MMOL/L (ref 7–16)
ANISOCYTOSIS BLD QL SMEAR: ABNORMAL
AST SERPL-CCNC: 17 U/L (ref 12–45)
BASOPHILS # BLD AUTO: 0 % (ref 0–1.8)
BASOPHILS # BLD: 0 K/UL (ref 0–0.12)
BILIRUB SERPL-MCNC: 0.2 MG/DL (ref 0.1–1.5)
BUN SERPL-MCNC: 66 MG/DL (ref 8–22)
BURR CELLS BLD QL SMEAR: NORMAL
CALCIUM SERPL-MCNC: 6.2 MG/DL (ref 8.5–10.5)
CHLORIDE SERPL-SCNC: 105 MMOL/L (ref 96–112)
CO2 SERPL-SCNC: 14 MMOL/L (ref 20–33)
CREAT SERPL-MCNC: 10.77 MG/DL (ref 0.5–1.4)
EOSINOPHIL # BLD AUTO: 0 K/UL (ref 0–0.51)
EOSINOPHIL NFR BLD: 0 % (ref 0–6.9)
ERYTHROCYTE [DISTWIDTH] IN BLOOD BY AUTOMATED COUNT: 52.9 FL (ref 35.9–50)
GLOBULIN SER CALC-MCNC: 4.5 G/DL (ref 1.9–3.5)
GLUCOSE SERPL-MCNC: 83 MG/DL (ref 65–99)
HAV IGM SERPL QL IA: NORMAL
HBV CORE IGM SER QL: NORMAL
HBV SURFACE AB SERPL IA-ACNC: <3.5 MIU/ML (ref 0–10)
HBV SURFACE AG SER QL: NORMAL
HCT VFR BLD AUTO: 24.8 % (ref 42–52)
HCV AB SER QL: NORMAL
HGB BLD-MCNC: 7.9 G/DL (ref 14–18)
HYPOCHROMIA BLD QL SMEAR: ABNORMAL
LYMPHOCYTES # BLD AUTO: 0.74 K/UL (ref 1–4.8)
LYMPHOCYTES NFR BLD: 9.8 % (ref 22–41)
MACROCYTES BLD QL SMEAR: ABNORMAL
MANUAL DIFF BLD: NORMAL
MCH RBC QN AUTO: 30.8 PG (ref 27–33)
MCHC RBC AUTO-ENTMCNC: 32.8 G/DL (ref 33.7–35.3)
MCV RBC AUTO: 93.8 FL (ref 81.4–97.8)
MONOCYTES # BLD AUTO: 0.61 K/UL (ref 0–0.85)
MONOCYTES NFR BLD AUTO: 8 % (ref 0–13.4)
MORPHOLOGY BLD-IMP: NORMAL
NEUTROPHILS # BLD AUTO: 6.24 K/UL (ref 1.82–7.42)
NEUTROPHILS NFR BLD: 82.1 % (ref 44–72)
NRBC # BLD AUTO: 0 K/UL
NRBC BLD-RTO: 0 /100 WBC
OVALOCYTES BLD QL SMEAR: NORMAL
PLATELET # BLD AUTO: 157 K/UL (ref 164–446)
PLATELET BLD QL SMEAR: NORMAL
PMV BLD AUTO: 11.6 FL (ref 9–12.9)
POIKILOCYTOSIS BLD QL SMEAR: NORMAL
POTASSIUM SERPL-SCNC: 4.2 MMOL/L (ref 3.6–5.5)
PROT SERPL-MCNC: 7.1 G/DL (ref 6–8.2)
RBC # BLD AUTO: 2.6 M/UL (ref 4.7–6.1)
RBC BLD AUTO: PRESENT
SMUDGE CELLS BLD QL SMEAR: NORMAL
SODIUM SERPL-SCNC: 135 MMOL/L (ref 135–145)
WBC # BLD AUTO: 7.6 K/UL (ref 4.8–10.8)

## 2020-12-21 PROCEDURE — 99233 SBSQ HOSP IP/OBS HIGH 50: CPT | Performed by: HOSPITALIST

## 2020-12-21 PROCEDURE — 93986 DUP-SCAN HEMO COMPL UNI STD: CPT | Mod: 26,LT | Performed by: INTERNAL MEDICINE

## 2020-12-21 PROCEDURE — 5A1D70Z PERFORMANCE OF URINARY FILTRATION, INTERMITTENT, LESS THAN 6 HOURS PER DAY: ICD-10-PCS | Performed by: INTERNAL MEDICINE

## 2020-12-21 PROCEDURE — A9270 NON-COVERED ITEM OR SERVICE: HCPCS | Performed by: INTERNAL MEDICINE

## 2020-12-21 PROCEDURE — 85007 BL SMEAR W/DIFF WBC COUNT: CPT

## 2020-12-21 PROCEDURE — 80053 COMPREHEN METABOLIC PANEL: CPT

## 2020-12-21 PROCEDURE — 99233 SBSQ HOSP IP/OBS HIGH 50: CPT | Performed by: INTERNAL MEDICINE

## 2020-12-21 PROCEDURE — 700102 HCHG RX REV CODE 250 W/ 637 OVERRIDE(OP): Performed by: RADIOLOGY

## 2020-12-21 PROCEDURE — 86480 TB TEST CELL IMMUN MEASURE: CPT

## 2020-12-21 PROCEDURE — 770020 HCHG ROOM/CARE - TELE (206)

## 2020-12-21 PROCEDURE — 0JH63XZ INSERTION OF TUNNELED VASCULAR ACCESS DEVICE INTO CHEST SUBCUTANEOUS TISSUE AND FASCIA, PERCUTANEOUS APPROACH: ICD-10-PCS | Performed by: RADIOLOGY

## 2020-12-21 PROCEDURE — 700111 HCHG RX REV CODE 636 W/ 250 OVERRIDE (IP): Performed by: HOSPITALIST

## 2020-12-21 PROCEDURE — 700101 HCHG RX REV CODE 250

## 2020-12-21 PROCEDURE — 99153 MOD SED SAME PHYS/QHP EA: CPT

## 2020-12-21 PROCEDURE — 700111 HCHG RX REV CODE 636 W/ 250 OVERRIDE (IP): Performed by: INTERNAL MEDICINE

## 2020-12-21 PROCEDURE — 86706 HEP B SURFACE ANTIBODY: CPT

## 2020-12-21 PROCEDURE — 700102 HCHG RX REV CODE 250 W/ 637 OVERRIDE(OP): Performed by: INTERNAL MEDICINE

## 2020-12-21 PROCEDURE — 93986 DUP-SCAN HEMO COMPL UNI STD: CPT | Mod: LT

## 2020-12-21 PROCEDURE — A9270 NON-COVERED ITEM OR SERVICE: HCPCS | Performed by: RADIOLOGY

## 2020-12-21 PROCEDURE — 90935 HEMODIALYSIS ONE EVALUATION: CPT

## 2020-12-21 PROCEDURE — 700102 HCHG RX REV CODE 250 W/ 637 OVERRIDE(OP): Performed by: HOSPITALIST

## 2020-12-21 PROCEDURE — A9270 NON-COVERED ITEM OR SERVICE: HCPCS | Performed by: HOSPITALIST

## 2020-12-21 PROCEDURE — 700111 HCHG RX REV CODE 636 W/ 250 OVERRIDE (IP)

## 2020-12-21 PROCEDURE — 02H633Z INSERTION OF INFUSION DEVICE INTO RIGHT ATRIUM, PERCUTANEOUS APPROACH: ICD-10-PCS | Performed by: RADIOLOGY

## 2020-12-21 PROCEDURE — 700105 HCHG RX REV CODE 258: Performed by: HOSPITALIST

## 2020-12-21 PROCEDURE — 80074 ACUTE HEPATITIS PANEL: CPT

## 2020-12-21 PROCEDURE — 85027 COMPLETE CBC AUTOMATED: CPT

## 2020-12-21 PROCEDURE — 700105 HCHG RX REV CODE 258: Performed by: INTERNAL MEDICINE

## 2020-12-21 RX ORDER — HEPARIN SODIUM (PORCINE) LOCK FLUSH IV SOLN 100 UNIT/ML 100 UNIT/ML
SOLUTION INTRAVENOUS
Status: COMPLETED
Start: 2020-12-21 | End: 2020-12-21

## 2020-12-21 RX ORDER — CEFAZOLIN SODIUM 1 G/3ML
INJECTION, POWDER, FOR SOLUTION INTRAMUSCULAR; INTRAVENOUS
Status: COMPLETED
Start: 2020-12-21 | End: 2020-12-21

## 2020-12-21 RX ORDER — MIDAZOLAM HYDROCHLORIDE 1 MG/ML
INJECTION INTRAMUSCULAR; INTRAVENOUS
Status: COMPLETED
Start: 2020-12-21 | End: 2020-12-21

## 2020-12-21 RX ORDER — CEFAZOLIN SODIUM 2 G/100ML
2 INJECTION, SOLUTION INTRAVENOUS ONCE
Status: COMPLETED | OUTPATIENT
Start: 2020-12-21 | End: 2020-12-21

## 2020-12-21 RX ORDER — SODIUM CHLORIDE 9 MG/ML
0-250 INJECTION, SOLUTION INTRAVENOUS ONCE
Status: ACTIVE | OUTPATIENT
Start: 2020-12-21 | End: 2020-12-22

## 2020-12-21 RX ORDER — SODIUM CHLORIDE 9 MG/ML
500 INJECTION, SOLUTION INTRAVENOUS
Status: ACTIVE | OUTPATIENT
Start: 2020-12-21 | End: 2020-12-21

## 2020-12-21 RX ORDER — SODIUM CHLORIDE 9 MG/ML
INJECTION, SOLUTION INTRAVENOUS
Status: DISPENSED
Start: 2020-12-21 | End: 2020-12-22

## 2020-12-21 RX ORDER — SODIUM CHLORIDE 9 MG/ML
250 INJECTION, SOLUTION INTRAVENOUS
Status: DISCONTINUED | OUTPATIENT
Start: 2020-12-21 | End: 2020-12-28 | Stop reason: HOSPADM

## 2020-12-21 RX ORDER — OXYCODONE HYDROCHLORIDE 10 MG/1
10 TABLET ORAL
Status: ACTIVE | OUTPATIENT
Start: 2020-12-21 | End: 2020-12-22

## 2020-12-21 RX ORDER — LIDOCAINE HYDROCHLORIDE AND EPINEPHRINE 10; 10 MG/ML; UG/ML
INJECTION, SOLUTION INFILTRATION; PERINEURAL
Status: COMPLETED
Start: 2020-12-21 | End: 2020-12-21

## 2020-12-21 RX ORDER — ONDANSETRON 2 MG/ML
4 INJECTION INTRAMUSCULAR; INTRAVENOUS PRN
Status: ACTIVE | OUTPATIENT
Start: 2020-12-21 | End: 2020-12-21

## 2020-12-21 RX ORDER — CARVEDILOL 6.25 MG/1
6.25 TABLET ORAL 2 TIMES DAILY WITH MEALS
Status: DISCONTINUED | OUTPATIENT
Start: 2020-12-21 | End: 2020-12-21

## 2020-12-21 RX ORDER — HEPARIN SODIUM 1000 [USP'U]/ML
3700 INJECTION, SOLUTION INTRAVENOUS; SUBCUTANEOUS
Status: DISCONTINUED | OUTPATIENT
Start: 2020-12-21 | End: 2020-12-28 | Stop reason: HOSPADM

## 2020-12-21 RX ORDER — OXYCODONE HYDROCHLORIDE 5 MG/1
5 TABLET ORAL
Status: DISPENSED | OUTPATIENT
Start: 2020-12-21 | End: 2020-12-22

## 2020-12-21 RX ORDER — DIPHENHYDRAMINE HYDROCHLORIDE, ZINC ACETATE 2; .1 G/100G; G/100G
CREAM TOPICAL 3 TIMES DAILY PRN
Status: DISCONTINUED | OUTPATIENT
Start: 2020-12-21 | End: 2020-12-28 | Stop reason: HOSPADM

## 2020-12-21 RX ORDER — CARVEDILOL 6.25 MG/1
6.25 TABLET ORAL 2 TIMES DAILY WITH MEALS
Status: DISCONTINUED | OUTPATIENT
Start: 2020-12-21 | End: 2020-12-23

## 2020-12-21 RX ORDER — MIDAZOLAM HYDROCHLORIDE 1 MG/ML
.5-2 INJECTION INTRAMUSCULAR; INTRAVENOUS PRN
Status: ACTIVE | OUTPATIENT
Start: 2020-12-21 | End: 2020-12-21

## 2020-12-21 RX ADMIN — CEFAZOLIN 2000 MG: 330 INJECTION, POWDER, FOR SOLUTION INTRAMUSCULAR; INTRAVENOUS at 12:22

## 2020-12-21 RX ADMIN — HEPARIN SODIUM 3700 UNITS: 1000 INJECTION, SOLUTION INTRAVENOUS; SUBCUTANEOUS at 18:28

## 2020-12-21 RX ADMIN — Medication: at 09:28

## 2020-12-21 RX ADMIN — MIDAZOLAM HYDROCHLORIDE 1 MG: 1 INJECTION INTRAMUSCULAR; INTRAVENOUS at 12:24

## 2020-12-21 RX ADMIN — OXYCODONE 5 MG: 5 TABLET ORAL at 15:10

## 2020-12-21 RX ADMIN — SODIUM BICARBONATE 1300 MG: 650 TABLET ORAL at 05:28

## 2020-12-21 RX ADMIN — FUROSEMIDE 100 MG: 10 INJECTION, SOLUTION INTRAVENOUS at 05:28

## 2020-12-21 RX ADMIN — CALCIUM GLUCONATE 1 G: 98 INJECTION, SOLUTION INTRAVENOUS at 09:28

## 2020-12-21 RX ADMIN — CARVEDILOL 6.25 MG: 6.25 TABLET, FILM COATED ORAL at 16:26

## 2020-12-21 RX ADMIN — HEPARIN SODIUM 5000 UNITS: 5000 INJECTION, SOLUTION INTRAVENOUS; SUBCUTANEOUS at 13:16

## 2020-12-21 RX ADMIN — MIDAZOLAM HYDROCHLORIDE 1 MG: 1 INJECTION, SOLUTION INTRAMUSCULAR; INTRAVENOUS at 12:24

## 2020-12-21 RX ADMIN — PIPERACILLIN SODIUM, TAZOBACTAM SODIUM 3.38 G: 3; .375 INJECTION, POWDER, LYOPHILIZED, FOR SOLUTION INTRAVENOUS at 18:51

## 2020-12-21 RX ADMIN — LIDOCAINE HYDROCHLORIDE AND EPINEPHRINE: 10; 10 INJECTION, SOLUTION INFILTRATION; PERINEURAL at 12:27

## 2020-12-21 RX ADMIN — HEPARIN SODIUM 5000 UNITS: 5000 INJECTION, SOLUTION INTRAVENOUS; SUBCUTANEOUS at 05:28

## 2020-12-21 RX ADMIN — DAPSONE 100 MG: 100 TABLET ORAL at 05:28

## 2020-12-21 RX ADMIN — SODIUM BICARBONATE 1300 MG: 650 TABLET ORAL at 21:45

## 2020-12-21 RX ADMIN — AMLODIPINE BESYLATE 10 MG: 10 TABLET ORAL at 05:28

## 2020-12-21 RX ADMIN — HEPARIN 4 ML: 100 SYRINGE at 12:44

## 2020-12-21 RX ADMIN — FENTANYL CITRATE 25 MCG: 50 INJECTION, SOLUTION INTRAMUSCULAR; INTRAVENOUS at 12:24

## 2020-12-21 RX ADMIN — PIPERACILLIN SODIUM, TAZOBACTAM SODIUM 3.38 G: 3; .375 INJECTION, POWDER, LYOPHILIZED, FOR SOLUTION INTRAVENOUS at 05:28

## 2020-12-21 RX ADMIN — SODIUM BICARBONATE 1300 MG: 650 TABLET ORAL at 13:16

## 2020-12-21 ASSESSMENT — ENCOUNTER SYMPTOMS
BACK PAIN: 0
HEADACHES: 0
SHORTNESS OF BREATH: 0
SHORTNESS OF BREATH: 1
PALPITATIONS: 0
FEVER: 0
COUGH: 0
VOMITING: 0
CHILLS: 0
SPUTUM PRODUCTION: 0
COUGH: 1
NERVOUS/ANXIOUS: 0
DIARRHEA: 0
BRUISES/BLEEDS EASILY: 0
ABDOMINAL PAIN: 0
DIZZINESS: 0
NAUSEA: 0
DEPRESSION: 0
BLURRED VISION: 0

## 2020-12-21 ASSESSMENT — FIBROSIS 4 INDEX: FIB4 SCORE: 1.68

## 2020-12-21 NOTE — PROGRESS NOTES
Lab called with critical result of Cr at 11.77. Critical lab result read back.  Dr. Stearns notified of critical lab result at 1943.  Critical lab result read back by Dr. Stearns.

## 2020-12-21 NOTE — PROGRESS NOTES
Nephrology Daily Progress Note    Date of Service  12/21/2020    Chief Complaint  55 y.o. male with HIV and CKD5 not yet on dialysis admitted 12/20/2020 with shortness of breath.    Interval Problem Update  12/21 -  services were used in the patient's primary language of Setswana. Mode of interpretation: iPad  - Denies chest pain, SOB, headache, nausea, vomiting. Amenable to starting dialysis.     Review of Systems  Review of Systems   Constitutional: Negative for fever.   Respiratory: Negative for shortness of breath.    Cardiovascular: Negative for chest pain.   Gastrointestinal: Negative for abdominal pain.   All other systems reviewed and are negative.       Physical Exam  Temp:  [36.2 °C (97.2 °F)-37.3 °C (99.2 °F)] 37.1 °C (98.7 °F)  Pulse:  [] 85  Resp:  [16-49] 18  BP: (143-176)/() 156/88  SpO2:  [89 %-99 %] 94 %    Physical Exam   Constitutional: He is oriented to person, place, and time. He appears well-developed. No distress.   HENT:   Mouth/Throat: Oropharynx is clear and moist. No oropharyngeal exudate.   Eyes: EOM are normal. No scleral icterus.   Neck: No tracheal deviation present.   Cardiovascular: Normal rate and normal heart sounds.   No murmur heard.  Pulmonary/Chest: Effort normal and breath sounds normal. No stridor. He has no rales.   Abdominal: Soft. He exhibits no distension. There is no abdominal tenderness.   Musculoskeletal: Normal range of motion.         General: No edema.   Neurological: He is alert and oriented to person, place, and time.   Skin: Skin is warm and dry. Rash (crusting rash on left forehead) noted. He is not diaphoretic.   Psychiatric: He has a normal mood and affect.       Fluids    Intake/Output Summary (Last 24 hours) at 12/21/2020 1505  Last data filed at 12/21/2020 1200  Gross per 24 hour   Intake 735.83 ml   Output 2650 ml   Net -1914.17 ml       Laboratory  Labs reviewed, pertinent labs below.  Recent Labs     12/20/20  0640 12/21/20  0604    WBC 13.7* 7.6   RBC 3.33* 2.60*   HEMOGLOBIN 10.1* 7.9*   HEMATOCRIT 32.5* 24.8*   MCV 97.6 93.8   MCH 30.3 30.8   MCHC 31.1* 32.8*   RDW 58.4* 52.9*   PLATELETCT 219 157*   MPV 11.9 11.6     Recent Labs     12/20/20  0640 12/20/20  1830 12/21/20  0525   SODIUM 133* 135 135   POTASSIUM 4.6 4.4 4.2   CHLORIDE 103 105 105   CO2 11* 12* 14*   GLUCOSE 156* 110* 83   BUN 64* 66* 66*   CREATININE 10.77* 11.16* 10.77*   CALCIUM 6.9* 6.3* 6.2*     Recent Labs     12/20/20  0640   APTT 47.0*   INR 1.13           URINALYSIS:  Lab Results   Component Value Date/Time    COLORURINE Yellow 12/20/2020 0649    CLARITY Clear 12/20/2020 0649    SPECGRAVITY 1.011 12/20/2020 0649    PHURINE 5.5 12/20/2020 0649    KETONES Negative 12/20/2020 0649    PROTEINURIN 100 (A) 12/20/2020 0649    BILIRUBINUR Negative 12/20/2020 0649    UROBILU 0.2 12/20/2020 0649    NITRITE Negative 12/20/2020 0649    LEUKESTERAS Negative 12/20/2020 0649    OCCULTBLOOD Small (A) 12/20/2020 0649     UPC  Lab Results   Component Value Date/Time    TOTPROTUR 148.0 (H) 07/03/2020 1315      Lab Results   Component Value Date/Time    CREATININEU 49.87 07/03/2020 1315         Imaging reviewed  IR-SONIA ELLISON PLACEMENT >5   Final Result      1. ULTRASOUND AND FLUOROSCOPIC GUIDED PLACEMENT OF A Right INTERNAL JUGULAR 14.5 [place] HemoSplit TUNNELED DIALYSIS CATHETER.      2. THE HEMODIALYSIS CATHETER MAY BE USED IMMEDIATELY AS CLINICALLY INDICATED. FLUSHES PER PROTOCOL.         DX-CHEST-PORTABLE (1 VIEW)   Final Result         Worsening airspace opacity throughout the right lower lung.      US-HEMODIALYSIS ACCESS CREATION DUPLEX UNILAT LEFT    (Results Pending)         Current Facility-Administered Medications   Medication Dose Route Frequency Provider Last Rate Last Admin   • diphenhydrAMINE-zinc acetate (BENADRYL ITCH) topical cream   Topical TID PRN Jeremy M Gonda, M.D.   Given at 12/21/20 0928   • NS (BOLUS) infusion 250 mL  250 mL Intravenous DIALYSIS PRN Bernabe  KARLENE Beasley       • NS (BOLUS) infusion 0-250 mL  0-250 mL Intravenous Once Bernabe Beasley M.D.       • NS (BOLUS) infusion 500 mL  500 mL Intravenous Once PRMEMO Johnson M.D.       • fentaNYL (SUBLIMAZE) injection 12.5-50 mcg  12.5-50 mcg Intravenous PRN Yannick Johnson M.D.   25 mcg at 12/21/20 1224   • midazolam (VERSED) injection 0.5-2 mg  0.5-2 mg Intravenous PRN Yannick Johnson M.D.   1 mg at 12/21/20 1224   • ondansetron (ZOFRAN) syringe/vial injection 4 mg  4 mg Intravenous PRN Yannick Johnson M.D.       • oxyCODONE immediate-release (ROXICODONE) tablet 5 mg  5 mg Oral Q3HRS PRN Yannick Johnson M.D.       • oxyCODONE immediate release (ROXICODONE) tablet 10 mg  10 mg Oral Q3HRS PRN Yannick Johnson M.D.       • SODIUM CHLORIDE 0.9 % IV SOLN            • carvedilol (COREG) tablet 6.25 mg  6.25 mg Oral BID WITH MEALS Jeremy M Gonda, M.D.       • labetalol (NORMODYNE/TRANDATE) injection 10-20 mg  10-20 mg Intravenous Q10 MIN PRN Katelyn Welsh D.YESY   20 mg at 12/20/20 1048   • sodium bicarbonate tablet 1,300 mg  1,300 mg Oral Q8HRS Chip Ponce M.D.   1,300 mg at 12/21/20 1316   • amLODIPine (NORVASC) tablet 10 mg  10 mg Oral Q DAY Chip Ponce M.D.   10 mg at 12/21/20 0528   • senna-docusate (PERICOLACE or SENOKOT S) 8.6-50 MG per tablet 2 Tab  2 Tab Oral BID Marc Le M.D.   Stopped at 12/20/20 1130    And   • polyethylene glycol/lytes (MIRALAX) PACKET 1 Packet  1 Packet Oral QDAY PRN Marc Le M.D.        And   • bisacodyl (DULCOLAX) suppository 10 mg  10 mg Rectal QDAY PRN Marc Le M.D.       • Respiratory Therapy Consult   Nebulization Continuous RT Marc Le M.D.       • heparin injection 5,000 Units  5,000 Units Subcutaneous Q8HRS Marc Le M.D.   5,000 Units at 12/21/20 1316   • acetaminophen (Tylenol) tablet 650 mg  650 mg Oral Q6HRS PRN Marc Le M.D.       • furosemide (LASIX) injection 100 mg  100 mg Intravenous BID DIURETIC Marc Le M.D.   100  mg at 12/21/20 0528   • piperacillin-tazobactam (ZOSYN) 3.375 g in  mL IVPB  3.375 g Intravenous Q12HRS Marc Le M.D.   Stopped at 12/21/20 0928   • dapsone tablet 100 mg  100 mg Oral DAILY Marc Le M.D.   100 mg at 12/21/20 0528         Assessment/Plan  55 y.o. male with HIV and CKD5 not yet on dialysis admitted 12/20/2020 with shortness of breath.    1. CKD5 nonoliguric.  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  Due to metabolic acidosis, and poor compliance with oral medication/bicarbonate, I recommend dialysis initiation.  Patient is nonoliguric, avoid nephrotoxins to preserve residual kidney function.  Check labs daily.    2.  Access: None currently.  Recommend upper extremity mapping studies, and placement of tunneled dialysis catheter for more immediate dialysis initiation.    3.  Metabolic acidosis, likely due to advanced CKD.  This should improve with dialysis, at which point sodium bicarbonate supplementation can be stopped.    4.  HIV/AIDS, uncontrolled.  Management per primary team and infectious disease.    5.  Anemia of chronic disease, worsening.  Start Epogen thrice weekly with dialysis.  Check CBC daily.    6.  Thrombocytopenia, worsening, unclear etiology.  Check CBC daily.    7.  Hypertension, likely due to occult volume overload.  We will plan on ultrafiltration with dialysis as tolerated.        Bernabe Beasley MD  Nephrology

## 2020-12-21 NOTE — HEART FAILURE PROGRAM
"It is fairly clear that patient's principal problem for this admission is ESRD with volume overload in the setting of having refused dialysis for quite some time now and in the setting of well documented uncontrolled HTN with medication non adherence.    Patient was under the care of hospice until 7/31/20- discharge remarks state: \"patient revoked for aggressive treatment\".    Patient has told providers here that he now wants to be a full code and that he is willing to undergo dialysis. There has been discussion in the notes of palliative consult this seems very appropriate.    With all that said, I did discuss this patient with Dr. Le who informed me, and updated his note to reflect that patient's presentation is due to renal failure no0t heart failure. Patient also carries diagnoses of HIV, presumed PNA, and he had SARS CoV-2 back in July.    Tobacco Cessation: documented on H&P  Diabetes Dx: not diagnosed  AC for atrial arrhythmia: n/a sinus tachycardia  SCOTT - I: Please consider before discharge  Evidence based beta blocker (bisoprolol, carvedilol, or Toprol XL): please consider before discharge  Aldosterone antagonist: n/a for EF greater than 35%  Hydralazine dinitrate: n/a per facesheet  Influenza vaccine: please screen patient for this  Pneumococcal vaccine: previous  Device Screening: n/a EF greater than 35%    Thank you, Katelyn Cardio RN Navigator [phone]      "

## 2020-12-21 NOTE — HOSPITAL COURSE
55 y.o. male who presented 12/20/2020 with HIV, ESRD that has been refusing dialysis, HFrEF 40% w/ diastolic dysfunction, Chol, ongoing tobacco abuse, COPD, seizure d/o that was discharge on hospice in July. He also was infected with Covid in July with recovery.  He recent went to [facility] for facial cellulitis which appears to me to be zoster with underlying cellulitis MSSA. He presents today with respiratory distress and hypertension to 290's placed on HFNC with improved distress. He now says he doesn't want to be on hospice and agree with dialysis but on multiple other occasions has refused. He is found to have RML pna on CXR with volume overload. I have been asked to admit patient. Patient hannah fever, chills, sputum production, sore throat, eye pain or discharge, nausea or vomiting, black stools. Nephrology has been consulted and hospitalist

## 2020-12-21 NOTE — OR SURGEON
Immediate Post- Operative Note        PostOp Diagnosis: Renal Failure    Procedure(s): RIJ Tunnelled HD Cath    Estimated Blood Loss: Less than 5 ml        Complications: None            12/21/2020     12:08 PM     Yannick Johnson M.D.

## 2020-12-21 NOTE — PROGRESS NOTES
Pt to IR. Nephrologist spoke with pt about procedure using  iPad.   This RN obtained consent.    Updated pt that he will be transferred out of ICU following procedure.  Report given to [facility] RN.  Chart, meds, and belongings with pt including clothes, phone, and glasses.   Call [phone] with questions.

## 2020-12-21 NOTE — PROGRESS NOTES
IR Nursing Note:    Procedure Confirmed with MD, patient and RN pre procedure. Consent obtained by MD with all questions answered, placed in Patient's chart. Patient assisted to the table in the supine position with all bony prominences padded and draped in sterile fashion.    Tunneled Hemodialysis Catheter Placement by MD Johnson assisted by RT Brody, right chest / neck access site sutured in place and CDI with derma bond, steri strips, biopatch, gauze, and a tegaderm dressing.     End Tidal CO2 range 20-21 during procedure.     Patient tolerated procedure, hemodynamically stable; pt awake and alert post procedure; report given to NAMRATA Rush; patient transported to [facility] via IR RN monitored then transferred care to report RN.    BARD HemoSplit Long-term Hemodialysis Catheter 14.5F x 23cm  REF# [id number] LOT# [id number] EXP. 06/30/2022

## 2020-12-21 NOTE — PROGRESS NOTES
Critical Care Progress Note    Date of admission  12/20/2020    Chief Complaint  55 y.o. male admitted 12/20/2020 with SOB    Hospital Course  55 y.o. male who presented 12/20/2020 with HIV, ESRD that has been refusing dialysis, HFrEF 40% w/ diastolic dysfunction, Chol, ongoing tobacco abuse, COPD, seizure d/o that was discharge on hospice in July. He also was infected with Covid in July with recovery.  He recent went to [facility] for facial cellulitis which appears to me to be zoster with underlying cellulitis MSSA. He presents today with respiratory distress and hypertension to [facility]'s placed on HFNC with improved distress. He now says he doesn't want to be on hospice and agree with dialysis but on multiple other occasions has refused. He is found to have RML pna on CXR with volume overload. I have been asked to admit patient. Patient ahnnah fever, chills, sputum production, sore throat, eye pain or discharge, nausea or vomiting, black stools. Nephrology has been consulted and hospitalist      Interval Problem Update  Reviewed last 24 hour events:   - AF, improved WBC   - AAOx4, no focal deficits, denies pain   - NSR, -170   - Hgb down to 8   - plts down to 157   - ledy renal diet   - ?HD cath placement today if patient agrees   - 1L UOP overnight   - low Ca   - zosyn and dapsone    Review of Systems  Review of Systems   Constitutional: Negative for chills and fever.   HENT: Negative for congestion.    Eyes: Negative for blurred vision.   Respiratory: Negative for cough and shortness of breath.    Cardiovascular: Positive for leg swelling. Negative for chest pain and palpitations.   Gastrointestinal: Negative for abdominal pain, nausea and vomiting.   Genitourinary: Negative for dysuria.   Musculoskeletal: Negative for back pain.   Skin: Positive for itching and rash.   Neurological: Negative for dizziness and headaches.   Endo/Heme/Allergies: Does not bruise/bleed easily.   Psychiatric/Behavioral: Negative for  depression. The patient is not nervous/anxious.    All other systems reviewed and are negative.       Vital Signs for last 24 hours   Temp:  [36.2 °C (97.2 °F)-37.3 °C (99.2 °F)] 37.3 °C (99.2 °F)  Pulse:  [73-97] 90  Resp:  [17-41] 25  BP: (133-291)/() 171/101  SpO2:  [95 %-100 %] 98 %    Respiratory Information for the last 24 hours   10 lpm FM --> 3 lpm n/c, IS 750mL    Physical Exam   Physical Exam  Vitals signs and nursing note reviewed.   Constitutional:       Appearance: He is normal weight. He is not toxic-appearing.      Comments: Up in chair   HENT:      Head: Normocephalic.      Right Ear: External ear normal.      Left Ear: External ear normal.      Nose: Nose normal. No congestion.      Mouth/Throat:      Mouth: Mucous membranes are moist.      Pharynx: Oropharynx is clear.   Eyes:      Conjunctiva/sclera: Conjunctivae normal.      Pupils: Pupils are equal, round, and reactive to light.   Neck:      Musculoskeletal: Neck supple. No neck rigidity.   Cardiovascular:      Rate and Rhythm: Normal rate and regular rhythm.      Pulses: Normal pulses.      Heart sounds: Normal heart sounds. No murmur.   Pulmonary:      Effort: Pulmonary effort is normal. No respiratory distress.      Breath sounds: Rales present. No wheezing.   Abdominal:      General: Bowel sounds are normal. There is no distension.      Palpations: Abdomen is soft.      Tenderness: There is no abdominal tenderness. There is no guarding.   Musculoskeletal:         General: No tenderness.   Skin:     Capillary Refill: Capillary refill takes less than 2 seconds.      Findings: Rash (L facial, scabs) present.   Neurological:      General: No focal deficit present.      Mental Status: He is alert and oriented to person, place, and time.      Cranial Nerves: No cranial nerve deficit.   Psychiatric:         Mood and Affect: Mood normal.         Behavior: Behavior normal.         Medications  Current Facility-Administered Medications    Medication Dose Route Frequency Provider Last Rate Last Admin   • labetalol (NORMODYNE/TRANDATE) injection 10-20 mg  10-20 mg Intravenous Q10 MIN PRN Katelyn Welsh D.O.   20 mg at 12/20/20 1048   • sodium bicarbonate tablet 1,300 mg  1,300 mg Oral Q8HRS Chip Ponce M.D.   1,300 mg at 12/21/20 0528   • amLODIPine (NORVASC) tablet 10 mg  10 mg Oral Q DAY Chip Ponce M.D.   10 mg at 12/21/20 0528   • senna-docusate (PERICOLACE or SENOKOT S) 8.6-50 MG per tablet 2 Tab  2 Tab Oral BID Marc Le M.D.   Stopped at 12/20/20 1130    And   • polyethylene glycol/lytes (MIRALAX) PACKET 1 Packet  1 Packet Oral QDAY PRN Marc Le M.D.        And   • bisacodyl (DULCOLAX) suppository 10 mg  10 mg Rectal QDAY PRN Marc Le M.D.       • Respiratory Therapy Consult   Nebulization Continuous RT Marc Le M.D.       • heparin injection 5,000 Units  5,000 Units Subcutaneous Q8HRS Marc Le M.D.   5,000 Units at 12/21/20 0528   • acetaminophen (Tylenol) tablet 650 mg  650 mg Oral Q6HRS PRN Marc Le M.D.       • furosemide (LASIX) injection 100 mg  100 mg Intravenous BID DIURETIC Marc Le M.D.   100 mg at 12/21/20 0528   • piperacillin-tazobactam (ZOSYN) 3.375 g in  mL IVPB  3.375 g Intravenous Q12HRS Marc Le M.D. 25 mL/hr at 12/21/20 0528 3.375 g at 12/21/20 0528   • dapsone tablet 100 mg  100 mg Oral DAILY Marc Le M.D.   100 mg at 12/21/20 0528       Fluids    Intake/Output Summary (Last 24 hours) at 12/21/2020 0703  Last data filed at 12/21/2020 0600  Gross per 24 hour   Intake 1056.47 ml   Output 2750 ml   Net -1693.53 ml       Laboratory  Recent Labs     12/20/20  0842   [id number] 7.25*   [id number] 24.4*   MRFRJ169G 115.3*   [id number] 96.9   ARTHCO3 11*   ARTBE -15*         Recent Labs     12/20/20  0640 12/20/20  1830 12/21/20  0525   SODIUM 133* 135 135   POTASSIUM 4.6 4.4 4.2   CHLORIDE 103 105 105   CO2 11* 12* 14*   BUN 64* 66* 66*   CREATININE 10.77* 11.16*  10.77*   MAGNESIUM 2.5  --   --    PHOSPHORUS 7.0*  --   --    CALCIUM 6.9* 6.3* 6.2*     Recent Labs     12/20/20  0640 12/20/20  1830 12/21/20  0525   ALTSGPT 21  --  13   ASTSGOT 23  --  17   ALKPHOSPHAT 170*  --  104*   TBILIRUBIN 0.2  --  0.2   GLUCOSE 156* 110* 83     Recent Labs     12/20/20  0640 12/21/20  0525 12/21/20  0604   WBC 13.7*  --  7.6   NEUTSPOLYS 69.60  --   --    LYMPHOCYTES 13.00*  --   --    MONOCYTES 7.00  --   --    EOSINOPHILS 7.00*  --   --    BASOPHILS 1.70  --   --    ASTSGOT 23 17  --    ALTSGPT 21 13  --    ALKPHOSPHAT 170* 104*  --    TBILIRUBIN 0.2 0.2  --      Recent Labs     12/20/20  0640 12/21/20  0604   RBC 3.33* 2.60*   HEMOGLOBIN 10.1* 7.9*   HEMATOCRIT 32.5* 24.8*   PLATELETCT 219 157*   PROTHROMBTM 14.8*  --    APTT 47.0*  --    INR 1.13  --        Imaging  X-Ray:  I have personally reviewed the images and compared with prior images. and My impression is: no change in diffuse B infiltrates, RLL consolidation, enlarged cardiac silhouette    Assessment/Plan  * Acute respiratory failure with hypoxia (HCC)- (present on admission)  Assessment & Plan  Improving  RT/O2 protocol  Fluid removal with HD today, diuresis in the meantime  Continue empiric zosyn for 5-7 days    Anemia due to stage 5 chronic kidney disease, not on chronic dialysis (HCC)  Assessment & Plan  Nephrology consulted with plans for HD cath placement and HD today if agrees  Monitor UOP, electrolytes, renal function  Avoid nephrotoxins  Cont lasix and bicarb tabs  Conservative transfusion strategy, daily CBC    Pneumonia- (present on admission)  Assessment & Plan  Continue abx      Goals of care, counseling/discussion- (present on admission)  Assessment & Plan  Patient with repeated behavior of noncompliance and not starting dialysis  Prior was on hospice  Now asking if needed would want to be full code, intubation and would get dialysis  Palliative care consult/hospice consult    Patient continue to say one thing  but by his behavior exercise another thing    HFrEF (heart failure with reduced ejection fraction) ([facility])- (present on admission)  Assessment & Plan  Without acute decompensation  Continue diuresis  Start coreg    HIV (human immunodeficiency virus infection) ([facility])- (present on admission)  Assessment & Plan  Hx of followed at \[facility]\"" clinic  Resume antiretroviral medication if indicated by ID    Nicotine dependence- (present on admission)  Assessment & Plan  Tobacco cessation education and resources    Seizure ([facility])- (present on admission)  Assessment & Plan  Seizure precautions  Prn BZDs    Herpes zoster complication- (present on admission)  Assessment & Plan  S/p crusted lesion of left face with zoster  With cellulitis  Cont abx coverage  Prn benadryl cream for pruritis       VTE:  Heparin  Ulcer: Not Indicated  Lines: None    I have performed a physical exam and reviewed and updated ROS and Plan today (12/21/2020). In review of yesterday's note (12/20/2020), there are no changes except as documented above.     Discussed patient condition and risk of morbidity and/or mortality with Hospitalist, RN, RT, Pharmacy, Charge nurse / hot rounds, Patient and nephrology. [facility] will sign off. Please call with questions.

## 2020-12-21 NOTE — PROGRESS NOTES
Clarified BP goals with Dr. Winters d/t order being different from note.  Pt okay for BP goal <180 per current PRN labetalol order.

## 2020-12-21 NOTE — ASSESSMENT & PLAN NOTE
Acute kidney injury in the setting of stage V CKD  Cr on 12/18 was 7 and went up to 10 on admission  He will have a dialysis catheter placed for initiation of dialysis   Nephrology consulted  IV lasix 100 mg BID ordered from the ER  BMP ordered for the morning

## 2020-12-21 NOTE — PROGRESS NOTES
[facility] Medicine Daily Progress Note    Date of Service  12/21/2020    Chief Complaint  55 y.o. male admitted 12/20/2020 with shortness of breath.    Hospital Course  Mr. Palencia has a history of HIV and ESRD that was most recently admitted here from 7/3-7/10 with COVID and respiratory failure. He was discharged home on Hospice. He has revoked his DNR/DNI status and has taken himself off of Hospice.   He was admitted to [facility] from 12/15-12/18 with left facial cellulitis. He had a CT face revealing left sided periorbital cellulitis and was treated with IV rocephin and clindamycin. Wound cultures grew out MSSA resistant to clindamycin. He was discharged home on oral augmentin. He had a Cr of 10.3 on admission there and was given IV fluids. It was recommended that he initiate dialysis but he adamantly refused. He was discharged on sodium bicarb tablets and his Cr was 7 upon discharge.   He states that he has been compliant with his HIV meds through the [facility] clinic though his HIV specialist Dr. Whyte notes that he has been off his meds for at least 5 months.   He denies exposure to persons known to have COVID. His COVID is negative here. Mr. Clark was admitted to the ICU in very guarded condition on high flow oxygen.     Interval Problem Update  12/21: patient seen and evaluated in the ICU. He has been tapered off of high flow and is on 4 liters of nasal cannula oxygen. He is amenable to a dialysis catheter today and initiation of dialysis. He is making some urine.     Consultants/Specialty  Critical care. I discussed with Dr. Gonda  Nephrology     Code Status  Full Code    Disposition  Medical     Review of Systems  Review of Systems   Constitutional: Negative for chills and fever.   Eyes:        Blurred vision left eye   Respiratory: Positive for cough and shortness of breath. Negative for sputum production.    Cardiovascular: Negative for chest pain.   Gastrointestinal: Negative for  diarrhea.   All other systems reviewed and are negative.       Physical Exam  Temp:  [36.2 °C (97.2 °F)-37.3 °C (99.2 °F)] 37.3 °C (99.2 °F)  Pulse:  [73-97] 90  Resp:  [17-41] 25  BP: (133-203)/() 171/101  SpO2:  [95 %-99 %] 98 %    Physical Exam  Vitals signs and nursing note reviewed.   Constitutional:       Comments: Chronically ill appearing   HENT:      Head:      Comments: Left face extensive scabs without vesicles      Nose:      Comments: Bleeding scab left nose     Mouth/Throat:      Mouth: Mucous membranes are dry.      Pharynx: Oropharynx is clear.      Comments: He has a few teeth  Eyes:      Comments: Left eyelid is swollen and left eye injection   Neck:      Musculoskeletal: Normal range of motion and neck supple.   Cardiovascular:      Rate and Rhythm: Normal rate and regular rhythm.      Heart sounds: Murmur present.   Pulmonary:      Breath sounds: Rales present.      Comments: Mild tachypnea though normal work of breathing  4 liters of oxygen  Abdominal:      General: There is no distension.      Tenderness: There is no abdominal tenderness.   Musculoskeletal:      Right lower leg: No edema.      Left lower leg: No edema.   Skin:     General: Skin is warm and dry.   Neurological:      General: No focal deficit present.   Psychiatric:         Mood and Affect: Mood normal.         Behavior: Behavior normal.      Comments: Compliant with exam         Fluids    Intake/Output Summary (Last 24 hours) at 12/21/2020 0939  Last data filed at 12/21/2020 0600  Gross per 24 hour   Intake 956.47 ml   Output 2750 ml   Net -1793.53 ml       Laboratory  Recent Labs     12/20/20  0640 12/21/20  0604   WBC 13.7* 7.6   RBC 3.33* 2.60*   HEMOGLOBIN 10.1* 7.9*   HEMATOCRIT 32.5* 24.8*   MCV 97.6 93.8   MCH 30.3 30.8   MCHC 31.1* 32.8*   RDW 58.4* 52.9*   PLATELETCT 219 157*   MPV 11.9 11.6     Recent Labs     12/20/20  0640 12/20/20  1830 12/21/20  0525   SODIUM 133* 135 135   POTASSIUM 4.6 4.4 4.2   CHLORIDE  103 105 105   CO2 11* 12* 14*   GLUCOSE 156* 110* 83   BUN 64* 66* 66*   CREATININE 10.77* 11.16* 10.77*   CALCIUM 6.9* 6.3* 6.2*     Recent Labs     12/20/20  0640   APTT 47.0*   INR 1.13               Imaging  DX-CHEST-PORTABLE (1 VIEW)   Final Result         Worsening airspace opacity throughout the right lower lung.      IR-ELLISON,GROSHONG PLACEMENT >5    (Results Pending)        Assessment/Plan  * Acute respiratory failure with hypoxia (HCC)- (present on admission)  Assessment & Plan  In the setting of pneumonia and volume overload  Status post high flow oxygen and ICU admission.   IV diuretics and IV antibiotics  He has been tapered to 4 liters of oxygen via nasal cannula  This is not consistent with acute heart failure. His volume overload is secondary to kidney failure.  His EF by echo in July was 40% though he has elected not to take any medications for this thus refrain from starting any for now and focus on dialysis.     Pneumonia- (present on admission)  Assessment & Plan  Hypoxia and infiltrate seen on CXR  He was recently admitted from 12/15 through 12/18 at [facility] and now has an xray consistent with RLL infiltrate. Consider aspiration and treat for HCAP with one dose vanco in the ER which will last until dialysis  IV zoysn  Nasal MRSA swab  Procalcitonin level elevated at 1.7  As he has not been compliant with HIV regimen, he is at risk of PCP pneumonia: Dr. Whyte has recommended against treating for PCP and instead dapsone for prophylaxis.    VAIBHAV (acute kidney injury) (HCC)- (present on admission)  Assessment & Plan  Acute kidney injury in the setting of stage V CKD  Cr on 12/18 was 7 and went up to 10 on admission  He will have a dialysis catheter placed for initiation of dialysis   Nephrology consulted  IV lasix 100 mg BID ordered from the ER  BMP ordered for the morning    HIV (human immunodeficiency virus infection) (HCC)- (present on admission)  Assessment &  Plan  Followed by Dr. Whyte at [facility] who states he has been off his meds for 5 months  He states he has been compliant with his meds though this is not congruent with previous records obtained from his last hospitalizations.  CD4 count ordered       Goals of care, counseling/discussion- (present on admission)  Assessment & Plan  Patient had been on Hospice and now wants full treat and full code  Palliative consult placed  Prognosis is overall guarded    Nicotine dependence- (present on admission)  Assessment & Plan  Cessation encouragement  Nicotine patch for withdrawal symptoms    History of 2019 novel coronavirus disease (COVID-19)- (present on admission)  Assessment & Plan  He had COVID + PCR on 7/9/20  COVID is negative in the ER  He does not need to be on isolation     Herpes zoster complication- (present on admission)  Assessment & Plan  He had been admitted to [facility] and treated for facial cellulitis though exam is consistent with healing zoster. He may require ophthalmology eval    Now crusted over and no open lesions  At risk of bacterial infection and may benefit from bactroban topically      Anemia due to stage 5 chronic kidney disease, not on chronic dialysis (HCC)  Assessment & Plan  Hb is 7.9 though likely a component of dilution given volume overload           VTE prophylaxis: heparin

## 2020-12-22 ENCOUNTER — APPOINTMENT (OUTPATIENT)
Dept: RADIOLOGY | Facility: MEDICAL CENTER | Age: 55
DRG: 974 | End: 2020-12-22
Attending: INTERNAL MEDICINE
Payer: COMMERCIAL

## 2020-12-22 PROBLEM — N18.9 ANEMIA DUE TO CHRONIC KIDNEY DISEASE: Status: ACTIVE | Noted: 2020-12-20

## 2020-12-22 PROBLEM — J96.01 ACUTE RESPIRATORY FAILURE WITH HYPOXIA (HCC): Status: RESOLVED | Noted: 2020-12-20 | Resolved: 2020-12-22

## 2020-12-22 PROBLEM — N18.6 ESRD (END STAGE RENAL DISEASE) ON DIALYSIS (HCC): Status: ACTIVE | Noted: 2020-12-22

## 2020-12-22 PROBLEM — Z71.89 GOALS OF CARE, COUNSELING/DISCUSSION: Status: RESOLVED | Noted: 2020-12-20 | Resolved: 2020-12-22

## 2020-12-22 PROBLEM — Z99.2 ESRD (END STAGE RENAL DISEASE) ON DIALYSIS (HCC): Status: ACTIVE | Noted: 2020-12-22

## 2020-12-22 LAB
ANION GAP SERPL CALC-SCNC: 13 MMOL/L (ref 7–16)
ANISOCYTOSIS BLD QL SMEAR: ABNORMAL
ANNOTATION COMMENT IMP: ABNORMAL
BASOPHILS # BLD AUTO: 1.7 % (ref 0–1.8)
BASOPHILS # BLD: 0.09 K/UL (ref 0–0.12)
BUN SERPL-MCNC: 42 MG/DL (ref 8–22)
CALCIUM SERPL-MCNC: 6.5 MG/DL (ref 8.5–10.5)
CD3 CELLS # BLD: 402 CELLS/UL (ref 570–2400)
CD3+CD4+ CELLS # BLD: 36 CELLS/UL (ref 430–1800)
CD3+CD4+ CELLS/CD3+CD8+ CLL BLD: 0.1 RATIO (ref 0.8–3.9)
CD3+CD8+ CELLS # BLD: 357 CELLS/UL (ref 210–1200)
CHLORIDE SERPL-SCNC: 101 MMOL/L (ref 96–112)
CO2 SERPL-SCNC: 22 MMOL/L (ref 20–33)
CREAT SERPL-MCNC: 8.35 MG/DL (ref 0.5–1.4)
EOSINOPHIL # BLD AUTO: 0.14 K/UL (ref 0–0.51)
EOSINOPHIL NFR BLD: 2.6 % (ref 0–6.9)
ERYTHROCYTE [DISTWIDTH] IN BLOOD BY AUTOMATED COUNT: 51.2 FL (ref 35.9–50)
GLUCOSE SERPL-MCNC: 83 MG/DL (ref 65–99)
HCT VFR BLD AUTO: 22.6 % (ref 42–52)
HGB BLD-MCNC: 7.4 G/DL (ref 14–18)
HYPOCHROMIA BLD QL SMEAR: ABNORMAL
L PNEUMO AG UR QL IA: NEGATIVE
LYMPHOCYTES # BLD AUTO: 0.43 K/UL (ref 1–4.8)
LYMPHOCYTES NFR BLD: 7.8 % (ref 22–41)
MACROCYTES BLD QL SMEAR: ABNORMAL
MANUAL DIFF BLD: NORMAL
MCH RBC QN AUTO: 30.1 PG (ref 27–33)
MCHC RBC AUTO-ENTMCNC: 32.7 G/DL (ref 33.7–35.3)
MCV RBC AUTO: 91.9 FL (ref 81.4–97.8)
MICROCYTES BLD QL SMEAR: ABNORMAL
MONOCYTES # BLD AUTO: 0.43 K/UL (ref 0–0.85)
MONOCYTES NFR BLD AUTO: 7.8 % (ref 0–13.4)
MORPHOLOGY BLD-IMP: NORMAL
NEUTROPHILS # BLD AUTO: 4.4 K/UL (ref 1.82–7.42)
NEUTROPHILS NFR BLD: 80 % (ref 44–72)
NRBC # BLD AUTO: 0 K/UL
NRBC BLD-RTO: 0 /100 WBC
PLATELET # BLD AUTO: 170 K/UL (ref 164–446)
PLATELET BLD QL SMEAR: NORMAL
PMV BLD AUTO: 12.1 FL (ref 9–12.9)
POLYCHROMASIA BLD QL SMEAR: NORMAL
POTASSIUM SERPL-SCNC: 3.8 MMOL/L (ref 3.6–5.5)
RBC # BLD AUTO: 2.46 M/UL (ref 4.7–6.1)
RBC BLD AUTO: PRESENT
S PNEUM AG UR QL: NEGATIVE
SODIUM SERPL-SCNC: 136 MMOL/L (ref 135–145)
TARGETS BLD QL SMEAR: NORMAL
WBC # BLD AUTO: 5.5 K/UL (ref 4.8–10.8)

## 2020-12-22 PROCEDURE — 700102 HCHG RX REV CODE 250 W/ 637 OVERRIDE(OP): Performed by: INTERNAL MEDICINE

## 2020-12-22 PROCEDURE — 5A1D70Z PERFORMANCE OF URINARY FILTRATION, INTERMITTENT, LESS THAN 6 HOURS PER DAY: ICD-10-PCS | Performed by: INTERNAL MEDICINE

## 2020-12-22 PROCEDURE — A9270 NON-COVERED ITEM OR SERVICE: HCPCS | Performed by: INTERNAL MEDICINE

## 2020-12-22 PROCEDURE — 700105 HCHG RX REV CODE 258: Performed by: HOSPITALIST

## 2020-12-22 PROCEDURE — 97165 OT EVAL LOW COMPLEX 30 MIN: CPT

## 2020-12-22 PROCEDURE — A9270 NON-COVERED ITEM OR SERVICE: HCPCS | Performed by: HOSPITALIST

## 2020-12-22 PROCEDURE — 700111 HCHG RX REV CODE 636 W/ 250 OVERRIDE (IP)

## 2020-12-22 PROCEDURE — 770020 HCHG ROOM/CARE - TELE (206)

## 2020-12-22 PROCEDURE — 99232 SBSQ HOSP IP/OBS MODERATE 35: CPT | Performed by: INTERNAL MEDICINE

## 2020-12-22 PROCEDURE — 85027 COMPLETE CBC AUTOMATED: CPT

## 2020-12-22 PROCEDURE — 97161 PT EVAL LOW COMPLEX 20 MIN: CPT

## 2020-12-22 PROCEDURE — 85007 BL SMEAR W/DIFF WBC COUNT: CPT

## 2020-12-22 PROCEDURE — 700102 HCHG RX REV CODE 250 W/ 637 OVERRIDE(OP): Performed by: RADIOLOGY

## 2020-12-22 PROCEDURE — 90935 HEMODIALYSIS ONE EVALUATION: CPT

## 2020-12-22 PROCEDURE — A9270 NON-COVERED ITEM OR SERVICE: HCPCS | Performed by: RADIOLOGY

## 2020-12-22 PROCEDURE — 700111 HCHG RX REV CODE 636 W/ 250 OVERRIDE (IP): Performed by: HOSPITALIST

## 2020-12-22 PROCEDURE — 80048 BASIC METABOLIC PNL TOTAL CA: CPT

## 2020-12-22 PROCEDURE — 700102 HCHG RX REV CODE 250 W/ 637 OVERRIDE(OP): Performed by: HOSPITALIST

## 2020-12-22 RX ORDER — HEPARIN SODIUM 1000 [USP'U]/ML
INJECTION, SOLUTION INTRAVENOUS; SUBCUTANEOUS
Status: COMPLETED
Start: 2020-12-22 | End: 2020-12-22

## 2020-12-22 RX ORDER — DOXYCYCLINE 100 MG/1
100 TABLET ORAL EVERY 12 HOURS
Status: DISCONTINUED | OUTPATIENT
Start: 2020-12-22 | End: 2020-12-28 | Stop reason: HOSPADM

## 2020-12-22 RX ORDER — AMOXICILLIN AND CLAVULANATE POTASSIUM 500; 125 MG/1; MG/1
1 TABLET, FILM COATED ORAL EVERY 24 HOURS
Status: COMPLETED | OUTPATIENT
Start: 2020-12-22 | End: 2020-12-26

## 2020-12-22 RX ADMIN — AMOXICILLIN AND CLAVULANATE POTASSIUM 1 TABLET: 500; 125 TABLET, FILM COATED ORAL at 16:21

## 2020-12-22 RX ADMIN — HEPARIN SODIUM 3700 UNITS: 1000 INJECTION, SOLUTION INTRAVENOUS; SUBCUTANEOUS at 14:25

## 2020-12-22 RX ADMIN — DOXYCYCLINE 100 MG: 100 TABLET, FILM COATED ORAL at 15:18

## 2020-12-22 RX ADMIN — CARVEDILOL 6.25 MG: 6.25 TABLET, FILM COATED ORAL at 16:21

## 2020-12-22 RX ADMIN — AMLODIPINE BESYLATE 10 MG: 10 TABLET ORAL at 06:14

## 2020-12-22 RX ADMIN — OXYCODONE 5 MG: 5 TABLET ORAL at 08:07

## 2020-12-22 RX ADMIN — PIPERACILLIN SODIUM, TAZOBACTAM SODIUM 3.38 G: 3; .375 INJECTION, POWDER, LYOPHILIZED, FOR SOLUTION INTRAVENOUS at 06:16

## 2020-12-22 RX ADMIN — DOCUSATE SODIUM 50 MG AND SENNOSIDES 8.6 MG 2 TABLET: 8.6; 5 TABLET, FILM COATED ORAL at 06:14

## 2020-12-22 RX ADMIN — CARVEDILOL 6.25 MG: 6.25 TABLET, FILM COATED ORAL at 08:06

## 2020-12-22 RX ADMIN — SODIUM BICARBONATE 1300 MG: 650 TABLET ORAL at 06:14

## 2020-12-22 RX ADMIN — DAPSONE 100 MG: 100 TABLET ORAL at 06:15

## 2020-12-22 ASSESSMENT — COGNITIVE AND FUNCTIONAL STATUS - GENERAL
SUGGESTED CMS G CODE MODIFIER MOBILITY: CJ
SUGGESTED CMS G CODE MODIFIER DAILY ACTIVITY: CJ
WALKING IN HOSPITAL ROOM: A LITTLE
DAILY ACTIVITIY SCORE: 22
DRESSING REGULAR LOWER BODY CLOTHING: A LITTLE
HELP NEEDED FOR BATHING: A LITTLE
CLIMB 3 TO 5 STEPS WITH RAILING: A LITTLE
MOBILITY SCORE: 21
STANDING UP FROM CHAIR USING ARMS: A LITTLE

## 2020-12-22 ASSESSMENT — ENCOUNTER SYMPTOMS
WEAKNESS: 0
PSYCHIATRIC NEGATIVE: 1
NEUROLOGICAL NEGATIVE: 1
CONSTITUTIONAL NEGATIVE: 1
GASTROINTESTINAL NEGATIVE: 1
EYES NEGATIVE: 1
VOMITING: 0
DEPRESSION: 0
CARDIOVASCULAR NEGATIVE: 1
MUSCULOSKELETAL NEGATIVE: 1
PALPITATIONS: 0
HEADACHES: 0
RESPIRATORY NEGATIVE: 1
SENSORY CHANGE: 0
FOCAL WEAKNESS: 0
NERVOUS/ANXIOUS: 0
FEVER: 0
SPEECH CHANGE: 0
ABDOMINAL PAIN: 0
WHEEZING: 0
NAUSEA: 0
DIZZINESS: 0
COUGH: 0
SHORTNESS OF BREATH: 0
INSOMNIA: 0

## 2020-12-22 ASSESSMENT — GAIT ASSESSMENTS
DEVIATION: ANTALGIC;STEP TO;DECREASED HEEL STRIKE;DECREASED TOE OFF
GAIT LEVEL OF ASSIST: REFUSED

## 2020-12-22 ASSESSMENT — ACTIVITIES OF DAILY LIVING (ADL): TOILETING: INDEPENDENT

## 2020-12-22 ASSESSMENT — PAIN DESCRIPTION - PAIN TYPE: TYPE: ACUTE PAIN

## 2020-12-22 NOTE — DISCHARGE PLANNING
Care Transition Team Assessment     Anticipated discharge disposition: Home with son once dialysis is arranged.    Interdisciplinary Discharge Planning  Lives with - Patient's Self Care Capacity: Adult Children, Unrelated Adult  Housing / Facility: [address]  Prior Services: None  Patient Prefers to be Discharged to:: Home    Discharge Preparedness  What is your plan after discharge?: Home with help              Vision / Hearing Impairment  Left Eye Vision: Impaired, Wears Glasses         Advance Directive  Advance Directive?: None              Discharge Risks or Barriers  Patient risk factors: Complex medical needs    Anticipated Discharge Information  Discharge Disposition: Still a Patient (30)

## 2020-12-22 NOTE — THERAPY
"Physical Therapy   Initial Evaluation     Patient Name: Seamus Palencia  Age:  55 y.o., Sex:  male  Medical Record #: [id number]  Today's Date: 12/22/2020     Precautions: Fall Risk    Assessment  Patient is 55 y.o. male that presented to acute 12/20 with complaints of SOB. PMHx significant for HIV, CKD5. He presented to PT with reports of pain and impaired vision but stated he is mobilizing at baseline. He refused attempts at mobility out of room and was irritable with therapists throughout assessment but performed bed mobility and stand step transfer in room at supervision level. He demonstrated antalgic gait during transfer. He reported no concerns regarding mobility or returning home following medical clearance. Will follow for DC needs given limited participation however anticipate he will self progress to PLOF with continued mobilization given PLOF and report. Patient will not be actively followed for physical therapy services at this time, however may be seen if requested by physician for 1 more visit within 30 days to address any discharge or equipment needs.    Plan    Recommend Physical Therapy for Evaluation only    DC Equipment Recommendations: Unable to determine at this time  Discharge Recommendations: Anticipate that the patient will have no further physical therapy needs after discharge from the hospital(patient refusing to participate, states no needs for therapy)       Subjective    \"I don't need therapy!\"     Objective       12/22/20 0957   Total Time Spent   Total Time Spent (Mins) 15   Charge Group   PT Evaluation PT Evaluation Low   Initial Contact Note    Initial Contact Note Order Received and Verified, Evaluation Only - Patient Does Not Require Further Acute Physical Therapy at this Time.  However, May Benefit from Post Acute Therapy for Higher Level Functional Deficits.   Vitals   O2 (LPM) 0   O2 Delivery Device None - Room Air   Pain 0 - 10 Group   Location Sternum;Shoulder  (location of " "central line?)   Therapist Pain Assessment During Activity;Post Activity Pain Same as Prior to Activity;Nurse Notified   Prior Living Situation   Prior Services None   Housing / Facility 1 Story House   Steps Into Home 0   Steps In Home 0   Equipment Owned None   Lives with - Patient's Self Care Capacity Adult Children;Unrelated Adult   Comments Patient reported family/friends he lives with able to assist as needed. Patient grumpy throughout and with limited subjective report, asked \"Are you a therapist or the police?\"   Prior Level of Functional Mobility   Bed Mobility Independent   Transfer Status Independent   Ambulation Independent   Distance Ambulation (Feet)   (community)   Assistive Devices Used None   Stairs Independent   History of Falls   History of Falls   (not asked)   Cognition    Cognition / Consciousness X   Level of Consciousness Alert   New Learning Impaired   Attention Impaired   Comments irritable and limited cooperation, may be baseline behavior   Passive ROM Lower Body   Passive ROM Lower Body WDL   Comments not formally tested, WFL for mobility   Active ROM Lower Body    Active ROM Lower Body  WDL   Comments functional; patient with limited flexion LLE during ambulation but states this is baseline   Strength Lower Body   Lower Body Strength  WDL   Comments functional   Sensation Lower Body   Lower Extremity Sensation   Not Tested   Lower Body Muscle Tone   Lower Body Muscle Tone  WDL   Neurological Concerns   Neurological Concerns No   Coordination Lower Body    Coordination Lower Body  WDL   Balance Assessment   Sitting Balance (Static) Good   Sitting Balance (Dynamic) Good   Standing Balance (Static) Fair   Standing Balance (Dynamic) Fair -   Weight Shift Sitting Good   Weight Shift Standing Fair   Comments held IV pole when mobilizing in room, minimal LOB but able to self correct   Gait Analysis   Gait Level Of Assist Refused   Deviation Antalgic;Step To;Decreased Heel Strike;Decreased Toe " Off   Weight Bearing Status no restrictions   Vision Deficits Impacting Mobility patient reported difficulty seeing out of L eye, note large wound/scabs around eye   Comments patient refused mobility out of room due to vision but noted antalgic gait with transfer in room   Bed Mobility    Supine to Sit Supervised   Sit to Supine   (NT, left in chair)   Scooting Supervised  (seated)   Functional Mobility   Sit to Stand Supervised   Bed, Chair, Wheelchair Transfer Supervised   Transfer Method Stand Step   How much difficulty does the patient currently have...   Turning over in bed (including adjusting bedclothes, sheets and blankets)? 4   Sitting down on and standing up from a chair with arms (e.g., wheelchair, bedside commode, etc.) 4   Moving from lying on back to sitting on the side of the bed? 4   How much help from another person does the patient currently need...   Moving to and from a bed to a chair (including a wheelchair)? 3   Need to walk in a hospital room? 3   Climbing 3-5 steps with a railing? 3   6 clicks Mobility Score 21   Activity Tolerance   Sitting in Chair left in chair   Sitting Edge of Bed 2 min   Standing 8 min   Comments limited by behavior   Edema / Skin Assessment   Edema / Skin  Not Assessed   Comments note wound on face   Education Group   Education Provided Role of Physical Therapist   Role of Physical Therapist Patient Response Patient;Acceptance;Explanation;Verbal Demonstration   Anticipated Discharge Equipment and Recommendations   DC Equipment Recommendations Unable to determine at this time   Discharge Recommendations Anticipate that the patient will have no further physical therapy needs after discharge from the hospital  (patient refusing to participate, states no needs for therapy)   Interdisciplinary Plan of Care Collaboration   IDT Collaboration with  Nursing;Occupational Therapist   Patient Position at End of Therapy Seated;Call Light within Reach;Tray Table within Reach;Phone  within Reach   Collaboration Comments RN aware of visit, response   Session Information   Date / Session Number  12/22 - DC needs only   Priority 0

## 2020-12-22 NOTE — PROGRESS NOTES
I have personally seen and examined / evaluated the patient, discussed the patient's evaluation & management plan with SHIRA Brown and I have reviewed the note below.     I agree with the findings, history, examination and assessment / plan as listed below with changes/addendum as listed below by me in my addendum / attestation separetely.     Patient with underlying history of HIV and end-stage renal disease, admitted to ICU on December 20, 2020 with hypertensive emergency, volume overload, hypoxemic respiratory failure and history of cellulitis.  Previously on hospice, patient revoked this and currently full code.  Right-sided tunneled dialysis catheter placed by IR on December 21, 2020.  Continuing dialysis per nephrology team.  Needs arrangement for outpatient dialysis, once arranged can discharge home.  On IV Zosyn, de-escalate to oral Augmentin and doxycycline.  On dapsone for PCP prophylaxis.  Continue antihypertensive therapy, DVT preventive prophylaxis and plan for outpatient nephrology, PCP follow-up alongside Dr. Whyte from ID for HIV.  Can discharge once outpatient dialysis is arranged.    Sabina Moore M.D.  12/22/20  2:25 PM

## 2020-12-22 NOTE — CONSULTS
Date of Service  12/22/2020    Reason For Consult  ESRD in need of access    Requesting Physician  Nova Bailey MD    Consulting Physician  Marcellus Goins M.D.    Primary Care Physician  DICKSON Corbett.    Chief Complaint  SOB    History of Present Illness  Mr. Palencia is a right-handed 55 y.o. male who presented 12/20/2020 with PNA, hypertensive emergency w/ , SOB, fluid overload, and ESRD. He has hx of HIV w/ questionable medication compliance, hx of COVID w/ respiratory failure last July and was apparently d/c'd on hospice and was DNR/DNI. He then revoked that status and took himself off hospice, but continued to refuse dialysis.     He was admitted to [facility] last week w/ zoster of the face w/ overlying periorbital MSSA cellulitis, needed HD at that time, but refused, and left there w/ Cr>7 w/ bicarb tabs. He presented here w/ SOB and was found to have PNA and fluid overload. A tunneled catheter was placed by IR and he is now amenable to continued HD.     He currently has no complaints.     Review of Systems  Review of Systems   Constitutional: Negative.    HENT: Negative.    Eyes: Negative.    Respiratory: Negative.    Cardiovascular: Negative.    Gastrointestinal: Negative.    Genitourinary: Negative.    Musculoskeletal: Negative.    Skin: Negative.    Neurological: Negative.    Endo/Heme/Allergies: Negative.    Psychiatric/Behavioral: Negative.        Past Medical History  Past Medical History:   Diagnosis Date   • Heart failure (HCC)    • HIV disease (HCC) 1995   • Hypertension    • Seizure (HCC)    • Stroke (HCC)        Surgical History  Past Surgical History:   Procedure Laterality Date   • CHOLECYSTECTOMY  1980s        Family History  Family History   Problem Relation Age of Onset   • Diabetes Mother         cause of death   • Diabetes Father         cause of death   • Diabetes Sister    • Other Brother         down syndrome   • No Known Problems Sister    • No Known Problems Sister     • No Known Problems Sister    • Other Daughter         5 daughters, 11 sons    • Clotting Disorder Neg Hx    • Stroke Neg Hx         Social History  Social History     Socioeconomic History   • Marital status: Single     Spouse name: Not on file   • Number of children: Not on file   • Years of education: Not on file   • Highest education level: Not on file   Occupational History   • Not on file   Social Needs   • Financial resource strain: Not on file   • Food insecurity     Worry: Not on file     Inability: Not on file   • Transportation needs     Medical: Not on file     Non-medical: Not on file   Tobacco Use   • Smoking status: Current Every Day Smoker     Packs/day: 0.25     Types: Cigarettes   • Smokeless tobacco: Never Used   • Tobacco comment: 3-4 CIGARETTES A DAY   Substance and Sexual Activity   • Alcohol use: No   • Drug use: No   • Sexual activity: Not on file   Lifestyle   • Physical activity     Days per week: Not on file     Minutes per session: Not on file   • Stress: Not on file   Relationships   • Social connections     Talks on phone: Not on file     Gets together: Not on file     Attends [employer]ism service: Not on file     Active member of club or organization: Not on file     Attends meetings of clubs or organizations: Not on file     Relationship status: Not on file   • Intimate partner violence     Fear of current or ex partner: Not on file     Emotionally abused: Not on file     Physically abused: Not on file     Forced sexual activity: Not on file   Other Topics Concern   • Not on file   Social History Narrative   • Not on file       Medications  None       Current Facility-Administered Medications   Medication Dose Route Frequency Provider Last Rate Last Admin   • amoxicillin-clavulanate (AUGMENTIN) 500-125 MG per tablet 1 Tab  1 Tab Oral Q24HRS Sabina Moore M.D.       • doxycycline monohydrate (ADOXA) tablet 100 mg  100 mg Oral Q12HRS Sabina Moore M.D.   100 mg at 12/22/20 1518   •  diphenhydrAMINE-zinc acetate (BENADRYL ITCH) topical cream   Topical TID PRN Jeremy M Gonda, M.D.   Given at 12/21/20 0928   • NS (BOLUS) infusion 250 mL  250 mL Intravenous DIALYSIS PRN Bernabe Beasley M.D.       • [START ON 12/23/2020] epoetin (Retacrit) injection (Dialysis Use Only) 3,000 Units  3,000 Units Intravenous MO, WE + FR Bernabe Beasley M.D.       • carvedilol (COREG) tablet 6.25 mg  6.25 mg Oral BID WITH MEALS Jeremy M Gonda, M.D.   6.25 mg at 12/22/20 0806   • heparin injection 3,700 Units  3,700 Units Intracatheter DIALYSIS PRN Bernabe Beasley M.D.   3,700 Units at 12/22/20 1425   • labetalol (NORMODYNE/TRANDATE) injection 10-20 mg  10-20 mg Intravenous Q10 MIN PRN Katelyn Welsh D.O.   20 mg at 12/20/20 1048   • amLODIPine (NORVASC) tablet 10 mg  10 mg Oral Q DAY Chip Ponce M.D.   10 mg at 12/22/20 0614   • Respiratory Therapy Consult   Nebulization Continuous RT Marc Le M.D.       • heparin injection 5,000 Units  5,000 Units Subcutaneous Q8HRS Marc Le M.D.   Stopped at 12/22/20 0600   • acetaminophen (Tylenol) tablet 650 mg  650 mg Oral Q6HRS PRN Marc Le M.D.       • dapsone tablet 100 mg  100 mg Oral DAILY Marc Le M.D.   100 mg at 12/22/20 0615       Allergies  No Known Allergies      Physical Exam  Temp:  [36.6 °C (97.9 °F)-37.4 °C (99.3 °F)] 37.4 °C (99.3 °F)  Pulse:  [] 89  Resp:  [16-18] 16  BP: (146-179)/(76-95) 146/76  SpO2:  [93 %-98 %] 93 %    Pulse/Extremity Exam:    Femorals:        Right: palpable       Left palpable    Pedal Pulses:       Right DP palpable       Right PT palpable       Left DP palpable       Left PT palpable     Palpable brachial/radials bilaterally    Wounds: None    General appearance: NAD, conversing appropriately  Psych: Normal affect, mood, judgement  Neuro: CN II-XII grossly intact.   HEENT: diffuse scabbing around L eye/face  Neck: full range of motion  Lungs: No inspiratory stridor or wheezing  CV: RRR  Abdomen: Soft, NT/ND  Skin:  No rashes    Labs Reviewed Today:  Recent Labs     12/20/20  0640 12/21/20  0604 12/22/20  0355   WBC 13.7* 7.6 5.5   RBC 3.33* 2.60* 2.46*   HEMOGLOBIN 10.1* 7.9* 7.4*   HEMATOCRIT 32.5* 24.8* 22.6*   MCV 97.6 93.8 91.9   MCH 30.3 30.8 30.1   MCHC 31.1* 32.8* 32.7*   RDW 58.4* 52.9* 51.2*   PLATELETCT 219 157* 170   MPV 11.9 11.6 12.1     Recent Labs     12/20/20  1830 12/21/20  0525 12/22/20  0355   SODIUM 135 135 136   POTASSIUM 4.4 4.2 3.8   CHLORIDE 105 105 101   CO2 12* 14* 22   GLUCOSE 110* 83 83   BUN 66* 66* 42*   CREATININE 11.16* 10.77* 8.35*   CALCIUM 6.3* 6.2* 6.5*     Recent Labs     12/20/20  0640 12/20/20  1830 12/21/20  0525 12/22/20  0355   ALTSGPT 21  --  13  --    ASTSGOT 23  --  17  --    ALKPHOSPHAT 170*  --  104*  --    TBILIRUBIN 0.2  --  0.2  --    GLUCOSE 156* 110* 83 83     Recent Labs     12/20/20  0640   APTT 47.0*   INR 1.13             No results for input(s): TROPONINI in the last 72 hours.    Urinalysis:    Recent Labs     12/20/20  0649   SPECGRAVITY 1.011   GLUCOSEUR Negative   KETONES Negative   NITRITE Negative   LEUKESTERAS Negative   WBCURINE 0-2*   RBCURINE 0-2*   BACTERIA Negative   EPITHELCELL Negative        Imaging Reviewed Today:  I personally reviewed all non-invasive vascular testing including images, x-rays, tracings, arterial waveforms, and duplex exams relevant to this admission. My interpretation is below:    LUE Vein Mapping:    LEFT    Cephalic                  Depth    Diana  Diam               (cm)           (cm)   Origin      0.53     1   Ax          0.21     1   UA          0.20     1   MA                   2   AC F   UFA   MFA   Wrist     Left Basilic                  Depth    Diana  Diam               (cm)           (cm)   Ax                   1     0.33   UA                   1     0.31   MA                   1     0.39   AC F                 1     0.28   UFA                  5     0.19   MFA                  5     0.15   Wrist     Findings   Left Arm:   Left  Basilic vein becomes smaller in diameter at the elbow with thickened wall.   Left Cephalic vein was adequate in diameter at origin and becomes smaller    in diameter at mid arm.and thrombus seen  in the distal cephalic vein above    elbow.   The right side needs to be evaluated later because he had bleeding from    right central line           Assessment/Plan & Medical Decision-Making  55M admitted w/ hypertensive emergency w/ , HIV, periorbital zoster w/ MSSA cellulitis, ESRD, and PNA. Historically non-compliant w/ meds and previously declined HD. He now states he does want to continue HD. Tunneled cath in place.    Presently, he is not a candidate for surgery given multiple concurrent infections, on abx. LUE vein mapping shows inadequate cephalic and marginal basilic. RUE mapping pending. Based on available mapping, if the left basilic proved to be inadequate in the OR, he would need a graft which should not be placed in his current condition.    Will have him f/u outpt for access planning once he recovers and infection clears.    Marcellus Goins MD  Vascular Surgeon  [facility]  Office: [phone]     no...

## 2020-12-22 NOTE — DISCHARGE PLANNING
Outpatient Dialysis Placement Notification    Received notification for outpatient dialysis placement from Dr. Beasley.    Met with patient bedside and confirmed demographics and insurance information.  Provided patient with dialysis education materials and list of HD centers, referral initiated to:    [facility]   [address] [place] [address]  LAUR[facility] [id number]    Pending medical and financial clearance.    Monika Monahan- Dialysis Coordinator  [facility] # [phone]

## 2020-12-22 NOTE — PROGRESS NOTES
Hemodialysis done today tolerated well, started @ 1650 and ended @ 1851 with net UF= 1000 ml. Report given to NAMRATA Hewitt. See flow sheet for details.

## 2020-12-22 NOTE — PROGRESS NOTES
Nephrology Daily Progress Note    Date of Service  12/22/2020    Chief Complaint  55 y.o. male with HIV and CKD5 not yet on dialysis admitted 12/20/2020 with shortness of breath.    Interval Problem Update  12/21 -  services were used in the patient's primary language of Polish. Mode of interpretation: iPad  - Denies chest pain, SOB, headache, nausea, vomiting. Amenable to starting dialysis.   12/22 -patient had first session of dialysis yesterday with 1 L removed.  Says it went well.  Denies chest pain, shortness of breath.  Complains of bleeding around permacath site.    Review of Systems  Review of Systems   Constitutional: Negative for fever.   Respiratory: Negative for shortness of breath.    Cardiovascular: Negative for chest pain.   Gastrointestinal: Negative for abdominal pain.   All other systems reviewed and are negative.       Physical Exam  Temp:  [36.6 °C (97.9 °F)-37.4 °C (99.3 °F)] 37.4 °C (99.3 °F)  Pulse:  [] 89  Resp:  [16-18] 16  BP: (143-179)/(76-95) 146/76  SpO2:  [89 %-98 %] 93 %    Physical Exam   Constitutional: He is oriented to person, place, and time. He appears well-developed. No distress.   HENT:   Mouth/Throat: Oropharynx is clear and moist. No oropharyngeal exudate.   Eyes: EOM are normal. No scleral icterus.   Neck: No tracheal deviation present.   Cardiovascular: Normal rate and normal heart sounds.   No murmur heard.  Pulmonary/Chest: Effort normal and breath sounds normal. No stridor. He has no rales.   Abdominal: Soft. He exhibits no distension. There is no abdominal tenderness.   Musculoskeletal: Normal range of motion.         General: No edema.   Neurological: He is alert and oriented to person, place, and time.   Skin: Skin is warm and dry. Rash (Crusting vesicular rash on the left forehead) noted. He is not diaphoretic.   Psychiatric: He has a normal mood and affect.   Access: Right IJ permacath.      Fluids    Intake/Output Summary (Last 24 hours) at  12/22/2020 1357  Last data filed at 12/22/2020 1000  Gross per 24 hour   Intake 740 ml   Output 2950 ml   Net -2210 ml       Laboratory  Labs reviewed, pertinent labs below.  Recent Labs     12/20/20  0640 12/21/20  0604 12/22/20  0355   WBC 13.7* 7.6 5.5   RBC 3.33* 2.60* 2.46*   HEMOGLOBIN 10.1* 7.9* 7.4*   HEMATOCRIT 32.5* 24.8* 22.6*   MCV 97.6 93.8 91.9   MCH 30.3 30.8 30.1   MCHC 31.1* 32.8* 32.7*   RDW 58.4* 52.9* 51.2*   PLATELETCT 219 157* 170   MPV 11.9 11.6 12.1     Recent Labs     12/20/20  1830 12/21/20  0525 12/22/20  0355   SODIUM 135 135 136   POTASSIUM 4.4 4.2 3.8   CHLORIDE 105 105 101   CO2 12* 14* 22   GLUCOSE 110* 83 83   BUN 66* 66* 42*   CREATININE 11.16* 10.77* 8.35*   CALCIUM 6.3* 6.2* 6.5*     Recent Labs     12/20/20  0640   APTT 47.0*   INR 1.13           URINALYSIS:  Lab Results   Component Value Date/Time    COLORURINE Yellow 12/20/2020 0649    CLARITY Clear 12/20/2020 0649    SPECGRAVITY 1.011 12/20/2020 0649    PHURINE 5.5 12/20/2020 0649    KETONES Negative 12/20/2020 0649    PROTEINURIN 100 (A) 12/20/2020 0649    BILIRUBINUR Negative 12/20/2020 0649    UROBILU 0.2 12/20/2020 0649    NITRITE Negative 12/20/2020 0649    LEUKESTERAS Negative 12/20/2020 0649    OCCULTBLOOD Small (A) 12/20/2020 0649     [facility]  Lab Results   Component Value Date/Time    TOTPROTUR 148.0 (H) 07/03/2020 1315      Lab Results   Component Value Date/Time    CREATININEU 49.87 07/03/2020 1315         Imaging reviewed  US-HEMODIALYSIS ACCESS CREATION DUPLEX UNILAT LEFT   Final Result      IR-ELLISONSONIA SEVERINO PLACEMENT >5   Final Result      1. ULTRASOUND AND FLUOROSCOPIC GUIDED PLACEMENT OF A Right INTERNAL JUGULAR 14.5 Amharic HemoSplit TUNNELED DIALYSIS CATHETER.      2. THE HEMODIALYSIS CATHETER MAY BE USED IMMEDIATELY AS CLINICALLY INDICATED. FLUSHES PER PROTOCOL.         DX-CHEST-PORTABLE (1 VIEW)   Final Result         Worsening airspace opacity throughout the right lower lung.      US-HEMODIALYSIS ACCESS  CREATION DUPLEX UNILAT RIGHT    (Results Pending)         Current Facility-Administered Medications   Medication Dose Route Frequency Provider Last Rate Last Admin   • EPOETIN TOMASA-EPBX 3000 UNIT/ML INJ SOLN            • HEPARIN SODIUM (PORCINE) 1000 UNIT/ML INJ SOLN            • diphenhydrAMINE-zinc acetate (BENADRYL ITCH) topical cream   Topical TID PRN Jeremy M Gonda, M.D.   Given at 12/21/20 0928   • NS (BOLUS) infusion 250 mL  250 mL Intravenous DIALYSIS PRN Bernabe Beasley M.D.       • [START ON 12/23/2020] epoetin (Retacrit) injection (Dialysis Use Only) 3,000 Units  3,000 Units Intravenous MO, WE + FR Bernabe Beasley M.D.       • carvedilol (COREG) tablet 6.25 mg  6.25 mg Oral BID WITH MEALS Jeremy M Gonda, M.D.   6.25 mg at 12/22/20 0806   • heparin injection 3,700 Units  3,700 Units Intracatheter DIALYSIS PRN Bernabe Beasley M.D.   3,700 Units at 12/21/20 1828   • labetalol (NORMODYNE/TRANDATE) injection 10-20 mg  10-20 mg Intravenous Q10 MIN PRN Katelyn Welsh D.O.   20 mg at 12/20/20 1048   • sodium bicarbonate tablet 1,300 mg  1,300 mg Oral Q8HRS Chip Ponce M.D.   1,300 mg at 12/22/20 0614   • amLODIPine (NORVASC) tablet 10 mg  10 mg Oral Q DAY Chip Ponce M.D.   10 mg at 12/22/20 0614   • senna-docusate (PERICOLACE or SENOKOT S) 8.6-50 MG per tablet 2 Tab  2 Tab Oral BID Marc Le M.D.   2 Tab at 12/22/20 0614    And   • polyethylene glycol/lytes (MIRALAX) PACKET 1 Packet  1 Packet Oral QDAY PRN Marc Le M.D.        And   • bisacodyl (DULCOLAX) suppository 10 mg  10 mg Rectal QDAY PRN Mrac Le M.D.       • Respiratory Therapy Consult   Nebulization Continuous RT Marc Le M.D.       • heparin injection 5,000 Units  5,000 Units Subcutaneous Q8HRS Marc Le M.D.   Stopped at 12/22/20 0600   • acetaminophen (Tylenol) tablet 650 mg  650 mg Oral Q6HRS PRN Marc Le M.D.       • piperacillin-tazobactam (ZOSYN) 3.375 g in  mL IVPB  3.375 g Intravenous Q12HRS Marc SNYDER  KARLENE Le   Stopped at 12/22/20 1016   • dapsone tablet 100 mg  100 mg Oral DAILY Marc Le M.D.   100 mg at 12/22/20 0615         Assessment/Plan  55 y.o. male with HIV and CKD5 not yet on dialysis admitted 12/20/2020 with shortness of breath.    1. CKD5.  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  Likely progressed to ESRD.  Initiated dialysis 12/21/2020.  Continue dialysis initiation with session #2 today, session #3 tomorrow, then plan on discharge with outpatient dialysis.  Avoid nephrotoxins to preserve residual kidney function.  Check labs daily.     2.  Access: Right IJ permacath.  Mapping studies ordered.  Vascular surgery consulted.    3.  Metabolic acidosis, improved with dialysis.  Discontinue sodium bicarbonate.    4.  HIV/AIDS, uncontrolled.  Management per primary team and infectious disease.    5.  Anemia of chronic disease, worsening.  Continue Epogen thrice weekly with dialysis.  Check CBC daily.    6.  Thrombocytopenia, improved.  Check CBC daily.    7.  Hypertension, uncontrolled, but improving.  Continue ultrafiltration with dialysis as tolerated.        Bernabe Beasley MD  Nephrology

## 2020-12-22 NOTE — THERAPY
"Occupational Therapy   Initial Evaluation     Patient Name: Seamus Palencia  Age:  55 y.o., Sex:  male  Medical Record #: [id number]  Today's Date: 12/22/2020     Precautions  Precautions: Fall Risk    Assessment  Patient is 55 y.o. male male who presented 12/20/2020 with shortness of breath. Pt has a PMHx of HIV, ESRD, and hospitalization for COVID-19 in July 2020. Pt presents to OT eval mainly limited by behavior/volition and also demonstrated impaired balance that improved as session progressed. Pt irritable throughout session and gave nonspecific responses to therapist's questions regarding social support, PLOF, home setup, stating, \"Are you a therapist or the police?\" Pt able to complete LE dressing with spv, and UE dressing with oNlan due to managing gown around IV lines. Pt declined all other ADL participation. Upon standing, pt was unsteady requiring CGA and pt held onto IV pole for support. Pt demonstrated chair transfer with spv and no overt LOB. Pt reports he lives with children and others who are able to provide assist as needed. Pt not agreeable to participate in any further therapy therefore, patient will not be actively followed for occupational therapy services at this time, however may be seen if requested by physician for 1 more visit within 30 days to address any discharge or equipment needs. Pt appears to have the support needed at home should he need assist with ADLs.     Plan    Recommend Occupational Therapy for Evaluation only.    DC Equipment Recommendations: Unable to determine at this time  Discharge Recommendations: Anticipate that the patient will have no further occupational therapy needs after discharge from the hospital     Subjective    Pt alert and somewhat uncooperative with OT eval, raising voice and stating, \"I don't need therapy!\"     Objective       12/22/20 0959   Prior Living Situation   Prior Services None   Housing / Facility [address]   Steps Into Home 0   Steps In Home 0 " "  Equipment Owned None   Lives with - Patient's Self Care Capacity Adult Children;Unrelated Adult   Comments Pt lives with adult children and friends who can assist as needed. Pt provided limited/nonspecific objective information due to irritability asking therapist, \"Are you a therapist or the police?\"   Prior Level of ADL Function   Self Feeding Independent   Grooming / Hygiene Independent   Bathing Independent   Dressing Independent   Toileting Independent   Prior Level of IADL Function   Medication Management Independent   Laundry Independent   Kitchen Mobility Independent   Finances Independent   Home Management Independent   Shopping Independent   Prior Level Of Mobility Independent Without Device in Community;Independent Without Device in Home   Driving / Transportation Unable To Determine At This Time   Occupation (Pre-Hospital Vocational) Unable To Determine At This Time   Leisure Interests Unable To Determine At This Time   Vitals   Vitals Comments no overt increased WOB/SOB during mobility   Cognition    Cognition / Consciousness X   Level of Consciousness Alert   New Learning Impaired   Attention Impaired   Initiation Impaired   Comments alert, somewhat uncooperative and raising voice stating, \"I don't need therapy!\" and \"I don't want to stand up!\" Poor insight into deficits as pt was unsteady on feet.   Balance Assessment   Comments standing without AD, attempts to hold onto IV pole, close spv/CGA as pt was unsteady upon standing and poor insight   Bed Mobility    Supine to Sit Supervised   Comments HOB elevated, heavy use of bed rails, increased effort due to R neck/shoulder pain   ADL Assessment   Grooming   (refused)   Upper Body Dressing Minimal Assist  (gown, due to lines)   Lower Body Dressing Supervision  (socks)   Toileting   (refused)   Functional Mobility   Sit to Stand Supervised   Toilet Transfers Refused   Mobility in room, holding onto IV pole, close spv/CGA   Edema / Skin Assessment "   Edema / Skin  X   Comments L eye/forehead lacerations   Activity Tolerance   Comments limited by volition/behavior   Patient / Family Goals   Patient / Family Goal #1 To go home

## 2020-12-22 NOTE — DISCHARGE PLANNING
Anticipated Discharge Disposition: Home with son    Action: Spoke with Monika Monahan- Dialysis Coordinator about dialysis arrangements. Per Monika, medical and financial clearance from [facility] admissions is pending and might take a few days. Quantiferon gold is ordered and is pending as well.    Barriers to Discharge: Dialysis chair, Quantiferon gold    Plan: Will continue to follow for any discharge needs/barriers.

## 2020-12-22 NOTE — CARE PLAN
Problem: Fluid Volume:  Goal: Will maintain balanced intake and output  Outcome: PROGRESSING AS EXPECTED     Problem: Respiratory:  Goal: Respiratory status will improve  Outcome: PROGRESSING AS EXPECTED

## 2020-12-23 ENCOUNTER — APPOINTMENT (OUTPATIENT)
Dept: RADIOLOGY | Facility: MEDICAL CENTER | Age: 55
DRG: 974 | End: 2020-12-23
Attending: INTERNAL MEDICINE
Payer: COMMERCIAL

## 2020-12-23 PROBLEM — N17.9 AKI (ACUTE KIDNEY INJURY) (HCC): Status: RESOLVED | Noted: 2020-07-03 | Resolved: 2020-12-23

## 2020-12-23 LAB
ANION GAP SERPL CALC-SCNC: 13 MMOL/L (ref 7–16)
ANISOCYTOSIS BLD QL SMEAR: ABNORMAL
BASOPHILS # BLD AUTO: 1.8 % (ref 0–1.8)
BASOPHILS # BLD: 0.08 K/UL (ref 0–0.12)
BUN SERPL-MCNC: 23 MG/DL (ref 8–22)
CALCIUM SERPL-MCNC: 7.1 MG/DL (ref 8.5–10.5)
CHLORIDE SERPL-SCNC: 97 MMOL/L (ref 96–112)
CO2 SERPL-SCNC: 23 MMOL/L (ref 20–33)
CREAT SERPL-MCNC: 6.16 MG/DL (ref 0.5–1.4)
EOSINOPHIL # BLD AUTO: 0.25 K/UL (ref 0–0.51)
EOSINOPHIL NFR BLD: 5.3 % (ref 0–6.9)
ERYTHROCYTE [DISTWIDTH] IN BLOOD BY AUTOMATED COUNT: 51.6 FL (ref 35.9–50)
GAMMA INTERFERON BACKGROUND BLD IA-ACNC: 0.12 IU/ML
GLUCOSE SERPL-MCNC: 82 MG/DL (ref 65–99)
HCT VFR BLD AUTO: 27.3 % (ref 42–52)
HGB BLD-MCNC: 8.7 G/DL (ref 14–18)
LYMPHOCYTES # BLD AUTO: 0.29 K/UL (ref 1–4.8)
LYMPHOCYTES NFR BLD: 6.2 % (ref 22–41)
M TB IFN-G BLD-IMP: NEGATIVE
M TB IFN-G CD4+ BCKGRND COR BLD-ACNC: 0.03 IU/ML
MACROCYTES BLD QL SMEAR: ABNORMAL
MANUAL DIFF BLD: NORMAL
MCH RBC QN AUTO: 29.8 PG (ref 27–33)
MCHC RBC AUTO-ENTMCNC: 31.9 G/DL (ref 33.7–35.3)
MCV RBC AUTO: 93.5 FL (ref 81.4–97.8)
METAMYELOCYTES NFR BLD MANUAL: 1.8 %
MITOGEN IGNF BCKGRD COR BLD-ACNC: 4.62 IU/ML
MONOCYTES # BLD AUTO: 0.38 K/UL (ref 0–0.85)
MONOCYTES NFR BLD AUTO: 8 % (ref 0–13.4)
MORPHOLOGY BLD-IMP: NORMAL
NEUTROPHILS # BLD AUTO: 3.62 K/UL (ref 1.82–7.42)
NEUTROPHILS NFR BLD: 77 % (ref 44–72)
NRBC # BLD AUTO: 0 K/UL
NRBC BLD-RTO: 0 /100 WBC
PLATELET # BLD AUTO: 186 K/UL (ref 164–446)
PLATELET BLD QL SMEAR: NORMAL
PMV BLD AUTO: 12.1 FL (ref 9–12.9)
POTASSIUM SERPL-SCNC: 3.8 MMOL/L (ref 3.6–5.5)
QFT TB2 - NIL TBQ2: 0 IU/ML
RBC # BLD AUTO: 2.92 M/UL (ref 4.7–6.1)
RBC BLD AUTO: PRESENT
SODIUM SERPL-SCNC: 133 MMOL/L (ref 135–145)
WBC # BLD AUTO: 4.7 K/UL (ref 4.8–10.8)

## 2020-12-23 PROCEDURE — 80048 BASIC METABOLIC PNL TOTAL CA: CPT

## 2020-12-23 PROCEDURE — 90935 HEMODIALYSIS ONE EVALUATION: CPT | Performed by: INTERNAL MEDICINE

## 2020-12-23 PROCEDURE — 700102 HCHG RX REV CODE 250 W/ 637 OVERRIDE(OP): Performed by: INTERNAL MEDICINE

## 2020-12-23 PROCEDURE — 85027 COMPLETE CBC AUTOMATED: CPT

## 2020-12-23 PROCEDURE — 93986 DUP-SCAN HEMO COMPL UNI STD: CPT | Mod: 26,RT | Performed by: INTERNAL MEDICINE

## 2020-12-23 PROCEDURE — 93986 DUP-SCAN HEMO COMPL UNI STD: CPT | Mod: RT

## 2020-12-23 PROCEDURE — 700102 HCHG RX REV CODE 250 W/ 637 OVERRIDE(OP): Performed by: HOSPITALIST

## 2020-12-23 PROCEDURE — A9270 NON-COVERED ITEM OR SERVICE: HCPCS | Performed by: INTERNAL MEDICINE

## 2020-12-23 PROCEDURE — A9270 NON-COVERED ITEM OR SERVICE: HCPCS | Performed by: HOSPITALIST

## 2020-12-23 PROCEDURE — 700111 HCHG RX REV CODE 636 W/ 250 OVERRIDE (IP)

## 2020-12-23 PROCEDURE — 99233 SBSQ HOSP IP/OBS HIGH 50: CPT | Performed by: HOSPITALIST

## 2020-12-23 PROCEDURE — 770020 HCHG ROOM/CARE - TELE (206)

## 2020-12-23 PROCEDURE — 90935 HEMODIALYSIS ONE EVALUATION: CPT

## 2020-12-23 PROCEDURE — 85007 BL SMEAR W/DIFF WBC COUNT: CPT

## 2020-12-23 PROCEDURE — 5A1D70Z PERFORMANCE OF URINARY FILTRATION, INTERMITTENT, LESS THAN 6 HOURS PER DAY: ICD-10-PCS | Performed by: INTERNAL MEDICINE

## 2020-12-23 RX ORDER — DOXYCYCLINE 100 MG/1
100 TABLET ORAL EVERY 12 HOURS
Qty: 6 TAB | Refills: 0 | Status: SHIPPED | OUTPATIENT
Start: 2020-12-23 | End: 2020-12-26

## 2020-12-23 RX ORDER — AMLODIPINE BESYLATE 10 MG/1
10 TABLET ORAL DAILY
Qty: 90 TAB | Refills: 0 | Status: SHIPPED | OUTPATIENT
Start: 2020-12-24 | End: 2020-12-27

## 2020-12-23 RX ORDER — CARVEDILOL 6.25 MG/1
6.25 TABLET ORAL 2 TIMES DAILY WITH MEALS
Qty: 180 TAB | Refills: 0 | Status: SHIPPED | OUTPATIENT
Start: 2020-12-23 | End: 2020-12-24

## 2020-12-23 RX ORDER — DAPSONE 100 MG/1
100 TABLET ORAL DAILY
Qty: 30 TAB | Refills: 0 | Status: SHIPPED | OUTPATIENT
Start: 2020-12-24 | End: 2022-11-15

## 2020-12-23 RX ORDER — CARVEDILOL 25 MG/1
25 TABLET ORAL 2 TIMES DAILY WITH MEALS
Status: DISCONTINUED | OUTPATIENT
Start: 2020-12-23 | End: 2020-12-28 | Stop reason: HOSPADM

## 2020-12-23 RX ORDER — HEPARIN SODIUM 1000 [USP'U]/ML
INJECTION, SOLUTION INTRAVENOUS; SUBCUTANEOUS
Status: COMPLETED
Start: 2020-12-23 | End: 2020-12-23

## 2020-12-23 RX ORDER — AMOXICILLIN AND CLAVULANATE POTASSIUM 500; 125 MG/1; MG/1
1 TABLET, FILM COATED ORAL EVERY 24 HOURS
Qty: 3 TAB | Refills: 0 | Status: SHIPPED | OUTPATIENT
Start: 2020-12-23 | End: 2020-12-27

## 2020-12-23 RX ADMIN — DOXYCYCLINE 100 MG: 100 TABLET, FILM COATED ORAL at 16:32

## 2020-12-23 RX ADMIN — EPOETIN ALFA-EPBX 3000 UNITS: 3000 INJECTION, SOLUTION INTRAVENOUS; SUBCUTANEOUS at 11:38

## 2020-12-23 RX ADMIN — HEPARIN SODIUM 3700 UNITS: 1000 INJECTION, SOLUTION INTRAVENOUS; SUBCUTANEOUS at 11:37

## 2020-12-23 RX ADMIN — CARVEDILOL 6.25 MG: 6.25 TABLET, FILM COATED ORAL at 08:06

## 2020-12-23 RX ADMIN — ACETAMINOPHEN 650 MG: 325 TABLET, FILM COATED ORAL at 16:32

## 2020-12-23 RX ADMIN — AMLODIPINE BESYLATE 10 MG: 10 TABLET ORAL at 05:20

## 2020-12-23 RX ADMIN — DAPSONE 100 MG: 100 TABLET ORAL at 05:20

## 2020-12-23 RX ADMIN — CARVEDILOL 25 MG: 25 TABLET, FILM COATED ORAL at 16:32

## 2020-12-23 RX ADMIN — AMOXICILLIN AND CLAVULANATE POTASSIUM 1 TABLET: 500; 125 TABLET, FILM COATED ORAL at 16:32

## 2020-12-23 RX ADMIN — DOXYCYCLINE 100 MG: 100 TABLET, FILM COATED ORAL at 05:20

## 2020-12-23 ASSESSMENT — ENCOUNTER SYMPTOMS
HEADACHES: 0
NAUSEA: 0
CHILLS: 0
VOMITING: 0
PALPITATIONS: 0
ABDOMINAL PAIN: 0
DIZZINESS: 0
COUGH: 0
FEVER: 0
SHORTNESS OF BREATH: 0

## 2020-12-23 ASSESSMENT — PAIN DESCRIPTION - PAIN TYPE: TYPE: ACUTE PAIN

## 2020-12-23 NOTE — PROGRESS NOTES
Hospital Medicine Daily Progress Note    Date of Service  12/23/2020    Chief Complaint  55 y.o. male admitted 12/20/2020 with redness of breath    Hospital Course  Mr. Palencia is a 55-year-old  male with a past medical history of HIV and ESRD who presented to the emergency department on 12/20/2020 with shortness of breath.  He had most recently been admitted here from 7/3/2020-7/10/2020 with COVID-19 infection and respiratory failure.  He was then discharged home on hospice but has since revoked his DNR/DNI status and has taken himself off of hospice care.  He was then admitted to [facility] [place] 12/15/2020-12/18/2020 with left facial cellulitis.  He had a CT of his face done at that time which revealed left-sided periorbital cellulitis for which he was treated with IV ceftriaxone and clindamycin.  Wound cultures grew MSSA resistant to clindamycin and he was discharged home on oral Augmentin.  On admission there he had a creatinine of 10.3 and was given IV fluids.  It was recommended that he initiate dialysis but he adamantly refused.  He was discharged on sodium bicarb tablets.  His creatinine upon discharge was 7.  On this admission he did require high flow oxygen for hypoxia and was initially monitored in the intensive care unit, though required only 1 day stay there.  Upon further discussion with him he then became amenable to a dialysis catheter (placed 12/21/2020) and initiation of dialysis.  Vascular surgery and nephrology were then consulted and a dialysis catheter was placed.  His first dialysis session was performed on 12/22/2020.  He is still pending results of QuantiFERON gold as well as dialysis chair arrangement.  He will be following up with vascular surgery as an outpatient for dialysis access planning.      Interval Problem Update  Patient states he is feeling well today  He denies any shortness of breath  Had some bleeding from permacath site last night, DVT prophylaxis held.  No current  bleeding    Consultants/Specialty  Nephrology  Critical care  Vascular surgery    Code Status  Full Code    Disposition  Home once dialysis chair arranged    Review of Systems  Review of Systems   Constitutional: Negative for chills and fever.   Respiratory: Negative for cough and shortness of breath.    Cardiovascular: Negative for chest pain and palpitations.   Gastrointestinal: Negative for abdominal pain, nausea and vomiting.   Genitourinary: Negative for dysuria and urgency.   Skin: Positive for rash. Negative for itching.   Neurological: Negative for dizziness and headaches.   All other systems reviewed and are negative.       Physical Exam  Temp:  [36.3 °C (97.3 °F)-37.7 °C (99.9 °F)] 37.7 °C (99.9 °F)  Pulse:  [83-91] 88  Resp:  [16-18] 18  BP: (144-176)/(85-99) 160/99  SpO2:  [91 %-98 %] 93 %    Physical Exam  Vitals signs and nursing note reviewed.   Constitutional:       Appearance: Normal appearance.   Neck:      Comments: Right-sided dialysis catheter present  Cardiovascular:      Rate and Rhythm: Normal rate and regular rhythm.      Heart sounds: No murmur.   Pulmonary:      Effort: Pulmonary effort is normal. No respiratory distress.      Breath sounds: Normal breath sounds.   Skin:     Comments: Hyperpigmentation on left upper half of face.  Significant scabbing and swelling.  No vesicles present   Neurological:      General: No focal deficit present.      Mental Status: He is alert and oriented to person, place, and time.         Fluids    Intake/Output Summary (Last 24 hours) at 12/23/2020 1151  Last data filed at 12/23/2020 0500  Gross per 24 hour   Intake 600 ml   Output 2570 ml   Net -1970 ml       Laboratory  Recent Labs     12/21/20  0604 12/22/20  0355 12/23/20  0256   WBC 7.6 5.5 4.7*   RBC 2.60* 2.46* 2.92*   HEMOGLOBIN 7.9* 7.4* 8.7*   HEMATOCRIT 24.8* 22.6* 27.3*   MCV 93.8 91.9 93.5   MCH 30.8 30.1 29.8   MCHC 32.8* 32.7* 31.9*   RDW 52.9* 51.2* 51.6*   PLATELETCT 157* 170 186   MPV 11.6  12.1 12.1     Recent Labs     12/21/20  0525 12/22/20  0355 12/23/20  0256   SODIUM 135 136 133*   POTASSIUM 4.2 3.8 3.8   CHLORIDE 105 101 97   CO2 14* 22 23   GLUCOSE 83 83 82   BUN 66* 42* 23*   CREATININE 10.77* 8.35* 6.16*   CALCIUM 6.2* 6.5* 7.1*                   Imaging  US-HEMODIALYSIS ACCESS CREATION DUPLEX UNILAT LEFT   Final Result      IR-ELLISON,GROKAYODE PLACEMENT >5   Final Result      1. ULTRASOUND AND FLUOROSCOPIC GUIDED PLACEMENT OF A Right INTERNAL JUGULAR 14.5 [place] HemoSplit TUNNELED DIALYSIS CATHETER.      2. THE HEMODIALYSIS CATHETER MAY BE USED IMMEDIATELY AS CLINICALLY INDICATED. FLUSHES PER PROTOCOL.         DX-CHEST-PORTABLE (1 VIEW)   Final Result         Worsening airspace opacity throughout the right lower lung.      US-HEMODIALYSIS ACCESS CREATION DUPLEX UNILAT RIGHT    (Results Pending)        Assessment/Plan  * ESRD (end stage renal disease) on dialysis ([facility])- (present on admission)  Assessment & Plan  -Dialysis access obtained 12/21/2020.  -Discharge pending set up with outpatient hemodialysis chair and QuantiFERON gold results    Pneumonia- (present on admission)  Assessment & Plan  -Hypoxia and infiltrate seen on CXR. Procalcitonin level elevated at 1.7.  -Continue augmentin and doxycycline.  -As he has not been compliant with HIV regimen, he is at risk of PCP pneumonia: Dr. Whyte has recommended against treating for PCP and instead dapsone for prophylaxis.    Anemia due to chronic kidney disease- (present on admission)  Assessment & Plan  -H/H is stable today.  Currently no need for transfusion.  -Transfuse for hemoglobin less than 7.    HFrEF (heart failure with reduced ejection fraction) ([facility])- (present on admission)  Assessment & Plan  -EF 40%, noted in July of this year.  -Continue Coreg.  -Follow-up with cardiology after hospital discharge.    HIV (human immunodeficiency virus infection) ([facility])- (present on admission)  Assessment & Plan  -Followed by Dr. Whyte at hospitals  clinic, who states he has been off his meds for 5 months.  -CD4 count low at 36.    History of 2019 novel coronavirus disease (COVID-19)- (present on admission)  Assessment & Plan  -He had COVID + PCR on 7/9/20.  -COVID is negative in the ER.  -No isolation currently required.    Herpes zoster complication- (present on admission)  Assessment & Plan  -He had been admitted to [facility] and treated for facial cellulitis though exam is consistent with healing zoster. Now crusted over and no open lesions.    Nicotine dependence- (present on admission)  Assessment & Plan  -Continue to encourage permanent cessation.       VTE prophylaxis: heparin

## 2020-12-23 NOTE — PROGRESS NOTES
[facility] Progress Note     Hemodialysis treatment no. 2 ordered today per Dr. simpson x 2.5 hours.   Treatment initiated at 1151 ended at 1421.      Patient tolerated treatment; see paper flow sheet for details.      Net UF 1000 mL.      Post tx, CVC flushed with saline then locked with heparin 1000 units/mL per designated amount in each wing then clamped and capped. Aspirate heparin prior to next CVC use.     Report given to Primary NAMRATA Hewitt.

## 2020-12-23 NOTE — HOSPITAL COURSE
Mr. Palencia is a 55-year-old  male with a past medical history of HIV and ESRD who presented to the emergency department on 12/20/2020 with shortness of breath.  He had most recently been admitted here from 7/3/2020-7/10/2020 with COVID-19 infection and respiratory failure.  He was then discharged home on hospice but has since revoked his DNR/DNI status and has taken himself off of hospice care.  He was then admitted to [facility] from 12/15/2020-12/18/2020 with left facial cellulitis.  He had a CT of his face done at that time which revealed left-sided periorbital cellulitis for which he was treated with IV ceftriaxone and clindamycin.  Wound cultures grew MSSA resistant to clindamycin and he was discharged home on oral Augmentin.  On admission there he had a creatinine of 10.3 and was given IV fluids.  It was recommended that he initiate dialysis but he adamantly refused.  He was discharged on sodium bicarb tablets.  His creatinine upon discharge was 7.  On this admission he did require high flow oxygen for hypoxia and was initially monitored in the intensive care unit, though required only 1 day stay there.  Upon further discussion with him he then became amenable to a dialysis catheter (placed 12/21/2020) and initiation of dialysis.  Vascular surgery and nephrology were then consulted and a dialysis catheter was placed.  His first dialysis session was performed on 12/22/2020.

## 2020-12-23 NOTE — PROGRESS NOTES
Hospital Medicine Daily Progress Note    Date of Service  12/22/2020    Chief Complaint  Shortness of breath    Hospital Course  Mr. Palencia is a 55-year-old  male with a past medical history of HIV and ESRD who presented to the emergency department on 12/20/2020 with shortness of breath.  He had most recently been admitted here from 7/3/2020-7/10/2020 with COVID-19 infection and respiratory failure.  He was then discharged home on hospice but has since revoked his DNR/DNI status and has taken himself off of hospice care.  He was then admitted to [facility] from 12/15/2020-12/18/2020 with left facial cellulitis.  He had a CT of his face done at that time which revealed left-sided periorbital cellulitis for which he was treated with IV ceftriaxone and clindamycin.  Wound cultures grew MSSA resistant to clindamycin and he was discharged home on oral Augmentin.  On admission there he had a creatinine of 10.3 and was given IV fluids.  It was recommended that he initiate dialysis but he adamantly refused.  He was discharged on sodium bicarb tablets.  His creatinine upon discharge was 7.  On this admission he did require high flow oxygen for hypoxia and was initially monitored in the intensive care unit, though required only 1 day stay there.  Upon further discussion with him he then became amenable to a dialysis catheter (placed 12/21/2020) and initiation of dialysis.  Vascular surgery and nephrology were then consulted and a dialysis catheter was placed.  His first dialysis session was performed on 12/22/2020.    Interval Problem Update  -First dialysis session scheduled for today.   -No specific complaints today.    Labs today were reviewed and showed a stable normocytic anemia.  Calcium improved to 6.5.    T-max 99.1 °F overnight, HR 90s-100, SBP 150s-170s, O2 saturations within normal limits on room air.    Consultants/Specialty  Vascular surgery  Nephrology  Critical care intensivists while in ICU    Code  Status  Full Code    Disposition  Medically stable for discharge once outpatient dialysis chair is arranged.    Review of Systems  Review of Systems   Constitutional: Negative for fever and malaise/fatigue.   Respiratory: Negative for cough, shortness of breath and wheezing.    Cardiovascular: Negative for chest pain, palpitations and leg swelling.   Gastrointestinal: Negative for abdominal pain, nausea and vomiting.   Neurological: Negative for dizziness, sensory change, speech change, focal weakness, weakness and headaches.   Psychiatric/Behavioral: Negative for depression. The patient is not nervous/anxious and does not have insomnia.    All other systems reviewed and are negative.     Physical Exam  Temp:  [36.4 °C (97.6 °F)-37.6 °C (99.7 °F)] 37.6 °C (99.7 °F)  Pulse:  [] 91  Resp:  [16-18] 18  BP: (138-179)/(76-95) 176/95  SpO2:  [91 %-98 %] 91 %    Physical Exam  Vitals signs and nursing note reviewed.   Constitutional:       General: He is awake.      Appearance: Normal appearance. He is well-developed. He is not ill-appearing.   HENT:      Head: Normocephalic and atraumatic.      Mouth/Throat:      Lips: Pink.      Mouth: Mucous membranes are moist.   Eyes:      Pupils: Pupils are equal, round, and reactive to light.   Neck:      Musculoskeletal: Normal range of motion and neck supple.   Cardiovascular:      Rate and Rhythm: Normal rate and regular rhythm.      Pulses: Normal pulses.      Heart sounds: Normal heart sounds.   Pulmonary:      Effort: Pulmonary effort is normal.      Breath sounds: Normal breath sounds.   Abdominal:      General: Bowel sounds are normal. There is no distension or abdominal bruit.      Palpations: Abdomen is soft.      Tenderness: There is no abdominal tenderness.   Musculoskeletal:      Right lower leg: No edema.      Left lower leg: No edema.   Skin:     General: Skin is warm and dry.   Neurological:      General: No focal deficit present.      Mental Status: He is  alert and oriented to person, place, and time.      GCS: GCS eye subscore is 4. GCS verbal subscore is 5. GCS motor subscore is 6.   Psychiatric:         Attention and Perception: Attention and perception normal.         Mood and Affect: Mood and affect normal.         Speech: Speech normal.         Behavior: Behavior normal. Behavior is cooperative.         Thought Content: Thought content normal.         Cognition and Memory: Cognition and memory normal.         Judgment: Judgment normal.     Fluids    Intake/Output Summary (Last 24 hours) at 12/22/2020 1750  Last data filed at 12/22/2020 1600  Gross per 24 hour   Intake 1340 ml   Output 4420 ml   Net -3080 ml     Laboratory  Recent Labs     12/20/20  0640 12/21/20  0604 12/22/20  0355   WBC 13.7* 7.6 5.5   RBC 3.33* 2.60* 2.46*   HEMOGLOBIN 10.1* 7.9* 7.4*   HEMATOCRIT 32.5* 24.8* 22.6*   MCV 97.6 93.8 91.9   MCH 30.3 30.8 30.1   MCHC 31.1* 32.8* 32.7*   RDW 58.4* 52.9* 51.2*   PLATELETCT 219 157* 170   MPV 11.9 11.6 12.1     Recent Labs     12/20/20  1830 12/21/20  0525 12/22/20  0355   SODIUM 135 135 136   POTASSIUM 4.4 4.2 3.8   CHLORIDE 105 105 101   CO2 12* 14* 22   GLUCOSE 110* 83 83   BUN 66* 66* 42*   CREATININE 11.16* 10.77* 8.35*   CALCIUM 6.3* 6.2* 6.5*     Recent Labs     12/20/20  0640   APTT 47.0*   INR 1.13     Imaging  US-HEMODIALYSIS ACCESS CREATION DUPLEX UNILAT LEFT   Final Result      IR-SONIA ELLISON PLACEMENT >5   Final Result      1. ULTRASOUND AND FLUOROSCOPIC GUIDED PLACEMENT OF A Right INTERNAL JUGULAR 14.5 [place] HemoSplit TUNNELED DIALYSIS CATHETER.      2. THE HEMODIALYSIS CATHETER MAY BE USED IMMEDIATELY AS CLINICALLY INDICATED. FLUSHES PER PROTOCOL.         DX-CHEST-PORTABLE (1 VIEW)   Final Result         Worsening airspace opacity throughout the right lower lung.      US-HEMODIALYSIS ACCESS CREATION DUPLEX UNILAT RIGHT    (Results Pending)      Assessment/Plan  * ESRD (end stage renal disease) on dialysis ([facility])- (present on  admission)  Assessment & Plan  -Creatinine stable today.  -Nephrology following.  -Dialysis access obtained 12/21/2020.  -First hemodialysis session to be done today.  -Discharge pending set up with outpatient hemodialysis chair.    Pneumonia- (present on admission)  Assessment & Plan  -Hypoxia and infiltrate seen on CXR. Procalcitonin level elevated at 1.7.  -Deescalate IV zosyn to augmentin and doxycycline.  -As he has not been compliant with HIV regimen, he is at risk of PCP pneumonia: Dr. Whyte has recommended against treating for PCP and instead dapsone for prophylaxis.    Anemia due to chronic kidney disease- (present on admission)  Assessment & Plan  -H/H is stable today.  Currently no need for transfusion.  -Transfuse for hemoglobin less than 7.    HFrEF (heart failure with reduced ejection fraction) ([facility])- (present on admission)  Assessment & Plan  -EF 40%, noted in July of this year.  -Continue Coreg.  -Follow-up with cardiology after hospital discharge.    VAIBHAV (acute kidney injury) ([facility])  Assessment & Plan  Acute kidney injury in the setting of stage V CKD  Cr on 12/18 was 7 and went up to 10 on admission  He will have a dialysis catheter placed for initiation of dialysis   Nephrology consulted  IV lasix 100 mg BID ordered from the ER  BMP ordered for the morning    HIV (human immunodeficiency virus infection) ([facility])- (present on admission)  Assessment & Plan  -Followed by Dr. Whyte at [facility], who states he has been off his meds for 5 months.  -CD4 count low at 36.    History of 2019 novel coronavirus disease (COVID-19)- (present on admission)  Assessment & Plan  -He had COVID + PCR on 7/9/20.  -COVID is negative in the ER.  -No isolation currently required.    Herpes zoster complication- (present on admission)  Assessment & Plan  -He had been admitted to [facility] and treated for facial cellulitis though exam is consistent with healing zoster. Now crusted over and no open  lesions.    Nicotine dependence- (present on admission)  Assessment & Plan  -Continue to encourage permanent cessation.     VTE prophylaxis: Subcutaneous heparin      Alexandria Cruz, MSN, RN, APRN, ACNPC-AG, CCRN  Nurse Practitioner, [facility]  [phone]    12/22/2020    5:48 PM

## 2020-12-23 NOTE — ASSESSMENT & PLAN NOTE
-EF 40%, noted in July of this year.  -Continue Coreg.  -Follow-up with cardiology after hospital discharge.

## 2020-12-23 NOTE — PROCEDURES
Diagnosis: CKD5 progression to End-Stage Renal Disease, initiated 12/21/20. 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 Patient seen and examined on hemodialysis during treatment. Patient is stable, tolerating hemodialysis. Denies chest pain and shortness of breath. Orders updated as needed. Please refer to flowsheet for full details.    Access: R IJ permacath  UF goal: 1.5L    Plan: Continue HD thrice weekly now. Discharge planning. Appreciate vascular surgery consult, will plan for outpatient AV access.     Bernabe Beasley MD  Nephrology

## 2020-12-23 NOTE — PROGRESS NOTES
Hemodialysis ordered by Dr. Beasley. Treatment started at 0858 and ended at 1158. Pt stable, vss, no c/o post tx. See flow sheets for details. Net UF 1.5 L. Reported to ERNST Hewitt RN.

## 2020-12-24 LAB
ANISOCYTOSIS BLD QL SMEAR: ABNORMAL
BASOPHILS # BLD AUTO: 1.7 % (ref 0–1.8)
BASOPHILS # BLD: 0.1 K/UL (ref 0–0.12)
EOSINOPHIL # BLD AUTO: 0.64 K/UL (ref 0–0.51)
EOSINOPHIL NFR BLD: 11.3 % (ref 0–6.9)
ERYTHROCYTE [DISTWIDTH] IN BLOOD BY AUTOMATED COUNT: 51.9 FL (ref 35.9–50)
HCT VFR BLD AUTO: 31 % (ref 42–52)
HGB BLD-MCNC: 9.6 G/DL (ref 14–18)
INR PPP: 1.08 (ref 0.87–1.13)
LYMPHOCYTES # BLD AUTO: 0.79 K/UL (ref 1–4.8)
LYMPHOCYTES NFR BLD: 13.9 % (ref 22–41)
MANUAL DIFF BLD: NORMAL
MCH RBC QN AUTO: 29.7 PG (ref 27–33)
MCHC RBC AUTO-ENTMCNC: 31 G/DL (ref 33.7–35.3)
MCV RBC AUTO: 96 FL (ref 81.4–97.8)
METAMYELOCYTES NFR BLD MANUAL: 0.9 %
MICROCYTES BLD QL SMEAR: ABNORMAL
MONOCYTES # BLD AUTO: 0.3 K/UL (ref 0–0.85)
MONOCYTES NFR BLD AUTO: 5.2 % (ref 0–13.4)
MORPHOLOGY BLD-IMP: NORMAL
NEUTROPHILS # BLD AUTO: 3.82 K/UL (ref 1.82–7.42)
NEUTROPHILS NFR BLD: 66.1 % (ref 44–72)
NEUTS BAND NFR BLD MANUAL: 0.9 % (ref 0–10)
NRBC # BLD AUTO: 0 K/UL
NRBC BLD-RTO: 0 /100 WBC
PLATELET # BLD AUTO: 209 K/UL (ref 164–446)
PLATELET BLD QL SMEAR: NORMAL
PMV BLD AUTO: 12 FL (ref 9–12.9)
POLYCHROMASIA BLD QL SMEAR: NORMAL
PROTHROMBIN TIME: 14.3 SEC (ref 12–14.6)
RBC # BLD AUTO: 3.23 M/UL (ref 4.7–6.1)
RBC BLD AUTO: PRESENT
WBC # BLD AUTO: 5.7 K/UL (ref 4.8–10.8)

## 2020-12-24 PROCEDURE — 99232 SBSQ HOSP IP/OBS MODERATE 35: CPT | Performed by: HOSPITALIST

## 2020-12-24 PROCEDURE — 700111 HCHG RX REV CODE 636 W/ 250 OVERRIDE (IP): Performed by: HOSPITALIST

## 2020-12-24 PROCEDURE — A9270 NON-COVERED ITEM OR SERVICE: HCPCS | Performed by: INTERNAL MEDICINE

## 2020-12-24 PROCEDURE — 770020 HCHG ROOM/CARE - TELE (206)

## 2020-12-24 PROCEDURE — 85610 PROTHROMBIN TIME: CPT

## 2020-12-24 PROCEDURE — A9270 NON-COVERED ITEM OR SERVICE: HCPCS | Performed by: HOSPITALIST

## 2020-12-24 PROCEDURE — 85007 BL SMEAR W/DIFF WBC COUNT: CPT

## 2020-12-24 PROCEDURE — 700102 HCHG RX REV CODE 250 W/ 637 OVERRIDE(OP): Performed by: INTERNAL MEDICINE

## 2020-12-24 PROCEDURE — 700102 HCHG RX REV CODE 250 W/ 637 OVERRIDE(OP): Performed by: HOSPITALIST

## 2020-12-24 PROCEDURE — 85027 COMPLETE CBC AUTOMATED: CPT

## 2020-12-24 RX ORDER — CARVEDILOL 25 MG/1
25 TABLET ORAL 2 TIMES DAILY WITH MEALS
Qty: 60 TAB | Refills: 0 | Status: SHIPPED | OUTPATIENT
Start: 2020-12-24 | End: 2022-11-15

## 2020-12-24 RX ADMIN — CARVEDILOL 25 MG: 25 TABLET, FILM COATED ORAL at 06:20

## 2020-12-24 RX ADMIN — AMLODIPINE BESYLATE 10 MG: 10 TABLET ORAL at 04:35

## 2020-12-24 RX ADMIN — CARVEDILOL 25 MG: 25 TABLET, FILM COATED ORAL at 17:46

## 2020-12-24 RX ADMIN — DOXYCYCLINE 100 MG: 100 TABLET, FILM COATED ORAL at 17:46

## 2020-12-24 RX ADMIN — DOXYCYCLINE 100 MG: 100 TABLET, FILM COATED ORAL at 04:35

## 2020-12-24 RX ADMIN — HEPARIN SODIUM 5000 UNITS: 5000 INJECTION, SOLUTION INTRAVENOUS; SUBCUTANEOUS at 04:35

## 2020-12-24 RX ADMIN — AMOXICILLIN AND CLAVULANATE POTASSIUM 1 TABLET: 500; 125 TABLET, FILM COATED ORAL at 17:46

## 2020-12-24 RX ADMIN — DAPSONE 100 MG: 100 TABLET ORAL at 04:35

## 2020-12-24 ASSESSMENT — ENCOUNTER SYMPTOMS
CHILLS: 0
DIZZINESS: 0
ABDOMINAL PAIN: 0
VOMITING: 0
NAUSEA: 0
PALPITATIONS: 0
FEVER: 0
SHORTNESS OF BREATH: 0
HEADACHES: 0
COUGH: 0

## 2020-12-24 ASSESSMENT — PAIN DESCRIPTION - PAIN TYPE: TYPE: ACUTE PAIN

## 2020-12-24 NOTE — DISCHARGE INSTRUCTIONS
Discharge Instructions    Discharged to home by car with relative. Discharged via wheelchair, hospital escort: Yes.  Special equipment needed: Not Applicable    Be sure to schedule a follow-up appointment with your primary care doctor or any specialists as instructed.     Discharge Plan:        I understand that a diet low in cholesterol, fat, and sodium is recommended for good health. Unless I have been given specific instructions below for another diet, I accept this instruction as my diet prescription.   Other diet: Renal    Special Instructions: None    · Is patient discharged on Warfarin / Coumadin?   No     Depression / Suicide Risk    As you are discharged from this [facility] facility, it is important to learn how to keep safe from harming yourself.    Recognize the warning signs:  · Abrupt changes in personality, positive or negative- including increase in energy   · Giving away possessions  · Change in eating patterns- significant weight changes-  positive or negative  · Change in sleeping patterns- unable to sleep or sleeping all the time   · Unwillingness or inability to communicate  · Depression  · Unusual sadness, discouragement and loneliness  · Talk of wanting to die  · Neglect of personal appearance   · Rebelliousness- reckless behavior  · Withdrawal from people/activities they love  · Confusion- inability to concentrate     If you or a loved one observes any of these behaviors or has concerns about self-harm, here's what you can do:  · Talk about it- your feelings and reasons for harming yourself  · Remove any means that you might use to hurt yourself (examples: pills, rope, extension cords, firearm)  · Get professional help from the community (Mental Health, Substance Abuse, psychological counseling)  · Do not be alone:Call your Safe Contact- someone whom you trust who will be there for you.  · Call your local CRISIS HOTLINE 495-7402 or 242-359-8931  · Call your local Children's Mobile Crisis  Response Team [facility] (216) 737-7742 or www.Checkd.In.Trunk Archive  · Call the toll free National Suicide Prevention Hotlines   · National Suicide Prevention Lifeline 997-658-WURO (0134)  · National Hope Line Network 800-SUICIDE (181-9906)        Plan de alimentación para pacientes con diálisis  Eating Plan for Dialysis  La diálisis es un tratamiento que limpia la ronny. Se realiza cuando tiene daño renal. Las personas que deben realizarse diálisis tienen que cuidar velez dieta. [place] se debe a que algunos nutrientes pueden acumularse en la ronny entre un tratamiento y otro, y causar enfermedades.  Velez médico o especialista en alimentación (nutricionista) podrá:  · Informarle qué nutrientes debe incluir o evitar.  · Informarle la cantidad de estos nutrientes que debe consumir cada día.  · Ayudarle a planificar las comidas.  · Informarle la cantidad de líquido que tiene que beber cada día.  ¿Cuáles son algunos consejos para seguir shikha plan?  Leer las etiquetas de los alimentos  · Luba las etiquetas de los alimentos para conocer lo siguiente:  ? Potasio. Shikha se encuentra en la leche, las frutas y las verduras.  ? Fósforo. Shikha se encuentra en la leche, el queso, los frijoles, los marisol secos y las gaseosas.  ? Endy (sodio). Esta se encuentra en las brittni procesadas, los embutidos, las comidas congeladas preelaboradas, las verduras enlatadas y los refrigerios salados.  · Intente encontrar alimentos con bajo contenido de potasio, fósforo y sodio.  · Busque los alimentos etiquetados “sin sodio”, “muy bajo en sodio” o “bajo en sodio”.  Al ir de compras  · No compre alimentos integrales y con alto contenido de fibra.  · No compre ni use sustitutos de sal.  · No compre alimentos procesados.  Al cocinar  · Elimine todo el líquido de las verduras cocidas y las frutas enlatadas antes de consumirlas.  · Antes de cocinar lora, córtelas en trozos pequeños. Luego hiérvalas en agua sin sal.  · Intente usar hierbas y especias que no  contengan sodio, para añadir sabor.  Planificación de las comidas  La mayoría de las personas en diálisis debe intentar comer:  · 6 a 11 porciones de cereales cada día. Carrie porción equivale a 1 rebanada de pan o a ½ taza de arroz o pasta cocidos.  · 2 a 3 porciones de verduras con bajo contenido de potasio cada día. Carrie porción equivale a ½ taza.  · 2 a 3 porciones de frutas con bajo contenido de potasio cada día. Carrie porción equivale a ½ taza.  · Proteínas, wyatt carne, ave, pescado y huevos. Hable con velez médico o nutricionista sobre la cantidad y el tipo de proteínas adecuados que debe consumir.  · ½ taza de productos lácteos cada día.  Información general  · Siga las indicaciones del médico respecto de la cantidad de líquido que tiene que beber. Es posible que le indiquen lo siguiente:  ? Anote lo que yonas.  ? Anote los alimentos que consume que están elaborados principalmente a base de agua, wyatt gelatinas y sopas.  ? Rupal los líquidos en tazas pequeñas.  · Emison vitaminas y suplementos minerales solamente wyatt se lo haya indicado el médico.  · Emison los medicamentos de venta lukas y los recetados solamente wyatt se lo haya indicado el médico.  ¿Qué alimentos puedo comer?         Frutas  Manzanas. Gopal rojos frescos o congelados. Peras, duraznos y ananá frescos o enlatados. Uvas. Ciruelas.  Verduras  Brócoli, zanahorias y frijoles verdes frescos o congelados. Repollo. Coliflor. Apio. Pepinos. Berenjena. Rábanos. [place].  Cereales  Pan amor. Arroz amor. Cereales cocidos. Palomitas de maíz sin sal. Tortillas. Pastas.  Tammi y otras proteínas  Carne, cerdo, nilson y pescado frescos o congelados. Huevos.  Lácteos  Queso crema. Crema espesa. Queso ricota.  Bebidas  Janet de manzana. Jugo de arándanos. Mosto. Limonada. Café magalys. Leche de arroz (no pasteurizada ni fortificada).  Aliños y condimentos  Hierbas. Especias. Mermelada y jalea. Miel.  Dulces y postres  Sorbete. Bizcochuelos. Galletitas.  Grasas y  aceites  Aceite de green, de canola y de girasol.  Otros alimentos  Crema no láctea. Cobertura batida no láctea. Caldos caseros sin sal.  Es posible que los productos que se enumeran más arriba no constituyan faby lista completa de los alimentos y las bebidas que puede margarita. Comuníquese con el nutricionista para conocer más opciones.  ¿Qué alimentos sally evitar?  Frutas  Carambola. Bananas. Naranjas. Kiwi. Pelones. Ciruelas pasas. Melón. Frutas secas. Aguacate.  Verduras  Medhat. Remolachas. Tomates. Zapallo y calabaza. Espárragos. Espinaca. Chirivía.  Cereales  Panes integrales. Pastas integrales. Cereales con alto contenido de fibra.  Tammi y otras proteínas  Tammi enlatadas y ahumadas, y embutidos. Embutidos envasados. Stefani. Marisol secos y semillas. Mantequilla de maní. Frijoles y legumbres.  Lácteos  Leche. Kev de leche. Yogur. Queso y queso cottage. Quesos untables procesados.  Bebidas  Jugo de naranjas. Jugo de ciruelas. Bebidas gaseosas.  Aliños y condimentos  Sal. Sustitutos de la sal. Salsa de soja.  Dulces y postres  Helados. Chocolate. Praliné de marisol secos.  Grasas y aceites  Mantequilla. Margarina.  Otros alimentos  Comidas congeladas preelaboradas. Sopas enlatadas.  Es posible que los productos que se enumeran más arriba no constituyan faby lista completa de los alimentos y las bebidas que debe evitar. Comuníquese con el nutricionista para obtener más información.  Resumen  · Si se realiza diálisis, es importante que preste atención a lo que come. Determinados nutrientes y desechos pueden acumularse en la ronny y causar que se enferme.  · El nutricionista lo ayudará a elaborar un plan de alimentación para robert necesidades.  · Evite los alimentos con alto contenido de potasio, sal (sodio) y fósforo. Limite velez ingesta de líquidos wyatt se lo haya indicado el médico o nutricionista.  Esta información no tiene wyatt fin reemplazar el consejo del médico. Asegúrese de hacerle al médico cualquier  pregunta que tenga.  Document Released: 06/18/2013 Document Revised: 03/30/2019 Document Reviewed: 01/23/2019  Elsevier Patient Education © 2020 Elsevier Inc.        Hipertensión en los adultos  Hypertension, Adult  La presión arterial robles (hipertensión) se produce cuando la fuerza de la ronny bombea a través de las arterias con mucha fuerza. Las arterias son los vasos sanguíneos que transportan la ronny desde el corazón al natalie del cuerpo. La hipertensión hace que el corazón vandana más esfuerzo para bombear ronny y puede provocar que las arterias se estrechen o endurezcan. La hipertensión no tratada o no controlada puede causar infarto de miocardio, insuficiencia cardíaca, accidente cerebrovascular, enfermedad renal y otros problemas.  Carrie lectura de la presión arterial consta de un número más alto sobre un número más bajo. En condiciones ideales, la presión arterial debe estar por debajo de 120/80. El primer número (“superior”) es la presión sistólica. Es la medida de la presión de las arterias cuando el corazón late. El byron número (“inferior”) es la presión diastólica. Es la medida de la presión en las arterias cuando el corazón se relaja.  ¿Cuáles son las causas?  Se desconoce la causa exacta de esta afección. Hay algunas afecciones que causan presión arterial robles o están relacionadas con johnnie.  ¿Qué incrementa el riesgo?  Algunos factores de riesgo de hipertensión están bajo velez control. Los siguientes factores pueden hacer que sea más propenso a desarrollar esta afección:  · Fumar.  · Tener diabetes mellitus tipo 2, colesterol alto, o ambos.  · No hacer la cantidad suficiente de actividad física o ejercicio.  · Tener sobrepeso.  · Consumir mucha grasa, azúcar, calorías o sal (sodio) en velez dieta.  · Beber alcohol en exceso.  Algunos factores de riesgo para la presión arterial robles pueden ser difíciles o imposibles de cambiar. Algunos de estos factores son los siguientes:  · Tener enfermedad renal  crónica.  · Tener antecedentes familiares de presión arterial robles.  · Edad. Los riesgos aumentan con la edad.  · Raymond. El riesgo es mayor para las personas afroamericanas.  · Sexo. Antes de los 45 años, los hombres corren más riesgo que las mujeres. Después de los 65 años, las mujeres corren más riesgo que los hombres.  · Tener apnea obstructiva del sueño.  · Estrés.  ¿Cuáles son los signos o los síntomas?  Es posible que la presión arterial robles puede no cause síntomas. La presión arterial muy robles (crisis hipertensiva) puede provocar:  · Dolor de aurelia.  · Ansiedad.  · Falta de aire.  · Hemorragia nasal.  · Náuseas y vómitos.  · Cambios en la visión.  · Dolor de pecho intenso.  · Convulsiones.  ¿Cómo se diagnostica?  Esta afección se diagnostica al medir velez presión arterial mientras se encuentra sentado, con el brazo apoyado sobre faby superficie plana, las piernas sin cruzar y los pies ross apoyados en el piso. El brazalete del tensiómetro debe colocarse directamente sobre la piel de la parte superior del brazo y al nivel de velez corazón. Debe medirla al menos dos veces en el mismo brazo. Determinadas condiciones pueden causar faby diferencia de presión arterial entre el brazo yelitza y el derecho.  Ciertos factores pueden provocar que las lecturas de la presión arterial lisa inferiores o superiores a lo normal por un período corto de tiempo:  · Si velez presión arterial es más robles cuando se encuentra en el consultorio del médico que cuando la mide en velez hogar, se denomina “hipertensión de bata jojo”. La mayoría de las personas que tienen esta afección no deben ser medicadas.  · Si velez presión arterial es más robles en el hogar que cuando se encuentra en el consultorio del médico, se denomina “hipertensión enmascarada”. La mayoría de las personas que tienen esta afección deben ser medicadas para controlar la presión arterial.  Si tiene faby lecturas de presión arterial robles arabella faby visita o si tiene presión  arterial normal con otros factores de riesgo, se le podrá pedir que vandana lo siguiente:  · Que regrese otro día para volver a controlar velez presión arterial nuevamente.  · Que se controle la presión arterial en velez casa arabella 1 semana o más.  Si se le diagnostica hipertensión, es posible que se le realicen otros análisis de ronny o estudios de diagnóstico por imágenes para ayudar a velez médico a comprender velez riesgo general de tener otras afecciones.  ¿Cómo se trata?  Esta afección se trata haciendo cambios saludables en el estilo de ray, tales wyatt ingerir alimentos saludables, realizar más ejercicio y reducir el consumo de alcohol. El médico puede recetarle medicamentos si los cambios en el estilo de rya no son suficientes para lograr controlar la presión arterial y si:  · Velez presión arterial sistólica está por encima de 130.  · Velez presión arterial diastólica está por encima de 80.  La presión arterial deseada puede variar en función de las enfermedades, la edad y otros factores personales.  Siga estas instrucciones en velez casa:  Comida y bebida    · Siga faby dieta con alto contenido de fibras y potasio, y con bajo contenido de sodio, azúcar agregada y grasas. Un ejemplo de plan alimenticio es la dieta DASH (Dietary Approaches to Stop Hypertension, Métodos alimenticios para detener la hipertensión). Para alimentarse de esta manera:  ? Coma mucha fruta y verdura fresca. Trate de que la mitad del plato de cada comida sea de frutas y verduras.  ? Coma cereales integrales, wyatt pasta integral, arroz integral o pan integral. Llene aproximadamente un cuarto del plato con cereales integrales.  ? Coma y millie productos lácteos con bajo contenido de grasa, wyatt leche descremada o yogur bajo en grasas.  ? Evite la ingesta de hudson de carne grasa, carne procesada o curada, y carne de ave con piel. Llene aproximadamente un cuarto del plato con proteínas magras, wyatt pescado, nilson sin piel, frijoles, huevos o tofu.  ? Evite  ingerir alimentos prehechos y procesados. En general, estos tienen mayor cantidad de sodio, azúcar agregada y grasa.  · Reduzca velez ingesta diaria de sodio. La mayoría de las personas que tienen hipertensión deben comer menos de 1500 mg de sodio por día.  · No millie alcohol si:  ? Velez médico le indica no hacerlo.  ? Está embarazada, puede estar embarazada o está tratando de quedar embarazada.  · Si yonas alcohol:  ? Limite la cantidad que yonas a lo siguiente:  § De 0 a 1 medida por día para las mujeres.  § De 0 a 2 medidas por día para los hombres.  ? Esté atento a la cantidad de alcohol que hay en las bebidas que flash. En los [place], faby medida equivale a faby botella de cerveza de 12 oz (355 ml), un vaso de vino de 5 oz (148 ml) o un vaso de faby bebida alcohólica de robles graduación de 1½ oz (44 ml).  Estilo de ray    · Trabaje con velez médico para mantener un peso saludable o perder peso. Pregúntele cuál es el peso recomendado para usted.  · Karli al menos 30 minutos de ejercicio la mayoría de los días de la semana. Estas actividades pueden incluir caminar, nadar o andar en bicicleta.  · Incluya ejercicios para fortalecer roebrt músculos (ejercicios de resistencia), wyatt [place] o levantamiento de pesas, wyatt parte de velez rutina semanal de ejercicios. Intente realizar 30 minutos de bill tipo de ejercicios al menos olga días a la semana.  · No consuma ningún producto que contenga nicotina o tabaco, wyatt cigarrillos, cigarrillos electrónicos y tabaco de mascar. Si necesita ayuda para dejar de fumar, consulte al médico.  · Contrólese la presión arterial en velez casa según las indicaciones del médico.  · Concurra a todas las visitas de seguimiento wyatt se lo haya indicado el médico. [place] es importante.  Medicamentos  · [place] los medicamentos de venta lukas y los recetados solamente wyatt se lo haya indicado el médico. Siga cuidadosamente las indicaciones. Los medicamentos para la presión arterial deben tomarse según las  indicaciones.  · No omita las dosis de medicamentos para la presión arterial. Si lo hace, estará en riesgo de tener problemas y puede hacer que los medicamentos lisa menos eficaces.  · Pregúntele a velez médico a qué efectos secundarios o reacciones a los medicamentos debe prestar atención.  Comuníquese con un médico si:  · Piensa que tiene faby reacción a un medicamento que está tomando.  · Tiene ricco de aurelia frecuentes (recurrentes).  · Se siente mareado.  · Tiene hinchazón en los tobillos.  · Tiene problemas de visión.  Solicite ayuda inmediatamente si:  · Siente un dolor de aurelia intenso o confusión.  · Siente debilidad inusual o adormecimiento.  · Siente que va a desmayarse.  · Siente un dolor intenso en el pecho o el abdomen.  · Vomita repetidas veces.  · Tiene dificultad para respirar.  Resumen  · La hipertensión se produce cuando la ronny bombea en las arterias con mucha fuerza. Si esta afección no se controla, podría correr riesgo de tener complicaciones graves.  · La presión arterial deseada puede variar en función de las enfermedades, la edad y otros factores personales. Para la mayoría de las personas, faby presión arterial normal es magnolia que 120/80.  · La hipertensión se trata con cambios en el estilo de ray, medicamentos o faby combinación de ambos. Los cambios en el estilo de ray incluyen pérdida de peso, ingerir alimentos sanos, seguir faby dieta baja en sodio, hacer más ejercicio y limitar el consumo de alcohol.  Esta información no tiene wyatt fin reemplazar el consejo del médico. Asegúrese de hacerle al médico cualquier pregunta que tenga.  Document Released: 12/18/2006 Document Revised: 10/03/2019 Document Reviewed: 10/03/2019  Elsevier Patient Education © 2020 Elsevier Inc.    Diálisis  Dialysis  La diálisis es un procedimiento que se realiza cuando los riñones vila dejado de funcionar adecuadamente (insuficiencia renal). También podría indicarse antes si pudiera mejorar los síntomas. Russell  la diálisis, se eliminan los desechos, las sales y el exceso de agua de la ronny y se mantiene el nivel de ciertos minerales en la ronny.  La diálisis se realiza en sesiones que continúan hasta que los riñones mejoran. Si los riñones no mejoran, wyatt sucede en la enfermedad renal terminal, la diálisis se continúa de por ray o hasta que usted pueda recibir un nuevo riñón de un donante (trasplante renal). Hay dos tipos de diálisis: hemodiálisis y diálisis peritoneal.  ¿Qué es la hemodiálisis?              La hemodiálisis es cuando se utiliza faby máquina llamada dializador para filtrar la ronny. Antes de comenzar la hemodiálisis, le harán faby cirugía para crear un lugar en el que la ronny pueda extraerse del cuerpo y volver a ingresar en él (acceso vascular). Hay olga tipos de accesos vasculares:  · Fístula arteriovenosa. Para crear bill tipo de acceso, se conectan faby arteria y faby vena (por lo general, del brazo) arabella faby cirugía. La fístula arteriovenosa, generalmente, demora de 1 a 6 meses en formarse después de la cirugía. Puede durar más años que los otros tipos de acceso y es menos probable que se infecte o que se formen coágulos de ronny.  · Injerto arteriovenoso. Para crear bill tipo de acceso, se conectan faby arteria y faby vena del brazo con un tubo arabella faby cirugía. El injerto arteriovenoso, generalmente, puede usarse en el término de 2 a 3 semanas después de la cirugía.  · Catéter venoso. Para crear bill tipo de acceso, se coloca un tubo harrington (catéter) en faby vena tony del yvonne, el tórax o la denise. El catéter venoso puede usarse inmediatamente. Por lo general, se usa wyatt un acceso temporario cuando es necesario que la diálisis comience de inmediato.  Arabella la hemodiálisis, la ronny sale del cuerpo a través del lugar de acceso. Viaja a través del tubo al dializador, donde se filtra. Luego la ronny regresa al cuerpo a través de otro tubo.  La hemodiálisis, generalmente, se realiza en  el hospital o en un centro de diálisis, olga veces por semana. Las sesiones hawkins entre 3 y 5 horas. Con faby capacitación especial, también puede hacerse en el hogar, con la ayuda de otra persona.  ¿Qué es la diálisis peritoneal?  La diálisis peritoneal es cuando se utiliza la membrana delgada que tapiza el abdomen (peritoneo) y un líquido llamado dialisato para filtrar la ronny. Antes de comenzar con la diálisis peritoneal, le harán faby cirugía para colocarle un catéter en el abdomen. El catéter se usará para introducir y extraer del abdomen el dialisato.  Al inicio de la sesión, le llenarán el abdomen con dialisato. Russell la sesión, los desechos, las sales y el exceso de líquidos de la ronny pasan a través del peritoneo y hacia el dialisato. El dialisato se drena del cuerpo al finalizar la sesión. El proceso de llenar y drenar el dialisato se llama intercambio. Los intercambios se repiten hasta que haya usado todo el dialisato del día.  Usted puede realizar la diálisis peritoneal en velez hogar o en ashtyn cualquier otro lugar. Se realiza todos los días. Es posible que necesite hasta guzman intercambios por día. Cada intercambio dura entre 30 y 40 minutos. La cantidad de tiempo que permanece el dialisato en el organismo entre los intercambios se llama tiempo de permanencia. El tiempo de permanencia, normalmente, dura entre 1,5 y 3 horas y puede variar con cada persona. Puede escoger hacer los intercambios por la noche mientras duerme, mediante faby máquina llamada ciclador.  ¿Qué tipo de diálisis sally elegir?  Ambos tipos de diálisis tienen ventajas y desventajas. Hable con velez médico acerca de qué tipo de diálisis es el mejor para usted. Hay que considerar robert preferencias, velez estilo de ray y velez enfermedad. En algunos casos, puede elegirse solo un tipo de diálisis.  Ventajas de la hemodiálisis  · Se realiza con menos frecuencia que la diálisis peritoneal.  · Otra persona puede hacer la diálisis por usted.  · Si se  dirige a un centro de diálisis:  ? Velez médico puede reconocer cualquier problema que pueda tener.  ? Puede interactuar con otras personas a quienes les hacen diálisis. Aquí podrá encontrar apoyo emocional.  Desventajas de la hemodiálisis  · La hemodiálisis puede causar calambres e hipotensión arterial. Puede dejarle faby sensación de cansancio en los radha en que le realizan el tratamiento.  · Si concurre a un centro de diálisis, deberá concertar citas semanales en los horarios disponibles del centro.  · Deberá tener más cuidado cuando viaje. Si suele tratarse en un centro de diálisis, será necesario planificar visitas a un centro de diálisis cerca de velez destino. Si realiza el tratamiento en velez casa, será necesario que lleve el dializador con usted cuando viaje.  · Hay más restricciones en la alimentación que con la diálisis peritoneal.  Ventajas de la diálisis peritoneal  · Es menos probable que cause calambres e hipotensión arterial.  · Hay menos restricciones en la alimentación que con la hemodiálisis.  · Podrá realizar los intercambios usted mismo, dondequiera que se encuentre, incluso cuando viaje.  Desventajas de la diálisis peritoneal  · Debe realizarse con más frecuencia que la hemodiálisis.  · Realizar faby diálisis peritoneal exige un uso adecuado (destreza) de las christiano. También debe ser capaz de levantar bolsas.  · Deba aprender cómo deshacerse de los gérmenes en velez equipo (técnicas de esterilización). Necesitará usar estas técnicas todos los días para prevenir faby infección.  ¿Qué cambios deberé hacer en la alimentación arabella la diálisis?  Ambos tipos de diálisis requieren que vandana algunos cambios en velez alimentación. Por ejemplo, deberá limitar la ingesta de alimentos con alto contenido de fósforo y potasio. También deberá limitar la ingesta de líquidos. Un especialista en alimentación y nutrición (nutricionista) lo puede ayudar a elaborar un plan de comidas que lo ayude a mejorar velez diálisis y velez  jose manuel.  ¿Qué sally esperar al comenzar la diálisis?  Adaptarse al tratamiento de diálisis, a las citas programadas y a la alimentación puede margarita algún tiempo. Puede que sea necesario que deje de trabajar y no pueda hacer algunas de robert actividades normales. Es probable que se sienta ansioso o deprimido cuando inicie la diálisis. Con el tiempo, muchas personas se sienten mejor francheska a la diálisis. Es posible que vuelva a trabajar después de realizar algunos cambios, wyatt disminuir la intensidad del trabajo.  Dónde encontrar más información  · Fundación Nacional del Riñón (National Kidney Foundation): www.kidney.org  · Asociación Americana de Pacientes Renales (American Association of Kidney Patients, AAKP): www.aakp.org  · Fondo Americano para Problemas Renales (American Kidney Fund): www.kidneyfund.org  Resumen  · Russell la diálisis, se eliminan los desechos, las sales y el exceso de agua de la ronny y se mantiene el nivel de ciertos minerales en la ronny. Hay dos tipos de diálisis: hemodiálisis y diálisis peritoneal.  · La hemodiálisis es cuando se utiliza faby máquina llamada dializador para filtrar la ronny.  · La hemodiálisis, generalmente, la realiza un médico en el hospital o en un centro de diálisis, olga veces por semana.  · La diálisis peritoneal es cuando el peritoneo se utiliza wyatt filtro. Usted puede realizar la diálisis peritoneal en velez hogar o en ashtyn cualquier otro lugar.  · Ambos tipos de diálisis tienen ventajas y desventajas. Hable con velez médico acerca de qué tipo de diálisis es el mejor para usted.  Esta información no tiene wyatt fin reemplazar el consejo del médico. Asegúrese de hacerle al médico cualquier pregunta que tenga.  Document Released: 12/18/2006 Document Revised: 03/15/2019 Document Reviewed: 06/21/2018  Elsevier Patient Education © 2020 Elsevier Inc.

## 2020-12-24 NOTE — PROGRESS NOTES
Pt's tunneled catheter dressing started bleeding around 0800, dressing reinforced with gauze.  MD wrote d/c orders, informed MD dressing was bleeding and MD came to bedside to assess.  Per MD will wait until this afternoon to attempt dressing change after SQ heparin has worn off and d/c pt if bleeding is controlled.  Pt updated about this plan and cleveland Waller also updated over the phone of this plan.    1500: Tunneled catheter dressing changed x2 and pressure dressing applied, dressing continues to bleed.  MD notified and SQ heparin d/varun.

## 2020-12-24 NOTE — DISCHARGE PLANNING
Outpatient Dialysis Placement Confirmation    Patient has been placed and confirmed at:    [facility] [place]   [address]  [place] [address]    Ph# [phone]    Regular Schedule: Tuesday, Thursday, Saturday  Holiday Schedule: Tuesday, Thursday, Sunday   Time: 3:15pm    Patient can start on 12/27/2020  and needs to be there by 2:45pm.    Follow up call made to Bushra JENSEN and Citlalli at ext [phone] relayed outpatient dialysis placement confirmation. Follow up message to Dr. Beasley to notify of placement. I spoke with patient's son and provided dialysis schedule and I also spoke with patient and faxed welcome letter in Barbadian to ext [phone] bedside RN will provide to patient at bedside.    Monika Monahan- Dialysis Coordinator  [facility] Ph# [phone]

## 2020-12-24 NOTE — DISCHARGE SUMMARY
Discharge Summary    CHIEF COMPLAINT ON ADMISSION  No chief complaint on file.      Reason for Admission  Shortness of Breath, Facial Injury      Admission Date  12/20/2020    CODE STATUS  Prior    HPI & HOSPITAL COURSE  Mr. Palencia is a 55-year-old  male with a past medical history of HIV and ESRD who presented to the emergency department on 12/20/2020 with shortness of breath.  He had most recently been admitted here from 7/3/2020-7/10/2020 with COVID-19 infection and respiratory failure.  He was then discharged home on hospice but has since revoked his DNR/DNI status and has taken himself off of hospice care.  He was then admitted to [facility] from 12/15/2020-12/18/2020 with left facial cellulitis.  He had a CT of his face done at that time which revealed left-sided periorbital cellulitis for which he was treated with IV ceftriaxone and clindamycin.  Wound cultures grew MSSA resistant to clindamycin and he was discharged home on oral Augmentin.  On admission there he had a creatinine of 10.3 and was given IV fluids.  It was recommended that he initiate dialysis but he adamantly refused.  He was discharged on sodium bicarb tablets.  His creatinine upon discharge was 7.      On this admission he did require high flow oxygen for hypoxia and was initially monitored in the intensive care unit, though required only 1 day stay there.  Upon further discussion with him he then became amenable to a dialysis catheter (placed 12/21/2020) and initiation of dialysis.  Vascular surgery and nephrology were then consulted and a dialysis catheter was placed.  His first dialysis session was performed on 12/22/2020.    Outpatient dialysis chair has been arranged for him. He will be following up with vascular surgery as an outpatient for dialysis access planning.  Of note, patient has been having intermittent bleeding from his dialysis catheter site.  His hemoglobin has remained stable.  I discussed this with nephrology Dr. Beasley  who states patient will just need to apply pressure to the catheter site when bleed is occur and is otherwise medically cleared for discharge.  His discharge was post poned due to patient developing significant orthostasis after dialysis resulting in him feeing dizzy and almost falling. This improved with IV fluids and patient's symptoms have resolved.    Therefore, he is discharged in good and stable condition to home with close outpatient follow-up.    The patient met 2-midnight criteria for an inpatient stay at the time of discharge.    Discharge Date  12/27/20    FOLLOW UP ITEMS POST DISCHARGE  Follow-up with vascular surgery for AV fistula formation    DISCHARGE DIAGNOSES  Principal Problem:    ESRD (end stage renal disease) on dialysis ([facility]) POA: Yes  Active Problems:    Pneumonia POA: Yes    HIV (human immunodeficiency virus infection) ([facility]) POA: Yes    HFrEF (heart failure with reduced ejection fraction) ([facility]) POA: Yes    Anemia due to chronic kidney disease POA: Yes    Orthostatic hypotension POA: Unknown    Nicotine dependence POA: Yes    Herpes zoster complication POA: Yes    History of 2019 novel coronavirus disease (COVID-19) POA: Yes  Resolved Problems:    Acute respiratory failure with hypoxia ([facility]) POA: Yes    Goals of care, counseling/discussion POA: Yes    Seizure ([facility]) POA: Yes    COVID-19 virus detected POA: Yes      FOLLOW UP  No future appointments.  TATE CorbettPMeghnaNMeghna  [address] [place] [address]  [place] [id number] [phone]      Per office request please call to schedule your hospital follow up with [facility]. Thank you     Marcellus Goins M.D.  [address] [place] [address]  [place] [id number] [phone]    Schedule an appointment as soon as possible for a visit in 1 week  for dialysis access planning      MEDICATIONS ON DISCHARGE     Medication List      START taking these medications      Instructions   carvedilol 25 MG Tabs  Commonly known as: COREG  Notes to patient:  This evening   Take 1 Tab by mouth 2 times a day, with meals.  Dose: 25 mg     dapsone 100 MG Tabs  Notes to patient: Tomorrow morning   Take 1 Tab by mouth every day.  Dose: 100 mg        ASK your doctor about these medications      Instructions   doxycycline monohydrate 100 MG tablet  Commonly known as: ADOXA  Ask about: Should I take this medication?   Take 1 Tab by mouth every 12 hours for 3 days.  Dose: 100 mg            Allergies  No Known Allergies    DIET  No orders of the defined types were placed in this encounter.      ACTIVITY  As tolerated.  Weight bearing as tolerated    CONSULTATIONS  Neurology  Vascular surgery  Critical care    PROCEDURES  Dialysis catheter placement    LABORATORY  Lab Results   Component Value Date    SODIUM 131 (L) 12/27/2020    POTASSIUM 3.9 12/27/2020    CHLORIDE 95 (L) 12/27/2020    CO2 22 12/27/2020    GLUCOSE 128 (H) 12/27/2020    BUN 31 (H) 12/27/2020    CREATININE 7.31 (HH) 12/27/2020        Lab Results   Component Value Date    WBC 7.0 12/25/2020    HEMOGLOBIN 9.0 (L) 12/25/2020    HEMATOCRIT 29.1 (L) 12/25/2020    PLATELETCT 201 12/25/2020        Total time of the discharge process exceeds 36 minutes.

## 2020-12-24 NOTE — PROGRESS NOTES
Bedside report received. Bed locked and in low position, call light within reach. Hourly rounding in place. No further needs at this time.

## 2020-12-25 LAB
ANISOCYTOSIS BLD QL SMEAR: ABNORMAL
BACTERIA BLD CULT: NORMAL
BASOPHILS # BLD AUTO: 3.4 % (ref 0–1.8)
BASOPHILS # BLD: 0.24 K/UL (ref 0–0.12)
EOSINOPHIL # BLD AUTO: 0.42 K/UL (ref 0–0.51)
EOSINOPHIL NFR BLD: 6 % (ref 0–6.9)
ERYTHROCYTE [DISTWIDTH] IN BLOOD BY AUTOMATED COUNT: 51 FL (ref 35.9–50)
HCT VFR BLD AUTO: 29.1 % (ref 42–52)
HGB BLD-MCNC: 9 G/DL (ref 14–18)
LG PLATELETS BLD QL SMEAR: NORMAL
LYMPHOCYTES # BLD AUTO: 1.03 K/UL (ref 1–4.8)
LYMPHOCYTES NFR BLD: 14.7 % (ref 22–41)
MACROCYTES BLD QL SMEAR: ABNORMAL
MANUAL DIFF BLD: NORMAL
MCH RBC QN AUTO: 29.9 PG (ref 27–33)
MCHC RBC AUTO-ENTMCNC: 30.9 G/DL (ref 33.7–35.3)
MCV RBC AUTO: 96.7 FL (ref 81.4–97.8)
METAMYELOCYTES NFR BLD MANUAL: 0.9 %
MONOCYTES # BLD AUTO: 0.36 K/UL (ref 0–0.85)
MONOCYTES NFR BLD AUTO: 5.2 % (ref 0–13.4)
MORPHOLOGY BLD-IMP: NORMAL
MYELOCYTES NFR BLD MANUAL: 0.9 %
NEUTROPHILS # BLD AUTO: 4.83 K/UL (ref 1.82–7.42)
NEUTROPHILS NFR BLD: 69 % (ref 44–72)
NRBC # BLD AUTO: 0 K/UL
NRBC BLD-RTO: 0 /100 WBC
OVALOCYTES BLD QL SMEAR: NORMAL
PLATELET # BLD AUTO: 201 K/UL (ref 164–446)
PLATELET BLD QL SMEAR: NORMAL
PMV BLD AUTO: 11.9 FL (ref 9–12.9)
RBC # BLD AUTO: 3.01 M/UL (ref 4.7–6.1)
RBC BLD AUTO: PRESENT
SIGNIFICANT IND 70042: NORMAL
SITE SITE: NORMAL
SOURCE SOURCE: NORMAL
WBC # BLD AUTO: 7 K/UL (ref 4.8–10.8)

## 2020-12-25 PROCEDURE — 700102 HCHG RX REV CODE 250 W/ 637 OVERRIDE(OP): Performed by: INTERNAL MEDICINE

## 2020-12-25 PROCEDURE — 770001 HCHG ROOM/CARE - MED/SURG/GYN PRIV*

## 2020-12-25 PROCEDURE — A9270 NON-COVERED ITEM OR SERVICE: HCPCS | Performed by: INTERNAL MEDICINE

## 2020-12-25 PROCEDURE — 85027 COMPLETE CBC AUTOMATED: CPT

## 2020-12-25 PROCEDURE — 99232 SBSQ HOSP IP/OBS MODERATE 35: CPT | Performed by: HOSPITALIST

## 2020-12-25 PROCEDURE — 90935 HEMODIALYSIS ONE EVALUATION: CPT

## 2020-12-25 PROCEDURE — 700111 HCHG RX REV CODE 636 W/ 250 OVERRIDE (IP)

## 2020-12-25 PROCEDURE — A9270 NON-COVERED ITEM OR SERVICE: HCPCS | Performed by: HOSPITALIST

## 2020-12-25 PROCEDURE — 5A1D70Z PERFORMANCE OF URINARY FILTRATION, INTERMITTENT, LESS THAN 6 HOURS PER DAY: ICD-10-PCS | Performed by: INTERNAL MEDICINE

## 2020-12-25 PROCEDURE — 700102 HCHG RX REV CODE 250 W/ 637 OVERRIDE(OP): Performed by: HOSPITALIST

## 2020-12-25 PROCEDURE — 85007 BL SMEAR W/DIFF WBC COUNT: CPT

## 2020-12-25 PROCEDURE — 90935 HEMODIALYSIS ONE EVALUATION: CPT | Performed by: INTERNAL MEDICINE

## 2020-12-25 RX ORDER — HEPARIN SODIUM 1000 [USP'U]/ML
INJECTION, SOLUTION INTRAVENOUS; SUBCUTANEOUS
Status: COMPLETED
Start: 2020-12-25 | End: 2020-12-25

## 2020-12-25 RX ADMIN — DOXYCYCLINE 100 MG: 100 TABLET, FILM COATED ORAL at 06:00

## 2020-12-25 RX ADMIN — EPOETIN ALFA-EPBX 3000 UNITS: 3000 INJECTION, SOLUTION INTRAVENOUS; SUBCUTANEOUS at 09:12

## 2020-12-25 RX ADMIN — ACETAMINOPHEN 650 MG: 325 TABLET, FILM COATED ORAL at 04:33

## 2020-12-25 RX ADMIN — CARVEDILOL 25 MG: 25 TABLET, FILM COATED ORAL at 17:57

## 2020-12-25 RX ADMIN — CARVEDILOL 25 MG: 25 TABLET, FILM COATED ORAL at 08:14

## 2020-12-25 RX ADMIN — DOXYCYCLINE 100 MG: 100 TABLET, FILM COATED ORAL at 17:57

## 2020-12-25 RX ADMIN — HEPARIN SODIUM 3700 UNITS: 1000 INJECTION, SOLUTION INTRAVENOUS; SUBCUTANEOUS at 11:37

## 2020-12-25 RX ADMIN — AMOXICILLIN AND CLAVULANATE POTASSIUM 1 TABLET: 500; 125 TABLET, FILM COATED ORAL at 17:57

## 2020-12-25 RX ADMIN — AMLODIPINE BESYLATE 10 MG: 10 TABLET ORAL at 06:00

## 2020-12-25 RX ADMIN — DAPSONE 100 MG: 100 TABLET ORAL at 06:00

## 2020-12-25 ASSESSMENT — ENCOUNTER SYMPTOMS
HEADACHES: 0
PALPITATIONS: 0
FEVER: 0
ABDOMINAL PAIN: 0
DIZZINESS: 0
NAUSEA: 0
COUGH: 0
CHILLS: 0
VOMITING: 0
SHORTNESS OF BREATH: 0

## 2020-12-25 NOTE — PROGRESS NOTES
Hospital Medicine Daily Progress Note    Date of Service  12/24/2020    Chief Complaint  55 y.o. male admitted 12/20/2020 with redness of breath    Hospital Course  Mr. Palencia is a 55-year-old  male with a past medical history of HIV and ESRD who presented to the emergency department on 12/20/2020 with shortness of breath.  He had most recently been admitted here from 7/3/2020-7/10/2020 with COVID-19 infection and respiratory failure.  He was then discharged home on hospice but has since revoked his DNR/DNI status and has taken himself off of hospice care.  He was then admitted to [facility] from 12/15/2020-12/18/2020 with left facial cellulitis.  He had a CT of his face done at that time which revealed left-sided periorbital cellulitis for which he was treated with IV ceftriaxone and clindamycin.  Wound cultures grew MSSA resistant to clindamycin and he was discharged home on oral Augmentin.  On admission there he had a creatinine of 10.3 and was given IV fluids.  It was recommended that he initiate dialysis but he adamantly refused.  He was discharged on sodium bicarb tablets.  His creatinine upon discharge was 7.  On this admission he did require high flow oxygen for hypoxia and was initially monitored in the intensive care unit, though required only 1 day stay there.  Upon further discussion with him he then became amenable to a dialysis catheter (placed 12/21/2020) and initiation of dialysis.  Vascular surgery and nephrology were then consulted and a dialysis catheter was placed.  His first dialysis session was performed on 12/22/2020.  He is still pending results of QuantiFERON gold as well as dialysis chair arrangement.  He will be following up with vascular surgery as an outpatient for dialysis access planning.      Interval Problem Update  Received DVT ppx in AM and has been bleeding from dialysis catheter site, so dc held  Denies any other complaints. No vision  issues    Consultants/Specialty  Nephrology  Critical care  Vascular surgery    Code Status  Full Code    Disposition  Home once dialysis chair arranged    Review of Systems  Review of Systems   Constitutional: Negative for chills and fever.   Respiratory: Negative for cough and shortness of breath.    Cardiovascular: Negative for chest pain and palpitations.   Gastrointestinal: Negative for abdominal pain, nausea and vomiting.   Genitourinary: Negative for dysuria and urgency.   Skin: Positive for rash. Negative for itching.   Neurological: Negative for dizziness and headaches.   All other systems reviewed and are negative.       Physical Exam  Temp:  [36.3 °C (97.4 °F)-36.8 °C (98.3 °F)] 36.7 °C (98.1 °F)  Pulse:  [66-82] 66  Resp:  [16] 16  BP: (105-109)/(63-68) 105/63  SpO2:  [92 %-94 %] 92 %    Physical Exam  Vitals signs and nursing note reviewed.   Constitutional:       Appearance: Normal appearance.   Neck:      Comments: Right-sided dialysis catheter present  Cardiovascular:      Rate and Rhythm: Normal rate and regular rhythm.      Heart sounds: No murmur.   Pulmonary:      Effort: Pulmonary effort is normal. No respiratory distress.      Breath sounds: Normal breath sounds.   Skin:     Comments: Hyperpigmentation on left upper half of face.  Significant scabbing and swelling.  No vesicles present   Neurological:      General: No focal deficit present.      Mental Status: He is alert and oriented to person, place, and time.         Fluids    Intake/Output Summary (Last 24 hours) at 12/24/2020 1657  Last data filed at 12/24/2020 0500  Gross per 24 hour   Intake 400 ml   Output --   Net 400 ml       Laboratory  Recent Labs     12/22/20  0355 12/23/20  0256   WBC 5.5 4.7*   RBC 2.46* 2.92*   HEMOGLOBIN 7.4* 8.7*   HEMATOCRIT 22.6* 27.3*   MCV 91.9 93.5   MCH 30.1 29.8   MCHC 32.7* 31.9*   RDW 51.2* 51.6*   PLATELETCT 170 186   MPV 12.1 12.1     Recent Labs     12/22/20  0355 12/23/20  0256   SODIUM 136 133*    POTASSIUM 3.8 3.8   CHLORIDE 101 97   CO2 22 23   GLUCOSE 83 82   BUN 42* 23*   CREATININE 8.35* 6.16*   CALCIUM 6.5* 7.1*                   Imaging  US-HEMODIALYSIS ACCESS CREATION DUPLEX UNILAT RIGHT   Final Result      US-HEMODIALYSIS ACCESS CREATION DUPLEX UNILAT LEFT   Final Result      IR-SONIA ELLISON PLACEMENT >5   Final Result      1. ULTRASOUND AND FLUOROSCOPIC GUIDED PLACEMENT OF A Right INTERNAL JUGULAR 14.5 Bangladeshi HemoSplit TUNNELED DIALYSIS CATHETER.      2. THE HEMODIALYSIS CATHETER MAY BE USED IMMEDIATELY AS CLINICALLY INDICATED. FLUSHES PER PROTOCOL.         DX-CHEST-PORTABLE (1 VIEW)   Final Result         Worsening airspace opacity throughout the right lower lung.           Assessment/Plan  * ESRD (end stage renal disease) on dialysis ([facility])- (present on admission)  Assessment & Plan  -Dialysis access obtained 12/21/2020.  - outpatient hemodialysis chair  Has been arranged    Pneumonia- (present on admission)  Assessment & Plan  -Hypoxia and infiltrate seen on CXR. Procalcitonin level elevated at 1.7.  -Continue augmentin and doxycycline.  -As he has not been compliant with HIV regimen, he is at risk of PCP pneumonia: Dr. Whyte has recommended against treating for PCP and instead dapsone for prophylaxis.    Anemia due to chronic kidney disease- (present on admission)  Assessment & Plan  -H/H is stable today.  Currently no need for transfusion.  -Transfuse for hemoglobin less than 7.    HFrEF (heart failure with reduced ejection fraction) ([facility])- (present on admission)  Assessment & Plan  -EF 40%, noted in July of this year.  -Continue Coreg.  -Follow-up with cardiology after hospital discharge.    HIV (human immunodeficiency virus infection) ([facility])- (present on admission)  Assessment & Plan  -Followed by Dr. Whyte at [facility], who states he has been off his meds for 5 months.  -CD4 count low at 36.    History of 2019 novel coronavirus disease (COVID-19)- (present on  admission)  Assessment & Plan  -He had COVID + PCR on 7/9/20.  -COVID is negative in the ER.  -No isolation currently required.    Herpes zoster complication- (present on admission)  Assessment & Plan  -He had been admitted to [facility] and treated for facial cellulitis though exam is consistent with healing zoster. Now crusted over and no open lesions.    Nicotine dependence- (present on admission)  Assessment & Plan  -Continue to encourage permanent cessation.       VTE prophylaxis: none, bleeding from dialysis catheter

## 2020-12-25 NOTE — PROGRESS NOTES
Dressing to right chest noted to be dripping bright red blood from dressing when entering room for rounds. Pressure applied. Pressure dressing applied over original dressing. Appears to have stopped bleeding, will monitor.

## 2020-12-25 NOTE — PROCEDURES
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 Patient seen and examined on hemodialysis during treatment. Patient is stable, tolerating hemodialysis. Denies chest pain and shortness of breath. Orders updated as needed. Please refer to flowsheet for full details.    Access: Right IJ permacath, bleeding stopped on my exam  UF goal: 1.5 L    Plan: Plan for next dialysis on Sunday due to holiday schedule, then resume Tuesday Thursday Saturday schedule as an outpatient. OK to discharge from Nephrology standpoint.  There is no need for outpatient nephrology clinic follow up appointment, as the patient will be seen by a nephrologist at their outpatient dialysis center.     Bernabe Beasley MD  Nephrology

## 2020-12-25 NOTE — PROGRESS NOTES
[facility] Services Progress Note      HD today x 3 hours per Dr. Beasley . Initiated at 0835 and ended at 1135.   Patient stable, AO X4. Denies chest pain, no SOB. With 10lb sandbag on R chest d/t bleeding issues happened since yesterday.     See paper flowsheet for details.    UF Net: 400 mL    Patient on low BP during HD, 80-90's systolic throughout tx. Unable to meet the UF goal. NS bolus 300 mL given for BP support. Patient asymptomatic. Denies dizziness or lightheadedness.  No bleeding on the CVC access site during and post HD.      R permacath intact and patent with good flow during dialysis. No s/sx of infection, no redness nor bleeding noted on the CVC site. CVC dressing clean, dry and intact.   Blood returned and CVC port flushed with NS and Heparin 1000 units/mL used  to lock catheter given per designated amount. CVC port clamped and capped.       Report given to Primary APRIL Amato RN.

## 2020-12-25 NOTE — PROGRESS NOTES
Dressing to right chest bled through pressure dressing x2. Dressing removed, pressure applied, redressed,abd pad applied, ice applied.     6:25am- dressing saturated again with new bright red blood. Doc notified, orders for sandbag, and labs. Pt remains calm, no distress. 10lb sandbag applied.

## 2020-12-26 PROBLEM — I95.1 ORTHOSTATIC HYPOTENSION: Status: ACTIVE | Noted: 2020-12-26

## 2020-12-26 PROCEDURE — A9270 NON-COVERED ITEM OR SERVICE: HCPCS | Performed by: HOSPITALIST

## 2020-12-26 PROCEDURE — 700111 HCHG RX REV CODE 636 W/ 250 OVERRIDE (IP): Performed by: HOSPITALIST

## 2020-12-26 PROCEDURE — 700102 HCHG RX REV CODE 250 W/ 637 OVERRIDE(OP): Performed by: HOSPITALIST

## 2020-12-26 PROCEDURE — 90471 IMMUNIZATION ADMIN: CPT

## 2020-12-26 PROCEDURE — 3E02340 INTRODUCTION OF INFLUENZA VACCINE INTO MUSCLE, PERCUTANEOUS APPROACH: ICD-10-PCS | Performed by: HOSPITALIST

## 2020-12-26 PROCEDURE — 700105 HCHG RX REV CODE 258: Performed by: HOSPITALIST

## 2020-12-26 PROCEDURE — 99232 SBSQ HOSP IP/OBS MODERATE 35: CPT | Performed by: HOSPITALIST

## 2020-12-26 PROCEDURE — A9270 NON-COVERED ITEM OR SERVICE: HCPCS | Performed by: INTERNAL MEDICINE

## 2020-12-26 PROCEDURE — 700102 HCHG RX REV CODE 250 W/ 637 OVERRIDE(OP): Performed by: INTERNAL MEDICINE

## 2020-12-26 PROCEDURE — 90686 IIV4 VACC NO PRSV 0.5 ML IM: CPT | Performed by: HOSPITALIST

## 2020-12-26 PROCEDURE — 770001 HCHG ROOM/CARE - MED/SURG/GYN PRIV*

## 2020-12-26 RX ORDER — SODIUM CHLORIDE 9 MG/ML
250 INJECTION, SOLUTION INTRAVENOUS ONCE
Status: COMPLETED | OUTPATIENT
Start: 2020-12-26 | End: 2020-12-26

## 2020-12-26 RX ORDER — AMLODIPINE BESYLATE 5 MG/1
5 TABLET ORAL
Status: DISCONTINUED | OUTPATIENT
Start: 2020-12-27 | End: 2020-12-26

## 2020-12-26 RX ADMIN — INFLUENZA A VIRUS A/GUANGDONG-MAONAN/SWL1536/2019 CNIC-1909 (H1N1) ANTIGEN (FORMALDEHYDE INACTIVATED), INFLUENZA A VIRUS A/HONG KONG/2671/2019 (H3N2) ANTIGEN (FORMALDEHYDE INACTIVATED), INFLUENZA B VIRUS B/PHUKET/3073/2013 ANTIGEN (FORMALDEHYDE INACTIVATED), AND INFLUENZA B VIRUS B/WASHINGTON/02/2019 ANTIGEN (FORMALDEHYDE INACTIVATED) 0.5 ML: 15; 15; 15; 15 INJECTION, SUSPENSION INTRAMUSCULAR at 11:25

## 2020-12-26 RX ADMIN — AMOXICILLIN AND CLAVULANATE POTASSIUM 1 TABLET: 500; 125 TABLET, FILM COATED ORAL at 17:16

## 2020-12-26 RX ADMIN — SODIUM CHLORIDE 250 ML: 9 INJECTION, SOLUTION INTRAVENOUS at 12:45

## 2020-12-26 RX ADMIN — CARVEDILOL 25 MG: 25 TABLET, FILM COATED ORAL at 08:50

## 2020-12-26 RX ADMIN — DOXYCYCLINE 100 MG: 100 TABLET, FILM COATED ORAL at 04:53

## 2020-12-26 RX ADMIN — DAPSONE 100 MG: 100 TABLET ORAL at 04:53

## 2020-12-26 RX ADMIN — AMLODIPINE BESYLATE 10 MG: 10 TABLET ORAL at 04:53

## 2020-12-26 RX ADMIN — DOXYCYCLINE 100 MG: 100 TABLET, FILM COATED ORAL at 17:16

## 2020-12-26 ASSESSMENT — ENCOUNTER SYMPTOMS
CHILLS: 0
SHORTNESS OF BREATH: 0
FEVER: 0
DIZZINESS: 0
ABDOMINAL PAIN: 0
COUGH: 0
NAUSEA: 0
VOMITING: 0
PALPITATIONS: 0
HEADACHES: 0

## 2020-12-26 NOTE — PROGRESS NOTES
Hospital Medicine Daily Progress Note    Date of Service  12/26/2020    Chief Complaint  55 y.o. male admitted 12/20/2020 with redness of breath    Hospital Course  Mr. Palencia is a 55-year-old  male with a past medical history of HIV and ESRD who presented to the emergency department on 12/20/2020 with shortness of breath.  He had most recently been admitted here from 7/3/2020-7/10/2020 with COVID-19 infection and respiratory failure.  He was then discharged home on hospice but has since revoked his DNR/DNI status and has taken himself off of hospice care.  He was then admitted to [facility] from 12/15/2020-12/18/2020 with left facial cellulitis.  He had a CT of his face done at that time which revealed left-sided periorbital cellulitis for which he was treated with IV ceftriaxone and clindamycin.  Wound cultures grew MSSA resistant to clindamycin and he was discharged home on oral Augmentin.  On admission there he had a creatinine of 10.3 and was given IV fluids.  It was recommended that he initiate dialysis but he adamantly refused.  He was discharged on sodium bicarb tablets.  His creatinine upon discharge was 7.  On this admission he did require high flow oxygen for hypoxia and was initially monitored in the intensive care unit, though required only 1 day stay there.  Upon further discussion with him he then became amenable to a dialysis catheter (placed 12/21/2020) and initiation of dialysis.  Vascular surgery and nephrology were then consulted and a dialysis catheter was placed.  His first dialysis session was performed on 12/22/2020.  He is still pending results of QuantiFERON gold as well as dialysis chair arrangement.  He will be following up with vascular surgery as an outpatient for dialysis access planning.      Interval Problem Update  No further bleeding from catheter site  Pt was about to discharge however almost fell and his BP was found to  Be in the 80s. Pt currently orthostatic given 250cc  bolus  Hold amlodipine    Consultants/Specialty  Nephrology  Critical care  Vascular surgery    Code Status  Full Code    Disposition  Home once dialysis chair arranged    Review of Systems  Review of Systems   Constitutional: Negative for chills and fever.   Respiratory: Negative for cough and shortness of breath.    Cardiovascular: Negative for chest pain and palpitations.   Gastrointestinal: Negative for abdominal pain, nausea and vomiting.   Genitourinary: Negative for dysuria and urgency.   Skin: Positive for rash. Negative for itching.   Neurological: Negative for dizziness and headaches.   All other systems reviewed and are negative.       Physical Exam  Temp:  [36.7 °C (98.1 °F)-37.3 °C (99.1 °F)] 37.1 °C (98.8 °F)  Pulse:  [69-81] 72  Resp:  [17-18] 17  BP: ()/(49-85) 131/76  SpO2:  [93 %-98 %] 98 %    Physical Exam  Vitals signs and nursing note reviewed.   Constitutional:       Appearance: Normal appearance.   Neck:      Comments: Right-sided dialysis catheter present  Cardiovascular:      Rate and Rhythm: Normal rate and regular rhythm.      Heart sounds: No murmur.   Pulmonary:      Effort: Pulmonary effort is normal. No respiratory distress.      Breath sounds: Normal breath sounds.   Skin:     Comments: Hyperpigmentation on left upper half of face.  Significant scabbing and swelling.  No vesicles present   Neurological:      General: No focal deficit present.      Mental Status: He is alert and oriented to person, place, and time.         Fluids    Intake/Output Summary (Last 24 hours) at 12/26/2020 1558  Last data filed at 12/26/2020 0900  Gross per 24 hour   Intake 240 ml   Output --   Net 240 ml       Laboratory  Recent Labs     12/24/20 1819 12/25/20  0835   WBC 5.7 7.0   RBC 3.23* 3.01*   HEMOGLOBIN 9.6* 9.0*   HEMATOCRIT 31.0* 29.1*   MCV 96.0 96.7   MCH 29.7 29.9   MCHC 31.0* 30.9*   RDW 51.9* 51.0*   PLATELETCT 209 201   MPV 12.0 11.9         Recent Labs     12/24/20 1819   INR 1.08                Imaging  US-HEMODIALYSIS ACCESS CREATION DUPLEX UNILAT RIGHT   Final Result      US-HEMODIALYSIS ACCESS CREATION DUPLEX UNILAT LEFT   Final Result      IR-ELLISON,GROSHONG PLACEMENT >5   Final Result      1. ULTRASOUND AND FLUOROSCOPIC GUIDED PLACEMENT OF A Right INTERNAL JUGULAR 14.5 Amharic HemoSplit TUNNELED DIALYSIS CATHETER.      2. THE HEMODIALYSIS CATHETER MAY BE USED IMMEDIATELY AS CLINICALLY INDICATED. FLUSHES PER PROTOCOL.         DX-CHEST-PORTABLE (1 VIEW)   Final Result         Worsening airspace opacity throughout the right lower lung.           Assessment/Plan  * ESRD (end stage renal disease) on dialysis ([facility])- (present on admission)  Assessment & Plan  -Dialysis access obtained 12/21/2020.  - outpatient hemodialysis chair  Has been arranged    Pneumonia- (present on admission)  Assessment & Plan  -Hypoxia and infiltrate seen on CXR. Procalcitonin level elevated at 1.7.  -Continue augmentin and doxycycline.  -As he has not been compliant with HIV regimen, he is at risk of PCP pneumonia: Dr. Whyte has recommended against treating for PCP and instead dapsone for prophylaxis.    Anemia due to chronic kidney disease- (present on admission)  Assessment & Plan  -H/H is stable today.  Currently no need for transfusion.  -Transfuse for hemoglobin less than 7.    HFrEF (heart failure with reduced ejection fraction) ([facility])- (present on admission)  Assessment & Plan  -EF 40%, noted in July of this year.  -Continue Coreg.  -Follow-up with cardiology after hospital discharge.    HIV (human immunodeficiency virus infection) ([facility])- (present on admission)  Assessment & Plan  -Followed by Dr. Whyte at [facility], who states he has been off his meds for 5 months.  -CD4 count low at 36.    Orthostatic hypotension  Assessment & Plan  S/p 250cc bolus  Hold amlodipine    History of 2019 novel coronavirus disease (COVID-19)- (present on admission)  Assessment & Plan  -He had COVID + PCR on 7/9/20.  -COVID  is negative in the ER.  -No isolation currently required.    Herpes zoster complication- (present on admission)  Assessment & Plan  -He had been admitted to [facility] and treated for facial cellulitis though exam is consistent with healing zoster. Now crusted over and no open lesions.    Nicotine dependence- (present on admission)  Assessment & Plan  -Continue to encourage permanent cessation.       VTE prophylaxis: none, bleeding from dialysis catheter

## 2020-12-26 NOTE — PROGRESS NOTES
Hospital Medicine Daily Progress Note    Date of Service  12/25/2020    Chief Complaint  55 y.o. male admitted 12/20/2020 with redness of breath    Hospital Course  Mr. Palencia is a 55-year-old  male with a past medical history of HIV and ESRD who presented to the emergency department on 12/20/2020 with shortness of breath.  He had most recently been admitted here from 7/3/2020-7/10/2020 with COVID-19 infection and respiratory failure.  He was then discharged home on hospice but has since revoked his DNR/DNI status and has taken himself off of hospice care.  He was then admitted to [facility] from 12/15/2020-12/18/2020 with left facial cellulitis.  He had a CT of his face done at that time which revealed left-sided periorbital cellulitis for which he was treated with IV ceftriaxone and clindamycin.  Wound cultures grew MSSA resistant to clindamycin and he was discharged home on oral Augmentin.  On admission there he had a creatinine of 10.3 and was given IV fluids.  It was recommended that he initiate dialysis but he adamantly refused.  He was discharged on sodium bicarb tablets.  His creatinine upon discharge was 7.  On this admission he did require high flow oxygen for hypoxia and was initially monitored in the intensive care unit, though required only 1 day stay there.  Upon further discussion with him he then became amenable to a dialysis catheter (placed 12/21/2020) and initiation of dialysis.  Vascular surgery and nephrology were then consulted and a dialysis catheter was placed.  His first dialysis session was performed on 12/22/2020.  He is still pending results of QuantiFERON gold as well as dialysis chair arrangement.  He will be following up with vascular surgery as an outpatient for dialysis access planning.      Interval Problem Update  His catheter site has been oozing since yesterday morning.  I called IR and  Will be by to apply another stitch to patient's catheter site.  Patient otherwise denying  any new complaints    Consultants/Specialty  Nephrology  Critical care  Vascular surgery    Code Status  Full Code    Disposition  Home once dialysis chair arranged    Review of Systems  Review of Systems   Constitutional: Negative for chills and fever.   Respiratory: Negative for cough and shortness of breath.    Cardiovascular: Negative for chest pain and palpitations.   Gastrointestinal: Negative for abdominal pain, nausea and vomiting.   Genitourinary: Negative for dysuria and urgency.   Skin: Positive for rash. Negative for itching.   Neurological: Negative for dizziness and headaches.   All other systems reviewed and are negative.       Physical Exam  Temp:  [35.9 °C (96.7 °F)-37.6 °C (99.6 °F)] 36.2 °C (97.1 °F)  Pulse:  [68-79] 72  Resp:  [18-20] 18  BP: (106-153)/() 106/70  SpO2:  [93 %-95 %] 95 %    Physical Exam  Vitals signs and nursing note reviewed.   Constitutional:       Appearance: Normal appearance.   Neck:      Comments: Right-sided dialysis catheter present  Cardiovascular:      Rate and Rhythm: Normal rate and regular rhythm.      Heart sounds: No murmur.   Pulmonary:      Effort: Pulmonary effort is normal. No respiratory distress.      Breath sounds: Normal breath sounds.   Skin:     Comments: Hyperpigmentation on left upper half of face.  Significant scabbing and swelling.  No vesicles present   Neurological:      General: No focal deficit present.      Mental Status: He is alert and oriented to person, place, and time.         Fluids    Intake/Output Summary (Last 24 hours) at 12/25/2020 1604  Last data filed at 12/25/2020 1140  Gross per 24 hour   Intake 1360 ml   Output 1700 ml   Net -340 ml       Laboratory  Recent Labs     12/23/20  0256 12/24/20  1819 12/25/20  0835   WBC 4.7* 5.7 7.0   RBC 2.92* 3.23* 3.01*   HEMOGLOBIN 8.7* 9.6* 9.0*   HEMATOCRIT 27.3* 31.0* 29.1*   MCV 93.5 96.0 96.7   MCH 29.8 29.7 29.9   MCHC 31.9* 31.0* 30.9*   RDW 51.6* 51.9* 51.0*   PLATELETCT 186 209 201    MPV 12.1 12.0 11.9     Recent Labs     12/23/20  0256   SODIUM 133*   POTASSIUM 3.8   CHLORIDE 97   CO2 23   GLUCOSE 82   BUN 23*   CREATININE 6.16*   CALCIUM 7.1*     Recent Labs     12/24/20  1819   INR 1.08               Imaging  US-HEMODIALYSIS ACCESS CREATION DUPLEX UNILAT RIGHT   Final Result      US-HEMODIALYSIS ACCESS CREATION DUPLEX UNILAT LEFT   Final Result      IR-ELLISON,GROSHONG PLACEMENT >5   Final Result      1. ULTRASOUND AND FLUOROSCOPIC GUIDED PLACEMENT OF A Right INTERNAL JUGULAR 14.5 English HemoSplit TUNNELED DIALYSIS CATHETER.      2. THE HEMODIALYSIS CATHETER MAY BE USED IMMEDIATELY AS CLINICALLY INDICATED. FLUSHES PER PROTOCOL.         DX-CHEST-PORTABLE (1 VIEW)   Final Result         Worsening airspace opacity throughout the right lower lung.           Assessment/Plan  * ESRD (end stage renal disease) on dialysis ([facility])- (present on admission)  Assessment & Plan  -Dialysis access obtained 12/21/2020.  - outpatient hemodialysis chair  Has been arranged    Pneumonia- (present on admission)  Assessment & Plan  -Hypoxia and infiltrate seen on CXR. Procalcitonin level elevated at 1.7.  -Continue augmentin and doxycycline.  -As he has not been compliant with HIV regimen, he is at risk of PCP pneumonia: Dr. Whyte has recommended against treating for PCP and instead dapsone for prophylaxis.    Anemia due to chronic kidney disease- (present on admission)  Assessment & Plan  -H/H is stable today.  Currently no need for transfusion.  -Transfuse for hemoglobin less than 7.    HFrEF (heart failure with reduced ejection fraction) ([facility])- (present on admission)  Assessment & Plan  -EF 40%, noted in July of this year.  -Continue Coreg.  -Follow-up with cardiology after hospital discharge.    HIV (human immunodeficiency virus infection) ([facility])- (present on admission)  Assessment & Plan  -Followed by Dr. Whyte at [facility], who states he has been off his meds for 5 months.  -CD4 count low at  36.    History of 2019 novel coronavirus disease (COVID-19)- (present on admission)  Assessment & Plan  -He had COVID + PCR on 7/9/20.  -COVID is negative in the ER.  -No isolation currently required.    Herpes zoster complication- (present on admission)  Assessment & Plan  -He had been admitted to [facility] and treated for facial cellulitis though exam is consistent with healing zoster. Now crusted over and no open lesions.    Nicotine dependence- (present on admission)  Assessment & Plan  -Continue to encourage permanent cessation.       VTE prophylaxis: none, bleeding from dialysis catheter

## 2020-12-26 NOTE — PROGRESS NOTES
"Assumed care of pt at 0700. Pt A+Ox4, VSS, dialysis catheter dressing CDI no bleeding noted. Pt ambulating independently to bathroom.  Denies CP/SOB or dizziness/lightheadedness.  D/C orders received from provider.  AVS completed and reviewed with pt, all questions answered.  PIV removed, pt given flu shot at his request and approved by MD.  At 1130, upon getting OOB to get into wheelchair for d/c, pt became dizzy and lay across the bed.  BP checked and noted to be 65/49, recheck in other arm was 82/52.  Pt alert and answering questions appropriately but stating he feels \"bad\".  MD contacted.  BP rechecked and SBP remaining in 90s, PIV placed and pt given 250 cc NS bolus.  BP now 105/69 and pt states he is feeling better, other VSS. Will continue to monitor.    1406: Pt remains orthostatic with BP dropping from 121/76 lying down to 85/60 when standing and pt c/o dizziness.  Provider notified and d/c cancelled for today.  Pt updated and ok with staying in the hospital overnight.  "

## 2020-12-27 VITALS
HEIGHT: 64 IN | DIASTOLIC BLOOD PRESSURE: 92 MMHG | TEMPERATURE: 98.5 F | HEART RATE: 81 BPM | WEIGHT: 140.21 LBS | SYSTOLIC BLOOD PRESSURE: 131 MMHG | RESPIRATION RATE: 18 BRPM | BODY MASS INDEX: 23.94 KG/M2 | OXYGEN SATURATION: 95 %

## 2020-12-27 LAB
ANION GAP SERPL CALC-SCNC: 14 MMOL/L (ref 7–16)
BUN SERPL-MCNC: 31 MG/DL (ref 8–22)
CALCIUM SERPL-MCNC: 7.7 MG/DL (ref 8.5–10.5)
CHLORIDE SERPL-SCNC: 95 MMOL/L (ref 96–112)
CO2 SERPL-SCNC: 22 MMOL/L (ref 20–33)
CREAT SERPL-MCNC: 7.31 MG/DL (ref 0.5–1.4)
GLUCOSE SERPL-MCNC: 128 MG/DL (ref 65–99)
POTASSIUM SERPL-SCNC: 3.9 MMOL/L (ref 3.6–5.5)
SODIUM SERPL-SCNC: 131 MMOL/L (ref 135–145)

## 2020-12-27 PROCEDURE — 80048 BASIC METABOLIC PNL TOTAL CA: CPT

## 2020-12-27 PROCEDURE — A9270 NON-COVERED ITEM OR SERVICE: HCPCS | Performed by: HOSPITALIST

## 2020-12-27 PROCEDURE — 700102 HCHG RX REV CODE 250 W/ 637 OVERRIDE(OP): Performed by: INTERNAL MEDICINE

## 2020-12-27 PROCEDURE — 700102 HCHG RX REV CODE 250 W/ 637 OVERRIDE(OP): Performed by: HOSPITALIST

## 2020-12-27 PROCEDURE — 700111 HCHG RX REV CODE 636 W/ 250 OVERRIDE (IP): Performed by: INTERNAL MEDICINE

## 2020-12-27 PROCEDURE — 5A1D70Z PERFORMANCE OF URINARY FILTRATION, INTERMITTENT, LESS THAN 6 HOURS PER DAY: ICD-10-PCS | Performed by: INTERNAL MEDICINE

## 2020-12-27 PROCEDURE — 700111 HCHG RX REV CODE 636 W/ 250 OVERRIDE (IP)

## 2020-12-27 PROCEDURE — A9270 NON-COVERED ITEM OR SERVICE: HCPCS | Performed by: INTERNAL MEDICINE

## 2020-12-27 PROCEDURE — 90935 HEMODIALYSIS ONE EVALUATION: CPT | Performed by: INTERNAL MEDICINE

## 2020-12-27 PROCEDURE — 90935 HEMODIALYSIS ONE EVALUATION: CPT

## 2020-12-27 PROCEDURE — 99239 HOSP IP/OBS DSCHRG MGMT >30: CPT | Performed by: HOSPITALIST

## 2020-12-27 RX ORDER — HEPARIN SODIUM 1000 [USP'U]/ML
INJECTION, SOLUTION INTRAVENOUS; SUBCUTANEOUS
Status: COMPLETED
Start: 2020-12-27 | End: 2020-12-27

## 2020-12-27 RX ADMIN — DOXYCYCLINE 100 MG: 100 TABLET, FILM COATED ORAL at 16:46

## 2020-12-27 RX ADMIN — HEPARIN SODIUM 3700 UNITS: 1000 INJECTION, SOLUTION INTRAVENOUS; SUBCUTANEOUS at 13:18

## 2020-12-27 RX ADMIN — DAPSONE 100 MG: 100 TABLET ORAL at 04:10

## 2020-12-27 RX ADMIN — EPOETIN ALFA-EPBX 3000 UNITS: 3000 INJECTION, SOLUTION INTRAVENOUS; SUBCUTANEOUS at 12:49

## 2020-12-27 RX ADMIN — DOXYCYCLINE 100 MG: 100 TABLET, FILM COATED ORAL at 04:10

## 2020-12-27 RX ADMIN — CARVEDILOL 25 MG: 25 TABLET, FILM COATED ORAL at 16:46

## 2020-12-27 NOTE — PROGRESS NOTES
3hr HD started @ 1014 and completed @ 1316,tx well tolerated,VSS,net UF = 600ml.RIJ TDC dressing CDI,report given to Bobby Rayo RN.

## 2020-12-27 NOTE — PROCEDURES
Diagnosis: CKD5 progressed to End-Stage Renal Disease initiated on HD 12/21/20. Patient seen and examined on hemodialysis during treatment. Patient is stable, tolerating hemodialysis. Denies chest pain and shortness of breath. Orders updated as needed. Please refer to flowsheet for full details.    Access: R IJ permacath  UF goal: 0L    Plan: HD today due to holiday schedule, then resume Tuesday Thursday Saturday schedule. OK to discharge from Nephrology standpoint.  There is no need for outpatient nephrology clinic follow up appointment, as the patient will be seen by a nephrologist at their outpatient dialysis center.     Bernabe Beasley MD  Nephrology

## 2020-12-28 NOTE — PROGRESS NOTES
1510: Orhtostatic vitals  Supine 162/97 pulse 81  Sitting 149/91  90  Standing 131/92 96     1600 left VM for Sridhar (son) regarding his fathers need for a ride home for discharge.

## 2020-12-28 NOTE — PROGRESS NOTES
Pt son called him, and told him they were outside, pt given discharge instructions, pt said he understood and was ready to go. Took pt IV out. Pt transported in  and all belongings taken. All questions answered.

## 2021-01-04 NOTE — DOCUMENTATION QUERY
"                                                                         [facility]                                                                       Query Response Note      PATIENT:               SONI ABBASI  ACCT #:                  [id number]  MRN:                     [id number]  :                      1965  ADMIT DATE:       2020 6:37 AM  DISCH DATE:        2020 9:00 PM  RESPONDING  PROVIDER #:        [id number]           QUERY TEXT:    Please clarify the etiology of the patient's ESRD.     NONTE:  If an appropriate response is not listed below, please respond with a new note.      Renetta Barrett,   [email or web address]    The patient's clinical indicators include:  Patient is a 56 y/o male admitted with respiratory failure from untreated ESRD.  Patient consented to initiation of dialysis this admission.  There is unclear documentation regarding the etiology of the patient's ESRD.  Clarification is needed.    Documentation notes a history of hypertension, and Coding Guidelines assume a causal relationship between HTN and CKD.    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    Risk factors: HIV/AIDS, ESRD, VAIBHAV, PNA  Options provided:   -- Hypertension   -- HIV/AIDS nephropathy   -- Both hypertension and HIV/AIDS nephropathy   -- Unable to determine      Query created by: Fatimah Barrett on 2021 6:35 AM    RESPONSE TEXT:    HIV/AIDS nephropathy          Electronically signed by:  JO DIALLO MD 2021 7:50 AM              "

## 2021-06-20 NOTE — PATIENT INSTRUCTIONS
"1) stop furosemide, start torsemide 10mg every morning   2) increase doxazosin to 4mg at bedtime   3) stop chlorthalidone   4) check labs in 2-3 weeks   5) follow-up regularly with pharmacist, Ulysses Godinez     [facility] nutrition advice:  - the USDA food pattern (https://www.cnpp.usda.gov/USDAFoodPatterns)  - plate method (https://www.choosemyplate.gov/)  - consume diet that emphasizes intake of vegetables, fruits, and whole grains,  - use low fat diary products, poultry, fish, legumes, nontropical vegetable oils, nuts  - limit intake of sweets, sugar-sweetned beverages, and red meats   - reduce saturated and trans fats to <6% of your daily calories   - consume no more than 2,400mg of sodium daily (look at food labels) or if you have high BP then reduce to no more than 1,500mg of sodium daily     BP lowering diet:   - DASH diet (https://www.heart.org/en/health-topics/high-blood-pressure/changes-you-can-make-to-manage-high-blood-pressure/managing-blood-pressure-with-a-heart-healthy-diet)    Diabetes diet:  - visit diabetes.org to review \"food and fitness\" section and \"Create your plate\" for plate method education (http://www.diabetes.org/food-and-fitness/)     Cholesterol-reducing diets:   - AHA diet  (http://www.heart.org/en/healthy-living/healthy-eating/eat-smart/nutrition-basics/aha-diet-and-lifestyle-recommendations)   - Mediterranean diet (http://www.heart.org/en/healthy-living/healthy-eating/eat-smart/nutrition-basics/mediterranean-diet)   - lowering triglycerides: (http://my.americanheart.org/idc/groups/ahamah-public/@wc/@sop/@[facility]/documents/downloadable/[facility]_425988.pdf)     " Letter by Dante Can RN at      Author: Dante Can RN Service: -- Author Type: --    Filed:  Encounter Date: 5/11/2020 Status: (Other)       5/11/2020        Sandy Spencer  [address]  [place] [address]    This letter provides a written record that you were tested for COVID-19 on 5/10/20.     Your result was positive.     This means that we found the virus that causes COVID-19 in your sample.    How can I protect others?    For safety, its very important to follow these rules.    First, stay home and away from others (self-isolate) until:      Youve had no fever--and no medicine that reduces fever--for 3 full days (72 hours). And?     Your other symptoms have gotten better. For example, your cough or breathing has improved. And?    At least 10 days have passed since your symptoms started.    During this time:      Stay in your own room (and use your own bathroom), if you can.    Stay away from others in your home. No hugging, kissing or shaking hands.    Dont let anyone visit.    Dont go to work, school or anywhere else.     Clean high touch surfaces often (doorknobs, counters, handles, etc.). Use a household cleaning spray or wipes.    Cover your mouth and nose with a mask, tissue or washcloth to avoid spreading germs.    Wash your hands and face often with soap and water.    You should not go back to work until you meet the guidelines above for ending your home isolation. You should meet these along with any other guidelines that your employer has.    Employers: This document serves as formal notice of your employees medical guidelines for going back to work. They must meet the above guidelines before going back to work in person.    How can I take care of myself?    1. Get lots of rest. Drink extra fluids (unless a doctor has told you not to).    2. Take Tylenol (acetaminophen) for fever or pain. If you have liver or kidney problems, ask your family doctor if its okay  to take Tylenol.     Take either:     650 mg (two 325 mg pills) every 4 to 6 hours, or?    1,000 mg (two 500 mg pills) every 8 hours as needed.     Note: Dont take more than 3,000 mg in one day. Acetaminophen is found in many medicines (both prescribed and over-the-counter medicines). Read all labels to be sure you dont take too much.  For children, check the Tylenol bottle for the right dose. The dose is based on the julia age or weight.    1. If you have other health problems (like cancer, heart failure, an organ transplant or severe kidney disease): Call your specialty clinic if you dont feel better in the next 2 days.    2. Know when to call 911: If your breathing is so bad that it keeps you from doing normal activities, call 911 or go to the emergency room. Tell them that youve been staying home and may have COVID-19.    3. Sign up for [employer]. We know its scary to hear that you have COVID-19. We want to track your symptoms to make sure youre okay over the next 2 weeks. Please look for an email from [employer]--this is a free, online program that well use to keep in touch. To sign up, follow the link in the email. Learn more at [email or web address].    4. Interested is participating in research? Visit the link below to view current clinical trials that apply to your situation:  [email or web address]    Where can I get more information?    To learn the [facility] guidelines for staying home, please visit the [place] Department of Health website at https://www.health.state.[place]/diseases/coronavirus/basics.html.    To learn more about COVID-19 and how to care for yourself at home, please visit the CDC website at https://www.cdc.gov/coronavirus/2019-ncov/about/steps-when-sick.html.    For more options for care at [facility] [place], please visit our website at [email or web address]

## 2021-06-25 ENCOUNTER — HOSPITAL ENCOUNTER (OUTPATIENT)
Dept: RADIOLOGY | Facility: MEDICAL CENTER | Age: 56
End: 2021-06-25
Attending: PHYSICIAN ASSISTANT
Payer: MEDICAID

## 2021-06-25 DIAGNOSIS — Z99.2 ENCOUNTER FOR EXTRACORPOREAL DIALYSIS (HCC): ICD-10-CM

## 2021-06-25 DIAGNOSIS — N18.6 END STAGE RENAL DISEASE (HCC): ICD-10-CM

## 2021-06-25 PROCEDURE — 93990 DOPPLER FLOW TESTING: CPT | Mod: 26 | Performed by: INTERNAL MEDICINE

## 2021-06-25 PROCEDURE — 93990 DOPPLER FLOW TESTING: CPT

## 2021-09-21 ENCOUNTER — OFFICE VISIT (OUTPATIENT)
Dept: URBAN - METROPOLITAN AREA CLINIC 44 | Facility: CLINIC | Age: 56
End: 2021-09-21
Payer: COMMERCIAL

## 2021-09-21 DIAGNOSIS — H00.14 CHALAZION LEFT UPPER LID: Primary | ICD-10-CM

## 2021-09-21 PROCEDURE — 92285 EXTERNAL OCULAR PHOTOGRAPHY: CPT | Performed by: OPHTHALMOLOGY

## 2021-09-21 PROCEDURE — 99204 OFFICE O/P NEW MOD 45 MIN: CPT | Performed by: OPHTHALMOLOGY

## 2021-09-21 RX ORDER — DOXYCYCLINE HYCLATE 100 MG/1
100 MG TABLET, COATED ORAL
Qty: 30 | Refills: 1 | Status: ACTIVE
Start: 2021-09-21

## 2021-09-21 ASSESSMENT — VISUAL ACUITY
OD: 20/25
OS: 20/30

## 2021-09-21 ASSESSMENT — KERATOMETRY
OD: 42.75
OS: 43.25

## 2021-09-21 ASSESSMENT — INTRAOCULAR PRESSURE
OD: 14
OS: 12

## 2021-09-21 NOTE — IMPRESSION/PLAN
Impression: Chalazion left upper lid: H00.14. Plan: CTM.  use W/C, tears, lid cleaning, start Doxy 100mg QD

## 2022-02-10 RX ORDER — SEVELAMER CARBONATE 800 MG/1
800 TABLET, FILM COATED ORAL
COMMUNITY
End: 2022-02-10 | Stop reason: SDUPTHER

## 2022-02-11 RX ORDER — SEVELAMER CARBONATE 800 MG/1
800 TABLET, FILM COATED ORAL
Qty: 390 TABLET | Refills: 11 | Status: SHIPPED | OUTPATIENT
Start: 2022-02-11 | End: 2022-11-15

## 2022-07-21 ENCOUNTER — HOSPITAL ENCOUNTER (EMERGENCY)
Facility: MEDICAL CENTER | Age: 57
End: 2022-07-21
Attending: EMERGENCY MEDICINE
Payer: MEDICAID

## 2022-07-21 ENCOUNTER — APPOINTMENT (OUTPATIENT)
Dept: RADIOLOGY | Facility: MEDICAL CENTER | Age: 57
End: 2022-07-21
Attending: EMERGENCY MEDICINE
Payer: MEDICAID

## 2022-07-21 VITALS
TEMPERATURE: 97.7 F | HEART RATE: 94 BPM | HEIGHT: 64 IN | SYSTOLIC BLOOD PRESSURE: 132 MMHG | DIASTOLIC BLOOD PRESSURE: 79 MMHG | WEIGHT: 149.91 LBS | RESPIRATION RATE: 16 BRPM | OXYGEN SATURATION: 97 % | BODY MASS INDEX: 25.59 KG/M2

## 2022-07-21 DIAGNOSIS — R55 SYNCOPE, UNSPECIFIED SYNCOPE TYPE: ICD-10-CM

## 2022-07-21 LAB
ALBUMIN SERPL BCP-MCNC: 5.1 G/DL (ref 3.2–4.9)
ALBUMIN/GLOB SERPL: 1 G/DL
ALP SERPL-CCNC: 143 U/L (ref 30–99)
ALT SERPL-CCNC: 7 U/L (ref 2–50)
ANION GAP SERPL CALC-SCNC: 17 MMOL/L (ref 7–16)
AST SERPL-CCNC: 5 U/L (ref 12–45)
BASOPHILS # BLD AUTO: 0.8 % (ref 0–1.8)
BASOPHILS # BLD: 0.08 K/UL (ref 0–0.12)
BILIRUB SERPL-MCNC: 0.5 MG/DL (ref 0.1–1.5)
BUN SERPL-MCNC: 16 MG/DL (ref 8–22)
CALCIUM SERPL-MCNC: 10 MG/DL (ref 8.5–10.5)
CHLORIDE SERPL-SCNC: 89 MMOL/L (ref 96–112)
CO2 SERPL-SCNC: 25 MMOL/L (ref 20–33)
CREAT SERPL-MCNC: 6.65 MG/DL (ref 0.5–1.4)
EKG IMPRESSION: NORMAL
EOSINOPHIL # BLD AUTO: 0.31 K/UL (ref 0–0.51)
EOSINOPHIL NFR BLD: 3 % (ref 0–6.9)
ERYTHROCYTE [DISTWIDTH] IN BLOOD BY AUTOMATED COUNT: 48.4 FL (ref 35.9–50)
GFR SERPLBLD CREATININE-BSD FMLA CKD-EPI: 9 ML/MIN/1.73 M 2
GLOBULIN SER CALC-MCNC: 5.1 G/DL (ref 1.9–3.5)
GLUCOSE SERPL-MCNC: 149 MG/DL (ref 65–99)
HCT VFR BLD AUTO: 38.3 % (ref 42–52)
HGB BLD-MCNC: 12.6 G/DL (ref 14–18)
IMM GRANULOCYTES # BLD AUTO: 0.11 K/UL (ref 0–0.11)
IMM GRANULOCYTES NFR BLD AUTO: 1.1 % (ref 0–0.9)
LYMPHOCYTES # BLD AUTO: 1.86 K/UL (ref 1–4.8)
LYMPHOCYTES NFR BLD: 17.9 % (ref 22–41)
MCH RBC QN AUTO: 31.1 PG (ref 27–33)
MCHC RBC AUTO-ENTMCNC: 32.9 G/DL (ref 33.7–35.3)
MCV RBC AUTO: 94.6 FL (ref 81.4–97.8)
MONOCYTES # BLD AUTO: 0.53 K/UL (ref 0–0.85)
MONOCYTES NFR BLD AUTO: 5.1 % (ref 0–13.4)
NEUTROPHILS # BLD AUTO: 7.51 K/UL (ref 1.82–7.42)
NEUTROPHILS NFR BLD: 72.1 % (ref 44–72)
NRBC # BLD AUTO: 0 K/UL
NRBC BLD-RTO: 0 /100 WBC
PLATELET # BLD AUTO: 300 K/UL (ref 164–446)
PMV BLD AUTO: 9.3 FL (ref 9–12.9)
POTASSIUM SERPL-SCNC: 3.2 MMOL/L (ref 3.6–5.5)
PROT SERPL-MCNC: 10.2 G/DL (ref 6–8.2)
RBC # BLD AUTO: 4.05 M/UL (ref 4.7–6.1)
SODIUM SERPL-SCNC: 131 MMOL/L (ref 135–145)
WBC # BLD AUTO: 10.4 K/UL (ref 4.8–10.8)

## 2022-07-21 PROCEDURE — 93005 ELECTROCARDIOGRAM TRACING: CPT

## 2022-07-21 PROCEDURE — 85025 COMPLETE CBC W/AUTO DIFF WBC: CPT

## 2022-07-21 PROCEDURE — 304217 HCHG IRRIGATION SYSTEM

## 2022-07-21 PROCEDURE — 303353 HCHG DERMABOND SKIN ADHESIVE

## 2022-07-21 PROCEDURE — 304999 HCHG REPAIR-SIMPLE/INTERMED LEVEL 1

## 2022-07-21 PROCEDURE — 80053 COMPREHEN METABOLIC PANEL: CPT

## 2022-07-21 PROCEDURE — 36415 COLL VENOUS BLD VENIPUNCTURE: CPT

## 2022-07-21 PROCEDURE — 70450 CT HEAD/BRAIN W/O DYE: CPT

## 2022-07-21 PROCEDURE — 99285 EMERGENCY DEPT VISIT HI MDM: CPT

## 2022-07-21 PROCEDURE — 700105 HCHG RX REV CODE 258: Performed by: EMERGENCY MEDICINE

## 2022-07-21 PROCEDURE — 93005 ELECTROCARDIOGRAM TRACING: CPT | Performed by: EMERGENCY MEDICINE

## 2022-07-21 RX ORDER — SODIUM CHLORIDE 9 MG/ML
500 INJECTION, SOLUTION INTRAVENOUS ONCE
Status: COMPLETED | OUTPATIENT
Start: 2022-07-21 | End: 2022-07-21

## 2022-07-21 RX ADMIN — SODIUM CHLORIDE 500 ML: 9 INJECTION, SOLUTION INTRAVENOUS at 20:26

## 2022-07-21 ASSESSMENT — FIBROSIS 4 INDEX: FIB4 SCORE: 2.03

## 2022-07-22 NOTE — ED TRIAGE NOTES
Chief Complaint   Patient presents with   • Syncope     Pt felt dizzy after he finished dialysis but he walked down to the bus stop when he had syncopal episode and fell forwards off bench, hitting his head on the ground; lac to right eyebrow; +LOC; -blood thinners     Pt brought in by EMS for above complaint. FSBS was 204 per EMS, no other interventions en route. Pt is alert and oriented on arrival to ER and answering questions appropriately.

## 2022-07-22 NOTE — ED NOTES
Bedside report given to Enrique TONG RN. Upon shift change pt is resting in bed with even and unlabored breaths, in no acute distress.

## 2022-07-22 NOTE — ED NOTES
Rounded on Pt.    Approximately 150 mL NS remaining to infuse.    Updated Pt on plan of care. Pt verbalized understanding.  No acute distress at this time.  Will continue to monitor.

## 2022-07-22 NOTE — ED NOTES
Rounded on Pt.    MD at bedside for wound closure.    Updated Pt on plan of care. Pt verbalized understanding.  No acute distress at this time.  Will continue to monitor.

## 2022-07-22 NOTE — ED NOTES
Orthostatic vital signs performed.    Pt endorses dizziness while standing. Pt is steady on his feet and able to pivot and stand without assistance.    Vitals:    07/21/22 2138 07/21/22 2139 07/21/22 2140 07/21/22 2142   BP: 124/65 (!) 140/78 125/76 129/76   Pulse: 83 75 82 85   Resp: 18 14 12 14   Temp:       TempSrc:       SpO2: 97% 98% 97% 100%   Weight:       Height:

## 2022-07-22 NOTE — ED PROVIDER NOTES
ED Provider Note    Scribed for Maddy Hall M.D. by Pete Jack. 7/21/2022  7:38 PM    Primary care provider: KERRI Corbett  Means of arrival: EMS  History obtained from: Patient   History limited by: None     CHIEF COMPLAINT  Chief Complaint   Patient presents with   • Syncope     Pt felt dizzy after he finished dialysis but he walked down to the bus stop when he had syncopal episode and fell forwards off bench, hitting his head on the ground; lac to right eyebrow; +LOC; -blood thinners       HPI  Seamus Palencia is a 57 y.o. male who presents to the Emergency Department for an acute syncopal episode status post dialysis today. Patient states he gets dialysis done 3 times a week at [facility]. Patient states he is not on blood thinners. He reports losing consciousness after finishing his dialysis while at the bus stop. Patient was subsequently presented to the ED. He reports no other associated symptoms. There are no alleviating or exacerbating factors. Patient denies associated abdominal pain, fever, chills, chest pain, or neck pain. Patient states he does not know when his last Tetanus vaccination was.     REVIEW OF SYSTEMS    CARDIAC: Negative for chest pain.  GI: Negative for abdominal pain.   Neuro: Positive for syncope.  Musculoskeletal: Negative for neck pain.   Endocrine: Negative for fevers or chills.     See history of present illness. All other systems are negative. C.    PAST MEDICAL HISTORY   has a past medical history of Heart failure (HCC), HIV disease ([facility]) (01/01/1995), Hypertension, Renal disorder, Seizure (HCC), and Stroke ([facility]).    SURGICAL HISTORY   has a past surgical history that includes cholecystectomy (1980s) and other (Left).    SOCIAL HISTORY  Social History     Tobacco Use   • Smoking status: Current Every Day Smoker     Packs/day: 0.25     Types: Cigarettes   • Smokeless tobacco: Never Used   • Tobacco comment: 3-4 CIGARETTES A DAY   Vaping Use   • Vaping Use: Never  "used   Substance Use Topics   • Alcohol use: No   • Drug use: No      Social History     Substance and Sexual Activity   Drug Use No       FAMILY HISTORY  Family History   Problem Relation Age of Onset   • Diabetes Mother         cause of death   • Diabetes Father         cause of death   • Diabetes Sister    • Other Brother         down syndrome   • No Known Problems Sister    • No Known Problems Sister    • No Known Problems Sister    • Other Daughter         5 daughters, 11 sons    • Clotting Disorder Neg Hx    • Stroke Neg Hx        CURRENT MEDICATIONS  Home Medications     Reviewed by Fabiana Moreno R.N. (Registered Nurse) on 07/21/22 at 1910  Med List Status: Not Addressed   Medication Last Dose Status   carvedilol (COREG) 25 MG Tab  Active   dapsone 100 MG Tab  Active   sevelamer carbonate (RENVELA) 800 MG Tab tablet  Active                ALLERGIES  No Known Allergies    PHYSICAL EXAM  VITAL SIGNS: /68   Pulse 77   Temp 36.1 °C (97 °F) (Temporal)   Resp 16   Ht 1.626 m (5' 4\")   Wt 68 kg (149 lb 14.6 oz)   SpO2 98%   BMI 25.73 kg/m²     Constitutional: Well developed, Well nourished, No acute distress, Non-toxic appearance.   HEENT: Normocephalic, 2 lacerations, one being 3 cm and L shaped, the other being 2 cm in length the right upper eyelid. External ears normal, pharynx pink,  Mucous  Membranes moist, No rhinorrhea or mucosal edema  Eyes: PERRL, EOMI, Conjunctiva normal, No discharge.   Neck: Normal range of motion, No tenderness, Supple, No stridor.   Lymphatic: No lymphadenopathy    Cardiovascular: Regular Rate and Rhythm, No murmurs,  rubs, or gallops.   Thorax & Lungs: Lungs clear to auscultation bilaterally, No respiratory distress, No wheezes, rhales or rhonchi, No chest wall tenderness.   Abdomen: Bowel sounds normal, Soft, non tender, non distended,  No pulsatile masses., no rebound guarding or peritoneal signs.   Skin: Warm, Dry, No erythema, No rash,   Back:  No CVA tenderness,  " No spinal tenderness, bony crepitance, step offs, or instability.   Neurologic: Alert & oriented clear speech no focal deficits  Extremities: Equal, intact distal pulses, No cyanosis, clubbing or edema,  No tenderness.   Musculoskeletal: Good range of motion in all major joints. No tenderness to palpation or major deformities noted.     DIAGNOSTIC STUDIES / PROCEDURES    LABS  Results for orders placed or performed during the hospital encounter of 07/21/22   CBC WITH DIFFERENTIAL   Result Value Ref Range    WBC 10.4 4.8 - 10.8 K/uL    RBC 4.05 (L) 4.70 - 6.10 M/uL    Hemoglobin 12.6 (L) 14.0 - 18.0 g/dL    Hematocrit 38.3 (L) 42.0 - 52.0 %    MCV 94.6 81.4 - 97.8 fL    MCH 31.1 27.0 - 33.0 pg    MCHC 32.9 (L) 33.7 - 35.3 g/dL    RDW 48.4 35.9 - 50.0 fL    Platelet Count 300 164 - 446 K/uL    MPV 9.3 9.0 - 12.9 fL    Neutrophils-Polys 72.10 (H) 44.00 - 72.00 %    Lymphocytes 17.90 (L) 22.00 - 41.00 %    Monocytes 5.10 0.00 - 13.40 %    Eosinophils 3.00 0.00 - 6.90 %    Basophils 0.80 0.00 - 1.80 %    Immature Granulocytes 1.10 (H) 0.00 - 0.90 %    Nucleated RBC 0.00 /100 WBC    Neutrophils (Absolute) 7.51 (H) 1.82 - 7.42 K/uL    Lymphs (Absolute) 1.86 1.00 - 4.80 K/uL    Monos (Absolute) 0.53 0.00 - 0.85 K/uL    Eos (Absolute) 0.31 0.00 - 0.51 K/uL    Baso (Absolute) 0.08 0.00 - 0.12 K/uL    Immature Granulocytes (abs) 0.11 0.00 - 0.11 K/uL    NRBC (Absolute) 0.00 K/uL   Comp Metabolic Panel   Result Value Ref Range    Sodium 131 (L) 135 - 145 mmol/L    Potassium 3.2 (L) 3.6 - 5.5 mmol/L    Chloride 89 (L) 96 - 112 mmol/L    Co2 25 20 - 33 mmol/L    Anion Gap 17.0 (H) 7.0 - 16.0    Glucose 149 (H) 65 - 99 mg/dL    Bun 16 8 - 22 mg/dL    Creatinine 6.65 (HH) 0.50 - 1.40 mg/dL    Calcium 10.0 8.5 - 10.5 mg/dL    AST(SGOT) 5 (L) 12 - 45 U/L    ALT(SGPT) 7 2 - 50 U/L    Alkaline Phosphatase 143 (H) 30 - 99 U/L    Total Bilirubin 0.5 0.1 - 1.5 mg/dL    Albumin 5.1 (H) 3.2 - 4.9 g/dL    Total Protein 10.2 (H) 6.0 - 8.2  g/dL    Globulin 5.1 (H) 1.9 - 3.5 g/dL    A-G Ratio 1.0 g/dL   ESTIMATED GFR   Result Value Ref Range    GFR (CKD-EPI) 9 (A) >60 mL/min/1.73 m 2   EKG (NOW)   Result Value Ref Range    Report       [facility] Emergency Dept.    Test Date:  2022  Pt Name:    SONI ABBASI                  Department: ER  MRN:        [id number]                      Room:       [facility]  Gender:     Male                         Technician: [id number]  :        1965                   Requested By:ER TRIAGE PROTOCOL  Order #:    [id number]                    Reading MD: MADY DORSEY MD    Measurements  Intervals                                Axis  Rate:       80                           P:          38  SC:         168                          QRS:        27  QRSD:       96                           T:          159  QT:         392  QTc:        453    Interpretive Statements  SINUS RHYTHM  ABNORMAL T, CONSIDER ISCHEMIA, ANT-LAT LEADS  Compared to ECG 2020 06:48:08  Sinus tachycardia no longer present  T-wave abnormality still present  Possible ischemia still present  Electronically Signed On 2022 20:45:47 PDT by MADY DORSEY MD        All labs reviewed by me.    EKG  12 lead EKG; Interpreted by emergency department physician as noted above.     RADIOLOGY  CT-HEAD W/O   Final Result         1.  No acute intracranial abnormality is identified, there are nonspecific white matter changes, commonly associated with small vessel ischemic disease.  Associated mild cerebral atrophy is noted.   2.  Atherosclerosis.           The radiologist's interpretation of all radiological studies have been reviewed by me.    PROCEDURES:    Laceration Repair Procedure Note    Indication: Laceration    Procedure: The patient was placed in the appropriate position. The area was then cleansed using chlorhexidine and irrigated with normal saline. The laceration was closed with Dermabond. A second laceration was closed with  Dermabond.     Total repaired wound length: 3 cm L shaped laceration, 2 cm laceration.     Other Items: None    The patient tolerated the procedure well.    Complications: None    COURSE & MEDICAL DECISION MAKING  Nursing notes, VS, PMSFHx reviewed in chart.    7:38 PM Patient seen and examined at bedside. Intravenous fluids ( mL) administered for hypotension. Ordered CBC w/diff, CMP, Estimated GFR, EKG to evaluate his symptoms. The differential diagnoses include but are not limited to: intracranial hemorrhage vs skull fracture vs arrhythmia vs dehydration. CT ordered to rule out hemorrhage or skull fracture, EKG ordered to rule out arrhythmia, labs ordered to further evaluate.     8:30 PM - CT scan reassuring ruling out hemorrhage or skull fracture. Lab workup is reassuring. Plan to clean and repair patient's facial laceration. Patient was reevaluated at bedside. Laceration repair performed as noted above. Advised patient to keep the wound area clean, to not use antibiotic ointment on the site, and to rest. Patient advised to follow up with primary care. Patient will be given IV fluids prior to discharge. I then informed the patient of my plan for discharge, which includes strict return precautions for any new or worsening symptoms. He understands and verbalizes agreement to plan of care. He is comfortable going home at this time.      HYDRATION: Based on the patient's presentation of Hypotension the patient was given IV fluids. IV Hydration was used because oral hydration was not adequate alone. Upon recheck following hydration, the patient was improved.    The patient will return for new or worsening symptoms and is stable at the time of discharge.    The patient is referred to a primary physician for blood pressure management, diabetic screening, and for all other preventative health concerns.    DISPOSITION:  Patient will be discharged home in stable condition.    FOLLOW UP:  Ellie Brown,  A.P.N.  [address] [facility] [id number] [phone]      As needed, If symptoms worsen    FINAL IMPRESSION  1. Syncope, unspecified syncope type          I, Pete Jack (Scribe), am scribing for, and in the presence of, Maddy Hall M.D..    Electronically signed by: Pete Jack (Scribe), 7/21/2022    IMaddy M.D. personally performed the services described in this documentation, as scribed by Pete Jack in my presence, and it is both accurate and complete.    The note accurately reflects work and decisions made by me.  Maddy Hall M.D.  7/21/2022  9:41 PM

## 2022-07-22 NOTE — ED NOTES
Patient educated on discharge instructions, follow up appointments, prescriptions, and home care. Patient verbalized understanding. Patient ambulated to [facility].    "I feel fine."

## 2022-10-17 ENCOUNTER — HOSPITAL ENCOUNTER (OUTPATIENT)
Dept: RADIOLOGY | Facility: MEDICAL CENTER | Age: 57
End: 2022-10-17
Payer: MEDICAID

## 2022-11-09 ENCOUNTER — APPOINTMENT (OUTPATIENT)
Dept: RADIOLOGY | Facility: MEDICAL CENTER | Age: 57
End: 2022-11-09
Attending: ORTHOPAEDIC SURGERY
Payer: MEDICAID

## 2022-11-15 ENCOUNTER — APPOINTMENT (OUTPATIENT)
Dept: RADIOLOGY | Facility: MEDICAL CENTER | Age: 57
DRG: 974 | End: 2022-11-15
Attending: HOSPITALIST
Payer: MEDICAID

## 2022-11-15 ENCOUNTER — HOSPITAL ENCOUNTER (INPATIENT)
Facility: MEDICAL CENTER | Age: 57
LOS: 3 days | DRG: 974 | End: 2022-11-18
Attending: EMERGENCY MEDICINE | Admitting: HOSPITALIST
Payer: MEDICAID

## 2022-11-15 ENCOUNTER — APPOINTMENT (OUTPATIENT)
Dept: RADIOLOGY | Facility: MEDICAL CENTER | Age: 57
DRG: 974 | End: 2022-11-15
Attending: EMERGENCY MEDICINE
Payer: MEDICAID

## 2022-11-15 DIAGNOSIS — E87.5 HYPERKALEMIA: ICD-10-CM

## 2022-11-15 DIAGNOSIS — I10 HTN (HYPERTENSION), MALIGNANT: ICD-10-CM

## 2022-11-15 DIAGNOSIS — E87.79 HYPERTENSION WITH FLUID OVERLOAD: ICD-10-CM

## 2022-11-15 DIAGNOSIS — B20 AIDS (ACQUIRED IMMUNODEFICIENCY SYNDROME), CD4 <=200 (HCC): ICD-10-CM

## 2022-11-15 DIAGNOSIS — I50.20 HFREF (HEART FAILURE WITH REDUCED EJECTION FRACTION) (HCC): ICD-10-CM

## 2022-11-15 DIAGNOSIS — N18.6 ESRD (END STAGE RENAL DISEASE) ON DIALYSIS (HCC): ICD-10-CM

## 2022-11-15 DIAGNOSIS — R09.02 HYPOXIA: ICD-10-CM

## 2022-11-15 DIAGNOSIS — Z99.2 ESRD (END STAGE RENAL DISEASE) ON DIALYSIS (HCC): ICD-10-CM

## 2022-11-15 DIAGNOSIS — I73.9 PAD (PERIPHERAL ARTERY DISEASE) (HCC): ICD-10-CM

## 2022-11-15 DIAGNOSIS — I10 HYPERTENSION WITH FLUID OVERLOAD: ICD-10-CM

## 2022-11-15 PROBLEM — J96.01 ACUTE RESPIRATORY FAILURE WITH HYPOXIA (HCC): Status: ACTIVE | Noted: 2022-11-15

## 2022-11-15 PROBLEM — A41.9: Status: ACTIVE | Noted: 2022-11-15

## 2022-11-15 LAB
ALBUMIN SERPL BCP-MCNC: 3.9 G/DL (ref 3.2–4.9)
ALBUMIN/GLOB SERPL: 0.9 G/DL
ALP SERPL-CCNC: 134 U/L (ref 30–99)
ALT SERPL-CCNC: 6 U/L (ref 2–50)
ANION GAP SERPL CALC-SCNC: 22 MMOL/L (ref 7–16)
AST SERPL-CCNC: <5 U/L (ref 12–45)
BASOPHILS # BLD AUTO: 0.3 % (ref 0–1.8)
BASOPHILS # BLD: 0.02 K/UL (ref 0–0.12)
BILIRUB SERPL-MCNC: 0.3 MG/DL (ref 0.1–1.5)
BUN SERPL-MCNC: 117 MG/DL (ref 8–22)
CALCIUM SERPL-MCNC: 7.7 MG/DL (ref 8.5–10.5)
CHLORIDE SERPL-SCNC: 100 MMOL/L (ref 96–112)
CO2 SERPL-SCNC: 11 MMOL/L (ref 20–33)
CREAT SERPL-MCNC: 20.47 MG/DL (ref 0.5–1.4)
D DIMER PPP IA.FEU-MCNC: 2.8 UG/ML (FEU) (ref 0–0.5)
EKG IMPRESSION: NORMAL
EOSINOPHIL # BLD AUTO: 0.09 K/UL (ref 0–0.51)
EOSINOPHIL NFR BLD: 1.3 % (ref 0–6.9)
ERYTHROCYTE [DISTWIDTH] IN BLOOD BY AUTOMATED COUNT: 54.3 FL (ref 35.9–50)
FLUAV RNA SPEC QL NAA+PROBE: NEGATIVE
FLUBV RNA SPEC QL NAA+PROBE: NEGATIVE
GFR SERPLBLD CREATININE-BSD FMLA CKD-EPI: 2 ML/MIN/1.73 M 2
GLOBULIN SER CALC-MCNC: 4.2 G/DL (ref 1.9–3.5)
GLUCOSE SERPL-MCNC: 129 MG/DL (ref 65–99)
HAV IGM SERPL QL IA: NORMAL
HBV CORE IGM SER QL: NORMAL
HBV SURFACE AB SERPL IA-ACNC: ABNORMAL MIU/ML (ref 0–10)
HBV SURFACE AG SER QL: NORMAL
HCT VFR BLD AUTO: 32.7 % (ref 42–52)
HCV AB SER QL: NORMAL
HGB BLD-MCNC: 10.6 G/DL (ref 14–18)
IMM GRANULOCYTES # BLD AUTO: 0.05 K/UL (ref 0–0.11)
IMM GRANULOCYTES NFR BLD AUTO: 0.7 % (ref 0–0.9)
INR PPP: 1.15 (ref 0.87–1.13)
LACTATE SERPL-SCNC: 1.2 MMOL/L (ref 0.5–2)
LDH SERPL L TO P-CCNC: 231 U/L (ref 107–266)
LYMPHOCYTES # BLD AUTO: 0.42 K/UL (ref 1–4.8)
LYMPHOCYTES NFR BLD: 6 % (ref 22–41)
MCH RBC QN AUTO: 31 PG (ref 27–33)
MCHC RBC AUTO-ENTMCNC: 32.4 G/DL (ref 33.7–35.3)
MCV RBC AUTO: 95.6 FL (ref 81.4–97.8)
MONOCYTES # BLD AUTO: 0.33 K/UL (ref 0–0.85)
MONOCYTES NFR BLD AUTO: 4.7 % (ref 0–13.4)
NEUTROPHILS # BLD AUTO: 6.1 K/UL (ref 1.82–7.42)
NEUTROPHILS NFR BLD: 87 % (ref 44–72)
NRBC # BLD AUTO: 0 K/UL
NRBC BLD-RTO: 0 /100 WBC
NT-PROBNP SERPL IA-MCNC: ABNORMAL PG/ML (ref 0–125)
PLATELET # BLD AUTO: 178 K/UL (ref 164–446)
PMV BLD AUTO: 10.1 FL (ref 9–12.9)
POTASSIUM SERPL-SCNC: 5.8 MMOL/L (ref 3.6–5.5)
PROT SERPL-MCNC: 8.1 G/DL (ref 6–8.2)
PROTHROMBIN TIME: 14.5 SEC (ref 12–14.6)
RBC # BLD AUTO: 3.42 M/UL (ref 4.7–6.1)
RSV RNA SPEC QL NAA+PROBE: NEGATIVE
SARS-COV-2 RNA RESP QL NAA+PROBE: NOTDETECTED
SODIUM SERPL-SCNC: 133 MMOL/L (ref 135–145)
SPECIMEN SOURCE: NORMAL
TROPONIN T SERPL-MCNC: 109 NG/L (ref 6–19)
TROPONIN T SERPL-MCNC: 122 NG/L (ref 6–19)
TROPONIN T SERPL-MCNC: 144 NG/L (ref 6–19)
TROPONIN T SERPL-MCNC: 83 NG/L (ref 6–19)
WBC # BLD AUTO: 7 K/UL (ref 4.8–10.8)

## 2022-11-15 PROCEDURE — 93005 ELECTROCARDIOGRAM TRACING: CPT | Performed by: EMERGENCY MEDICINE

## 2022-11-15 PROCEDURE — 80053 COMPREHEN METABOLIC PANEL: CPT

## 2022-11-15 PROCEDURE — 700102 HCHG RX REV CODE 250 W/ 637 OVERRIDE(OP): Performed by: HOSPITALIST

## 2022-11-15 PROCEDURE — 96375 TX/PRO/DX INJ NEW DRUG ADDON: CPT

## 2022-11-15 PROCEDURE — 84484 ASSAY OF TROPONIN QUANT: CPT | Mod: 91

## 2022-11-15 PROCEDURE — 87040 BLOOD CULTURE FOR BACTERIA: CPT | Mod: 91

## 2022-11-15 PROCEDURE — 85610 PROTHROMBIN TIME: CPT

## 2022-11-15 PROCEDURE — 99406 BEHAV CHNG SMOKING 3-10 MIN: CPT | Performed by: HOSPITALIST

## 2022-11-15 PROCEDURE — 99255 IP/OBS CONSLTJ NEW/EST HI 80: CPT | Performed by: INTERNAL MEDICINE

## 2022-11-15 PROCEDURE — 87581 M.PNEUMON DNA AMP PROBE: CPT

## 2022-11-15 PROCEDURE — 85379 FIBRIN DEGRADATION QUANT: CPT

## 2022-11-15 PROCEDURE — 90935 HEMODIALYSIS ONE EVALUATION: CPT

## 2022-11-15 PROCEDURE — 86706 HEP B SURFACE ANTIBODY: CPT

## 2022-11-15 PROCEDURE — 96374 THER/PROPH/DIAG INJ IV PUSH: CPT

## 2022-11-15 PROCEDURE — 87486 CHLMYD PNEUM DNA AMP PROBE: CPT

## 2022-11-15 PROCEDURE — 80074 ACUTE HEPATITIS PANEL: CPT

## 2022-11-15 PROCEDURE — 99285 EMERGENCY DEPT VISIT HI MDM: CPT

## 2022-11-15 PROCEDURE — 83880 ASSAY OF NATRIURETIC PEPTIDE: CPT

## 2022-11-15 PROCEDURE — 87633 RESP VIRUS 12-25 TARGETS: CPT

## 2022-11-15 PROCEDURE — 83615 LACTATE (LD) (LDH) ENZYME: CPT

## 2022-11-15 PROCEDURE — 83605 ASSAY OF LACTIC ACID: CPT

## 2022-11-15 PROCEDURE — 71045 X-RAY EXAM CHEST 1 VIEW: CPT

## 2022-11-15 PROCEDURE — 87798 DETECT AGENT NOS DNA AMP: CPT | Mod: 91

## 2022-11-15 PROCEDURE — 36415 COLL VENOUS BLD VENIPUNCTURE: CPT

## 2022-11-15 PROCEDURE — A9270 NON-COVERED ITEM OR SERVICE: HCPCS | Performed by: HOSPITALIST

## 2022-11-15 PROCEDURE — 99223 1ST HOSP IP/OBS HIGH 75: CPT | Mod: 25 | Performed by: HOSPITALIST

## 2022-11-15 PROCEDURE — 0241U HCHG SARS-COV-2 COVID-19 NFCT DS RESP RNA 4 TRGT MIC: CPT

## 2022-11-15 PROCEDURE — 700101 HCHG RX REV CODE 250: Performed by: HOSPITALIST

## 2022-11-15 PROCEDURE — 700111 HCHG RX REV CODE 636 W/ 250 OVERRIDE (IP): Performed by: HOSPITALIST

## 2022-11-15 PROCEDURE — 770020 HCHG ROOM/CARE - TELE (206)

## 2022-11-15 PROCEDURE — C9803 HOPD COVID-19 SPEC COLLECT: HCPCS | Performed by: EMERGENCY MEDICINE

## 2022-11-15 PROCEDURE — 85025 COMPLETE CBC W/AUTO DIFF WBC: CPT

## 2022-11-15 PROCEDURE — 700117 HCHG RX CONTRAST REV CODE 255: Performed by: HOSPITALIST

## 2022-11-15 PROCEDURE — 700111 HCHG RX REV CODE 636 W/ 250 OVERRIDE (IP): Performed by: EMERGENCY MEDICINE

## 2022-11-15 PROCEDURE — 700111 HCHG RX REV CODE 636 W/ 250 OVERRIDE (IP)

## 2022-11-15 RX ORDER — PROMETHAZINE HYDROCHLORIDE 25 MG/1
12.5-25 SUPPOSITORY RECTAL EVERY 4 HOURS PRN
Status: DISCONTINUED | OUTPATIENT
Start: 2022-11-15 | End: 2022-11-18 | Stop reason: HOSPADM

## 2022-11-15 RX ORDER — ONDANSETRON 4 MG/1
4 TABLET, ORALLY DISINTEGRATING ORAL EVERY 4 HOURS PRN
Status: DISCONTINUED | OUTPATIENT
Start: 2022-11-15 | End: 2022-11-18 | Stop reason: HOSPADM

## 2022-11-15 RX ORDER — PROCHLORPERAZINE EDISYLATE 5 MG/ML
5-10 INJECTION INTRAMUSCULAR; INTRAVENOUS EVERY 4 HOURS PRN
Status: DISCONTINUED | OUTPATIENT
Start: 2022-11-15 | End: 2022-11-18 | Stop reason: HOSPADM

## 2022-11-15 RX ORDER — HEPARIN SODIUM 1000 [USP'U]/ML
1500 INJECTION, SOLUTION INTRAVENOUS; SUBCUTANEOUS
Status: DISCONTINUED | OUTPATIENT
Start: 2022-11-15 | End: 2022-11-15

## 2022-11-15 RX ORDER — ASPIRIN 81 MG/1
81 TABLET, CHEWABLE ORAL DAILY
Status: DISCONTINUED | OUTPATIENT
Start: 2022-11-15 | End: 2022-11-18 | Stop reason: HOSPADM

## 2022-11-15 RX ORDER — AMLODIPINE BESYLATE 10 MG/1
10 TABLET ORAL
Status: DISCONTINUED | OUTPATIENT
Start: 2022-11-15 | End: 2022-11-18 | Stop reason: HOSPADM

## 2022-11-15 RX ORDER — HEPARIN SODIUM 1000 [USP'U]/ML
INJECTION, SOLUTION INTRAVENOUS; SUBCUTANEOUS
Status: COMPLETED
Start: 2022-11-15 | End: 2022-11-15

## 2022-11-15 RX ORDER — AZITHROMYCIN 250 MG/1
600 TABLET, FILM COATED ORAL
Status: DISCONTINUED | OUTPATIENT
Start: 2022-11-18 | End: 2022-11-16

## 2022-11-15 RX ORDER — LABETALOL HYDROCHLORIDE 5 MG/ML
10 INJECTION, SOLUTION INTRAVENOUS EVERY 4 HOURS PRN
Status: DISCONTINUED | OUTPATIENT
Start: 2022-11-15 | End: 2022-11-18 | Stop reason: HOSPADM

## 2022-11-15 RX ORDER — AMOXICILLIN 250 MG
2 CAPSULE ORAL 2 TIMES DAILY
Status: DISCONTINUED | OUTPATIENT
Start: 2022-11-15 | End: 2022-11-18 | Stop reason: HOSPADM

## 2022-11-15 RX ORDER — AZITHROMYCIN 600 MG/1
600 TABLET, FILM COATED ORAL
Status: DISCONTINUED | OUTPATIENT
Start: 2022-11-15 | End: 2022-11-15

## 2022-11-15 RX ORDER — SULFAMETHOXAZOLE AND TRIMETHOPRIM 400; 80 MG/1; MG/1
1 TABLET ORAL DAILY
Status: DISCONTINUED | OUTPATIENT
Start: 2022-11-15 | End: 2022-11-16

## 2022-11-15 RX ORDER — HEPARIN SODIUM 1000 [USP'U]/ML
1800 INJECTION, SOLUTION INTRAVENOUS; SUBCUTANEOUS
Status: COMPLETED | OUTPATIENT
Start: 2022-11-16 | End: 2022-11-16

## 2022-11-15 RX ORDER — ONDANSETRON 2 MG/ML
4 INJECTION INTRAMUSCULAR; INTRAVENOUS EVERY 4 HOURS PRN
Status: DISCONTINUED | OUTPATIENT
Start: 2022-11-15 | End: 2022-11-18 | Stop reason: HOSPADM

## 2022-11-15 RX ORDER — POLYETHYLENE GLYCOL 3350 17 G/17G
1 POWDER, FOR SOLUTION ORAL
Status: DISCONTINUED | OUTPATIENT
Start: 2022-11-15 | End: 2022-11-18 | Stop reason: HOSPADM

## 2022-11-15 RX ORDER — ACETAMINOPHEN 325 MG/1
650 TABLET ORAL EVERY 6 HOURS PRN
Status: DISCONTINUED | OUTPATIENT
Start: 2022-11-15 | End: 2022-11-18 | Stop reason: HOSPADM

## 2022-11-15 RX ORDER — HEPARIN SODIUM 5000 [USP'U]/ML
5000 INJECTION, SOLUTION INTRAVENOUS; SUBCUTANEOUS EVERY 8 HOURS
Status: DISCONTINUED | OUTPATIENT
Start: 2022-11-15 | End: 2022-11-18 | Stop reason: HOSPADM

## 2022-11-15 RX ORDER — HYDRALAZINE HYDROCHLORIDE 20 MG/ML
20 INJECTION INTRAMUSCULAR; INTRAVENOUS ONCE
Status: COMPLETED | OUTPATIENT
Start: 2022-11-15 | End: 2022-11-15

## 2022-11-15 RX ORDER — PROMETHAZINE HYDROCHLORIDE 25 MG/1
12.5-25 TABLET ORAL EVERY 4 HOURS PRN
Status: DISCONTINUED | OUTPATIENT
Start: 2022-11-15 | End: 2022-11-18 | Stop reason: HOSPADM

## 2022-11-15 RX ORDER — BISACODYL 10 MG
10 SUPPOSITORY, RECTAL RECTAL
Status: DISCONTINUED | OUTPATIENT
Start: 2022-11-15 | End: 2022-11-18 | Stop reason: HOSPADM

## 2022-11-15 RX ADMIN — METOPROLOL TARTRATE 25 MG: 25 TABLET, FILM COATED ORAL at 10:37

## 2022-11-15 RX ADMIN — HEPARIN SODIUM 1500 UNITS: 1000 INJECTION, SOLUTION INTRAVENOUS; SUBCUTANEOUS at 13:37

## 2022-11-15 RX ADMIN — HYDRALAZINE HYDROCHLORIDE 20 MG: 20 INJECTION INTRAMUSCULAR; INTRAVENOUS at 07:50

## 2022-11-15 RX ADMIN — LABETALOL HYDROCHLORIDE 10 MG: 5 INJECTION, SOLUTION INTRAVENOUS at 11:17

## 2022-11-15 RX ADMIN — HEPARIN SODIUM 5000 UNITS: 5000 INJECTION, SOLUTION INTRAVENOUS; SUBCUTANEOUS at 21:35

## 2022-11-15 RX ADMIN — ACETAMINOPHEN 650 MG: 325 TABLET, FILM COATED ORAL at 18:48

## 2022-11-15 RX ADMIN — AMLODIPINE BESYLATE 10 MG: 10 TABLET ORAL at 10:37

## 2022-11-15 RX ADMIN — SULFAMETHOXAZOLE AND TRIMETHOPRIM 1 TABLET: 400; 80 TABLET ORAL at 10:37

## 2022-11-15 RX ADMIN — METOPROLOL TARTRATE 25 MG: 25 TABLET, FILM COATED ORAL at 18:48

## 2022-11-15 RX ADMIN — ASPIRIN 81 MG 81 MG: 81 TABLET ORAL at 10:37

## 2022-11-15 RX ADMIN — IOHEXOL 45 ML: 350 INJECTION, SOLUTION INTRAVENOUS at 12:45

## 2022-11-15 ASSESSMENT — ENCOUNTER SYMPTOMS
FEVER: 0
SHORTNESS OF BREATH: 0
DIARRHEA: 0
PALPITATIONS: 0
CHILLS: 0
BACK PAIN: 0
COUGH: 0
ABDOMINAL PAIN: 0
DIZZINESS: 0
EYE DISCHARGE: 0
VOMITING: 0
SPEECH CHANGE: 0
SENSORY CHANGE: 0
NERVOUS/ANXIOUS: 0
NAUSEA: 0
STRIDOR: 0
HEADACHES: 0

## 2022-11-15 ASSESSMENT — FIBROSIS 4 INDEX: FIB4 SCORE: 0.37

## 2022-11-15 ASSESSMENT — PAIN DESCRIPTION - PAIN TYPE: TYPE: ACUTE PAIN

## 2022-11-15 NOTE — ASSESSMENT & PLAN NOTE
Prior echocardiogram showing EF of 40%  Continue dialysis to help diurese.  If emergent dialysis not available will give Lasix.

## 2022-11-15 NOTE — ASSESSMENT & PLAN NOTE
This is Sepsis Present on admission  SIRS criteria identified on my evaluation include: Tachycardia, with heart rate greater than 90 BPM and Tachypnea, with respirations greater than 20 per minute  Source is pulmonary  Sepsis protocol initiated  Fluid resuscitation ordered per protocol  Crystalloid Fluid Administration: Patient has advanced or end-stage chronic renal disease (with documentation of stage IV or GFR 15-29 mL/min, or stage V or GFR < 15 mL/min or ESRD), for this/these reason(s) it is unsafe for this patient to receive 30 mL/kg of fluid, patient to be reassessed shortly after completion of partial fluid bolus to ensure adequacy of resuscitation  IV antibiotics as appropriate for source of sepsis  Reassessment: I have reassessed the patient's hemodynamic status  12/2020 CD4:36  Recheck CD4 count      Improved

## 2022-11-15 NOTE — ASSESSMENT & PLAN NOTE
Smoking cessation encouraged and recommended.  Nicotine patch offered  4 minutes spent on smoking cessation encouragement and education.

## 2022-11-15 NOTE — ASSESSMENT & PLAN NOTE
Nephrology consulting.  EKG reviewed  MOnitor on telemetry until repeat potassium within normal levels.

## 2022-11-15 NOTE — ASSESSMENT & PLAN NOTE
We will wait list of home medications we will initiate amlodipine 10 mg oral with parameter.  Hold on ACE or ARB or Aldactone based on his potassium  Monitor vitals

## 2022-11-15 NOTE — ASSESSMENT & PLAN NOTE
12/2020 CD4:36  Check new CD4 count  Place on bactrim.  May need azithromycin.  CXR reviewed  Check LDH  Lactic acid okay  Checking [facility] to see if he has been on his medications and refills.

## 2022-11-15 NOTE — ASSESSMENT & PLAN NOTE
Multifactorial patient is a smoker he has AIDS and so the differential is broad and at this point time.  I am checking in ruling out COVID/flu/PCP.  I am initiating him on Bactrim prophylaxis.  I am also checking a CD4 count on him.  Acutely will have respiratory per protocol with supplemental oxygen.  I have requested him to stop smoking.  I feel at this point time with his BNP being greater than 35,000 and chest x-ray showing pulmonary congestion I am concerned of volume overload.  I feel dialysis will be important to his respiratory recovery.  We will require likely multiple sessions of dialysis to work on volume removal.  Nephrology has already been consulted and will look toward sending him to dialysis.

## 2022-11-15 NOTE — PROGRESS NOTES
[facility] Services Progress Note      HD today x 3.5 hours per Dr. Bailey.   Initiated at 1325 and ended at 1655.     Assessment:    Received via stretcher, patient alert and oriented x 4. On O2 at 2lpm. O2 sat 99%.        Access used: LISA AVF  Needle gauge used: 15g sharp needle    Pre HD vitals  BP: 160/92 mmHg   HR: 84 bpm   RR: 22 bpm  Temp: 97 F    Net Fluid Removed: 2,500 mL    Procedure Events/ Complications:   Patient stable alert and oriented x 4. Patient had hypotensive episodes on  the last hour of dialysis. Asymptomatic. Uf goal lowered to 3L. NS bolus 200cc given for BP support. Dr. Bailey notified.    Post HD vitals:  BP: 124/56 mmHg  HR: 87 bpm  RR: 17 bpm Temp: 97.4F  Post Access Care:   Blood returned. Gauze applied and held for  10 minutes.   Bruit and thrill present   Gauze and taped secured.      Report given to Primary LENARD Vleazquez RN.

## 2022-11-15 NOTE — ED TRIAGE NOTES
"Chief Complaint   Patient presents with    Shortness of Breath     X7 hours    Respiratory Distress     Pt [place] from his car, where he lives. Pt has been experiencing SOB and resp distress for the last 7 hours. Pt O2 satruation was 75% on roomair. Pt is non-complaint with his medications and have not been to dialysis for the last week. EMS administered 6 Nitro and 1 albuterol treatment. Pt arrives A&O4, O2 saturation 96% on 15L NRB.     BP (!) 203/122   Pulse (!) 106   Temp 36.7 °C (98 °F) (Temporal)   Resp (!) 24   Ht 1.626 m (5' 4\")   Wt 68 kg (150 lb)   SpO2 96%     "

## 2022-11-15 NOTE — H&P
Hospital Medicine History & Physical Note    Date of Service  11/15/2022    Primary Care Physician  DICKSON Corbett.    Consultants  nephrology    Specialist Names: Nova Bailey M.D.    Code Status  Full Code    Chief Complaint  Chief Complaint   Patient presents with    Shortness of Breath     X7 hours    Respiratory Distress       History of Presenting Illness  Seamus Palencia is a 57 y.o. male with a history of HIV/AIDS (prior CD4 count 36 in Dec 2020), HTN, HFrEF:40%, active smoker, and ESRD who presented 11/15/2022 with acute shortness of breath.  He lives in his car and was found by EMS to have SpO2:75% on room air.  He has not had dialysis in over a week.  In the ER he was requiring 15L NRB to maintain SpO2:96%.      The patient has not been ill recently.  He denies any fevers chills or coughs.  He has no phlegm.  He denies any diarrhea he states he still makes urine and no complaints of dysuria.  He has had no sore throat no runny nose.  States he has been living in his car as he lost his house recently.  He has not subsequently followed up with dialysis.  He states he has been getting his medications at the [facility] clinic but cannot recall the names of his medications.    I discussed the plan of care with patient.    Review of Systems  Review of Systems   Constitutional:  Negative for chills and fever.   HENT:  Negative for congestion.    Eyes:  Negative for discharge.   Respiratory:  Negative for cough, shortness of breath and stridor.    Cardiovascular:  Negative for chest pain, palpitations and leg swelling.   Gastrointestinal:  Negative for abdominal pain, diarrhea, nausea and vomiting.   Genitourinary:  Negative for dysuria.   Musculoskeletal:  Negative for back pain and joint pain.   Skin:  Negative for rash.   Neurological:  Negative for dizziness, sensory change, speech change and headaches.   Psychiatric/Behavioral:  The patient is not nervous/anxious.      Past Medical History   has a  past medical history of Heart failure (HCC), HIV disease (HCC) (01/01/1995), Hypertension, Renal disorder, Seizure (HCC), and Stroke (HCC).    Surgical History   has a past surgical history that includes cholecystectomy (1980s) and other (Left).     Family History  family history includes Diabetes in his father, mother, and sister; No Known Problems in his sister, sister, and sister; Other in his brother and daughter.   Family history reviewed with patient. There is no family history that is pertinent to the chief complaint.     Social History   reports that he has been smoking cigarettes. He has been smoking an average of .25 packs per day. He has never used smokeless tobacco. He reports that he does not drink alcohol and does not use drugs.    Allergies  No Known Allergies    Medications  None       Physical Exam  Temp:  [36.7 °C (98 °F)] 36.7 °C (98 °F)  Pulse:  [] 103  Resp:  [19-25] 23  BP: (165-203)/() 190/89  SpO2:  [96 %-99 %] 98 %  Blood Pressure: (!) 166/78   Temperature: 36.7 °C (98 °F)   Pulse: (!) 102   Respiration: (!) 24   Pulse Oximetry: 97 %       Physical Exam  Vitals reviewed.   Constitutional:       Appearance: Normal appearance. He is not diaphoretic.      Comments: Disheveled   HENT:      Head: Normocephalic and atraumatic.      Nose: Nose normal.      Mouth/Throat:      Mouth: Mucous membranes are moist.      Pharynx: No oropharyngeal exudate.   Eyes:      General: No scleral icterus.        Right eye: No discharge.         Left eye: No discharge.      Extraocular Movements: Extraocular movements intact.      Conjunctiva/sclera: Conjunctivae normal.   Cardiovascular:      Rate and Rhythm: Normal rate and regular rhythm.      Pulses:           Radial pulses are 2+ on the right side and 2+ on the left side.        Dorsalis pedis pulses are 2+ on the right side and 2+ on the left side.      Heart sounds: No murmur heard.     Comments: Left upper arm positive thrill in AV  fistula.  Pulmonary:      Effort: Pulmonary effort is normal. No respiratory distress.      Breath sounds: Decreased air movement present. Rhonchi present. No wheezing or rales.   Abdominal:      General: Bowel sounds are normal. There is no distension.      Palpations: Abdomen is soft.      Tenderness: There is no abdominal tenderness.   Musculoskeletal:         General: No swelling or tenderness.      Cervical back: No tenderness. No muscular tenderness.      Right lower leg: No edema.      Left lower leg: No edema.   Lymphadenopathy:      Cervical: No cervical adenopathy.   Skin:     Coloration: Skin is not jaundiced or pale.   Neurological:      General: No focal deficit present.      Mental Status: He is alert and oriented to person, place, and time. Mental status is at baseline.      Cranial Nerves: No cranial nerve deficit.   Psychiatric:         Mood and Affect: Mood normal.         Behavior: Behavior normal.       Laboratory:  Recent Labs     11/15/22  0715   WBC 7.0   RBC 3.42*   HEMOGLOBIN 10.6*   HEMATOCRIT 32.7*   MCV 95.6   MCH 31.0   MCHC 32.4*   RDW 54.3*   PLATELETCT 178   MPV 10.1     Recent Labs     11/15/22  0715   SODIUM 133*   POTASSIUM 5.8*   CHLORIDE 100   CO2 11*   GLUCOSE 129*   *   CREATININE 20.47*   CALCIUM 7.7*     Recent Labs     11/15/22  0715   ALTSGPT 6   ASTSGOT <5*   ALKPHOSPHAT 134*   TBILIRUBIN 0.3   GLUCOSE 129*         Recent Labs     11/15/22  0715   NTPROBNP >56527*         Recent Labs     11/15/22  0715   TROPONINT 83*       Imaging:  DX-CHEST-PORTABLE (1 VIEW)   Final Result      Near resolution of consolidation      Unchanged bilateral scarring          X-Ray:  My impression is: Volume overload with pulmonary congestion    Assessment/Plan:  Justification for Admission Status  I anticipate this patient will require at least two midnights for appropriate medical management, necessitating inpatient admission because immune compromised state and need for multiple  sessions of dialysis.    Patient will need a Telemetry bed on MEDICAL service .  The need is secondary to hyperkalemia.    * Acute respiratory failure with hypoxia (HCC)- (present on admission)  Assessment & Plan  Multifactorial patient is a smoker he has AIDS and so the differential is broad and at this point time.  I am checking in ruling out COVID/flu/PCP.  I am initiating him on Bactrim prophylaxis.  I am also checking a CD4 count on him.  Acutely will have respiratory per protocol with supplemental oxygen.  I have requested him to stop smoking.  I feel at this point time with his BNP being greater than 35,000 and chest x-ray showing pulmonary congestion I am concerned of volume overload.  I feel dialysis will be important to his respiratory recovery.  We will require likely multiple sessions of dialysis to work on volume removal.  Nephrology has already been consulted and will look toward sending him to dialysis.    Sepsis associated with AIDS (HCC)  Assessment & Plan  This is Sepsis Present on admission  SIRS criteria identified on my evaluation include: Tachycardia, with heart rate greater than 90 BPM and Tachypnea, with respirations greater than 20 per minute  Source is pulmonary  Sepsis protocol initiated  Fluid resuscitation ordered per protocol  Crystalloid Fluid Administration: Patient has advanced or end-stage chronic renal disease (with documentation of stage IV or GFR 15-29 mL/min, or stage V or GFR < 15 mL/min or ESRD), for this/these reason(s) it is unsafe for this patient to receive 30 mL/kg of fluid, patient to be reassessed shortly after completion of partial fluid bolus to ensure adequacy of resuscitation  IV antibiotics as appropriate for source of sepsis  Reassessment: I have reassessed the patient's hemodynamic status  12/2020 CD4:36  Recheck CD4 count  Prophylaxis with bactrim and azithromycin for now.  Add ceftriaxone for now  Checking COVID/FLU/RSV.          Hyperkalemia  Assessment &  Plan  Nephrology consulting.  EKG reviewed  MOnitor on telemetry until repeat potassium within normal levels.    AIDS (acquired immunodeficiency syndrome), CD4 <=200 ([facility])  Assessment & Plan  12/2020 CD4:36  Check new CD4 count  Place on bactrim.  May need azithromycin.  CXR reviewed  Check LDH  Lactic acid okay  Checking [facility] to see if he has been on his medications and refills.    ESRD (end stage renal disease) on dialysis ([facility])- (present on admission)  Assessment & Plan  Nephrology Dr. Bailey has been consulted in the emergency room.  Expect emergent dialysis based on his potassium.  Monitor I's and O's, weights, labs, and vitals    Anemia due to chronic kidney disease- (present on admission)  Assessment & Plan  That of chronic disease from renal failure as well as chronic AIDS/HIV infection.  EPO per nephrology  Monitor CBCs    Nicotine dependence- (present on admission)  Assessment & Plan  Smoking cessation encouraged and recommended.  Nicotine patch offered  4 minutes spent on smoking cessation encouragement and education.    HFrEF (heart failure with reduced ejection fraction) ([facility])- (present on admission)  Assessment & Plan  Last echo showed EF 40%    Abnormal echocardiogram: LVEF 45% in 2015- (present on admission)  Assessment & Plan  Prior echocardiogram showing EF of 40%  Continue dialysis to help diurese.  If emergent dialysis not available will give Lasix.    Essential hypertension, benign- (present on admission)  Assessment & Plan  We will wait list of home medications we will initiate amlodipine 10 mg oral with parameter.  Hold on ACE or ARB or Aldactone based on his potassium  Monitor vitals    Elevated troponin- (present on admission)  Assessment & Plan  Likely secondary to hypoxia and demand ischemia as in face of heart failure  Supplemental oxygen and follow-up on serial troponin to verify no mass upswing in level    Hyponatremia- (present on admission)  Assessment & Plan  Monitor  labs  Concern of volume overload.      VTE prophylaxis: heparin ppx

## 2022-11-15 NOTE — ED NOTES
Med Rec completed per patient   Allergies reviewed  No ORAL antibiotics in last 30 days    Patient states that he is not currently taking any daily medications

## 2022-11-15 NOTE — ASSESSMENT & PLAN NOTE
Likely secondary to hypoxia and demand ischemia as in face of heart failure  Supplemental oxygen and follow-up on serial troponin to verify no mass upswing in level

## 2022-11-15 NOTE — ASSESSMENT & PLAN NOTE
That of chronic disease from renal failure as well as chronic AIDS/HIV infection.  EPO per nephrology  Monitor CBCs

## 2022-11-15 NOTE — ASSESSMENT & PLAN NOTE
Nephrology Dr. Bailey has been consulted in the emergency room.  Expect emergent dialysis based on his potassium.  Monitor I's and O's, weights, labs, and vitals

## 2022-11-15 NOTE — DISCHARGE PLANNING
Outpatient Dialysis Note     Confirmed patient is active at:    [facility]  [address]  [place] [address]       Schedule: Tues, Thurs, Sat   Time: 2:45 PM     Patient is seen by Dr. Najjar in HD clinic.     Spoke with Nancie at facility who confirmed patient information.   Forwarded records for review.     Xitlaly Reyes   Dialysis Coordinator / [facility]  Ph: [phone]

## 2022-11-15 NOTE — ED NOTES
Assumed care of pt. PIV established, blood drawn and sent to lab. Lab notified of need for blood draw.  O2 titrated to 10L oxy mask.    Pt medicated per MAR.  Lab at bedside for blood culture #2.

## 2022-11-15 NOTE — ED PROVIDER NOTES
ED Provider Note    Scribed for Christelle Pollock M.D. by Italia Salcedo. 11/15/2022, 7:08 AM.    Primary care provider: KERRI Corbett  Means of arrival: EMS  History obtained from: Patient  History limited by: None    CHIEF COMPLAINT  Chief Complaint   Patient presents with    Shortness of Breath     X7 hours    Respiratory Distress       HPI  Seamus Palencia is a 57 y.o. male who presents to the Emergency Department via EMS for shortness of breath onset 7 hours ago. He has an associated symptom of chest pain. EMS reports patient was found to be 75% on room air, and has since improved to 96% on a 15L non-rebreather and following administration of Nitro and an albuterol treatment. Patient does have a history of renal disease and is supposed to dialyze at [facility] 3 times a week on Tuesdays, Wednesdays, and Saturdays, however has not gone since 11/8 because he recently lost his house. His Nephrologist is Dr. Ray. He is currently living in his car. Denies cough, dizziness, fluid retention, fever, sweats, or chills. He is a smoker.  He has a history of HIV reports taking his medications.    REVIEW OF SYSTEMS  Pertinent positives include shortness of breath and chest pain. Pertinent negatives include no cough, dizziness, fluid retention, fever, sweats, or chills. All other systems reviewed and negative.     PAST MEDICAL HISTORY   has a past medical history of Heart failure (HCC), HIV disease ([facility]) (01/01/1995), Hypertension, Renal disorder, Seizure (HCC), and Stroke ([facility]).    SURGICAL HISTORY   has a past surgical history that includes cholecystectomy (1980s) and other (Left).    SOCIAL HISTORY  Social History     Tobacco Use    Smoking status: Every Day     Packs/day: 0.25     Types: Cigarettes    Smokeless tobacco: Never    Tobacco comments:     3-4 CIGARETTES A DAY   Vaping Use    Vaping Use: Never used   Substance Use Topics    Alcohol use: No    Drug use: No      Social History     Substance  "and Sexual Activity   Drug Use No       FAMILY HISTORY  Family History   Problem Relation Age of Onset    Diabetes Mother         cause of death    Diabetes Father         cause of death    Diabetes Sister     Other Brother         down syndrome    No Known Problems Sister     No Known Problems Sister     No Known Problems Sister     Other Daughter         5 daughters, 11 sons     Clotting Disorder Neg Hx     Stroke Neg Hx        CURRENT MEDICATIONS  Current Outpatient Medications   Medication Instructions    carvedilol (COREG) 25 mg, Oral, 2 TIMES DAILY WITH MEALS    dapsone 100 mg, Oral, DAILY    sevelamer carbonate (RENVELA) 800 mg, Oral, 3 TIMES DAILY WITH MEALS, 2 tablet with snacks       ALLERGIES  No Known Allergies    PHYSICAL EXAM  VITAL SIGNS: BP (!) 203/122   Pulse (!) 106   Temp 36.7 °C (98 °F) (Temporal)   Resp (!) 24   Ht 1.626 m (5' 4\")   Wt 68 kg (150 lb)   SpO2 96%   BMI 25.75 kg/m²     Constitutional:  Well developed, Moderate acute distress, Non-toxic appearance.   HENT: Normocephalic, Atraumatic, Bilateral external ears normal,  Nose normal.   Eyes: PERRL, EOMI, Conjunctiva normal.    Neck: Normal range of motion, No tenderness, Supple. No stridor.    Cardiovascular: Tachycardic, Normal rhythm.    Thorax & Lungs: Tachypneic. Mild retractions. Rales.   Abdomen: Benign abdominal exam, no tenderness, no distention, no guarding, no rebound.    Skin: Warm, Dry, No erythema, No rash.   Back: No tenderness, No CVA tenderness.   Extremities: Intact distal pulses, No tenderness. Minimal bilateral leg swelling. Fistula in left upper arm.   Neurologic: Alert & oriented x 3, Normal motor function, Normal sensory function, No focal deficits noted.   Psychiatric: Appropriate                                                     DIAGNOSTIC STUDIES / PROCEDURES\    LABS  Results for orders placed or performed during the hospital encounter of 11/15/22   CBC with Differential   Result Value Ref Range    WBC " 7.0 4.8 - 10.8 K/uL    RBC 3.42 (L) 4.70 - 6.10 M/uL    Hemoglobin 10.6 (L) 14.0 - 18.0 g/dL    Hematocrit 32.7 (L) 42.0 - 52.0 %    MCV 95.6 81.4 - 97.8 fL    MCH 31.0 27.0 - 33.0 pg    MCHC 32.4 (L) 33.7 - 35.3 g/dL    RDW 54.3 (H) 35.9 - 50.0 fL    Platelet Count 178 164 - 446 K/uL    MPV 10.1 9.0 - 12.9 fL    Neutrophils-Polys 87.00 (H) 44.00 - 72.00 %    Lymphocytes 6.00 (L) 22.00 - 41.00 %    Monocytes 4.70 0.00 - 13.40 %    Eosinophils 1.30 0.00 - 6.90 %    Basophils 0.30 0.00 - 1.80 %    Immature Granulocytes 0.70 0.00 - 0.90 %    Nucleated RBC 0.00 /100 WBC    Neutrophils (Absolute) 6.10 1.82 - 7.42 K/uL    Lymphs (Absolute) 0.42 (L) 1.00 - 4.80 K/uL    Monos (Absolute) 0.33 0.00 - 0.85 K/uL    Eos (Absolute) 0.09 0.00 - 0.51 K/uL    Baso (Absolute) 0.02 0.00 - 0.12 K/uL    Immature Granulocytes (abs) 0.05 0.00 - 0.11 K/uL    NRBC (Absolute) 0.00 K/uL   Comp Metabolic Panel   Result Value Ref Range    Sodium 133 (L) 135 - 145 mmol/L    Potassium 5.8 (H) 3.6 - 5.5 mmol/L    Chloride 100 96 - 112 mmol/L    Co2 11 (L) 20 - 33 mmol/L    Anion Gap 22.0 (H) 7.0 - 16.0    Glucose 129 (H) 65 - 99 mg/dL    Bun 117 (H) 8 - 22 mg/dL    Creatinine 20.47 (HH) 0.50 - 1.40 mg/dL    Calcium 7.7 (L) 8.5 - 10.5 mg/dL    AST(SGOT) <5 (L) 12 - 45 U/L    ALT(SGPT) 6 2 - 50 U/L    Alkaline Phosphatase 134 (H) 30 - 99 U/L    Total Bilirubin 0.3 0.1 - 1.5 mg/dL    Albumin 3.9 3.2 - 4.9 g/dL    Total Protein 8.1 6.0 - 8.2 g/dL    Globulin 4.2 (H) 1.9 - 3.5 g/dL    A-G Ratio 0.9 g/dL   Troponins NOW   Result Value Ref Range    Troponin T 83 (H) 6 - 19 ng/L   Troponins in two (2) hours   Result Value Ref Range    Troponin T 109 (H) 6 - 19 ng/L   Lactic acid (lactate)   Result Value Ref Range    Lactic Acid 1.2 0.5 - 2.0 mmol/L   proBrain Natriuretic Peptide, NT   Result Value Ref Range    NT-proBNP >26667 (H) 0 - 125 pg/mL   ESTIMATED GFR   Result Value Ref Range    GFR (CKD-EPI) 2 (A) >60 mL/min/1.73 m 2   CoV-2, FLU A/B, and RSV by  PCR (2-4 Hours CEPHEID) : Collect NP swab in VTM    Specimen: Respirate   Result Value Ref Range    Influenza virus A RNA Negative Negative    Influenza virus B, PCR Negative Negative    RSV, PCR Negative Negative    SARS-CoV-2 by PCR NotDetected     SARS-CoV-2 Source NP Swab    LDH   Result Value Ref Range    LDH Total 231 107 - 266 U/L   D-DIMER   Result Value Ref Range    D-Dimer Screen 2.80 (H) 0.00 - 0.50 ug/mL (FEU)   TROPONIN   Result Value Ref Range    Troponin T 122 (H) 6 - 19 ng/L   Prothrombin Time   Result Value Ref Range    PT 14.5 12.0 - 14.6 sec    INR 1.15 (H) 0.87 - 1.13   EKG   Result Value Ref Range    Report       [facility] Emergency Dept[facility]    Test Date:  2022-11-15  Pt Name:    SONI ABBASI                  Department: ER  MRN:        [id number]                      Room:       [facility]  Gender:     Male                         Technician: [id number]  :        1965                   Requested By:ER TRIAGE PROTOCOL  Order #:    [id number]                    Reading MD: Christelle Spring MD    Measurements  Intervals                                Axis  Rate:       105                          P:          78  NJ:         165                          QRS:        51  QRSD:       87                           T:          117  QT:         352  QTc:        466    Interpretive Statements  Sinus tachycardia  Abnormal T, consider ischemia, lateral leads  Compared to ECG 2022 19:03:17  Sinus rhythm no longer present  T-wave abnormality still present  Possible ischemia still present  Electronically Signed On 11- 13:23:33 PST by Christelle Spring MD       All labs reviewed by me.    EKG  12 lead EKG interpreted by me as noted above.    RADIOLOGY  DX-CHEST-PORTABLE (1 VIEW)   Final Result      Near resolution of consolidation      Unchanged bilateral scarring        The radiologist's interpretation of all radiological studies have been reviewed by me.    COURSE & MEDICAL DECISION  MAKING  Nursing notes, VS, PMSFHx reviewed in chart.     Patient presents to the emergency department with shortness of breath.  Found hypoxic and hypertensive.  Reportedly has missed a week of dialysis.  EKG done upon presentation showed a normal QRS however some of his T waves did appear mildly peaked and I was concerned about his potassium level.  Patient was complaining of some mild chest pain but mostly shortness of breath.  Patient does have HIV.  He does not report cough or recent fevers although infection is in the differential for shortness of breath.  With his missed dialysis clearly fluid overload could also explain his presentation today.  Plan is to check laboratory studies.  Patient was taken off the nonrebreather and is on OxiMax and is currently maintaining his oxygen saturations.  There is no evidence of acute respiratory failure.    7:08 AM Patient seen and examined at bedside.  Ordered for EKG, DX-Chest, trending Troponins, Lactic acid, Urinalysis, urine cultures, blood cultures, proBrain Natriuretic peptide, estimated GFR, CBC w/ differential, and CMP to evaluate. Patient was treated with Hydralazine 20mg for his symptoms.    8:39 AM - Review of patient's lab results reveal a normal white count.  Mild anemia with a hemoglobin of 10.6.  Slight left shift.  A potassium of 5.8.  CO2 of 11.  Glucose of 129.  An elevated creatinine.  No significant LFT abnormalities.  A elevated troponin of 83.  BNP greater than 35,000. Paged Hospitalist and Nephrology.  Patient's troponin is elevated and I think this needs to be trended especially in light of his complaint of chest pain when he arrived. EKG showed some nonspecific changes but no evidence of an acute MI.    8:43 AM - I discussed the patient's case and the above findings with Dr. Dominguez (Hospitalist) who agrees to evaluate the patient for admission.  At this point without EKG changes I have not aggressively treated his hyperkalemia.  I think his best  option is to get dialyzed.  I have also added a COVID test to further evaluate the cause of his hypoxia.  Patient be admitted to telemetry due to his hyperkalemia.    8:49 AM - I reevaluated the patient at bedside and updated him on his results, as outlined above. He still feels short of breath, and is currently 97% on 10L oxygen mask. I discussed the plan for admission, as outlined below, and gave the patient the opportunity to ask questions. He understands and agrees to the plan for admission.     8:52 AM - I discussed the patient's case and the above findings with Dr. Bailey (Nephrologist) who agrees to consult.     CRITICAL CARE  The very real possibilty of a deterioration of this patient's condition required the highest level of my preparedness for sudden, emergent intervention.  I provided critical care services, which included medication orders, frequent reevaluations of the patient's condition and response to treatment, ordering and reviewing test results, and discussing the case with various consultants.  The critical care time associated with the care of the patient was 35 minutes. Review chart for interventions. This time is exclusive of any other billable procedures.     DISPOSITION:  Patient will be hospitalized by Dr. Dominguez in critical condition.       FINAL IMPRESSION  1. Hypoxia    2. Hypertension with fluid overload    3. ESRD (end stage renal disease) on dialysis (HCC)       The critical care time associated with the care of the patient was 35 minutes.        Italia HULL (Scribestrada), am scribing for, and in the presence of, Christelle Pollock M.D..    Electronically signed by: Italia Salcedo (Joseibe), 11/15/2022    Christelle HULL M.D. personally performed the services described in this documentation, as scribed by Italia Salcedo in my presence, and it is both accurate and complete.    The note accurately reflects work and decisions made by me.  Christelle Pollock M.D.  11/15/2022   1:30 PM

## 2022-11-16 ENCOUNTER — APPOINTMENT (OUTPATIENT)
Dept: RADIOLOGY | Facility: MEDICAL CENTER | Age: 57
DRG: 974 | End: 2022-11-16
Attending: HOSPITALIST
Payer: MEDICAID

## 2022-11-16 LAB
ANION GAP SERPL CALC-SCNC: 17 MMOL/L (ref 7–16)
B PARAP IS1001 DNA NPH QL NAA+NON-PROBE: NOT DETECTED
B PERT.PT PRMT NPH QL NAA+NON-PROBE: NOT DETECTED
BUN SERPL-MCNC: 53 MG/DL (ref 8–22)
C PNEUM DNA NPH QL NAA+NON-PROBE: NOT DETECTED
CALCIUM SERPL-MCNC: 7.8 MG/DL (ref 8.5–10.5)
CHLORIDE SERPL-SCNC: 96 MMOL/L (ref 96–112)
CO2 SERPL-SCNC: 22 MMOL/L (ref 20–33)
CREAT SERPL-MCNC: 12.25 MG/DL (ref 0.5–1.4)
ERYTHROCYTE [DISTWIDTH] IN BLOOD BY AUTOMATED COUNT: 53.6 FL (ref 35.9–50)
FLUAV RNA NPH QL NAA+NON-PROBE: NOT DETECTED
FLUBV RNA NPH QL NAA+NON-PROBE: NOT DETECTED
GFR SERPLBLD CREATININE-BSD FMLA CKD-EPI: 4 ML/MIN/1.73 M 2
GLUCOSE SERPL-MCNC: 85 MG/DL (ref 65–99)
HADV DNA NPH QL NAA+NON-PROBE: NOT DETECTED
HCOV 229E RNA NPH QL NAA+NON-PROBE: NOT DETECTED
HCOV HKU1 RNA NPH QL NAA+NON-PROBE: NOT DETECTED
HCOV NL63 RNA NPH QL NAA+NON-PROBE: NOT DETECTED
HCOV OC43 RNA NPH QL NAA+NON-PROBE: NOT DETECTED
HCT VFR BLD AUTO: 31.5 % (ref 42–52)
HGB BLD-MCNC: 10.7 G/DL (ref 14–18)
HMPV RNA NPH QL NAA+NON-PROBE: NOT DETECTED
HPIV1 RNA NPH QL NAA+NON-PROBE: NOT DETECTED
HPIV2 RNA NPH QL NAA+NON-PROBE: NOT DETECTED
HPIV3 RNA NPH QL NAA+NON-PROBE: NOT DETECTED
HPIV4 RNA NPH QL NAA+NON-PROBE: NOT DETECTED
M PNEUMO DNA NPH QL NAA+NON-PROBE: NOT DETECTED
MCH RBC QN AUTO: 31.5 PG (ref 27–33)
MCHC RBC AUTO-ENTMCNC: 34 G/DL (ref 33.7–35.3)
MCV RBC AUTO: 92.6 FL (ref 81.4–97.8)
PLATELET # BLD AUTO: 181 K/UL (ref 164–446)
PMV BLD AUTO: 10.4 FL (ref 9–12.9)
POTASSIUM SERPL-SCNC: 4 MMOL/L (ref 3.6–5.5)
RBC # BLD AUTO: 3.4 M/UL (ref 4.7–6.1)
RSV RNA NPH QL NAA+NON-PROBE: NOT DETECTED
RV+EV RNA NPH QL NAA+NON-PROBE: DETECTED
SARS-COV-2 RNA NPH QL NAA+NON-PROBE: NOTDETECTED
SODIUM SERPL-SCNC: 135 MMOL/L (ref 135–145)
WBC # BLD AUTO: 4.3 K/UL (ref 4.8–10.8)

## 2022-11-16 PROCEDURE — 86360 T CELL ABSOLUTE COUNT/RATIO: CPT

## 2022-11-16 PROCEDURE — 36415 COLL VENOUS BLD VENIPUNCTURE: CPT

## 2022-11-16 PROCEDURE — 86359 T CELLS TOTAL COUNT: CPT

## 2022-11-16 PROCEDURE — 90935 HEMODIALYSIS ONE EVALUATION: CPT | Performed by: INTERNAL MEDICINE

## 2022-11-16 PROCEDURE — 85027 COMPLETE CBC AUTOMATED: CPT

## 2022-11-16 PROCEDURE — 99232 SBSQ HOSP IP/OBS MODERATE 35: CPT | Performed by: INTERNAL MEDICINE

## 2022-11-16 PROCEDURE — 71275 CT ANGIOGRAPHY CHEST: CPT

## 2022-11-16 PROCEDURE — 700111 HCHG RX REV CODE 636 W/ 250 OVERRIDE (IP): Performed by: HOSPITALIST

## 2022-11-16 PROCEDURE — 770020 HCHG ROOM/CARE - TELE (206)

## 2022-11-16 PROCEDURE — 80048 BASIC METABOLIC PNL TOTAL CA: CPT

## 2022-11-16 PROCEDURE — 5A1D70Z PERFORMANCE OF URINARY FILTRATION, INTERMITTENT, LESS THAN 6 HOURS PER DAY: ICD-10-PCS | Performed by: INTERNAL MEDICINE

## 2022-11-16 PROCEDURE — 700102 HCHG RX REV CODE 250 W/ 637 OVERRIDE(OP): Performed by: HOSPITALIST

## 2022-11-16 PROCEDURE — 90935 HEMODIALYSIS ONE EVALUATION: CPT

## 2022-11-16 PROCEDURE — A9270 NON-COVERED ITEM OR SERVICE: HCPCS | Performed by: HOSPITALIST

## 2022-11-16 PROCEDURE — 700111 HCHG RX REV CODE 636 W/ 250 OVERRIDE (IP)

## 2022-11-16 RX ORDER — HEPARIN SODIUM 1000 [USP'U]/ML
INJECTION, SOLUTION INTRAVENOUS; SUBCUTANEOUS
Status: COMPLETED
Start: 2022-11-16 | End: 2022-11-16

## 2022-11-16 RX ADMIN — SENNOSIDES AND DOCUSATE SODIUM 2 TABLET: 50; 8.6 TABLET ORAL at 17:10

## 2022-11-16 RX ADMIN — METOPROLOL TARTRATE 25 MG: 25 TABLET, FILM COATED ORAL at 17:10

## 2022-11-16 RX ADMIN — HEPARIN SODIUM 1800 UNITS: 1000 INJECTION, SOLUTION INTRAVENOUS; SUBCUTANEOUS at 09:39

## 2022-11-16 RX ADMIN — DOLUTEGRAVIR SODIUM 50 MG: 50 TABLET, FILM COATED ORAL at 05:33

## 2022-11-16 RX ADMIN — HEPARIN SODIUM 5000 UNITS: 5000 INJECTION, SOLUTION INTRAVENOUS; SUBCUTANEOUS at 21:09

## 2022-11-16 RX ADMIN — RILPIVIRINE HYDROCHLORIDE 25 MG: 25 TABLET, FILM COATED ORAL at 05:32

## 2022-11-16 RX ADMIN — SENNOSIDES AND DOCUSATE SODIUM 2 TABLET: 50; 8.6 TABLET ORAL at 05:32

## 2022-11-16 RX ADMIN — HEPARIN SODIUM 5000 UNITS: 5000 INJECTION, SOLUTION INTRAVENOUS; SUBCUTANEOUS at 13:29

## 2022-11-16 RX ADMIN — ASPIRIN 81 MG 81 MG: 81 TABLET ORAL at 05:32

## 2022-11-16 RX ADMIN — SULFAMETHOXAZOLE AND TRIMETHOPRIM 1 TABLET: 400; 80 TABLET ORAL at 05:33

## 2022-11-16 RX ADMIN — ACETAMINOPHEN 650 MG: 325 TABLET, FILM COATED ORAL at 17:10

## 2022-11-16 RX ADMIN — AMLODIPINE BESYLATE 10 MG: 10 TABLET ORAL at 05:32

## 2022-11-16 RX ADMIN — METOPROLOL TARTRATE 25 MG: 25 TABLET, FILM COATED ORAL at 05:32

## 2022-11-16 RX ADMIN — HEPARIN SODIUM 5000 UNITS: 5000 INJECTION, SOLUTION INTRAVENOUS; SUBCUTANEOUS at 05:32

## 2022-11-16 ASSESSMENT — COGNITIVE AND FUNCTIONAL STATUS - GENERAL
STANDING UP FROM CHAIR USING ARMS: A LITTLE
MOVING FROM LYING ON BACK TO SITTING ON SIDE OF FLAT BED: A LITTLE
SUGGESTED CMS G CODE MODIFIER DAILY ACTIVITY: CH
SUGGESTED CMS G CODE MODIFIER MOBILITY: CJ
MOVING FROM LYING ON BACK TO SITTING ON SIDE OF FLAT BED: A LITTLE
DAILY ACTIVITIY SCORE: 24
WALKING IN HOSPITAL ROOM: A LITTLE
DAILY ACTIVITIY SCORE: 24
MOBILITY SCORE: 21
WALKING IN HOSPITAL ROOM: A LITTLE
CLIMB 3 TO 5 STEPS WITH RAILING: A LITTLE
SUGGESTED CMS G CODE MODIFIER DAILY ACTIVITY: CH

## 2022-11-16 ASSESSMENT — ENCOUNTER SYMPTOMS
COUGH: 0
BACK PAIN: 0
SPEECH CHANGE: 0
EYE DISCHARGE: 0
VOMITING: 0
ORTHOPNEA: 0
ABDOMINAL PAIN: 0
CHILLS: 0
SENSORY CHANGE: 0
DIARRHEA: 0
NERVOUS/ANXIOUS: 0
NAUSEA: 0
HEADACHES: 0
STRIDOR: 0
SHORTNESS OF BREATH: 0
FEVER: 0
DIZZINESS: 0
PALPITATIONS: 0

## 2022-11-16 ASSESSMENT — PAIN DESCRIPTION - PAIN TYPE
TYPE: ACUTE PAIN
TYPE: ACUTE PAIN

## 2022-11-16 ASSESSMENT — PATIENT HEALTH QUESTIONNAIRE - PHQ9
2. FEELING DOWN, DEPRESSED, IRRITABLE, OR HOPELESS: NOT AT ALL
1. LITTLE INTEREST OR PLEASURE IN DOING THINGS: NOT AT ALL
SUM OF ALL RESPONSES TO PHQ9 QUESTIONS 1 AND 2: 0

## 2022-11-16 ASSESSMENT — FIBROSIS 4 INDEX: FIB4 SCORE: 0.59

## 2022-11-16 NOTE — CARE PLAN
The patient is Stable - Low risk of patient condition declining or worsening    Shift Goals  Clinical Goals: wean O2  Patient Goals: sleep  Family Goals: LEX    Progress made toward(s) clinical / shift goals:  O2 weaned during shift, VSS, CT pending    Patient is not progressing towards the following goals:

## 2022-11-16 NOTE — PROCEDURES
Nephrology/Hemodialysis note    Patient with ESRD/HD admitted with SOB, missed dialysis treatment  Seen and examined during dialysis treatment.  Tolerates well  VS stable  Lab results reviewed  UF 2-3 L  Please see dialysis flow sheet for details

## 2022-11-16 NOTE — PROGRESS NOTES
[facility] Services Progress Note      HD today x 3.5 hours per Dr. Bailey.   Initiated at 0936 and ended at 1305.     Assessment: pt stable, denies SOB, chest pain. VSS. On tele monitor.    Access used: LISA AVF with bruit and thrill pre dialysis  Needle gauge used: 15g    Pre HD vitals  BP: 119/66 mmHg    HR: 74 bpm    RR: 17 bpm   Temp: 97.5F    Net Fluid Removed: 2,000 mL    Procedure Events/ Complications:   Patient had low BP on the last 30 minutes on treatment. Denies pain, no SOB. Asymptomatic. VSS post dialysis.     Post HD vitals:  BP: 128/67 mmHg    HR: 62 bpm    RR: 17 bpm    Temp: 97.4F    Post Access Care:   Blood returned. Gauze applied and held for  *7minutes.   Bruit and thrill present   Gauze and taped secured.      Report given to Primary Donte CONDON RN.

## 2022-11-16 NOTE — PROGRESS NOTES
Bedside shift report received from night RN. Patient asleep at the moment, appears comfortable and in no acute distress Whiteboard updated for shift. Call light within reach. Bed locked and in lowest position. Fall precautions remain in place. Bed alarm on. HD RN states patient will be going to HD shortly this AM.

## 2022-11-16 NOTE — DIETARY
"Nutrition services: Day 1 of admit.  Seamus Palencia is a 57 y.o. male with admitting DX of Acute respiratory failure with hypoxia   Consult received for high risk dx (AIDS)      Assessment:  Height: 162.6 cm (5' 4\")  Weight: 68 kg (150 lb)  Body mass index is 25.75 kg/m²., BMI classification: Overweight  Diet/Intake: Regular; No % documented.     Evaluation:   Nurse via voalte reports pt did not have a chance to eat breakfast prior to dialysis this morning.   No wt hx via chart review, no MST documented.   Isolation cart was being set up outside of room prior to assessment, NFPE and hx unable to be completed.   Pt w/ hx of HIV/AIDS, HTN, HFrEF:40%, active smoker, and ESRD.   Labs and meds reviewed.     Malnutrition Risk: No criteria met at this time, monitor intake.     Recommendations/Plan:  Encourage intake of >50%  Document intake of all PO as % taken in ADL's to provide interdisciplinary communication across all shifts.   Monitor weight.  Nutrition rep will continue to see patient for ongoing meal and snack preferences.       RD Following.   "

## 2022-11-16 NOTE — PROGRESS NOTES
Patient up to unit from dialysis. Patient is A&Ox4, temp of 101.3, tylenol given. Tele monitor placed and verified by monitor room. All questions and concerns answered, call light in reach, will continue to monitor.

## 2022-11-16 NOTE — PROGRESS NOTES
4 Eyes Skin Assessment Completed by Gonzalo RN and NAMRATA Millan.    Head WDL  Ears WDL  Nose WDL  Mouth WDL  Neck WDL  Breast/Chest WDL- scattered old scabs  Shoulder Blades WDL  Spine WDL  (R) Arm/Elbow/Hand WDL  (L) Arm/Elbow/Hand WDL  Abdomen WDL  Groin WDL  Scrotum/Coccyx/Buttocks WDL  (R) Leg WDL  (L) Leg WDL  (R) Heel/Foot/Toe WDL  (L) Heel/Foot/Toe WDL          Devices In Places ECG, Tele Box, Blood Pressure Cuff, and Pulse Ox      Interventions In Place Pillows, Low Air Loss Mattress, and Pressure Redistribution Mattress    Possible Skin Injury No    Pictures Uploaded Into Epic N/A  Wound Consult Placed N/A  RN Wound Prevention Protocol Ordered No

## 2022-11-16 NOTE — PROGRESS NOTES
Hospital Medicine Daily Progress Note    Date of Service  11/16/2022    Chief Complaint  Seamus Palencia is a 57 y.o. male admitted 11/15/2022 with shortness of breath     Hospital Course  This is a  57 y.o. male with a history of HIV/AIDS (prior CD4 count 36 in Dec 2020), HTN, HFrEF:40%, active smoker, and ESRD who presented 11/15/2022 with acute shortness of breath.  He lives in his car and was found by EMS to have SpO2:75% on room air.  He has not had dialysis in over a week.  In the ER he was requiring 15L NRB to maintain SpO2:96%.    Patient admitted for further treatment and work up. Nephrology was also consulted    Interval Problem Update  Patient seen and examined, afebrile, still feeling SOB on 3-4 L of oxygen. Had dialysis yesterday, nephrology following appreciate rec.  Wean off O2 as tolerated     I have discussed this patient's plan of care and discharge plan at IDT rounds today with Case Management, Nursing, Nursing leadership, and other members of the IDT team.    Consultants/Specialty  nephrology    Code Status  Full Code    Disposition  Patient is not medically cleared for discharge.   Anticipate discharge to  TBD .  I have placed the appropriate orders for post-discharge needs.    Review of Systems  Review of Systems   Constitutional:  Negative for chills and fever.   HENT:  Negative for congestion.    Eyes:  Negative for discharge.   Respiratory:  Negative for cough, shortness of breath and stridor.    Cardiovascular:  Negative for chest pain, palpitations, orthopnea and leg swelling.   Gastrointestinal:  Negative for abdominal pain, diarrhea, nausea and vomiting.   Genitourinary:  Negative for dysuria.   Musculoskeletal:  Negative for back pain and joint pain.   Skin:  Negative for rash.   Neurological:  Negative for dizziness, sensory change, speech change and headaches.   Psychiatric/Behavioral:  The patient is not nervous/anxious.       Physical Exam  Temp:  [36.3 °C (97.4 °F)-38.2 °C (100.8  °F)] 38.2 °C (100.8 °F)  Pulse:  [74-99] 74  Resp:  [15-19] 16  BP: (115-161)/(56-86) 115/59  SpO2:  [95 %-99 %] 97 %    Physical Exam  Vitals reviewed.   Constitutional:       Appearance: Normal appearance. He is not diaphoretic.      Comments: Disheveled   HENT:      Head: Normocephalic and atraumatic.      Nose: Nose normal.      Mouth/Throat:      Mouth: Mucous membranes are moist.      Pharynx: No oropharyngeal exudate.   Eyes:      General: No scleral icterus.        Right eye: No discharge.         Left eye: No discharge.      Extraocular Movements: Extraocular movements intact.      Conjunctiva/sclera: Conjunctivae normal.   Cardiovascular:      Rate and Rhythm: Normal rate and regular rhythm.      Pulses:           Radial pulses are 2+ on the right side and 2+ on the left side.        Dorsalis pedis pulses are 2+ on the right side and 2+ on the left side.      Heart sounds: No murmur heard.     Comments: Left upper arm positive thrill in AV fistula.  Pulmonary:      Effort: Pulmonary effort is normal. No respiratory distress.      Breath sounds: Decreased air movement present. Rhonchi present. No wheezing or rales.   Abdominal:      General: Bowel sounds are normal. There is no distension.      Palpations: Abdomen is soft.      Tenderness: There is no abdominal tenderness. There is no guarding or rebound.   Musculoskeletal:         General: No swelling or tenderness.      Cervical back: No tenderness. No muscular tenderness.      Right lower leg: No edema.      Left lower leg: No edema.   Lymphadenopathy:      Cervical: No cervical adenopathy.   Skin:     Coloration: Skin is not jaundiced or pale.   Neurological:      General: No focal deficit present.      Mental Status: He is alert and oriented to person, place, and time. Mental status is at baseline.      Cranial Nerves: No cranial nerve deficit.   Psychiatric:         Mood and Affect: Mood normal.         Behavior: Behavior normal.        Fluids    Intake/Output Summary (Last 24 hours) at 11/16/2022 1154  Last data filed at 11/15/2022 1705  Gross per 24 hour   Intake 700 ml   Output 3200 ml   Net -2500 ml       Laboratory  Recent Labs     11/15/22  0715 11/16/22  0113   WBC 7.0 4.3*   RBC 3.42* 3.40*   HEMOGLOBIN 10.6* 10.7*   HEMATOCRIT 32.7* 31.5*   MCV 95.6 92.6   MCH 31.0 31.5   MCHC 32.4* 34.0   RDW 54.3* 53.6*   PLATELETCT 178 181   MPV 10.1 10.4     Recent Labs     11/15/22  0715 11/16/22  0113   SODIUM 133* 135   POTASSIUM 5.8* 4.0   CHLORIDE 100 96   CO2 11* 22   GLUCOSE 129* 85   * 53*   CREATININE 20.47* 12.25*   CALCIUM 7.7* 7.8*     Recent Labs     11/15/22  0715   INR 1.15*               Imaging  CT-CTA CHEST PULMONARY ARTERY W/ RECONS   Final Result      1.  No evidence of pulmonary embolus.      2.  Trace bilateral pleural effusions.      3.  Right lung scarring.            DX-CHEST-PORTABLE (1 VIEW)   Final Result      Near resolution of consolidation      Unchanged bilateral scarring           Assessment/Plan  * Acute respiratory failure with hypoxia (HCC)- (present on admission)  Assessment & Plan  Multifactorial patient is a smoker he has AIDS and so the differential is broad and at this point time.  I am checking in ruling out COVID/flu/PCP.  I am initiating him on Bactrim prophylaxis.  I am also checking a CD4 count on him.  Acutely will have respiratory per protocol with supplemental oxygen.  I have requested him to stop smoking.  I feel at this point time with his BNP being greater than 35,000 and chest x-ray showing pulmonary congestion I am concerned of volume overload.  I feel dialysis will be important to his respiratory recovery.  We will require likely multiple sessions of dialysis to work on volume removal.  Nephrology has already been consulted and will look toward sending him to dialysis.    Sepsis associated with AIDS (HCC)  Assessment & Plan  This is Sepsis Present on admission  SIRS criteria identified  on my evaluation include: Tachycardia, with heart rate greater than 90 BPM and Tachypnea, with respirations greater than 20 per minute  Source is pulmonary  Sepsis protocol initiated  Fluid resuscitation ordered per protocol  Crystalloid Fluid Administration: Patient has advanced or end-stage chronic renal disease (with documentation of stage IV or GFR 15-29 mL/min, or stage V or GFR < 15 mL/min or ESRD), for this/these reason(s) it is unsafe for this patient to receive 30 mL/kg of fluid, patient to be reassessed shortly after completion of partial fluid bolus to ensure adequacy of resuscitation  IV antibiotics as appropriate for source of sepsis  Reassessment: I have reassessed the patient's hemodynamic status  12/2020 CD4:36  Recheck CD4 count  Prophylaxis with bactrim and azithromycin for now.  Add zosyn for now   Checking COVID/FLU/RSV.          Hyperkalemia  Assessment & Plan  Nephrology consulting.  EKG reviewed  MOnitor on telemetry until repeat potassium within normal levels.    AIDS (acquired immunodeficiency syndrome), CD4 <=200 (HCC)  Assessment & Plan  12/2020 CD4:36  Check new CD4 count  Place on bactrim.  May need azithromycin.  CXR reviewed  Check LDH  Lactic acid okay  Checking [facility] to see if he has been on his medications and refills.    ESRD (end stage renal disease) on dialysis ([facility])- (present on admission)  Assessment & Plan  Nephrology Dr. Bailey has been consulted in the emergency room.  Expect emergent dialysis based on his potassium.  Monitor I's and O's, weights, labs, and vitals    Anemia due to chronic kidney disease- (present on admission)  Assessment & Plan  That of chronic disease from renal failure as well as chronic AIDS/HIV infection.  EPO per nephrology  Monitor CBCs    Nicotine dependence- (present on admission)  Assessment & Plan  Smoking cessation encouraged and recommended.  Nicotine patch offered  4 minutes spent on smoking cessation encouragement and  education.    HFrEF (heart failure with reduced ejection fraction) ([facility])- (present on admission)  Assessment & Plan  Last echo showed EF 40%    Abnormal echocardiogram: LVEF 45% in 2015- (present on admission)  Assessment & Plan  Prior echocardiogram showing EF of 40%  Continue dialysis to help diurese.  If emergent dialysis not available will give Lasix.    Essential hypertension, benign- (present on admission)  Assessment & Plan  We will wait list of home medications we will initiate amlodipine 10 mg oral with parameter.  Hold on ACE or ARB or Aldactone based on his potassium  Monitor vitals    Elevated troponin- (present on admission)  Assessment & Plan  Likely secondary to hypoxia and demand ischemia as in face of heart failure  Supplemental oxygen and follow-up on serial troponin to verify no mass upswing in level    Hyponatremia- (present on admission)  Assessment & Plan  Improved          VTE prophylaxis: heparin ppx    I have performed a physical exam and reviewed and updated ROS and Plan today (11/16/2022). In review of yesterday's note (11/15/2022), there are no changes except as documented above.

## 2022-11-16 NOTE — CONSULTS
DATE OF SERVICE:  11/15/2022     NEPHROLOGY CONSULTATION     REQUESTING PHYSICIAN:  Christelle Pollock MD     REASON FOR CONSULTATION:  To evaluate and provide dialysis for patient with   end-stage renal disease.     HISTORY OF PRESENT ILLNESS:  The patient is a 57-year-old male with end-stage   renal disease, on hemodialysis, presented to the Emergency Room with worsening   shortness of breath.  The patient missed dialysis treatments over the past   week, has lost recently his house and currently, he is homeless.  Currently,   he complaints of shortness of breath, which is better with oxygen.  No chest   pain, no fever or chills.  No nausea or vomiting.     REVIEW OF SYSTEMS:    GENERAL:  Positive for fatigue.  No fever or chills.  HEENT:  No nosebleeds, no sore throat.  NECK:  No pain, no stiffness.  EYES:  No double or blurry vision.  RESPIRATORY:  Positive for shortness of breath, no cough, no hemoptysis.  CARDIOVASCULAR:  Positive dyspnea on exertion.  No chest pain, no   palpitations.  GASTROINTESTINAL:  No nausea, vomiting, diarrhea.     All other systems reviewed, all negative.     PAST MEDICAL HISTORY:  End-stage renal disease, on hemodialysis; HIV positive,   heart failure, hypertension and stroke.     PAST SURGICAL HISTORY:  Cholecystectomy, AV fistula creation.     SOCIAL HISTORY:  Positive for tobacco, no alcohol or drug use.     FAMILY HISTORY:  Positive for diabetes mellitus type 2.     ALLERGIES:  No known drug allergies.     OUTPATIENT MEDICATIONS:  Reviewed.     PHYSICAL EXAMINATION:    VITAL SIGNS:  Blood pressure 203/122, pulse 106, temperature 36.7 Celsius.  GENERAL:  A well-developed, well-nourished male in mild acute respiratory   distress.    HEENT:  Normocephalic, atraumatic.  Pupils equal, round, reactive to light.    Extraocular movement intact.  Nares patent.  Oropharynx clear, moist mucosa,   no erythema or exudate.  NECK:  Supple.  No lymphadenopathy, no thyromegaly appreciated.  LUNGS:   Clear to auscultation.    LUNGS:  Tachypneic with bilateral rales; no rhonchi, no wheezes.  HEART:  Tachycardic, regular rhythm, no rub or gallop.  ABDOMEN:  Soft, nontender, nondistended.  Bowel sounds present.  No palpable   mass.  SKIN:  Warm, dry.  No erythema or rash.  EXTREMITIES:  Trace pedal edema, no cyanosis.  NEUROLOGIC:  Alert, oriented x3, no focal deficit.  Cranial nerves II-XII   grossly intact.     LABORATORY DATA:  Reviewed, revealed hemoglobin level 10.6.  Sodium 133,   potassium 5.8, CO2 of 11, creatinine 0.47.  Brain natriuretic peptide above   35,000.     ASSESSMENT AND PLAN:  The patient is a 57-year-old male with end-stage renal   disease, on hemodialysis, presented with worsening shortness of breath and   missed his dialysis treatments.  1.  End-stage renal disease.  We will continue dialysis on daily basis.  We   will dialyze emergently today.  2.  Electrolytes, hyperkalemia will be correcting with dialysis; mild   hyponatremia due to hypovolemia, should improve with dialysis and   ultrafiltration.  3.  Metabolic acidosis, will be correcting with dialysis.  4.  Anemia.  Hemoglobin level stable at goal.  5.  Hypertension.  Elevated blood pressure likely due to volume overload,   should improve with dialysis.     RECOMMENDATIONS:   Daily dialysis, daily monitoring of basic metabolic panel,   hemoglobin level, to provide renal diet, to adjust all medications to renal   doses.  We will follow the patient closely.     Thank you for the consult.        ______________________________  MD JIMMY FREEDMAN/CHARLENE/RACHEL    DD:  11/15/2022 11:31  DT:  11/15/2022 13:26    Job#:  [id number]

## 2022-11-17 LAB
ANION GAP SERPL CALC-SCNC: 13 MMOL/L (ref 7–16)
ANNOTATION COMMENT IMP: ABNORMAL
BUN SERPL-MCNC: 23 MG/DL (ref 8–22)
CALCIUM SERPL-MCNC: 8.2 MG/DL (ref 8.5–10.5)
CD3 CELLS # BLD: 536 CELLS/UL (ref 570–2400)
CD3+CD4+ CELLS # BLD: 75 CELLS/UL (ref 430–1800)
CD3+CD4+ CELLS/CD3+CD8+ CLL BLD: 0.17 RATIO (ref 0.8–3.9)
CD3+CD8+ CELLS # BLD: 449 CELLS/UL (ref 210–1200)
CHLORIDE SERPL-SCNC: 95 MMOL/L (ref 96–112)
CO2 SERPL-SCNC: 26 MMOL/L (ref 20–33)
CREAT SERPL-MCNC: 6.6 MG/DL (ref 0.5–1.4)
ERYTHROCYTE [DISTWIDTH] IN BLOOD BY AUTOMATED COUNT: 53.9 FL (ref 35.9–50)
GFR SERPLBLD CREATININE-BSD FMLA CKD-EPI: 9 ML/MIN/1.73 M 2
GLUCOSE SERPL-MCNC: 94 MG/DL (ref 65–99)
HCT VFR BLD AUTO: 33.2 % (ref 42–52)
HGB BLD-MCNC: 11.1 G/DL (ref 14–18)
MCH RBC QN AUTO: 31.3 PG (ref 27–33)
MCHC RBC AUTO-ENTMCNC: 33.4 G/DL (ref 33.7–35.3)
MCV RBC AUTO: 93.5 FL (ref 81.4–97.8)
PLATELET # BLD AUTO: 170 K/UL (ref 164–446)
PMV BLD AUTO: 10.1 FL (ref 9–12.9)
POTASSIUM SERPL-SCNC: 4.1 MMOL/L (ref 3.6–5.5)
RBC # BLD AUTO: 3.55 M/UL (ref 4.7–6.1)
SODIUM SERPL-SCNC: 134 MMOL/L (ref 135–145)
WBC # BLD AUTO: 3.5 K/UL (ref 4.8–10.8)

## 2022-11-17 PROCEDURE — 90935 HEMODIALYSIS ONE EVALUATION: CPT | Performed by: INTERNAL MEDICINE

## 2022-11-17 PROCEDURE — 80048 BASIC METABOLIC PNL TOTAL CA: CPT

## 2022-11-17 PROCEDURE — 90935 HEMODIALYSIS ONE EVALUATION: CPT

## 2022-11-17 PROCEDURE — 770020 HCHG ROOM/CARE - TELE (206)

## 2022-11-17 PROCEDURE — 85027 COMPLETE CBC AUTOMATED: CPT

## 2022-11-17 PROCEDURE — A9270 NON-COVERED ITEM OR SERVICE: HCPCS | Performed by: HOSPITALIST

## 2022-11-17 PROCEDURE — 99232 SBSQ HOSP IP/OBS MODERATE 35: CPT | Performed by: INTERNAL MEDICINE

## 2022-11-17 PROCEDURE — 700102 HCHG RX REV CODE 250 W/ 637 OVERRIDE(OP): Performed by: HOSPITALIST

## 2022-11-17 PROCEDURE — 700111 HCHG RX REV CODE 636 W/ 250 OVERRIDE (IP): Performed by: INTERNAL MEDICINE

## 2022-11-17 PROCEDURE — 700111 HCHG RX REV CODE 636 W/ 250 OVERRIDE (IP): Performed by: HOSPITALIST

## 2022-11-17 PROCEDURE — 36415 COLL VENOUS BLD VENIPUNCTURE: CPT

## 2022-11-17 RX ORDER — LISINOPRIL 5 MG/1
2.5 TABLET ORAL
Status: DISCONTINUED | OUTPATIENT
Start: 2022-11-17 | End: 2022-11-18 | Stop reason: HOSPADM

## 2022-11-17 RX ORDER — HEPARIN SODIUM 1000 [USP'U]/ML
3000 INJECTION, SOLUTION INTRAVENOUS; SUBCUTANEOUS
Status: DISCONTINUED | OUTPATIENT
Start: 2022-11-17 | End: 2022-11-17

## 2022-11-17 RX ORDER — HEPARIN SODIUM 1000 [USP'U]/ML
1800 INJECTION, SOLUTION INTRAVENOUS; SUBCUTANEOUS
Status: DISCONTINUED | OUTPATIENT
Start: 2022-11-17 | End: 2022-11-18 | Stop reason: HOSPADM

## 2022-11-17 RX ADMIN — SENNOSIDES AND DOCUSATE SODIUM 2 TABLET: 50; 8.6 TABLET ORAL at 06:41

## 2022-11-17 RX ADMIN — AMLODIPINE BESYLATE 10 MG: 10 TABLET ORAL at 06:41

## 2022-11-17 RX ADMIN — HEPARIN SODIUM 5000 UNITS: 5000 INJECTION, SOLUTION INTRAVENOUS; SUBCUTANEOUS at 06:41

## 2022-11-17 RX ADMIN — HEPARIN SODIUM 1800 UNITS: 1000 INJECTION, SOLUTION INTRAVENOUS; SUBCUTANEOUS at 09:44

## 2022-11-17 RX ADMIN — METOPROLOL TARTRATE 25 MG: 25 TABLET, FILM COATED ORAL at 06:41

## 2022-11-17 RX ADMIN — ASPIRIN 81 MG 81 MG: 81 TABLET ORAL at 06:41

## 2022-11-17 RX ADMIN — RILPIVIRINE HYDROCHLORIDE 25 MG: 25 TABLET, FILM COATED ORAL at 06:42

## 2022-11-17 RX ADMIN — HEPARIN SODIUM 5000 UNITS: 5000 INJECTION, SOLUTION INTRAVENOUS; SUBCUTANEOUS at 22:45

## 2022-11-17 RX ADMIN — DOLUTEGRAVIR SODIUM 50 MG: 50 TABLET, FILM COATED ORAL at 06:42

## 2022-11-17 ASSESSMENT — ENCOUNTER SYMPTOMS
DIZZINESS: 0
STRIDOR: 0
PALPITATIONS: 0
VOMITING: 0
ABDOMINAL PAIN: 0
NAUSEA: 0
SHORTNESS OF BREATH: 0
COUGH: 0
ORTHOPNEA: 0
SPEECH CHANGE: 0
CHILLS: 0
SENSORY CHANGE: 0
FEVER: 0
DIARRHEA: 0
BACK PAIN: 0
EYE DISCHARGE: 0
NERVOUS/ANXIOUS: 0
HEADACHES: 0

## 2022-11-17 ASSESSMENT — PAIN DESCRIPTION - PAIN TYPE: TYPE: ACUTE PAIN

## 2022-11-17 ASSESSMENT — FIBROSIS 4 INDEX: FIB4 SCORE: 0.63

## 2022-11-17 NOTE — DISCHARGE PLANNING
Care Transition Team Assessment    Information Source  Orientation Level: Oriented X4  Information Given By: Patient  Informant's Name: Seamus Palencia  Who is responsible for making decisions for patient? : Patient    Readmission Evaluation  Is this a readmission?: No    Elopement Risk  Legal Hold: No  Ambulatory or Self Mobile in Wheelchair: Yes  Disoriented: No  Psychiatric Symptoms: None  History of Wandering: No  Elopement this Admit: No  Vocalizing Wanting to Leave: No  Displays Behaviors, Body Language Wanting to Leave: No-Not at Risk for Elopement  Elopement Risk: Not at Risk for Elopement    Interdisciplinary Discharge Planning  Does Admitting Nurse Feel This Could be a Complex Discharge?: Yes  Primary Care Physician: NORMA WALLACE  Lives with - Patient's Self Care Capacity: Alone and Able to Care For Self  Support Systems: None (Called sonSridhar and they are estranged.)  H[facility] / [facility]: [phone] and left a VM message with this 's phone number return call pending.)  Do You Take your Prescribed Medications Regularly: Yes  Able to Return to Previous ADL's: Future Time w/Therapy  Assistance Needed: Yes  Durable Medical Equipment: Not Applicable    Discharge Preparedness  What is your plan after discharge?: Other (comment) (Discharge to shelter vs room per [facility].)  What are your discharge supports?: Other (comment) (Patient is estranged from both sons.)    Finances  Financial Barriers to Discharge: Yes  Average Monthly Income: 841 $  Source of Income: Other (comment) (medicaid assistance)  Prescription Coverage: Yes    Advance Directive  Advance Directive?: None  Advance Directive offered?: AD Booklet refused    Domestic Abuse  Have you ever been the victim of abuse or violence?: No    Anticipated Discharge Information  Discharge Disposition: Discharged to home/self care (01)  Discharge Address: Homeless  Discharge Contact  Phone Number: [phone]  Out of paid minutes so phone is not working at this time.

## 2022-11-17 NOTE — DISCHARGE PLANNING
Case Management Discharge Planning    Admission Date: 11/15/2022  GMLOS: 6.9  ALOS: 2    6-Clicks ADL Score: 24  6-Clicks Mobility Score: 21      Anticipated Discharge Dispo: Discharge Disposition: Discharged to home/self care (01)  Discharge Address: Homeless  Discharge Contact Phone Number: [phone]  Out of paid minutes so phone is not working at this time.    DME Needed: No    Action(s) Taken: Updated Provider/Nurse on Discharge Plan    Escalations Completed: None    Medically Clear: No    Next Steps: Transition to homeless shelter vs rent a room.  Pending [facility]ldon Jennifer [phone] return call.     13:10  Return call:  [facility] will do a phone intake for housing at 10:00 on 11.18.22 with Alicia .  phone # [phone], Ext [phone].    Barriers to Discharge: Medical clearance and Homelessness    Is the patient up for discharge tomorrow: No

## 2022-11-17 NOTE — CARE PLAN
The patient is Stable - Low risk of patient condition declining or worsening    Shift Goals  Clinical Goals: Wean O2  Patient Goals: Rest  Family Goals: LEX    Progress made toward(s) clinical / shift goals:    Problem: Knowledge Deficit - Standard  Goal: Patient and family/care givers will demonstrate understanding of plan of care, disease process/condition, diagnostic tests and medications  Outcome: Progressing     Problem: Hemodynamics  Goal: Patient's hemodynamics, fluid balance and neurologic status will be stable or improve  Outcome: Progressing     Problem: Fluid Volume  Goal: Fluid volume balance will be maintained  Outcome: Progressing     Problem: Urinary - Renal Perfusion  Goal: Ability to achieve and maintain adequate renal perfusion and functioning will improve  Outcome: Progressing     Problem: Respiratory  Goal: Patient will achieve/maintain optimum respiratory ventilation and gas exchange  Outcome: Progressing     Problem: Mechanical Ventilation  Goal: Safe management of artificial airway and ventilation  Outcome: Progressing  Goal: Successful weaning off mechanical ventilator, spontaneously maintains adequate gas exchange  Outcome: Progressing  Goal: Patient will be able to express needs and understand communication  Outcome: Progressing     Problem: Physical Regulation  Goal: Diagnostic test results will improve  Outcome: Progressing  Goal: Signs and symptoms of infection will decrease  Outcome: Progressing      COVID-19

## 2022-11-17 NOTE — CARE PLAN
The patient is Stable - Low risk of patient condition declining or worsening    Shift Goals  Clinical Goals: no s/s of fluid overload  Patient Goals: eat, rest  Family Goals: LEX    Progress made toward(s) clinical / shift goals:      Problem: Knowledge Deficit - Standard  Goal: Patient and family/care givers will demonstrate understanding of plan of care, disease process/condition, diagnostic tests and medications  Outcome: Progressing     Problem: Hemodynamics  Goal: Patient's hemodynamics, fluid balance and neurologic status will be stable or improve  Outcome: Progressing     Problem: Fluid Volume  Goal: Fluid volume balance will be maintained  Outcome: Progressing     Problem: Respiratory  Goal: Patient will achieve/maintain optimum respiratory ventilation and gas exchange  Outcome: Progressing     Problem: Mechanical Ventilation  Goal: Patient will be able to express needs and understand communication  Outcome: Progressing     Problem: Physical Regulation  Goal: Diagnostic test results will improve  Outcome: Progressing       Patient is not progressing towards the following goals:

## 2022-11-17 NOTE — HEART FAILURE PROGRAM
Patient admitted primarily for Sepsis, there is additionally, HF decompensation diagnosed. Have requested a f/u via HF schedulers. Patient actively using tobacco, cessation documented by Dr. Newman on 11/16/22.    Previous pneumococcal  Influenza in October 2022  No DM diagnosis  No atrial arrhythmia    Nurses: Please document HF education in the education tab and populate the new and improved HF Care Plan. These tools will guide day to day care of the HF patient.    Providers: below are Guideline Directed Medical Therapy (GDMT) for HFrEF. If any cannot be prescribed by discharge, would you please note the clinical reason for each in your discharge summary? Thanks! Katelyn  Evidence Based Beta Blocker (bisoprolol, carvedilol, or toprol xl), for EF of 40% or less  SCOTT - I, for EF of 40% or less ARNI is preferred If not cost prohibitive for patient  SGLT2 inhibitor with proven ASCVD, HF, or DKD benefit Jardiance/empagliflozin): If not cost prohibitive for patient  Aldosterone antagonist, for EF of 40% or less  Anticoagulation for atrial arrhythmia, if applicable  Glycemic control for DM + HF, if applicable  Lipid lowering medication for DM + HF, if applicable  Hydralazine Hydrochloride/Isosorbide Dinitrate. The combination of hydralazine and isosorbide- dinitrate is recommended to reduce morbidity and mortality for patients self-described  Americans with NYHA class III-IV HFrEF (EF 40% or less), receiving optimal therapy with ACE inhibitors and beta blockers, unless contraindicated (Class I, SAMI: A).  Pneumococcal vaccine, if not previously received  Influenza vaccine for current season, if not previously received  Nicotine cessation counseling documented, if applicable  Device screening, if applicable  Referral to disease management program specializing in heart failure care- requested that schedulers notify me if patient cannot be scheduled at the Heart Failure Clinic  Referral to Cardiac Rehab: Patient Criteria:  Stable, chronic heart failure patients who can receive Medicare coverage are defined as patients with left ventricular ejection fraction of 35% or less and New York Heart Association (NYHA) Class II to IV symptoms on optimal heart failure therapy for at least six weeks.  7 calendar day f/u appointment scheduled prior to discharge, appearing on signed after visit summary.

## 2022-11-17 NOTE — DISCHARGE PLANNING
[facility] will do an intake for housing for 10:00 on 11.18.22 with Alicia, .  phone # [phone], Ext [phone].  His assigned CM is Gabbie, but he needs to complete an intake with Alicia before they can go forward with potential housing needs.  Voalte message to RN and Charge to make sure patient has a hospital phone and his phone has no paid minutes left.

## 2022-11-17 NOTE — PROCEDURES
Nephrology/Hemodialysis note    Patient with ESRD/HD admitted with SOB, volume overloaded  Seen and examined during dialysis  Tolerates well  VS stable  UF 1-2 L  Lab results reviewed  Please see dialysis flow sheet for details

## 2022-11-17 NOTE — PROGRESS NOTES
Hospital Medicine Daily Progress Note    Date of Service  11/17/2022    Chief Complaint  Seamus Palencia is a 57 y.o. male admitted 11/15/2022 with shortness of breath     Hospital Course  This is a  57 y.o. male with a history of HIV/AIDS (prior CD4 count 36 in Dec 2020), HTN, HFrEF:40%, active smoker, and ESRD who presented 11/15/2022 with acute shortness of breath.  He lives in his car and was found by EMS to have SpO2:75% on room air.  He has not had dialysis in over a week.  In the ER he was requiring 15L NRB to maintain SpO2:96%.    Patient admitted for further treatment and work up. Nephrology was also consulted    Interval Problem Update  Patient seen and examined, , still feeling SOB weaning off oxygen . , nephrology following appreciate rec. Having again dialysis today     I have discussed this patient's plan of care and discharge plan at IDT rounds today with Case Management, Nursing, Nursing leadership, and other members of the IDT team.    Consultants/Specialty  nephrology    Code Status  Full Code    Disposition  Patient is not medically cleared for discharge.   Anticipate discharge to  TBD .  I have placed the appropriate orders for post-discharge needs.    Review of Systems  Review of Systems   Constitutional:  Negative for chills and fever.   HENT:  Negative for congestion.    Eyes:  Negative for discharge.   Respiratory:  Negative for cough, shortness of breath and stridor.    Cardiovascular:  Negative for chest pain, palpitations, orthopnea and leg swelling.   Gastrointestinal:  Negative for abdominal pain, diarrhea, nausea and vomiting.   Genitourinary:  Negative for dysuria.   Musculoskeletal:  Negative for back pain and joint pain.   Skin:  Negative for rash.   Neurological:  Negative for dizziness, sensory change, speech change and headaches.   Psychiatric/Behavioral:  The patient is not nervous/anxious.       Physical Exam  Temp:  [36.3 °C (97.4 °F)-38.5 °C (101.3 °F)] 36.7 °C (98.1  °F)  Pulse:  [62-98] 73  Resp:  [16-18] 16  BP: ()/(52-86) 86/52  SpO2:  [87 %-99 %] 91 %    Physical Exam  Vitals reviewed.   Constitutional:       Appearance: Normal appearance. He is not diaphoretic.      Comments: Disheveled   HENT:      Head: Normocephalic and atraumatic.      Nose: Nose normal.      Mouth/Throat:      Mouth: Mucous membranes are moist.      Pharynx: No oropharyngeal exudate.   Eyes:      General: No scleral icterus.        Right eye: No discharge.         Left eye: No discharge.      Extraocular Movements: Extraocular movements intact.      Conjunctiva/sclera: Conjunctivae normal.   Cardiovascular:      Rate and Rhythm: Normal rate and regular rhythm.      Pulses:           Radial pulses are 2+ on the right side and 2+ on the left side.        Dorsalis pedis pulses are 2+ on the right side and 2+ on the left side.      Heart sounds: No murmur heard.     Comments: Left upper arm positive thrill in AV fistula.  Pulmonary:      Effort: Pulmonary effort is normal. No respiratory distress.      Breath sounds: Decreased air movement present. Rhonchi present. No wheezing or rales.   Abdominal:      General: Bowel sounds are normal. There is no distension.      Palpations: Abdomen is soft.      Tenderness: There is no abdominal tenderness. There is no guarding or rebound.   Musculoskeletal:         General: No swelling or tenderness.      Cervical back: No tenderness. No muscular tenderness.      Right lower leg: No edema.      Left lower leg: No edema.   Lymphadenopathy:      Cervical: No cervical adenopathy.   Skin:     Coloration: Skin is not jaundiced or pale.   Neurological:      General: No focal deficit present.      Mental Status: He is alert and oriented to person, place, and time. Mental status is at baseline.      Cranial Nerves: No cranial nerve deficit.   Psychiatric:         Mood and Affect: Mood normal.         Behavior: Behavior normal.       Fluids    Intake/Output Summary (Last  24 hours) at 11/17/2022 1145  Last data filed at 11/17/2022 0754  Gross per 24 hour   Intake 860 ml   Output 2500 ml   Net -1640 ml       Laboratory  Recent Labs     11/15/22  0715 11/16/22  0113 11/17/22  0920   WBC 7.0 4.3* 3.5*   RBC 3.42* 3.40* 3.55*   HEMOGLOBIN 10.6* 10.7* 11.1*   HEMATOCRIT 32.7* 31.5* 33.2*   MCV 95.6 92.6 93.5   MCH 31.0 31.5 31.3   MCHC 32.4* 34.0 33.4*   RDW 54.3* 53.6* 53.9*   PLATELETCT 178 181 170   MPV 10.1 10.4 10.1     Recent Labs     11/15/22  0715 11/16/22  0113 11/17/22  0920   SODIUM 133* 135 134*   POTASSIUM 5.8* 4.0 4.1   CHLORIDE 100 96 95*   CO2 11* 22 26   GLUCOSE 129* 85 94   * 53* 23*   CREATININE 20.47* 12.25* 6.60*   CALCIUM 7.7* 7.8* 8.2*     Recent Labs     11/15/22  0715   INR 1.15*               Imaging  CT-CTA CHEST PULMONARY ARTERY W/ RECONS   Final Result      1.  No evidence of pulmonary embolus.      2.  Trace bilateral pleural effusions.      3.  Right lung scarring.            DX-CHEST-PORTABLE (1 VIEW)   Final Result      Near resolution of consolidation      Unchanged bilateral scarring           Assessment/Plan  * Acute respiratory failure with hypoxia (HCC)- (present on admission)  Assessment & Plan  Multifactorial patient is a smoker he has AIDS and so the differential is broad and at this point time.  I am checking in ruling out COVID/flu/PCP.  I am initiating him on Bactrim prophylaxis.  I am also checking a CD4 count on him.  Acutely will have respiratory per protocol with supplemental oxygen.  I have requested him to stop smoking.  I feel at this point time with his BNP being greater than 35,000 and chest x-ray showing pulmonary congestion I am concerned of volume overload.  I feel dialysis will be important to his respiratory recovery.  We will require likely multiple sessions of dialysis to work on volume removal.  Nephrology has already been consulted and will look toward sending him to dialysis.    Sepsis associated with AIDS  ([facility])  Assessment & Plan  This is Sepsis Present on admission  SIRS criteria identified on my evaluation include: Tachycardia, with heart rate greater than 90 BPM and Tachypnea, with respirations greater than 20 per minute  Source is pulmonary  Sepsis protocol initiated  Fluid resuscitation ordered per protocol  Crystalloid Fluid Administration: Patient has advanced or end-stage chronic renal disease (with documentation of stage IV or GFR 15-29 mL/min, or stage V or GFR < 15 mL/min or ESRD), for this/these reason(s) it is unsafe for this patient to receive 30 mL/kg of fluid, patient to be reassessed shortly after completion of partial fluid bolus to ensure adequacy of resuscitation  IV antibiotics as appropriate for source of sepsis  Reassessment: I have reassessed the patient's hemodynamic status  12/2020 CD4:36  Recheck CD4 count      Improved         Hyperkalemia  Assessment & Plan  Nephrology consulting.  EKG reviewed  MOnitor on telemetry until repeat potassium within normal levels.    AIDS (acquired immunodeficiency syndrome), CD4 <=200 ([facility])  Assessment & Plan  12/2020 CD4:36  Check new CD4 count  Place on bactrim.  May need azithromycin.  CXR reviewed  Check LDH  Lactic acid okay  Checking [facility] to see if he has been on his medications and refills.    ESRD (end stage renal disease) on dialysis ([facility])- (present on admission)  Assessment & Plan  Nephrology Dr. Bailey has been consulted in the emergency room.  Expect emergent dialysis based on his potassium.  Monitor I's and O's, weights, labs, and vitals    Anemia due to chronic kidney disease- (present on admission)  Assessment & Plan  That of chronic disease from renal failure as well as chronic AIDS/HIV infection.  EPO per nephrology  Monitor CBCs    Nicotine dependence- (present on admission)  Assessment & Plan  Smoking cessation encouraged and recommended.  Nicotine patch offered  4 minutes spent on smoking cessation encouragement and  education.    HFrEF (heart failure with reduced ejection fraction) ([facility])- (present on admission)  Assessment & Plan  Last echo showed EF 40%    Abnormal echocardiogram: LVEF 45% in 2015- (present on admission)  Assessment & Plan  Prior echocardiogram showing EF of 40%  Continue dialysis to help diurese.  If emergent dialysis not available will give Lasix.    Essential hypertension, benign- (present on admission)  Assessment & Plan  We will wait list of home medications we will initiate amlodipine 10 mg oral with parameter.  Hold on ACE or ARB or Aldactone based on his potassium  Monitor vitals    Elevated troponin- (present on admission)  Assessment & Plan  Likely secondary to hypoxia and demand ischemia as in face of heart failure  Supplemental oxygen and follow-up on serial troponin to verify no mass upswing in level    Hyponatremia- (present on admission)  Assessment & Plan  Improved        VTE prophylaxis: heparin ppx    I have performed a physical exam and reviewed and updated ROS and Plan today (11/17/2022). In review of yesterday's note (11/16/2022), there are no changes except as documented above.

## 2022-11-17 NOTE — PROGRESS NOTES
Hemodialysis Nursing Progress Notes:     HD treatment x 3:30 hours today.     HD  at bedside. Pt is alert, oriented x 4, not in any signs of distress at this time. LISA AVF with good bruit and thrill. Cannulated aseptically using G15 needles, no cannulation issues noted. Labs drawn today. Pt had episodes of asymptomatic hypotension, lowered UF goal, flushed saline and informed nephrologist. S/E by Dr. Bailey during HD. 3:30 hrs HD tx completed. Pt denied any discomfort.    UF Net: 1.8L    LISA AVF decannulated aseptically, no active bleeding noted, noted good bruit and thrill. Applied gauze and tape.     Report given to primary RN. See electronic flowsheets for additional information.

## 2022-11-18 ENCOUNTER — PHARMACY VISIT (OUTPATIENT)
Dept: PHARMACY | Facility: MEDICAL CENTER | Age: 57
End: 2022-11-18
Payer: COMMERCIAL

## 2022-11-18 VITALS
DIASTOLIC BLOOD PRESSURE: 75 MMHG | RESPIRATION RATE: 16 BRPM | HEIGHT: 64 IN | SYSTOLIC BLOOD PRESSURE: 121 MMHG | OXYGEN SATURATION: 95 % | HEART RATE: 79 BPM | WEIGHT: 130.29 LBS | TEMPERATURE: 97.7 F | BODY MASS INDEX: 22.24 KG/M2

## 2022-11-18 LAB
ANION GAP SERPL CALC-SCNC: 15 MMOL/L (ref 7–16)
BUN SERPL-MCNC: 23 MG/DL (ref 8–22)
CALCIUM SERPL-MCNC: 8.6 MG/DL (ref 8.5–10.5)
CHLORIDE SERPL-SCNC: 94 MMOL/L (ref 96–112)
CO2 SERPL-SCNC: 25 MMOL/L (ref 20–33)
CREAT SERPL-MCNC: 7.15 MG/DL (ref 0.5–1.4)
ERYTHROCYTE [DISTWIDTH] IN BLOOD BY AUTOMATED COUNT: 53.1 FL (ref 35.9–50)
GFR SERPLBLD CREATININE-BSD FMLA CKD-EPI: 8 ML/MIN/1.73 M 2
GLUCOSE SERPL-MCNC: 86 MG/DL (ref 65–99)
HCT VFR BLD AUTO: 33.8 % (ref 42–52)
HGB BLD-MCNC: 11.1 G/DL (ref 14–18)
MCH RBC QN AUTO: 30.8 PG (ref 27–33)
MCHC RBC AUTO-ENTMCNC: 32.8 G/DL (ref 33.7–35.3)
MCV RBC AUTO: 93.9 FL (ref 81.4–97.8)
PLATELET # BLD AUTO: 180 K/UL (ref 164–446)
PMV BLD AUTO: 10.6 FL (ref 9–12.9)
POTASSIUM SERPL-SCNC: 4.2 MMOL/L (ref 3.6–5.5)
RBC # BLD AUTO: 3.6 M/UL (ref 4.7–6.1)
SODIUM SERPL-SCNC: 134 MMOL/L (ref 135–145)
WBC # BLD AUTO: 5 K/UL (ref 4.8–10.8)

## 2022-11-18 PROCEDURE — 80048 BASIC METABOLIC PNL TOTAL CA: CPT

## 2022-11-18 PROCEDURE — A9270 NON-COVERED ITEM OR SERVICE: HCPCS | Performed by: INTERNAL MEDICINE

## 2022-11-18 PROCEDURE — 700102 HCHG RX REV CODE 250 W/ 637 OVERRIDE(OP): Performed by: HOSPITALIST

## 2022-11-18 PROCEDURE — RXMED WILLOW AMBULATORY MEDICATION CHARGE: Performed by: INTERNAL MEDICINE

## 2022-11-18 PROCEDURE — 99239 HOSP IP/OBS DSCHRG MGMT >30: CPT | Performed by: INTERNAL MEDICINE

## 2022-11-18 PROCEDURE — 99232 SBSQ HOSP IP/OBS MODERATE 35: CPT | Performed by: INTERNAL MEDICINE

## 2022-11-18 PROCEDURE — A9270 NON-COVERED ITEM OR SERVICE: HCPCS | Performed by: HOSPITALIST

## 2022-11-18 PROCEDURE — 700102 HCHG RX REV CODE 250 W/ 637 OVERRIDE(OP): Performed by: INTERNAL MEDICINE

## 2022-11-18 PROCEDURE — 85027 COMPLETE CBC AUTOMATED: CPT

## 2022-11-18 PROCEDURE — 36415 COLL VENOUS BLD VENIPUNCTURE: CPT

## 2022-11-18 PROCEDURE — 700111 HCHG RX REV CODE 636 W/ 250 OVERRIDE (IP): Performed by: HOSPITALIST

## 2022-11-18 RX ORDER — ASPIRIN 81 MG/1
81 TABLET, CHEWABLE ORAL DAILY
Qty: 100 TABLET | Refills: 0 | Status: SHIPPED | OUTPATIENT
Start: 2022-11-19 | End: 2023-01-27 | Stop reason: SDUPTHER

## 2022-11-18 RX ORDER — LISINOPRIL 2.5 MG/1
2.5 TABLET ORAL DAILY
Qty: 30 TABLET | Refills: 0 | Status: SHIPPED | OUTPATIENT
Start: 2022-11-19 | End: 2023-01-27

## 2022-11-18 RX ORDER — AMLODIPINE BESYLATE 10 MG/1
10 TABLET ORAL DAILY
Qty: 30 TABLET | Refills: 0 | Status: SHIPPED | OUTPATIENT
Start: 2022-11-19 | End: 2023-01-27 | Stop reason: SDUPTHER

## 2022-11-18 RX ORDER — METOPROLOL SUCCINATE 50 MG/1
50 TABLET, EXTENDED RELEASE ORAL DAILY
Qty: 30 TABLET | Refills: 0 | Status: SHIPPED | OUTPATIENT
Start: 2022-11-18 | End: 2022-11-23 | Stop reason: SDUPTHER

## 2022-11-18 RX ORDER — RILPIVIRINE HYDROCHLORIDE 25 MG/1
25 TABLET, FILM COATED ORAL
Qty: 30 TABLET | Refills: 0 | Status: ON HOLD | OUTPATIENT
Start: 2022-11-19 | End: 2023-10-31

## 2022-11-18 RX ORDER — SPIRONOLACTONE 25 MG/1
25 TABLET ORAL DAILY
Qty: 30 TABLET | Refills: 3 | Status: SHIPPED | OUTPATIENT
Start: 2022-11-18 | End: 2023-01-27

## 2022-11-18 RX ADMIN — LISINOPRIL 2.5 MG: 5 TABLET ORAL at 05:46

## 2022-11-18 RX ADMIN — RILPIVIRINE HYDROCHLORIDE 25 MG: 25 TABLET, FILM COATED ORAL at 08:30

## 2022-11-18 RX ADMIN — AMLODIPINE BESYLATE 10 MG: 10 TABLET ORAL at 05:47

## 2022-11-18 RX ADMIN — HEPARIN SODIUM 5000 UNITS: 5000 INJECTION, SOLUTION INTRAVENOUS; SUBCUTANEOUS at 05:48

## 2022-11-18 RX ADMIN — DOLUTEGRAVIR SODIUM 50 MG: 50 TABLET, FILM COATED ORAL at 05:48

## 2022-11-18 RX ADMIN — METOPROLOL TARTRATE 25 MG: 25 TABLET, FILM COATED ORAL at 05:46

## 2022-11-18 RX ADMIN — ASPIRIN 81 MG 81 MG: 81 TABLET ORAL at 05:47

## 2022-11-18 ASSESSMENT — ENCOUNTER SYMPTOMS
SINUS PAIN: 0
PALPITATIONS: 0
COUGH: 0
FEVER: 0
WHEEZING: 0
WEIGHT LOSS: 0
EYES NEGATIVE: 1
HEMOPTYSIS: 0
SHORTNESS OF BREATH: 0
VOMITING: 0
CHILLS: 0
ORTHOPNEA: 0
NAUSEA: 0
ABDOMINAL PAIN: 0

## 2022-11-18 ASSESSMENT — PAIN DESCRIPTION - PAIN TYPE
TYPE: ACUTE PAIN
TYPE: ACUTE PAIN

## 2022-11-18 NOTE — PROGRESS NOTES
Nephrology Daily Progress Note    Date of Service  11/18/2022    Chief Complaint  57 y.o. male with ESRD/HD,  admitted 11/15/2022 with SOB, volume overloaded, missed dialysis treatments    Interval Problem Update  11/18 -doing well, no complaints  HD TTS  Plan d/c    Review of Systems  Review of Systems   Constitutional:  Negative for chills, fever, malaise/fatigue and weight loss.   HENT:  Negative for congestion, hearing loss and sinus pain.    Eyes: Negative.    Respiratory:  Negative for cough, hemoptysis, shortness of breath and wheezing.    Cardiovascular:  Negative for chest pain, palpitations, orthopnea and leg swelling.   Gastrointestinal:  Negative for abdominal pain, nausea and vomiting.   Skin: Negative.    All other systems reviewed and are negative.     Physical Exam  Temp:  [36.5 °C (97.7 °F)-37.2 °C (99 °F)] 36.5 °C (97.7 °F)  Pulse:  [74-81] 79  Resp:  [16-18] 16  BP: (103-146)/(63-88) 121/75  SpO2:  [92 %-95 %] 95 %    Physical Exam  Vitals reviewed.   Constitutional:       General: He is not in acute distress.     Appearance: Normal appearance. He is well-developed. He is not diaphoretic.   HENT:      Head: Normocephalic and atraumatic.      Nose: Nose normal.      Mouth/Throat:      Mouth: Mucous membranes are moist.      Pharynx: Oropharynx is clear.   Eyes:      Extraocular Movements: Extraocular movements intact.      Conjunctiva/sclera: Conjunctivae normal.      Pupils: Pupils are equal, round, and reactive to light.   Cardiovascular:      Rate and Rhythm: Normal rate and regular rhythm.      Pulses: Normal pulses.      Heart sounds: Normal heart sounds.   Pulmonary:      Effort: Pulmonary effort is normal. No respiratory distress.      Breath sounds: Normal breath sounds. No wheezing or rales.   Abdominal:      General: Bowel sounds are normal. There is no distension.      Palpations: Abdomen is soft. There is no mass.      Tenderness: There is no abdominal tenderness. There is no right CVA  tenderness or left CVA tenderness.   Musculoskeletal:      Cervical back: Normal range of motion and neck supple.      Right lower leg: No edema.      Left lower leg: No edema.   Skin:     General: Skin is warm.      Coloration: Skin is not pale.      Findings: No erythema or rash.   Neurological:      General: No focal deficit present.      Mental Status: He is alert and oriented to person, place, and time.      Cranial Nerves: No cranial nerve deficit.      Coordination: Coordination normal.   Psychiatric:         Mood and Affect: Mood normal.         Behavior: Behavior normal.         Thought Content: Thought content normal.         Judgment: Judgment normal.       Fluids    Intake/Output Summary (Last 24 hours) at 11/18/2022 1514  Last data filed at 11/18/2022 1135  Gross per 24 hour   Intake 360 ml   Output --   Net 360 ml       Laboratory  Recent Labs     11/16/22  0113 11/17/22  0920 11/18/22  0100   WBC 4.3* 3.5* 5.0   RBC 3.40* 3.55* 3.60*   HEMOGLOBIN 10.7* 11.1* 11.1*   HEMATOCRIT 31.5* 33.2* 33.8*   MCV 92.6 93.5 93.9   MCH 31.5 31.3 30.8   MCHC 34.0 33.4* 32.8*   RDW 53.6* 53.9* 53.1*   PLATELETCT 181 170 180   MPV 10.4 10.1 10.6     Recent Labs     11/16/22  0113 11/17/22  0920 11/18/22  0100   SODIUM 135 134* 134*   POTASSIUM 4.0 4.1 4.2   CHLORIDE 96 95* 94*   CO2 22 26 25   GLUCOSE 85 94 86   BUN 53* 23* 23*   CREATININE 12.25* 6.60* 7.15*   CALCIUM 7.8* 8.2* 8.6         No results for input(s): NTPROBNP in the last 72 hours.        Imaging  CT-CTA CHEST PULMONARY ARTERY W/ RECONS   Final Result      1.  No evidence of pulmonary embolus.      2.  Trace bilateral pleural effusions.      3.  Right lung scarring.            DX-CHEST-PORTABLE (1 VIEW)   Final Result      Near resolution of consolidation      Unchanged bilateral scarring           Assessment/Plan  1.ESRD/HD -on TTS schedule  2.HTN: BP well controlled  3.Electrolytes: well controlled.  4.Anemia: Hb stable  5.Volume: well controlled with  UF/HD    Recs: HD TTS            Renal diet            All meds to renal doses            Will follow

## 2022-11-18 NOTE — CARE PLAN
Problem: Knowledge Deficit - Standard  Goal: Patient and family/care givers will demonstrate understanding of plan of care, disease process/condition, diagnostic tests and medications  Outcome: Progressing     Problem: Hemodynamics  Goal: Patient's hemodynamics, fluid balance and neurologic status will be stable or improve  Outcome: Progressing   The patient is Watcher - Medium risk of patient condition declining or worsening    Shift Goals  Clinical Goals: no s/s of fluid overload  Patient Goals: eat, rest  Family Goals: LEX    Progress made toward(s) clinical / shift goals:  ***    Patient is not progressing towards the following goals:

## 2022-11-18 NOTE — DISCHARGE SUMMARY
Discharge Summary    CHIEF COMPLAINT ON ADMISSION  Chief Complaint   Patient presents with    Shortness of Breath     X7 hours    Respiratory Distress       Reason for Admission  EMS     Admission Date  11/15/2022    CODE STATUS  Full Code    HPI & HOSPITAL COURSE  This is a 57 y.o. male history of HIV/AIDS (prior CD4 count 36 in Dec 2020), HTN, HFrEF:40%, active smoker, and ESRD who presented 11/15/2022 with acute shortness of breath.  He lives in his car and was found by EMS to have SpO2:75% on room air.  He has not had dialysis in over a week.  In the ER he was requiring 15L NRB to maintain SpO2:96%.    Patient admitted for further treatment and work up. Nephrology was also consulted  Patient had dialysis for 3 days and is dong now better. He is now off oxygen   Will arrange meds to bed for patient and he will be discharged today   Patient has h/o of HF will also arrange meds to bed for his HF medication     The patient met 2-midnight criteria for an inpatient stay at the time of discharge.    Discharge Date  11/18/22    FOLLOW UP ITEMS POST DISCHARGE  PCP  Cardiology  Nephrology     DISCHARGE DIAGNOSES  Principal Problem:    Acute respiratory failure with hypoxia (HCC) POA: Yes  Active Problems:    Hyponatremia POA: Yes    Elevated troponin POA: Yes    Essential hypertension, benign POA: Yes    Abnormal echocardiogram: LVEF 45% in 2015 POA: Yes    HFrEF (heart failure with reduced ejection fraction) ([facility]) POA: Yes    Nicotine dependence POA: Yes    Anemia due to chronic kidney disease POA: Yes    ESRD (end stage renal disease) on dialysis ([facility]) POA: Yes    AIDS (acquired immunodeficiency syndrome), CD4 <=200 ([facility]) POA: Unknown    Hyperkalemia POA: Unknown    Sepsis associated with AIDS ([facility]) POA: Unknown  Resolved Problems:    * No resolved hospital problems. *      FOLLOW UP  Future Appointments   Date Time Provider Department Center   11/23/2022  1:15 PM Rajan Cesar M.D. [facility] None     Heart Failure  Education  Please access the [facility] My HF Guide/Heart Failure Interactive Workbook:   [email or web address]  Follow up  Please visit this website as soon as possible for information on how to manage your heart failure at home. Thank you, The [facility] Heart Failure Program      MEDICATIONS ON DISCHARGE     Medication List        START taking these medications        Instructions   amLODIPine 10 MG Tabs  Start taking on: November 19, 2022  Commonly known as: NORVASC   Scanlon 1 tableta por vía oral diariamente.  (Take 1 Tablet by mouth every day.)  Dose: 10 mg     Aspirin Low Dose 81 MG Chew chewable tablet  Start taking on: November 19, 2022  Generic drug: aspirin   Chew 1 Tablet every day.  Dose: 81 mg     dolutegravir 50 MG Tabs tablet  Start taking on: November 19, 2022  Commonly known as: TIVICAY   Scanlon 1 tableta por vía oral diariamente.  (Take 1 Tablet by mouth every day.)  Dose: 50 mg     Edurant 25 MG Tabs tablet  Start taking on: November 19, 2022  Generic drug: rilpivirine   Take 1 Tablet by mouth every morning with breakfast.  Dose: 25 mg     lisinopril 2.5 MG Tabs  Start taking on: November 19, 2022  Commonly known as: PRINIVIL   Scanlon 1 tableta por vía oral diariamente.  (Take 1 Tablet by mouth every day.)  Dose: 2.5 mg     metoprolol SR 50 MG Tb24  Commonly known as: TOPROL XL   Scanlon 1 tableta por vía oral diariamente.  (Take 1 Tablet by mouth every day.)  Dose: 50 mg     spironolactone 25 MG Tabs  Commonly known as: ALDACTONE   Scanlon 1 tableta por vía oral diariamente.  (Take 1 Tablet by mouth every day.)  Dose: 25 mg              Allergies  No Known Allergies    DIET  Orders Placed This Encounter   Procedures    Diet Order Diet: Regular     Standing Status:   Standing     Number of Occurrences:   1     Order Specific Question:   Diet:     Answer:   Regular [1]       ACTIVITY  As tolerated.  Weight bearing as tolerated    CONSULTATIONS  Nephrology     PROCEDURES  None     LABORATORY  Lab Results    Component Value Date    SODIUM 134 (L) 11/18/2022    POTASSIUM 4.2 11/18/2022    CHLORIDE 94 (L) 11/18/2022    CO2 25 11/18/2022    GLUCOSE 86 11/18/2022    BUN 23 (H) 11/18/2022    CREATININE 7.15 (HH) 11/18/2022        Lab Results   Component Value Date    WBC 5.0 11/18/2022    HEMOGLOBIN 11.1 (L) 11/18/2022    HEMATOCRIT 33.8 (L) 11/18/2022    PLATELETCT 180 11/18/2022        Total time of the discharge process exceeds 41 minutes.

## 2022-11-18 NOTE — PROGRESS NOTES
Received Bedside report. Assumed care at 1900. This pt is AOx4, 0/10 pain. Patient and RN discussed plan of care: questions answered. Labs noted, assessment complete, patient tolerating regular diet. Tele box in place. Pt is on room air. Call light in place, fall precautions in place, patient educated on importance of calling for assistance. No additional needs at this time. VSS

## 2022-11-18 NOTE — HEART FAILURE PROGRAM
Discharge Order Quality Check-Up - HFrEF    The below appointment is appropriate for discharge today. Given challenging social situation, I asked RNCM Yuliana Rogel to assess whether or not patient has transportation to his appointment and if he does not, to provide assistance if possible. She responded that patient has been given 6 bus passes and 'he can use one of those'.    HF Education Documented? no messaged bedside RN, No Guzman. Also asked her to discuss the appointment with him and how important it is for him to attend.    Where we stand right now with HFrEF MEASURES per review of most recent notes and discharge med rec:    Evidence Based Beta Blocker (bisoprolol, carvedilol, or toprol xl), for EF of 40% or less: patient is on lopressor- messaged Dr. Newman asking if we can change to EBB.  SCOTT - I, for EF of 40% or less ARNI is preferred If not cost prohibitive for patient: lisinopril  SGLT2 inhibitor with proven ASCVD, HF, or DKD benefit (Jardiance/empagliflozin): If not cost prohibitive for patient   Aldosterone antagonist, for EF of 40% or less: Missing asked Dr. Newman to address  Nicotine cessation counseling documented: documented by Dr. Newman on 11/16/22  Device screening: n/a EF of 40%  Valve team alert: yes, moderate MR  HF Clinic RN alert: yes  [facility] Interactive HF education prompt placed in f/u section: yes  Referral to disease management program specializing in heart failure care see appointment  Referral to Cardiac Rehab to be addressed in outpatient setting      Thank you, Katelyn, Cardio RN Navigator [phone]

## 2022-11-19 NOTE — PROGRESS NOTES
Discharge instructions given to patient at bedside, verbalizes understanding and states plans for follow-up with PCP and Cardiologist as recommended. Individualized instruction and CHF discharge information provided on Heart Failure, low sodium/fluid restricted diet, new and home medication review, post-discharge activity level, smoking/etoh cessation and worsening of symptoms needing follow-up care. Telemetry monitor/IV cathlon removed. All belongings accounted for, all questions answered at this time. Patient discharged to home with self.    Pt provided with multiple bus passes so he can make it to dialysis and his heart failure appointment. Pt states understanding of discharge plan and instructions.

## 2022-11-20 LAB
BACTERIA BLD CULT: NORMAL
BACTERIA BLD CULT: NORMAL
SIGNIFICANT IND 70042: NORMAL
SIGNIFICANT IND 70042: NORMAL
SITE SITE: NORMAL
SITE SITE: NORMAL
SOURCE SOURCE: NORMAL
SOURCE SOURCE: NORMAL

## 2022-11-23 ENCOUNTER — OFFICE VISIT (OUTPATIENT)
Dept: CARDIOLOGY | Facility: MEDICAL CENTER | Age: 57
End: 2022-11-23
Payer: MEDICAID

## 2022-11-23 VITALS
DIASTOLIC BLOOD PRESSURE: 68 MMHG | RESPIRATION RATE: 16 BRPM | HEART RATE: 71 BPM | WEIGHT: 142 LBS | HEIGHT: 64 IN | SYSTOLIC BLOOD PRESSURE: 140 MMHG | OXYGEN SATURATION: 96 % | BODY MASS INDEX: 24.24 KG/M2

## 2022-11-23 DIAGNOSIS — B20 HIV INFECTION, UNSPECIFIED SYMPTOM STATUS (HCC): ICD-10-CM

## 2022-11-23 DIAGNOSIS — I10 ESSENTIAL HYPERTENSION, BENIGN: ICD-10-CM

## 2022-11-23 DIAGNOSIS — Z99.2 ESRD (END STAGE RENAL DISEASE) ON DIALYSIS (HCC): ICD-10-CM

## 2022-11-23 DIAGNOSIS — N18.6 ESRD (END STAGE RENAL DISEASE) ON DIALYSIS (HCC): ICD-10-CM

## 2022-11-23 DIAGNOSIS — E78.5 DYSLIPIDEMIA: ICD-10-CM

## 2022-11-23 DIAGNOSIS — J96.01 ACUTE RESPIRATORY FAILURE WITH HYPOXIA (HCC): ICD-10-CM

## 2022-11-23 DIAGNOSIS — I73.9 PAD (PERIPHERAL ARTERY DISEASE) (HCC): ICD-10-CM

## 2022-11-23 DIAGNOSIS — R93.1 ABNORMAL ECHOCARDIOGRAM: ICD-10-CM

## 2022-11-23 DIAGNOSIS — R79.89 ELEVATED TROPONIN: ICD-10-CM

## 2022-11-23 DIAGNOSIS — R29.898 TRANSIENT WEAKNESS OF LEFT LOWER EXTREMITY: ICD-10-CM

## 2022-11-23 DIAGNOSIS — I50.20 HFREF (HEART FAILURE WITH REDUCED EJECTION FRACTION) (HCC): ICD-10-CM

## 2022-11-23 DIAGNOSIS — I65.23 BILATERAL CAROTID ARTERY STENOSIS: ICD-10-CM

## 2022-11-23 DIAGNOSIS — Z86.73 HISTORY OF CVA (CEREBROVASCULAR ACCIDENT): ICD-10-CM

## 2022-11-23 DIAGNOSIS — E87.6 HYPOKALEMIA: ICD-10-CM

## 2022-11-23 DIAGNOSIS — D69.6 THROMBOCYTOPENIA (HCC): ICD-10-CM

## 2022-11-23 DIAGNOSIS — B20 AIDS (ACQUIRED IMMUNODEFICIENCY SYNDROME), CD4 <=200 (HCC): ICD-10-CM

## 2022-11-23 DIAGNOSIS — I10 HTN (HYPERTENSION), MALIGNANT: ICD-10-CM

## 2022-11-23 DIAGNOSIS — D64.9 NORMOCHROMIC NORMOCYTIC ANEMIA: ICD-10-CM

## 2022-11-23 DIAGNOSIS — R74.8 ELEVATED LIVER ENZYMES: ICD-10-CM

## 2022-11-23 DIAGNOSIS — I1A.0 RESISTANT HYPERTENSION: ICD-10-CM

## 2022-11-23 DIAGNOSIS — N18.31 CHRONIC KIDNEY DISEASE (CKD) STAGE G3A/A3, MODERATELY DECREASED GLOMERULAR FILTRATION RATE (GFR) BETWEEN 45-59 ML/MIN/1.73 SQUARE METER AND ALBUMINURIA CREATININE RATIO GREATER THAN 300 MG/G: ICD-10-CM

## 2022-11-23 PROCEDURE — 99205 OFFICE O/P NEW HI 60 MIN: CPT | Performed by: INTERNAL MEDICINE

## 2022-11-23 RX ORDER — LISINOPRIL 5 MG/1
5 TABLET ORAL DAILY
Qty: 30 TABLET | Refills: 11 | Status: SHIPPED | OUTPATIENT
Start: 2022-11-23 | End: 2023-01-27 | Stop reason: SDUPTHER

## 2022-11-23 RX ORDER — METOPROLOL SUCCINATE 50 MG/1
100 TABLET, EXTENDED RELEASE ORAL DAILY
Qty: 60 TABLET | Refills: 11 | Status: SHIPPED | OUTPATIENT
Start: 2022-11-23 | End: 2023-01-27 | Stop reason: SDUPTHER

## 2022-11-23 RX ORDER — SEVELAMER CARBONATE 800 MG/1
800 TABLET, FILM COATED ORAL
COMMUNITY
End: 2022-12-08 | Stop reason: SDUPTHER

## 2022-11-23 ASSESSMENT — ENCOUNTER SYMPTOMS
CARDIOVASCULAR NEGATIVE: 1
SPUTUM PRODUCTION: 0
NEUROLOGICAL NEGATIVE: 1
SORE THROAT: 0
HEMOPTYSIS: 0
GASTROINTESTINAL NEGATIVE: 1
WHEEZING: 0
CLAUDICATION: 0
FEVER: 0
PALPITATIONS: 0
BRUISES/BLEEDS EASILY: 0
LOSS OF CONSCIOUSNESS: 0
DIZZINESS: 0
STRIDOR: 0
CHILLS: 0
CONSTITUTIONAL NEGATIVE: 1
SHORTNESS OF BREATH: 0
COUGH: 0
MUSCULOSKELETAL NEGATIVE: 1
WEAKNESS: 0
RESPIRATORY NEGATIVE: 1
PND: 0
EYES NEGATIVE: 1
ORTHOPNEA: 0

## 2022-11-23 ASSESSMENT — FIBROSIS 4 INDEX: FIB4 SCORE: 0.59

## 2022-11-23 NOTE — PROGRESS NOTES
Chief Complaint   Patient presents with    Congestive Heart Failure     F/V Dx:ACC/AHA stage C heart failure with preserved ejection fraction (HCC)    Hypertension       Subjective     Seamus Palencia is a 57 y.o. male who presents today as a new consultation for heart failure.  He is a 57-year-old male with a 30-year history of HIV.  He is also been on dialysis for years probably due to high blood pressure.  He was admitted to hospital for high blood pressure.  His EF in the past was 40%.  He is never had an ischemic work-up.  His blood pressures at home continue to be 1 60-1 80.  He is on minimal medications.    Past Medical History:   Diagnosis Date    Heart failure (HCC)     HIV disease (HCC) 01/01/1995    Hypertension     Renal disorder     ESRD on hemodylsis Tues/Thurs/Sat at [facility]    Seizure (HCC)     Stroke (HCC)      Past Surgical History:   Procedure Laterality Date    CHOLECYSTECTOMY  1980s    OTHER Left     Dialysis graft left arm     Family History   Problem Relation Age of Onset    Diabetes Mother         cause of death    Diabetes Father         cause of death    Diabetes Sister     Other Brother         down syndrome    No Known Problems Sister     No Known Problems Sister     No Known Problems Sister     Other Daughter         5 daughters, 11 sons     Clotting Disorder Neg Hx     Stroke Neg Hx      Social History     Socioeconomic History    Marital status: Single     Spouse name: Not on file    Number of children: Not on file    Years of education: Not on file    Highest education level: Not on file   Occupational History    Not on file   Tobacco Use    Smoking status: Every Day     Packs/day: 0.25     Types: Cigarettes    Smokeless tobacco: Never    Tobacco comments:     3-4 CIGARETTES A DAY   Vaping Use    Vaping Use: Never used   Substance and Sexual Activity    Alcohol use: No    Drug use: No    Sexual activity: Not on file   Other Topics Concern    Not on file   Social History Narrative     Not on file     Social Determinants of Health     Financial Resource Strain: Not on file   Food Insecurity: Not on file   Transportation Needs: Not on file   Physical Activity: Not on file   Stress: Not on file   Social Connections: Not on file   Intimate Partner Violence: Not on file   Housing Stability: Not on file     No Known Allergies  Outpatient Encounter Medications as of 11/23/2022   Medication Sig Dispense Refill    sevelamer carbonate (RENVELA) 800 MG Tab tablet Take 800 mg by mouth 3 times a day with meals.      metoprolol SR (TOPROL XL) 50 MG TABLET SR 24 HR Take 2 Tablets by mouth every day. 60 Tablet 11    lisinopril (PRINIVIL) 5 MG Tab Take 1 Tablet by mouth every day. 30 Tablet 11    amLODIPine (NORVASC) 10 MG Tab Take 1 Tablet by mouth every day. 30 Tablet 0    aspirin (ASA) 81 MG Chew Tab chewable tablet Chew 1 Tablet every day. 100 Tablet 0    dolutegravir (TIVICAY) 50 MG Tab tablet Take 1 Tablet by mouth every day. 30 Tablet 0    rilpivirine (EDURANT) 25 MG Tab tablet Take 1 Tablet by mouth every morning with breakfast. 30 Tablet 0    lisinopril (PRINIVIL) 2.5 MG Tab Take 1 Tablet by mouth every day. 30 Tablet 0    spironolactone (ALDACTONE) 25 MG Tab Take 1 Tablet by mouth every day. 30 Tablet 3    [DISCONTINUED] metoprolol SR (TOPROL XL) 50 MG TABLET SR 24 HR Take 1 Tablet by mouth every day. 30 Tablet 0     No facility-administered encounter medications on file as of 11/23/2022.     Review of Systems   Constitutional: Negative.  Negative for chills, fever and malaise/fatigue.   HENT: Negative.  Negative for sore throat.    Eyes: Negative.    Respiratory: Negative.  Negative for cough, hemoptysis, sputum production, shortness of breath, wheezing and stridor.    Cardiovascular: Negative.  Negative for chest pain, palpitations, orthopnea, claudication, leg swelling and PND.   Gastrointestinal: Negative.    Genitourinary: Negative.    Musculoskeletal: Negative.    Skin: Negative.    Neurological:  "Negative.  Negative for dizziness, loss of consciousness and weakness.   Endo/Heme/Allergies: Negative.  Does not bruise/bleed easily.   All other systems reviewed and are negative.           Objective     BP (!) 140/68 (BP Location: Left arm, Patient Position: Sitting, BP Cuff Size: Adult)   Pulse 71   Resp 16   Ht 1.626 m (5' 4\")   Wt 64.4 kg (142 lb)   SpO2 96%   BMI 24.37 kg/m²     Physical Exam  Vitals and nursing note reviewed.   Constitutional:       General: He is not in acute distress.     Appearance: He is well-developed. He is not diaphoretic.   HENT:      Head: Normocephalic and atraumatic.      Right Ear: External ear normal.      Left Ear: External ear normal.      Nose: Nose normal.      Mouth/Throat:      Pharynx: No oropharyngeal exudate.   Eyes:      General: No scleral icterus.        Right eye: No discharge.         Left eye: No discharge.      Conjunctiva/sclera: Conjunctivae normal.      Pupils: Pupils are equal, round, and reactive to light.   Neck:      Vascular: No JVD.   Cardiovascular:      Rate and Rhythm: Normal rate and regular rhythm.      Heart sounds: No murmur heard.    No friction rub. No gallop.   Pulmonary:      Effort: Pulmonary effort is normal. No respiratory distress.      Breath sounds: No stridor. No wheezing or rales.   Chest:      Chest wall: No tenderness.   Abdominal:      General: There is no distension.      Palpations: Abdomen is soft.      Tenderness: There is no guarding.   Musculoskeletal:         General: No tenderness or deformity. Normal range of motion.      Cervical back: Neck supple.   Skin:     General: Skin is warm and dry.      Coloration: Skin is not pale.      Findings: No erythema or rash.   Neurological:      Mental Status: He is alert.      Cranial Nerves: No cranial nerve deficit.      Motor: No abnormal muscle tone.      Coordination: Coordination normal.      Deep Tendon Reflexes: Reflexes are normal and symmetric. Reflexes normal. "   Psychiatric:         Behavior: Behavior normal.         Thought Content: Thought content normal.         Judgment: Judgment normal.              Assessment & Plan     1. Normochromic normocytic anemia        2. Transient weakness of left lower extremity        3. Hypokalemia        4. ESRD (end stage renal disease) on dialysis ([facility])        5. HIV infection, unspecified symptom status ([facility])  NM-CARDIAC STRESS TEST      6. Elevated troponin        7. Elevated liver enzymes  NM-CARDIAC STRESS TEST      8. HFrEF (heart failure with reduced ejection fraction) ([facility])  NM-CARDIAC STRESS TEST    metoprolol SR (TOPROL XL) 50 MG TABLET SR 24 HR      9. PAD (peripheral artery disease) ([facility])  NM-CARDIAC STRESS TEST      10. Thrombocytopenia ([facility])        11. HTN (hypertension), malignant  lisinopril (PRINIVIL) 5 MG Tab      12. Essential hypertension, benign        13. Abnormal echocardiogram: LVEF 45% in 2015        14. Chronic kidney disease (CKD) stage G3a/A3, moderately decreased glomerular filtration rate (GFR) between 45-59 mL/min/1.73 square meter and albuminuria creatinine ratio greater than 300 mg/g ([facility])        15. Dyslipidemia        16. History of CVA (cerebrovascular accident)        17. Resistant hypertension        18. Bilateral carotid artery stenosis        19. Acute respiratory failure with hypoxia ([facility])        20. AIDS (acquired immunodeficiency syndrome), CD4 <=200 ([facility])            Medical Decision Making: Today's Assessment/Status/Plan:        57-year-old male with end-stage renal disease likely from uncontrolled high blood pressure.  I will increase his lisinopril to 5 and increasing metoprolol to 100/day.  We will get a nuclear stress test.  He will continue to monitor his blood pressures.  We will see him back after his stress test.

## 2022-12-09 RX ORDER — SEVELAMER CARBONATE 800 MG/1
1600 TABLET, FILM COATED ORAL
Qty: 240 TABLET | Refills: 11 | Status: ON HOLD | OUTPATIENT
Start: 2022-12-09 | End: 2023-10-31

## 2022-12-09 NOTE — TELEPHONE ENCOUNTER
Received request via: Pharmacy    Was the patient seen in the last year in this department? No    Does the patient have an active prescription (recently filled or refills available) for medication(s) requested? No    Does the patient have USP Plus and need 100 day supply (blood pressure, diabetes and cholesterol meds only)? Patient does not have SCP

## 2022-12-29 ENCOUNTER — TELEPHONE (OUTPATIENT)
Dept: CARDIOLOGY | Facility: MEDICAL CENTER | Age: 57
End: 2022-12-29
Payer: MEDICAID

## 2023-01-09 ENCOUNTER — HOSPITAL ENCOUNTER (OUTPATIENT)
Dept: RADIOLOGY | Facility: MEDICAL CENTER | Age: 58
End: 2023-01-09
Attending: INTERNAL MEDICINE
Payer: MEDICAID

## 2023-01-09 DIAGNOSIS — R74.8 ELEVATED LIVER ENZYMES: ICD-10-CM

## 2023-01-09 DIAGNOSIS — I50.20 HFREF (HEART FAILURE WITH REDUCED EJECTION FRACTION) (HCC): ICD-10-CM

## 2023-01-09 DIAGNOSIS — I73.9 PAD (PERIPHERAL ARTERY DISEASE) (HCC): ICD-10-CM

## 2023-01-09 DIAGNOSIS — B20 HIV INFECTION, UNSPECIFIED SYMPTOM STATUS (HCC): ICD-10-CM

## 2023-01-09 PROCEDURE — 700111 HCHG RX REV CODE 636 W/ 250 OVERRIDE (IP)

## 2023-01-09 PROCEDURE — 78452 HT MUSCLE IMAGE SPECT MULT: CPT

## 2023-01-09 RX ORDER — REGADENOSON 0.08 MG/ML
INJECTION, SOLUTION INTRAVENOUS
Status: COMPLETED
Start: 2023-01-09 | End: 2023-01-09

## 2023-01-09 RX ADMIN — REGADENOSON 0.4 MG: 0.08 INJECTION, SOLUTION INTRAVENOUS at 15:15

## 2023-01-10 ENCOUNTER — HOSPITAL ENCOUNTER (OUTPATIENT)
Dept: RADIOLOGY | Facility: MEDICAL CENTER | Age: 58
End: 2023-01-10
Attending: ORTHOPAEDIC SURGERY
Payer: MEDICAID

## 2023-01-10 DIAGNOSIS — M25.562 LEFT KNEE PAIN, UNSPECIFIED CHRONICITY: ICD-10-CM

## 2023-01-10 PROCEDURE — 78452 HT MUSCLE IMAGE SPECT MULT: CPT | Mod: 26 | Performed by: INTERNAL MEDICINE

## 2023-01-10 PROCEDURE — 93018 CV STRESS TEST I&R ONLY: CPT | Performed by: INTERNAL MEDICINE

## 2023-01-10 PROCEDURE — 73721 MRI JNT OF LWR EXTRE W/O DYE: CPT | Mod: LT

## 2023-01-26 ENCOUNTER — TELEPHONE (OUTPATIENT)
Dept: NEPHROLOGY | Facility: MEDICAL CENTER | Age: 58
End: 2023-01-26

## 2023-01-26 NOTE — TELEPHONE ENCOUNTER
Caller Name: Norma with [facility]  Call Back Number: [phone]    Norma requesting surgical clearance from Nephrology for dialysis patient, I informed her that our providers do not fill out surgical clearances, surgical clearance will need to be routed to patient primary care provider. Norma asked me to ask Dr.Najjar for a clearance still since patient has been on dialysis. Dr.Najjar out of office, routed question to oncall provider  who advised for Norma to route clearance to primary care provider.  I left voicemail to Norma extension relaying message.

## 2023-01-27 ENCOUNTER — OFFICE VISIT (OUTPATIENT)
Dept: CARDIOLOGY | Facility: MEDICAL CENTER | Age: 58
End: 2023-01-27
Payer: MEDICAID

## 2023-01-27 VITALS
HEART RATE: 92 BPM | BODY MASS INDEX: 25.27 KG/M2 | RESPIRATION RATE: 16 BRPM | OXYGEN SATURATION: 99 % | SYSTOLIC BLOOD PRESSURE: 210 MMHG | WEIGHT: 148 LBS | DIASTOLIC BLOOD PRESSURE: 110 MMHG | HEIGHT: 64 IN

## 2023-01-27 DIAGNOSIS — N18.6 ESRD (END STAGE RENAL DISEASE) ON DIALYSIS (HCC): ICD-10-CM

## 2023-01-27 DIAGNOSIS — I73.9 PAD (PERIPHERAL ARTERY DISEASE) (HCC): ICD-10-CM

## 2023-01-27 DIAGNOSIS — N18.6 ANEMIA DUE TO CHRONIC KIDNEY DISEASE, ON CHRONIC DIALYSIS (HCC): ICD-10-CM

## 2023-01-27 DIAGNOSIS — I50.20 HFREF (HEART FAILURE WITH REDUCED EJECTION FRACTION) (HCC): ICD-10-CM

## 2023-01-27 DIAGNOSIS — I10 ESSENTIAL HYPERTENSION, BENIGN: ICD-10-CM

## 2023-01-27 DIAGNOSIS — Z91.199 PERSONAL HISTORY OF NONADHERENCE TO MEDICAL TREATMENT: ICD-10-CM

## 2023-01-27 DIAGNOSIS — D63.1 ANEMIA DUE TO CHRONIC KIDNEY DISEASE, ON CHRONIC DIALYSIS (HCC): ICD-10-CM

## 2023-01-27 DIAGNOSIS — I16.0 HYPERTENSIVE URGENCY: ICD-10-CM

## 2023-01-27 DIAGNOSIS — Z99.2 ANEMIA DUE TO CHRONIC KIDNEY DISEASE, ON CHRONIC DIALYSIS (HCC): ICD-10-CM

## 2023-01-27 DIAGNOSIS — E78.5 DYSLIPIDEMIA: ICD-10-CM

## 2023-01-27 DIAGNOSIS — Z99.2 ESRD (END STAGE RENAL DISEASE) ON DIALYSIS (HCC): ICD-10-CM

## 2023-01-27 DIAGNOSIS — I10 HTN (HYPERTENSION), MALIGNANT: ICD-10-CM

## 2023-01-27 PROCEDURE — 99214 OFFICE O/P EST MOD 30 MIN: CPT | Performed by: NURSE PRACTITIONER

## 2023-01-27 RX ORDER — CALCITRIOL 0.25 UG/1
0.25 CAPSULE, LIQUID FILLED ORAL DAILY
Status: ON HOLD | COMMUNITY
Start: 2022-12-29 | End: 2023-10-31

## 2023-01-27 RX ORDER — DOLUTEGRAVIR SODIUM AND RILPIVIRINE HYDROCHLORIDE 50; 25 MG/1; MG/1
TABLET, FILM COATED ORAL
COMMUNITY
Start: 2023-01-03 | End: 2023-01-27

## 2023-01-27 RX ORDER — AMLODIPINE BESYLATE 10 MG/1
10 TABLET ORAL DAILY
Qty: 90 TABLET | Refills: 3 | Status: ON HOLD | OUTPATIENT
Start: 2023-01-27 | End: 2024-03-25

## 2023-01-27 RX ORDER — ASPIRIN 81 MG/1
81 TABLET, CHEWABLE ORAL DAILY
Qty: 100 TABLET | Refills: 3 | Status: ON HOLD | OUTPATIENT
Start: 2023-01-27 | End: 2023-10-31

## 2023-01-27 RX ORDER — LISINOPRIL 5 MG/1
5 TABLET ORAL DAILY
Qty: 90 TABLET | Refills: 3 | Status: SHIPPED | OUTPATIENT
Start: 2023-01-27 | End: 2023-02-27 | Stop reason: SDUPTHER

## 2023-01-27 RX ORDER — METOPROLOL SUCCINATE 50 MG/1
100 TABLET, EXTENDED RELEASE ORAL DAILY
Qty: 180 TABLET | Refills: 3 | Status: SHIPPED | OUTPATIENT
Start: 2023-01-27 | End: 2023-04-10

## 2023-01-27 ASSESSMENT — FIBROSIS 4 INDEX: FIB4 SCORE: 0.59

## 2023-01-27 NOTE — PROGRESS NOTES
Chief Complaint   Patient presents with    Congestive Heart Failure     F/V DX: Congestive Heart Failure NEW        Subjective     Seamus Palencia is a 57 y.o. male who presents today for hypertension, nonischemic cardiomyopathy, end-stage renal disease, HIV follow-up.  Patient was last seen by Dr. Cesar on 11/20/2022 who recommended ischemic work-up and GDMT optimization.  Patient's most recent hospitalization was 11/15/2022 for fluid volume overload, patient was dialyzed discharged.    Today, patient has not been taking antihypertensive medications as he ran out of refills.  He underwent hemodialysis yesterday.  Patient reports blood pressure at home without dialysis ranged from 210s to 280s systolically.  Patient reports when he has antihypertensive medications available blood pressure systolically 140-150s.  Otherwise, Patient feels well, denies chest pain, shortness of breath, palpitations, dizziness/lightheadedness, orthopnea, PND or Edema. Today, based on physical examination findings, patient is euvolemic. No JVD, lungs are clear to auscultation, no pitting edema in bilateral lower extremities, no ascites.     Will provide refills for medication.  We discussed the importance of patient taking medications, and ER precautions discussed.  We will have patient follow-up on Monday for blood pressure check after resuming his antihypertensive medication therapy.  We will obtain lab work in 2 weeks.    Past Medical History:   Diagnosis Date    Heart failure (HCC)     HIV disease (HCC) 01/01/1995    Hypertension     Renal disorder     ESRD on hemodylsis Tues/Thurs/Sat at [facility]    Seizure (HCC)     Stroke (HCC)      Past Surgical History:   Procedure Laterality Date    CHOLECYSTECTOMY  1980s    OTHER Left     Dialysis graft left arm     Family History   Problem Relation Age of Onset    Diabetes Mother         cause of death    Diabetes Father         cause of death    Diabetes Sister     Other Brother         down  syndrome    No Known Problems Sister     No Known Problems Sister     No Known Problems Sister     Other Daughter         5 daughters, 11 sons     Clotting Disorder Neg Hx     Stroke Neg Hx      Social History     Socioeconomic History    Marital status: Single     Spouse name: Not on file    Number of children: Not on file    Years of education: Not on file    Highest education level: Not on file   Occupational History    Not on file   Tobacco Use    Smoking status: Every Day     Packs/day: 0.25     Types: Cigarettes    Smokeless tobacco: Never    Tobacco comments:     3-4 CIGARETTES A DAY   Vaping Use    Vaping Use: Never used   Substance and Sexual Activity    Alcohol use: No    Drug use: No    Sexual activity: Not on file   Other Topics Concern    Not on file   Social History Narrative    Not on file     Social Determinants of Health     Financial Resource Strain: Not on file   Food Insecurity: Not on file   Transportation Needs: Not on file   Physical Activity: Not on file   Stress: Not on file   Social Connections: Not on file   Intimate Partner Violence: Not on file   Housing Stability: Not on file     No Known Allergies  Outpatient Encounter Medications as of 1/27/2023   Medication Sig Dispense Refill    amLODIPine (NORVASC) 10 MG Tab Take 1 Tablet by mouth every day. 90 Tablet 3    aspirin (ASA) 81 MG Chew Tab chewable tablet Chew 1 Tablet every day. 100 Tablet 3    lisinopril (PRINIVIL) 5 MG Tab Take 1 Tablet by mouth every day. 90 Tablet 3    metoprolol SR (TOPROL XL) 50 MG TABLET SR 24 HR Take 2 Tablets by mouth every day. 180 Tablet 3    sevelamer carbonate (RENVELA) 800 MG Tab tablet Take 2 Tablets by mouth 3 times a day with meals. 1 po BID with snack 240 Tablet 11    dolutegravir (TIVICAY) 50 MG Tab tablet Take 1 Tablet by mouth every day. 30 Tablet 0    rilpivirine (EDURANT) 25 MG Tab tablet Take 1 Tablet by mouth every morning with breakfast. 30 Tablet 0    [DISCONTINUED] metoprolol SR (TOPROL XL)  "50 MG TABLET SR 24 HR Take 2 Tablets by mouth every day. 60 Tablet 11    [DISCONTINUED] lisinopril (PRINIVIL) 5 MG Tab Take 1 Tablet by mouth every day. 30 Tablet 11    [DISCONTINUED] amLODIPine (NORVASC) 10 MG Tab Take 1 Tablet by mouth every day. 30 Tablet 0    [DISCONTINUED] aspirin (ASA) 81 MG Chew Tab chewable tablet Chew 1 Tablet every day. 100 Tablet 0    [DISCONTINUED] lisinopril (PRINIVIL) 2.5 MG Tab Take 1 Tablet by mouth every day. 30 Tablet 0    [DISCONTINUED] spironolactone (ALDACTONE) 25 MG Tab Take 1 Tablet by mouth every day. 30 Tablet 3     No facility-administered encounter medications on file as of 1/27/2023.     ROS negative except as noted in HPI           Objective     BP (!) 210/110 (BP Location: Left arm, Patient Position: Sitting, BP Cuff Size: Adult)   Pulse 92   Resp 16   Ht 1.626 m (5' 4\")   Wt 67.1 kg (148 lb)   SpO2 99%   BMI 25.40 kg/m²     BP rechecked 210/110s  Physical Exam  Vitals reviewed.   Constitutional:       Appearance: He is well-developed.   HENT:      Head: Normocephalic and atraumatic.   Eyes:      Pupils: Pupils are equal, round, and reactive to light.   Neck:      Vascular: No JVD.   Cardiovascular:      Rate and Rhythm: Normal rate and regular rhythm.      Heart sounds: Normal heart sounds. No murmur heard.    No friction rub. No gallop.   Pulmonary:      Effort: Pulmonary effort is normal. No respiratory distress.      Breath sounds: Normal breath sounds.   Abdominal:      General: Bowel sounds are normal. There is no distension.      Palpations: Abdomen is soft.   Musculoskeletal:      Right lower leg: No edema.      Left lower leg: No edema.   Skin:     General: Skin is warm and dry.      Findings: No erythema.      Comments: Left upper extremity AV fistula   Neurological:      Mental Status: He is alert and oriented to person, place, and time.   Psychiatric:         Behavior: Behavior normal.          Lab Results   Component Value Date/Time    CHOLSTRLTOT 189 " 03/14/2019 03:39 PM     (H) 03/14/2019 03:39 PM    HDL 39 (A) 03/14/2019 03:39 PM    TRIGLYCERIDE 193 (H) 03/14/2019 03:39 PM       Lab Results   Component Value Date/Time    SODIUM 134 (L) 11/18/2022 01:00 AM    POTASSIUM 4.2 11/18/2022 01:00 AM    CHLORIDE 94 (L) 11/18/2022 01:00 AM    CO2 25 11/18/2022 01:00 AM    GLUCOSE 86 11/18/2022 01:00 AM    BUN 23 (H) 11/18/2022 01:00 AM    CREATININE 7.15 (HH) 11/18/2022 01:00 AM     Lab Results   Component Value Date/Time    ALKPHOSPHAT 134 (H) 11/15/2022 07:15 AM    ASTSGOT <5 (L) 11/15/2022 07:15 AM    ALTSGPT 6 11/15/2022 07:15 AM    TBILIRUBIN 0.3 11/15/2022 07:15 AM      NM cardiac stress (1/9/2023): No evidence of significant jeopardized viable myocardium or prior myocardial    infarction.   Normal left ventricular size, ejection fraction, and wall motion.   ECG INTERPRETATION   Negative stress ECG for ischemia.    Assessment & Plan     1. HTN (hypertension), malignant  amLODIPine (NORVASC) 10 MG Tab    lisinopril (PRINIVIL) 5 MG Tab    Basic Metabolic Panel      2. PAD (peripheral artery disease) ([facility])  aspirin (ASA) 81 MG Chew Tab chewable tablet    Basic Metabolic Panel      3. HFrEF (heart failure with reduced ejection fraction) ([facility])  metoprolol SR (TOPROL XL) 50 MG TABLET SR 24 HR      4. ESRD (end stage renal disease) on dialysis ([facility])  Basic Metabolic Panel      5. Essential hypertension, benign  Basic Metabolic Panel      6. Anemia due to chronic kidney disease, on chronic dialysis ([facility])        7. Dyslipidemia        8. Personal history of nonadherence to medical treatment        9. Hypertensive urgency            Medical Decision Making: Today's Assessment/Status/Plan:        Nonischemic cardiomyopathy; hypertension; end-stage renal disease; PAD  -Resume metoprolol 100 mg daily  -Resume lisinopril 5 mg daily  -Resume amlodipine 10 mg daily  -Resume aspirin 81 mg daily  -Stop spironolactone due to GFR less than 15.  SGLT2 not applicable due to GFR  less than 20  -HD per nephrology  -ER precautions discussed for hypertensive urgency.  Blood pressure check on Monday.  Close follow-up in 4 weeks for further optimization.    FU in clinic in 4 weeks.  Sooner if needed.    Patient verbalizes understanding and agrees with the plan of care.     MARQUES Glaser.   [facility]  [phone]    PLEASE NOTE: This Note was created using voice recognition Software. I have made every reasonable attempt to correct obvious errors, but I expect that there are errors of grammar and possibly content that I did not discover before finalizing the note

## 2023-02-11 NOTE — LETTER
[facility]   [address] [place] [address]  LAURY Mckeon [address]  Phone: [phone]  Fax: [phone]              Seamus Palencia  1965    Encounter Date: 5/4/2018    EDEN Bell          PROGRESS NOTE:  Chief Complaint   Patient presents with   • HTN (Uncontrolled)     F/V: 1 WEEK       Subjective:   Seamus Palencia is a 53 y.o. male who presents today for follow up on his HTN and heart failure.     Patient of Dr. Marie. Patient was last seen 4/25/18.  During his last visit, patient continued to have elevated blood pressure.  Patient was started on hydralazine 50 mg 3 times daily and isosorbide dinitrate 20 mg 3 times daily.  Patient reports no problems with the new medications.      Patient did bring in his medications today.  All were verified except spironolactone.  Patient reports he may have run out of this a few weeks ago.        For his heart failure, he is essentially at NYHA class 1 symptoms.    Patient denies chest pain, shortness of breath at rest, ADLs, Exertion, palpitations, dizziness/lightheadedness, orthopnea, PND or Edema.      Home weights have been stable.      Pt does reports the he is smoking a few cigarettes per day and will try to quit again.     Additonally, patient has the following medical problems:     -HIV infection followed by the [facility] clinic     -HTN: takes carvedilol, lisinopril, amlodipine, hydralazine, isosorbide, Spironolactone    Past Medical History:   Diagnosis Date   • Heart failure (HCC)    • HIV disease (HCC) 1995   • Hypertension    • Seizure (HCC)    • Stroke (HCC)      Past Surgical History:   Procedure Laterality Date   • CHOLECYSTECTOMY  1980s     Family History   Problem Relation Age of Onset   • Diabetes Sister    • Other Brother      down syndrome   • No Known Problems Sister    • No Known Problems Sister    • No Known Problems Sister      Social History     Social History   • Marital status: Single     Spouse  name: N/A   • Number of children: N/A   • Years of education: N/A     Occupational History   • Not on file.     Social History Main Topics   • Smoking status: Former Smoker     Packs/day: 0.25     Types: Cigarettes     Quit date: 10/27/2017   • Smokeless tobacco: Never Used      Comment: Stopped smoking late OCt 2017   • Alcohol use No   • Drug use: No   • Sexual activity: Not on file     Other Topics Concern   • Not on file     Social History Narrative   • No narrative on file     No Known Allergies  Outpatient Encounter Prescriptions as of 5/4/2018   Medication Sig Dispense Refill   • hydrALAZINE (APRESOLINE) 50 MG Tab Take 1 Tab by mouth 3 times a day. 90 Tab 11   • isosorbide dinitrate (ISORDIL) 20 MG Tab Take 1 Tab by mouth 3 times a day. 90 Tab 11   • amLODIPine (NORVASC) 10 MG Tab Take 1 Tab by mouth 2 Times a Day. 30 Tab 11   • atorvastatin (LIPITOR) 10 MG Tab Take 1 Tab by mouth every day. 30 Tab 11   • carvedilol (COREG) 25 MG Tab Take 2 Tabs by mouth 2 times a day, with meals. 180 Tab 3   • lisinopril (PRINIVIL, ZESTRIL) 40 MG tablet Take 1 Tab by mouth 2 times a day. (Patient taking differently: Take 40 mg by mouth every day.) 60 Tab 11   • ASPIRIN LOW DOSE 81 MG EC tablet Take 1 Tab by mouth every day.  2   • TRIUMEQ 600- MG Tab Take 1 Tab by mouth every day.  5   • sulfamethoxazole-trimethoprim (BACTRIM DS) 800-160 MG tablet Take 1 Tab by mouth every day.     • levetiracetam (KEPPRA) 500 MG Tab Take 1 Tab by mouth every 12 hours. 60 Tab 3   • spironolactone (ALDACTONE) 50 MG Tab Take 1 Tab by mouth every day. 30 Tab 3   • acetaminophen (TYLENOL) 325 MG Tab Take 650 mg by mouth every four hours as needed.       No facility-administered encounter medications on file as of 5/4/2018.      Review of Systems   Constitutional: Negative for fever and malaise/fatigue.   Respiratory: Negative for cough and shortness of breath.    Cardiovascular: Negative for chest pain, palpitations, orthopnea,  "claudication, leg swelling and PND.   Gastrointestinal: Negative for abdominal pain.   Musculoskeletal: Negative for myalgias.   Neurological: Negative for dizziness.   All other systems reviewed and are negative.       Objective:   /88   Pulse 92   Ht 1.626 m (5' 4\")   Wt 69.9 kg (154 lb)   SpO2 95%   BMI 26.43 kg/m²      Physical Exam   Constitutional: He is oriented to person, place, and time. He appears well-developed and well-nourished.   HENT:   Head: Normocephalic and atraumatic.   Eyes: EOM are normal. Pupils are equal, round, and reactive to light.   Neck: Normal range of motion. Neck supple. No JVD present.   Cardiovascular: Normal rate, regular rhythm and normal heart sounds.    Pulmonary/Chest: Effort normal and breath sounds normal. No respiratory distress. He has no wheezes. He has no rales.   Musculoskeletal: He exhibits no edema.   Neurological: He is alert and oriented to person, place, and time.   Skin: Skin is warm and dry.   Psychiatric: He has a normal mood and affect. His behavior is normal.     Lab Results   Component Value Date/Time    CHOLSTRLTOT 49 (L) 08/19/2016 01:15 AM    LDL 16 08/19/2016 01:15 AM    HDL 17 (A) 08/19/2016 01:15 AM    TRIGLYCERIDE 80 08/19/2016 01:15 AM       Lab Results   Component Value Date/Time    SODIUM 139 08/22/2016 04:31 AM    POTASSIUM 4.0 08/22/2016 04:31 AM    CHLORIDE 110 08/22/2016 04:31 AM    CO2 22 08/22/2016 04:31 AM    GLUCOSE 96 08/22/2016 04:31 AM    BUN 5 (L) 08/22/2016 04:31 AM    CREATININE 0.67 08/22/2016 04:31 AM     Lab Results   Component Value Date/Time    ALKPHOSPHAT 89 08/18/2016 02:38 AM    ASTSGOT 7 (L) 08/18/2016 02:38 AM    ALTSGPT 7 08/18/2016 02:38 AM    TBILIRUBIN 0.3 08/18/2016 02:38 AM        Transthoracic Echo Report 12/27/15  Left ventricular ejection fraction is visually estimated to be 45%.  Septum hyopkinesis, Basal inferiolateral and basal to mid anterior wall   akinesis, grade III diastolic dysfunction.  Mild " concentric left ventricular hypertrophy.  Small pericardial effusion without evidence of hemodynamic compromise.  Estimated right ventricular systolic pressure  is 60 mmHg.  Moderate mitral regurgitation.  Normal inferior vena cava size and inspiratory collapse.  No prior study is available for comparison.         Transthoracic Echo Report 8/30/17  No prior study is available for comparison.   Normal left ventricular systolic function.   No evidence of valvular abnormality based on Doppler evaluation  Assessment:     1. HTN (hypertension), malignant  lisinopril (PRINIVIL, ZESTRIL) 40 MG tablet    spironolactone (ALDACTONE) 50 MG Tab    BASIC METABOLIC PANEL   2. ACC/AHA stage C heart failure with preserved ejection fraction (HCC)  lisinopril (PRINIVIL, ZESTRIL) 40 MG tablet    BASIC METABOLIC PANEL   3. Left ventricular diastolic dysfunction, NYHA class 1  lisinopril (PRINIVIL, ZESTRIL) 40 MG tablet    BASIC METABOLIC PANEL   4. HIV (human immunodeficiency virus infection) (HCC)     5. High risk medication use         Medical Decision Making:  Today's Assessment / Status / Plan:   1. HTN: BP is better today, asymptomatic.  -Resume Spironolactone 50 mg Daily (Refill sent to Pharmacy)  -Continue Carvedilol 50 mg twice a day  -Continue amlodipine 10 mg BID  -Continue Lisinopril 40 mg Daily  -Continue Hydralazine 50 mg TID  -Continue Isosorbide dinitrate 20 mg TID  -BMP in 1 week  -Continue to Monitor and log Blood pressures at home. Call office or RTC if BP increasing, decreasing or >180/100, <90/50 or if symptoms of elevated/low blood pressure present. Reviewed s/sx of stroke and heart attack. Patient to go to ER or call 911 if present.      2. HFrEF, Stage C, class 1: Based on physical examination findings, patient is euvolemic. No JVD, lungs are clear to auscultation, no pitting edema in bilateral lower extremities, no ascites.  -Continue carvedilol 50 mg twice a day  -Continue lisinopril 40 mg twice a  day  -Reinforced s/sx of worsening heart failure with patient and weight monitoring. Pt verbalizes understanding. Pt to call office or RTC if present.      3. HIV:  -Continue follow up with [facility]     Continue with smoking Cessation.     FU in clinic in 1 month. Sooner if needed.     Patient verbalizes understanding and agrees with the plan of care.      Collaborating MD: MD Ellie Gonzalez, A.P.NMeghna  [address] [place] [address] [place] [address]  VIA Facsimile: [phone]                  Never smoker

## 2023-02-27 ENCOUNTER — OFFICE VISIT (OUTPATIENT)
Dept: CARDIOLOGY | Facility: MEDICAL CENTER | Age: 58
End: 2023-02-27
Payer: MEDICAID

## 2023-02-27 VITALS
OXYGEN SATURATION: 99 % | SYSTOLIC BLOOD PRESSURE: 220 MMHG | RESPIRATION RATE: 16 BRPM | DIASTOLIC BLOOD PRESSURE: 100 MMHG | WEIGHT: 145.2 LBS | HEIGHT: 64 IN | HEART RATE: 83 BPM | BODY MASS INDEX: 24.79 KG/M2

## 2023-02-27 DIAGNOSIS — I10 ESSENTIAL HYPERTENSION, BENIGN: ICD-10-CM

## 2023-02-27 DIAGNOSIS — N18.6 ESRD (END STAGE RENAL DISEASE) ON DIALYSIS (HCC): ICD-10-CM

## 2023-02-27 DIAGNOSIS — Z99.2 ESRD (END STAGE RENAL DISEASE) ON DIALYSIS (HCC): ICD-10-CM

## 2023-02-27 DIAGNOSIS — I10 HTN (HYPERTENSION), MALIGNANT: ICD-10-CM

## 2023-02-27 DIAGNOSIS — E78.5 DYSLIPIDEMIA: ICD-10-CM

## 2023-02-27 DIAGNOSIS — I73.9 PAD (PERIPHERAL ARTERY DISEASE) (HCC): ICD-10-CM

## 2023-02-27 DIAGNOSIS — I16.0 HYPERTENSIVE URGENCY: ICD-10-CM

## 2023-02-27 PROCEDURE — 99214 OFFICE O/P EST MOD 30 MIN: CPT | Performed by: NURSE PRACTITIONER

## 2023-02-27 RX ORDER — LISINOPRIL 10 MG/1
10 TABLET ORAL DAILY
Qty: 90 TABLET | Refills: 3 | Status: SHIPPED | OUTPATIENT
Start: 2023-02-27 | End: 2023-05-08 | Stop reason: DRUGHIGH

## 2023-02-27 RX ORDER — DOLUTEGRAVIR SODIUM AND RILPIVIRINE HYDROCHLORIDE 50; 25 MG/1; MG/1
1 TABLET, FILM COATED ORAL DAILY
COMMUNITY
Start: 2023-02-08

## 2023-02-27 RX ORDER — HYDRALAZINE HYDROCHLORIDE 25 MG/1
25 TABLET, FILM COATED ORAL 3 TIMES DAILY
Qty: 270 TABLET | Refills: 3 | Status: ON HOLD | OUTPATIENT
Start: 2023-02-27 | End: 2023-10-31

## 2023-02-27 ASSESSMENT — FIBROSIS 4 INDEX: FIB4 SCORE: 0.59

## 2023-02-27 NOTE — PROGRESS NOTES
Chief Complaint   Patient presents with    Hypertension     F/V DX: HTN (hypertension), malignant       Subjective     Seamus Palencia is a 57 y.o. male who presents today for hypertension, nonischemic cardiomyopathy, end-stage renal disease, HIV follow-up.  Patient was last seen by myself on 1/27/2022 Dr. Cesar on 11/20/2022 who recommended ischemic work-up and GDMT optimization.  Patient's most recent hospitalization was 11/15/2022 for fluid volume overload, patient was dialyzed discharged.  Patient continues to receive dialysis on Tuesday Thursday Saturday.    Today, Patient feels well, denies chest pain, shortness of breath, palpitations, dizziness/lightheadedness, orthopnea, PND or Edema.  Patient reports blood pressure persistently elevated above 200 systolically.  Patient reports blood pressure is normal during dialysis after.  Patient appears euvolemic on exam.  We will optimize his medical therapy per below.  We will refer patient to pharmacotherapy for additional assistance.  We will obtain BMP for renal electrolyte function for high risk medication use in 2 weeks.    Past Medical History:   Diagnosis Date    Heart failure (HCC)     HIV disease (HCC) 01/01/1995    Hypertension     Renal disorder     ESRD on hemodylsis Tues/Thurs/Sat at [facility]    Seizure (HCC)     Stroke (HCC)      Past Surgical History:   Procedure Laterality Date    CHOLECYSTECTOMY  1980s    OTHER Left     Dialysis graft left arm     Family History   Problem Relation Age of Onset    Diabetes Mother         cause of death    Diabetes Father         cause of death    Diabetes Sister     Other Brother         down syndrome    No Known Problems Sister     No Known Problems Sister     No Known Problems Sister     Other Daughter         5 daughters, 11 sons     Clotting Disorder Neg Hx     Stroke Neg Hx      Social History     Socioeconomic History    Marital status: Single     Spouse name: Not on file    Number of children: Not on file     Years of education: Not on file    Highest education level: Not on file   Occupational History    Not on file   Tobacco Use    Smoking status: Every Day     Packs/day: 0.25     Types: Cigarettes    Smokeless tobacco: Never    Tobacco comments:     3-4 CIGARETTES A DAY   Vaping Use    Vaping Use: Never used   Substance and Sexual Activity    Alcohol use: No    Drug use: No    Sexual activity: Not on file   Other Topics Concern    Not on file   Social History Narrative    Not on file     Social Determinants of Health     Financial Resource Strain: Not on file   Food Insecurity: Not on file   Transportation Needs: Not on file   Physical Activity: Not on file   Stress: Not on file   Social Connections: Not on file   Intimate Partner Violence: Not on file   Housing Stability: Not on file     No Known Allergies  Outpatient Encounter Medications as of 2/27/2023   Medication Sig Dispense Refill    JULUCA 50-25 MG Tab TAKE ONE TABLET BY MOUTH WITH FOOD EVERY DAY      amLODIPine (NORVASC) 10 MG Tab Take 1 Tablet by mouth every day. 90 Tablet 3    aspirin (ASA) 81 MG Chew Tab chewable tablet Chew 1 Tablet every day. 100 Tablet 3    lisinopril (PRINIVIL) 5 MG Tab Take 1 Tablet by mouth every day. 90 Tablet 3    metoprolol SR (TOPROL XL) 50 MG TABLET SR 24 HR Take 2 Tablets by mouth every day. 180 Tablet 3    calcitRIOL (ROCALTROL) 0.25 MCG Cap       sevelamer carbonate (RENVELA) 800 MG Tab tablet Take 2 Tablets by mouth 3 times a day with meals. 1 po BID with snack 240 Tablet 11    dolutegravir (TIVICAY) 50 MG Tab tablet Take 1 Tablet by mouth every day. 30 Tablet 0    rilpivirine (EDURANT) 25 MG Tab tablet Take 1 Tablet by mouth every morning with breakfast. 30 Tablet 0     No facility-administered encounter medications on file as of 2/27/2023.     ROS negative except as noted in HPI           Objective     BP (!) 220/100 (BP Location: Right arm, Patient Position: Sitting, BP Cuff Size: Adult)   Pulse 83   Resp 16   Ht  "1.626 m (5' 4\")   Wt 65.9 kg (145 lb 3.2 oz)   SpO2 99%   BMI 24.92 kg/m²     BP rechecked 210/110s  Physical Exam  Vitals reviewed.   Constitutional:       Appearance: He is well-developed.   HENT:      Head: Normocephalic and atraumatic.   Eyes:      Pupils: Pupils are equal, round, and reactive to light.   Neck:      Vascular: No JVD.   Cardiovascular:      Rate and Rhythm: Normal rate and regular rhythm.      Heart sounds: Normal heart sounds. No murmur heard.    No friction rub. No gallop.   Pulmonary:      Effort: Pulmonary effort is normal. No respiratory distress.      Breath sounds: Normal breath sounds.   Abdominal:      General: Bowel sounds are normal. There is no distension.      Palpations: Abdomen is soft.   Musculoskeletal:      Right lower leg: No edema.      Left lower leg: No edema.   Skin:     General: Skin is warm and dry.      Findings: No erythema.      Comments: Left upper extremity AV fistula   Neurological:      Mental Status: He is alert and oriented to person, place, and time.   Psychiatric:         Behavior: Behavior normal.          Lab Results   Component Value Date/Time    CHOLSTRLTOT 189 03/14/2019 03:39 PM     (H) 03/14/2019 03:39 PM    HDL 39 (A) 03/14/2019 03:39 PM    TRIGLYCERIDE 193 (H) 03/14/2019 03:39 PM       Lab Results   Component Value Date/Time    SODIUM 134 (L) 11/18/2022 01:00 AM    POTASSIUM 4.2 11/18/2022 01:00 AM    CHLORIDE 94 (L) 11/18/2022 01:00 AM    CO2 25 11/18/2022 01:00 AM    GLUCOSE 86 11/18/2022 01:00 AM    BUN 23 (H) 11/18/2022 01:00 AM    CREATININE 7.15 (HH) 11/18/2022 01:00 AM     Lab Results   Component Value Date/Time    ALKPHOSPHAT 134 (H) 11/15/2022 07:15 AM    ASTSGOT <5 (L) 11/15/2022 07:15 AM    ALTSGPT 6 11/15/2022 07:15 AM    TBILIRUBIN 0.3 11/15/2022 07:15 AM      NM cardiac stress (1/9/2023): No evidence of significant jeopardized viable myocardium or prior myocardial    infarction.   Normal left ventricular size, ejection fraction, " and wall motion.   ECG INTERPRETATION   Negative stress ECG for ischemia.    TTE (7/4/2020):  CONCLUSIONS  Compared to the images of the prior study done 8/30/2017, changes   noted, LV function declined.  Left ventricular ejection fraction is visually estimated to be 40%.  Global hypokinesis with regional variation.  Grade II diastolic dysfunction.  Mild to moderate mitral regurgitation.  Estimated right ventricular systolic pressure  is 50 mmHg.  Assessment & Plan     1. Dyslipidemia        2. Hypertensive urgency        3. Essential hypertension, benign        4. ESRD (end stage renal disease) on dialysis ([facility])        5. PAD (peripheral artery disease) ([facility])            Medical Decision Making: Today's Assessment/Status/Plan:        Nonischemic cardiomyopathy; hypertension; end-stage renal disease; PAD  -Continue metoprolol 100 mg daily  -Increase lisinopril 10 mg daily  -Continue amlodipine 10 mg daily  -Continue aspirin 81 mg daily  -Add hydralazine 25 mg 3 times daily  -BMP in 2 weeks  -HD per nephrology  -ER precautions discussed for hypertensive urgency.  Blood pressure check on Monday.  Close follow-up in 4 weeks for further optimization.    FU in clinic in 4 weeks.  Sooner if needed.    Patient verbalizes understanding and agrees with the plan of care.     MARQUES Glaser.   [facility]  [phone]    PLEASE NOTE: This Note was created using voice recognition Software. I have made every reasonable attempt to correct obvious errors, but I expect that there are errors of grammar and possibly content that I did not discover before finalizing the note

## 2023-02-27 NOTE — PATIENT INSTRUCTIONS
Increase lisinopril to 10 mg daily  Continue amlodipine 10 mg daily  Continue metoprolol 100 mg daily  Start hydralazine 25 mg 3 times a day    Check blood pressure after dialysis and write down for LETA Avina

## 2023-03-20 ENCOUNTER — NON-PROVIDER VISIT (OUTPATIENT)
Dept: CARDIOLOGY | Facility: MEDICAL CENTER | Age: 58
End: 2023-03-20
Payer: MEDICAID

## 2023-03-20 VITALS — SYSTOLIC BLOOD PRESSURE: 131 MMHG | HEART RATE: 88 BPM | DIASTOLIC BLOOD PRESSURE: 78 MMHG

## 2023-03-20 DIAGNOSIS — F17.210 CIGARETTE NICOTINE DEPENDENCE WITHOUT COMPLICATION: ICD-10-CM

## 2023-03-20 PROCEDURE — 99211 OFF/OP EST MAY X REQ PHY/QHP: CPT | Performed by: INTERNAL MEDICINE

## 2023-03-20 RX ORDER — NICOTINE 21 MG/24HR
1 PATCH, TRANSDERMAL 24 HOURS TRANSDERMAL EVERY 24 HOURS
Qty: 30 PATCH | Refills: 1 | Status: SHIPPED | OUTPATIENT
Start: 2023-03-20 | End: 2023-05-09 | Stop reason: SDUPTHER

## 2023-03-20 NOTE — PROGRESS NOTES
"Pharmacotherapy Hypertension Visit  03/20/23     Goal Date when HTN will be controlled: 6/20/23    Type of Visit:  Initial Visit    Seamus Palencia has been referred for evaluation and management of hypertension    HPI:   Vitals:    03/20/23 1338 03/20/23 1340   BP: 131/76 131/78   BP Location: Left arm Right arm   Patient Position: Sitting Sitting   BP Cuff Size: Adult Adult   Pulse: 90 88       Age at Initial Diagnosis: Pt unsure of exact age of dx, but states \"for years\"      Home BP readings:   Pt states before hydralazine always ~ 280's/170's. Since initiation of hydralazine it's trended ~ 180-190/140 . Per chart review, pt BP trends ~ 200's/100's in clinic.  Any readings above  180/110:  Yes, see above  Any symptoms of high blood pressure (TIA, Stroke, Head ache, vision changes): None    Current Prescription HTN Medications - including dose:    Amlodipine 10 mg once daily  Lisinopril 10 mg once daily  Carvedilol - pt unsure of dose. Rx'd by his [facility] PCP    Current side effects potentially related to antihypertensive medications (lightheaded, dizziness, leg swelling): None    Current Adherence to Blood Pressure Medications: Complete    Previously attempted BP medications:   Spironolactone - Dc'd d/t CKD    Any current interfering substances: None     Any current lifestyle issues affecting BP:    Pt states occasional sleep issues - counseled regarding sleep hygiene. Encouraged pt to avoid daytime napping.  Pt was stressed d/t his living situation, but this is now resolved and he is no longer homeless.    In the past 6 months have pt had the following:  EKG - Y  Microalbumin - N  Electrolytes and eGFR - Y  TSH - N  CBC - Y  Fasting lipid panel - N  Fasting glucose - Y    Lab Results   Component Value Date/Time    SODIUM 134 (L) 11/18/2022 01:00 AM    POTASSIUM 4.2 11/18/2022 01:00 AM    CHLORIDE 94 (L) 11/18/2022 01:00 AM    CO2 25 11/18/2022 01:00 AM    GLUCOSE 86 11/18/2022 01:00 AM    BUN 23 (H) 11/18/2022 " 01:00 AM    CREATININE 7.15 (HH) 11/18/2022 01:00 AM        x Medications Reconciled  CURRENT MEDICATIONS:   Current Outpatient Medications:     CARVEDILOL PO, Take  by mouth. Per chen - pt unable to recall dose. NOT taking metoprolol concomitantly.    nicotine, 1 Patch, Transdermal, Q24HRS    Juluca, TAKE ONE TABLET BY MOUTH WITH FOOD EVERY DAY, Taking    lisinopril, 10 mg, Oral, DAILY, Taking    hydrALAZINE, 25 mg, Oral, TID, Taking    amLODIPine, 10 mg, Oral, DAILY, Taking    calcitRIOL, , Taking    sevelamer carbonate, 1,600 mg, Oral, TID WITH MEALS, Taking    aspirin, 81 mg, Oral, DAILY (Patient not taking: Reported on 3/20/2023), Not Taking    metoprolol SR, 100 mg, Oral, DAILY (Patient not taking: Reported on 3/20/2023), Not Taking    dolutegravir, 50 mg, Oral, DAILY (Patient not taking: Reported on 3/20/2023), Not Taking    Edurant, 25 mg, Oral, QDAY with Breakfast (Patient not taking: Reported on 3/20/2023), Not Taking  ALLERGIES: Patient has no known allergies.    SOCIAL HISTORY   Social History     Tobacco Use   Smoking Status Every Day    Packs/day: 0.25    Types: Cigarettes   Smokeless Tobacco Never   Tobacco Comments    3-4 CIGARETTES A DAY     Change in weight:  Stable  Exercise habits: no regular exercise program, pt uses public transportation, but walks the majority of the time to get around.  Diet: common adult      ASSESSMENT AND PLAN    BP is at goal    BP Goal ~ 130/80     Does pt have known end organ damage? Yes - ESRD    BP Monitoring Recommendations - Home BP   Proper technique for blood pressure monitoring reviewed with patient.  Pt instructed to check BP at varying times through out the day 4 times per week.  Provided pt log for blood pressure monitoring and pt instructed to bring in at each visit for reviews    Lifestyle Recommendations From Today’s Visit:    DASH or mediterranean style eating plan  Dietary Sodium Restriction  Increase Physical Activity  Stop Smoking (currently smoking 3  cigarettes daily) - Pt working to cut down on tobacco use. Previously smoked 1 PPD. Pt open to trying NRT patch.    Medication recommendations from today's visit:  ARB/ACEI: Lisinopril 10 mg once daily  Diuretic: None  Calcium Channel Blocker: Amlodipine 10 mg once daily  Other class: Hydralazine 25 mg TID, carvedilol  x Importance of adherence discussed    Other recommendations:   Pt presents to HTN clinic to establish care in the setting of ESRD & HIV. Pt also w/ hx of stroke and PAD.  Pt reported home BP is significantly above goal, but pt states that he recently started hydralazine and has noted a significant decrease his his home BP.   Given pt's hx of BP consistently > 180/110 - advised pt that if he ever has HA, CP, dizziness, vision changes, etc to report to the ED immediately.  Pt's BP today in clinic was at goal, surprisingly. Pt currently doing well on his current medication regimen w/ no issues.   Pt interested in NRT - sent in Rx for 14 mg patch to be administered once daily. Pt to continue to cut down on his cigarette use.   Will f/u w/ pt in one month for BP check - if it is elevated at that time we can consider addition of nonselective alpha antagonist or 24 hr ABPM.      Studies Ordered at Todays Visit: None  Blood Work Ordered At Today’s visit: None  Follow-Up: 1 months (every 1-4 weeks until at goal)     Gal Newell, PharmD, BCACP    CC:  Ellie Brown, KERRI Dubon, TATE Peraza*

## 2023-03-28 ENCOUNTER — TELEPHONE (OUTPATIENT)
Dept: CARDIOLOGY | Facility: MEDICAL CENTER | Age: 58
End: 2023-03-28
Payer: MEDICAID

## 2023-04-10 ENCOUNTER — OFFICE VISIT (OUTPATIENT)
Dept: CARDIOLOGY | Facility: MEDICAL CENTER | Age: 58
End: 2023-04-10
Payer: MEDICAID

## 2023-04-10 VITALS
SYSTOLIC BLOOD PRESSURE: 182 MMHG | HEIGHT: 64 IN | DIASTOLIC BLOOD PRESSURE: 80 MMHG | RESPIRATION RATE: 17 BRPM | HEART RATE: 100 BPM | OXYGEN SATURATION: 99 % | WEIGHT: 147.8 LBS | BODY MASS INDEX: 25.23 KG/M2

## 2023-04-10 DIAGNOSIS — I16.0 HYPERTENSIVE URGENCY: ICD-10-CM

## 2023-04-10 DIAGNOSIS — N18.6 ANEMIA DUE TO CHRONIC KIDNEY DISEASE, ON CHRONIC DIALYSIS (HCC): ICD-10-CM

## 2023-04-10 DIAGNOSIS — I10 ESSENTIAL HYPERTENSION, BENIGN: ICD-10-CM

## 2023-04-10 DIAGNOSIS — Z99.2 ANEMIA DUE TO CHRONIC KIDNEY DISEASE, ON CHRONIC DIALYSIS (HCC): ICD-10-CM

## 2023-04-10 DIAGNOSIS — Z99.2 ESRD (END STAGE RENAL DISEASE) ON DIALYSIS (HCC): ICD-10-CM

## 2023-04-10 DIAGNOSIS — N18.6 ESRD (END STAGE RENAL DISEASE) ON DIALYSIS (HCC): ICD-10-CM

## 2023-04-10 DIAGNOSIS — I10 HTN (HYPERTENSION), MALIGNANT: ICD-10-CM

## 2023-04-10 DIAGNOSIS — I73.9 PAD (PERIPHERAL ARTERY DISEASE) (HCC): ICD-10-CM

## 2023-04-10 DIAGNOSIS — D64.9 NORMOCHROMIC NORMOCYTIC ANEMIA: ICD-10-CM

## 2023-04-10 DIAGNOSIS — D63.1 ANEMIA DUE TO CHRONIC KIDNEY DISEASE, ON CHRONIC DIALYSIS (HCC): ICD-10-CM

## 2023-04-10 PROCEDURE — 99214 OFFICE O/P EST MOD 30 MIN: CPT | Performed by: NURSE PRACTITIONER

## 2023-04-10 RX ORDER — CARVEDILOL 25 MG/1
25 TABLET ORAL 2 TIMES DAILY WITH MEALS
Qty: 180 TABLET | Refills: 3 | Status: SHIPPED | OUTPATIENT
Start: 2023-04-10 | End: 2024-03-21

## 2023-04-10 ASSESSMENT — FIBROSIS 4 INDEX: FIB4 SCORE: 0.59

## 2023-04-10 NOTE — PATIENT INSTRUCTIONS
STOP NIFEDIPINE. TAKE AMLODIPINE    INCREASE COREG 25 MG TWICE A DAY WITH FOOD    Lisinopril and Hydralazine doses are the same    You can hold hydralazine before dialysis for better blood pressure in dialysis

## 2023-04-10 NOTE — PROGRESS NOTES
Chief Complaint   Patient presents with    Hypertension     F/V DX: HTN (hypertension), malignant    Congestive Heart Failure     F/V DX: ACC/AHA stage C heart failure with preserved ejection fraction (HCC)       Subjective     Seamus Palencia is a 57 y.o. male who presents today for hypertension, nonischemic cardiomyopathy, end-stage renal disease, HIV follow-up.  Patient was last seen by myself on 2/27/2023 and Dr. Cesar on 11/20/2022 who recommended ischemic work-up and GDMT optimization.  Patient's most recent hospitalization was 11/15/2022 for fluid volume overload, patient was dialyzed discharged.  Patient continues to receive dialysis on Tuesday Thursday Saturday.  Since patient was last seen, patient also established with pharmacotherapy clinic on 3/10/2023 where his blood pressure was at goal.    Today, patient reports blood pressure during dialysis 160-140s/60s.  On nondialysis days patient does not check his blood pressure.  Patient reports his blood pressure is much lower at the end of his dialysis treatment and he sometimes experiences dizziness.  Otherwise, patient denies chest pain, shortness of breath, palpitations, orthopnea, PND, or edema.  Patient did not take his medications today.  Patient does not know his carvedilol dose.  Called [facility] pharmacy to determine carvedilol dose.  Patient also has nifedipine ordered.    We discussed importance of continuing lisinopril and hydralazine as previously prescribed.  Patient may hold hydralazine prior to dialysis for less dizziness after.  We also discussed for patient to stop nifedipine while taking in conjunction with amlodipine.  Patient currently has 12.5 mg of Coreg prescribed, we will increase Coreg dose to 25 mg twice daily.  Patient verbalizes understanding.  Written instructions provided.  Patient to follow-up in 3 months.    Past Medical History:   Diagnosis Date    Heart failure (HCC)     HIV disease (HCC) 01/01/1995    Hypertension     Renal  disorder     ESRD on hemodylsis Tues/Thurs/Sat at [facility]    Seizure (HCC)     Stroke (HCC)      Past Surgical History:   Procedure Laterality Date    CHOLECYSTECTOMY  1980s    OTHER Left     Dialysis graft left arm     Family History   Problem Relation Age of Onset    Diabetes Mother         cause of death    Diabetes Father         cause of death    Diabetes Sister     Other Brother         down syndrome    No Known Problems Sister     No Known Problems Sister     No Known Problems Sister     Other Daughter         5 daughters, 11 sons     Clotting Disorder Neg Hx     Stroke Neg Hx      Social History     Socioeconomic History    Marital status: Single     Spouse name: Not on file    Number of children: Not on file    Years of education: Not on file    Highest education level: Not on file   Occupational History    Not on file   Tobacco Use    Smoking status: Every Day     Packs/day: 0.25     Types: Cigarettes    Smokeless tobacco: Never    Tobacco comments:     3-4 CIGARETTES A DAY   Vaping Use    Vaping Use: Never used   Substance and Sexual Activity    Alcohol use: No    Drug use: No    Sexual activity: Not on file   Other Topics Concern    Not on file   Social History Narrative    Not on file     Social Determinants of Health     Financial Resource Strain: Not on file   Food Insecurity: Not on file   Transportation Needs: Not on file   Physical Activity: Not on file   Stress: Not on file   Social Connections: Not on file   Intimate Partner Violence: Not on file   Housing Stability: Not on file     No Known Allergies  Outpatient Encounter Medications as of 4/10/2023   Medication Sig Dispense Refill    carvedilol (COREG) 25 MG Tab Take 1 Tablet by mouth 2 times a day with meals. 180 Tablet 3    nicotine (NICOTINE STEP 2) 14 MG/24HR PATCH 24 HR Place 1 Patch on the skin every 24 hours. 30 Patch 1    JULUCA 50-25 MG Tab TAKE ONE TABLET BY MOUTH WITH FOOD EVERY DAY      lisinopril (PRINIVIL) 10 MG Tab Take 1 Tablet  "by mouth every day. 90 Tablet 3    hydrALAZINE (APRESOLINE) 25 MG Tab Take 1 Tablet by mouth 3 times a day. 270 Tablet 3    amLODIPine (NORVASC) 10 MG Tab Take 1 Tablet by mouth every day. 90 Tablet 3    aspirin (ASA) 81 MG Chew Tab chewable tablet Chew 1 Tablet every day. 100 Tablet 3    calcitRIOL (ROCALTROL) 0.25 MCG Cap       sevelamer carbonate (RENVELA) 800 MG Tab tablet Take 2 Tablets by mouth 3 times a day with meals. 1 po BID with snack 240 Tablet 11    dolutegravir (TIVICAY) 50 MG Tab tablet Take 1 Tablet by mouth every day. 30 Tablet 0    rilpivirine (EDURANT) 25 MG Tab tablet Take 1 Tablet by mouth every morning with breakfast. 30 Tablet 0    [DISCONTINUED] CARVEDILOL PO Take  by mouth. Per chen - pt unable to recall dose. NOT taking metoprolol concomitantly.      [DISCONTINUED] metoprolol SR (TOPROL XL) 50 MG TABLET SR 24 HR Take 2 Tablets by mouth every day. 180 Tablet 3     No facility-administered encounter medications on file as of 4/10/2023.     ROS negative except as noted in HPI           Objective     BP (!) 182/80 (BP Location: Left arm, Patient Position: Sitting, BP Cuff Size: Adult)   Pulse 100   Resp 17   Ht 1.626 m (5' 4\")   Wt 67 kg (147 lb 12.8 oz)   SpO2 99%   BMI 25.37 kg/m²     BP rechecked 170/80  Physical Exam  Vitals reviewed.   Constitutional:       Appearance: He is well-developed.   HENT:      Head: Normocephalic and atraumatic.   Eyes:      Pupils: Pupils are equal, round, and reactive to light.   Neck:      Vascular: No JVD.   Cardiovascular:      Rate and Rhythm: Normal rate and regular rhythm.      Heart sounds: Normal heart sounds. No murmur heard.    No friction rub. No gallop.   Pulmonary:      Effort: Pulmonary effort is normal. No respiratory distress.      Breath sounds: Normal breath sounds.   Abdominal:      General: Bowel sounds are normal. There is no distension.      Palpations: Abdomen is soft.   Musculoskeletal:      Right lower leg: No edema.      Left " lower leg: No edema.   Skin:     General: Skin is warm and dry.      Findings: No erythema.      Comments: Left upper extremity AV fistula   Neurological:      Mental Status: He is alert and oriented to person, place, and time.   Psychiatric:         Behavior: Behavior normal.          Lab Results   Component Value Date/Time    CHOLSTRLTOT 189 03/14/2019 03:39 PM     (H) 03/14/2019 03:39 PM    HDL 39 (A) 03/14/2019 03:39 PM    TRIGLYCERIDE 193 (H) 03/14/2019 03:39 PM       Lab Results   Component Value Date/Time    SODIUM 134 (L) 11/18/2022 01:00 AM    POTASSIUM 4.2 11/18/2022 01:00 AM    CHLORIDE 94 (L) 11/18/2022 01:00 AM    CO2 25 11/18/2022 01:00 AM    GLUCOSE 86 11/18/2022 01:00 AM    BUN 23 (H) 11/18/2022 01:00 AM    CREATININE 7.15 (HH) 11/18/2022 01:00 AM     Lab Results   Component Value Date/Time    ALKPHOSPHAT 134 (H) 11/15/2022 07:15 AM    ASTSGOT <5 (L) 11/15/2022 07:15 AM    ALTSGPT 6 11/15/2022 07:15 AM    TBILIRUBIN 0.3 11/15/2022 07:15 AM      NM cardiac stress (1/9/2023): No evidence of significant jeopardized viable myocardium or prior myocardial    infarction.   Normal left ventricular size, ejection fraction, and wall motion.   ECG INTERPRETATION   Negative stress ECG for ischemia.    TTE (7/4/2020):  CONCLUSIONS  Compared to the images of the prior study done 8/30/2017, changes   noted, LV function declined.  Left ventricular ejection fraction is visually estimated to be 40%.  Global hypokinesis with regional variation.  Grade II diastolic dysfunction.  Mild to moderate mitral regurgitation.  Estimated right ventricular systolic pressure  is 50 mmHg.  Assessment & Plan     1. Hypertensive urgency        2. Essential hypertension, benign        3. ESRD (end stage renal disease) on dialysis (HCC)        4. PAD (peripheral artery disease) (formerly [facility])        5. HTN (hypertension), malignant        6. Normochromic normocytic anemia        7. Anemia due to chronic kidney disease, on chronic dialysis  (HCC)            Medical Decision Making: Today's Assessment/Status/Plan:        Nonischemic cardiomyopathy; hypertension; end-stage renal disease; PAD  -Increase Coreg 25 mg twice daily  -Continue lisinopril 10 mg daily  -Continue amlodipine 10 mg daily.  Stop nifedipine  -Continue aspirin 81 mg daily  -Continue hydralazine 25 mg 3 times daily.  May hold morning dose on mornings before dialysis.  -Follow-up with pharmacotherapy clinic as scheduled  -HD per nephrology  -ER precautions discussed for hypertensive urgency.  Blood pressure check on Monday.  Close follow-up in 4 weeks for further optimization.    FU in clinic in 3 months.  Sooner if needed.    Patient verbalizes understanding and agrees with the plan of care.     PORTILLO GlaserR.N.   [facility]  [phone]    PLEASE NOTE: This Note was created using voice recognition Software. I have made every reasonable attempt to correct obvious errors, but I expect that there are errors of grammar and possibly content that I did not discover before finalizing the note

## 2023-04-17 ENCOUNTER — NON-PROVIDER VISIT (OUTPATIENT)
Dept: CARDIOLOGY | Facility: MEDICAL CENTER | Age: 58
End: 2023-04-17
Payer: MEDICAID

## 2023-04-17 VITALS
HEART RATE: 87 BPM | SYSTOLIC BLOOD PRESSURE: 211 MMHG | DIASTOLIC BLOOD PRESSURE: 90 MMHG | BODY MASS INDEX: 26.09 KG/M2 | WEIGHT: 152 LBS

## 2023-04-17 DIAGNOSIS — I10 HTN (HYPERTENSION), MALIGNANT: ICD-10-CM

## 2023-04-17 PROCEDURE — 99211 OFF/OP EST MAY X REQ PHY/QHP: CPT | Performed by: STUDENT IN AN ORGANIZED HEALTH CARE EDUCATION/TRAINING PROGRAM

## 2023-04-17 RX ORDER — DOXAZOSIN MESYLATE 1 MG/1
1 TABLET ORAL DAILY
Qty: 30 TABLET | Refills: 2 | Status: ON HOLD | OUTPATIENT
Start: 2023-04-17 | End: 2023-10-31

## 2023-04-17 ASSESSMENT — FIBROSIS 4 INDEX: FIB4 SCORE: 0.59

## 2023-04-17 NOTE — Clinical Note
Hi Dr. Bloch,  Complex pt that I saw today for our first f/u. He is HIV+ & ESRD on dialysis. Also w/ hx of stroke & PAD. He used to follow w/ our services, but appears to have been lost to f/u.  His BP was at goal the first time that I saw him, but was significantly elevated today in CHF clinic (stated he feels at his baseline). He refused to go to the ED despite me counseling him in detail on the importance of this today. See my note for further detail.  I placed a referral to resistant HTN clinic.  Let me know if there's anything else you'd like me to do in the meantime.  Thank you, Gal

## 2023-04-17 NOTE — PROGRESS NOTES
"Pharmacotherapy Hypertension Visit  04/17/23     Type of Visit: 1st Follow Up    Seamus Palencia has been referred for evaluation and management of hypertension    HPI:   Vitals:    04/17/23 1428 04/17/23 1441   BP: (!) 225/91 (!) 211/90   BP Location: Right arm Right arm   Patient Position: Sitting Sitting   BP Cuff Size: Adult Adult   Pulse: 91 87   Weight: 68.9 kg (152 lb)        Age at Initial Diagnosis: Pt unsure of exact age of dx, but states \"for years\"        Home BP readings:   130-140/  Any readings above  180/110:  Yes, states when he doesn't take meds prior to dialysis (has dialysis on Tues, Weds, Sat).  Any symptoms of high blood pressure (TIA, Stroke, Head ache, vision changes): None      Current Prescription HTN Medications - including dose:    Carvedilol 25 mg BID  Amlodipine 10 mg once daily  Lisinopril 10 mg once daily  Hydralazine 25 mg TID      Current side effects potentially related to antihypertensive medications (lightheaded, dizziness, leg swelling): Pt states he experiences dizziness post-dialysis    Current Adherence to Blood Pressure Medications:  Complete    Previously attempted BP medications:   Spironolactone - Dc'd d/t CKD    Any current interfering substances: None     Any current lifestyle issues affecting BP:    Pt states occasional sleep issues - counseled regarding sleep hygiene. Encouraged pt to avoid daytime napping.   Since last visit, pt states he's not sleeping as well as he's noted more numbness/tingling on his left side.  Pt was stressed d/t his living situation, but this is now resolved and he is no longer homeless.    Lab Results   Component Value Date/Time    SODIUM 134 (L) 11/18/2022 01:00 AM    POTASSIUM 4.2 11/18/2022 01:00 AM    CHLORIDE 94 (L) 11/18/2022 01:00 AM    CO2 25 11/18/2022 01:00 AM    GLUCOSE 86 11/18/2022 01:00 AM    BUN 23 (H) 11/18/2022 01:00 AM    CREATININE 7.15 (HH) 11/18/2022 01:00 AM        x Medications Reconciled  CURRENT MEDICATIONS: "   Current Outpatient Medications:     doxazosin, 1 mg, Oral, DAILY    carvedilol, 25 mg, Oral, BID WITH MEALS, Taking    nicotine, 1 Patch, Transdermal, Q24HRS, Taking    Juluca, TAKE ONE TABLET BY MOUTH WITH FOOD EVERY DAY, Taking    lisinopril, 10 mg, Oral, DAILY, Taking    hydrALAZINE, 25 mg, Oral, TID, Taking    amLODIPine, 10 mg, Oral, DAILY, Taking    aspirin, 81 mg, Oral, DAILY, Taking    calcitRIOL, , Taking    dolutegravir, 50 mg, Oral, DAILY, Taking    Edurant, 25 mg, Oral, QDAY with Breakfast, Taking    sevelamer carbonate, 1,600 mg, Oral, TID WITH MEALS (Patient not taking: Reported on 4/17/2023), Not Taking  ALLERGIES: Patient has no known allergies.    SOCIAL HISTORY   Social History     Tobacco Use   Smoking Status Every Day    Packs/day: 0.25    Types: Cigarettes   Smokeless Tobacco Never   Tobacco Comments    3-4 CIGARETTES A DAY     Change in weight:  Pt has gained ~ 5 lbs since last visit. States d/t increased appetite w/ smoking cessation.  Exercise habits: no regular exercise program, pt uses public transportation, but walks the majority of the time to get around.  Diet: Low Na, dialysis diet      ASSESSMENT AND PLAN    BP is elevated    BP Goal < 130/80     Does pt have known end organ damage? ESRD      BP Monitoring Recommendations - Home BP   Proper technique for blood pressure monitoring reviewed with patient.  Pt instructed to check BP at varying times through out the day 4 times per week.  Provided pt log for blood pressure monitoring and pt instructed to bring in at each visit for reviews    Lifestyle Recommendations From Today’s Visit:    DASH or mediterranean style eating plan  Dietary Sodium Restriction  Increase Physical Activity  Stop Smoking (has Dc'd since last visit) - Pt using nicotine patches    Medication recommendations from today's visit: As below  ARB/ACEI: Lisinopril 10 mg once daily  Diuretic: None  Calcium Channel Blocker: Amlodipine 10 mg once daily  Other class:  Hydralazine 25 mg TID, carvedilol 25 mg BID, START doxazosin 1 mg once daily on non-dialysis days  x Importance of adherence discussed    Other recommendations:   Pt presents to clinic today after f/u w/ LETA Phipps, last week - he was found to be taking duplicate CCB therapy (amlodipine + nifedipine) and was instructed to DC Nifedipine.  Pt reported BP is labile. He notes some readings at goal and others significantly above goal. I suspect the major underlying factor here is his dialysis and fluid status. Pt notes that he becomes symptomatically hypotensive after dialysis.  Of note, pt w/ significant hx of noncompliance w/ medications. He endorses compliance today at our appt.  Pt was very hypertensive today in clinic. He states he feels at his baseline. He denies vision changes, CP, SOB, dizziness, etc. Strongly advised that pt go to the ED for further evaluation - he declines. He states he will monitor his BP at home and go to the ED if it remains this elevated. Counseled pt that I highly advise against this, but he continues to refuse my recommendation.   Pt to start alpha antagonist, doxazosin 1 mg once daily, on non-dialysis days. Counseled pt regarding MOA, SE, and administration.  Placing referral to resistant HTN clinic as well.    Studies Ordered at Todays Visit: None  Blood Work Ordered At Today’s visit: None   Follow-Up: ~ 2 weeks (every 1-4 weeks until at goal)     Gal Newell, PharmD, BCACP    CC:  KERRI Corbett

## 2023-04-21 ENCOUNTER — HOSPITAL ENCOUNTER (EMERGENCY)
Facility: MEDICAL CENTER | Age: 58
End: 2023-04-21
Attending: STUDENT IN AN ORGANIZED HEALTH CARE EDUCATION/TRAINING PROGRAM
Payer: MEDICAID

## 2023-04-21 ENCOUNTER — APPOINTMENT (OUTPATIENT)
Dept: RADIOLOGY | Facility: MEDICAL CENTER | Age: 58
End: 2023-04-21
Attending: STUDENT IN AN ORGANIZED HEALTH CARE EDUCATION/TRAINING PROGRAM
Payer: MEDICAID

## 2023-04-21 VITALS
RESPIRATION RATE: 16 BRPM | HEIGHT: 64 IN | OXYGEN SATURATION: 95 % | WEIGHT: 150.79 LBS | SYSTOLIC BLOOD PRESSURE: 142 MMHG | DIASTOLIC BLOOD PRESSURE: 77 MMHG | HEART RATE: 71 BPM | BODY MASS INDEX: 25.74 KG/M2 | TEMPERATURE: 97.9 F

## 2023-04-21 DIAGNOSIS — T82.848A PAIN FROM ARTERIOVENOUS FISTULA (HCC): ICD-10-CM

## 2023-04-21 DIAGNOSIS — T82.898A ANEURYSM OF ARTERIOVENOUS DIALYSIS FISTULA, INITIAL ENCOUNTER (HCC): ICD-10-CM

## 2023-04-21 PROCEDURE — 700102 HCHG RX REV CODE 250 W/ 637 OVERRIDE(OP): Performed by: STUDENT IN AN ORGANIZED HEALTH CARE EDUCATION/TRAINING PROGRAM

## 2023-04-21 PROCEDURE — 99284 EMERGENCY DEPT VISIT MOD MDM: CPT

## 2023-04-21 PROCEDURE — 93990 DOPPLER FLOW TESTING: CPT

## 2023-04-21 PROCEDURE — A9270 NON-COVERED ITEM OR SERVICE: HCPCS | Performed by: STUDENT IN AN ORGANIZED HEALTH CARE EDUCATION/TRAINING PROGRAM

## 2023-04-21 PROCEDURE — 93990 DOPPLER FLOW TESTING: CPT | Mod: 26 | Performed by: INTERNAL MEDICINE

## 2023-04-21 RX ORDER — OXYCODONE HYDROCHLORIDE AND ACETAMINOPHEN 5; 325 MG/1; MG/1
1 TABLET ORAL ONCE
Status: DISCONTINUED | OUTPATIENT
Start: 2023-04-21 | End: 2023-04-22 | Stop reason: HOSPADM

## 2023-04-21 RX ORDER — CARVEDILOL 6.25 MG/1
25 TABLET ORAL ONCE
Status: COMPLETED | OUTPATIENT
Start: 2023-04-21 | End: 2023-04-21

## 2023-04-21 RX ADMIN — CARVEDILOL 25 MG: 6.25 TABLET, FILM COATED ORAL at 20:07

## 2023-04-21 ASSESSMENT — FIBROSIS 4 INDEX: FIB4 SCORE: 0.59

## 2023-04-21 ASSESSMENT — PAIN DESCRIPTION - PAIN TYPE: TYPE: ACUTE PAIN

## 2023-04-22 NOTE — ED NOTES
Pt discharged to home. Discharge paperwork provided. Education provided by ERP.  Pt was given follow up instructions to ff up with vascular surgery, dx  radiology . Pt verbalized understanding of all instructions for discharge. Patient ambulatory, alert and oriented x 4, out of ER with all belongings and steady gait.

## 2023-04-22 NOTE — ED PROVIDER NOTES
ED Provider Note    CHIEF COMPLAINT  Chief Complaint   Patient presents with    Arm Pain       EXTERNAL RECORDS REVIEWED  Inpatient Notes attempted to find records from [place] to find out who patient's surgeon was who performed the fistula surgery but unable to find.  Patient was started on dialysis in December 2020 after admission    HPI/ROS  LIMITATION TO HISTORY   Select: : None    Seamus Palencia is a 57 y.o. male PMHx of AIDS, HTN, CVA, seizure, ESRD on dialysis who presents with chief complaint of pain in his fistula that he states has been going on for about 3 weeks.  Patient states that initially started after an episode of dialysis, has been persistent since that time and worsening.  He states he is still going to dialysis and that the fistula is still functional at dialysis.  Patient gets dialysis Tuesday Thursday and Saturday.  He denies any fevers.  He cannot tell me where he got his surgery done.  Patient denies any chest pain, shortness of breath, or complaints apart from his fistula pain.    PAST MEDICAL HISTORY  Past Medical History:   Diagnosis Date    HIV disease (HCC) 01/01/1995    Heart failure (HCC)     Hypertension     Renal disorder     ESRD on hemodylsis Tues/Thurs/Sat at [facility]    Seizure (HCC)     Stroke (HCC)         SURGICAL HISTORY  Past Surgical History:   Procedure Laterality Date    CHOLECYSTECTOMY  1980s    OTHER Left     Dialysis graft left arm        FAMILY HISTORY  Family History   Problem Relation Age of Onset    Diabetes Mother         cause of death    Diabetes Father         cause of death    Diabetes Sister     Other Brother         down syndrome    No Known Problems Sister     No Known Problems Sister     No Known Problems Sister     Other Daughter         5 daughters, 11 sons     Clotting Disorder Neg Hx     Stroke Neg Hx        SOCIAL HISTORY       CURRENT MEDICATIONS  Home Medications    Medication Sig Taking? Last Dose Authorizing Provider   doxazosin (CARDURA) 1 MG  "Tab Take 1 Tablet by mouth every day.   PORTILLO GlaserRGENESIS   carvedilol (COREG) 25 MG Tab Take 1 Tablet by mouth 2 times a day with meals.   PORTILLO GlaserRGENESIS   nicotine (NICOTINE STEP 2) 14 MG/24HR PATCH 24 HR Place 1 Patch on the skin every 24 hours.   EDEN Glaser   JULUCA 50-25 MG Tab TAKE ONE TABLET BY MOUTH WITH FOOD EVERY DAY   Physician Outpatient   lisinopril (PRINIVIL) 10 MG Tab Take 1 Tablet by mouth every day.   PORTILLO GlaserRGENESIS   hydrALAZINE (APRESOLINE) 25 MG Tab Take 1 Tablet by mouth 3 times a day.   TATE GlaserPMeghnaRGENESIS   amLODIPine (NORVASC) 10 MG Tab Take 1 Tablet by mouth every day.   PORTILLO GlaserRGENESIS   aspirin (ASA) 81 MG Chew Tab chewable tablet Chew 1 Tablet every day.   PORTILLO GlaserRGENESIS   calcitRIOL (ROCALTROL) 0.25 MCG Cap    Physician Outpatient   sevelamer carbonate (RENVELA) 800 MG Tab tablet Take 2 Tablets by mouth 3 times a day with meals. 1 po BID with snack  Patient not taking: Reported on 4/17/2023   Fadi Najjar, M.D.   dolutegravir (TIVICAY) 50 MG Tab tablet Take 1 Tablet by mouth every day.   Danna Newman M.D.   rilpivirine (EDURANT) 25 MG Tab tablet Take 1 Tablet by mouth every morning with breakfast.   Danna Newman M.D.       ALLERGIES  No Known Allergies    PHYSICAL EXAM  BP (!) 142/77   Pulse 71   Temp 36.6 °C (97.9 °F) (Skin)   Resp 16   Ht 1.626 m (5' 4\")   Wt 68.4 kg (150 lb 12.7 oz)   SpO2 95%   Constitutional: Alert in no apparent distress.  HENT: No signs of trauma, Bilateral external ears normal, Nose normal.   Eyes: Pupils are equal and reactive, Conjunctiva normal, Non-icteric.   Neck: Normal range of motion, No tenderness, Supple, No stridor.   Cardiovascular: Regular rate and rhythm, no murmurs.   Thorax & Lungs: Normal breath sounds, No respiratory distress, No wheezing  Abdomen: Soft, No tenderness, No peritoneal signs, No masses, No pulsatile masses.   Skin: Warm, Dry, No erythema, " No rash.   Extremities: Left upper extremity with fistula with good thrill, no overlying erythema, drainage, mild diffuse tenderness, hand is warm and well-perfused  Neurologic: Alert , Normal motor function, Normal speech, No focal deficits noted.   Psychiatric: Irritable, judgment normal, Mood normal.         DIAGNOSTIC STUDIES / PROCEDURES      RADIOLOGY  I have independently interpreted the diagnostic imaging associated with this visit and am waiting the final reading from the radiologist.     Radiologist interpretation:   US-HEMODIALYSIS GRAFT DUPLEX COMP UPPER EXTREMITY   Final Result          COURSE & MEDICAL DECISION MAKING    ED Observation Status? Yes; I am placing the patient in to an observation status due to a diagnostic uncertainty as well as therapeutic intensity. Patient placed in observation status at 7:51 PM, 4/21/2023.     Observation plan is as follows: Patient will require imaging of his fistula to evaluate for clot, pseudoaneurysm, or other obvious complication    Upon Reevaluation, the patient's condition has: Improved; and will be discharged.    Patient discharged from ED Observation status at 10:06 PM (Time) 04/21/23 (Date).     INITIAL ASSESSMENT, COURSE AND PLAN  Care Narrative: 57-year-old male with complex medical history here for pain in his fistula on his left arm that has been going on for several weeks.  He is hypertensive which required multiple outpatient notes he has a history of and is on multiple medications for.  He denies any chest pain or shortness of breath, he is otherwise asymptomatic from this.  He reports pain has been present in his left arm for 3 weeks now and slowly worsening.  On exam he has no evidence of infection overlying the fistula.  Has not had any fevers.  The fistula has an excellent thrill and his arm is well-perfused distally.  Will obtain ultrasound to evaluate for thrombosis, pseudoaneurysm, or obvious abnormality causing pain.    10:05 PM  Spoke with  Dr. Medrano, Vascular Surgery.  States nothing to do at this time, no wrap or compression.  Have patient follow-up in clinic.    ADDITIONAL PROBLEM LIST    Pain of fistula site    DISPOSITION AND DISCUSSIONS  I have discussed management of the patient with the following physicians and SHIRA's:  Dr. Medrano, Vascular Surgery    Escalation of care considered, and ultimately not performed:acute inpatient care management, however at this time, the patient is most appropriate for outpatient management    Discharged home in stable condition      FINAL DIAGNOSIS  1. Pain from arteriovenous fistula (HCC)  Referral to Vascular Surgery      2. Aneurysm of arteriovenous dialysis fistula, initial encounter ([facility])  Referral to Vascular Surgery            Electronically signed by: Cathryn Metcalf M.D., 04/21/23 7:43 PM

## 2023-04-22 NOTE — ED NOTES
Report received from Joy RN. Assumed patient care. Verified patient identification. Checked on bed, connected to monitor,  with unlabored respirations. Vital signs is stable. Denied any new complaints. Gurney in low position, side rail up for pt safety. Call light within reach. Will continue to monitor for any complications.

## 2023-04-22 NOTE — ED TRIAGE NOTES
Chief Complaint   Patient presents with    Arm Pain     Pt ambulatory to triage for above complaint. Pt of dialysis, pt has been attending his appts. Pt has been having pain at fistula on L side for 2-3 weeks, pt's PCP asked him to come to ER for evaluation. Normal Buit and Thrill.     Pt is alert/oriented and follows commands. Pt speaking in full sentences and responds appropriately to questions. No acute distress noted in triage and respirations are even and unlabored.     Pt placed in lobby and educated on triage process. Pt encouraged to alert staff for any changes in condition.

## 2023-04-22 NOTE — DISCHARGE INSTRUCTIONS
As we discussed, you have some changes of your fistula on the ultrasound that you should follow-up with vascular surgery for further evaluation and possible repair.  Call the office early next week to schedule an appointment for follow-up    Return to the emergency department if symptoms significantly worsen, the fistula is nonfunctional, or other concerns.

## 2023-05-04 ENCOUNTER — TELEPHONE (OUTPATIENT)
Dept: MEDICAL GROUP | Facility: MEDICAL CENTER | Age: 58
End: 2023-05-04

## 2023-05-04 NOTE — TELEPHONE ENCOUNTER
Pt missed their HTN - pharmacotherapy appointment on 5/4/23.    Rescheduled on 5/8/23 at vascular medicine for availability of schedule.    Nicky Walker, HilarioD

## 2023-05-08 ENCOUNTER — NON-PROVIDER VISIT (OUTPATIENT)
Dept: VASCULAR LAB | Facility: MEDICAL CENTER | Age: 58
End: 2023-05-08
Attending: INTERNAL MEDICINE
Payer: MEDICAID

## 2023-05-08 VITALS — HEART RATE: 70 BPM | SYSTOLIC BLOOD PRESSURE: 187 MMHG | DIASTOLIC BLOOD PRESSURE: 96 MMHG

## 2023-05-08 DIAGNOSIS — I10 HTN (HYPERTENSION), MALIGNANT: ICD-10-CM

## 2023-05-08 PROCEDURE — 99212 OFFICE O/P EST SF 10 MIN: CPT

## 2023-05-08 RX ORDER — LISINOPRIL 20 MG/1
20 TABLET ORAL DAILY
Qty: 30 TABLET | Refills: 0 | Status: SHIPPED | OUTPATIENT
Start: 2023-05-08 | End: 2023-06-01 | Stop reason: SDUPTHER

## 2023-05-08 NOTE — PROGRESS NOTES
"Pharmacotherapy Hypertension Visit  04/17/23      Type of Visit: 2nd Follow Up     Seamus Palencia has been referred for evaluation and management of hypertension     HPI:   Vitals        Vitals:     04/17/23 1428 04/17/23 1441   BP: (!) 225/91 (!) 211/90   BP Location: Right arm Right arm   Patient Position: Sitting Sitting   BP Cuff Size: Adult Adult   Pulse: 91 87   Weight: 68.9 kg (152 lb)              Age at Initial Diagnosis: Pt unsure of exact age of dx, but states \"for years\"         Home BP readings: 280-180/ (pt endorsed, did not bring BP log)  Any readings above  180/110:  Yes, states when he doesn't take meds prior to dialysis (has dialysis on Tues, Weds, Sat).  Any symptoms of high blood pressure (TIA, Stroke, Head ache, vision changes): None. Last 6 years ago        Current Prescription HTN Medications - including dose:    Carvedilol 25 mg BID  Amlodipine 10 mg once daily  Lisinopril 10 mg once daily  Hydralazine 25 mg TID  Doxazosin 1 mg daily on non-dialysis days        Current side effects potentially related to antihypertensive medications (lightheaded, dizziness, leg swelling): Pt states he experiences dizziness post-dialysis     Current Adherence to Blood Pressure Medications:  Complete     Previously attempted BP medications:   Spironolactone - Dc'd d/t CKD  Nifedipine - Duplicate with amlodipine     Any current interfering substances: None      Any current lifestyle issues affecting BP:    Pt states occasional sleep issues - counseled regarding sleep hygiene. Encouraged pt to avoid daytime napping.   Has not improved since last visit.  Pt was stressed d/t his living situation, but this is now resolved and he is no longer homeless.     Lab Results   Component Value Date/Time     SODIUM 134 (L) 11/18/2022 01:00 AM     POTASSIUM 4.2 11/18/2022 01:00 AM     CHLORIDE 94 (L) 11/18/2022 01:00 AM     CO2 25 11/18/2022 01:00 AM     GLUCOSE 86 11/18/2022 01:00 AM     BUN 23 (H) 11/18/2022 01:00 AM "     CREATININE 7.15 (HH) 11/18/2022 01:00 AM        x Medications Reconciled  CURRENT MEDICATIONS:   Current Outpatient Medications:        doxazosin, 1 mg, Oral, DAILY    carvedilol, 25 mg, Oral, BID WITH MEALS, Taking    nicotine, 1 Patch, Transdermal, Q24HRS, Taking    Juluca, TAKE ONE TABLET BY MOUTH WITH FOOD EVERY DAY, Taking    lisinopril, 10 mg, Oral, DAILY, Taking    hydrALAZINE, 25 mg, Oral, TID, Taking    amLODIPine, 10 mg, Oral, DAILY, Taking    aspirin, 81 mg, Oral, DAILY, Taking    calcitRIOL, , Taking    dolutegravir, 50 mg, Oral, DAILY, Taking    Edurant, 25 mg, Oral, QDAY with Breakfast, Taking    sevelamer carbonate, 1,600 mg, Oral, TID WITH MEALS (Patient not taking: Reported on 4/17/2023), Not Taking     ALLERGIES: Patient has no known allergies.     SOCIAL HISTORY   Social History           Tobacco Use   Smoking Status Every Day    Packs/day: 0.25    Types: Cigarettes   Smokeless Tobacco Never   Tobacco Comments     3-4 CIGARETTES A DAY      Change in weight:  Stable. States increased appetite w/ smoking cessation, but no longer gaining weight since previous visit.  Exercise habits: no regular exercise program, pt uses public transportation, but walks the majority of the time to get around. He endorses knee degradation that interferes with current exercise. He notices he becomes short of breath much easier  Diet: Low Na, dialysis diet from nephrologist.        ASSESSMENT AND PLAN     BP is elevated  In office levels   - 188/99 (read #1)   - 187/96 (read #2)     BP Goal < 130/80      Does pt have known end organ damage? ESRD        BP Monitoring Recommendations - Home BP   Proper technique for blood pressure monitoring reviewed with patient.  Pt instructed to check BP at varying times through out the day 4 times per week.  Encouraged patient to continue documenting blood pressure. Explained that It will give a clearer image of his BP readings throughout the day and on/off dialysis days. Pt  verbalizes understanding     Lifestyle Recommendations From Today’s Visit:    DASH or mediterranean style eating plan  Dietary Sodium Restriction  Increase Physical Activity  Stop Smoking (has Dc'd since last visit) - Pt using nicotine patches  Denies alcohol or coffee intake     Medication recommendations from today's visit: As below  ARB/ACEI: Increase Lisinopril from 10 mg to 20 mg once daily  Diuretic: None  Calcium Channel Blocker: Amlodipine 10 mg once daily  Other class: Hydralazine 25 mg TID, carvedilol 25 mg BID, doxazosin 1 mg once daily on non-dialysis days    Others:  - n/a     Other recommendations:   Pt presents to clinic for 2 week f/u htn visit  Pt reported BP is labile. He notes some readings at goal and others significantly above goal.   Pt has not had dialysis since 5/2 due to difficulty with line access. Fluid status likely a major underlying factor to hypertension.   Pt notes that he becomes symptomatically hypotensive after dialysis.  Of note, pt w/ significant hx of noncompliance w/ medications. He endorses compliance today at our appt.  Pt was very hypertensive today in clinic. He states he feels at his baseline. He denies vision changes, CP, SOB, dizziness, etc.   Advised pt go to the ED for further evaluation - he declines. He states he will monitor his BP at home and go to the ED if it remains this elevated. Counseled pt that I highly advise against this, but he continues to refuse my recommendation.   Pt to increase lisinopril from 10 mg to 20 mg once daily.   Has previously taken lisinopril 40 mg in the past, asked pt if he recalls why the dose was reduced. He cannot recall.  Counseled pt regarding MOA, SE, and administration.     Studies Ordered at Todays Visit: None  Blood Work Ordered At Today’s visit: None   Follow-Up: ~ 2 weeks (every 1-4 weeks until at goal)      Suman Jefferson, PharmD     CC:  KERRI Corbett, HilarioD  Michael Bloch, M.D.

## 2023-05-09 DIAGNOSIS — F17.210 CIGARETTE NICOTINE DEPENDENCE WITHOUT COMPLICATION: ICD-10-CM

## 2023-05-10 RX ORDER — NICOTINE 21 MG/24HR
1 PATCH, TRANSDERMAL 24 HOURS TRANSDERMAL EVERY 24 HOURS
Qty: 30 PATCH | Refills: 1 | Status: SHIPPED | OUTPATIENT
Start: 2023-05-10 | End: 2024-03-21

## 2023-05-12 ENCOUNTER — APPOINTMENT (OUTPATIENT)
Dept: ADMISSIONS | Facility: MEDICAL CENTER | Age: 58
End: 2023-05-12
Attending: ORTHOPAEDIC SURGERY
Payer: MEDICAID

## 2023-05-12 ENCOUNTER — HOSPITAL ENCOUNTER (OUTPATIENT)
Facility: MEDICAL CENTER | Age: 58
End: 2023-05-12
Attending: ORTHOPAEDIC SURGERY | Admitting: ORTHOPAEDIC SURGERY
Payer: MEDICAID

## 2023-05-17 ENCOUNTER — PRE-ADMISSION TESTING (OUTPATIENT)
Dept: ADMISSIONS | Facility: MEDICAL CENTER | Age: 58
End: 2023-05-17
Attending: ORTHOPAEDIC SURGERY
Payer: MEDICAID

## 2023-05-17 VITALS — WEIGHT: 165.34 LBS | BODY MASS INDEX: 28.23 KG/M2 | HEIGHT: 64 IN

## 2023-05-17 RX ORDER — CEFAZOLIN SODIUM 1 G/3ML
2 INJECTION, POWDER, FOR SOLUTION INTRAMUSCULAR; INTRAVENOUS ONCE
Status: CANCELLED | OUTPATIENT
Start: 2023-05-18 | End: 2023-05-18

## 2023-05-17 ASSESSMENT — FIBROSIS 4 INDEX: FIB4 SCORE: 0.6

## 2023-05-17 NOTE — OR NURSING
Pt unable to come in this afternoon to get labs, ecg, cxr done.  Please do DOS.  Pt states that he will have someone drive him to and from the center, and he will have someone with him at home.

## 2023-05-18 RX ORDER — SODIUM CHLORIDE, SODIUM LACTATE, POTASSIUM CHLORIDE, CALCIUM CHLORIDE 600; 310; 30; 20 MG/100ML; MG/100ML; MG/100ML; MG/100ML
INJECTION, SOLUTION INTRAVENOUS CONTINUOUS
Status: CANCELLED | OUTPATIENT
Start: 2023-05-18 | End: 2023-05-18

## 2023-05-18 RX ORDER — ACETAMINOPHEN 500 MG
1000 TABLET ORAL ONCE
Status: CANCELLED | OUTPATIENT
Start: 2023-05-18 | End: 2023-05-18

## 2023-05-22 ENCOUNTER — APPOINTMENT (OUTPATIENT)
Dept: VASCULAR LAB | Facility: MEDICAL CENTER | Age: 58
End: 2023-05-22
Attending: INTERNAL MEDICINE
Payer: MEDICAID

## 2023-05-23 ENCOUNTER — DOCUMENTATION (OUTPATIENT)
Dept: VASCULAR LAB | Facility: MEDICAL CENTER | Age: 58
End: 2023-05-23
Payer: MEDICAID

## 2023-05-23 NOTE — PROGRESS NOTES
S/w patient regarding cancelled pharmacotherapy f/u for HTN.  Rescheduled appt to 5-31-23.  Julio C Babin, PharmD, BCACP

## 2023-06-01 DIAGNOSIS — I10 HTN (HYPERTENSION), MALIGNANT: ICD-10-CM

## 2023-06-01 RX ORDER — LISINOPRIL 20 MG/1
20 TABLET ORAL DAILY
Qty: 30 TABLET | Refills: 6 | Status: ON HOLD | OUTPATIENT
Start: 2023-06-01 | End: 2023-10-31

## 2023-06-30 ENCOUNTER — DOCUMENTATION (OUTPATIENT)
Dept: VASCULAR LAB | Facility: MEDICAL CENTER | Age: 58
End: 2023-06-30
Payer: MEDICAID

## 2023-06-30 NOTE — PROGRESS NOTES
Seamus Palencia has been referred for evaluation and management in the vascular care clinic.     Unfortunately patient missed appointment for initial visit in our office today.  We will be unable to take part in care until or unless patient makes an appointment for a face-to-face visit in our center  We will ask our  or medical assistant to call and reschedule     Pending further patient contact, we will defer all vascular care, including management of cardiovascular risk factors, to PCP and other members of the care team    Vascular Medicine Clinic   [facility]   [phone]

## 2023-07-06 ENCOUNTER — TELEPHONE (OUTPATIENT)
Dept: VASCULAR LAB | Facility: MEDICAL CENTER | Age: 58
End: 2023-07-06
Payer: MEDICAID

## 2023-07-06 NOTE — TELEPHONE ENCOUNTER
Called patient to schedule initial vascular appt. Left voicemail requesting return call.     Nicky Walker, HilarioD

## 2023-07-13 ENCOUNTER — TELEPHONE (OUTPATIENT)
Dept: VASCULAR LAB | Facility: MEDICAL CENTER | Age: 58
End: 2023-07-13
Payer: MEDICAID

## 2023-07-13 NOTE — TELEPHONE ENCOUNTER
Called pt regarding missed pharmacotherapy appt - no answer. LVM asking pt to c/b to reschedule.     Will f/u at a later time.    Gal Newell, HilarioD, BCACP

## 2023-07-20 ENCOUNTER — TELEPHONE (OUTPATIENT)
Dept: VASCULAR LAB | Facility: MEDICAL CENTER | Age: 58
End: 2023-07-20
Payer: MEDICAID

## 2023-07-20 NOTE — TELEPHONE ENCOUNTER
Called pt regarding missed pharmacotherapy appt - no answer. LVM asking pt to c/b to reschedule.     4th call.    Sent letter.     Will f/u at a later time.    Discharge criteria:  ? 3 calls  ? 1 letter  x Over 3 months - last seen 5/8/23     Gal Newell, HilarioD, BCACP

## 2023-07-20 NOTE — LETTER
[address] [address]        Dear Seamus Palencia ,    We have been unsuccessful in our attempts to contact you regarding your Hypertension Clinical Pharmacist referral.  Please contact us if you would like to schedule an appointment for help managing your blood pressure.    Please contact our clinic so we may assist you.  We are open Monday-Friday 8 am until 5 pm.  You may reach our Service at [phone].        Sincerely,    Ulysses Godinez, HilarioD, [facility]  Clinic Supervisor  [facility]  Outpatient Anticoagulation Service

## 2023-08-08 ENCOUNTER — DOCUMENTATION (OUTPATIENT)
Dept: VASCULAR LAB | Facility: MEDICAL CENTER | Age: 58
End: 2023-08-08
Payer: MEDICAID

## 2023-08-09 NOTE — PROGRESS NOTES
Once again attempted to reach patient regarding overdue follow up in CHF clinic.  No answer to call, LM asking for c/b.  Have attempted to reach patient on multiple occasions over last three months, will await further contact at this time.  Julio C Babin, HilarioD, BCACP

## 2023-09-08 ENCOUNTER — TELEPHONE (OUTPATIENT)
Dept: CARDIOLOGY | Facility: MEDICAL CENTER | Age: 58
End: 2023-09-08
Payer: MEDICAID

## 2023-09-08 NOTE — TELEPHONE ENCOUNTER
Called patient on 9/8/23 regarding missed appt with VR on 9/6/23.  Unable to reach patient, would not allow me to leave a VM.

## 2023-10-31 ENCOUNTER — HOSPITAL ENCOUNTER (INPATIENT)
Facility: MEDICAL CENTER | Age: 58
LOS: 1 days | DRG: 291 | End: 2023-11-01
Attending: EMERGENCY MEDICINE | Admitting: INTERNAL MEDICINE
Payer: MEDICAID

## 2023-10-31 ENCOUNTER — APPOINTMENT (OUTPATIENT)
Dept: RADIOLOGY | Facility: MEDICAL CENTER | Age: 58
DRG: 291 | End: 2023-10-31
Attending: EMERGENCY MEDICINE
Payer: MEDICAID

## 2023-10-31 DIAGNOSIS — N18.6 ESRD (END STAGE RENAL DISEASE) ON DIALYSIS (HCC): ICD-10-CM

## 2023-10-31 DIAGNOSIS — B20 AIDS (ACQUIRED IMMUNODEFICIENCY SYNDROME), CD4 <=200 (HCC): ICD-10-CM

## 2023-10-31 DIAGNOSIS — I10 HTN (HYPERTENSION), MALIGNANT: ICD-10-CM

## 2023-10-31 DIAGNOSIS — R06.03 RESPIRATORY DISTRESS: ICD-10-CM

## 2023-10-31 DIAGNOSIS — I73.9 PAD (PERIPHERAL ARTERY DISEASE) (HCC): ICD-10-CM

## 2023-10-31 DIAGNOSIS — Z99.2 ESRD (END STAGE RENAL DISEASE) ON DIALYSIS (HCC): ICD-10-CM

## 2023-10-31 PROBLEM — J96.91 HYPOXIC RESPIRATORY FAILURE (HCC): Status: ACTIVE | Noted: 2020-07-03

## 2023-10-31 PROBLEM — I16.1 HYPERTENSIVE EMERGENCY: Status: ACTIVE | Noted: 2023-10-31

## 2023-10-31 LAB
ALBUMIN SERPL BCP-MCNC: 4.3 G/DL (ref 3.2–4.9)
ALBUMIN/GLOB SERPL: 0.9 G/DL
ALP SERPL-CCNC: 103 U/L (ref 30–99)
ALT SERPL-CCNC: <5 U/L (ref 2–50)
ANION GAP SERPL CALC-SCNC: 24 MMOL/L (ref 7–16)
APTT PPP: 33.2 SEC (ref 24.7–36)
AST SERPL-CCNC: <5 U/L (ref 12–45)
BASOPHILS # BLD AUTO: 0.6 % (ref 0–1.8)
BASOPHILS # BLD: 0.09 K/UL (ref 0–0.12)
BILIRUB SERPL-MCNC: 0.4 MG/DL (ref 0.1–1.5)
BUN SERPL-MCNC: 87 MG/DL (ref 8–22)
CALCIUM ALBUM COR SERPL-MCNC: 8.3 MG/DL (ref 8.5–10.5)
CALCIUM SERPL-MCNC: 8.5 MG/DL (ref 8.5–10.5)
CHLORIDE SERPL-SCNC: 95 MMOL/L (ref 96–112)
CO2 SERPL-SCNC: 17 MMOL/L (ref 20–33)
CREAT SERPL-MCNC: 17.62 MG/DL (ref 0.5–1.4)
EKG IMPRESSION: NORMAL
EOSINOPHIL # BLD AUTO: 0.15 K/UL (ref 0–0.51)
EOSINOPHIL NFR BLD: 1 % (ref 0–6.9)
ERYTHROCYTE [DISTWIDTH] IN BLOOD BY AUTOMATED COUNT: 53.1 FL (ref 35.9–50)
GFR SERPLBLD CREATININE-BSD FMLA CKD-EPI: 3 ML/MIN/1.73 M 2
GLOBULIN SER CALC-MCNC: 4.8 G/DL (ref 1.9–3.5)
GLUCOSE SERPL-MCNC: 159 MG/DL (ref 65–99)
HCT VFR BLD AUTO: 37.8 % (ref 42–52)
HGB BLD-MCNC: 12.4 G/DL (ref 14–18)
IMM GRANULOCYTES # BLD AUTO: 0.07 K/UL (ref 0–0.11)
IMM GRANULOCYTES NFR BLD AUTO: 0.5 % (ref 0–0.9)
INR PPP: 1.11 (ref 0.87–1.13)
LYMPHOCYTES # BLD AUTO: 2.23 K/UL (ref 1–4.8)
LYMPHOCYTES NFR BLD: 14.6 % (ref 22–41)
MAGNESIUM SERPL-MCNC: 3 MG/DL (ref 1.5–2.5)
MCH RBC QN AUTO: 33.2 PG (ref 27–33)
MCHC RBC AUTO-ENTMCNC: 32.8 G/DL (ref 32.3–36.5)
MCV RBC AUTO: 101.3 FL (ref 81.4–97.8)
MONOCYTES # BLD AUTO: 0.75 K/UL (ref 0–0.85)
MONOCYTES NFR BLD AUTO: 4.9 % (ref 0–13.4)
NEUTROPHILS # BLD AUTO: 12.03 K/UL (ref 1.82–7.42)
NEUTROPHILS NFR BLD: 78.4 % (ref 44–72)
NRBC # BLD AUTO: 0 K/UL
NRBC BLD-RTO: 0 /100 WBC (ref 0–0.2)
NT-PROBNP SERPL IA-MCNC: ABNORMAL PG/ML (ref 0–125)
PLATELET # BLD AUTO: 226 K/UL (ref 164–446)
PMV BLD AUTO: 10.1 FL (ref 9–12.9)
POTASSIUM SERPL-SCNC: 6.3 MMOL/L (ref 3.6–5.5)
PROCALCITONIN SERPL-MCNC: 0.39 NG/ML
PROT SERPL-MCNC: 9.1 G/DL (ref 6–8.2)
PROTHROMBIN TIME: 14.4 SEC (ref 12–14.6)
RBC # BLD AUTO: 3.73 M/UL (ref 4.7–6.1)
SODIUM SERPL-SCNC: 136 MMOL/L (ref 135–145)
TROPONIN T SERPL-MCNC: 72 NG/L (ref 6–19)
WBC # BLD AUTO: 15.3 K/UL (ref 4.8–10.8)

## 2023-10-31 PROCEDURE — A9270 NON-COVERED ITEM OR SERVICE: HCPCS | Mod: UD

## 2023-10-31 PROCEDURE — 99291 CRITICAL CARE FIRST HOUR: CPT

## 2023-10-31 PROCEDURE — 99255 IP/OBS CONSLTJ NEW/EST HI 80: CPT | Performed by: INTERNAL MEDICINE

## 2023-10-31 PROCEDURE — 770020 HCHG ROOM/CARE - TELE (206)

## 2023-10-31 PROCEDURE — 99255 IP/OBS CONSLTJ NEW/EST HI 80: CPT | Performed by: HOSPITALIST

## 2023-10-31 PROCEDURE — 93005 ELECTROCARDIOGRAM TRACING: CPT | Performed by: EMERGENCY MEDICINE

## 2023-10-31 PROCEDURE — 700102 HCHG RX REV CODE 250 W/ 637 OVERRIDE(OP)

## 2023-10-31 PROCEDURE — A9270 NON-COVERED ITEM OR SERVICE: HCPCS | Mod: UD | Performed by: EMERGENCY MEDICINE

## 2023-10-31 PROCEDURE — 700111 HCHG RX REV CODE 636 W/ 250 OVERRIDE (IP): Mod: JZ,UD | Performed by: EMERGENCY MEDICINE

## 2023-10-31 PROCEDURE — 96375 TX/PRO/DX INJ NEW DRUG ADDON: CPT

## 2023-10-31 PROCEDURE — 90935 HEMODIALYSIS ONE EVALUATION: CPT

## 2023-10-31 PROCEDURE — 700102 HCHG RX REV CODE 250 W/ 637 OVERRIDE(OP): Mod: UD

## 2023-10-31 PROCEDURE — 94660 CPAP INITIATION&MGMT: CPT

## 2023-10-31 PROCEDURE — 83735 ASSAY OF MAGNESIUM: CPT

## 2023-10-31 PROCEDURE — 85730 THROMBOPLASTIN TIME PARTIAL: CPT

## 2023-10-31 PROCEDURE — 71045 X-RAY EXAM CHEST 1 VIEW: CPT

## 2023-10-31 PROCEDURE — 700111 HCHG RX REV CODE 636 W/ 250 OVERRIDE (IP): Performed by: INTERNAL MEDICINE

## 2023-10-31 PROCEDURE — 85610 PROTHROMBIN TIME: CPT

## 2023-10-31 PROCEDURE — 96374 THER/PROPH/DIAG INJ IV PUSH: CPT

## 2023-10-31 PROCEDURE — 83880 ASSAY OF NATRIURETIC PEPTIDE: CPT

## 2023-10-31 PROCEDURE — 84484 ASSAY OF TROPONIN QUANT: CPT

## 2023-10-31 PROCEDURE — 36415 COLL VENOUS BLD VENIPUNCTURE: CPT

## 2023-10-31 PROCEDURE — 80053 COMPREHEN METABOLIC PANEL: CPT

## 2023-10-31 PROCEDURE — 99291 CRITICAL CARE FIRST HOUR: CPT | Performed by: INTERNAL MEDICINE

## 2023-10-31 PROCEDURE — 84145 PROCALCITONIN (PCT): CPT

## 2023-10-31 PROCEDURE — 5A1D70Z PERFORMANCE OF URINARY FILTRATION, INTERMITTENT, LESS THAN 6 HOURS PER DAY: ICD-10-PCS | Performed by: INTERNAL MEDICINE

## 2023-10-31 PROCEDURE — 700102 HCHG RX REV CODE 250 W/ 637 OVERRIDE(OP): Mod: UD | Performed by: EMERGENCY MEDICINE

## 2023-10-31 PROCEDURE — 85025 COMPLETE CBC W/AUTO DIFF WBC: CPT

## 2023-10-31 PROCEDURE — A9270 NON-COVERED ITEM OR SERVICE: HCPCS

## 2023-10-31 RX ORDER — DOXAZOSIN MESYLATE 1 MG/1
1 TABLET ORAL DAILY
Status: DISCONTINUED | OUTPATIENT
Start: 2023-10-31 | End: 2023-10-31

## 2023-10-31 RX ORDER — HEPARIN SODIUM 1000 [USP'U]/ML
1500 INJECTION, SOLUTION INTRAVENOUS; SUBCUTANEOUS
Status: DISCONTINUED | OUTPATIENT
Start: 2023-10-31 | End: 2023-11-01 | Stop reason: HOSPADM

## 2023-10-31 RX ORDER — ROSUVASTATIN CALCIUM 10 MG/1
10 TABLET, COATED ORAL EVERY EVENING
Status: DISCONTINUED | OUTPATIENT
Start: 2023-10-31 | End: 2023-11-01 | Stop reason: HOSPADM

## 2023-10-31 RX ORDER — HYDRALAZINE HYDROCHLORIDE 10 MG/1
10 TABLET, FILM COATED ORAL EVERY 8 HOURS PRN
Status: DISCONTINUED | OUTPATIENT
Start: 2023-10-31 | End: 2023-11-01 | Stop reason: HOSPADM

## 2023-10-31 RX ORDER — NITROGLYCERIN 0.4 MG/1
0.4 TABLET SUBLINGUAL ONCE
Status: COMPLETED | OUTPATIENT
Start: 2023-10-31 | End: 2023-10-31

## 2023-10-31 RX ORDER — ASPIRIN 300 MG/1
300 SUPPOSITORY RECTAL ONCE
Status: COMPLETED | OUTPATIENT
Start: 2023-10-31 | End: 2023-10-31

## 2023-10-31 RX ORDER — ASPIRIN 81 MG/1
324 TABLET, CHEWABLE ORAL ONCE
Status: COMPLETED | OUTPATIENT
Start: 2023-10-31 | End: 2023-10-31

## 2023-10-31 RX ORDER — AMLODIPINE BESYLATE 10 MG/1
10 TABLET ORAL DAILY
Status: DISCONTINUED | OUTPATIENT
Start: 2023-10-31 | End: 2023-11-01 | Stop reason: HOSPADM

## 2023-10-31 RX ORDER — SEVELAMER CARBONATE 800 MG/1
1600 TABLET, FILM COATED ORAL 3 TIMES DAILY
COMMUNITY
End: 2024-03-21

## 2023-10-31 RX ORDER — NICOTINE 21 MG/24HR
1 PATCH, TRANSDERMAL 24 HOURS TRANSDERMAL EVERY 24 HOURS
Status: DISCONTINUED | OUTPATIENT
Start: 2023-10-31 | End: 2023-11-01 | Stop reason: HOSPADM

## 2023-10-31 RX ORDER — FUROSEMIDE 10 MG/ML
40 INJECTION INTRAMUSCULAR; INTRAVENOUS EVERY 8 HOURS
Status: DISCONTINUED | OUTPATIENT
Start: 2023-10-31 | End: 2023-11-01 | Stop reason: HOSPADM

## 2023-10-31 RX ORDER — CARVEDILOL 25 MG/1
25 TABLET ORAL 2 TIMES DAILY WITH MEALS
Status: DISCONTINUED | OUTPATIENT
Start: 2023-10-31 | End: 2023-11-01 | Stop reason: HOSPADM

## 2023-10-31 RX ORDER — LISINOPRIL 20 MG/1
20 TABLET ORAL DAILY
Status: DISCONTINUED | OUTPATIENT
Start: 2023-10-31 | End: 2023-10-31

## 2023-10-31 RX ORDER — HYDRALAZINE HYDROCHLORIDE 10 MG/1
10 TABLET, FILM COATED ORAL EVERY 8 HOURS PRN
Status: DISCONTINUED | OUTPATIENT
Start: 2023-10-31 | End: 2023-10-31

## 2023-10-31 RX ORDER — NITROGLYCERIN 0.4 MG/1
TABLET SUBLINGUAL
Status: COMPLETED
Start: 2023-10-31 | End: 2023-10-31

## 2023-10-31 RX ORDER — ONDANSETRON 2 MG/ML
4 INJECTION INTRAMUSCULAR; INTRAVENOUS ONCE
Status: COMPLETED | OUTPATIENT
Start: 2023-10-31 | End: 2023-10-31

## 2023-10-31 RX ORDER — ASPIRIN 81 MG/1
81 TABLET, CHEWABLE ORAL DAILY
Status: DISCONTINUED | OUTPATIENT
Start: 2023-11-01 | End: 2023-11-01 | Stop reason: HOSPADM

## 2023-10-31 RX ORDER — CALCITRIOL 0.25 UG/1
0.25 CAPSULE, LIQUID FILLED ORAL DAILY
Status: DISCONTINUED | OUTPATIENT
Start: 2023-11-01 | End: 2023-11-01 | Stop reason: HOSPADM

## 2023-10-31 RX ORDER — MORPHINE SULFATE 4 MG/ML
4 INJECTION INTRAVENOUS ONCE
Status: COMPLETED | OUTPATIENT
Start: 2023-10-31 | End: 2023-10-31

## 2023-10-31 RX ORDER — LISINOPRIL 20 MG/1
20 TABLET ORAL DAILY
Status: DISCONTINUED | OUTPATIENT
Start: 2023-10-31 | End: 2023-11-01 | Stop reason: HOSPADM

## 2023-10-31 RX ADMIN — NITROGLYCERIN 0.4 MG: 0.4 TABLET SUBLINGUAL at 08:36

## 2023-10-31 RX ADMIN — AMLODIPINE BESYLATE 10 MG: 10 TABLET ORAL at 14:18

## 2023-10-31 RX ADMIN — NITROGLYCERIN 0.5 INCH: 20 OINTMENT TOPICAL at 09:42

## 2023-10-31 RX ADMIN — MORPHINE SULFATE 4 MG: 4 INJECTION, SOLUTION INTRAMUSCULAR; INTRAVENOUS at 08:45

## 2023-10-31 RX ADMIN — DOLUTEGRAVIR SODIUM 50 MG: 50 TABLET, FILM COATED ORAL at 17:35

## 2023-10-31 RX ADMIN — FUROSEMIDE 40 MG: 10 INJECTION, SOLUTION INTRAVENOUS at 08:41

## 2023-10-31 RX ADMIN — ASPIRIN 324 MG: 81 TABLET, CHEWABLE ORAL at 08:35

## 2023-10-31 RX ADMIN — HYDRALAZINE HYDROCHLORIDE 10 MG: 10 TABLET, FILM COATED ORAL at 11:53

## 2023-10-31 RX ADMIN — ROSUVASTATIN CALCIUM 10 MG: 20 TABLET, FILM COATED ORAL at 17:34

## 2023-10-31 RX ADMIN — NICOTINE 14 MG: 14 PATCH TRANSDERMAL at 14:18

## 2023-10-31 RX ADMIN — CARVEDILOL 25 MG: 25 TABLET, FILM COATED ORAL at 17:34

## 2023-10-31 RX ADMIN — NITROGLYCERIN 0.4 MG: 0.4 TABLET, ORALLY DISINTEGRATING SUBLINGUAL at 08:36

## 2023-10-31 RX ADMIN — ONDANSETRON 4 MG: 2 INJECTION INTRAMUSCULAR; INTRAVENOUS at 08:45

## 2023-10-31 RX ADMIN — LISINOPRIL 20 MG: 20 TABLET ORAL at 17:34

## 2023-10-31 RX ADMIN — HEPARIN SODIUM 1500 UNITS: 1000 INJECTION, SOLUTION INTRAVENOUS; SUBCUTANEOUS at 12:40

## 2023-10-31 RX ADMIN — RILPIVIRINE HYDROCHLORIDE 25 MG: 25 TABLET, FILM COATED ORAL at 17:34

## 2023-10-31 ASSESSMENT — PULMONARY FUNCTION TESTS
EPAP_CMH2O: 8

## 2023-10-31 ASSESSMENT — ENCOUNTER SYMPTOMS
PSYCHIATRIC NEGATIVE: 1
DIARRHEA: 0
CHILLS: 0
FEVER: 0
HEADACHES: 0
CONSTITUTIONAL NEGATIVE: 1
PALPITATIONS: 0
TINGLING: 0
NEUROLOGICAL NEGATIVE: 1
BLURRED VISION: 0
EYES NEGATIVE: 1
SHORTNESS OF BREATH: 1
GASTROINTESTINAL NEGATIVE: 1
VOMITING: 0
NAUSEA: 0

## 2023-10-31 ASSESSMENT — PATIENT HEALTH QUESTIONNAIRE - PHQ9
9. THOUGHTS THAT YOU WOULD BE BETTER OFF DEAD, OR OF HURTING YOURSELF: NOT AT ALL
2. FEELING DOWN, DEPRESSED, IRRITABLE, OR HOPELESS: SEVERAL DAYS
6. FEELING BAD ABOUT YOURSELF - OR THAT YOU ARE A FAILURE OR HAVE LET YOURSELF OR YOUR FAMILY DOWN: NOT AL ALL
7. TROUBLE CONCENTRATING ON THINGS, SUCH AS READING THE NEWSPAPER OR WATCHING TELEVISION: NOT AT ALL
3. TROUBLE FALLING OR STAYING ASLEEP OR SLEEPING TOO MUCH: NOT AT ALL
4. FEELING TIRED OR HAVING LITTLE ENERGY: NOT AT ALL
SUM OF ALL RESPONSES TO PHQ QUESTIONS 1-9: 2
1. LITTLE INTEREST OR PLEASURE IN DOING THINGS: SEVERAL DAYS
SUM OF ALL RESPONSES TO PHQ9 QUESTIONS 1 AND 2: 2
5. POOR APPETITE OR OVEREATING: NOT AT ALL
8. MOVING OR SPEAKING SO SLOWLY THAT OTHER PEOPLE COULD HAVE NOTICED. OR THE OPPOSITE, BEING SO FIGETY OR RESTLESS THAT YOU HAVE BEEN MOVING AROUND A LOT MORE THAN USUAL: NOT AT ALL

## 2023-10-31 ASSESSMENT — LIFESTYLE VARIABLES
CONSUMPTION TOTAL: NEGATIVE
HAVE YOU EVER FELT YOU SHOULD CUT DOWN ON YOUR DRINKING: NO
HAVE PEOPLE ANNOYED YOU BY CRITICIZING YOUR DRINKING: NO
ALCOHOL_USE: NO
AVERAGE NUMBER OF DAYS PER WEEK YOU HAVE A DRINK CONTAINING ALCOHOL: 0
TOTAL SCORE: 0
TOTAL SCORE: 0
EVER FELT BAD OR GUILTY ABOUT YOUR DRINKING: NO
HAVE YOU EVER FELT YOU SHOULD CUT DOWN ON YOUR DRINKING: NO
ALCOHOL_USE: NO
EVER HAD A DRINK FIRST THING IN THE MORNING TO STEADY YOUR NERVES TO GET RID OF A HANGOVER: NO
ON A TYPICAL DAY WHEN YOU DRINK ALCOHOL HOW MANY DRINKS DO YOU HAVE: 0
CONSUMPTION TOTAL: INCOMPLETE
EVER FELT BAD OR GUILTY ABOUT YOUR DRINKING: NO
DOES PATIENT WANT TO STOP DRINKING: NO
ALCOHOL_USE: NO
TOTAL SCORE: 0
DOES PATIENT WANT TO STOP DRINKING: NO
TOTAL SCORE: 0
HOW MANY TIMES IN THE PAST YEAR HAVE YOU HAD 5 OR MORE DRINKS IN A DAY: 0
EVER HAD A DRINK FIRST THING IN THE MORNING TO STEADY YOUR NERVES TO GET RID OF A HANGOVER: NO
HAVE PEOPLE ANNOYED YOU BY CRITICIZING YOUR DRINKING: NO
TOTAL SCORE: 0
TOTAL SCORE: 0

## 2023-10-31 ASSESSMENT — PAIN DESCRIPTION - PAIN TYPE: TYPE: ACUTE PAIN

## 2023-10-31 ASSESSMENT — COGNITIVE AND FUNCTIONAL STATUS - GENERAL
SUGGESTED CMS G CODE MODIFIER MOBILITY: CK
MOVING FROM LYING ON BACK TO SITTING ON SIDE OF FLAT BED: A LITTLE
DAILY ACTIVITIY SCORE: 18
DRESSING REGULAR UPPER BODY CLOTHING: A LITTLE
CLIMB 3 TO 5 STEPS WITH RAILING: A LITTLE
MOBILITY SCORE: 18
HELP NEEDED FOR BATHING: A LITTLE
TURNING FROM BACK TO SIDE WHILE IN FLAT BAD: A LITTLE
PERSONAL GROOMING: A LITTLE
STANDING UP FROM CHAIR USING ARMS: A LITTLE
SUGGESTED CMS G CODE MODIFIER DAILY ACTIVITY: CK
WALKING IN HOSPITAL ROOM: A LITTLE
TOILETING: A LITTLE
DRESSING REGULAR LOWER BODY CLOTHING: A LITTLE
EATING MEALS: A LITTLE
MOVING TO AND FROM BED TO CHAIR: A LITTLE

## 2023-10-31 ASSESSMENT — FIBROSIS 4 INDEX
FIB4 SCORE: 0.55
FIB4 SCORE: 0.6

## 2023-10-31 NOTE — ED NOTES
Patient resting on gurney, respirations even and unlabored, Bipap in place at 10L O2, patient denies needs at this time, family at bedside, call light within reach, educated on use, verbalized understanding.

## 2023-10-31 NOTE — ED PROVIDER NOTES
ED Provider Note    CHIEF COMPLAINT  Chief Complaint   Patient presents with    Chest Pain     Beginning around 0300    Shortness of Breath     Pt reports missing dialysis on Saturday and having shortness of breath starting at 0300. Pt reports having increased shortness of breath all morning        EXTERNAL RECORDS REVIEWED  Other reviewed notes from August 8, 2023 as the patient missed his follow-up appointment with the CHF clinic and I also reviewed a note on September 8 as the patient missed his appointment on 6 September    HPI/ROS  LIMITATION TO HISTORY   Select: Patient has difficulty speaking due to respiratory distress  OUTSIDE HISTORIAN(S):  EMS provided signout at the bedside    Seamus Palencia is a 58 y.o. male who presents with severe shortness of breath and chest pain.  Started around 3 AM.  The patient's been having progressive shortness of breath throughout the evening.  He has a known history of heart failure and is currently a dialysis patient.  The patient missed dialysis on Saturday as he states his fistula was not functional.  He states it has been subsequently corrected.  The patient has not had any associated fevers.  He does not have a cough.  He does have severe chest pressure as well as difficulty with breathing.  He does not have any swelling to his lower extremities.    PAST MEDICAL HISTORY   has a past medical history of Dialysis patient ([facility]) (05/17/2023), Heart failure ([facility]), HIV disease ([facility]) (01/01/1995), Hypertension (05/17/2023), Pneumonia (05/17/2023), Renal disorder, Seizure ([facility]) (05/17/2023), and Stroke ([facility]) (2015).    SURGICAL HISTORY   has a past surgical history that includes cholecystectomy (1980s) and other (Left).    FAMILY HISTORY  Family History   Problem Relation Age of Onset    Diabetes Mother         cause of death    Diabetes Father         cause of death    Diabetes Sister     Other Brother         down syndrome    No Known Problems Sister     No Known Problems  "Sister     No Known Problems Sister     Other Daughter         5 daughters, 11 sons     Clotting Disorder Neg Hx     Stroke Neg Hx        SOCIAL HISTORY  Social History     Tobacco Use    Smoking status: Former     Current packs/day: 0.00     Average packs/day: 0.3 packs/day for 30.0 years (7.5 ttl pk-yrs)     Types: Cigarettes     Start date:      Quit date:      Years since quittin.8    Smokeless tobacco: Never    Tobacco comments:     3-4 CIGARETTES A DAY   Vaping Use    Vaping Use: Never used   Substance and Sexual Activity    Alcohol use: No    Drug use: No    Sexual activity: Not on file       CURRENT MEDICATIONS  Home Medications       Reviewed by Raquel Brasher R.N. (Registered Nurse) on 10/31/23 at 0831  Med List Status: Not Addressed     Medication Last Dose Status   amLODIPine (NORVASC) 10 MG Tab  Active   aspirin (ASA) 81 MG Chew Tab chewable tablet  Active   calcitRIOL (ROCALTROL) 0.25 MCG Cap  Active   carvedilol (COREG) 25 MG Tab  Active   dolutegravir (TIVICAY) 50 MG Tab tablet  Active   doxazosin (CARDURA) 1 MG Tab  Active   hydrALAZINE (APRESOLINE) 25 MG Tab  Active   JULUCA 50-25 MG Tab  Active   lisinopril (PRINIVIL) 20 MG Tab  Active   nicotine (NICOTINE STEP 2) 14 MG/24HR PATCH 24 HR  Active   rilpivirine (EDURANT) 25 MG Tab tablet  Active   sevelamer carbonate (RENVELA) 800 MG Tab tablet  Active                    ALLERGIES  No Known Allergies    PHYSICAL EXAM  VITAL SIGNS: BP (!) 155/108   Pulse 98   Resp (!) 28   Ht 1.626 m (5' 4\")   Wt 75 kg (165 lb 5.5 oz)   SpO2 (!) 82%   BMI 28.38 kg/m²    In general the patient is in acute distress    HEENT unremarkable except for poor dentition    Pulmonary the patient's lungs are symmetrically diminished throughout, the patient is tachypneic, diffuse rhonchi    Cardiovascular S1-S2 with a slightly tachycardic rate    GI abdomen soft    Extremities no significant distal edema    Neurologic examination is grossly intact    Skin no " rashes, pallor, no jaundice    DIAGNOSTIC STUDIES   Results for orders placed or performed during the hospital encounter of 10/31/23   CBC w/ Differential   Result Value Ref Range    WBC 15.3 (H) 4.8 - 10.8 K/uL    RBC 3.73 (L) 4.70 - 6.10 M/uL    Hemoglobin 12.4 (L) 14.0 - 18.0 g/dL    Hematocrit 37.8 (L) 42.0 - 52.0 %    .3 (H) 81.4 - 97.8 fL    MCH 33.2 (H) 27.0 - 33.0 pg    MCHC 32.8 32.3 - 36.5 g/dL    RDW 53.1 (H) 35.9 - 50.0 fL    Platelet Count 226 164 - 446 K/uL    MPV 10.1 9.0 - 12.9 fL    Neutrophils-Polys 78.40 (H) 44.00 - 72.00 %    Lymphocytes 14.60 (L) 22.00 - 41.00 %    Monocytes 4.90 0.00 - 13.40 %    Eosinophils 1.00 0.00 - 6.90 %    Basophils 0.60 0.00 - 1.80 %    Immature Granulocytes 0.50 0.00 - 0.90 %    Nucleated RBC 0.00 0.00 - 0.20 /100 WBC    Neutrophils (Absolute) 12.03 (H) 1.82 - 7.42 K/uL    Lymphs (Absolute) 2.23 1.00 - 4.80 K/uL    Monos (Absolute) 0.75 0.00 - 0.85 K/uL    Eos (Absolute) 0.15 0.00 - 0.51 K/uL    Baso (Absolute) 0.09 0.00 - 0.12 K/uL    Immature Granulocytes (abs) 0.07 0.00 - 0.11 K/uL    NRBC (Absolute) 0.00 K/uL   Complete Metabolic Panel (CMP)   Result Value Ref Range    Sodium 136 135 - 145 mmol/L    Potassium 6.3 (H) 3.6 - 5.5 mmol/L    Chloride 95 (L) 96 - 112 mmol/L    Co2 17 (L) 20 - 33 mmol/L    Anion Gap 24.0 (H) 7.0 - 16.0    Glucose 159 (H) 65 - 99 mg/dL    Bun 87 (H) 8 - 22 mg/dL    Creatinine 17.62 (HH) 0.50 - 1.40 mg/dL    Calcium 8.5 8.5 - 10.5 mg/dL    Correct Calcium 8.3 (L) 8.5 - 10.5 mg/dL    AST(SGOT) <5 (L) 12 - 45 U/L    ALT(SGPT) <5 2 - 50 U/L    Alkaline Phosphatase 103 (H) 30 - 99 U/L    Total Bilirubin 0.4 0.1 - 1.5 mg/dL    Albumin 4.3 3.2 - 4.9 g/dL    Total Protein 9.1 (H) 6.0 - 8.2 g/dL    Globulin 4.8 (H) 1.9 - 3.5 g/dL    A-G Ratio 0.9 g/dL   Troponin - STAT Once   Result Value Ref Range    Troponin T 72 (H) 6 - 19 ng/L   proBrain Natriuretic Peptide, NT   Result Value Ref Range    NT-proBNP >23606 (H) 0 - 125 pg/mL   APTT    Result Value Ref Range    APTT 33.2 24.7 - 36.0 sec   PT/INR   Result Value Ref Range    PT 14.4 12.0 - 14.6 sec    INR 1.11 0.87 - 1.13   ESTIMATED GFR   Result Value Ref Range    GFR (CKD-EPI) 3 (A) >60 mL/min/1.73 m 2   EKG   Result Value Ref Range    Report       [facility] Emergency Dept.    Test Date:  2023-10-31  Pt Name:    SONI ABBASI                  Department: ER  MRN:        [id number]                      Room:       RD 01  Gender:     Male                         Technician: [id number]  :        1965                   Requested By:ER TRIAGE PROTOCOL  Order #:    [id number]                    Reading MD: REKHA NOVA MD    Measurements  Intervals                                Axis  Rate:       94                           P:          60  MD:         168                          QRS:        29  QRSD:       106                          T:          102  QT:         386  QTc:        483    Interpretive Statements  Twelve-lead EKG does have baseline artifact due to the patient's respiratory  distress.  It appears to be a normal sinus rhythm with a ventricular to 94,  peaked T waves anteriorly, no ST segment elevation, slight depression  laterally in V4 and V5 and V6.  This might be slightly more pronounced  mirlande red to prior otherwise no dynamic changes  Electronically Signed On 10- 08:37:43 PDT by REKHA NOVA MD       EKG  I have independently interpreted this EKG  Please see my interpretation above      RADIOLOGY  I have independently interpreted the diagnostic imaging associated with this visit and am waiting the final reading from the radiologist.   My preliminary interpretation is as follows: Chest x-ray is reviewed and there is some diffuse edema  Radiologist interpretation:   DX-CHEST-PORTABLE (1 VIEW)   Final Result      1.  Findings suspicious for pulmonary edema. Pneumonia not excluded.   2.  Trace RIGHT pleural effusion            COURSE & MEDICAL  DECISION MAKING    This a 58-year-old gentleman who presents the emergency department acute respiratory distress.  Based on his history of Jasbir dependent renal failure as well as missing dialysis I was concern for fluid overload and pulmonary edema.  On arrival he was in respiratory distress and he was treated aggressively initially with nitroglycerin tablet as we are awaiting peripheral intravenous line placement.  I also emergently ordered positive pressure support with BiPAP which was started and an IV was established.  After the IV was established I also administered 4 mg of morphine for his chest pain as well as to help decrease his afterload.  The patient started having significant improvement.  We did apply Nitropaste and the patient's blood pressure has come down nicely.  I did administer aspirin orally for possible acute coronary syndrome although I suspect this is less likely as his EKG does not show any concerning dynamic change.  His troponin did come back elevated at 72 but this is the low end of his baseline I suspect is elevated from his renal insufficiency.  His BUN and creatinine is 87 and 17.6 respectively all consistent with his lack of dialysis.  Family has arrived after the resuscitation and they state the patient has not gone to dialysis since last Tuesday.  Therefore I emergently contacted Dr. Evans from nephrology for emergent dialysis.  The patient has stabilized on BiPAP.  He feels significantly better on repeat exam he has no further chest pain.  We will repeat a troponin to make sure it is not increasing consistent with acute coronary syndrome.  I suspect his symptoms were all from pulmonary edema.    The patient does have a leukocytosis but I suspect this is demargination from stress.  Chest x-ray does not show any evidence of a focal process such as pneumonia nor is he had any recent fevers.  The patient does have a stable anemia.     Patient does have hyperkalemia that is acute with a  potassium of 6.3 and he will receive insulin and glucose to shift the potassium intracellularly.  He will have definitive treatment with dialysis emergently.  He does not have any widening of the QRS and therefore we will hold off on calcium treatment at this time.    1005 Dr. Evans is at the bedside and she like to hold off on insulin and glucose as she will take the patient emergently for dialysis.    FINAL DIAGNOSIS  1.  Respiratory distress requiring BiPAP therapy  2.  Suspect secondary to fluid overload and noncompliance with dialysis  3.  Hypertensive emergency  4.  Chest pain rule out acute coronary syndrome  5.  Dialysis dependent renal failure  6.  Hyperkalemia  7.  Stable anemia  8.  Critical care time 45 minutes    CRITICAL CARE  The very real possibilty of a deterioration of this patient's condition required the highest level of my preparedness for sudden, emergent intervention.  I provided critical care services, which included medication orders, frequent reevaluations of the patient's condition and response to treatment, ordering and reviewing test results, and discussing the case with various consultants.  The critical care time associated with the care of the patient was 45 minutes. Review chart for interventions. This time is exclusive of any other billable procedures.      Disposition  The patient will be admitted to the hospital in guarded condition           Electronically signed by: Nelson Blas M.D., 10/31/2023 8:33 AM

## 2023-10-31 NOTE — ASSESSMENT & PLAN NOTE
- Patient noted to be needing BiPAP  -Likely secondary to pulmonary edema, fluid overload in the setting of missed dialysis  -Emergent hemodialysis with nephrology  -Expect improvement in patient's respiratory status after hemodialysis, wean off on BiPAP

## 2023-10-31 NOTE — H&P
Critical Care/Pulmonary Consultation    Date of Service: 10/31/2023    Date of Admission:  10/31/2023  8:20 AM    Consulting Physician: Chip Ponce M.D.    Chief Complaint:  Chest Pain (Beginning around 0300) and Shortness of Breath (Pt reports missing dialysis on Saturday and having shortness of breath starting at 0300. Pt reports having increased shortness of breath all morning )      History of Present Illness: Seamus Palencia is a 58 y.o. male with a past medical history of end-stage renal disease on hemodialysis Tuesday Thursday Saturday, heart failure, anemia secondary to CKD, HIV, hypertension, peripheral arterial disease.     Patient presented to the emergency department 10/31/2023 due to persistent shortness of breath and chest pain which started around 3 AM this morning.  Patient's grand daughter who lives with him states that patient missed hemodialysis for 2 days last hemodialysis was Tuesday last week.  Per review, patient missed his hemodialysis due to AV fistula procedure.  Patient states that he was not feeling well and did not go to his hemodialysis sessions.   Patient states that chest pain subsequently resolved without taking any medications,patient describes chest pain as discomfort, localized, no radiation, no pleuritic chest pain.Patient denies fever, chills,further denies any nausea, vomiting, denies cough.  Patient likewise denies any history of recent sick contact.  Patient with no regular blood pressure monitoring at home, but states that he is compliant with his medications.  Patient did not have any headache, blurring of vision, no sensory or motor deficits noted.  Denies orthopnea and denies PND, denies leg swelling.     In the emergency department, patient was noted to be afebrile, pulse 98, tachycardic at 28, hypertensive at 155/108, needing BiPAP.  Labs remarkable for creatinine of 17.62, BUN 87, bicarbonate of 17, hyperkalemia 6.3,  (EKG with peaked T waves anteriorly, noted  non specific ST depression, no ST elevations .  Troponin of 72, (which is stable from patient's baseline), proBNP elevated at greater than 70,000 , hemoglobin 12.4, WBC at 15.3.  Chest x-ray findings of pulmonary edema, consolidation cannot be excluded, trace right-sided pleural effusion.  Nephrology was initially consulted and with plans for dialysis this afternoon.       Review of Systems   Constitutional: Negative.  Negative for chills and fever.   HENT: Negative.     Eyes: Negative.  Negative for blurred vision.   Cardiovascular:  Negative for palpitations and leg swelling.   Gastrointestinal: Negative.    Genitourinary: Negative.    Skin: Negative.    Neurological: Negative.  Negative for tingling and headaches.   Endo/Heme/Allergies: Negative.    Psychiatric/Behavioral: Negative.         Home Medications       Reviewed by Nael Jimenez (Pharmacy Tech) on 10/31/23 at 1134  Med List Status: Complete     Medication Last Dose Status   amLODIPine (NORVASC) 10 MG Tab UNK Active   carvedilol (COREG) 25 MG Tab UNK Active   doxazosin (CARDURA) 1 MG Tab UNK Active   JULUCA 50-25 MG Tab UNK Active   lisinopril (PRINIVIL) 20 MG Tab UNK Active   nicotine (NICOTINE STEP 2) 14 MG/24HR PATCH 24 HR NOT PICKE UP Active                    Social History     Tobacco Use    Smoking status: Former     Current packs/day: 0.00     Average packs/day: 0.3 packs/day for 30.0 years (7.5 ttl pk-yrs)     Types: Cigarettes     Start date:      Quit date: 2011     Years since quittin.8    Smokeless tobacco: Never    Tobacco comments:     3-4 CIGARETTES A DAY   Vaping Use    Vaping Use: Never used   Substance Use Topics    Alcohol use: No    Drug use: No        Past Medical History:   Diagnosis Date    Dialysis patient ([facility]) 2023    three times a week, Tues, Thurs, Sat    Heart failure (HCC)     HIV disease ([facility]) 1995    medicated    Hypertension 2023    medicated    Pneumonia 2023    history of     "Renal disorder     ESRD on hemodylsis Tues/Thurs/Sat at [facility]    Seizure ([facility]) 05/17/2023    history of    Stroke ([facility]) 2015    no residual issues       Past Surgical History:   Procedure Laterality Date    CHOLECYSTECTOMY  1980s    OTHER Left     Dialysis graft left arm       Allergies: Patient has no known allergies.    Family History   Problem Relation Age of Onset    Diabetes Mother         cause of death    Diabetes Father         cause of death    Diabetes Sister     Other Brother         down syndrome    No Known Problems Sister     No Known Problems Sister     No Known Problems Sister     Other Daughter         5 daughters, 11 sons     Clotting Disorder Neg Hx     Stroke Neg Hx        Vitals:    10/31/23 0821 10/31/23 0824 10/31/23 0836 10/31/23 0847   Height: 1.626 m (5' 4\")      Weight: 75 kg (165 lb 5.5 oz)      Weight % change since last entry.: 0 %      BP:  (!) 155/108  (!) 239/116   Pulse:  98 (!) 101 84   BMI (Calculated): 28.38      Resp:  (!) 28 (!) 28 (!) 31   Temp:       TempSrc:        10/31/23 0932 10/31/23 0942 10/31/23 1000 10/31/23 1012   Height:       Weight:       Weight % change since last entry.:       BP: (!) 174/93 (!) 170/89 (!) 158/86 (!) 157/90   Pulse: 71  66 66   BMI (Calculated):       Resp:   (!) 22 (!) 21   Temp: 36.9 °C (98.5 °F)      TempSrc: Temporal       10/31/23 1022 10/31/23 1030 10/31/23 1120 10/31/23 1128   Height:   1.626 m (5' 4\")    Weight:   68.2 kg (150 lb 5.7 oz)    Weight % change since last entry.:   -9.07 %    BP: (!) 168/91  (!) 159/85 (!) 217/98   Pulse: 66 66 70 72   BMI (Calculated):   25.81    Resp: (!) 21 20 18 18   Temp:   36.8 °C (98.3 °F)    TempSrc:   Temporal        Physical Examination    GENERAL APPEARANCE: Ill looking , not in respiratory   distress, on BiPAP.  HEENT:  Normocephalic, atraumatic.  Pupils equal, round, reactive to light.    Extraocular movement intact.  Oropharynx covered with BiPAP mask.  NECK:  No lymphadenopathy, no thyromegaly " appreciated.  LUNGS: Crackles in bilateral lung fields  HEART:  Tachycardic, regular rate.  No rub or gallop.  ABDOMEN:  Soft, nontender, nondistended.  Bowel sounds present.  EXTREMITIES: Left arm fistula, with good thrill , trace pedal edema, No cyanosis.  NEUROLOGIC:  Alert, oriented x3, no focal deficit.  Cranial nerves II-XII   grossly intact.  SKIN:  Warm.  No erythema, No jaundice.    No intake or output data in the 24 hours ending 10/31/23 1237    Recent Labs     10/31/23  0838   WBC 15.3*   NEUTSPOLYS 78.40*   LYMPHOCYTES 14.60*   MONOCYTES 4.90   EOSINOPHILS 1.00   BASOPHILS 0.60   ASTSGOT <5*   ALTSGPT <5   ALKPHOSPHAT 103*   TBILIRUBIN 0.4     Recent Labs     10/31/23  0838   SODIUM 136   POTASSIUM 6.3*   CHLORIDE 95*   CO2 17*   BUN 87*   CREATININE 17.62*   MAGNESIUM 3.0*   CALCIUM 8.5     Recent Labs     10/31/23  0838   ALTSGPT <5   ASTSGOT <5*   ALKPHOSPHAT 103*   TBILIRUBIN 0.4   GLUCOSE 159*         DX-CHEST-PORTABLE (1 VIEW)   Final Result      1.  Findings suspicious for pulmonary edema. Pneumonia not excluded.   2.  Trace RIGHT pleural effusion          Patient Active Problem List   Diagnosis    Hyponatremia    Pneumonia    HIV (human immunodeficiency virus infection) (HCC)    Stroke (HCC)    Normochromic normocytic anemia    Elevated troponin    Encephalopathy    Hypocalcemia    Hypoalbuminemia    Elevated liver enzymes    Scabies infestation    Hematuria    Thrombocytopenia (HCC)    PAD (peripheral artery disease) (HCC)    Hypomagnesemia    HTN (hypertension), malignant    Cerebral infarction (HCC)    Transient weakness of left lower extremity    Hypokalemia    Leukocytosis    Thrombocytosis    Essential hypertension, benign    Abnormal echocardiogram: LVEF 45% in 2015    Chronic kidney disease (CKD) stage G3a/A3, moderately decreased glomerular filtration rate (GFR) between 45-59 mL/min/1.73 square meter and albuminuria creatinine ratio greater than 300 mg/g (HCC)    Dyslipidemia    History  of CVA (cerebrovascular accident)    Resistant hypertension    Carotid disease, bilateral (HCC)    Personal history of nonadherence to medical treatment    Nicotine dependence    Anemia due to chronic kidney disease    Herpes zoster complication    History of 2019 novel coronavirus disease (COVID-19)    ESRD (end stage renal disease) on dialysis (HCC)    Orthostatic hypotension    Acute respiratory failure with hypoxia (HCC)    AIDS (acquired immunodeficiency syndrome), CD4 <=200 (HCC)    Hyperkalemia    Sepsis associated with AIDS (HCC)    Hypertensive urgency    Hypertensive emergency       Assessment and Plan:    1.  Hypertensive emergency  -Hypertensive emergency with pulmonary edema  -In the setting of missing 2 hemodialysis sessions  -Scheduled for hemodialysis with nephrology  -Resume home medications, as needed hydralazine for systolic blood pressure greater than 160  -Patient denies any headache, blurring of vision, denies any weakness  -Reviewed stress test on 1/10/2023, no evidence of ischemia infarction/  -Hold off further diuresis in the setting of planned hemodialysis    2.  Hypoxic respiratory failure  -Patient noted to be needing BiPAP  -Likely secondary to pulmonary edema, fluid overload in the setting of missed dialysis  -Emergent hemodialysis with nephrology  -Expect improvement in patient's respiratory status after hemodialysis, wean off on BiPAP       3. Metabolic Acidosis  - secondary to ESRD, patient missing hemodialysis  - emergent hemodialysis    4.  Anemia  -currently at goal, no need for EPO with dialysis  -Secondary to end-stage renal disease  -Continue to monitor  -No active bleeding    5 Leukocytosis  - Patient noted to be needing BiPAP  -Likely secondary to pulmonary edema, fluid overload in the setting of missed dialysis  -Emergent hemodialysis with nephrology  -Expect improvement in patient's respiratory status after hemodialysis, wean off on BiPAP     6.  Hyperkalemia  -Potassium of  6.3   -With noted peak T waves  -Plan for emergent hemodialysis    7.  Heart failure   -Patient does not present with any swelling, denies any orthopnea  -Previous echo on 2020 revealing ejection fraction 40%  -Continue current medications  -Follows with outpatient cardiology    8.  PAD  -Continue statin, continue aspirin    9.  End-stage renal disease  -Hemodialysis as above  -Check vitamin D  -consider checking PTH to check for Bone Mineral Disease, though patient already follows with nephrology    10.HIV     -continue current home medications      Ellie Montanez M.D.  PGY2 internal medicine      Case discussed with Dr. Ponce      Patient is critically ill.   The vital organ system that is affected is the: kidneys/lungs  If untreated there is a high chance of deterioration into multiorgan failure And eventually death.     The critical that has been undertaken is medically complex.   There has been no overlap in critical care time.

## 2023-10-31 NOTE — ASSESSMENT & PLAN NOTE
-Hypertensive emergency with pulmonary edema  -In the setting of missing 2 hemodialysis sessions  -Scheduled for hemodialysis with nephrology  -Resume home medications, as needed hydralazine for systolic blood pressure greater than 160  -Patient denies any headache, blurring of vision, denies any weakness

## 2023-10-31 NOTE — ED NOTES
Bedside report to NAMRATA Hamilton.  [place]amus [place] [facility] [place], RT patient on monitor and Bipap at 10L for SpO2 >92%. All personal belongings in possession, family at bedside.  NAD noted.

## 2023-10-31 NOTE — PROGRESS NOTES
[facility] Progress Note         HD today x 3 hours per Dr. Bailey.  Tx initiated at 1240 and ended at 1540    Pt A/O x 4, on BiPap, with O2 sat of 99%, no bleeding, denies chest pain, but with diminished BS, hyperkalemia, and was is hypertensive with initial BP of 209/93 mmHg. LUE AVF (+) bruit and thrill (+) consent for HD     NET UF: 3000 ml     Patient tolerated tx. Bp improved towards the end of the treatment; pt was asymptomatic. No HD complications encountered throughout the treatment.  All blood was returned aseptically. HD needles removed from LUE AVF. Dry gauze applied and changed without bleeding issue.(+) Bruit and thrill pre/post tx. Should breakthrough bleeding occur, staff RN to apply pressure to access sites until bleeding resolved. Notify Dialysis and Nephrologist for follow-up.See eflow sheets for further details.      Report given to TOBIN Martin RN

## 2023-10-31 NOTE — PROGRESS NOTES
4 Eyes Skin Assessment Completed by Joy, RN and NAMRATA Ricci.    Head WDL  Ears WDL  Nose WDL  Mouth WDL  Neck WDL  Breast/Chest WDL  Shoulder Blades WDL  Spine WDL  (R) Arm/Elbow/Hand WDL  (L) Arm/Elbow/Hand Scab, upper arm fistula  Abdomen WDL  Groin WDL  Scrotum/Coccyx/Buttocks WDL  (R) Leg WDL  (L) Leg WDL  (R) Heel/Foot/Toe WDL  (L) Heel/Foot/Toe WDL          Devices In Places ECG, Blood Pressure Cuff, Pulse Ox, SCD's, and Bipap      Interventions In Place Bipap Protecta-Gel, Pillows, Low Air Loss Mattress, and Heels Loaded W/Pillows    Possible Skin Injury No    Pictures Uploaded Into Epic N/A  Wound Consult Placed N/A  RN Wound Prevention Protocol Ordered No

## 2023-10-31 NOTE — CONSULTS
Critical Care Medicine Faculty Consult Note    Consulted by: Dr Blas    Reason for consult: ESRD volume overload with hypertensive emergency and respiratory failure    HPI: 58y M w/ ESRD on dialysis T,TH, Sat, HTN, PAD, anemia, HIV. That had a fistulagram and clot removal on Tuesday of last week. He was having some pain associated with this so he missed dialysis on Thursday and then started to feel poorly so also didn't go to dialysis on Saturday. He presents today with significant hypertension, respiratory distress and hypoxia needing Bipap. He has had no infectious symptoms or fever sick contact or Covid exposure or chest pain. He will be admitted to ICU for hypertensive emergency and urgent dialysis for volume overload. He will be placed as full code.     Exam:Chronic ill male in no acute distress, sitting up in red 1 on Bipap, lungs crackles bilateral, heart rrr, abdomen soft, ext with edema or pallor, abnormality of right bicept and good thrill on left arm fistula, neuro intact moving all extremities.     A/P:  Hypertensive emergency with pulmonary edema: emergent dialysis Dr Evans consulting. SBP has improved continue PRN for SBP > 160. EKG without ischemic injury some peak t waves. Echo 7/202 with EF 40% Grade II diastolic dysfunction, RVSP 50, Mild/Mod MR. Repeat echo this admission.     ESRD: avoid nephrotoxins, emergent dialysis with fluid removal. Serial monitor k and need for further dialysis.     Hypertension: on multiple BP medication re-initiate home medication regime.     PAD: continue statin and aspirin therapy.     Hypoxic respiratory failure: on Bipap for volume overload, likely trial off when done with dialysis and monitor for further need.     HIV: continue home HIV medication no current infection concerns.     Patient remains in critical condition from hypertensive emergency and titration of Bipap . Critical care time provided was 55 minutes. This excludes all separate billable procedures.      Please see [employer]/NP notes for additional documentation    Chip Ponce MD  Critical Care Medicine

## 2023-10-31 NOTE — ED TRIAGE NOTES
"Chief Complaint   Patient presents with    Chest Pain     Beginning around 0300    Shortness of Breath     Pt reports missing dialysis on Saturday and having shortness of breath starting at 0300. Pt reports having increased shortness of breath all morning      BP (!) 155/108   Pulse 98   Resp (!) 28   Ht 1.626 m (5' 4\")   Wt 75 kg (165 lb 5.5 oz)   SpO2 (!) 82%   BMI 28.38 kg/m²     ERP at bedside  Pt medicated with Nitroglycerin per MD  RT called  Pt on oxymask   "

## 2023-10-31 NOTE — CONSULTS
DATE OF SERVICE:  10/31/2023     NEPHROLOGY CONSULTATION     REQUESTING PHYSICIAN:  Nelson Blas MD     REASON FOR CONSULTATION:  To evaluate and provide emergent dialysis for   patient with hyperkalemia, shortness of breath on BiPAP due to dialysis   noncompliance.     HISTORY OF PRESENT ILLNESS:  The patient is a 58-year-old male with end-stage   renal disease, on hemodialysis, who has been receiving his dialysis treatments   on Tuesday, Thursday and Saturday in [facility].  He missed   dialysis treatment on Thursday due to AV fistula procedure, also missed on   Saturday, not feeling well, presented to the emergency room this morning with   worsening shortness of breath, required BiPAP treatment.  Also, noticed   hyperkalemia.  Currently, the patient on BiPAP, not able to provide history.    Scheduled emergent dialysis.     PAST MEDICAL HISTORY:   End-stage renal disease, on hemodialysis, congestive   heart failure, HIV disease pneumonia, hypertension, stroke.     PAST SURGICAL HISTORY:  AV fistula creation and revision, cholecystectomy.     FAMILY HISTORY:  Positive for diabetes mellitus type 2.  No kidney disease.     SOCIAL HISTORY:  Used to smoke, quit long time ago.  No alcohol or drug use.     ALLERGIES:  No known drug allergies.     OUTPATIENT MEDICATIONS:  Reviewed.     PHYSICAL EXAMINATION:    VITAL SIGNS:  Blood pressure 155/108, pulse 98, temperature 98.1 Fahrenheit.  GENERAL APPEARANCE:  Well-developed, well-nourished male in respiratory   distress, on BiPAP.  HEENT:  Normocephalic, atraumatic.  Pupils equal, round, reactive to light.    Extraocular movement intact.  Oropharynx covered with BiPAP mask.  NECK:  No lymphadenopathy, no thyromegaly appreciated.  LUNGS:  Diminished breath sounds ____ diffuse rhonchi.  HEART:  Tachycardic, regular rate.  No rub or gallop.  ABDOMEN:  Soft, nontender, nondistended.  Bowel sounds present.  EXTREMITIES:  Trace pedal edema, No  cyanosis.  NEUROLOGIC:  Alert, oriented x3, no focal deficit.  Cranial nerves II-XII   grossly intact.  SKIN:  Warm.  No erythema, No jaundice.     LABORATORY DATA:  Reviewed, revealed hemoglobin 12.4.  Sodium 136, potassium   6.3, CO2 of 17, calcium 8.5, BUN 87, creatinine level 17.6.     Brain natriuretic peptide above 70,000.     Chest x-ray, pulmonary edema, right pleural effusion.     ASSESSMENT AND PLAN:  The patient is a 58-year-old male with end-stage renal   disease, on hemodialysis, admitted with respiratory distress, on BiPAP, severe   hyperkalemia, metabolic acidosis, missing dialysis treatments.  1.  End-stage renal disease.  We will schedule emergent dialysis today.    Continue daily.  2.  Electrolytes, hyperkalemia, will be correcting with emergent dialysis.  3.  Metabolic acidosis, also will be correcting with emergent dialysis.  4.  Anemia.  Hemoglobin level at goal.  No need for Epogen treatment at   present time.  5.  Volume overload.  We will attempt ultrafiltration with hemodialysis if   blood pressure tolerates.  6.  Hypertension.  Elevated blood pressure.  Hopefully, improved with better   volume control.  To monitor.     RECOMMENDATIONS:    1.  Emergent dialysis today, then continue daily.  2.  Provide renal diet.  3.  To adjust all medications to renal doses.  4.  Daily monitoring of CBC, basic metabolic panel.  5.  We will follow the patient closely.     Thank you for the consult.  Above plan was discussed with Dr. Blas.           ______________________________  MD JIMMY FREEDMAN/FIRTZ    DD:  10/31/2023 10:45  DT:  10/31/2023 11:31    Job#:  [id number]

## 2023-10-31 NOTE — ED NOTES
Med Rec UPDATED and COMPLETE per PHARMACY AND PATIENT  Allergies Reviewed  Antibiotcs in past 30 days:NO  Anticoagulant in past 14 days:NO  Preferred pharmacy:[facility]    Pt was unable to participate (transferred upstairs)     Attempted to contact family member's on demographics, unable to verify pt's medication's.  LVM for call back     Verified current med-list with pharmacy    ADDENDUM:    Verified which medications and when last doses were taken with the patient

## 2023-10-31 NOTE — ASSESSMENT & PLAN NOTE
- Likely reactive, though chest x-ray findings suggest that consolidation/pneumonia could not be ruled out  -Procalcitonin ordered  -Patient afebrile  -Patient denies any dysuria, denies fever, chills, further review of systems negative  -We will continue to monitor

## 2023-11-01 VITALS
BODY MASS INDEX: 25.67 KG/M2 | DIASTOLIC BLOOD PRESSURE: 61 MMHG | OXYGEN SATURATION: 94 % | SYSTOLIC BLOOD PRESSURE: 118 MMHG | WEIGHT: 150.35 LBS | TEMPERATURE: 98.4 F | HEART RATE: 74 BPM | RESPIRATION RATE: 17 BRPM | HEIGHT: 64 IN

## 2023-11-01 LAB
ALBUMIN SERPL BCP-MCNC: 3.8 G/DL (ref 3.2–4.9)
ALBUMIN/GLOB SERPL: 0.8 G/DL
ALP SERPL-CCNC: 91 U/L (ref 30–99)
ALT SERPL-CCNC: <5 U/L (ref 2–50)
ANION GAP SERPL CALC-SCNC: 18 MMOL/L (ref 7–16)
AST SERPL-CCNC: <5 U/L (ref 12–45)
BILIRUB SERPL-MCNC: 0.4 MG/DL (ref 0.1–1.5)
BUN SERPL-MCNC: 50 MG/DL (ref 8–22)
CALCIUM ALBUM COR SERPL-MCNC: 8.3 MG/DL (ref 8.5–10.5)
CALCIUM SERPL-MCNC: 8.1 MG/DL (ref 8.5–10.5)
CHLORIDE SERPL-SCNC: 94 MMOL/L (ref 96–112)
CHOLEST SERPL-MCNC: 150 MG/DL (ref 100–199)
CO2 SERPL-SCNC: 27 MMOL/L (ref 20–33)
CREAT SERPL-MCNC: 12 MG/DL (ref 0.5–1.4)
ERYTHROCYTE [DISTWIDTH] IN BLOOD BY AUTOMATED COUNT: 52.2 FL (ref 35.9–50)
GFR SERPLBLD CREATININE-BSD FMLA CKD-EPI: 4 ML/MIN/1.73 M 2
GLOBULIN SER CALC-MCNC: 4.5 G/DL (ref 1.9–3.5)
GLUCOSE SERPL-MCNC: 105 MG/DL (ref 65–99)
HCT VFR BLD AUTO: 35.7 % (ref 42–52)
HDLC SERPL-MCNC: 37 MG/DL
HGB BLD-MCNC: 11.9 G/DL (ref 14–18)
LDLC SERPL CALC-MCNC: 93 MG/DL
MCH RBC QN AUTO: 32.9 PG (ref 27–33)
MCHC RBC AUTO-ENTMCNC: 33.3 G/DL (ref 32.3–36.5)
MCV RBC AUTO: 98.6 FL (ref 81.4–97.8)
PLATELET # BLD AUTO: 207 K/UL (ref 164–446)
PMV BLD AUTO: 10.1 FL (ref 9–12.9)
POTASSIUM SERPL-SCNC: 4.7 MMOL/L (ref 3.6–5.5)
PROT SERPL-MCNC: 8.3 G/DL (ref 6–8.2)
RBC # BLD AUTO: 3.62 M/UL (ref 4.7–6.1)
SODIUM SERPL-SCNC: 139 MMOL/L (ref 135–145)
TRIGL SERPL-MCNC: 100 MG/DL (ref 0–149)
WBC # BLD AUTO: 7.7 K/UL (ref 4.8–10.8)

## 2023-11-01 PROCEDURE — 80053 COMPREHEN METABOLIC PANEL: CPT

## 2023-11-01 PROCEDURE — 85027 COMPLETE CBC AUTOMATED: CPT

## 2023-11-01 PROCEDURE — 700102 HCHG RX REV CODE 250 W/ 637 OVERRIDE(OP)

## 2023-11-01 PROCEDURE — A9270 NON-COVERED ITEM OR SERVICE: HCPCS

## 2023-11-01 PROCEDURE — 80061 LIPID PANEL: CPT

## 2023-11-01 PROCEDURE — 99239 HOSP IP/OBS DSCHRG MGMT >30: CPT | Performed by: HOSPITALIST

## 2023-11-01 RX ORDER — CALCITRIOL 0.25 UG/1
0.25 CAPSULE, LIQUID FILLED ORAL DAILY
Qty: 30 CAPSULE | Refills: 0 | Status: SHIPPED
Start: 2023-11-01 | End: 2024-03-21

## 2023-11-01 RX ORDER — LISINOPRIL 20 MG/1
20 TABLET ORAL DAILY
Qty: 30 TABLET | Refills: 6 | Status: SHIPPED | OUTPATIENT
Start: 2023-11-01

## 2023-11-01 RX ORDER — RILPIVIRINE HYDROCHLORIDE 25 MG/1
25 TABLET, FILM COATED ORAL
Qty: 30 TABLET | Refills: 0 | Status: ACTIVE | OUTPATIENT
Start: 2023-11-01 | End: 2024-03-21

## 2023-11-01 RX ORDER — ASPIRIN 81 MG/1
81 TABLET, CHEWABLE ORAL DAILY
Qty: 100 TABLET | Refills: 3 | Status: SHIPPED | OUTPATIENT
Start: 2023-11-01

## 2023-11-01 RX ADMIN — ASPIRIN 81 MG: 81 TABLET, CHEWABLE ORAL at 05:48

## 2023-11-01 RX ADMIN — LISINOPRIL 20 MG: 20 TABLET ORAL at 05:48

## 2023-11-01 RX ADMIN — CARVEDILOL 25 MG: 25 TABLET, FILM COATED ORAL at 08:36

## 2023-11-01 RX ADMIN — NICOTINE 14 MG: 14 PATCH TRANSDERMAL at 05:49

## 2023-11-01 RX ADMIN — CALCITRIOL CAPSULES 0.25 MCG 0.25 MCG: 0.25 CAPSULE ORAL at 05:49

## 2023-11-01 RX ADMIN — AMLODIPINE BESYLATE 10 MG: 10 TABLET ORAL at 05:49

## 2023-11-01 ASSESSMENT — FIBROSIS 4 INDEX: FIB4 SCORE: 0.6

## 2023-11-01 NOTE — CARE PLAN
The patient is Stable - Low risk of patient condition declining or worsening    Shift Goals  Clinical Goals: Dialysis, SBP < 180  Patient Goals: Get off BiPap  Family Goals: Improve resp status    Progress made toward(s) clinical / shift goals:      Problem: Knowledge Deficit - Standard  Goal: Patient and family/care givers will demonstrate understanding of plan of care, disease process/condition, diagnostic tests and medications  Outcome: Progressing  Note: Pt verbalized understanding of the importance of not missing his dialysis appointments.       Patient is not progressing towards the following goals:

## 2023-11-01 NOTE — ASSESSMENT & PLAN NOTE
Potassium 6.3 secondary to missing dialysis  Continuous telemetry monitoring  Dialysis was performed we will check a basic metabolic panel in the morning

## 2023-11-01 NOTE — ASSESSMENT & PLAN NOTE
Continue his outpatient regimen  He notes that he has been compliant with meds  He is followed by Dr. Whyte at [facility]

## 2023-11-01 NOTE — ASSESSMENT & PLAN NOTE
White blood cell count 15,000   no apparent source of infection this is likely a stress response and a CBC will be ordered for the morning

## 2023-11-01 NOTE — DISCHARGE SUMMARY
Discharge Summary    CHIEF COMPLAINT ON ADMISSION  Chief Complaint   Patient presents with    Chest Pain     Beginning around 0300    Shortness of Breath     Pt reports missing dialysis on Saturday and having shortness of breath starting at 0300. Pt reports having increased shortness of breath all morning        Reason for Admission  EMS     Admission Date  10/31/2023    CODE STATUS  Full Code    HPI & HOSPITAL COURSE  This is a 58 y.o. male here with shortness of breath   Mr. Palencia has a past medical history of HIV as well as end-stage renal disease on hemodialysis and hypertension that usually goes to dialysis Tuesday, Thursday, Saturdays.  He missed dialysis on Saturday and presented to the emergency room with increasing shortness of breath that started early this morning.  In the emergency room he is found to have widely elevated blood pressure and hypoxia due to volume overload.  His proBNP was greater than 70,000 and his creatinine is 17 with a potassium 6.3.  Required rescue BiPAP due to respiratory failure.  Nitroglycerin was given in the emergency room and he was admitted in guarded condition to the ICU for emergent dialysis.  11/1: Mr. Palencia is feeling much better today.  He is on room air.  His blood pressure has been normal overnight with the last 2 blood pressures being in the 118/61 range.  He is ambulating without assistance.  We had a long discussion about the absolute necessity of not missing any further dialysis.  He will be discharged home with no medication changes I will follow-up at the [facility]. I discussed with Dr. Bailey nephrology who is in agreement with discharge home today.    Therefore, he is discharged in good and stable condition to home with close outpatient follow-up.    The patient recovered much more quickly than anticipated on admission.    Discharge Date  11/1    FOLLOW UP ITEMS POST DISCHARGE  Nephrology  [facility]    DISCHARGE DIAGNOSES  Principal Problem:     Hypertensive emergency (POA: Yes)  Active Problems:    Hypoxic respiratory failure (HCC) (POA: Yes)    HIV (human immunodeficiency virus infection) (HCC) (POA: Yes)    Normochromic normocytic anemia (POA: Yes)    Leukocytosis (POA: Yes)    ESRD (end stage renal disease) on dialysis (HCC) (POA: Yes)    Hyperkalemia (POA: Yes)  Resolved Problems:    * No resolved hospital problems. *      FOLLOW UP  No future appointments.  Matt Whyte M.D.  [address] [facility][1] [id number] [phone]    Follow up        MEDICATIONS ON DISCHARGE     Medication List        CHANGE how you take these medications        Instructions   calcitRIOL 0.25 MCG Caps  What changed: how to take this  Commonly known as: Rocaltrol   Take 1 Capsule by mouth every day.  Dose: 0.25 mcg            CONTINUE taking these medications        Instructions   amLODIPine 10 MG Tabs  Commonly known as: Norvasc   Blue Grass 1 tableta por vía oral diariamente.  (Take 1 Tablet by mouth every day.)  Dose: 10 mg     aspirin 81 MG Chew chewable tablet  Commonly known as: Asa   Chew 1 Tablet every day.  Dose: 81 mg     carvedilol 25 MG Tabs  Commonly known as: Coreg   Take 1 Tablet by mouth 2 times a day with meals.  Dose: 25 mg     dolutegravir 50 MG Tabs tablet  Commonly known as: Tivicay   Blue Grass 1 tableta por vía oral diariamente.  (Take 1 Tablet by mouth every day.)  Dose: 50 mg     Edurant 25 MG Tabs tablet  Generic drug: rilpivirine   Take 1 Tablet by mouth every morning with breakfast.  Dose: 25 mg     Juluca 50-25 MG Tabs  Generic drug: Dolutegravir-Rilpivirine   Take 1 Tablet by mouth every day at 6 PM. With food  Dose: 1 Tablet     lisinopril 20 MG Tabs  Commonly known as: Prinivil   Doctor's comments: This Rx has been ordered by a pharmacist working under a collaborative practice agreement.  Take 1 Tablet by mouth every day.  Dose: 20 mg     nicotine 14 MG/24HR Pt24  Commonly known as: Nicotine Step 2   Doctor's comments: This prescription is transmitted  by a pharmacist under the authority of a collaborative practice agreement.  Place 1 Patch on the skin every 24 hours.  Dose: 1 Patch     Renvela 800 MG Tabs tablet  Generic drug: sevelamer carbonate   Take 1,600 mg by mouth in the morning, at noon, and at bedtime. 1,600 MG = 2 TABS  Dose: 1,600 mg              Allergies  No Known Allergies    DIET  Orders Placed This Encounter   Procedures    Diet Order Diet: Renal     Standing Status:   Standing     Number of Occurrences:   1     Order Specific Question:   Diet:     Answer:   Renal [8]       ACTIVITY  As tolerated.      CONSULTATIONS  Critical care  nephrology    PROCEDURES  none    LABORATORY  Lab Results   Component Value Date    SODIUM 139 11/01/2023    POTASSIUM 4.7 11/01/2023    CHLORIDE 94 (L) 11/01/2023    CO2 27 11/01/2023    GLUCOSE 105 (H) 11/01/2023    BUN 50 (H) 11/01/2023    CREATININE 12.00 (HH) 11/01/2023        Lab Results   Component Value Date    WBC 7.7 11/01/2023    HEMOGLOBIN 11.9 (L) 11/01/2023    HEMATOCRIT 35.7 (L) 11/01/2023    PLATELETCT 207 11/01/2023      DX-CHEST-PORTABLE (1 VIEW)   Final Result      1.  Findings suspicious for pulmonary edema. Pneumonia not excluded.   2.  Trace RIGHT pleural effusion          Total time of the discharge process exceeds 34 minutes.

## 2023-11-01 NOTE — ASSESSMENT & PLAN NOTE
Due to volume overload  Dialysis has been arranged  Restart Norvasc 10 mg daily and Coreg 25 mg daily and utilize IV hydralazine for elevated blood pressure

## 2023-11-01 NOTE — CARE PLAN
The patient is Stable - Low risk of patient condition declining or worsening    Shift Goals  Clinical Goals: hemodynamic stability, SBP <180, dialysis  Patient Goals: dialysis, rest  Family Goals: siena    Progress made toward(s) clinical / shift goals:  yes    Patient is not progressing towards the following goals:      Problem: Knowledge Deficit - Standard  Goal: Patient and family/care givers will demonstrate understanding of plan of care, disease process/condition, diagnostic tests and medications  Outcome: Progressing     Problem: Fall Risk  Goal: Patient will remain free from falls  Outcome: Progressing

## 2023-11-01 NOTE — ASSESSMENT & PLAN NOTE
Due to volume overload in the setting of missing dialysis  He required rescue BiPAP in the ICU  Chest x-ray is consistent with pulmonary edema

## 2023-11-01 NOTE — PROGRESS NOTES
Called by bedside nurse patient tolerated dialysis well and has been stable off Bipap on room air with good BP parameters in 140's. Due to critical ICU bed shortage patient will be transferred out to floor. Please call with questions or concerns.     Chip Ponce MD  Critical Care Medicine

## 2023-11-01 NOTE — CONSULTS
Hospital Medicine Consultation    Date of Service  10/31/2023    Referring Physician  Chip Ponce M.D.    Consulting Physician  Marc Le M.D.    Reason for Consultation  hypertension    History of Presenting Illness  58 y.o. male who presented 10/31/2023 with shortness of breath.  Mr. Palencia has a past medical history of HIV as well as end-stage renal disease on hemodialysis and hypertension that usually goes to dialysis Tuesday, Thursday, Saturdays.  He missed dialysis on Saturday and presented to the emergency room with increasing shortness of breath that started early this morning.  In the emergency room he is found to have widely elevated blood pressure and hypoxia due to volume overload.  His proBNP was greater than 70,000 and his creatinine is 17 with a potassium 6.3.  Required rescue BiPAP due to respiratory failure.  Nitroglycerin was given in the emergency room and he was admitted in guarded condition to the ICU for emergent dialysis.    Review of Systems  Review of Systems   Constitutional:  Negative for chills and fever.   Respiratory:  Positive for shortness of breath.    Cardiovascular:  Negative for leg swelling.   Gastrointestinal:  Negative for diarrhea, nausea and vomiting.   All other systems reviewed and are negative.      Past Medical History   has a past medical history of Dialysis patient ([facility]) (05/17/2023), Heart failure ([facility]), HIV disease ([facility]) (01/01/1995), Hypertension (05/17/2023), Pneumonia (05/17/2023), Renal disorder, Seizure ([facility]) (05/17/2023), and Stroke ([facility]) (2015).    Surgical History   has a past surgical history that includes cholecystectomy (1980s) and other (Left).    Family History  family history includes Diabetes in his father, mother, and sister; No Known Problems in his sister, sister, and sister; Other in his brother and daughter.    Social History   reports that he quit smoking about 12 years ago. His smoking use included cigarettes. He started smoking about  42 years ago. He has a 7.5 pack-year smoking history. He has never used smokeless tobacco. He reports that he does not drink alcohol and does not use drugs.    Medications  Prior to Admission Medications   Prescriptions Last Dose Informant Patient Reported? Taking?   JULUCA 50-25 MG Tab 10/30/2023 at [facility], Patient Yes No   Sig: Take 1 Tablet by mouth every day at 6 PM. With food   amLODIPine (NORVASC) 10 MG Tab 2 DAYS AGO at [facility], Patient No No   Sig: Take 1 Tablet by mouth every day.   carvedilol (COREG) 25 MG Tab 10/31/2023 at [facility], Patient No No   Sig: Take 1 Tablet by mouth 2 times a day with meals.   nicotine (NICOTINE STEP 2) 14 MG/24HR PATCH 24 HR NOT PICKE UP Patient's Home Pharmacy No No   Sig: Place 1 Patch on the skin every 24 hours.   sevelamer carbonate (RENVELA) 800 MG Tab tablet 10/29/2023 at [facility] Patient Yes Yes   Sig: Take 1,600 mg by mouth in the morning, at noon, and at bedtime. 1,600 MG = 2 TABS      Facility-Administered Medications: None       Allergies  No Known Allergies    Physical Exam  Temp:  [36.7 °C (98.1 °F)-36.9 °C (98.5 °F)] 36.7 °C (98.1 °F)  Pulse:  [] 83  Resp:  [10-31] 24  BP: (134-239)/() 144/78  SpO2:  [82 %-100 %] 93 %    Physical Exam  Vitals and nursing note reviewed.   Constitutional:       General: He is not in acute distress.     Appearance: He is not toxic-appearing.   HENT:      Mouth/Throat:      Mouth: Mucous membranes are dry.      Comments: Poor dentition  Cardiovascular:      Rate and Rhythm: Normal rate and regular rhythm.      Heart sounds: Murmur heard.   Pulmonary:      Comments: Room air   Few crackles  Abdominal:      General: There is no distension.      Tenderness: There is no abdominal tenderness.   Musculoskeletal:      Right lower leg: No edema.      Left lower leg: No edema.      Comments: Left arm fistula with a thrill   Neurological:      General: No focal deficit present.       Mental Status: He is alert and oriented to person, place, and time.   Psychiatric:         Mood and Affect: Mood normal.         Behavior: Behavior normal.         Fluids  Date 10/31/23 0700 - 11/01/23 0659   Shift 6202-6188 9813-0375 7416-6889 24 Hour Total   INTAKE   Dialysis  500  500   Shift Total  500  500   OUTPUT   Dialysis  3500  3500   Shift Total  3500  3500   Weight (kg) 68.2 68.2 68.2 68.2       Laboratory  Recent Labs     10/31/23  0838   WBC 15.3*   RBC 3.73*   HEMOGLOBIN 12.4*   HEMATOCRIT 37.8*   .3*   MCH 33.2*   MCHC 32.8   RDW 53.1*   PLATELETCT 226   MPV 10.1     Recent Labs     10/31/23  0838   SODIUM 136   POTASSIUM 6.3*   CHLORIDE 95*   CO2 17*   GLUCOSE 159*   BUN 87*   CREATININE 17.62*   CALCIUM 8.5     Recent Labs     10/31/23  0838   APTT 33.2   INR 1.11                 Imaging  DX-CHEST-PORTABLE (1 VIEW)   Final Result      1.  Findings suspicious for pulmonary edema. Pneumonia not excluded.   2.  Trace RIGHT pleural effusion          Assessment/Plan  * Hypertensive emergency- (present on admission)  Assessment & Plan  Due to volume overload  Dialysis has been arranged  Restart Norvasc 10 mg daily and Coreg 25 mg daily and utilize IV hydralazine for elevated blood pressure    Hypoxic respiratory failure (HCC)- (present on admission)  Assessment & Plan  Due to volume overload in the setting of missing dialysis  He required rescue BiPAP in the ICU  Chest x-ray is consistent with pulmonary edema    Hyperkalemia- (present on admission)  Assessment & Plan  Potassium 6.3 secondary to missing dialysis  Continuous telemetry monitoring  Dialysis was performed we will check a basic metabolic panel in the morning    ESRD (end stage renal disease) on dialysis (HCC)- (present on admission)  Assessment & Plan  Nephrology has been consulted for hemodialysis via his fistula    Leukocytosis- (present on admission)  Assessment & Plan  White blood cell count 15,000   no apparent source of  infection this is likely a stress response and a CBC will be ordered for the morning    Normochromic normocytic anemia- (present on admission)  Assessment & Plan  Likely chronic renal disease    HIV (human immunodeficiency virus infection) (HCC)- (present on admission)  Assessment & Plan  Continue his outpatient regimen  He notes that he has been compliant with meds  He is followed by Dr. Whyte at [facility]

## 2024-02-24 ENCOUNTER — APPOINTMENT (OUTPATIENT)
Dept: RADIOLOGY | Facility: MEDICAL CENTER | Age: 59
DRG: 177 | End: 2024-02-24
Attending: EMERGENCY MEDICINE
Payer: MEDICAID

## 2024-02-24 ENCOUNTER — HOSPITAL ENCOUNTER (INPATIENT)
Facility: MEDICAL CENTER | Age: 59
LOS: 1 days | DRG: 177 | End: 2024-02-25
Attending: EMERGENCY MEDICINE | Admitting: HOSPITALIST
Payer: MEDICAID

## 2024-02-24 DIAGNOSIS — J96.01 ACUTE RESPIRATORY FAILURE WITH HYPOXIA (HCC): ICD-10-CM

## 2024-02-24 PROBLEM — J12.82 PNEUMONIA DUE TO COVID-19 VIRUS: Status: ACTIVE | Noted: 2024-02-24

## 2024-02-24 PROBLEM — U07.1 PNEUMONIA DUE TO COVID-19 VIRUS: Status: ACTIVE | Noted: 2024-02-24

## 2024-02-24 PROBLEM — R79.89 ELEVATED LACTIC ACID LEVEL: Status: ACTIVE | Noted: 2024-02-24

## 2024-02-24 PROBLEM — I50.42 CHRONIC COMBINED SYSTOLIC AND DIASTOLIC HEART FAILURE (HCC): Status: ACTIVE | Noted: 2017-08-31

## 2024-02-24 LAB
ALBUMIN SERPL BCP-MCNC: 3.9 G/DL (ref 3.2–4.9)
ALBUMIN/GLOB SERPL: 0.8 G/DL
ALP SERPL-CCNC: 104 U/L (ref 30–99)
ALT SERPL-CCNC: 11 U/L (ref 2–50)
ANION GAP SERPL CALC-SCNC: 25 MMOL/L (ref 7–16)
ANISOCYTOSIS BLD QL SMEAR: ABNORMAL
APPEARANCE UR: CLEAR
AST SERPL-CCNC: <5 U/L (ref 12–45)
BACTERIA #/AREA URNS HPF: NEGATIVE /HPF
BASOPHILS # BLD AUTO: 0 % (ref 0–1.8)
BASOPHILS # BLD: 0 K/UL (ref 0–0.12)
BILIRUB SERPL-MCNC: 0.3 MG/DL (ref 0.1–1.5)
BILIRUB UR QL STRIP.AUTO: NEGATIVE
BUN SERPL-MCNC: 36 MG/DL (ref 8–22)
CALCIUM ALBUM COR SERPL-MCNC: 9.3 MG/DL (ref 8.5–10.5)
CALCIUM SERPL-MCNC: 9.2 MG/DL (ref 8.5–10.5)
CHLORIDE SERPL-SCNC: 92 MMOL/L (ref 96–112)
CO2 SERPL-SCNC: 21 MMOL/L (ref 20–33)
COLOR UR: YELLOW
COMMENT NL1176: NORMAL
CREAT SERPL-MCNC: 8.35 MG/DL (ref 0.5–1.4)
EKG IMPRESSION: NORMAL
EOSINOPHIL # BLD AUTO: 0.7 K/UL (ref 0–0.51)
EOSINOPHIL NFR BLD: 3.6 % (ref 0–6.9)
EPI CELLS #/AREA URNS HPF: ABNORMAL /HPF
ERYTHROCYTE [DISTWIDTH] IN BLOOD BY AUTOMATED COUNT: 58.5 FL (ref 35.9–50)
FLUAV RNA SPEC QL NAA+PROBE: NEGATIVE
FLUBV RNA SPEC QL NAA+PROBE: NEGATIVE
GFR SERPLBLD CREATININE-BSD FMLA CKD-EPI: 7 ML/MIN/1.73 M 2
GLOBULIN SER CALC-MCNC: 4.8 G/DL (ref 1.9–3.5)
GLUCOSE SERPL-MCNC: 238 MG/DL (ref 65–99)
GLUCOSE UR STRIP.AUTO-MCNC: 250 MG/DL
HAV IGM SERPL QL IA: NORMAL
HBV CORE IGM SER QL: NORMAL
HBV SURFACE AB SERPL IA-ACNC: 4478 MIU/ML (ref 0–10)
HBV SURFACE AG SER QL: NORMAL
HCT VFR BLD AUTO: 36.4 % (ref 42–52)
HCV AB SER QL: NORMAL
HGB BLD-MCNC: 11.9 G/DL (ref 14–18)
HYALINE CASTS #/AREA URNS LPF: ABNORMAL /LPF
INR PPP: 1.11 (ref 0.87–1.13)
KETONES UR STRIP.AUTO-MCNC: NEGATIVE MG/DL
LACTATE SERPL-SCNC: 1.3 MMOL/L (ref 0.5–2)
LACTATE SERPL-SCNC: 7 MMOL/L (ref 0.5–2)
LEUKOCYTE ESTERASE UR QL STRIP.AUTO: NEGATIVE
LYMPHOCYTES # BLD AUTO: 4 K/UL (ref 1–4.8)
LYMPHOCYTES NFR BLD: 20.5 % (ref 22–41)
MACROCYTES BLD QL SMEAR: ABNORMAL
MANUAL DIFF BLD: NORMAL
MCH RBC QN AUTO: 33.2 PG (ref 27–33)
MCHC RBC AUTO-ENTMCNC: 32.7 G/DL (ref 32.3–36.5)
MCV RBC AUTO: 101.7 FL (ref 81.4–97.8)
MICRO URNS: ABNORMAL
MONOCYTES # BLD AUTO: 0.35 K/UL (ref 0–0.85)
MONOCYTES NFR BLD AUTO: 1.8 % (ref 0–13.4)
MORPHOLOGY BLD-IMP: NORMAL
NEUTROPHILS # BLD AUTO: 14.45 K/UL (ref 1.82–7.42)
NEUTROPHILS NFR BLD: 74.1 % (ref 44–72)
NITRITE UR QL STRIP.AUTO: NEGATIVE
NRBC # BLD AUTO: 0 K/UL
NRBC BLD-RTO: 0 /100 WBC (ref 0–0.2)
NT-PROBNP SERPL IA-MCNC: ABNORMAL PG/ML (ref 0–125)
PH UR STRIP.AUTO: 8.5 [PH] (ref 5–8)
PLATELET # BLD AUTO: 303 K/UL (ref 164–446)
PLATELET BLD QL SMEAR: NORMAL
PMV BLD AUTO: 10.2 FL (ref 9–12.9)
POTASSIUM SERPL-SCNC: 4.1 MMOL/L (ref 3.6–5.5)
PROCALCITONIN SERPL-MCNC: 0.27 NG/ML
PROT SERPL-MCNC: 8.7 G/DL (ref 6–8.2)
PROT UR QL STRIP: 100 MG/DL
PROTHROMBIN TIME: 14.4 SEC (ref 12–14.6)
RBC # BLD AUTO: 3.58 M/UL (ref 4.7–6.1)
RBC # URNS HPF: ABNORMAL /HPF
RBC BLD AUTO: PRESENT
RBC UR QL AUTO: ABNORMAL
RENAL EPI CELLS #/AREA URNS HPF: ABNORMAL /HPF
RSV RNA SPEC QL NAA+PROBE: NEGATIVE
SARS-COV-2 RNA RESP QL NAA+PROBE: DETECTED
SODIUM SERPL-SCNC: 138 MMOL/L (ref 135–145)
SP GR UR STRIP.AUTO: 1.01
SPECIMEN SOURCE: ABNORMAL
TROPONIN T SERPL-MCNC: 291 NG/L (ref 6–19)
TROPONIN T SERPL-MCNC: 453 NG/L (ref 6–19)
TROPONIN T SERPL-MCNC: 539 NG/L (ref 6–19)
UROBILINOGEN UR STRIP.AUTO-MCNC: 0.2 MG/DL
WBC # BLD AUTO: 19.5 K/UL (ref 4.8–10.8)
WBC #/AREA URNS HPF: ABNORMAL /HPF
WBC TOXIC VACUOLES BLD QL SMEAR: NORMAL

## 2024-02-24 PROCEDURE — 83605 ASSAY OF LACTIC ACID: CPT

## 2024-02-24 PROCEDURE — 700111 HCHG RX REV CODE 636 W/ 250 OVERRIDE (IP): Performed by: HOSPITALIST

## 2024-02-24 PROCEDURE — 85027 COMPLETE CBC AUTOMATED: CPT

## 2024-02-24 PROCEDURE — A9270 NON-COVERED ITEM OR SERVICE: HCPCS | Performed by: EMERGENCY MEDICINE

## 2024-02-24 PROCEDURE — 770000 HCHG ROOM/CARE - INTERMEDIATE ICU *

## 2024-02-24 PROCEDURE — 700105 HCHG RX REV CODE 258: Performed by: HOSPITALIST

## 2024-02-24 PROCEDURE — 700101 HCHG RX REV CODE 250

## 2024-02-24 PROCEDURE — 93005 ELECTROCARDIOGRAM TRACING: CPT | Performed by: EMERGENCY MEDICINE

## 2024-02-24 PROCEDURE — 96374 THER/PROPH/DIAG INJ IV PUSH: CPT

## 2024-02-24 PROCEDURE — 90935 HEMODIALYSIS ONE EVALUATION: CPT

## 2024-02-24 PROCEDURE — 85610 PROTHROMBIN TIME: CPT

## 2024-02-24 PROCEDURE — 96375 TX/PRO/DX INJ NEW DRUG ADDON: CPT

## 2024-02-24 PROCEDURE — 84484 ASSAY OF TROPONIN QUANT: CPT

## 2024-02-24 PROCEDURE — 99291 CRITICAL CARE FIRST HOUR: CPT

## 2024-02-24 PROCEDURE — 83880 ASSAY OF NATRIURETIC PEPTIDE: CPT

## 2024-02-24 PROCEDURE — 85007 BL SMEAR W/DIFF WBC COUNT: CPT

## 2024-02-24 PROCEDURE — 99223 1ST HOSP IP/OBS HIGH 75: CPT | Performed by: HOSPITALIST

## 2024-02-24 PROCEDURE — 84145 PROCALCITONIN (PCT): CPT

## 2024-02-24 PROCEDURE — 81001 URINALYSIS AUTO W/SCOPE: CPT

## 2024-02-24 PROCEDURE — 700102 HCHG RX REV CODE 250 W/ 637 OVERRIDE(OP): Performed by: EMERGENCY MEDICINE

## 2024-02-24 PROCEDURE — 94660 CPAP INITIATION&MGMT: CPT

## 2024-02-24 PROCEDURE — 80074 ACUTE HEPATITIS PANEL: CPT

## 2024-02-24 PROCEDURE — 87040 BLOOD CULTURE FOR BACTERIA: CPT | Mod: 91

## 2024-02-24 PROCEDURE — 700111 HCHG RX REV CODE 636 W/ 250 OVERRIDE (IP): Mod: JZ,UD | Performed by: EMERGENCY MEDICINE

## 2024-02-24 PROCEDURE — 700111 HCHG RX REV CODE 636 W/ 250 OVERRIDE (IP)

## 2024-02-24 PROCEDURE — 51702 INSERT TEMP BLADDER CATH: CPT

## 2024-02-24 PROCEDURE — 0241U HCHG SARS-COV-2 COVID-19 NFCT DS RESP RNA 4 TRGT MIC: CPT

## 2024-02-24 PROCEDURE — 303105 HCHG CATHETER EXTRA

## 2024-02-24 PROCEDURE — 99254 IP/OBS CNSLTJ NEW/EST MOD 60: CPT | Performed by: INTERNAL MEDICINE

## 2024-02-24 PROCEDURE — 80053 COMPREHEN METABOLIC PANEL: CPT

## 2024-02-24 PROCEDURE — 99292 CRITICAL CARE ADDL 30 MIN: CPT | Performed by: INTERNAL MEDICINE

## 2024-02-24 PROCEDURE — 87086 URINE CULTURE/COLONY COUNT: CPT

## 2024-02-24 PROCEDURE — 71045 X-RAY EXAM CHEST 1 VIEW: CPT

## 2024-02-24 PROCEDURE — 86706 HEP B SURFACE ANTIBODY: CPT

## 2024-02-24 PROCEDURE — 99291 CRITICAL CARE FIRST HOUR: CPT | Performed by: INTERNAL MEDICINE

## 2024-02-24 PROCEDURE — 5A1D70Z PERFORMANCE OF URINARY FILTRATION, INTERMITTENT, LESS THAN 6 HOURS PER DAY: ICD-10-PCS | Performed by: INTERNAL MEDICINE

## 2024-02-24 PROCEDURE — 8E0ZXY6 ISOLATION: ICD-10-PCS | Performed by: EMERGENCY MEDICINE

## 2024-02-24 RX ORDER — SODIUM CHLORIDE 9 MG/ML
250 INJECTION, SOLUTION INTRAVENOUS
Status: DISCONTINUED | OUTPATIENT
Start: 2024-02-24 | End: 2024-02-25 | Stop reason: HOSPADM

## 2024-02-24 RX ORDER — DEXAMETHASONE 4 MG/1
6 TABLET ORAL DAILY
Status: DISCONTINUED | OUTPATIENT
Start: 2024-02-24 | End: 2024-02-24

## 2024-02-24 RX ORDER — CEFTRIAXONE 2 G/1
2000 INJECTION, POWDER, FOR SOLUTION INTRAMUSCULAR; INTRAVENOUS ONCE
Status: COMPLETED | OUTPATIENT
Start: 2024-02-24 | End: 2024-02-24

## 2024-02-24 RX ORDER — FUROSEMIDE 10 MG/ML
80 INJECTION INTRAMUSCULAR; INTRAVENOUS ONCE
Status: COMPLETED | OUTPATIENT
Start: 2024-02-24 | End: 2024-02-24

## 2024-02-24 RX ORDER — HEPARIN SODIUM 1000 [USP'U]/ML
1500 INJECTION, SOLUTION INTRAVENOUS; SUBCUTANEOUS
Status: DISCONTINUED | OUTPATIENT
Start: 2024-02-24 | End: 2024-02-25 | Stop reason: HOSPADM

## 2024-02-24 RX ORDER — DEXAMETHASONE 6 MG/1
6 TABLET ORAL DAILY
Status: DISCONTINUED | OUTPATIENT
Start: 2024-02-25 | End: 2024-02-25 | Stop reason: HOSPADM

## 2024-02-24 RX ORDER — LIDOCAINE HYDROCHLORIDE 10 MG/ML
INJECTION, SOLUTION INFILTRATION; PERINEURAL
Status: COMPLETED
Start: 2024-02-24 | End: 2024-02-24

## 2024-02-24 RX ORDER — MORPHINE SULFATE 4 MG/ML
2 INJECTION INTRAVENOUS ONCE
Status: COMPLETED | OUTPATIENT
Start: 2024-02-24 | End: 2024-02-24

## 2024-02-24 RX ORDER — HEPARIN SODIUM 1000 [USP'U]/ML
INJECTION, SOLUTION INTRAVENOUS; SUBCUTANEOUS
Status: COMPLETED
Start: 2024-02-24 | End: 2024-02-24

## 2024-02-24 RX ADMIN — DEXAMETHASONE 6 MG: 4 TABLET ORAL at 10:04

## 2024-02-24 RX ADMIN — HEPARIN SODIUM 1500 UNITS: 1000 INJECTION, SOLUTION INTRAVENOUS; SUBCUTANEOUS at 11:30

## 2024-02-24 RX ADMIN — CEFTRIAXONE SODIUM 2000 MG: 2 INJECTION, POWDER, FOR SOLUTION INTRAMUSCULAR; INTRAVENOUS at 08:51

## 2024-02-24 RX ADMIN — LIDOCAINE HYDROCHLORIDE 1 ML: 10 INJECTION, SOLUTION INFILTRATION; PERINEURAL at 11:20

## 2024-02-24 RX ADMIN — AMPICILLIN AND SULBACTAM 3 G: 1; 2 INJECTION, POWDER, FOR SOLUTION INTRAMUSCULAR; INTRAVENOUS at 15:43

## 2024-02-24 RX ADMIN — FUROSEMIDE 80 MG: 10 INJECTION INTRAMUSCULAR; INTRAVENOUS at 07:38

## 2024-02-24 RX ADMIN — MORPHINE SULFATE 2 MG: 4 INJECTION, SOLUTION INTRAMUSCULAR; INTRAVENOUS at 07:28

## 2024-02-24 RX ADMIN — Medication 1 ML: at 11:20

## 2024-02-24 ASSESSMENT — ENCOUNTER SYMPTOMS
VOMITING: 0
FEVER: 1
DIARRHEA: 0
ABDOMINAL PAIN: 0
SHORTNESS OF BREATH: 1
FEVER: 0

## 2024-02-24 ASSESSMENT — PATIENT HEALTH QUESTIONNAIRE - PHQ9
1. LITTLE INTEREST OR PLEASURE IN DOING THINGS: NOT AT ALL
2. FEELING DOWN, DEPRESSED, IRRITABLE, OR HOPELESS: NOT AT ALL
SUM OF ALL RESPONSES TO PHQ9 QUESTIONS 1 AND 2: 0

## 2024-02-24 ASSESSMENT — PULMONARY FUNCTION TESTS
EPAP_CMH2O: 10
EPAP_CMH2O: 10

## 2024-02-24 ASSESSMENT — PAIN DESCRIPTION - PAIN TYPE
TYPE: ACUTE PAIN
TYPE: ACUTE PAIN

## 2024-02-24 ASSESSMENT — FIBROSIS 4 INDEX
FIB4 SCORE: 0.6
FIB4 SCORE: 0.26

## 2024-02-24 NOTE — ED TRIAGE NOTES
Chief Complaint   Patient presents with    Respiratory Distress    Chest Pain     BIB EMS pt arrives in respiratory distress. Grunting respirations. Sepsis score 3.  ERP called to bedside. Blood drawn and sent to lab. EKG obtained. RT to bedside.

## 2024-02-24 NOTE — ASSESSMENT & PLAN NOTE
I suspect that this is due to volume overload  I am titrating BiPAP for respiratory distress  High risk of deterioration

## 2024-02-24 NOTE — ED PROVIDER NOTES
ED Provider Note    CHIEF COMPLAINT  Chief Complaint   Patient presents with    Respiratory Distress    Chest Pain       EXTERNAL RECORDS REVIEWED  Inpatient Notes most recently admitted to this facility in October 2023 for shortness of breath and was found to be fluid overloaded.  He required BiPAP and nitroglycerin was given.  He was admitted to the ICU for emergent dialysis.  He was discharged to follow-up with his nephrologist, Dr. Bailey.    HPI/ROS  LIMITATION TO HISTORY   Select: : None  OUTSIDE HISTORIAN(S):  None    Seamus Palencia is a 58 y.o. male who presents with a chief complaint of shortness of breath and chest pain that started earlier today.  He has a history of ESRD on Tuesday/Thursday/Saturday dialysis.  He reports that he most recently received his dialysis on Thursday and had a full run.  Today he developed difficulty breathing and EMS was contacted.  He was found to be hypoxic on room air and started on a nonrebreather however could not improve his O2 sats above 90%.  He also reported substernal chest pain that does not radiate.  He denies any fevers or chills.  He has been coughing but denies any hemoptysis or leg swelling.  He denies history of DVT or PE.    PAST MEDICAL HISTORY   has a past medical history of Dialysis patient ([facility]) (05/17/2023), Heart failure ([facility]), HIV disease ([facility]) (01/01/1995), Hypertension (05/17/2023), Pneumonia (05/17/2023), Renal disorder, Seizure ([facility]) (05/17/2023), and Stroke ([facility]) (2015).    SURGICAL HISTORY   has a past surgical history that includes cholecystectomy (1980s) and other (Left).    FAMILY HISTORY  Family History   Problem Relation Age of Onset    Diabetes Mother         cause of death    Diabetes Father         cause of death    Diabetes Sister     Other Brother         down syndrome    No Known Problems Sister     No Known Problems Sister     No Known Problems Sister     Other Daughter         5 daughters, 11 sons     Clotting Disorder Neg Hx      Stroke Neg Hx        SOCIAL HISTORY  Social History     Tobacco Use    Smoking status: Former     Current packs/day: 0.00     Average packs/day: 0.3 packs/day for 30.0 years (7.5 ttl pk-yrs)     Types: Cigarettes     Start date:      Quit date:      Years since quittin.1    Smokeless tobacco: Never    Tobacco comments:     3-4 CIGARETTES A DAY   Vaping Use    Vaping Use: Never used   Substance and Sexual Activity    Alcohol use: No    Drug use: No    Sexual activity: Not on file       CURRENT MEDICATIONS  Home Medications    **Home medications have not yet been reviewed for this encounter**         ALLERGIES  No Known Allergies    PHYSICAL EXAM  VITAL SIGNS: BP (!) 153/105   Pulse (!) 123   Temp 36.3 °C (97.3 °F)   Resp (!) 32   Wt 68 kg (150 lb)   SpO2 88%   BMI 25.75 kg/m²    Physical Exam  Vitals and nursing note reviewed.   Constitutional:       General: He is in acute distress.      Appearance: He is ill-appearing.   HENT:      Head: Normocephalic and atraumatic.   Eyes:      Extraocular Movements: Extraocular movements intact.      Pupils: Pupils are equal, round, and reactive to light.   Cardiovascular:      Rate and Rhythm: Regular rhythm. Tachycardia present.      Heart sounds: Normal heart sounds.   Pulmonary:      Comments: Decreased breath sounds bilaterally.  Abdominal:      Palpations: Abdomen is soft.      Tenderness: There is no abdominal tenderness.   Musculoskeletal:      Cervical back: Normal range of motion and neck supple.      Right lower leg: No edema.      Left lower leg: No edema.   Skin:     General: Skin is warm and dry.   Neurological:      General: No focal deficit present.      Mental Status: He is alert and oriented to person, place, and time.   Psychiatric:         Mood and Affect: Mood is anxious.         Behavior: Behavior is agitated.       DIAGNOSTIC STUDIES / PROCEDURES  LABS  Results for orders placed or performed during the hospital encounter of 24    Lactic Acid   Result Value Ref Range    Lactic Acid 7.0 (HH) 0.5 - 2.0 mmol/L   Lactic Acid   Result Value Ref Range    Lactic Acid 1.3 0.5 - 2.0 mmol/L   CBC with Differential   Result Value Ref Range    WBC 19.5 (H) 4.8 - 10.8 K/uL    RBC 3.58 (L) 4.70 - 6.10 M/uL    Hemoglobin 11.9 (L) 14.0 - 18.0 g/dL    Hematocrit 36.4 (L) 42.0 - 52.0 %    .7 (H) 81.4 - 97.8 fL    MCH 33.2 (H) 27.0 - 33.0 pg    MCHC 32.7 32.3 - 36.5 g/dL    RDW 58.5 (H) 35.9 - 50.0 fL    Platelet Count 303 164 - 446 K/uL    MPV 10.2 9.0 - 12.9 fL    Neutrophils-Polys 74.10 (H) 44.00 - 72.00 %    Lymphocytes 20.50 (L) 22.00 - 41.00 %    Monocytes 1.80 0.00 - 13.40 %    Eosinophils 3.60 0.00 - 6.90 %    Basophils 0.00 0.00 - 1.80 %    Nucleated RBC 0.00 0.00 - 0.20 /100 WBC    Neutrophils (Absolute) 14.45 (H) 1.82 - 7.42 K/uL    Lymphs (Absolute) 4.00 1.00 - 4.80 K/uL    Monos (Absolute) 0.35 0.00 - 0.85 K/uL    Eos (Absolute) 0.70 (H) 0.00 - 0.51 K/uL    Baso (Absolute) 0.00 0.00 - 0.12 K/uL    NRBC (Absolute) 0.00 K/uL    Anisocytosis 2+ (A)     Macrocytosis 2+ (A)    Complete Metabolic Panel   Result Value Ref Range    Sodium 138 135 - 145 mmol/L    Potassium 4.1 3.6 - 5.5 mmol/L    Chloride 92 (L) 96 - 112 mmol/L    Co2 21 20 - 33 mmol/L    Anion Gap 25.0 (H) 7.0 - 16.0    Glucose 238 (H) 65 - 99 mg/dL    Bun 36 (H) 8 - 22 mg/dL    Creatinine 8.35 (HH) 0.50 - 1.40 mg/dL    Calcium 9.2 8.5 - 10.5 mg/dL    Correct Calcium 9.3 8.5 - 10.5 mg/dL    AST(SGOT) <5 (L) 12 - 45 U/L    ALT(SGPT) 11 2 - 50 U/L    Alkaline Phosphatase 104 (H) 30 - 99 U/L    Total Bilirubin 0.3 0.1 - 1.5 mg/dL    Albumin 3.9 3.2 - 4.9 g/dL    Total Protein 8.7 (H) 6.0 - 8.2 g/dL    Globulin 4.8 (H) 1.9 - 3.5 g/dL    A-G Ratio 0.8 g/dL   Urinalysis    Specimen: Blood   Result Value Ref Range    Color Yellow     Character Clear     Specific Gravity 1.011 <1.035    Ph 8.5 (A) 5.0 - 8.0    Glucose 250 (A) Negative mg/dL    Ketones Negative Negative mg/dL    Protein  100 (A) Negative mg/dL    Bilirubin Negative Negative    Urobilinogen, Urine 0.2 Negative    Nitrite Negative Negative    Leukocyte Esterase Negative Negative    Occult Blood Moderate (A) Negative    Micro Urine Req Microscopic    CoV-2, FLU A/B, and RSV by PCR (2-4 Hours CEPHEID) : Collect NP swab in VTM    Specimen: Respirate   Result Value Ref Range    Influenza virus A RNA Negative Negative    Influenza virus B, PCR Negative Negative    RSV, PCR Negative Negative    SARS-CoV-2 by PCR DETECTED (AA)     SARS-CoV-2 Source NP Swab    TROPONIN   Result Value Ref Range    Troponin T 291 (H) 6 - 19 ng/L   proBrain Natriuretic Peptide, NT   Result Value Ref Range    NT-proBNP >22444 (H) 0 - 125 pg/mL   URINE MICROSCOPIC (W/UA)   Result Value Ref Range    WBC 0-2 (A) /hpf    RBC 5-10 (A) /hpf    Bacteria Negative None /hpf    Epithelial Cells Few /hpf    Epithelial Cells Renal Few /hpf    Hyaline Cast 0-2 /lpf   ESTIMATED GFR   Result Value Ref Range    GFR (CKD-EPI) 7 (A) >60 mL/min/1.73 m 2   Prothrombin time (INR)   Result Value Ref Range    PT 14.4 12.0 - 14.6 sec    INR 1.11 0.87 - 1.13   DIFFERENTIAL MANUAL   Result Value Ref Range    Manual Diff Status PERFORMED     Comment See Comment    PERIPHERAL SMEAR REVIEW   Result Value Ref Range    Peripheral Smear Review see below    PLATELET ESTIMATE   Result Value Ref Range    Plt Estimation Normal    MORPHOLOGY   Result Value Ref Range    RBC Morphology Present     Toxic Vacuoles Few    Hepatitis Panel Acute (4 components)   Result Value Ref Range    Hepatitis B Surface Antigen Non-Reactive Non-Reactive    Hepatitis B Cors Ab,IgM Non-Reactive Non-Reactive    Hepatitis A Virus Ab, IgM Non-Reactive Non-Reactive    Hepatitis C Antibody Non-Reactive Non-Reactive   Hep B Surface AB   Result Value Ref Range    Hep B Surface Antibody Quant 4478.00 (H) 0.00 - 10.00 mIU/mL   TROPONIN   Result Value Ref Range    Troponin T 453 (H) 6 - 19 ng/L   PROCALCITONIN   Result Value Ref  Range    Procalcitonin 0.27 (H) <0.25 ng/mL   EKG   Result Value Ref Range    Report       [facility]    Test Date:  2024  Pt Name:    SONI ABBASI                  Department: ER  MRN:        [id number]                      Room:        14  Gender:     Male                         Technician: [id number]  :        1965                   Requested By:JOYCELYN OWEN  Order #:    [id number]                    Reading MD: Joycelyn Owen MD    Measurements  Intervals                                Axis  Rate:       125                          P:          91  MN:         130                          QRS:        30  QRSD:       86                           T:          192  QT:         332  QTc:        479    Interpretive Statements  Sinus tachycardia  Probable LVH with secondary repol abnrm  Borderline prolonged QT interval  Artifact in lead(s) I,II,III,aVR,aVL,aVF,V1,V3,V4,V5,V6  Compared to ECG 10/31/2023 08:28:11  Sinus rhythm no longer present  ST (T wave) deviation no longer present  Electronically Signed On 2024 12:44:22 PST by PEPPER Owen MD       RADIOLOGY  I have independently interpreted the diagnostic imaging associated with this visit and am waiting the final reading from the radiologist.   My preliminary interpretation is as follows: Pulmonary edema  Radiologist interpretation:   DX-CHEST-PORTABLE (1 VIEW)   Final Result      Cardiomegaly with interstitial edema.      EC-ECHOCARDIOGRAM COMPLETE W/O CONT    (Results Pending)     COURSE & MEDICAL DECISION MAKING    ED Observation Status? No; Patient does not meet criteria for ED Observation.     INITIAL ASSESSMENT, COURSE AND PLAN  Care Narrative: This is a 58-year-old gentleman with a history of ESRD on /TH/SAT dialysis, most recently dialyzed on Thursday, who is here with acute onset shortness of breath and chest pain.    Differential diagnosis includes, but is not limited to, ACS, fluid overload, COPD, PE,  aortic dissection, pneumonia, viral syndrome.    Arrives hypoxic, hypertensive, and tachycardic.  He is complaining of significant chest pain and is in significant respiratory distress, grunting, using increased work to breathe, and still hypoxic on 15 L nonrebreather.  RT was contacted immediately to place the patient on BiPAP and an EKG was performed which revealed diffuse ST depressions but did not look consistent with a posterior MI.  He was given a small dose of morphine and a chest x-ray was obtained emergently which to my read demonstrated interstitial edema bilaterally.  Patient reports that he still makes urine and so was given a dose of Lasix 80 mg.    Upon reevaluation after approximately 15 minutes he reported that his pain had completely resolved and appeared to be breathing much more easily.  O2 sats were in the high 90s on BiPAP.    CBC was obtained which revealed significant leukocytosis to 19.5 with neutrophilic predominance but no bandemia.  He was given a dose of ceftriaxone for potential bacterial pneumonia.  Metabolic panel is consistent with ESRD although his electrolytes are reassuring, specifically potassium is 4.1.  Anion gap is elevated at 25 with a normal bicarb at 21.  Initial lactate is elevated to 7 which is most likely due to his hypoxia and fluid overload.  Troponin is elevated to 291 with a BNP greater than 70,000 consistent with clinical picture of fluid overload.    I contacted Dr. Beasley, on-call for nephrology, for emergent dialysis and he has concurred with that plan.  I also spoke with Dr. Lay, cardiology, to specifically ask if he felt that heparin drip was indicated or even potential catheterization.  He recommended continued medical management without heparin drip but if enzymes trend up to reconsult cardiology.    Unfortunately, COVID test was positive and patient was given a dose of Decadron.    I spoke with the intensivist regarding ICU versus IMCU admission and he  consulted on the patient, recommending ICU admission.  Patient was then discussed with the hospitalist and admitted in guarded condition.    CRITICAL CARE  The very real possibilty of a deterioration of this patient's condition required the highest level of my preparedness for sudden, emergent intervention.  I provided critical care services, which included medication orders, frequent reevaluations of the patient's condition and response to treatment, ordering and reviewing test results, and discussing the case with various consultants.  The critical care time associated with the care of the patient was 51 minutes. Review chart for interventions. This time is exclusive of any other billable procedures.     ADDITIONAL PROBLEM LIST  None  DISPOSITION AND DISCUSSIONS  I have discussed management of the patient with the following physicians and SHIRA's: Dr. Lay, cardiology.  Dr. Beasley, nephrology.  Dr. Stearns, intensivist.  Dr. Le, hospitalist.    Discussion of management with other [facility] or appropriate source(s): RT assisted with BiPAP      Escalation of care considered, and ultimately not performed: N/A.    Barriers to care at this time, including but not limited to:  None .     Decision tools and prescription drugs considered including, but not limited to: HEART Score 7 .    FINAL DIAGNOSIS  1.  Hypoxic respiratory failure  2.  Fluid overload  3.  NSTEMI     Electronically signed by: Franki Couch M.D., 2/24/2024 7:31 AM

## 2024-02-24 NOTE — ASSESSMENT & PLAN NOTE
COVID-positive with significant leukocytosis of 19,000 concerning for concomitant bacterial pneumonia  He will be treated with empiric IV antibiotics and procalcitonin has been ordered  He may be immunocompromise given his HIV status though his CD4 count is unknown (he does state he has been compliant)  Decadron 6 mg daily while he is hypoxic  Infectious disease will be consulted whether he is a candidate for remdesivir

## 2024-02-24 NOTE — PROGRESS NOTES
ED NAMRATA Soto notified me the patient will be getting dialysis for ~2 more hours. She will call me when he is ready to be picked up. Will be going into T-915.

## 2024-02-24 NOTE — PROGRESS NOTES
[facility]     Dialysis treatment started at 1135 and completed at 1437. Nephrologist Dr. Beasley Updated on pateint status and notified of treatment start.     NET UF: 2800 mL     Patient is A&Ox4, no pain and discomfort reported. VS within normal limits. On Isolation due to (+) Covid, Mild SOB noted, Lung sound clear, no coughing noted. Left UA AVF has Bruitt and thrill and no signs and symptoms of infection. AVF cannulated and no access issues encountered. Lines are patent w/ good blood flow. Lab results and orders reviewed prior to treatment start.      Patient completed and tolerated dialysis treatment well w/o complications. VS stayed within normal limits. Left UA AVF Deaccessed, No bleeding noted after needle removal. Manual access pressure applied x 10mins. Patient and PCN instructed to monitor Left UA AVF site for bleeding and to re-enforce pressure dressing if needed.     1050 Report to PCN TOBIN Massey RN and 1520 TATE Chang RN    See Dialysis treatment flow sheet for details.

## 2024-02-24 NOTE — ED NOTES
Pt placed on BIPAP. Wong placed per ERP. Pt medicated per MAR. Lab at bedside for BC #2.  Pt reports relief from morphine. COVID swab sent to lab.

## 2024-02-24 NOTE — H&P
Hospital Medicine History & Physical Note    Date of Service  2/24/2024    Primary Care Physician  DICKSON Corbett.    Consultants  infectious disease  Critical care Dr. Stearns    Code Status  Prior    Chief Complaint  Chief Complaint   Patient presents with    Respiratory Distress    Chest Pain       History of Presenting Illness  Seamus Palencia is a 58 y.o. male who presented 2/24/2024 with shortness of breath.  Mr. Palencia has a past medical history of end-stage renal disease undergoing dialysis as well as HIV followed by Dr. Whyte infectious disease at the [facility] as well as hypertension and cardiomyopathy that missed dialysis all of last week though was compliant with Tuesday and Thursday of this week.  Last night he developed some shortness of breath and this morning presented to the emergency room with these complaints here he is required rescue BiPAP due to respiratory failure.  His COVID swab is positive.  He has a lactic acid of 7 and a white blood cell count 19,000.  His respiratory failure appears to be combination of volume overload especially given his BNP greater than 70,000 in conjunction with COVID infection.  He will be admitted in guarded condition to the [facility].  I have a message out to Dr. Whyte for recommendations.     I discussed the plan of care with Dr. Couch.    Review of Systems  Review of Systems   Constitutional:  Positive for fever.   Respiratory:  Positive for shortness of breath.    Cardiovascular:  Positive for chest pain.   Gastrointestinal:  Negative for diarrhea and vomiting.   All other systems reviewed and are negative.      Past Medical History   has a past medical history of Dialysis patient ([facility]) (05/17/2023), Heart failure ([facility]), HIV disease ([facility]) (01/01/1995), Hypertension (05/17/2023), Pneumonia (05/17/2023), Renal disorder, Seizure ([facility]) (05/17/2023), and Stroke ([facility]) (2015).    Surgical History   has a past surgical history that includes  cholecystectomy (1980s) and other (Left).     Family History  family history includes Diabetes in his father, mother, and sister; No Known Problems in his sister, sister, and sister; Other in his brother and daughter.   Family history reviewed with patient. There is no family history that is pertinent to the chief complaint.     Social History   reports that he quit smoking about 13 years ago. His smoking use included cigarettes. He started smoking about 43 years ago. He has a 7.5 pack-year smoking history. He has never used smokeless tobacco. He reports that he does not drink alcohol and does not use drugs.    Allergies  No Known Allergies    Medications  Prior to Admission Medications   Prescriptions Last Dose Informant Patient Reported? Taking?   JULUCA 50-25 MG Tab  Patient's Home Pharmacy, Patient Yes No   Sig: Take 1 Tablet by mouth every day at 6 PM. With food   amLODIPine (NORVASC) 10 MG Tab  Patient's Home Pharmacy, Patient No No   Sig: Take 1 Tablet by mouth every day.   aspirin (ASA) 81 MG Chew Tab chewable tablet   No No   Sig: Chew 1 Tablet every day.   calcitRIOL (ROCALTROL) 0.25 MCG Cap   No No   Sig: Take 1 Capsule by mouth every day.   carvedilol (COREG) 25 MG Tab  Patient's Home Pharmacy, Patient No No   Sig: Take 1 Tablet by mouth 2 times a day with meals.   dolutegravir (TIVICAY) 50 MG Tab tablet   No No   Sig: Take 1 Tablet by mouth every day.   lisinopril (PRINIVIL) 20 MG Tab   No No   Sig: Take 1 Tablet by mouth every day.   nicotine (NICOTINE STEP 2) 14 MG/24HR PATCH 24 HR  Patient's Home Pharmacy No No   Sig: Place 1 Patch on the skin every 24 hours.   rilpivirine (EDURANT) 25 MG Tab tablet   No No   Sig: Take 1 Tablet by mouth every morning with breakfast.   sevelamer carbonate (RENVELA) 800 MG Tab tablet  Patient Yes No   Sig: Take 1,600 mg by mouth in the morning, at noon, and at bedtime. 1,600 MG = 2 TABS      Facility-Administered Medications: None       Physical Exam  Temp:  [36.3 °C  (97.3 °F)] 36.3 °C (97.3 °F)  Pulse:  [] 85  Resp:  [12-32] 16  BP: (125-153)/() 125/70  SpO2:  [88 %-100 %] 99 %  Blood Pressure: 125/70   Temperature: 36.3 °C (97.3 °F)   Pulse: 85   Respiration: 16   Pulse Oximetry: 99 %       Physical Exam  Vitals and nursing note reviewed.   Constitutional:       General: He is in acute distress.      Appearance: He is ill-appearing and toxic-appearing.   Cardiovascular:      Rate and Rhythm: Normal rate and regular rhythm.   Pulmonary:      Comments: BiPAP  Tachypnea  Poor air movement  Crackles throughout  Abdominal:      General: There is distension.      Tenderness: There is no abdominal tenderness.   Musculoskeletal:      Comments: No edema  Left arm fistula   Neurological:      General: No focal deficit present.      Mental Status: He is alert and oriented to person, place, and time.   Psychiatric:         Mood and Affect: Mood normal.         Behavior: Behavior normal.         Laboratory:  Recent Labs     02/24/24  0721   WBC 19.5*   RBC 3.58*   HEMOGLOBIN 11.9*   HEMATOCRIT 36.4*   .7*   MCH 33.2*   MCHC 32.7   RDW 58.5*   PLATELETCT 303   MPV 10.2     Recent Labs     02/24/24  0721   SODIUM 138   POTASSIUM 4.1   CHLORIDE 92*   CO2 21   GLUCOSE 238*   BUN 36*   CREATININE 8.35*   CALCIUM 9.2     Recent Labs     02/24/24  0721   ALTSGPT 11   ASTSGOT <5*   ALKPHOSPHAT 104*   TBILIRUBIN 0.3   GLUCOSE 238*     Recent Labs     02/24/24  0721   INR 1.11     Recent Labs     02/24/24  0721   NTPROBNP >41683*         Recent Labs     02/24/24  0721   TROPONINT 291*       Imaging:  DX-CHEST-PORTABLE (1 VIEW)   Final Result      Cardiomegaly with interstitial edema.      EC-ECHOCARDIOGRAM COMPLETE W/O CONT    (Results Pending)           Assessment/Plan:  Justification for Admission Status  I anticipate this patient will require at least two midnights for appropriate medical management, necessitating inpatient admission because rescue BiPAP and IV abx.    Patient  will need a Intermediate Care (Adult and Pediatrics) bed on MEDICAL service .  The need is secondary to as above.    * Pneumonia due to COVID-19 virus- (present on admission)  Assessment & Plan  COVID-positive with significant leukocytosis of 19,000 concerning for concomitant bacterial pneumonia  He will be treated with empiric IV antibiotics and procalcitonin has been ordered  He may be immunocompromise given his HIV status though his CD4 count is unknown (he does state he has been compliant)  Decadron 6 mg daily while he is hypoxic  Infectious disease will be consulted whether he is a candidate for remdesivir    Acute respiratory failure with hypoxia (HCC)- (present on admission)  Assessment & Plan  Requiring rescue BiPAP  Secondary to volume overload and COVID infection  Admit to the IMCU.    Elevated lactic acid level- (present on admission)  Assessment & Plan  Lactic acid is markedly elevated at 7  This in the setting of marked volume overload as well as COVID infection  He is not a candidate for IV fluids given his volume overload and BNP greater than 70,000  Dialysis will be initiated    ESRD (end stage renal disease) on dialysis ([facility])- (present on admission)  Assessment & Plan  Dialysis will be initiated by nephrology via a left arm fistula    Chronic combined systolic and diastolic heart failure ([facility])- (present on admission)  Assessment & Plan  Last echocardiogram in our system was 2020 which his ejection fraction was 40% with RSVP of 50 mmHg    Primary hypertension- (present on admission)  Assessment & Plan  Hold on his outpatient medications given hypotension and restart accordingly    PAD (peripheral artery disease) ([facility])- (present on admission)  Assessment & Plan  Known history of    Elevated troponin- (present on admission)  Assessment & Plan  Consistent with a stress response secondary to volume overload and COVID infection requiring rescue BiPAP  He denies chest pain  No intervention is warranted at  this time    HIV (human immunodeficiency virus infection) (HCC)- (present on admission)  Assessment & Plan  Followed by Dr. Whyte  Restart his home regimen        VTE prophylaxis: heparin ppx

## 2024-02-24 NOTE — ASSESSMENT & PLAN NOTE
He states that he was sick over a week ago, but is feeling better  Appropriate isolation  Dexamethasone, 6 mg daily for 10 days  He is not a candidate for baricitinib due to his ESRD - I do not believe it is indicated in in this case as I believe that we are mostly dealing with volume overload

## 2024-02-24 NOTE — ASSESSMENT & PLAN NOTE
Consistent with a stress response secondary to volume overload and COVID infection requiring rescue BiPAP  He denies chest pain  No intervention is warranted at this time

## 2024-02-24 NOTE — PROGRESS NOTES
4 Eyes Skin Assessment Completed by NAMRATA Tee and Chip RN.    Head WDL  Ears WDL  Nose Redness and Blanching  Mouth WDL  Neck WDL  Breast/Chest WDL  Shoulder Blades WDL  Spine WDL  (R) Arm/Elbow/Hand WDL  (L) Arm/Elbow/Hand WDL, upper arm fistula  Abdomen WDL  Groin WDL  Scrotum/Coccyx/Buttocks WDL  (R) Leg WDL  (L) Leg WDL  (R) Heel/Foot/Toe Redness and Blanching, top of foot  (L) Heel/Foot/Toe Redness and Blanching, top of foot          Devices In Places ECG, Blood Pressure Cuff, Pulse Ox, Wong, and SCD's      Interventions In Place Sacral Mepilex, TAP System, Pillows, Q2 Turns, Low Air Loss Mattress, and ZFlo Pillow, padding under bipap mask    Possible Skin Injury No    Pictures Uploaded Into Epic N/A  Wound Consult Placed N/A  RN Wound Prevention Protocol Ordered Yes     Belongings placed in closet    Pink polo shirt  Black and red jalen shoes  Black puffy jacket  Jeans  Belt  Phone  Wallet with $92 cash and cards  Glasses

## 2024-02-24 NOTE — ASSESSMENT & PLAN NOTE
Lactic acid is markedly elevated at 7  This in the setting of marked volume overload as well as COVID infection  He is not a candidate for IV fluids given his volume overload and BNP greater than 70,000  Dialysis will be initiated

## 2024-02-24 NOTE — CONSULTS
PULMONARY AND CRITICAL CARE MEDICINE CONSULTATION    Date of Consultation:  2/24/2024    Requesting Physician:  Franki Couch MD    Consulting Physician:  Mata Stearns MD    Reason for Consultation: Respiratory failure    Chief Complaint: Dyspnea    History of Present Illness:    I was kindly asked to see and evaluate Seamus Palencia, a 58 y.o. male for evaluation and management of the above problem.    This charming gentleman has a history of ESRD on thrice weekly HD, HIV disease, primary hypertension, prior seizure, prior stroke, PAD, combined systolic and diastolic heart failure with an LVEF of 40% and grade 2 diastolic dysfunction and pulmonary hypertension with an RVSP of 50 mmHg.  He comes into the ED telling me that he developed sudden shortness of breath this morning.  He also had some discomfort in his chest.  EMS was activated.  He was placed on BiPAP for respiratory distress and is feeling better.  He tells me that he was sick last week.  Apparently the week before last, he did not go to dialysis because he was sick.  He states he did not miss dialysis this past week.  He has a cough with scant sputum production.  He denies fever, chills, sweats, myalgias or arthralgias.  He has no abdominal pain, nausea, vomiting or diarrhea.  At the time of my evaluation he is feeling better on BiPAP and he denies chest pain.    Medications Prior to Admission:    No current facility-administered medications on file prior to encounter.     Current Outpatient Medications on File Prior to Encounter   Medication Sig Dispense Refill    rilpivirine (EDURANT) 25 MG Tab tablet Take 1 Tablet by mouth every morning with breakfast. 30 Tablet 0    lisinopril (PRINIVIL) 20 MG Tab Take 1 Tablet by mouth every day. 30 Tablet 6    dolutegravir (TIVICAY) 50 MG Tab tablet Take 1 Tablet by mouth every day. 30 Tablet 0    calcitRIOL (ROCALTROL) 0.25 MCG Cap Take 1 Capsule by mouth every day. 30 Capsule 0    aspirin (ASA) 81 MG  Chew Tab chewable tablet Chew 1 Tablet every day. 100 Tablet 3    sevelamer carbonate (RENVELA) 800 MG Tab tablet Take 1,600 mg by mouth in the morning, at noon, and at bedtime. 1,600 MG = 2 TABS      nicotine (NICOTINE STEP 2) 14 MG/24HR PATCH 24 HR Place 1 Patch on the skin every 24 hours. 30 Patch 1    carvedilol (COREG) 25 MG Tab Take 1 Tablet by mouth 2 times a day with meals. 180 Tablet 3    JULUCA 50-25 MG Tab Take 1 Tablet by mouth every day at 6 PM. With food      amLODIPine (NORVASC) 10 MG Tab Take 1 Tablet by mouth every day. 90 Tablet 3       Current Medications:      Current Facility-Administered Medications:     NS (Bolus) 0.9 % infusion 250 mL, 250 mL, Intravenous, DIALYSIS PRN, Bernabe Beasley M.D.    lidocaine (Xylocaine) 1 % injection 1 mL, 1 mL, Intradermal, PRN, Bernabe Beasley M.D.    dexamethasone (Decadron) tablet 6 mg, 6 mg, Oral, DAILY, Mata Stearns M.D.    Current Outpatient Medications:     rilpivirine (EDURANT) 25 MG Tab tablet, Take 1 Tablet by mouth every morning with breakfast., Disp: 30 Tablet, Rfl: 0    lisinopril (PRINIVIL) 20 MG Tab, Take 1 Tablet by mouth every day., Disp: 30 Tablet, Rfl: 6    dolutegravir (TIVICAY) 50 MG Tab tablet, Take 1 Tablet by mouth every day., Disp: 30 Tablet, Rfl: 0    calcitRIOL (ROCALTROL) 0.25 MCG Cap, Take 1 Capsule by mouth every day., Disp: 30 Capsule, Rfl: 0    aspirin (ASA) 81 MG Chew Tab chewable tablet, Chew 1 Tablet every day., Disp: 100 Tablet, Rfl: 3    sevelamer carbonate (RENVELA) 800 MG Tab tablet, Take 1,600 mg by mouth in the morning, at noon, and at bedtime. 1,600 MG = 2 TABS, Disp: , Rfl:     nicotine (NICOTINE STEP 2) 14 MG/24HR PATCH 24 HR, Place 1 Patch on the skin every 24 hours., Disp: 30 Patch, Rfl: 1    carvedilol (COREG) 25 MG Tab, Take 1 Tablet by mouth 2 times a day with meals., Disp: 180 Tablet, Rfl: 3    JULUCA 50-25 MG Tab, Take 1 Tablet by mouth every day at 6 PM. With food, Disp: , Rfl:     amLODIPine (NORVASC) 10 MG  Tab, Take 1 Tablet by mouth every day., Disp: 90 Tablet, Rfl: 3    Allergies:    Patient has no known allergies.    Past Surgical History:    Past Surgical History:   Procedure Laterality Date    CHOLECYSTECTOMY  1980s    OTHER Left     Dialysis graft left arm       Past Medical History:    Past Medical History:   Diagnosis Date    Dialysis patient ([facility]) 2023    three times a week, Tues, Thurs, Sat    Heart failure ([facility])     HIV disease ([facility]) 1995    medicated    Hypertension 2023    medicated    Pneumonia 2023    history of    Renal disorder     ESRD on hemodylsis Tu/Th/Sat at [facility]    Seizure ([facility]) 2023    history of    Stroke ([facility])     no residual issues       Social History:    Social History     Socioeconomic History    Marital status: Single     Spouse name: Not on file    Number of children: Not on file    Years of education: Not on file    Highest education level: Not on file   Occupational History    Not on file   Tobacco Use    Smoking status: Former     Current packs/day: 0.00     Average packs/day: 0.3 packs/day for 30.0 years (7.5 ttl pk-yrs)     Types: Cigarettes     Start date:      Quit date:      Years since quittin.1    Smokeless tobacco: Never    Tobacco comments:     3-4 CIGARETTES A DAY   Vaping Use    Vaping Use: Never used   Substance and Sexual Activity    Alcohol use: No    Drug use: No    Sexual activity: Not on file   Other Topics Concern    Not on file   Social History Narrative    Not on file     Social Determinants of Health     Financial Resource Strain: Not on file   Food Insecurity: Not on file   Transportation Needs: Not on file   Physical Activity: Not on file   Stress: Not on file   Social Connections: Not on file   Intimate Partner Violence: Not on file   Housing Stability: Not on file       Family History:    Family History   Problem Relation Age of Onset    Diabetes Mother         cause of death    Diabetes Father          cause of death    Diabetes Sister     Other Brother         down syndrome    No Known Problems Sister     No Known Problems Sister     No Known Problems Sister     Other Daughter         5 daughters, 11 sons     Clotting Disorder Neg Hx     Stroke Neg Hx        Review of System:    Review of Systems   Unable to perform ROS: Acuity of condition       Physical Examination:    /73   Pulse 85   Temp 36.3 °C (97.3 °F)   Resp 17   Wt 68 kg (150 lb)   SpO2 98%   BMI 25.75 kg/m²   Physical Exam  Constitutional:       Appearance: He is not diaphoretic.   HENT:      Mouth/Throat:      Pharynx: Oropharynx is clear.   Cardiovascular:      Comments: Sinus rhythm  Pulmonary:      Breath sounds: Rales (Crackles bilaterally) present. No wheezing.      Comments: He is on BiPAP  Abdominal:      General: There is no distension.      Tenderness: There is no abdominal tenderness.   Musculoskeletal:         General: Normal range of motion.   Skin:     General: Skin is warm.   Neurological:      Comments: He is awake and alert and answering questions appropriately         Laboratory Data:        Recent Labs     02/24/24  0721   WBC 19.5*   RBC 3.58*   HEMOGLOBIN 11.9*   HEMATOCRIT 36.4*   .7*   MCH 33.2*   MCHC 32.7   RDW 58.5*   PLATELETCT 303   MPV 10.2     Recent Labs     02/24/24  0721   SODIUM 138   POTASSIUM 4.1   CHLORIDE 92*   CO2 21   GLUCOSE 238*   BUN 36*   CREATININE 8.35*   CALCIUM 9.2                   Imaging:    I personally viewed all the available CXR and CT scan images as well as reviewed the radiology interpretation reports.    DX-CHEST-PORTABLE (1 VIEW)   Final Result      Cardiomegaly with interstitial edema.      EC-ECHOCARDIOGRAM COMPLETE W/O CONT    (Results Pending)       Assessment and Plan:    Acute respiratory failure with hypoxia (HCC)- (present on admission)  Assessment & Plan  I suspect that this is due to volume overload  I am titrating BiPAP for respiratory distress  High risk of  deterioration    Elevated lactic acid level  Assessment & Plan  I would not give him IV fluids as I believe he is volume overloaded  Trend lactic acid    ESRD (end stage renal disease) on dialysis ([facility])- (present on admission)  Assessment & Plan  Emergent HD/UF  Renal dose medications    COVID-19 virus detected- (present on admission)  Assessment & Plan  He states that he was sick over a week ago, but is feeling better  Appropriate isolation  Dexamethasone, 6 mg daily for 10 days  He is not a candidate for baricitinib due to his ESRD - I do not believe it is indicated in in this case as I believe that we are mostly dealing with volume overload    Chronic combined systolic and diastolic heart failure (HCC)- (present on admission)  Assessment & Plan  LVEF 40% with grade 2 diastolic dysfunction  Emergent HD/UF  Repeat echocardiogram    Primary hypertension- (present on admission)  Assessment & Plan  Goal SBP less than 160  Continue amlodipine, 10 mg daily  Continue carvedilol, 25 mg twice daily  Continue lisinopril, 20 mg daily    PAD (peripheral artery disease) ([facility])- (present on admission)  Assessment & Plan  History of    Elevated troponin- (present on admission)  Assessment & Plan  I suspect secondary to demand ischemia  Trend troponin  Echocardiogram    HIV (human immunodeficiency virus infection) ([facility])- (present on admission)  Assessment & Plan  HIV positive  Last viral load was in 2016  He gets his medications from [facility] - he does not know what medications he is taking    History of CVA (cerebrovascular accident)- (present on admission)  Assessment & Plan  History of    Dyslipidemia- (present on admission)  Assessment & Plan  History of        High risk of deterioration and worsening vital organ dysfunction and death without the above critical care interventions.    I have assessed and reassessed his respiratory status with the titration of BiPAP for respiratory distress, his blood pressure, hemodynamics and  cardiovascular status.  He is at increased risk for worsening respiratory and cardiovascular system dysfunction.    Thank you for allowing me to participate in the care of this gentleman.    The patient is critically ill.  Critical care time = 105 minutes in directly providing and coordinating critical care and extensive data review.  No time overlap and excludes procedures.    Discussed with ER physician, RN, RT    Mata Stearns MD  Pulmonary and Critical Care Medicine

## 2024-02-24 NOTE — ASSESSMENT & PLAN NOTE
HIV positive  Last viral load was in 2016  He gets his medications from [facility] - he does not know what medications he is taking

## 2024-02-24 NOTE — ASSESSMENT & PLAN NOTE
Last echocardiogram in our system was 2020 which his ejection fraction was 40% with RSVP of 50 mmHg

## 2024-02-24 NOTE — CONSULTS
Nephrology Consultation    Date of Service  2/24/2024    Referring Physician  Marc Le M.D.    Consulting Physician  Bernabe Beasley M.D.    Reason for Consultation  Management of ESRD on HD      History of Presenting Illness  58 y.o. male with a history of hypertension, nonoliguric ESRD on dialysis Tuesday Thursday Saturday who presented 2/24/2024 with shortness of breath.    The patient says he has been in his usual state of health.  He attended his last 3 previous dialysis treatments, with last treatment 2 days ago on Thursday.  [facility], outpatient dialysis records show he attended his treatment and finished treatment at a weight of 65.3 kg, his target weight is 65.5 kg.  The patient said he started feeling bad last night with increasing shortness of breath.  He could not make it to his dialysis treatment this morning due to shortness of breath.  So he came into the emergency department.  Patient was diagnosed with COVID-pneumonia.  He was placed on BiPAP.  Patient is seen and examined while receiving dialysis.  Shortness of breath is improving with dialysis treatment.  He receives dialysis via a left arm AV fistula, which works well.  Ultrafiltration goal between 2 to 3 L today.  Patient says he has been on dialysis for 3 years, thinks high blood pressure caused his kidney failure.    Review of Systems  Review of Systems   Constitutional:  Negative for fever.   Respiratory:  Positive for shortness of breath.    Cardiovascular:  Negative for chest pain.   Gastrointestinal:  Negative for abdominal pain.   All other systems reviewed and are negative.      Past Medical History  Past Medical History:   Diagnosis Date    Dialysis patient ([facility]) 05/17/2023    three times a week, Tues, Thurs, Sat    Heart failure ([facility])     HIV disease ([facility]) 01/01/1995    medicated    Hypertension 05/17/2023    medicated    Pneumonia 05/17/2023    history of    Renal disorder     ESRD on hemodylsis Tues/Thurs/Sat at [facility]  ([facility]) 2023    history of    Stroke ([facility])     no residual issues       Surgical History  Past Surgical History:   Procedure Laterality Date    CHOLECYSTECTOMY  1980s    OTHER Left     Dialysis graft left arm       Family History  Family History   Problem Relation Age of Onset    Diabetes Mother         cause of death    Diabetes Father         cause of death    Diabetes Sister     Other Brother         down syndrome    No Known Problems Sister     No Known Problems Sister     No Known Problems Sister     Other Daughter         5 daughters, 11 sons     Clotting Disorder Neg Hx     Stroke Neg Hx        Social History  Social History     Socioeconomic History    Marital status: Single     Spouse name: Not on file    Number of children: Not on file    Years of education: Not on file    Highest education level: Not on file   Occupational History    Not on file   Tobacco Use    Smoking status: Former     Current packs/day: 0.00     Average packs/day: 0.3 packs/day for 30.0 years (7.5 ttl pk-yrs)     Types: Cigarettes     Start date:      Quit date:      Years since quittin.1    Smokeless tobacco: Never    Tobacco comments:     3-4 CIGARETTES A DAY   Vaping Use    Vaping Use: Never used   Substance and Sexual Activity    Alcohol use: No    Drug use: No    Sexual activity: Not on file   Other Topics Concern    Not on file   Social History Narrative    Not on file     Social Determinants of Health     Financial Resource Strain: Not on file   Food Insecurity: Not on file   Transportation Needs: Not on file   Physical Activity: Not on file   Stress: Not on file   Social Connections: Not on file   Intimate Partner Violence: Not on file   Housing Stability: Not on file       Medications  Current Facility-Administered Medications   Medication Dose Route Frequency Provider Last Rate Last Admin    NS (Bolus) 0.9 % infusion 250 mL  250 mL Intravenous DIALYSIS PRN Bernabe Beasley M.D.        lidocaine (Xylocaine) 1  % injection 1 mL  1 mL Intradermal PRN Bernabe Beasley M.D.   1 mL at 02/24/24 1120    heparin injection 1,500 Units  1,500 Units Intravenous ACUTE DIALYSIS PRN Bernabe Beasley M.D.   1,500 Units at 02/24/24 1130    ampicillin/sulbactam (Unasyn) 3 g in  mL IVPB  3 g Intravenous Q24HRS Marc Le M.D.        [START ON 2/25/2024] dexamethasone (Decadron) tablet 6 mg  6 mg Oral DAILY Marc Le M.D.         Current Outpatient Medications   Medication Sig Dispense Refill    rilpivirine (EDURANT) 25 MG Tab tablet Take 1 Tablet by mouth every morning with breakfast. 30 Tablet 0    lisinopril (PRINIVIL) 20 MG Tab Take 1 Tablet by mouth every day. 30 Tablet 6    dolutegravir (TIVICAY) 50 MG Tab tablet Take 1 Tablet by mouth every day. 30 Tablet 0    calcitRIOL (ROCALTROL) 0.25 MCG Cap Take 1 Capsule by mouth every day. 30 Capsule 0    aspirin (ASA) 81 MG Chew Tab chewable tablet Chew 1 Tablet every day. 100 Tablet 3    sevelamer carbonate (RENVELA) 800 MG Tab tablet Take 1,600 mg by mouth in the morning, at noon, and at bedtime. 1,600 MG = 2 TABS      nicotine (NICOTINE STEP 2) 14 MG/24HR PATCH 24 HR Place 1 Patch on the skin every 24 hours. 30 Patch 1    carvedilol (COREG) 25 MG Tab Take 1 Tablet by mouth 2 times a day with meals. 180 Tablet 3    JULUCA 50-25 MG Tab Take 1 Tablet by mouth every day at 6 PM. With food      amLODIPine (NORVASC) 10 MG Tab Take 1 Tablet by mouth every day. 90 Tablet 3       Allergies  No Known Allergies    Physical Exam  Temp:  [36.3 °C (97.3 °F)] 36.3 °C (97.3 °F)  Pulse:  [] 78  Resp:  [12-32] 13  BP: (118-153)/() 133/71  SpO2:  [88 %-100 %] 100 %    Physical Exam  Constitutional:       General: He is not in acute distress.     Appearance: He is well-developed. He is not diaphoretic.   HENT:      Head: Normocephalic and atraumatic.      Mouth/Throat:      Mouth: Mucous membranes are moist.      Pharynx: Oropharynx is clear. No oropharyngeal exudate.      Comments: BiPAP  mask in place  Eyes:      General: No scleral icterus.     Extraocular Movements: Extraocular movements intact.   Neck:      Trachea: No tracheal deviation.   Cardiovascular:      Rate and Rhythm: Normal rate.      Heart sounds: Normal heart sounds. No murmur heard.  Pulmonary:      Effort: Pulmonary effort is normal.      Breath sounds: Normal breath sounds. No stridor. No rales.   Abdominal:      General: There is no distension.      Palpations: Abdomen is soft.      Tenderness: There is no abdominal tenderness.   Musculoskeletal:         General: Normal range of motion.      Right lower leg: No edema.      Left lower leg: No edema.   Skin:     General: Skin is warm and dry.   Neurological:      General: No focal deficit present.      Mental Status: He is alert and oriented to person, place, and time.   Psychiatric:         Mood and Affect: Mood normal.         Behavior: Behavior normal.     Dialysis access: Left arm AV fistula, accessed with needles    Fluids      Laboratory  Labs reviewed, pertinent labs below.  Recent Labs     02/24/24  0721   WBC 19.5*   RBC 3.58*   HEMOGLOBIN 11.9*   HEMATOCRIT 36.4*   .7*   MCH 33.2*   MCHC 32.7   RDW 58.5*   PLATELETCT 303   MPV 10.2     Recent Labs     02/24/24  0721   SODIUM 138   POTASSIUM 4.1   CHLORIDE 92*   CO2 21   GLUCOSE 238*   BUN 36*   CREATININE 8.35*   CALCIUM 9.2     Recent Labs     02/24/24  0721   INR 1.11            URINALYSIS:  Lab Results   Component Value Date/Time    COLORURINE Yellow 02/24/2024 0747    CLARITY Clear 02/24/2024 0747    SPECGRAVITY 1.011 02/24/2024 0747    PHURINE 8.5 (A) 02/24/2024 0747    KETONES Negative 02/24/2024 0747    PROTEINURIN 100 (A) 02/24/2024 0747    BILIRUBINUR Negative 02/24/2024 0747    UROBILU 0.2 02/24/2024 0747    NITRITE Negative 02/24/2024 0747    LEUKESTERAS Negative 02/24/2024 0747    OCCULTBLOOD Moderate (A) 02/24/2024 0747     UPC  Lab Results   Component Value Date/Time    TOTPROTUR 148.0 (H) 07/03/2020  1315      Lab Results   Component Value Date/Time    CREATININEU 49.87 07/03/2020 1315       Imaging interpreted by radiologist. Imaging reports reviewed with pertinent findings below  DX-CHEST-PORTABLE (1 VIEW)   Final Result      Cardiomegaly with interstitial edema.      EC-ECHOCARDIOGRAM COMPLETE W/O CONT    (Results Pending)         Assessment/Plan  58 y.o. male with a history of hypertension, nonoliguric ESRD on dialysis Tuesday Thursday Saturday who presented 2/24/2024 with shortness of breath, found to have acute COVID infection.    1.  ESRD on hemodialysis Tuesday Thursday Saturday.  Nonoliguric.  Plan on dialysis today per usual schedule.  Assess daily for dialysis needs.  Avoid NSAIDs and other nephrotoxins.  Check renal function panel daily.    2.  Dialysis access: Left arm AV fistula, left arm precautions.    3.  Acute hypoxic respiratory failure, likely due to COVID-pneumonia and some degree of volume overload.  We will plan on ultrafiltration with dialysis today as tolerated.  Recommend low-sodium diet.    4.  Anemia of chronic disease, well-controlled.  Patient's last dose of Mircera was on 2/6/2024.  Check CBC at least thrice weekly on Tuesdays Thursdays Saturdays.    5.  Leukocytosis, noted on admission.  Unclear etiology.  Check CBC daily.    6.  Lactic acidosis, likely due to sepsis and tachypnea.  Improving with BiPAP and dialysis.    7.  Hypertension, noted on admission, likely due to volume overload and shortness of breath.  Improving with dialysis.  Recommend low-sodium diet.      Bernabe Beasley MD  Nephrology  [facility]

## 2024-02-24 NOTE — ASSESSMENT & PLAN NOTE
Goal SBP less than 160  Continue amlodipine, 10 mg daily  Continue carvedilol, 25 mg twice daily  Continue lisinopril, 20 mg daily

## 2024-02-24 NOTE — ED NOTES
Unable to complete med rec at this time   Pt is unable to verify what medications he is taking   Home pharmacy ([facility]) is closed till Monday   Pt states that his family wouldn't be able to verify his medications   Allergies reviewed   Pt states that he last took his medications yesterday (2/23/2024)

## 2024-02-25 ENCOUNTER — APPOINTMENT (OUTPATIENT)
Dept: CARDIOLOGY | Facility: MEDICAL CENTER | Age: 59
DRG: 177 | End: 2024-02-25
Attending: INTERNAL MEDICINE
Payer: MEDICAID

## 2024-02-25 VITALS
TEMPERATURE: 98 F | SYSTOLIC BLOOD PRESSURE: 114 MMHG | HEART RATE: 78 BPM | OXYGEN SATURATION: 93 % | BODY MASS INDEX: 24.37 KG/M2 | RESPIRATION RATE: 23 BRPM | DIASTOLIC BLOOD PRESSURE: 62 MMHG | WEIGHT: 141.98 LBS

## 2024-02-25 LAB
ALBUMIN SERPL BCP-MCNC: 3.8 G/DL (ref 3.2–4.9)
ALBUMIN/GLOB SERPL: 0.7 G/DL
ALP SERPL-CCNC: 89 U/L (ref 30–99)
ALT SERPL-CCNC: 24 U/L (ref 2–50)
ANION GAP SERPL CALC-SCNC: 17 MMOL/L (ref 7–16)
AST SERPL-CCNC: 19 U/L (ref 12–45)
BASOPHILS # BLD AUTO: 0.3 % (ref 0–1.8)
BASOPHILS # BLD: 0.04 K/UL (ref 0–0.12)
BILIRUB SERPL-MCNC: 0.4 MG/DL (ref 0.1–1.5)
BUN SERPL-MCNC: 32 MG/DL (ref 8–22)
CALCIUM ALBUM COR SERPL-MCNC: 9.6 MG/DL (ref 8.5–10.5)
CALCIUM SERPL-MCNC: 9.4 MG/DL (ref 8.5–10.5)
CHLORIDE SERPL-SCNC: 92 MMOL/L (ref 96–112)
CO2 SERPL-SCNC: 26 MMOL/L (ref 20–33)
CREAT SERPL-MCNC: 7.02 MG/DL (ref 0.5–1.4)
EOSINOPHIL # BLD AUTO: 0.01 K/UL (ref 0–0.51)
EOSINOPHIL NFR BLD: 0.1 % (ref 0–6.9)
ERYTHROCYTE [DISTWIDTH] IN BLOOD BY AUTOMATED COUNT: 56.9 FL (ref 35.9–50)
GFR SERPLBLD CREATININE-BSD FMLA CKD-EPI: 8 ML/MIN/1.73 M 2
GLOBULIN SER CALC-MCNC: 5.2 G/DL (ref 1.9–3.5)
GLUCOSE SERPL-MCNC: 121 MG/DL (ref 65–99)
HCT VFR BLD AUTO: 32.5 % (ref 42–52)
HGB BLD-MCNC: 10.7 G/DL (ref 14–18)
IMM GRANULOCYTES # BLD AUTO: 0.06 K/UL (ref 0–0.11)
IMM GRANULOCYTES NFR BLD AUTO: 0.4 % (ref 0–0.9)
LV EJECT FRACT  99904: 40
LV EJECT FRACT MOD 2C 99903: 36.26
LV EJECT FRACT MOD 4C 99902: 40.38
LV EJECT FRACT MOD BP 99901: 37.46
LYMPHOCYTES # BLD AUTO: 0.45 K/UL (ref 1–4.8)
LYMPHOCYTES NFR BLD: 3.3 % (ref 22–41)
MCH RBC QN AUTO: 32.6 PG (ref 27–33)
MCHC RBC AUTO-ENTMCNC: 32.9 G/DL (ref 32.3–36.5)
MCV RBC AUTO: 99.1 FL (ref 81.4–97.8)
MONOCYTES # BLD AUTO: 0.73 K/UL (ref 0–0.85)
MONOCYTES NFR BLD AUTO: 5.3 % (ref 0–13.4)
NEUTROPHILS # BLD AUTO: 12.44 K/UL (ref 1.82–7.42)
NEUTROPHILS NFR BLD: 90.6 % (ref 44–72)
NRBC # BLD AUTO: 0 K/UL
NRBC BLD-RTO: 0 /100 WBC (ref 0–0.2)
PLATELET # BLD AUTO: 349 K/UL (ref 164–446)
PMV BLD AUTO: 9.8 FL (ref 9–12.9)
POTASSIUM SERPL-SCNC: 4.8 MMOL/L (ref 3.6–5.5)
PROT SERPL-MCNC: 9 G/DL (ref 6–8.2)
RBC # BLD AUTO: 3.28 M/UL (ref 4.7–6.1)
SODIUM SERPL-SCNC: 135 MMOL/L (ref 135–145)
WBC # BLD AUTO: 13.7 K/UL (ref 4.8–10.8)

## 2024-02-25 PROCEDURE — 700102 HCHG RX REV CODE 250 W/ 637 OVERRIDE(OP): Performed by: HOSPITALIST

## 2024-02-25 PROCEDURE — 80053 COMPREHEN METABOLIC PANEL: CPT

## 2024-02-25 PROCEDURE — 93306 TTE W/DOPPLER COMPLETE: CPT | Mod: 26 | Performed by: INTERNAL MEDICINE

## 2024-02-25 PROCEDURE — A9270 NON-COVERED ITEM OR SERVICE: HCPCS | Performed by: HOSPITALIST

## 2024-02-25 PROCEDURE — 700111 HCHG RX REV CODE 636 W/ 250 OVERRIDE (IP): Mod: JZ | Performed by: HOSPITALIST

## 2024-02-25 PROCEDURE — 93306 TTE W/DOPPLER COMPLETE: CPT

## 2024-02-25 PROCEDURE — 700105 HCHG RX REV CODE 258: Performed by: HOSPITALIST

## 2024-02-25 PROCEDURE — 99253 IP/OBS CNSLTJ NEW/EST LOW 45: CPT | Mod: GC | Performed by: INTERNAL MEDICINE

## 2024-02-25 PROCEDURE — 99239 HOSP IP/OBS DSCHRG MGMT >30: CPT | Performed by: HOSPITALIST

## 2024-02-25 PROCEDURE — 85025 COMPLETE CBC W/AUTO DIFF WBC: CPT

## 2024-02-25 RX ADMIN — DEXAMETHASONE 6 MG: 6 TABLET ORAL at 06:27

## 2024-02-25 RX ADMIN — AMPICILLIN AND SULBACTAM 3 G: 1; 2 INJECTION, POWDER, FOR SOLUTION INTRAMUSCULAR; INTRAVENOUS at 06:28

## 2024-02-25 ASSESSMENT — PAIN DESCRIPTION - PAIN TYPE
TYPE: ACUTE PAIN

## 2024-02-25 ASSESSMENT — FIBROSIS 4 INDEX: FIB4 SCORE: 0.66

## 2024-02-25 NOTE — CONSULTS
DATE OF SERVICE:  02/25/2024     INFECTIOUS DISEASE CONSULTATION     REQUESTING PHYSICIAN:  Marc Le MD     IDENTIFICATION:  A 58-year-old HIV patient seen for COVID management and HIV.     HISTORY OF PRESENT ILLNESS:  The patient has been a patient at the [facility] since 2013.  At that time, he was diagnosed when he presented to [facility] with pneumococcal pneumonia and bacteremia.  He had had prior   episodes even a few years prior to that and so probably has been effected for   a while.  After that for first few years he was very noncompliant and   difficult patient, was also readmitted multiple times with C. difficile   colitis and wasting.  However, in the last few years, he has really turned   around and has been fully compliant with his medicines.  Unfortunately, he has   gone to develop kidney failure for which he is on dialysis.  His latest   regimen through the [facility] is dolutegravir plus rilpivirine, which is   combined in the pill called Juluca.  His last tests were from last year when   his CD4 count was 134 and his virus is undetectable.  He has been compliant   with this.  He has also been compliant with his COVID vaccines and has had   multiple Moderna vaccines.  The patient, about 3 weeks ago, started feeling   ill, thought he had the flu, spent a week in bed and then over the last few   days, became increasingly short of breath.  He presented to the emergency   room, was somewhat hypoxic and was found to be COVID PCR positive by the gene   expert machine.  He was diagnosed with COVID pneumonia and initially placed on   BiPAP.  Yesterday evening he underwent dialysis with removal of about 2-3   liters of fluid, and he this morning feels much better.  He is off oxygen, he   is speaking clearly and he feels quite well.  His medications at present   include IV ampicillin and sulbactam.  He is also on his HIV medicines and he   has been put on Decadron 6 mg  daily.  Scheduled Medications   Medication Dose Frequency    dexamethasone  6 mg DAILY      ALLERGIES:  No known antibiotic allergies.     PAST MEDICAL HISTORY:  As noted above.  His dialysis, his history of C.   difficile colitis, which was a problem for a few years about [id number] and   otherwise unremarkable.     PAST SURGICAL HISTORY:  Just he had his gallbladder taken out and a   functioning graft in his left arm for dialysis.     FAMILY HISTORY:  Noncontributory.     SOCIAL HISTORY:  He is single and he is not working and he is in a stable   housing situation with assistance through the [facility].     Vitals:    02/25/24 0800   BP: 114/62   Pulse: 78   Resp: (!) 23   Temp:    SpO2: 93%      PHYSICAL EXAMINATION:  GENERAL:  He is very comfortable appearing.  He is speaking clearly.  He is   not on any supplemental oxygen with a resting pulse ox about 96-97%.  NECK:  His neck is supple.  HEENT:  His mouth shows no thrush.  LUNGS:  Show fine rales posteriorly, otherwise clear.  CARDIAC:  Unremarkable.  He has a fistula in his left arm.  EXTREMITIES:  Show no edema and no rash.     LABORATORY DATA:  I reviewed his chest x-ray, which looks like fluid overload.    His white count was 19,000 on admission, is now down to 13,000.  He had a   negative procalcitonin.  A chest x-ray shows fluid overload.  His blood   cultures are negative and his chemistry panel was consistent with his renal   failure.  He did have a BNP over 70,000.     ASSESSMENT AND PLAN:  1.  This is an HIV infected patient, who is actually doing well on therapy.    He has undetectable viral load, even though he has a low CD4 count.  2.  Covid infection- bacterial pneumonia very unlikely.  He does not have a fever.  He does not have any consolidation on the chest x-ray and his procalcitonin was negative, so I   think we are dealing with just COVID pneumonia. The reason he most   likely needed hospital admission was he did not come into dialysis for  over a week.  He    probably became  infected with COVID 3 weeks ago and my suspicion is that the   COVID is basically resolved at present time.     RECOMMENDATIONS:  1.  Continue his HIV therapy.  2.  Discontinue the ampicillin and sulbactam.  3.  Probably he does not need to continue on the dexamethasone given how well   he is doing after dialysis.  4.  Patient can almost certainly be discharged today.  Discussed with Dr. Le        ______________________________  MD ZEKE DUMONT/DUSTIN    DD:  02/25/2024 07:45  DT:  02/25/2024 08:20    Job#:  [id number]

## 2024-02-25 NOTE — CARE PLAN
The patient is Stable - Low risk of patient condition declining or worsening    Shift Goals  Clinical Goals: Hemodynamic stability  Patient Goals: Rest  Family Goals: LEX    Problem: Pain - Standard  Goal: Alleviation of pain or a reduction in pain to the patient’s comfort goal  Outcome: Progressing     Problem: Hemodynamics  Goal: Patient's hemodynamics, fluid balance and neurologic status will be stable or improve  Outcome: Progressing     Problem: Fluid Volume  Goal: Fluid volume balance will be maintained  Outcome: Progressing     Problem: Respiratory  Goal: Patient will achieve/maintain optimum respiratory ventilation and gas exchange  Outcome: Progressing     Problem: Mechanical Ventilation  Goal: Successful weaning off mechanical ventilator, spontaneously maintains adequate gas exchange  Outcome: Progressing  Goal: Patient will be able to express needs and understand communication  Outcome: Progressing

## 2024-02-25 NOTE — PROGRESS NOTES
Bedside report received. Assumed care of patient this morning. Assessment completed      Patient is A&O x 4, pt calls for assistance appropriately  Reports 0 /10 pain  Pt is in room air, saturation 93-96%  Mobility: SBA   Voiding + oliguric  Flatus +    Plan of care reviewed with the patient. Bed is locked and in the lowest position. Call light is within reach. Patient encouraged to voice needs and concerns, all needs met at this time. Hourly rounding in place.

## 2024-02-25 NOTE — CARE PLAN
The patient is Watcher - Medium risk of patient condition declining or worsening    Shift Goals  Clinical Goals: euvolemia  Patient Goals: sleep  Family Goals: siena      Problem: Pain - Standard  Goal: Alleviation of pain or a reduction in pain to the patient’s comfort goal  Outcome: Progressing     Problem: Hemodynamics  Goal: Patient's hemodynamics, fluid balance and neurologic status will be stable or improve  Outcome: Progressing     Problem: Fluid Volume  Goal: Fluid volume balance will be maintained  Outcome: Progressing     Problem: Urinary - Renal Perfusion  Goal: Ability to achieve and maintain adequate renal perfusion and functioning will improve  Outcome: Progressing     Problem: Respiratory  Goal: Patient will achieve/maintain optimum respiratory ventilation and gas exchange  Outcome: Progressing     Problem: Mechanical Ventilation  Goal: Safe management of artificial airway and ventilation  Outcome: Progressing  Goal: Successful weaning off mechanical ventilator, spontaneously maintains adequate gas exchange  Outcome: Progressing  Goal: Patient will be able to express needs and understand communication  Outcome: Progressing     Problem: Physical Regulation  Goal: Diagnostic test results will improve  Outcome: Progressing  Goal: Signs and symptoms of infection will decrease  Outcome: Progressing     Problem: Knowledge Deficit - Standard  Goal: Patient and family/care givers will demonstrate understanding of plan of care, disease process/condition, diagnostic tests and medications  Outcome: Progressing

## 2024-02-25 NOTE — DISCHARGE SUMMARY
Discharge Summary    CHIEF COMPLAINT ON ADMISSION  Chief Complaint   Patient presents with    Respiratory Distress    Chest Pain       Reason for Admission  EMS     Admission Date  2/24/2024    CODE STATUS  Prior    HPI & HOSPITAL COURSE  This is a 58 y.o. male here with shortness of breath.   Mr. Palencia has a past medical history of end-stage renal disease undergoing dialysis as well as HIV followed by Dr. Whyte infectious disease at the [facility] as well as hypertension and cardiomyopathy that missed dialysis all of last week though was compliant with Tuesday and Thursday of this week.  Last night he developed some shortness of breath and this morning presented to the emergency room with these complaints here he is required rescue BiPAP due to respiratory failure.  His COVID swab is positive.  He has a lactic acid of 7 and a white blood cell count 19,000.  His respiratory failure appears to be combination of volume overload especially given his BNP greater than 70,000 in conjunction with COVID infection.  He will be admitted in guarded condition to the [facility].   2/25: Mr. Palencia was evaluated in the ICU.  After dialysis he was able to be taken off rescue BiPAP and now is on room air.  His procalcitonin is low at 0.27 therefore antibiotics are not warranted.  Now that he is off oxygen and Decadron will be stopped. I spoke with Dr. Whyte infectious disease his HIV doctor.  He recommends no antibiotics and no further treatment for COVID at this time.  He will be discharged home in stable condition on his home regimen.    Therefore, he is discharged in good and stable condition to home with close outpatient follow-up.    The patient recovered much more quickly than anticipated on admission.    Discharge Date  2/25    FOLLOW UP ITEMS POST DISCHARGE  Dr. Whyte at [facility]    DISCHARGE DIAGNOSES  Principal Problem:    Pneumonia due to COVID-19 virus (POA: Yes)  Active Problems:    Acute respiratory failure  with hypoxia (HCC) (POA: Yes)    Elevated lactic acid level (POA: Yes)    HIV (human immunodeficiency virus infection) (HCC) (POA: Yes)    Elevated troponin (POA: Yes)    PAD (peripheral artery disease) (HCC) (POA: Yes)    Primary hypertension (POA: Yes)    Chronic combined systolic and diastolic heart failure (HCC) (POA: Yes)    Dyslipidemia (POA: Yes)    History of CVA (cerebrovascular accident) (POA: Yes)    COVID-19 virus detected (POA: Yes)    ESRD (end stage renal disease) on dialysis (HCC) (POA: Yes)  Resolved Problems:    * No resolved hospital problems. *      FOLLOW UP  No future appointments.  Matt Whyte M.D.  [address] [place] [phone]    Schedule an appointment as soon as possible for a visit        MEDICATIONS ON DISCHARGE     Medication List        CONTINUE taking these medications        Instructions   amLODIPine 10 MG Tabs  Commonly known as: Norvasc   [place] 1 tableta por vía oral diariamente.  (Take 1 Tablet by mouth every day.)  Dose: 10 mg     aspirin 81 MG Chew chewable tablet  Commonly known as: Asa   Chew 1 Tablet every day.  Dose: 81 mg     calcitRIOL 0.25 MCG Caps  Commonly known as: Rocaltrol   Take 1 Capsule by mouth every day.  Dose: 0.25 mcg     carvedilol 25 MG Tabs  Commonly known as: Coreg   Take 1 Tablet by mouth 2 times a day with meals.  Dose: 25 mg     dolutegravir 50 MG Tabs tablet  Commonly known as: Tivicay   [place] 1 tableta por vía oral diariamente.  (Take 1 Tablet by mouth every day.)  Dose: 50 mg     Edurant 25 MG Tabs tablet  Generic drug: rilpivirine   Take 1 Tablet by mouth every morning with breakfast.  Dose: 25 mg     Juluca 50-25 MG Tabs  Generic drug: Dolutegravir-Rilpivirine   Take 1 Tablet by mouth every day at 6 PM. With food  Dose: 1 Tablet     lisinopril 20 MG Tabs  Commonly known as: Prinivil   Doctor's comments: This Rx has been ordered by a pharmacist working under a collaborative practice agreement.  Take 1 Tablet by mouth every  day.  Dose: 20 mg     nicotine 14 MG/24HR Pt24  Commonly known as: Nicotine Step 2   Doctor's comments: This prescription is transmitted by a pharmacist under the authority of a collaborative practice agreement.  Place 1 Patch on the skin every 24 hours.  Dose: 1 Patch     Renvela 800 MG Tabs tablet  Generic drug: sevelamer carbonate   Take 1,600 mg by mouth in the morning, at noon, and at bedtime. 1,600 MG = 2 TABS  Dose: 1,600 mg              Allergies  No Known Allergies    DIET  Orders Placed This Encounter   Procedures    Diet Order Diet: Renal     Standing Status:   Standing     Number of Occurrences:   1     Order Specific Question:   Diet:     Answer:   Renal [8]       ACTIVITY  As tolerated.      CONSULTATIONS  Dr. Beasley nephrology  Dr. Whyte infectious disease    PROCEDURES  Rescue BiPAP    LABORATORY  Lab Results   Component Value Date    SODIUM 135 02/25/2024    POTASSIUM 4.8 02/25/2024    CHLORIDE 92 (L) 02/25/2024    CO2 26 02/25/2024    GLUCOSE 121 (H) 02/25/2024    BUN 32 (H) 02/25/2024    CREATININE 7.02 (HH) 02/25/2024        Lab Results   Component Value Date    WBC 13.7 (H) 02/25/2024    HEMOGLOBIN 10.7 (L) 02/25/2024    HEMATOCRIT 32.5 (L) 02/25/2024    PLATELETCT 349 02/25/2024      DX-CHEST-PORTABLE (1 VIEW)   Final Result      Cardiomegaly with interstitial edema.      EC-ECHOCARDIOGRAM COMPLETE W/O CONT    (Results Pending)         Total time of the discharge process exceeds 34 minutes.

## 2024-02-25 NOTE — PROGRESS NOTES
PIV D/C tip was intact. Discharge paperwork discussed with the patient, signed copy in the chart. Pt taken to [facility] for discharge.

## 2024-02-25 NOTE — PROGRESS NOTES
Seen this morning  Full note dictated   Suspect has had COVID for 3 weeks, now admitted mostly due to fluid overload after missing dialysis and not due to CAP, COVID or HIV related infection. He has been fully vaccinated for COVID at [facility].  He feels well this morning after having dialysis last evening. Pulse ox 97% on RA.  I discontinued Unasyn, doubt he needs dexamethasone, likely OK to be discharged today

## 2024-02-26 LAB
BACTERIA UR CULT: NORMAL
SIGNIFICANT IND 70042: NORMAL
SITE SITE: NORMAL
SOURCE SOURCE: NORMAL

## 2024-03-02 ENCOUNTER — HOSPITAL ENCOUNTER (INPATIENT)
Facility: MEDICAL CENTER | Age: 59
LOS: 10 days | DRG: 280 | End: 2024-03-12
Attending: EMERGENCY MEDICINE | Admitting: INTERNAL MEDICINE
Payer: MEDICAID

## 2024-03-02 ENCOUNTER — APPOINTMENT (OUTPATIENT)
Dept: RADIOLOGY | Facility: MEDICAL CENTER | Age: 59
DRG: 280 | End: 2024-03-02
Attending: EMERGENCY MEDICINE
Payer: MEDICAID

## 2024-03-02 DIAGNOSIS — E78.2 MIXED HYPERLIPIDEMIA: ICD-10-CM

## 2024-03-02 DIAGNOSIS — N18.6 END STAGE RENAL DISEASE (HCC): ICD-10-CM

## 2024-03-02 DIAGNOSIS — J18.9 COMMUNITY ACQUIRED PNEUMONIA OF LEFT LOWER LOBE OF LUNG: ICD-10-CM

## 2024-03-02 DIAGNOSIS — J96.01 ACUTE RESPIRATORY FAILURE WITH HYPOXIA (HCC): ICD-10-CM

## 2024-03-02 DIAGNOSIS — B20 HIV INFECTION, UNSPECIFIED SYMPTOM STATUS (HCC): ICD-10-CM

## 2024-03-02 DIAGNOSIS — I16.1 HYPERTENSIVE EMERGENCY: ICD-10-CM

## 2024-03-02 DIAGNOSIS — R06.00 DYSPNEA, UNSPECIFIED TYPE: ICD-10-CM

## 2024-03-02 DIAGNOSIS — I21.4 NSTEMI (NON-ST ELEVATED MYOCARDIAL INFARCTION) (HCC): ICD-10-CM

## 2024-03-02 DIAGNOSIS — I50.43 ACUTE ON CHRONIC COMBINED SYSTOLIC (CONGESTIVE) AND DIASTOLIC (CONGESTIVE) HEART FAILURE (HCC): ICD-10-CM

## 2024-03-02 PROBLEM — D72.829 LEUKOCYTOSIS: Status: ACTIVE | Noted: 2024-03-02

## 2024-03-02 PROBLEM — J96.90 RESPIRATORY FAILURE (HCC): Status: ACTIVE | Noted: 2024-03-02

## 2024-03-02 PROBLEM — F17.200 SMOKING: Status: ACTIVE | Noted: 2024-03-02

## 2024-03-02 LAB
ALBUMIN SERPL BCP-MCNC: 3.9 G/DL (ref 3.2–4.9)
ALBUMIN/GLOB SERPL: 0.9 G/DL
ALP SERPL-CCNC: 88 U/L (ref 30–99)
ALT SERPL-CCNC: 10 U/L (ref 2–50)
ANION GAP SERPL CALC-SCNC: 27 MMOL/L (ref 7–16)
AST SERPL-CCNC: 15 U/L (ref 12–45)
BASOPHILS # BLD AUTO: 0.4 % (ref 0–1.8)
BASOPHILS # BLD: 0.05 K/UL (ref 0–0.12)
BILIRUB SERPL-MCNC: 0.4 MG/DL (ref 0.1–1.5)
BUN SERPL-MCNC: 45 MG/DL (ref 8–22)
CALCIUM ALBUM COR SERPL-MCNC: 8.8 MG/DL (ref 8.5–10.5)
CALCIUM SERPL-MCNC: 8.7 MG/DL (ref 8.5–10.5)
CHLORIDE SERPL-SCNC: 93 MMOL/L (ref 96–112)
CO2 SERPL-SCNC: 18 MMOL/L (ref 20–33)
CREAT SERPL-MCNC: 9.77 MG/DL (ref 0.5–1.4)
EKG IMPRESSION: NORMAL
EKG IMPRESSION: NORMAL
EOSINOPHIL # BLD AUTO: 0.51 K/UL (ref 0–0.51)
EOSINOPHIL NFR BLD: 4 % (ref 0–6.9)
ERYTHROCYTE [DISTWIDTH] IN BLOOD BY AUTOMATED COUNT: 57.7 FL (ref 35.9–50)
GFR SERPLBLD CREATININE-BSD FMLA CKD-EPI: 6 ML/MIN/1.73 M 2
GLOBULIN SER CALC-MCNC: 4.4 G/DL (ref 1.9–3.5)
GLUCOSE SERPL-MCNC: 222 MG/DL (ref 65–99)
HCT VFR BLD AUTO: 35.1 % (ref 42–52)
HGB BLD-MCNC: 11.7 G/DL (ref 14–18)
IMM GRANULOCYTES # BLD AUTO: 0.09 K/UL (ref 0–0.11)
IMM GRANULOCYTES NFR BLD AUTO: 0.7 % (ref 0–0.9)
LYMPHOCYTES # BLD AUTO: 3.92 K/UL (ref 1–4.8)
LYMPHOCYTES NFR BLD: 31.1 % (ref 22–41)
MCH RBC QN AUTO: 33.6 PG (ref 27–33)
MCHC RBC AUTO-ENTMCNC: 33.3 G/DL (ref 32.3–36.5)
MCV RBC AUTO: 100.9 FL (ref 81.4–97.8)
MONOCYTES # BLD AUTO: 0.81 K/UL (ref 0–0.85)
MONOCYTES NFR BLD AUTO: 6.4 % (ref 0–13.4)
NEUTROPHILS # BLD AUTO: 7.23 K/UL (ref 1.82–7.42)
NEUTROPHILS NFR BLD: 57.4 % (ref 44–72)
NRBC # BLD AUTO: 0 K/UL
NRBC BLD-RTO: 0 /100 WBC (ref 0–0.2)
PLATELET # BLD AUTO: 312 K/UL (ref 164–446)
PMV BLD AUTO: 9.8 FL (ref 9–12.9)
POTASSIUM SERPL-SCNC: 4 MMOL/L (ref 3.6–5.5)
PROT SERPL-MCNC: 8.3 G/DL (ref 6–8.2)
RBC # BLD AUTO: 3.48 M/UL (ref 4.7–6.1)
SODIUM SERPL-SCNC: 138 MMOL/L (ref 135–145)
TROPONIN T SERPL-MCNC: 356 NG/L (ref 6–19)
TROPONIN T SERPL-MCNC: 430 NG/L (ref 6–19)
WBC # BLD AUTO: 12.6 K/UL (ref 4.8–10.8)

## 2024-03-02 PROCEDURE — 84484 ASSAY OF TROPONIN QUANT: CPT | Mod: 91

## 2024-03-02 PROCEDURE — 99285 EMERGENCY DEPT VISIT HI MDM: CPT

## 2024-03-02 PROCEDURE — 99406 BEHAV CHNG SMOKING 3-10 MIN: CPT | Performed by: INTERNAL MEDICINE

## 2024-03-02 PROCEDURE — 700111 HCHG RX REV CODE 636 W/ 250 OVERRIDE (IP): Performed by: INTERNAL MEDICINE

## 2024-03-02 PROCEDURE — 93005 ELECTROCARDIOGRAM TRACING: CPT | Performed by: EMERGENCY MEDICINE

## 2024-03-02 PROCEDURE — 94660 CPAP INITIATION&MGMT: CPT

## 2024-03-02 PROCEDURE — 71045 X-RAY EXAM CHEST 1 VIEW: CPT

## 2024-03-02 PROCEDURE — 36415 COLL VENOUS BLD VENIPUNCTURE: CPT

## 2024-03-02 PROCEDURE — 96374 THER/PROPH/DIAG INJ IV PUSH: CPT

## 2024-03-02 PROCEDURE — 85025 COMPLETE CBC W/AUTO DIFF WBC: CPT

## 2024-03-02 PROCEDURE — 96372 THER/PROPH/DIAG INJ SC/IM: CPT

## 2024-03-02 PROCEDURE — 770020 HCHG ROOM/CARE - TELE (206)

## 2024-03-02 PROCEDURE — A9270 NON-COVERED ITEM OR SERVICE: HCPCS | Mod: UD | Performed by: EMERGENCY MEDICINE

## 2024-03-02 PROCEDURE — 700111 HCHG RX REV CODE 636 W/ 250 OVERRIDE (IP): Mod: JZ | Performed by: EMERGENCY MEDICINE

## 2024-03-02 PROCEDURE — 80053 COMPREHEN METABOLIC PANEL: CPT

## 2024-03-02 PROCEDURE — 700102 HCHG RX REV CODE 250 W/ 637 OVERRIDE(OP): Mod: UD | Performed by: EMERGENCY MEDICINE

## 2024-03-02 PROCEDURE — 99406 BEHAV CHNG SMOKING 3-10 MIN: CPT

## 2024-03-02 PROCEDURE — 99223 1ST HOSP IP/OBS HIGH 75: CPT | Mod: 25 | Performed by: INTERNAL MEDICINE

## 2024-03-02 PROCEDURE — 94799 UNLISTED PULMONARY SVC/PX: CPT

## 2024-03-02 PROCEDURE — 93005 ELECTROCARDIOGRAM TRACING: CPT

## 2024-03-02 RX ORDER — OXYCODONE HYDROCHLORIDE 5 MG/1
5 TABLET ORAL
Status: DISCONTINUED | OUTPATIENT
Start: 2024-03-02 | End: 2024-03-12 | Stop reason: HOSPADM

## 2024-03-02 RX ORDER — HYDROMORPHONE HYDROCHLORIDE 1 MG/ML
0.5 INJECTION, SOLUTION INTRAMUSCULAR; INTRAVENOUS; SUBCUTANEOUS
Status: DISCONTINUED | OUTPATIENT
Start: 2024-03-02 | End: 2024-03-12 | Stop reason: HOSPADM

## 2024-03-02 RX ORDER — ONDANSETRON 4 MG/1
4 TABLET, ORALLY DISINTEGRATING ORAL EVERY 4 HOURS PRN
Status: DISCONTINUED | OUTPATIENT
Start: 2024-03-02 | End: 2024-03-12 | Stop reason: HOSPADM

## 2024-03-02 RX ORDER — AMOXICILLIN 250 MG
2 CAPSULE ORAL EVERY EVENING
Status: DISCONTINUED | OUTPATIENT
Start: 2024-03-02 | End: 2024-03-12 | Stop reason: HOSPADM

## 2024-03-02 RX ORDER — OXYCODONE HYDROCHLORIDE 10 MG/1
10 TABLET ORAL
Status: DISCONTINUED | OUTPATIENT
Start: 2024-03-02 | End: 2024-03-12 | Stop reason: HOSPADM

## 2024-03-02 RX ORDER — ONDANSETRON 2 MG/ML
4 INJECTION INTRAMUSCULAR; INTRAVENOUS EVERY 4 HOURS PRN
Status: DISCONTINUED | OUTPATIENT
Start: 2024-03-02 | End: 2024-03-12 | Stop reason: HOSPADM

## 2024-03-02 RX ORDER — ACETAMINOPHEN 325 MG/1
650 TABLET ORAL EVERY 6 HOURS PRN
Status: DISCONTINUED | OUTPATIENT
Start: 2024-03-02 | End: 2024-03-12 | Stop reason: HOSPADM

## 2024-03-02 RX ORDER — PROMETHAZINE HYDROCHLORIDE 25 MG/1
12.5-25 SUPPOSITORY RECTAL EVERY 4 HOURS PRN
Status: DISCONTINUED | OUTPATIENT
Start: 2024-03-02 | End: 2024-03-12 | Stop reason: HOSPADM

## 2024-03-02 RX ORDER — LISINOPRIL 20 MG/1
20 TABLET ORAL
Status: DISCONTINUED | OUTPATIENT
Start: 2024-03-03 | End: 2024-03-12 | Stop reason: HOSPADM

## 2024-03-02 RX ORDER — NICOTINE 21 MG/24HR
21 PATCH, TRANSDERMAL 24 HOURS TRANSDERMAL
Status: DISCONTINUED | OUTPATIENT
Start: 2024-03-02 | End: 2024-03-12 | Stop reason: HOSPADM

## 2024-03-02 RX ORDER — ASPIRIN 81 MG/1
81 TABLET ORAL DAILY
Status: DISCONTINUED | OUTPATIENT
Start: 2024-03-03 | End: 2024-03-12 | Stop reason: HOSPADM

## 2024-03-02 RX ORDER — HEPARIN SODIUM 5000 [USP'U]/ML
5000 INJECTION, SOLUTION INTRAVENOUS; SUBCUTANEOUS EVERY 8 HOURS
Status: DISCONTINUED | OUTPATIENT
Start: 2024-03-02 | End: 2024-03-03

## 2024-03-02 RX ORDER — SEVELAMER CARBONATE 800 MG/1
1600 TABLET, FILM COATED ORAL
Status: DISCONTINUED | OUTPATIENT
Start: 2024-03-03 | End: 2024-03-12 | Stop reason: HOSPADM

## 2024-03-02 RX ORDER — PROMETHAZINE HYDROCHLORIDE 25 MG/1
12.5-25 TABLET ORAL EVERY 4 HOURS PRN
Status: DISCONTINUED | OUTPATIENT
Start: 2024-03-02 | End: 2024-03-12 | Stop reason: HOSPADM

## 2024-03-02 RX ORDER — HYDRALAZINE HYDROCHLORIDE 20 MG/ML
10 INJECTION INTRAMUSCULAR; INTRAVENOUS EVERY 4 HOURS PRN
Status: DISCONTINUED | OUTPATIENT
Start: 2024-03-02 | End: 2024-03-12 | Stop reason: HOSPADM

## 2024-03-02 RX ORDER — CALCITRIOL 0.25 UG/1
0.25 CAPSULE, LIQUID FILLED ORAL DAILY
Status: DISCONTINUED | OUTPATIENT
Start: 2024-03-03 | End: 2024-03-12 | Stop reason: HOSPADM

## 2024-03-02 RX ORDER — CARVEDILOL 25 MG/1
25 TABLET ORAL 2 TIMES DAILY WITH MEALS
Status: DISCONTINUED | OUTPATIENT
Start: 2024-03-03 | End: 2024-03-11

## 2024-03-02 RX ORDER — PROCHLORPERAZINE EDISYLATE 5 MG/ML
5-10 INJECTION INTRAMUSCULAR; INTRAVENOUS EVERY 4 HOURS PRN
Status: DISCONTINUED | OUTPATIENT
Start: 2024-03-02 | End: 2024-03-12 | Stop reason: HOSPADM

## 2024-03-02 RX ORDER — FUROSEMIDE 10 MG/ML
40 INJECTION INTRAMUSCULAR; INTRAVENOUS ONCE
Status: COMPLETED | OUTPATIENT
Start: 2024-03-02 | End: 2024-03-02

## 2024-03-02 RX ORDER — ASPIRIN 325 MG
325 TABLET ORAL ONCE
Status: COMPLETED | OUTPATIENT
Start: 2024-03-02 | End: 2024-03-02

## 2024-03-02 RX ORDER — POLYETHYLENE GLYCOL 3350 17 G/17G
1 POWDER, FOR SOLUTION ORAL
Status: DISCONTINUED | OUTPATIENT
Start: 2024-03-02 | End: 2024-03-12 | Stop reason: HOSPADM

## 2024-03-02 RX ORDER — AMLODIPINE BESYLATE 10 MG/1
10 TABLET ORAL
Status: DISCONTINUED | OUTPATIENT
Start: 2024-03-03 | End: 2024-03-12 | Stop reason: HOSPADM

## 2024-03-02 RX ADMIN — HEPARIN SODIUM 5000 UNITS: 5000 INJECTION, SOLUTION INTRAVENOUS; SUBCUTANEOUS at 21:44

## 2024-03-02 RX ADMIN — FUROSEMIDE 40 MG: 10 INJECTION INTRAMUSCULAR; INTRAVENOUS at 18:36

## 2024-03-02 RX ADMIN — ASPIRIN 325 MG: 325 TABLET ORAL at 19:06

## 2024-03-02 ASSESSMENT — ENCOUNTER SYMPTOMS
WEIGHT LOSS: 0
COUGH: 0
HEADACHES: 0
FOCAL WEAKNESS: 0
SHORTNESS OF BREATH: 1
BRUISES/BLEEDS EASILY: 0
BACK PAIN: 0
NAUSEA: 0
PHOTOPHOBIA: 0
NECK PAIN: 0
SPEECH CHANGE: 0
PALPITATIONS: 0
HEARTBURN: 0
NERVOUS/ANXIOUS: 0
CHILLS: 0
SPUTUM PRODUCTION: 0
TREMORS: 0
DOUBLE VISION: 0
BLURRED VISION: 0
POLYDIPSIA: 0
HALLUCINATIONS: 0
VOMITING: 0
FLANK PAIN: 0
HEMOPTYSIS: 0
ORTHOPNEA: 0
FEVER: 0

## 2024-03-02 ASSESSMENT — COPD QUESTIONNAIRES
HAVE YOU SMOKED AT LEAST 100 CIGARETTES IN YOUR ENTIRE LIFE: YES
DURING THE PAST 4 WEEKS HOW MUCH DID YOU FEEL SHORT OF BREATH: SOME OF THE TIME
COPD SCREENING SCORE: 4
DO YOU EVER COUGH UP ANY MUCUS OR PHLEGM?: NO/ONLY WITH OCCASIONAL COLDS OR INFECTIONS

## 2024-03-02 ASSESSMENT — PULMONARY FUNCTION TESTS: EPAP_CMH2O: 8

## 2024-03-02 ASSESSMENT — LIFESTYLE VARIABLES: SUBSTANCE_ABUSE: 0

## 2024-03-02 ASSESSMENT — FIBROSIS 4 INDEX: FIB4 SCORE: 1.89

## 2024-03-03 ENCOUNTER — APPOINTMENT (OUTPATIENT)
Dept: RADIOLOGY | Facility: MEDICAL CENTER | Age: 59
DRG: 280 | End: 2024-03-03
Payer: MEDICAID

## 2024-03-03 LAB
ALBUMIN SERPL BCP-MCNC: 3.5 G/DL (ref 3.2–4.9)
ALBUMIN/GLOB SERPL: 0.9 G/DL
ALP SERPL-CCNC: 76 U/L (ref 30–99)
ALT SERPL-CCNC: 9 U/L (ref 2–50)
ANION GAP SERPL CALC-SCNC: 20 MMOL/L (ref 7–16)
AST SERPL-CCNC: 22 U/L (ref 12–45)
BASOPHILS # BLD AUTO: 0.4 % (ref 0–1.8)
BASOPHILS # BLD: 0.04 K/UL (ref 0–0.12)
BILIRUB SERPL-MCNC: 0.3 MG/DL (ref 0.1–1.5)
BUN SERPL-MCNC: 59 MG/DL (ref 8–22)
CALCIUM ALBUM COR SERPL-MCNC: 8.7 MG/DL (ref 8.5–10.5)
CALCIUM SERPL-MCNC: 8.3 MG/DL (ref 8.5–10.5)
CHLORIDE SERPL-SCNC: 94 MMOL/L (ref 96–112)
CO2 SERPL-SCNC: 22 MMOL/L (ref 20–33)
CREAT SERPL-MCNC: 11.13 MG/DL (ref 0.5–1.4)
EKG IMPRESSION: NORMAL
EKG IMPRESSION: NORMAL
EOSINOPHIL # BLD AUTO: 0.34 K/UL (ref 0–0.51)
EOSINOPHIL NFR BLD: 3.4 % (ref 0–6.9)
ERYTHROCYTE [DISTWIDTH] IN BLOOD BY AUTOMATED COUNT: 55.9 FL (ref 35.9–50)
GFR SERPLBLD CREATININE-BSD FMLA CKD-EPI: 5 ML/MIN/1.73 M 2
GLOBULIN SER CALC-MCNC: 3.9 G/DL (ref 1.9–3.5)
GLUCOSE SERPL-MCNC: 104 MG/DL (ref 65–99)
HAV IGM SERPL QL IA: NORMAL
HBV CORE IGM SER QL: NORMAL
HBV SURFACE AB SERPL IA-ACNC: 5318 MIU/ML (ref 0–10)
HBV SURFACE AG SER QL: NORMAL
HCT VFR BLD AUTO: 28.7 % (ref 42–52)
HCV AB SER QL: NORMAL
HGB BLD-MCNC: 9.6 G/DL (ref 14–18)
IMM GRANULOCYTES # BLD AUTO: 0.06 K/UL (ref 0–0.11)
IMM GRANULOCYTES NFR BLD AUTO: 0.6 % (ref 0–0.9)
LYMPHOCYTES # BLD AUTO: 2.3 K/UL (ref 1–4.8)
LYMPHOCYTES NFR BLD: 22.9 % (ref 22–41)
MCH RBC QN AUTO: 33.3 PG (ref 27–33)
MCHC RBC AUTO-ENTMCNC: 33.4 G/DL (ref 32.3–36.5)
MCV RBC AUTO: 99.7 FL (ref 81.4–97.8)
MONOCYTES # BLD AUTO: 0.69 K/UL (ref 0–0.85)
MONOCYTES NFR BLD AUTO: 6.9 % (ref 0–13.4)
NEUTROPHILS # BLD AUTO: 6.6 K/UL (ref 1.82–7.42)
NEUTROPHILS NFR BLD: 65.8 % (ref 44–72)
NRBC # BLD AUTO: 0 K/UL
NRBC BLD-RTO: 0 /100 WBC (ref 0–0.2)
PLATELET # BLD AUTO: 261 K/UL (ref 164–446)
PMV BLD AUTO: 10 FL (ref 9–12.9)
POTASSIUM SERPL-SCNC: 4.2 MMOL/L (ref 3.6–5.5)
POTASSIUM SERPL-SCNC: 4.8 MMOL/L (ref 3.6–5.5)
PROT SERPL-MCNC: 7.4 G/DL (ref 6–8.2)
RBC # BLD AUTO: 2.88 M/UL (ref 4.7–6.1)
SODIUM SERPL-SCNC: 136 MMOL/L (ref 135–145)
TROPONIN T SERPL-MCNC: 842 NG/L (ref 6–19)
WBC # BLD AUTO: 10 K/UL (ref 4.8–10.8)

## 2024-03-03 PROCEDURE — 700102 HCHG RX REV CODE 250 W/ 637 OVERRIDE(OP): Performed by: INTERNAL MEDICINE

## 2024-03-03 PROCEDURE — 770020 HCHG ROOM/CARE - TELE (206)

## 2024-03-03 PROCEDURE — 5A1D70Z PERFORMANCE OF URINARY FILTRATION, INTERMITTENT, LESS THAN 6 HOURS PER DAY: ICD-10-PCS | Performed by: INTERNAL MEDICINE

## 2024-03-03 PROCEDURE — 700111 HCHG RX REV CODE 636 W/ 250 OVERRIDE (IP): Mod: JZ | Performed by: STUDENT IN AN ORGANIZED HEALTH CARE EDUCATION/TRAINING PROGRAM

## 2024-03-03 PROCEDURE — 71045 X-RAY EXAM CHEST 1 VIEW: CPT

## 2024-03-03 PROCEDURE — 93005 ELECTROCARDIOGRAM TRACING: CPT | Performed by: INTERNAL MEDICINE

## 2024-03-03 PROCEDURE — 36415 COLL VENOUS BLD VENIPUNCTURE: CPT

## 2024-03-03 PROCEDURE — 84484 ASSAY OF TROPONIN QUANT: CPT

## 2024-03-03 PROCEDURE — 93010 ELECTROCARDIOGRAM REPORT: CPT | Performed by: INTERNAL MEDICINE

## 2024-03-03 PROCEDURE — 99232 SBSQ HOSP IP/OBS MODERATE 35: CPT | Performed by: STUDENT IN AN ORGANIZED HEALTH CARE EDUCATION/TRAINING PROGRAM

## 2024-03-03 PROCEDURE — 86706 HEP B SURFACE ANTIBODY: CPT

## 2024-03-03 PROCEDURE — 93005 ELECTROCARDIOGRAM TRACING: CPT | Performed by: STUDENT IN AN ORGANIZED HEALTH CARE EDUCATION/TRAINING PROGRAM

## 2024-03-03 PROCEDURE — 84132 ASSAY OF SERUM POTASSIUM: CPT

## 2024-03-03 PROCEDURE — 700111 HCHG RX REV CODE 636 W/ 250 OVERRIDE (IP): Performed by: INTERNAL MEDICINE

## 2024-03-03 PROCEDURE — 90935 HEMODIALYSIS ONE EVALUATION: CPT

## 2024-03-03 PROCEDURE — 85025 COMPLETE CBC W/AUTO DIFF WBC: CPT

## 2024-03-03 PROCEDURE — 80053 COMPREHEN METABOLIC PANEL: CPT

## 2024-03-03 PROCEDURE — 99255 IP/OBS CONSLTJ NEW/EST HI 80: CPT | Performed by: INTERNAL MEDICINE

## 2024-03-03 PROCEDURE — 80074 ACUTE HEPATITIS PANEL: CPT

## 2024-03-03 PROCEDURE — A9270 NON-COVERED ITEM OR SERVICE: HCPCS | Performed by: INTERNAL MEDICINE

## 2024-03-03 RX ADMIN — LISINOPRIL 20 MG: 20 TABLET ORAL at 06:07

## 2024-03-03 RX ADMIN — FAMOTIDINE 20 MG: 10 INJECTION, SOLUTION INTRAVENOUS at 17:56

## 2024-03-03 RX ADMIN — CARVEDILOL 25 MG: 25 TABLET, FILM COATED ORAL at 16:51

## 2024-03-03 RX ADMIN — AMLODIPINE BESYLATE 10 MG: 10 TABLET ORAL at 06:07

## 2024-03-03 RX ADMIN — HYDROMORPHONE HYDROCHLORIDE 0.5 MG: 1 INJECTION, SOLUTION INTRAMUSCULAR; INTRAVENOUS; SUBCUTANEOUS at 05:50

## 2024-03-03 RX ADMIN — ASPIRIN 81 MG: 81 TABLET, COATED ORAL at 06:07

## 2024-03-03 RX ADMIN — HEPARIN SODIUM 5000 UNITS: 5000 INJECTION, SOLUTION INTRAVENOUS; SUBCUTANEOUS at 06:08

## 2024-03-03 RX ADMIN — CARVEDILOL 25 MG: 25 TABLET, FILM COATED ORAL at 07:57

## 2024-03-03 RX ADMIN — HYDROMORPHONE HYDROCHLORIDE 0.5 MG: 1 INJECTION, SOLUTION INTRAMUSCULAR; INTRAVENOUS; SUBCUTANEOUS at 23:19

## 2024-03-03 RX ADMIN — HEPARIN SODIUM 5000 UNITS: 5000 INJECTION, SOLUTION INTRAVENOUS; SUBCUTANEOUS at 20:47

## 2024-03-03 RX ADMIN — CALCITRIOL CAPSULES 0.25 MCG 0.25 MCG: 0.25 CAPSULE ORAL at 06:08

## 2024-03-03 RX ADMIN — HEPARIN SODIUM 5000 UNITS: 5000 INJECTION, SOLUTION INTRAVENOUS; SUBCUTANEOUS at 16:52

## 2024-03-03 RX ADMIN — RILPIVIRINE HYDROCHLORIDE 25 MG: 25 TABLET, FILM COATED ORAL at 07:57

## 2024-03-03 RX ADMIN — DOLUTEGRAVIR SODIUM 50 MG: 50 TABLET, FILM COATED ORAL at 06:07

## 2024-03-03 RX ADMIN — SEVELAMER CARBONATE 1600 MG: 800 TABLET, FILM COATED ORAL at 16:51

## 2024-03-03 RX ADMIN — SEVELAMER CARBONATE 1600 MG: 800 TABLET, FILM COATED ORAL at 07:57

## 2024-03-03 RX ADMIN — OXYCODONE HYDROCHLORIDE 10 MG: 10 TABLET ORAL at 23:16

## 2024-03-03 RX ADMIN — OXYCODONE HYDROCHLORIDE 10 MG: 10 TABLET ORAL at 16:51

## 2024-03-03 ASSESSMENT — COGNITIVE AND FUNCTIONAL STATUS - GENERAL
DRESSING REGULAR LOWER BODY CLOTHING: A LITTLE
DAILY ACTIVITIY SCORE: 20
SUGGESTED CMS G CODE MODIFIER DAILY ACTIVITY: CJ
DRESSING REGULAR UPPER BODY CLOTHING: A LITTLE
SUGGESTED CMS G CODE MODIFIER MOBILITY: CK
MOBILITY SCORE: 19
MOVING TO AND FROM BED TO CHAIR: A LITTLE
HELP NEEDED FOR BATHING: A LITTLE
TOILETING: A LITTLE
MOVING FROM LYING ON BACK TO SITTING ON SIDE OF FLAT BED: A LITTLE
CLIMB 3 TO 5 STEPS WITH RAILING: A LITTLE
STANDING UP FROM CHAIR USING ARMS: A LITTLE
WALKING IN HOSPITAL ROOM: A LITTLE

## 2024-03-03 ASSESSMENT — ENCOUNTER SYMPTOMS
HEADACHES: 0
VOMITING: 0
BACK PAIN: 0
NAUSEA: 0
EYE PAIN: 0
FEVER: 0
BLURRED VISION: 0
SHORTNESS OF BREATH: 0
COUGH: 0
PALPITATIONS: 0
INSOMNIA: 0
DIZZINESS: 0
CHILLS: 0
ABDOMINAL PAIN: 0

## 2024-03-03 ASSESSMENT — LIFESTYLE VARIABLES
HAVE YOU EVER FELT YOU SHOULD CUT DOWN ON YOUR DRINKING: NO
CONSUMPTION TOTAL: NEGATIVE
TOTAL SCORE: 0
EVER FELT BAD OR GUILTY ABOUT YOUR DRINKING: NO
TOTAL SCORE: 0
EVER HAD A DRINK FIRST THING IN THE MORNING TO STEADY YOUR NERVES TO GET RID OF A HANGOVER: NO
ON A TYPICAL DAY WHEN YOU DRINK ALCOHOL HOW MANY DRINKS DO YOU HAVE: 1
TOTAL SCORE: 0
HOW MANY TIMES IN THE PAST YEAR HAVE YOU HAD 5 OR MORE DRINKS IN A DAY: 0
AVERAGE NUMBER OF DAYS PER WEEK YOU HAVE A DRINK CONTAINING ALCOHOL: 0
SUBSTANCE_ABUSE: 0
HAVE PEOPLE ANNOYED YOU BY CRITICIZING YOUR DRINKING: NO
ALCOHOL_USE: NO

## 2024-03-03 ASSESSMENT — FIBROSIS 4 INDEX
FIB4 SCORE: 0.9
FIB4 SCORE: 0.9

## 2024-03-03 NOTE — H&P
Hospital Medicine History & Physical Note    Date of Service  3/2/2024    Primary Care Physician  Pcp Pt States None      Code Status  Full Code    Chief Complaint  Chief Complaint   Patient presents with    Shortness of Breath     Chest pain & shortness of breath started at 1720. Missed dialysis today - last was on Thursday. Sats 80% on room air, given 1 inch of nitro paste by [facility], decreased BP from 200/100 to 145/84. Arrives on CPAP.       History of Presenting Illness  Bob Ochoa is a 58 y.o. male with past medical history of end-stage renal disease on hemodialysis, peripheral arterial disease, cardiomyopathy with EF 40%, severe aortic stenosis, diastolic dysfunction grade 2, HIV on antiretroviral medications, recent COVID-19 infection, who presented 3/2/2024 with respiratory distress, severe chest pain that started at 5 PM.  He stated that he missed dialysis with his last hemodialysis on Thursday.  Upon EMS arrival his blood pressure 200/100 and desaturation to 80% on room air, placed on CPAP.  In ED his condition improved with CPAP and nitroglycerin ointment.  He also received IV Lasix but did not urinate.  He states that typically he has urine output.  Chest x-ray showed interstitial edema.  Troponin is elevated 356, 413.  EKG showed nonspecific T/ST changes with possible ischemia in inferior leads, sinus tachycardia.  Per chart review he was admitted here 2/24 to 2/25 with COVID-19 pneumonia and shortness of breath requiring BiPAP in IMCU.  He denies currently cough or fever.    ERP is in the process of contacting nephrology for emergent hemodialysis.  Last blood pressure 100/61.    I discussed the plan of care with patient and ERP .    Review of Systems  Review of Systems   Constitutional:  Negative for chills, fever and weight loss.   HENT:  Negative for ear pain, hearing loss and tinnitus.    Eyes:  Negative for blurred vision, double vision and photophobia.   Respiratory:  Positive for shortness  of breath. Negative for cough, hemoptysis and sputum production.    Cardiovascular:  Positive for chest pain. Negative for palpitations and orthopnea.   Gastrointestinal:  Negative for heartburn, nausea and vomiting.   Genitourinary:  Negative for dysuria, flank pain, frequency and hematuria.   Musculoskeletal:  Negative for back pain, joint pain and neck pain.   Skin:  Negative for itching and rash.   Neurological:  Negative for tremors, speech change, focal weakness and headaches.   Endo/Heme/Allergies:  Negative for environmental allergies and polydipsia. Does not bruise/bleed easily.   Psychiatric/Behavioral:  Negative for hallucinations and substance abuse. The patient is not nervous/anxious.        Past Medical History   has no past medical history on file.    Surgical History   has no past surgical history on file.     Family History  family history is not on file.   Family history reviewed with patient. There is no family history that is pertinent to the chief complaint.     Social History       Allergies  No Known Allergies    Medications  None       Physical Exam  Temp:  [36.7 °C (98 °F)] 36.7 °C (98 °F)  Pulse:  [] 88  Resp:  [17-25] 24  BP: (134-153)/(72-82) 134/72  SpO2:  [97 %-100 %] 100 %  Blood Pressure: 134/72   Temperature: 36.7 °C (98 °F)   Pulse: 88   Respiration: (!) 24   Pulse Oximetry: 100 %       Physical Exam  Vitals and nursing note reviewed.   Constitutional:       General: He is not in acute distress.     Appearance: Normal appearance.   HENT:      Head: Normocephalic and atraumatic.      Nose: Nose normal.      Mouth/Throat:      Mouth: Mucous membranes are moist.   Eyes:      Extraocular Movements: Extraocular movements intact.      Pupils: Pupils are equal, round, and reactive to light.   Cardiovascular:      Rate and Rhythm: Normal rate and regular rhythm.   Pulmonary:      Effort: Pulmonary effort is normal.      Breath sounds: Decreased breath sounds present.   Abdominal:       "General: Abdomen is flat. There is no distension.      Tenderness: There is no abdominal tenderness.   Musculoskeletal:         General: No swelling or deformity. Normal range of motion.      Cervical back: Normal range of motion and neck supple.   Skin:     General: Skin is warm and dry.   Neurological:      General: No focal deficit present.      Mental Status: He is alert and oriented to person, place, and time.   Psychiatric:         Mood and Affect: Mood normal.         Behavior: Behavior normal.         Laboratory:  Recent Labs     03/02/24  1810   WBC 12.6*   RBC 3.48*   HEMOGLOBIN 11.7*   HEMATOCRIT 35.1*   .9*   MCH 33.6*   MCHC 33.3   RDW 57.7*   PLATELETCT 312   MPV 9.8     Recent Labs     03/02/24  1810   SODIUM 138   POTASSIUM 4.0   CHLORIDE 93*   CO2 18*   GLUCOSE 222*   BUN 45*   CREATININE 9.77*   CALCIUM 8.7     Recent Labs     03/02/24  1810   ALTSGPT 10   ASTSGOT 15   ALKPHOSPHAT 88   TBILIRUBIN 0.4   GLUCOSE 222*         No results for input(s): \"NTPROBNP\" in the last 72 hours.      Recent Labs     03/02/24  1810 03/02/24  1934   TROPONINT 356* 430*       Imaging:  DX-CHEST-PORTABLE (1 VIEW)   Final Result      1.  Diffuse ill-defined interstitial opacities are most consistent with interstitial edema with viral pneumonitis in the differential diagnosis.          X-Ray:  I have personally reviewed the images and compared with prior images.    Assessment/Plan:  Justification for Admission Status  I anticipate this patient will require at least two midnights for appropriate medical management, necessitating inpatient admission because acute respiratory failure with hypoxia    Patient will need a Telemetry bed on MEDICAL service .  The need is secondary to acute respiratory failure with hypoxia.    * Acute respiratory failure with hypoxia- (present on admission)  Assessment & Plan  58-year-old male on dialysis for end-stage renal disease and combined systolic and diastolic heart failure and " severe aortic stenosis presented with acute respiratory distress after missed hemodialysis, hypertensive emergency.  Condition improved after correction of hypertensive crisis with nitroglycerin, Lasix and CPAP  Chest x-ray: Bilateral interstitial edema.  ERP is contacting nephrology for hemodialysis  Plan to admit to telemetry, monitor respiratory status and heart rate moderate      Smoking  Assessment & Plan  Spent approx 5 mins on Tobacco cessation education . Discussed options of nicotine patch, medical treatment with wellbutrin and chantix. Discussed the benefits of quitting smoking and risks of continued smoking including cardiovascular disease, cancer and COPD.   Code 98396      Leukocytosis  Assessment & Plan  WBC 12.6  Possibly reactive  Will check procalcitonin    Acute on chronic combined systolic (congestive) and diastolic (congestive) heart failure (HCC)  Assessment & Plan  Secondary to volume overload missed hemodialysis and uncontrolled blood pressure  Resume hemodialysis and blood pressure medications  Monitor input and output and daily weight    NSTEMI (non-ST elevated myocardial infarction) ([facility])  Assessment & Plan  Type II NSTEMI secondary to hypertensive emergency and volume overload in the setting of severe aortic stenosis  Dialysis per nephrology  Continue carvedilol, lisinopril, aspirin  Monitor on telemetry, repeat troponin      HIV (human immunodeficiency virus infection) ([facility])  Assessment & Plan  Continue home regimen  Follow-up with Dr. Whyte    Hypertensive emergency  Assessment & Plan  Resolved  Resume home blood pressure medications lisinopril, carvedilol, amlodipine  Monitor blood pressure  IV labetalol as needed        VTE prophylaxis: heparin ppx    Time: 77 minutes

## 2024-03-03 NOTE — PROGRESS NOTES
[facility] Progress Note      HD today x 3 hours per Dr. Dwyer.   Initiated at 1102 and ended at 1402.       Assessment:   Patient alert and oriented x 4. Denies pain, no SOB. Consent signed by patient.    Access used: LISA AVF with bruit and thrill  Needle gauge used: 15 g sharp needle.      Net Fluid Removed: 500 mL      Procedure Events:  Patient had low BP prior to start of HD. BP on 75-80's systolic. Notified Dr. Dwyer  Slowly, BP trending up. UF on and UF goal updated. Patient tolerated treatment.         Post Access Care:   Blood returned. Gauze applied and held for  10 minutes.   Bruit and thrill present post dialysis   Dressing applied and taped secured.  No bleeding      Report given to Primary APRIL Holm RN.   Statement Selected

## 2024-03-03 NOTE — ED NOTES
Bedside report received from off going RN/tech: Stephie REINA RN, assumed care of patient.  POC discussed with patient. Call light within reach, all needs addressed at this time.       Fall risk interventions in place: Move the patient closer to the nurse's station, Patient's personal possessions are with in their safe reach, and Keep floor surfaces clean and dry (all applicable per [place] Fall risk assessment)   Continuous monitoring: Cardiac Leads, Pulse Ox, or Blood Pressure  IVF/IV medications: Not Applicable   Oxygen: How many liters 2L NC  Bedside sitter: Not Applicable   Isolation: Not Applicable    Pt sitting up in bed, in no apparent distress. AAOx4.

## 2024-03-03 NOTE — ASSESSMENT & PLAN NOTE
58-year-old male on dialysis for end-stage renal disease and combined systolic and diastolic heart failure and severe aortic stenosis presented with acute respiratory distress after missed hemodialysis, hypertensive emergency.  Condition improved after correction of hypertensive crisis with nitroglycerin, Lasix and CPAP  Chest x-ray: Bilateral interstitial edema.  S/p dialysis, with symptom  some improvement  Incentive spirometry  Check procalcitonin  Home O2 evaluation in a.m.

## 2024-03-03 NOTE — PROGRESS NOTES
4 Eyes Skin Assessment Completed by NAMRATA Herrera and NAMRATA Lee.    Head WDL  Ears WDL  Nose WDL  Mouth WDL  Neck WDL  Breast/Chest WDL  Shoulder Blades WDL  Spine WDL  (R) Arm/Elbow/Hand WDL  (L) Arm/Elbow/Hand WDL, upper arm fistula  Abdomen WDL, old incision scar  Groin WDL  Scrotum/Coccyx/Buttocks WDL  (R) Leg WDL  (L) Leg WDL  (R) Heel/Foot/Toe Redness and Blanching  (L) Heel/Foot/Toe Redness and Blanching          Devices In Places Tele Box, Pulse Ox, and Nasal Cannula      Interventions In Place NC W/Ear Foams, Pillows, and Pressure Redistribution Mattress    Possible Skin Injury No    Pictures Uploaded Into Epic N/A  Wound Consult Placed N/A  RN Wound Prevention Protocol Ordered No

## 2024-03-03 NOTE — PROGRESS NOTES
Hospital Medicine Daily Progress Note    Date of Service  3/3/2024    Chief Complaint  Bob Ochoa is a 58 y.o. male admitted 3/2/2024 with dyspnea.    Hospital Course  Bob Ochoa is a 58 y.o. male with past medical history of end-stage renal disease on hemodialysis, peripheral arterial disease, cardiomyopathy with EF 40%, severe aortic stenosis, diastolic dysfunction grade 2, HIV on antiretroviral medications, recent COVID-19 infection, who presented 3/2/2024 with respiratory distress, severe chest pain that started at 5 PM.  He stated that he missed dialysis with his last hemodialysis on Thursday.  Upon EMS arrival his blood pressure 200/100 and desaturation to 80% on room air, placed on CPAP.  In ED his condition improved with CPAP and nitroglycerin ointment.  He also received IV Lasix but did not urinate.  He states that typically he has urine output.  Chest x-ray showed interstitial edema.  Troponin is elevated 356, 413.  EKG showed nonspecific T/ST changes with possible ischemia in inferior leads, sinus tachycardia.  Per chart review he was admitted here 2/24 to 2/25 with COVID-19 pneumonia and shortness of breath requiring BiPAP in IMCU.  He denies currently cough or fever.    ERP is in the process of contacting nephrology for emergent hemodialysis.  Last blood pressure 100/61.    Interval Problem Update  No acute events overnight.  Patient reports his chest pain and dyspnea have resolved since getting some medication this morning.  He tolerated dialysis well today, UF 500cc.  On 6L oxygen, wean off as tolerated. Baseline room air.  Patient reports compliance with home medications.  Continue to monitor.  Dialysis per nephrology team.  On fluid restriction given he is anuric.  EKG prn for chest pain.  Possible discharge home tomorrow if remains clinically stable.      I have discussed this patient's plan of care and discharge plan at IDT rounds today with Case Management, Nursing, Nursing  leadership, and other members of the IDT team.    Consultants/Specialty  nephrology    Code Status  Full Code    Disposition  Medically Cleared  I have placed the appropriate orders for post-discharge needs.    Review of Systems  Review of Systems   Constitutional:  Negative for chills and fever.   Eyes:  Negative for blurred vision and pain.   Respiratory:  Negative for cough and shortness of breath.    Cardiovascular:  Negative for chest pain, palpitations and leg swelling.   Gastrointestinal:  Negative for abdominal pain, nausea and vomiting.   Genitourinary:  Negative for dysuria and urgency.   Musculoskeletal:  Negative for back pain.   Skin:  Negative for itching and rash.   Neurological:  Negative for dizziness and headaches.   Psychiatric/Behavioral:  Negative for substance abuse. The patient does not have insomnia.         Physical Exam  Temp:  [36.3 °C (97.3 °F)-36.7 °C (98.1 °F)] 36.4 °C (97.6 °F)  Pulse:  [] 81  Resp:  [16-25] 18  BP: ()/(55-87) 103/62  SpO2:  [90 %-100 %] 100 %    Physical Exam  Constitutional:       General: He is not in acute distress.     Appearance: He is not ill-appearing.   HENT:      Head: Normocephalic and atraumatic.      Right Ear: External ear normal.      Left Ear: External ear normal.      Mouth/Throat:      Pharynx: No oropharyngeal exudate or posterior oropharyngeal erythema.   Eyes:      Extraocular Movements: Extraocular movements intact.      Pupils: Pupils are equal, round, and reactive to light.   Cardiovascular:      Rate and Rhythm: Normal rate and regular rhythm.      Pulses: Normal pulses.      Heart sounds: Normal heart sounds.   Pulmonary:      Effort: Pulmonary effort is normal. No respiratory distress.      Breath sounds: Normal breath sounds. No wheezing.   Abdominal:      General: Bowel sounds are normal. There is distension.      Palpations: Abdomen is soft.      Tenderness: There is no abdominal tenderness.   Musculoskeletal:         General:  No swelling or tenderness.      Cervical back: Normal range of motion and neck supple.      Right lower leg: No edema.      Left lower leg: No edema.   Skin:     General: Skin is warm and dry.   Neurological:      General: No focal deficit present.      Mental Status: He is oriented to person, place, and time.      Cranial Nerves: No cranial nerve deficit.      Motor: No weakness.   Psychiatric:         Mood and Affect: Mood normal.         Behavior: Behavior normal.         Fluids    Intake/Output Summary (Last 24 hours) at 3/3/2024 1518  Last data filed at 3/3/2024 1402  Gross per 24 hour   Intake 500 ml   Output 1000 ml   Net -500 ml       Laboratory  Recent Labs     03/02/24  1810 03/03/24  0349   WBC 12.6* 10.0   RBC 3.48* 2.88*   HEMOGLOBIN 11.7* 9.6*   HEMATOCRIT 35.1* 28.7*   .9* 99.7*   MCH 33.6* 33.3*   MCHC 33.3 33.4   RDW 57.7* 55.9*   PLATELETCT 312 261   MPV 9.8 10.0     Recent Labs     03/02/24 1810 03/03/24  0349 03/03/24  0949   SODIUM 138 136  --    POTASSIUM 4.0 4.2 4.8   CHLORIDE 93* 94*  --    CO2 18* 22  --    GLUCOSE 222* 104*  --    BUN 45* 59*  --    CREATININE 9.77* 11.13*  --    CALCIUM 8.7 8.3*  --                    Imaging  DX-CHEST-PORTABLE (1 VIEW)   Final Result      1.  Diffuse ill-defined interstitial opacities are most consistent with interstitial edema with viral pneumonitis in the differential diagnosis.           Assessment/Plan  * Acute respiratory failure with hypoxia- (present on admission)  Assessment & Plan  58-year-old male on dialysis for end-stage renal disease and combined systolic and diastolic heart failure and severe aortic stenosis presented with acute respiratory distress after missed hemodialysis, hypertensive emergency.  Condition improved after correction of hypertensive crisis with nitroglycerin, Lasix and CPAP  Chest x-ray: Bilateral interstitial edema.  S/p dialysis, with symptom improvement  Weaning oxygen      Smoking  Assessment & Plan  Spent  approx 5 mins on Tobacco cessation education . Discussed options of nicotine patch, medical treatment with wellbutrin and chantix. Discussed the benefits of quitting smoking and risks of continued smoking including cardiovascular disease, cancer and COPD.   Code 96877      Leukocytosis  Assessment & Plan  WBC 12.6  Possibly reactive  Will check procalcitonin    Acute on chronic combined systolic (congestive) and diastolic (congestive) heart failure ([facility])  Assessment & Plan  Secondary to volume overload missed hemodialysis and uncontrolled blood pressure  Resume hemodialysis and blood pressure medications  Monitor input and output and daily weight    NSTEMI (non-ST elevated myocardial infarction) ([facility])  Assessment & Plan  Type II NSTEMI secondary to hypertensive emergency and volume overload in the setting of severe aortic stenosis  Dialysis per nephrology  Continue carvedilol, lisinopril, aspirin  Monitor on telemetry, repeat troponin      HIV (human immunodeficiency virus infection) ([facility])  Assessment & Plan  Continue home regimen  Follow-up with Dr. Whyte    Hypertensive emergency  Assessment & Plan  Resolved  Resume home blood pressure medications lisinopril, carvedilol, amlodipine  Monitor blood pressure  IV labetalol as needed         VTE prophylaxis: heparin ppx

## 2024-03-03 NOTE — ASSESSMENT & PLAN NOTE
Telemetry monitoring  O2 2L per nasal cannula to keep saturation more than 92%  On aspirin, Coreg, lisinopril  Lipitor 40 mg HS  Cardiac cath on 3/7 revealed severe multivessel CAD with severe aortic stenosis.  Cardiothoracic surgeon to evaluate for aortic valve repair and CABG.   \CTS evaluated and felt he is high risk for CABG given multiple comorbidities .   Patient continued to have intermittent anginal chest pain and shortness of breath .  Cardiology deciding for possible intervention.

## 2024-03-03 NOTE — CARE PLAN
The patient is Stable - Low risk of patient condition declining or worsening    Shift Goals  Clinical Goals: Monitor cardiac/respiratory status  Patient Goals: Maintain comfort    Progress made toward(s) clinical / shift goals:  Progressing     Problem: Knowledge Deficit - Standard  Goal: Patient and family/care givers will demonstrate understanding of plan of care, disease process/condition, diagnostic tests and medications  3/3/2024 0151 by Javier Hernández, R.N.  Outcome: Progressing  3/3/2024 0151 by Javier Hernández, R.N.  Note: Pt verbalizes understanding of plan of care

## 2024-03-03 NOTE — ED NOTES
Pt given sandwich + 1 glass of water. Has not voided since lasix. Pt reports no chest pain, respirations unlabored. On 1L by nasal cannula.

## 2024-03-03 NOTE — ED PROVIDER NOTES
"ED Provider Note    CHIEF COMPLAINT  Chief Complaint   Patient presents with    Shortness of Breath     Chest pain & shortness of breath started at 1720. Missed dialysis today - last was on Thursday. Sats 80% on room air, given 1 inch of nitro paste by BRETT, decreased BP from 200/100 to 145/84. Arrives on CPAP.       HPI/ROS  LIMITATION TO HISTORY   Select: Language iPad,  Used       Bob Ochoa is a 89 y.o. adult who presents in acute distress.  According to EMS they received a call as the patient was in acute respiratory distress.  In speaking the patient to the translating line he did not have a ride to dialysis today.  He states that he started developing severe chest pain as well as shortness of breath.  Does not have any edema to his lower extremities.  He is unaware of any ischemic heart disease and states he does take a daily aspirin he did have aspirin today.  He has not had any associated fevers.  Despite EMS he does not have a headache.  He has not had any rhinorrhea.    PAST MEDICAL HISTORY       SURGICAL HISTORY  patient denies any surgical history    FAMILY HISTORY  No family history on file.    SOCIAL HISTORY  Social History     Tobacco Use    Smoking status: Not on file    Smokeless tobacco: Not on file   Substance and Sexual Activity    Alcohol use: Not on file    Drug use: Not on file    Sexual activity: Not on file       CURRENT MEDICATIONS  Home Medications    **Home medications have not yet been reviewed for this encounter**         ALLERGIES  No Known Allergies    PHYSICAL EXAM  VITAL SIGNS: /82   Pulse 109   Resp 23   Ht 1.6 m (5' 3\")   Wt 56.7 kg (125 lb)   SpO2 97%   BMI 22.14 kg/m²    General the patient appears ill    HEENT unremarkable    Pulmonary the patient's lungs are symmetrically diminished throughout with diffuse rhonchi    Cardiovascular S1-S2 with a slightly tachycardic rate    GI abdomen soft    Skin no rashes, pallor, no jaundice    Extremities no " distal edema    Neurologic examination is grossly intact    DIAGNOSTIC STUDIES  Results for orders placed or performed during the hospital encounter of 03/02/24   CBC with Differential   Result Value Ref Range    WBC 12.6 (H) 4.8 - 10.8 K/uL    RBC 3.48 (L) 4.70 - 6.10 M/uL    Hemoglobin 11.7 (L) 14.0 - 18.0 g/dL    Hematocrit 35.1 (L) 42.0 - 52.0 %    .9 (H) 81.4 - 97.8 fL    MCH 33.6 (H) 27.0 - 33.0 pg    MCHC 33.3 32.3 - 36.5 g/dL    RDW 57.7 (H) 35.9 - 50.0 fL    Platelet Count 312 164 - 446 K/uL    MPV 9.8 9.0 - 12.9 fL    Neutrophils-Polys 57.40 44.00 - 72.00 %    Lymphocytes 31.10 22.00 - 41.00 %    Monocytes 6.40 0.00 - 13.40 %    Eosinophils 4.00 0.00 - 6.90 %    Basophils 0.40 0.00 - 1.80 %    Immature Granulocytes 0.70 0.00 - 0.90 %    Nucleated RBC 0.00 0.00 - 0.20 /100 WBC    Neutrophils (Absolute) 7.23 1.82 - 7.42 K/uL    Lymphs (Absolute) 3.92 1.00 - 4.80 K/uL    Monos (Absolute) 0.81 0.00 - 0.85 K/uL    Eos (Absolute) 0.51 0.00 - 0.51 K/uL    Baso (Absolute) 0.05 0.00 - 0.12 K/uL    Immature Granulocytes (abs) 0.09 0.00 - 0.11 K/uL    NRBC (Absolute) 0.00 K/uL   Complete Metabolic Panel (CMP)   Result Value Ref Range    Sodium 138 135 - 145 mmol/L    Potassium 4.0 3.6 - 5.5 mmol/L    Chloride 93 (L) 96 - 112 mmol/L    Co2 18 (L) 20 - 33 mmol/L    Anion Gap 27.0 (H) 7.0 - 16.0    Glucose 222 (H) 65 - 99 mg/dL    Bun 45 (H) 8 - 22 mg/dL    Creatinine 9.77 (HH) 0.50 - 1.40 mg/dL    Calcium 8.7 8.5 - 10.5 mg/dL    Correct Calcium 8.8 8.5 - 10.5 mg/dL    AST(SGOT) 15 12 - 45 U/L    ALT(SGPT) 10 2 - 50 U/L    Alkaline Phosphatase 88 30 - 99 U/L    Total Bilirubin 0.4 0.1 - 1.5 mg/dL    Albumin 3.9 3.2 - 4.9 g/dL    Total Protein 8.3 (H) 6.0 - 8.2 g/dL    Globulin 4.4 (H) 1.9 - 3.5 g/dL    A-G Ratio 0.9 g/dL   Troponins NOW   Result Value Ref Range    Troponin T 356 (H) 6 - 19 ng/L   Troponins in two (2) hours   Result Value Ref Range    Troponin T 430 (H) 6 - 19 ng/L   ESTIMATED GFR   Result Value  Ref Range    GFR (CKD-EPI) 6 (A) >60 mL/min/1.73 m 2   EKG   Result Value Ref Range    Report       [facility]    Test Date:  2024  Pt Name:    LUKAS JOAQUIN            Department: ER  MRN:        [id number]                      Room:       [facility] [place]  Gender:                                  Technician: [id number]  :        [id number]                   Requested By:ER TRIAGE PROTOCOL  Order #:    [id number]                    Reading MD: REKHA NOVA MD    Measurements  Intervals                                Axis  Rate:       115                          P:          77  DC:         139                          QRS:        26  QRSD:       96                           T:          163  QT:         373  QTc:        516    Interpretive Statements  Twelve-lead EKG shows a sinus tachycardia with a ventricular rate 115, normal  QRS, prolonged QT interval at 373, T waves inverted laterally with some  slight ST segment depression inferiorly with no reciprocal changes to support  ischemia.  Electronically Signed On 2024 18:13:21 PST by REKHA NOVA MD         EKG  I have independently interpreted this EKG  See my interpretation above      RADIOLOGY  DX-CHEST-PORTABLE (1 VIEW)   Final Result      1.  Diffuse ill-defined interstitial opacities are most consistent with interstitial edema with viral pneumonitis in the differential diagnosis.            COURSE & MEDICAL DECISION MAKING    This is a 58-year-old male who presents to the emergency department acute distress.  He did miss dialysis today and he is in respiratory distress therefore he was treated aggressively with positive pressure support.  EMS fortunately did put Nitropaste on the patient for his hypertensive emergency.  I also administer Lasix but unfortunately patient's been unable to produce urine.  Positive pressure ventilation has been extremely effective as well as the nitroglycerin.  After approximate an  hour and a half the patient's vital signs have stabilized and he is resting comfortably.  At 8:45 PM his pulse is 84 and his blood pressure is 115/87.  The patient does not have any chest pain.  He feels significantly better.  Will take the patient off the positive pressure and see if he can oxygenate with a nasal cannula.  Nephrology is on-call for consultation for emergent dialysis.  As for the elevation of the troponin it is started to increase and I did repeat an EKG and there is still no ischemic change.  As mentioned above he is pain-free.  I suspect this is from the hypertensive emergency as well as his dialysis dependent renal failure.  The patient does not have any ongoing chest pain but ischemia cannot be excluded and will continue to trend his troponin.  The patient has been stabilized.  He will be admitted to the medicine team with nephrology in consultation.    Of note the patient did have to have his chart merged and I did review his past troponins and they were in the 450-500 region on 24 February and therefore it is even more likely that his elevated troponin is from an acute cardiac event as it has decreased since 24 February.    FINAL DIAGNOSIS  1.  Acute respiratory distress  2.  Dialysis dependent renal failure  3.  Chest pain rule out acute coronary syndrome  4.  Critical care time 45 minutes    Disposition  The patient has stabilized and will be admitted to the medical team with nephrology in consultation.       Electronically signed by: Nelson Blas M.D., 3/2/2024 6:16 PM

## 2024-03-03 NOTE — ASSESSMENT & PLAN NOTE
Secondary to volume overload missed hemodialysis and uncontrolled blood pressure  Resume hemodialysis and blood pressure medications  Monitor input and output and daily weight  Volume status improved with hemodialysis

## 2024-03-03 NOTE — ASSESSMENT & PLAN NOTE
Resolved  Resume home blood pressure medications lisinopril, carvedilol, amlodipine  Monitor blood pressure  IV labetalol as needed

## 2024-03-03 NOTE — CARE PLAN
Problem: Knowledge Deficit - Standard  Goal: Patient and family/care givers will demonstrate understanding of plan of care, disease process/condition, diagnostic tests and medications  Outcome: Progressing     Problem: Pain - Standard  Goal: Alleviation of pain or a reduction in pain to the patient’s comfort goal  Outcome: Progressing   The patient is Stable - Low risk of patient condition declining or worsening    Shift Goals  Clinical Goals: Remain free of falls, no skin breakdown  Patient Goals: Rest  Family Goals: LEX    Progress made toward(s) clinical / shift goals:        Pt educated on plan of care, pt verbalizes understanding, reinforcement needed. Pt educated on pain/anxiety scales, alleviating factors, pain/anxiety medications, and side effects, and need for pain/anxiety reassessment, pt pain/anxiety controlled at this time, reinforcement needed. Pt educated on the need to reposition frequently and remain dry to avoid skin breakdown, reinforcement needed, no further skin breakdown during shift. Fall risk education provided to pt, pt educated about the need to call for assistance while attempting to ambulate, reinforcement needed, pt remains free of falls this shift.

## 2024-03-03 NOTE — ED TRIAGE NOTES
Bob Twenty-Two  89 y.o.  adult  Chief Complaint   Patient presents with    Shortness of Breath     Chest pain & shortness of breath started at 1720. Missed dialysis today - last was on Thursday. Sats 80% on room air, given 1 inch of nitro paste by DEVORAHSA, decreased BP from 200/100 to 145/84. Arrives on CPAP.     Pt BIB [facility] on CPAP, increased work of breathing. Pt missed dialysis today as he did not have a ride. Russian virtual  used for triage.

## 2024-03-03 NOTE — ASSESSMENT & PLAN NOTE
Spent approx 5 mins on Tobacco cessation education . Discussed options of nicotine patch, medical treatment with wellbutrin and chantix. Discussed the benefits of quitting smoking and risks of continued smoking including cardiovascular disease, cancer and COPD.   Code 50636

## 2024-03-03 NOTE — PROGRESS NOTES
Report received from night RN at 0700. PT seen at bedside and pt care assumed. Pt is A&Ox4, on 6L oxymask, denies pain. PIV flushed and intact. PT is SR on telemetry monitor. PT is up standby.     Plan of care reviewed with pt's family, call light, phone, and personal belongings within reach. Bed alarm on, and bed in low locked position. All pt's needs met at this time.    Bedside report received from off going RN/tech: NAMRATA Herrera, assumed care of patient.     Fall Risk Score: NO RISK  Fall risk interventions in place: Provide patient/family education based on risk assessment  Bed type: Regular (Griffin Score less than 17 interventions in place)  Patient on cardiac monitor: Yes  IVF/IV medications: Not Applicable   Oxygen: How many liters 6L  Bedside sitter: Not Applicable   Isolation: Not applicable

## 2024-03-03 NOTE — ED NOTES
Lab called - troponin 365. Critical result read back to lab & Dr. Blas informed of results by phone.

## 2024-03-03 NOTE — CONSULTS
Nephrology Consultation    Date of Service  3/3/2024    Referring Physician  Austin Ambrosio M.D.    Consulting Physician  Yuni Dwyer M.D.    Reason for Consultation  End-stage renal disease    History of Presenting Illness  58 y.o. male who presented 3/2/2024 with end-stage renal disease on hemodialysis, combined systolic and diastolic congestive heart failure, hypertension, HIV who presents with chest pain and shortness of breath.    Patient reports attending his hemodialysis on Thursday however per review of the medical record reportedly missed his dialysis session last Thursday.  He did miss his and 3-24 session when his symptoms persisted patient presented to emergency room for further evaluation.    On arrival patient presented with hypertensive emergency for which blood pressure was 200/100.  Patient given IV Lasix, nitroglycerin, BiPAP.  Pressures normalized.    Nephrology consulted for hemodialysis needs.  She is otherwise feeling better.  Reports chest pain and shortness of breath have significantly improved.      Review of Systems  ROS    Past Medical History  End Stage renal disease on hemodialysis  Chronic congestive heart failure, systolic and diastolic dysfunction  Hypertension  Peripheral vascular disease  HIV.      Surgical History  No past surgical history on file.    Family History  Family history.  Social History  Current smoker.  7 cigarettes daily.  Has been smoking since age of 11.  Denies alcohol.  Denies illicit drug use.      Medications  Current Facility-Administered Medications   Medication Dose Route Frequency Provider Last Rate Last Admin    lidocaine (Xylocaine) 1 % injection 1 mL  1 mL Intradermal PRN Yuni Dwyer M.D.        Respiratory Therapy Consult   Nebulization Continuous RT Julio Contreras M.D.        heparin injection 5,000 Units  5,000 Units Subcutaneous Q8HRS Julio Contreras M.D.   5,000 Units at 03/03/24 0608    acetaminophen (Tylenol) tablet 650 mg  650 mg  Oral Q6HRS PRN Julio Contreras M.D.        senna-docusate (Pericolace Or Senokot S) 8.6-50 MG per tablet 2 Tablet  2 Tablet Oral Q EVENING Julio Contreras M.D.        And    polyethylene glycol/lytes (Miralax) Packet 1 Packet  1 Packet Oral QDAY PRN Julio Contreras M.D.        Pharmacy Consult Request ...Pain Management Review 1 Each  1 Each Other PHARMACY TO DOSE Julio Contreras M.D.        oxyCODONE immediate-release (Roxicodone) tablet 5 mg  5 mg Oral Q3HRS PRN Julio Contreras M.D.        Or    oxyCODONE immediate release (Roxicodone) tablet 10 mg  10 mg Oral Q3HRS PRN Julio Contreras M.D.        Or    HYDROmorphone (Dilaudid) injection 0.5 mg  0.5 mg Intravenous Q3HRS PRN Julio Contreras M.D.   0.5 mg at 03/03/24 0550    hydrALAZINE (Apresoline) injection 10 mg  10 mg Intravenous Q4HRS PRN Julio Contreras M.D.        ondansetron (Zofran) syringe/vial injection 4 mg  4 mg Intravenous Q4HRS PRN Julio Contreras M.D.        ondansetron (Zofran ODT) dispertab 4 mg  4 mg Oral Q4HRS PRN Julio Contreras M.D.        promethazine (Phenergan) tablet 12.5-25 mg  12.5-25 mg Oral Q4HRS PRN Julio Contreras M.D.        promethazine (Phenergan) suppository 12.5-25 mg  12.5-25 mg Rectal Q4HRS PRN Julio Contreras M.D.        prochlorperazine (Compazine) injection 5-10 mg  5-10 mg Intravenous Q4HRS PRN Julio Contreras M.D.        rilpivirine (Edurant) tablet 25 mg  25 mg Oral QDAY with Breakfast Julio Contreras M.D.   25 mg at 03/03/24 0757    dolutegravir (Tivicay) tablet 50 mg  50 mg Oral DAILY Julio Contreras M.D.   50 mg at 03/03/24 0607    calcitRIOL (Rocaltrol) capsule 0.25 mcg  0.25 mcg Oral DAILY Julio Contreras M.D.   0.25 mcg at 03/03/24 0608    lisinopril (Prinivil) tablet 20 mg  20 mg Oral Q DAY Julio Contreras M.D.   20 mg at 03/03/24 0607    aspirin EC tablet 81 mg  81 mg Oral DAILY Julio Contreras M.D.   81 mg at 03/03/24 0607    nicotine (Nicoderm) 21 MG/24HR 21 mg  21 mg  "Transdermal Daily-0600 Julio Contreras M.D.        carvedilol (Coreg) tablet 25 mg  25 mg Oral BID WITH MEALS Julio Contreras M.D.   25 mg at 03/03/24 0757    amLODIPine (Norvasc) tablet 10 mg  10 mg Oral Q DAY Julio Contreras M.D.   10 mg at 03/03/24 0607    sevelamer carbonate (Renvela) tablet 1,600 mg  1,600 mg Oral TID WITH MEALS Julio Contreras M.D.   1,600 mg at 03/03/24 0757       Allergies  No Known Allergies    Physical Exam  Temp:  [36.3 °C (97.3 °F)-36.7 °C (98.1 °F)] 36.7 °C (98.1 °F)  Pulse:  [] 80  Resp:  [16-25] 19  BP: ()/(55-87) 111/67  SpO2:  [90 %-100 %] 97 %    Physical Exam  GEN: alert and oriented in no acute distress.  HEENT: moist oropharygneal mucous membranes  CV:RRR, normal s1 s2  PULM: clear to auscultation bilaterally  ABD: soft non tender non distended  EXT: warm well perfused, no lower extremity edema   Fluids      Laboratory  Labs reviewed, pertinent labs below.  Recent Labs     03/02/24 1810 03/03/24  0349   WBC 12.6* 10.0   RBC 3.48* 2.88*   HEMOGLOBIN 11.7* 9.6*   HEMATOCRIT 35.1* 28.7*   .9* 99.7*   MCH 33.6* 33.3*   MCHC 33.3 33.4   RDW 57.7* 55.9*   PLATELETCT 312 261   MPV 9.8 10.0     Recent Labs     03/02/24  1810 03/03/24  0349 03/03/24  0949   SODIUM 138 136  --    POTASSIUM 4.0 4.2 4.8   CHLORIDE 93* 94*  --    CO2 18* 22  --    GLUCOSE 222* 104*  --    BUN 45* 59*  --    CREATININE 9.77* 11.13*  --    CALCIUM 8.7 8.3*  --                 URINALYSIS:  No results found for: \"COLORURINE\", \"CLARITY\", \"SPECGRAVITY\", \"PHURINE\", \"KETONES\", \"PROTEINURIN\", \"BILIRUBINUR\", \"UROBILU\", \"NITRITE\", \"LEUKESTERAS\", \"OCCULTBLOOD\"  UPC  No results found for: \"TOTPROTUR\" No results found for: \"CREATININEU\"    Imaging interpreted by radiologist. Imaging reports reviewed with pertinent findings below  DX-CHEST-PORTABLE (1 VIEW)   Final Result      1.  Diffuse ill-defined interstitial opacities are most consistent with interstitial edema with viral pneumonitis in the " differential diagnosis.            Assessment/Plan  58 y.o. male who presented 3/2/2024 with end-stage renal disease on hemodialysis, combined systolic and diastolic congestive heart failure, hypertension, HIV who presents with chest pain and shortness of breath.      1.ESRD on HD on TTS  - Primary Nephrologist Najjar.   - Recommend routine hemodialysis for maintenance clearance and volume needs  - Recommend HD on 3/3/24  - Dose all medications for patient dialysis, renal function  - Avoid nephrotoxic agents  - Obtain phosphate level twice weekly.      2.  Acute hypoxic respiratory failure.  Due to volume overload.  Improved with nitroglycerin, diuresis, CPAP in the emergency room.  Recommend hemodialysis as above.    3.  Acute on chronic combined systolic and diastolic heart failure.  Careful assessment of volume status with hemodialysis needs.  Cardiac diet.    4.  NSTEMI.  Likely demand ischemia from hypertensive crisis.  Continue to monitor.  No plans for cardiac intervention.    5.  Hypertension.  Initially admitted with hypertensive emergency.  Resumed home medications including lisinopril, amlodipine, carvedilol.  Will continue to monitor.  Ultrafiltration as above with hemodialysis.    6.  HIV.  Continue antiretroviral medications.    Plan of care discussed with Dr. Ambrosio.    Yuni Dwyer MD  Nephrology  [facility]

## 2024-03-04 ENCOUNTER — TELEPHONE (OUTPATIENT)
Dept: CARDIOLOGY | Facility: MEDICAL CENTER | Age: 59
End: 2024-03-04
Payer: MEDICAID

## 2024-03-04 ENCOUNTER — PATIENT OUTREACH (OUTPATIENT)
Dept: SCHEDULING | Facility: IMAGING CENTER | Age: 59
End: 2024-03-04
Payer: MEDICAID

## 2024-03-04 ENCOUNTER — APPOINTMENT (OUTPATIENT)
Dept: CARDIOLOGY | Facility: MEDICAL CENTER | Age: 59
DRG: 280 | End: 2024-03-04
Payer: MEDICAID

## 2024-03-04 DIAGNOSIS — I35.0 SEVERE AORTIC STENOSIS: ICD-10-CM

## 2024-03-04 DIAGNOSIS — I50.20 HFREF (HEART FAILURE WITH REDUCED EJECTION FRACTION) (HCC): ICD-10-CM

## 2024-03-04 LAB
ANION GAP SERPL CALC-SCNC: 12 MMOL/L (ref 7–16)
APTT PPP: 41.6 SEC (ref 24.7–36)
BASOPHILS # BLD AUTO: 0.5 % (ref 0–1.8)
BASOPHILS # BLD: 0.05 K/UL (ref 0–0.12)
BUN SERPL-MCNC: 34 MG/DL (ref 8–22)
CALCIUM SERPL-MCNC: 8.7 MG/DL (ref 8.5–10.5)
CHLORIDE SERPL-SCNC: 93 MMOL/L (ref 96–112)
CO2 SERPL-SCNC: 27 MMOL/L (ref 20–33)
CREAT SERPL-MCNC: 7.34 MG/DL (ref 0.5–1.4)
EKG IMPRESSION: NORMAL
EOSINOPHIL # BLD AUTO: 0.36 K/UL (ref 0–0.51)
EOSINOPHIL NFR BLD: 3.6 % (ref 0–6.9)
ERYTHROCYTE [DISTWIDTH] IN BLOOD BY AUTOMATED COUNT: 56.6 FL (ref 35.9–50)
GFR SERPLBLD CREATININE-BSD FMLA CKD-EPI: 8 ML/MIN/1.73 M 2
GLUCOSE SERPL-MCNC: 109 MG/DL (ref 65–99)
HCT VFR BLD AUTO: 26.5 % (ref 42–52)
HGB BLD-MCNC: 8.9 G/DL (ref 14–18)
IMM GRANULOCYTES # BLD AUTO: 0.06 K/UL (ref 0–0.11)
IMM GRANULOCYTES NFR BLD AUTO: 0.6 % (ref 0–0.9)
INR PPP: 1.11 (ref 0.87–1.13)
LYMPHOCYTES # BLD AUTO: 1.88 K/UL (ref 1–4.8)
LYMPHOCYTES NFR BLD: 18.8 % (ref 22–41)
MCH RBC QN AUTO: 33.5 PG (ref 27–33)
MCHC RBC AUTO-ENTMCNC: 33.6 G/DL (ref 32.3–36.5)
MCV RBC AUTO: 99.6 FL (ref 81.4–97.8)
MONOCYTES # BLD AUTO: 0.58 K/UL (ref 0–0.85)
MONOCYTES NFR BLD AUTO: 5.8 % (ref 0–13.4)
NEUTROPHILS # BLD AUTO: 7.06 K/UL (ref 1.82–7.42)
NEUTROPHILS NFR BLD: 70.7 % (ref 44–72)
NRBC # BLD AUTO: 0 K/UL
NRBC BLD-RTO: 0 /100 WBC (ref 0–0.2)
PLATELET # BLD AUTO: 247 K/UL (ref 164–446)
PMV BLD AUTO: 10.1 FL (ref 9–12.9)
POTASSIUM SERPL-SCNC: 5.4 MMOL/L (ref 3.6–5.5)
PROTHROMBIN TIME: 14.4 SEC (ref 12–14.6)
RBC # BLD AUTO: 2.66 M/UL (ref 4.7–6.1)
SODIUM SERPL-SCNC: 132 MMOL/L (ref 135–145)
TROPONIN T SERPL-MCNC: 961 NG/L (ref 6–19)
UFH PPP CHRO-ACNC: <0.1 IU/ML
WBC # BLD AUTO: 10 K/UL (ref 4.8–10.8)

## 2024-03-04 PROCEDURE — 99291 CRITICAL CARE FIRST HOUR: CPT | Performed by: STUDENT IN AN ORGANIZED HEALTH CARE EDUCATION/TRAINING PROGRAM

## 2024-03-04 PROCEDURE — A9270 NON-COVERED ITEM OR SERVICE: HCPCS | Performed by: INTERNAL MEDICINE

## 2024-03-04 PROCEDURE — 85730 THROMBOPLASTIN TIME PARTIAL: CPT

## 2024-03-04 PROCEDURE — A9270 NON-COVERED ITEM OR SERVICE: HCPCS

## 2024-03-04 PROCEDURE — 85025 COMPLETE CBC W/AUTO DIFF WBC: CPT

## 2024-03-04 PROCEDURE — 99232 SBSQ HOSP IP/OBS MODERATE 35: CPT | Performed by: STUDENT IN AN ORGANIZED HEALTH CARE EDUCATION/TRAINING PROGRAM

## 2024-03-04 PROCEDURE — 85520 HEPARIN ASSAY: CPT

## 2024-03-04 PROCEDURE — 93010 ELECTROCARDIOGRAM REPORT: CPT | Performed by: INTERNAL MEDICINE

## 2024-03-04 PROCEDURE — 700111 HCHG RX REV CODE 636 W/ 250 OVERRIDE (IP)

## 2024-03-04 PROCEDURE — 80048 BASIC METABOLIC PNL TOTAL CA: CPT

## 2024-03-04 PROCEDURE — 700102 HCHG RX REV CODE 250 W/ 637 OVERRIDE(OP)

## 2024-03-04 PROCEDURE — 700102 HCHG RX REV CODE 250 W/ 637 OVERRIDE(OP): Performed by: INTERNAL MEDICINE

## 2024-03-04 PROCEDURE — 770020 HCHG ROOM/CARE - TELE (206)

## 2024-03-04 PROCEDURE — 700111 HCHG RX REV CODE 636 W/ 250 OVERRIDE (IP): Mod: JZ | Performed by: STUDENT IN AN ORGANIZED HEALTH CARE EDUCATION/TRAINING PROGRAM

## 2024-03-04 PROCEDURE — 84484 ASSAY OF TROPONIN QUANT: CPT

## 2024-03-04 PROCEDURE — 85610 PROTHROMBIN TIME: CPT

## 2024-03-04 PROCEDURE — 99232 SBSQ HOSP IP/OBS MODERATE 35: CPT | Performed by: INTERNAL MEDICINE

## 2024-03-04 PROCEDURE — 700111 HCHG RX REV CODE 636 W/ 250 OVERRIDE (IP): Performed by: INTERNAL MEDICINE

## 2024-03-04 PROCEDURE — 36415 COLL VENOUS BLD VENIPUNCTURE: CPT

## 2024-03-04 RX ORDER — AMLODIPINE BESYLATE 10 MG/1
10 TABLET ORAL DAILY
Qty: 30 TABLET | Refills: 0
Start: 2024-03-05 | End: 2024-03-12

## 2024-03-04 RX ORDER — HYDROMORPHONE HYDROCHLORIDE 1 MG/ML
0.5 INJECTION, SOLUTION INTRAMUSCULAR; INTRAVENOUS; SUBCUTANEOUS
Status: DISCONTINUED | OUTPATIENT
Start: 2024-03-04 | End: 2024-03-12 | Stop reason: HOSPADM

## 2024-03-04 RX ORDER — HEPARIN SODIUM 1000 [USP'U]/ML
30 INJECTION, SOLUTION INTRAVENOUS; SUBCUTANEOUS PRN
Status: DISCONTINUED | OUTPATIENT
Start: 2024-03-04 | End: 2024-03-04

## 2024-03-04 RX ORDER — SEVELAMER CARBONATE 800 MG/1
1600 TABLET, FILM COATED ORAL
Qty: 270 TABLET | Refills: 0
Start: 2024-03-04 | End: 2024-03-12

## 2024-03-04 RX ORDER — MORPHINE SULFATE 4 MG/ML
2 INJECTION INTRAVENOUS
Status: DISCONTINUED | OUTPATIENT
Start: 2024-03-04 | End: 2024-03-04

## 2024-03-04 RX ORDER — MORPHINE SULFATE 4 MG/ML
2-4 INJECTION INTRAVENOUS
Status: DISCONTINUED | OUTPATIENT
Start: 2024-03-04 | End: 2024-03-04

## 2024-03-04 RX ORDER — ASPIRIN 81 MG/1
81 TABLET ORAL DAILY
Qty: 100 TABLET | Refills: 0
Start: 2024-03-05 | End: 2024-03-12

## 2024-03-04 RX ORDER — CALCITRIOL 0.25 UG/1
0.25 CAPSULE, LIQUID FILLED ORAL DAILY
Qty: 30 CAPSULE | Refills: 0
Start: 2024-03-05 | End: 2024-03-12

## 2024-03-04 RX ORDER — HEPARIN SODIUM 1000 [USP'U]/ML
60 INJECTION, SOLUTION INTRAVENOUS; SUBCUTANEOUS ONCE
Status: COMPLETED | OUTPATIENT
Start: 2024-03-04 | End: 2024-03-04

## 2024-03-04 RX ORDER — NITROGLYCERIN 0.4 MG/1
0.4 TABLET SUBLINGUAL
Status: DISCONTINUED | OUTPATIENT
Start: 2024-03-04 | End: 2024-03-12 | Stop reason: HOSPADM

## 2024-03-04 RX ORDER — CARVEDILOL 25 MG/1
25 TABLET ORAL 2 TIMES DAILY WITH MEALS
Qty: 60 TABLET | Refills: 0
Start: 2024-03-04 | End: 2024-03-12

## 2024-03-04 RX ORDER — HEPARIN SODIUM 5000 [USP'U]/100ML
0-30 INJECTION, SOLUTION INTRAVENOUS CONTINUOUS
Status: DISCONTINUED | OUTPATIENT
Start: 2024-03-04 | End: 2024-03-04

## 2024-03-04 RX ORDER — LISINOPRIL 20 MG/1
20 TABLET ORAL DAILY
Qty: 30 TABLET | Refills: 0
Start: 2024-03-05 | End: 2024-03-12

## 2024-03-04 RX ORDER — ATORVASTATIN CALCIUM 40 MG/1
40 TABLET, FILM COATED ORAL EVERY EVENING
Status: DISCONTINUED | OUTPATIENT
Start: 2024-03-04 | End: 2024-03-12 | Stop reason: HOSPADM

## 2024-03-04 RX ORDER — ATORVASTATIN CALCIUM 40 MG/1
40 TABLET, FILM COATED ORAL EVERY EVENING
Qty: 30 TABLET | Refills: 0
Start: 2024-03-04 | End: 2024-03-12

## 2024-03-04 RX ADMIN — SEVELAMER CARBONATE 1600 MG: 800 TABLET, FILM COATED ORAL at 17:03

## 2024-03-04 RX ADMIN — DOLUTEGRAVIR SODIUM 50 MG: 50 TABLET, FILM COATED ORAL at 06:01

## 2024-03-04 RX ADMIN — CARVEDILOL 25 MG: 25 TABLET, FILM COATED ORAL at 07:48

## 2024-03-04 RX ADMIN — HYDROMORPHONE HYDROCHLORIDE 0.5 MG: 1 INJECTION, SOLUTION INTRAMUSCULAR; INTRAVENOUS; SUBCUTANEOUS at 01:37

## 2024-03-04 RX ADMIN — ATORVASTATIN CALCIUM 40 MG: 40 TABLET, FILM COATED ORAL at 17:03

## 2024-03-04 RX ADMIN — RILPIVIRINE HYDROCHLORIDE 25 MG: 25 TABLET, FILM COATED ORAL at 07:48

## 2024-03-04 RX ADMIN — ASPIRIN 81 MG: 81 TABLET, COATED ORAL at 06:01

## 2024-03-04 RX ADMIN — PROCHLORPERAZINE EDISYLATE 10 MG: 5 INJECTION INTRAMUSCULAR; INTRAVENOUS at 01:37

## 2024-03-04 RX ADMIN — FAMOTIDINE 20 MG: 10 INJECTION, SOLUTION INTRAVENOUS at 17:03

## 2024-03-04 RX ADMIN — SEVELAMER CARBONATE 1600 MG: 800 TABLET, FILM COATED ORAL at 07:48

## 2024-03-04 RX ADMIN — AMLODIPINE BESYLATE 10 MG: 10 TABLET ORAL at 06:01

## 2024-03-04 RX ADMIN — HEPARIN SODIUM 12 UNITS/KG/HR: 5000 INJECTION, SOLUTION INTRAVENOUS at 00:48

## 2024-03-04 RX ADMIN — CALCITRIOL CAPSULES 0.25 MCG 0.25 MCG: 0.25 CAPSULE ORAL at 06:01

## 2024-03-04 RX ADMIN — LISINOPRIL 20 MG: 20 TABLET ORAL at 06:01

## 2024-03-04 RX ADMIN — HEPARIN SODIUM 4000 UNITS: 1000 INJECTION, SOLUTION INTRAVENOUS; SUBCUTANEOUS at 00:49

## 2024-03-04 ASSESSMENT — ENCOUNTER SYMPTOMS
BLURRED VISION: 0
VOMITING: 0
DIZZINESS: 0
HEADACHES: 0
BACK PAIN: 0
INSOMNIA: 0
COUGH: 0
EYE PAIN: 0
CHILLS: 0
ABDOMINAL PAIN: 0
SHORTNESS OF BREATH: 0
FEVER: 0
NAUSEA: 0
PALPITATIONS: 0

## 2024-03-04 ASSESSMENT — LIFESTYLE VARIABLES: SUBSTANCE_ABUSE: 0

## 2024-03-04 ASSESSMENT — FIBROSIS 4 INDEX: FIB4 SCORE: 1.75

## 2024-03-04 NOTE — PROGRESS NOTES
Patient with a past medical history of ESRD, admitted on 3/2 for acute hypoxemic respiratory failure as well as hypertensive emergency which began on the day of admission while at dialysis.  Patient had chest pain and elevated troponin on presentation but was attributed to hypertensive emergency.      I was called to evaluate patient at bedside for continued substernal chest pain.  Stat EKG shows deepening T waves in the lateral leads and new ST depressions in V3- V4, ST depressions in the inferior leads are similar to previous EKG from earlier that day.  Continues to have chest pain but has improved with Dilaudid.     A&P:  NSTEMI  Hypertensive emergency  Symptoms initially attributed to hypertensive emergency however, patient continues to have substernal chest pain despite adequate blood pressure control.  The highest blood pressure since admission was 153/82 in fact he has been running on the lower side with a systolic in the 90s-100s.  I will start patient on heparin drip and will keep him n.p.o., discussed with patient's nurse  Already on aspirin and metoprolol since admission.  Will add atorvastatin    Patient is critically ill.   The patient continues to have: NSTEMI  If untreated there is a high chance of deterioration And eventually death.   The critical care that I am providing today is: Started patient on heparin drip, needs close monitoring with labs and titration of heparin as needed.  Needs close monitoring for any signs of bleeding.   The critical that has been undertaken is medically complex.   There has been no overlap in critical care time.   Critical Care Time not including procedures: 39 minutes

## 2024-03-04 NOTE — PROGRESS NOTES
Bedside report received from off going RN/tech: Earnest, assumed care of patient.   Overnight Events: per night RN pt continued to complain of intermittent chest pain with an increase in troponin. Heparin gtt started, however pt is now declining Xa draws and states he doesn't want any further workup for chest pain. A&OX4 irritable. Denies pain at this time.  SBP Ranged: 90's-low 100's  Fall Risk Score: (P) NO RISK  Fall risk interventions in place: Place yellow fall risk ID band on patient, Provide patient/family education based on risk assessment, Educate patient/family to call staff for assistance when getting out of bed, Place fall precaution signage outside patient door, Utilize bed/chair fall alarm, and Bed alarm connected correctly  Bed type: Regular (Griffin Score less than 17 interventions in place)  Patient on cardiac monitor: Yes SR 70's-80's.  IVF/IV medications: Heparin gtt paused for lab draw, however pt is refusing, will update primary MD.   Oxygen: How many liters 5Lvia Oxy Mask. RA at baseline will titrate off as tolerated.  Bedside sitter: Not Applicable   Isolation: Not applicable

## 2024-03-04 NOTE — TELEPHONE ENCOUNTER
Our Structural Heart Program has implemented a quality initiative aimed at identifying patients with valvular heart disease and confirming a clinical plan for their care upon diagnosis.     Your patient has been flagged through our echocardiogram surveillance with the following echocardiogram results:    Severe Aortic Stenosis    Cardiologist: [place] GILLETTE    Current Plan of Care for Structural Heart Disease: none    Next Echo date: none    Next Follow up appointment: none    Please place Referral to [facility], sub-specialty Structural Heart Program, if warranted for consult and treatment.

## 2024-03-04 NOTE — PROGRESS NOTES
Assumed care of patient from day shift RN at 1900, Patient AOX4, VSS.   Fall education reinforced, call bell within reach, bed locked and in lowest position. POC discussed and all questions answered.  Purposeful and or hourly rounding in place.    Bedside report received from off going RN/tech: Adelfo, assumed care of patient.     Fall Risk Score: NO RISK  Fall risk interventions in place: Provide patient/family education based on risk assessment and Educate patient/family to call staff for assistance when getting out of bed  Bed type: Regular (Griffin Score less than 17 interventions in place)  Patient on cardiac monitor: Yes  IVF/IV medications: Not Applicable   Oxygen: How many liters 6L  Bedside sitter: Not Applicable   Isolation: Not applicable

## 2024-03-04 NOTE — PROGRESS NOTES
Pt continues to refuse care. Oxygen titrated down from 5L to 2L pt continues to maintain O2 sats > 95%

## 2024-03-04 NOTE — PROGRESS NOTES
Nephrology Daily Progress Note    Date of Service  3/4/2024    Chief Complaint  58 y.o. male admitted 3/2/2024 with chest pain, shortness of breath, ESRD    Interval Problem Update  Events last 24 hours noted, patient is refusing care.  No new complaints of chest pain at the moment  Pt had HD yesterday  Review of Systems  Review of Systems   Constitutional:  Negative for chills, fever and malaise/fatigue.   Respiratory:  Negative for cough and shortness of breath.    Cardiovascular:  Negative for chest pain and leg swelling.   Gastrointestinal:  Negative for nausea and vomiting.   Genitourinary:  Negative for dysuria, frequency and urgency.        Physical Exam  Temp:  [36.4 °C (97.6 °F)-36.7 °C (98.1 °F)] 36.7 °C (98.1 °F)  Pulse:  [71-88] 87  Resp:  [16-18] 16  BP: ()/(53-73) 94/55  SpO2:  [86 %-100 %] 94 %    Physical Exam  Vitals and nursing note reviewed.   Constitutional:       General: He is not in acute distress.     Appearance: He is ill-appearing.   HENT:      Head: Normocephalic and atraumatic.      Right Ear: External ear normal.      Left Ear: External ear normal.      Nose: Nose normal.   Eyes:      General:         Right eye: No discharge.         Left eye: No discharge.      Conjunctiva/sclera: Conjunctivae normal.   Cardiovascular:      Rate and Rhythm: Normal rate and regular rhythm.      Heart sounds: No murmur heard.  Pulmonary:      Effort: Pulmonary effort is normal. No respiratory distress.      Breath sounds: Normal breath sounds. No wheezing.   Musculoskeletal:         General: No tenderness or deformity.      Right lower leg: No edema.      Left lower leg: No edema.   Skin:     General: Skin is warm.   Neurological:      General: No focal deficit present.      Mental Status: He is alert and oriented to person, place, and time.   Psychiatric:         Mood and Affect: Mood normal.         Behavior: Behavior normal.         Fluids    Intake/Output Summary (Last 24 hours) at 3/4/2024  "1207  Last data filed at 3/3/2024 1402  Gross per 24 hour   Intake 500 ml   Output 1000 ml   Net -500 ml       Laboratory  Recent Labs     03/02/24  1810 03/03/24  0349 03/04/24  0013   WBC 12.6* 10.0 10.0   RBC 3.48* 2.88* 2.66*   HEMOGLOBIN 11.7* 9.6* 8.9*   HEMATOCRIT 35.1* 28.7* 26.5*   .9* 99.7* 99.6*   MCH 33.6* 33.3* 33.5*   MCHC 33.3 33.4 33.6   RDW 57.7* 55.9* 56.6*   PLATELETCT 312 261 247   MPV 9.8 10.0 10.1     Recent Labs     03/02/24 1810 03/03/24 0349 03/03/24  0949 03/04/24  0013   SODIUM 138 136  --  132*   POTASSIUM 4.0 4.2 4.8 5.4   CHLORIDE 93* 94*  --  93*   CO2 18* 22  --  27   GLUCOSE 222* 104*  --  109*   BUN 45* 59*  --  34*   CREATININE 9.77* 11.13*  --  7.34*   CALCIUM 8.7 8.3*  --  8.7     Recent Labs     03/04/24  0013   APTT 41.6*   INR 1.11     No results for input(s): \"NTPROBNP\" in the last 72 hours.        Imaging  DX-CHEST-PORTABLE (1 VIEW)   Final Result      Patchy bilateral interstitial opacities which could be seen in setting with edema and/or infection.      DX-CHEST-PORTABLE (1 VIEW)   Final Result      1.  Diffuse ill-defined interstitial opacities are most consistent with interstitial edema with viral pneumonitis in the differential diagnosis.            Assessment/Plan  1 ESRD  2 respiratory failure  3 cardiomyopathy  4 Anemia  no acute need for HD  Erythropoietin with dialysis  Renal diet  Daily BMP, CBC.  Renal dose all meds  Avoid nephrotoxins like NSAIDs.  Prognosis guarded.                 "

## 2024-03-04 NOTE — PROGRESS NOTES
Rhythm: SR  Rate: 73-91  Ectopy: (R) Coup, (R) PVC, (R) Vtach  Measurements: .16/.08/.34

## 2024-03-04 NOTE — PROGRESS NOTES
Pt stated he was experiencing midsternal chest pain. Hospitalist notified and STAT EKG ordered. Pt also given pain medication.

## 2024-03-04 NOTE — PROGRESS NOTES
Patient is now refusing his discharge states he feels SOB, when spot checked pt was found to be at 84% RA, placed back on 2 L via oxy mask. Pt educated on importance of continuous pulse ox monitoring when titrating off oxygen however pt continues to refuse  and tele monitoring. Md updated

## 2024-03-05 ENCOUNTER — APPOINTMENT (OUTPATIENT)
Dept: CARDIOLOGY | Facility: MEDICAL CENTER | Age: 59
DRG: 280 | End: 2024-03-05
Attending: STUDENT IN AN ORGANIZED HEALTH CARE EDUCATION/TRAINING PROGRAM
Payer: MEDICAID

## 2024-03-05 ENCOUNTER — APPOINTMENT (OUTPATIENT)
Dept: CARDIOLOGY | Facility: MEDICAL CENTER | Age: 59
DRG: 280 | End: 2024-03-05
Attending: INTERNAL MEDICINE
Payer: MEDICAID

## 2024-03-05 PROBLEM — I35.0 SEVERE AORTIC STENOSIS: Status: ACTIVE | Noted: 2024-03-05

## 2024-03-05 LAB
FERRITIN SERPL-MCNC: 1651 NG/ML (ref 22–322)
IRON SATN MFR SERPL: 25 % (ref 15–55)
IRON SERPL-MCNC: 52 UG/DL (ref 50–180)
LV EJECT FRACT  99904: 45
LV EJECT FRACT MOD 2C 99903: 57.77
LV EJECT FRACT MOD 4C 99902: 45.83
LV EJECT FRACT MOD BP 99901: 51.24
TIBC SERPL-MCNC: 211 UG/DL (ref 250–450)
UIBC SERPL-MCNC: 159 UG/DL (ref 110–370)
VIT B12 SERPL-MCNC: 416 PG/ML (ref 211–911)

## 2024-03-05 PROCEDURE — 93306 TTE W/DOPPLER COMPLETE: CPT | Mod: 26 | Performed by: INTERNAL MEDICINE

## 2024-03-05 PROCEDURE — 700111 HCHG RX REV CODE 636 W/ 250 OVERRIDE (IP): Performed by: STUDENT IN AN ORGANIZED HEALTH CARE EDUCATION/TRAINING PROGRAM

## 2024-03-05 PROCEDURE — 700102 HCHG RX REV CODE 250 W/ 637 OVERRIDE(OP): Performed by: INTERNAL MEDICINE

## 2024-03-05 PROCEDURE — 83540 ASSAY OF IRON: CPT

## 2024-03-05 PROCEDURE — 99233 SBSQ HOSP IP/OBS HIGH 50: CPT | Performed by: STUDENT IN AN ORGANIZED HEALTH CARE EDUCATION/TRAINING PROGRAM

## 2024-03-05 PROCEDURE — 90935 HEMODIALYSIS ONE EVALUATION: CPT | Performed by: INTERNAL MEDICINE

## 2024-03-05 PROCEDURE — 770020 HCHG ROOM/CARE - TELE (206)

## 2024-03-05 PROCEDURE — 700102 HCHG RX REV CODE 250 W/ 637 OVERRIDE(OP): Performed by: STUDENT IN AN ORGANIZED HEALTH CARE EDUCATION/TRAINING PROGRAM

## 2024-03-05 PROCEDURE — A9270 NON-COVERED ITEM OR SERVICE: HCPCS | Performed by: STUDENT IN AN ORGANIZED HEALTH CARE EDUCATION/TRAINING PROGRAM

## 2024-03-05 PROCEDURE — 99255 IP/OBS CONSLTJ NEW/EST HI 80: CPT | Performed by: INTERNAL MEDICINE

## 2024-03-05 PROCEDURE — 36415 COLL VENOUS BLD VENIPUNCTURE: CPT

## 2024-03-05 PROCEDURE — 83550 IRON BINDING TEST: CPT

## 2024-03-05 PROCEDURE — 700102 HCHG RX REV CODE 250 W/ 637 OVERRIDE(OP)

## 2024-03-05 PROCEDURE — 82728 ASSAY OF FERRITIN: CPT

## 2024-03-05 PROCEDURE — A9270 NON-COVERED ITEM OR SERVICE: HCPCS

## 2024-03-05 PROCEDURE — 93306 TTE W/DOPPLER COMPLETE: CPT

## 2024-03-05 PROCEDURE — 82607 VITAMIN B-12: CPT

## 2024-03-05 PROCEDURE — A9270 NON-COVERED ITEM OR SERVICE: HCPCS | Performed by: INTERNAL MEDICINE

## 2024-03-05 RX ORDER — AMLODIPINE BESYLATE 10 MG/1
10 TABLET ORAL DAILY
COMMUNITY
End: 2024-03-21

## 2024-03-05 RX ORDER — HEPARIN SODIUM 5000 [USP'U]/ML
5000 INJECTION, SOLUTION INTRAVENOUS; SUBCUTANEOUS EVERY 8 HOURS
Status: DISCONTINUED | OUTPATIENT
Start: 2024-03-05 | End: 2024-03-12 | Stop reason: HOSPADM

## 2024-03-05 RX ORDER — CARVEDILOL 25 MG/1
25 TABLET ORAL 2 TIMES DAILY WITH MEALS
Status: ON HOLD | COMMUNITY
End: 2024-03-12

## 2024-03-05 RX ORDER — SEVELAMER CARBONATE 800 MG/1
1600 TABLET, FILM COATED ORAL
COMMUNITY

## 2024-03-05 RX ORDER — ASPIRIN 81 MG/1
81 TABLET ORAL DAILY
COMMUNITY
End: 2024-03-21

## 2024-03-05 RX ORDER — FAMOTIDINE 20 MG/1
20 TABLET, FILM COATED ORAL EVERY EVENING
Status: DISCONTINUED | OUTPATIENT
Start: 2024-03-05 | End: 2024-03-12 | Stop reason: HOSPADM

## 2024-03-05 RX ORDER — LISINOPRIL 20 MG/1
20 TABLET ORAL DAILY
COMMUNITY
End: 2024-03-21

## 2024-03-05 RX ADMIN — SEVELAMER CARBONATE 1600 MG: 800 TABLET, FILM COATED ORAL at 17:13

## 2024-03-05 RX ADMIN — HEPARIN SODIUM 5000 UNITS: 5000 INJECTION, SOLUTION INTRAVENOUS; SUBCUTANEOUS at 21:20

## 2024-03-05 RX ADMIN — DOLUTEGRAVIR SODIUM 50 MG: 50 TABLET, FILM COATED ORAL at 05:31

## 2024-03-05 RX ADMIN — CALCITRIOL CAPSULES 0.25 MCG 0.25 MCG: 0.25 CAPSULE ORAL at 05:31

## 2024-03-05 RX ADMIN — ASPIRIN 81 MG: 81 TABLET, COATED ORAL at 05:31

## 2024-03-05 RX ADMIN — CARVEDILOL 25 MG: 25 TABLET, FILM COATED ORAL at 17:13

## 2024-03-05 RX ADMIN — ATORVASTATIN CALCIUM 40 MG: 40 TABLET, FILM COATED ORAL at 17:13

## 2024-03-05 RX ADMIN — FAMOTIDINE 20 MG: 20 TABLET, FILM COATED ORAL at 17:13

## 2024-03-05 ASSESSMENT — ENCOUNTER SYMPTOMS
DIZZINESS: 0
ABDOMINAL PAIN: 0
PALPITATIONS: 0
SHORTNESS OF BREATH: 0
CHEST TIGHTNESS: 0
HEADACHES: 0
NAUSEA: 0
FATIGUE: 1
SHORTNESS OF BREATH: 1
WEAKNESS: 1

## 2024-03-05 ASSESSMENT — FIBROSIS 4 INDEX: FIB4 SCORE: 1.75

## 2024-03-05 ASSESSMENT — PAIN DESCRIPTION - PAIN TYPE: TYPE: ACUTE PAIN

## 2024-03-05 NOTE — PROGRESS NOTES
3hr HD started @ 0833 and completed @ 1134,tx well tolerted.VSS,net UF = 2000ml,breathing better per patient.LUAAVF + B/T,cannulation sites covered with DD,CDI.Report given to Enio Lin RN.

## 2024-03-05 NOTE — ASSESSMENT & PLAN NOTE
Echocardiogram result noted with ejection fraction 40 to 48%, grade 2 left ventricular distal dysfunction, severe aortic valve stenosis, pulmonary hypertension with RVSP 55mh hg .  Cardiology evaluated

## 2024-03-05 NOTE — PROGRESS NOTES
Hospital Medicine Daily Progress Note    Date of Service  3/5/2024    Chief Complaint  Bob Ochoa is a 58 y.o. male admitted 3/2/2024 with shortness of breath    Hospital Course  58 male with past medical history of cardiomyopathy with EF 40%, severe aortic stenosis, HIV on antiretroviral, ESRD on hemodialysis presented with chest pain.  Patient reportedly had missed hemodialysis.Significant elevated blood pressure on arrival to ED.  Noted to have significantly elevated troponin with EKG changes.  Patient initially declined to be on heparin drip.  Cardiology evaluated and recommended for coronary angiogram .       Interval Problem Update    3/5/2024  Seen and examined during hemodialysis  Vitals remained stable  Currently denies chest pain, shortness of breath, nausea/vomiting  Labs reviewed hemoglobin 8.9, chemistry consistent with ESRD, significant elevated troponin.    Echocardiogram result reviewed noted with ejection fraction 40 to 48%, grade 2 left ventricular distal dysfunction, severe aortic valve stenosis, pulmonary hypertension with RVSP 55mh hg     Case was discussed with cardiology  .       Telemetry monitoring  Continue on aspirin, Coreg, Lipitor  Scheduled for coronary angiogram      Repeat BMP in a.m. to monitor electrolytes and renal function  Repeat CBC in a.m. to monitor white count and hemoglobin    Need close monitoring of blood counts, electrolytes and renal function due to potential of organ dysfunction due to NSTEMI    Scheduled for urgent coronary angiogram    High risk of deterioration into arrhythmia due to NSTEMI.  Need close telemetry monitoring    Patient has a high medical complexity, complex decision making and is at high risk of complication, morbidity and mortality          I have discussed this patient's plan of care and discharge plan at IDT rounds today with Case Management, Nursing, Nursing leadership, and other members of the IDT  team.    Consultants/Specialty  cardiology    Code Status  Full Code    Disposition  The patient is not medically cleared for discharge to home or a post-acute facility.  Anticipate discharge to: home with close outpatient follow-up    I have placed the appropriate orders for post-discharge needs.    Review of Systems  Review of Systems   Respiratory:  Negative for shortness of breath.    Cardiovascular:  Negative for chest pain.        Physical Exam  Temp:  [36.4 °C (97.5 °F)-36.8 °C (98.2 °F)] 36.5 °C (97.7 °F)  Pulse:  [64-91] 91  Resp:  [16-18] 18  BP: ()/(63-91) 143/91  SpO2:  [91 %-98 %] 92 %    Physical Exam  Constitutional:       Appearance: Normal appearance.   Cardiovascular:      Rate and Rhythm: Normal rate and regular rhythm.      Heart sounds: Normal heart sounds.   Pulmonary:      Effort: Pulmonary effort is normal.   Musculoskeletal:      Right lower leg: No edema.      Left lower leg: No edema.   Neurological:      General: No focal deficit present.      Mental Status: He is alert and oriented to person, place, and time.   Psychiatric:         Mood and Affect: Mood normal.         Fluids    Intake/Output Summary (Last 24 hours) at 3/5/2024 1518  Last data filed at 3/5/2024 1134  Gross per 24 hour   Intake 620 ml   Output 2500 ml   Net -1880 ml       Laboratory  Recent Labs     03/02/24 1810 03/03/24  0349 03/04/24  0013   WBC 12.6* 10.0 10.0   RBC 3.48* 2.88* 2.66*   HEMOGLOBIN 11.7* 9.6* 8.9*   HEMATOCRIT 35.1* 28.7* 26.5*   .9* 99.7* 99.6*   MCH 33.6* 33.3* 33.5*   MCHC 33.3 33.4 33.6   RDW 57.7* 55.9* 56.6*   PLATELETCT 312 261 247   MPV 9.8 10.0 10.1     Recent Labs     03/02/24 1810 03/03/24  0349 03/03/24  0949 03/04/24  0013   SODIUM 138 136  --  132*   POTASSIUM 4.0 4.2 4.8 5.4   CHLORIDE 93* 94*  --  93*   CO2 18* 22  --  27   GLUCOSE 222* 104*  --  109*   BUN 45* 59*  --  34*   CREATININE 9.77* 11.13*  --  7.34*   CALCIUM 8.7 8.3*  --  8.7     Recent Labs     03/04/24  0013    APTT 41.6*   INR 1.11               Imaging  EC-ECHOCARDIOGRAM COMPLETE W/O CONT   Final Result      DX-CHEST-PORTABLE (1 VIEW)   Final Result      Patchy bilateral interstitial opacities which could be seen in setting with edema and/or infection.      DX-CHEST-PORTABLE (1 VIEW)   Final Result      1.  Diffuse ill-defined interstitial opacities are most consistent with interstitial edema with viral pneumonitis in the differential diagnosis.      CL-LEFT HEART CATHETERIZATION WITH POSSIBLE INTERVENTION    (Results Pending)        Assessment/Plan  * Acute respiratory failure with hypoxia- (present on admission)  Assessment & Plan  58-year-old male on dialysis for end-stage renal disease and combined systolic and diastolic heart failure and severe aortic stenosis presented with acute respiratory distress after missed hemodialysis, hypertensive emergency.  Condition improved after correction of hypertensive crisis with nitroglycerin, Lasix and CPAP  Chest x-ray: Bilateral interstitial edema.  S/p dialysis, with symptom improvement  Weaning oxygen  3/5/2024  On hemodialysis  Continue to wean off oxygen      Severe aortic stenosis  Assessment & Plan  Echocardiogram result noted with ejection fraction 40 to 48%, grade 2 left ventricular distal dysfunction, severe aortic valve stenosis, pulmonary hypertension with RVSP 55mh hg .  Cardiology evaluated    Smoking  Assessment & Plan  Spent approx 5 mins on Tobacco cessation education . Discussed options of nicotine patch, medical treatment with wellbutrin and chantix. Discussed the benefits of quitting smoking and risks of continued smoking including cardiovascular disease, cancer and COPD.   Code 33781      Leukocytosis  Assessment & Plan  Resolved    Acute on chronic combined systolic (congestive) and diastolic (congestive) heart failure (HCC)  Assessment & Plan  Secondary to volume overload missed hemodialysis and uncontrolled blood pressure  Resume hemodialysis and blood  pressure medications  Monitor input and output and daily weight  3/5/2024  Volume status improved with hemodialysis      NSTEMI (non-ST elevated myocardial infarction) ([facility])  Assessment & Plan    Telemetry monitoring  O2 2L per nasal cannula to keep saturation more than 92%  On aspirin, Coreg, lisinopril  Lipitor 40 mg HS  Cardiology evaluated  Scheduled for coronary angiogram    HIV (human immunodeficiency virus infection) ([facility])  Assessment & Plan  Continue home regimen  Follow-up with Dr. Whyte    Hypertensive emergency  Assessment & Plan  Resolved  Resume home blood pressure medications lisinopril, carvedilol, amlodipine  Monitor blood pressure  IV labetalol as needed         VTE prophylaxis: Heparin     I have performed a physical exam and reviewed and updated ROS and Plan today (3/5/2024). In review of yesterday's note (3/4/2024), there are no changes except as documented above.

## 2024-03-05 NOTE — PROGRESS NOTES
[facility] Medicine Daily Progress Note    Date of Service  3/4/2024    Chief Complaint  Bob Ochoa is a 58 y.o. male admitted 3/2/2024 with dyspnea.    Hospital Course  Bob Ochoa is a 58 y.o. male with past medical history of end-stage renal disease on hemodialysis, peripheral arterial disease, cardiomyopathy with EF 40%, severe aortic stenosis, diastolic dysfunction grade 2, HIV on antiretroviral medications, recent COVID-19 infection, who presented 3/2/2024 with respiratory distress, severe chest pain that started at 5 PM.  He stated that he missed dialysis with his last hemodialysis on Thursday.  Upon EMS arrival his blood pressure 200/100 and desaturation to 80% on room air, placed on CPAP.  In ED his condition improved with CPAP and nitroglycerin ointment.  He also received IV Lasix but did not urinate.  He states that typically he has urine output.  Chest x-ray showed interstitial edema.  Troponin is elevated 356, 413.  EKG showed nonspecific T/ST changes with possible ischemia in inferior leads, sinus tachycardia.  Per chart review he was admitted here 2/24 to 2/25 with COVID-19 pneumonia and shortness of breath requiring BiPAP in IMCU.  He denies currently cough or fever.    ERP is in the process of contacting nephrology for emergent hemodialysis.  Last blood pressure 100/61.    Interval Problem Update  Patient with chest pain again overnight.  EKG with deep inverted T waves.  Started on heparin drip overnight for possible NSTEMI.  Troponin this morning relatively stable.  Patient refusing lab draws for Xa monitoring on heparin drip.  He is not interested in cardiac cath procedure even if he was having active heart attack and may die from it. He does not want this procedure done. He also does not want lab draws for heparin drip.  Heparin drip discontinued.  Weaning oxygen, on 2L oxygen today. Baseline room air.  Plan for more dialysis tomorrow for volume overload.  Anticipate patient can  discharge to home after dialysis tomorrow.      I have discussed this patient's plan of care and discharge plan at IDT rounds today with Case Management, Nursing, Nursing leadership, and other members of the IDT team.    Consultants/Specialty  nephrology    Code Status  Full Code    Disposition  The patient is not medically cleared for discharge to home or a post-acute facility.  Anticipate discharge to: home with close outpatient follow-up    I have placed the appropriate orders for post-discharge needs.    Review of Systems  Review of Systems   Constitutional:  Negative for chills and fever.   Eyes:  Negative for blurred vision and pain.   Respiratory:  Negative for cough and shortness of breath.    Cardiovascular:  Negative for chest pain, palpitations and leg swelling.   Gastrointestinal:  Negative for abdominal pain, nausea and vomiting.   Genitourinary:  Negative for dysuria and urgency.   Musculoskeletal:  Negative for back pain.   Skin:  Negative for itching and rash.   Neurological:  Negative for dizziness and headaches.   Psychiatric/Behavioral:  Negative for substance abuse. The patient does not have insomnia.         Physical Exam  Temp:  [36.7 °C (98.1 °F)-36.8 °C (98.2 °F)] 36.7 °C (98.1 °F)  Pulse:  [71-88] 84  Resp:  [16] 16  BP: ()/(53-72) 123/72  SpO2:  [86 %-97 %] 94 %    Physical Exam  Constitutional:       General: He is not in acute distress.     Appearance: He is not ill-appearing.   HENT:      Head: Normocephalic and atraumatic.      Right Ear: External ear normal.      Left Ear: External ear normal.      Mouth/Throat:      Pharynx: No oropharyngeal exudate or posterior oropharyngeal erythema.   Eyes:      Extraocular Movements: Extraocular movements intact.      Pupils: Pupils are equal, round, and reactive to light.   Cardiovascular:      Rate and Rhythm: Normal rate and regular rhythm.      Pulses: Normal pulses.      Heart sounds: Normal heart sounds.   Pulmonary:      Effort:  Pulmonary effort is normal. No respiratory distress.      Breath sounds: Normal breath sounds. No wheezing.   Abdominal:      General: Bowel sounds are normal. There is distension.      Palpations: Abdomen is soft.      Tenderness: There is no abdominal tenderness.   Musculoskeletal:         General: No swelling or tenderness.      Cervical back: Normal range of motion and neck supple.      Right lower leg: No edema.      Left lower leg: No edema.   Skin:     General: Skin is warm and dry.   Neurological:      General: No focal deficit present.      Mental Status: He is oriented to person, place, and time.      Cranial Nerves: No cranial nerve deficit.      Motor: No weakness.   Psychiatric:         Mood and Affect: Mood normal.         Behavior: Behavior normal.         Fluids  No intake or output data in the 24 hours ending 03/04/24 1942      Laboratory  Recent Labs     03/02/24 1810 03/03/24 0349 03/04/24  0013   WBC 12.6* 10.0 10.0   RBC 3.48* 2.88* 2.66*   HEMOGLOBIN 11.7* 9.6* 8.9*   HEMATOCRIT 35.1* 28.7* 26.5*   .9* 99.7* 99.6*   MCH 33.6* 33.3* 33.5*   MCHC 33.3 33.4 33.6   RDW 57.7* 55.9* 56.6*   PLATELETCT 312 261 247   MPV 9.8 10.0 10.1     Recent Labs     03/02/24 1810 03/03/24 0349 03/03/24  0949 03/04/24  0013   SODIUM 138 136  --  132*   POTASSIUM 4.0 4.2 4.8 5.4   CHLORIDE 93* 94*  --  93*   CO2 18* 22  --  27   GLUCOSE 222* 104*  --  109*   BUN 45* 59*  --  34*   CREATININE 9.77* 11.13*  --  7.34*   CALCIUM 8.7 8.3*  --  8.7     Recent Labs     03/04/24  0013   APTT 41.6*   INR 1.11               Imaging  DX-CHEST-PORTABLE (1 VIEW)   Final Result      Patchy bilateral interstitial opacities which could be seen in setting with edema and/or infection.      DX-CHEST-PORTABLE (1 VIEW)   Final Result      1.  Diffuse ill-defined interstitial opacities are most consistent with interstitial edema with viral pneumonitis in the differential diagnosis.           Assessment/Plan  * Acute  respiratory failure with hypoxia- (present on admission)  Assessment & Plan  58-year-old male on dialysis for end-stage renal disease and combined systolic and diastolic heart failure and severe aortic stenosis presented with acute respiratory distress after missed hemodialysis, hypertensive emergency.  Condition improved after correction of hypertensive crisis with nitroglycerin, Lasix and CPAP  Chest x-ray: Bilateral interstitial edema.  S/p dialysis, with symptom improvement  Weaning oxygen      Smoking  Assessment & Plan  Spent approx 5 mins on Tobacco cessation education . Discussed options of nicotine patch, medical treatment with wellbutrin and chantix. Discussed the benefits of quitting smoking and risks of continued smoking including cardiovascular disease, cancer and COPD.   Code 40781      Leukocytosis  Assessment & Plan  WBC 12.6  Possibly reactive  Will check procalcitonin    Acute on chronic combined systolic (congestive) and diastolic (congestive) heart failure (HCC)  Assessment & Plan  Secondary to volume overload missed hemodialysis and uncontrolled blood pressure  Resume hemodialysis and blood pressure medications  Monitor input and output and daily weight    NSTEMI (non-ST elevated myocardial infarction) ([facility])  Assessment & Plan  Type II NSTEMI secondary to hypertensive emergency and volume overload in the setting of severe aortic stenosis  Dialysis per nephrology  Continue carvedilol, lisinopril, aspirin  Monitor on telemetry, repeat troponin      HIV (human immunodeficiency virus infection) ([facility])  Assessment & Plan  Continue home regimen  Follow-up with Dr. Whyte    Hypertensive emergency  Assessment & Plan  Resolved  Resume home blood pressure medications lisinopril, carvedilol, amlodipine  Monitor blood pressure  IV labetalol as needed         VTE prophylaxis: heparin ppx

## 2024-03-05 NOTE — TELEPHONE ENCOUNTER
MARQUES Glaser.  You16 hours ago (3:24 PM)     Lost to follow up. Go ahead and refer to structural heart team. TY!     Referral to structural heart placed.

## 2024-03-05 NOTE — CARE PLAN
The patient is Watcher - Medium risk of patient condition declining or worsening    Shift Goals  Clinical Goals: monitor respiratory status  Patient Goals: rest  Family Goals: none present    Progress made toward(s) clinical / shift goals:    Problem: Knowledge Deficit - Standard  Goal: Patient and family/care givers will demonstrate understanding of plan of care, disease process/condition, diagnostic tests and medications  3/5/2024 0054 by Diana Durbin R.N.  Outcome: Progressing  Note: Educated pt on plan of care, medications and disease process.  3/5/2024 0053 by Diana Durbin R.N.  Outcome: Progressing  3/5/2024 0048 by Diana Durbin R.N.  Outcome: Progressing  Note: Educated pt on plan of care, medications and disease process     Problem: Fall Risk  Goal: Patient will remain free from falls  Outcome: Progressing  Note: Educated pt on fall precautions. Pt instructed to use call light for ny assistance needed. Pt's bed locked and in lowest position. Call light and personal belongings within reach. Hourly rounding in place.     Problem: Respiratory  Goal: Patient will achieve/maintain optimum respiratory ventilation and gas exchange  Outcome: Not Progressing  Note: Pt educated on monitoring and assessing respiratory status. Pt requires to increase O2 from initial O2 level d/t  difficulty breathing and O2 sat desats to 84%.       Patient is not progressing towards the following goals:      Problem: Respiratory  Goal: Patient will achieve/maintain optimum respiratory ventilation and gas exchange  Outcome: Not Progressing  Note: Pt educated on monitoring and assessing respiratory status. Pt requires to increase O2 from initial O2 level d/t  difficulty breathing and O2 sat desats to 84%.

## 2024-03-05 NOTE — PROGRESS NOTES
Pt c/o pain on 2 LPM O2 via oxymask, O2 sat checked 89%. Pt's O2 bumped up to 3 LPM O2 but continuously drops to 84%. Pt's oxygen increase to 7 LPM O2 via NC sating 93%. POC ongoing.

## 2024-03-05 NOTE — PROGRESS NOTES
Patient back in room from dialysis, pt now NPO awaiting cardiology consult, echo tech notified pt is back for urgent echocardiogram

## 2024-03-05 NOTE — PROGRESS NOTES
Bedside report received from off going RN/tech: Diana, assumed care of patient.   Overnight Events: pt continues to refuse most care including medications / lab draws, A&OX4 complains of SOB, currently requiring 3L via [place] RA at baseline. SBP has been soft with systolic 90's-low 100's. Denies any pain at this time. Plan for dialysis today.  Fall Risk Score: Low to Moderate fall risk  Fall risk interventions in place: PATIENT REFUSING BED ALARM  Bed type: Regular (Griffin Score less than 17 interventions in place)  Patient on cardiac monitor: Yes SR  70's-80's with diffuse ST depression  IVF/IV medications: Not Applicable   Oxygen: How many liters 3L via [place] will titrate off as tolerated   Bedside sitter: Not Applicable   Isolation: Not applicable

## 2024-03-05 NOTE — CONSULTS
Cardiology Initial Consultation    Date of Service  3/5/2024    Referring Physician  Denis Ramos M.D.    Reason for Consultation  NSTEMI    History of Presenting Illness  Bob Ochoa is a 58 y.o. male with a past medical history of severe aortic stenosis, hypertension, nonischemic cardiomyopathy, end-stage renal disease, HIV  who presented 3/2/2024 brought in by EMS with fever diffuse chest pain and shortness of breath after recent hospitalization for shortness of breath, pulmonary edema and respiratory failure.  Cardiology consultation requested for evaluation    In the ER vital signs were stable.  EKG showed lateral ST-T wave abnormalities.  Received IV furosemide, aspirin, IV heparin which was subsequently discontinued.    Since admission underwent dialysis yesterday and today.  Symptomatically improved.  Troponin levels are initially 375 consistently increasing up to 961.  Currently no chest pain or shortness of breath.  He has been treated with aspirin, atorvastatin, carvedilol along with amlodipine and lisinopril.    Recently hospitalized at [facility] 2/24/2024-2/5/2024 for respiratory failure requiring BiPAP, COVID-19 pneumonia pulmonary edema having been noncompliant with dialysis follow-up.  Echocardiogram showed LVEF 40% now with severe aortic stenosis compared to 2020 echocardiogram.  He had been referred to [facility] for management of his severe aortic stenosis.    Review of Systems  Review of Systems   Constitutional:  Positive for fatigue.   Respiratory:  Positive for shortness of breath. Negative for chest tightness.    Cardiovascular:  Positive for chest pain. Negative for palpitations.   Gastrointestinal:  Negative for abdominal pain and nausea.   Neurological:  Positive for weakness. Negative for dizziness and headaches.   All other systems reviewed and are negative.      Past Medical History   has no past medical history on file.    Surgical History   has no  past surgical history on file.    Family History  No family history of premature heart disease.    Social History       Medications  None       Allergies  No Known Allergies    Vital signs in last 24 hours  Temp:  [36.4 °C (97.5 °F)-36.8 °C (98.2 °F)] 36.5 °C (97.7 °F)  Pulse:  [64-91] 91  Resp:  [16-18] 18  BP: ()/(63-91) 143/91  SpO2:  [91 %-98 %] 92 %    Physical Exam  Physical Exam  Constitutional:       General: He is not in acute distress.  HENT:      Head: Normocephalic.   Eyes:      General: No scleral icterus.     Conjunctiva/sclera: Conjunctivae normal.      Pupils: Pupils are equal, round, and reactive to light.   Neck:      Comments: Normal jugular venous pressure.  Cardiovascular:      Rate and Rhythm: Normal rate and regular rhythm.      Pulses:           Carotid pulses are 1+ on the right side and 1+ on the left side.       Radial pulses are 1+ on the right side and 1+ on the left side.        Posterior tibial pulses are 1+ on the right side and 1+ on the left side.      Heart sounds: S1 normal and S2 normal. Murmur heard.      No friction rub. No gallop.   Pulmonary:      Effort: Pulmonary effort is normal.      Breath sounds: Normal breath sounds. No wheezing, rhonchi or rales.   Abdominal:      General: Bowel sounds are normal.      Palpations: Abdomen is soft.      Tenderness: There is no abdominal tenderness.   Musculoskeletal:      Right lower leg: No edema.      Left lower leg: No edema.      Comments: AV fistula left arm.   Skin:     General: Skin is warm and dry.      Nails: There is no clubbing.   Neurological:      Mental Status: He is alert and oriented to person, place, and time.   Psychiatric:         Behavior: Behavior normal.         Lab Review  Lab Results   Component Value Date/Time    WBC 10.0 03/04/2024 12:13 AM    RBC 2.66 (L) 03/04/2024 12:13 AM    HEMOGLOBIN 8.9 (L) 03/04/2024 12:13 AM    HEMATOCRIT 26.5 (L) 03/04/2024 12:13 AM    MCV 99.6 (H) 03/04/2024 12:13 AM    MCH  "33.5 (H) 03/04/2024 12:13 AM    MCHC 33.6 03/04/2024 12:13 AM    MPV 10.1 03/04/2024 12:13 AM      Lab Results   Component Value Date/Time    SODIUM 132 (L) 03/04/2024 12:13 AM    POTASSIUM 5.4 03/04/2024 12:13 AM    CHLORIDE 93 (L) 03/04/2024 12:13 AM    CO2 27 03/04/2024 12:13 AM    GLUCOSE 109 (H) 03/04/2024 12:13 AM    BUN 34 (H) 03/04/2024 12:13 AM    CREATININE 7.34 (HH) 03/04/2024 12:13 AM      Lab Results   Component Value Date/Time    ASTSGOT 22 03/03/2024 03:49 AM    ALTSGPT 9 03/03/2024 03:49 AM     Lab Results   Component Value Date/Time    TROPONINT 961 (H) 03/04/2024 12:13 AM       No results for input(s): \"NTPROBNP\" in the last 72 hours.    Cardiac Imaging and Procedures Review  EKG:  My personal interpretation of the EKG dated 3/3/2024 is sinus rhythm with lateral ST-T wave abnormalities.    Echocardiogram: 7/4/2020  Compared to the images of the prior study done 8/30/2017, changes   noted, LV function declined.  Left ventricular ejection fraction is visually estimated to be 40%.  Global hypokinesis with regional variation.  Grade II diastolic dysfunction.  Mild to moderate mitral regurgitation.  Estimated right ventricular systolic pressure  is 50 mmHg.      Echocardiogram:  2/25/2024  Prior study on 07/04/2020, compared to the report of the prior study,   there has been progression of aortic stenosis.  Moderately reduced left ventricular systolic function.  The left ventricular ejection fraction is visually estimated to be 40%.  The aortic valve leaflets are heavily calcified.  The aortic valve appears bileaflet.  Severe aortic valve stenosis.  Aortic valve area calculated from the continuity equation is  0.77 cm2.  Vmax is 3.93 m/s. Transvalvular gradients are - Peak: 62 mmHg, Mean: 40   mmHg.  Estimated right ventricular systolic pressure is 50 mmHg.    Imaging  Chest X-Ray: 3/3/2024  Probable edema    Stress Test: 2023   No evidence of significant jeopardized viable myocardium or prior " myocardial    infarction.   Normal left ventricular size, ejection fraction, and wall motion.   ECG INTERPRETATION   Negative stress ECG for ischemia.    Assessment  NSTEMI  Severe aortic stenosis  HFrEF, chronic  Hypertension, acute on chronic  ESRD on hemodialysis  COVID-19 pneumonia 2/2024  HIV    Recommendations Discussion  Reviewed the patient's medical records and echocardiogram images ECG tracings and laboratory test  Had a detailed discussion with the patient concerning his current cardiac condition regarding his severe aortic stenosis and concern about coronary artery disease.  I recommended to the patient proceeding with a coronary angiogram explaining the reason for the procedure, the procedure itself, with possible therapeutic scenarios as well as the risks including death, myocardial infarction, stroke, bleeding complications or infection need for possible emergent bypass surgery.  He expressed his understanding wish to proceed.  Currently on comprehensive secondary ASCVD therapy    I personally reviewed the patient's treatment plan with Denis Ramos MD.    Thank you for allowing me to participate in the care of this patient.    Please contact me with any questions.    Austin Pruitt M.D.   Cardiologist, [facility]  [phone]

## 2024-03-05 NOTE — PROGRESS NOTES
Telemetry Report:    Rhythm: SR  Heart Rate: 80 to 88  Ectopy: R) PVC    CT: 0.17  QRS: 0.07  QT: 0.35          Per telemetry monitor room

## 2024-03-06 ENCOUNTER — APPOINTMENT (OUTPATIENT)
Dept: CARDIOLOGY | Facility: MEDICAL CENTER | Age: 59
DRG: 280 | End: 2024-03-06
Attending: INTERNAL MEDICINE
Payer: MEDICAID

## 2024-03-06 LAB
ANION GAP SERPL CALC-SCNC: 16 MMOL/L (ref 7–16)
BASOPHILS # BLD AUTO: 0.4 % (ref 0–1.8)
BASOPHILS # BLD: 0.03 K/UL (ref 0–0.12)
BUN SERPL-MCNC: 49 MG/DL (ref 8–22)
CALCIUM SERPL-MCNC: 8.8 MG/DL (ref 8.5–10.5)
CHLORIDE SERPL-SCNC: 93 MMOL/L (ref 96–112)
CO2 SERPL-SCNC: 25 MMOL/L (ref 20–33)
CREAT SERPL-MCNC: 8.24 MG/DL (ref 0.5–1.4)
EOSINOPHIL # BLD AUTO: 0.18 K/UL (ref 0–0.51)
EOSINOPHIL NFR BLD: 2.3 % (ref 0–6.9)
ERYTHROCYTE [DISTWIDTH] IN BLOOD BY AUTOMATED COUNT: 54.7 FL (ref 35.9–50)
GFR SERPLBLD CREATININE-BSD FMLA CKD-EPI: 7 ML/MIN/1.73 M 2
GLUCOSE SERPL-MCNC: 97 MG/DL (ref 65–99)
HCT VFR BLD AUTO: 25.3 % (ref 42–52)
HGB BLD-MCNC: 8.6 G/DL (ref 14–18)
IMM GRANULOCYTES # BLD AUTO: 0.04 K/UL (ref 0–0.11)
IMM GRANULOCYTES NFR BLD AUTO: 0.5 % (ref 0–0.9)
LYMPHOCYTES # BLD AUTO: 1.51 K/UL (ref 1–4.8)
LYMPHOCYTES NFR BLD: 19.6 % (ref 22–41)
MCH RBC QN AUTO: 33.5 PG (ref 27–33)
MCHC RBC AUTO-ENTMCNC: 34 G/DL (ref 32.3–36.5)
MCV RBC AUTO: 98.4 FL (ref 81.4–97.8)
MONOCYTES # BLD AUTO: 0.59 K/UL (ref 0–0.85)
MONOCYTES NFR BLD AUTO: 7.7 % (ref 0–13.4)
NEUTROPHILS # BLD AUTO: 5.35 K/UL (ref 1.82–7.42)
NEUTROPHILS NFR BLD: 69.5 % (ref 44–72)
NRBC # BLD AUTO: 0 K/UL
NRBC BLD-RTO: 0 /100 WBC (ref 0–0.2)
PLATELET # BLD AUTO: 215 K/UL (ref 164–446)
PMV BLD AUTO: 10.2 FL (ref 9–12.9)
POTASSIUM SERPL-SCNC: 4.7 MMOL/L (ref 3.6–5.5)
RBC # BLD AUTO: 2.57 M/UL (ref 4.7–6.1)
SODIUM SERPL-SCNC: 134 MMOL/L (ref 135–145)
WBC # BLD AUTO: 7.7 K/UL (ref 4.8–10.8)

## 2024-03-06 PROCEDURE — 99152 MOD SED SAME PHYS/QHP 5/>YRS: CPT

## 2024-03-06 PROCEDURE — A9270 NON-COVERED ITEM OR SERVICE: HCPCS

## 2024-03-06 PROCEDURE — 700117 HCHG RX CONTRAST REV CODE 255: Performed by: INTERNAL MEDICINE

## 2024-03-06 PROCEDURE — 700111 HCHG RX REV CODE 636 W/ 250 OVERRIDE (IP)

## 2024-03-06 PROCEDURE — 700101 HCHG RX REV CODE 250

## 2024-03-06 PROCEDURE — 80048 BASIC METABOLIC PNL TOTAL CA: CPT

## 2024-03-06 PROCEDURE — 770020 HCHG ROOM/CARE - TELE (206)

## 2024-03-06 PROCEDURE — 99232 SBSQ HOSP IP/OBS MODERATE 35: CPT | Performed by: INTERNAL MEDICINE

## 2024-03-06 PROCEDURE — 700111 HCHG RX REV CODE 636 W/ 250 OVERRIDE (IP): Performed by: STUDENT IN AN ORGANIZED HEALTH CARE EDUCATION/TRAINING PROGRAM

## 2024-03-06 PROCEDURE — 700102 HCHG RX REV CODE 250 W/ 637 OVERRIDE(OP): Performed by: STUDENT IN AN ORGANIZED HEALTH CARE EDUCATION/TRAINING PROGRAM

## 2024-03-06 PROCEDURE — A9270 NON-COVERED ITEM OR SERVICE: HCPCS | Performed by: STUDENT IN AN ORGANIZED HEALTH CARE EDUCATION/TRAINING PROGRAM

## 2024-03-06 PROCEDURE — 36415 COLL VENOUS BLD VENIPUNCTURE: CPT

## 2024-03-06 PROCEDURE — 700102 HCHG RX REV CODE 250 W/ 637 OVERRIDE(OP): Performed by: INTERNAL MEDICINE

## 2024-03-06 PROCEDURE — A9270 NON-COVERED ITEM OR SERVICE: HCPCS | Performed by: INTERNAL MEDICINE

## 2024-03-06 PROCEDURE — 700102 HCHG RX REV CODE 250 W/ 637 OVERRIDE(OP)

## 2024-03-06 PROCEDURE — 85025 COMPLETE CBC W/AUTO DIFF WBC: CPT

## 2024-03-06 PROCEDURE — 99233 SBSQ HOSP IP/OBS HIGH 50: CPT | Performed by: STUDENT IN AN ORGANIZED HEALTH CARE EDUCATION/TRAINING PROGRAM

## 2024-03-06 PROCEDURE — 93454 CORONARY ARTERY ANGIO S&I: CPT | Mod: 26 | Performed by: INTERNAL MEDICINE

## 2024-03-06 PROCEDURE — 99152 MOD SED SAME PHYS/QHP 5/>YRS: CPT | Performed by: INTERNAL MEDICINE

## 2024-03-06 PROCEDURE — B2111ZZ FLUOROSCOPY OF MULTIPLE CORONARY ARTERIES USING LOW OSMOLAR CONTRAST: ICD-10-PCS | Performed by: INTERNAL MEDICINE

## 2024-03-06 RX ORDER — LIDOCAINE HYDROCHLORIDE 20 MG/ML
INJECTION, SOLUTION INFILTRATION; PERINEURAL
Status: COMPLETED
Start: 2024-03-06 | End: 2024-03-06

## 2024-03-06 RX ORDER — HEPARIN SODIUM 200 [USP'U]/100ML
INJECTION, SOLUTION INTRAVENOUS
Status: COMPLETED
Start: 2024-03-06 | End: 2024-03-06

## 2024-03-06 RX ORDER — VERAPAMIL HYDROCHLORIDE 2.5 MG/ML
INJECTION, SOLUTION INTRAVENOUS
Status: COMPLETED
Start: 2024-03-06 | End: 2024-03-06

## 2024-03-06 RX ORDER — MIDAZOLAM HYDROCHLORIDE 1 MG/ML
INJECTION INTRAMUSCULAR; INTRAVENOUS
Status: COMPLETED
Start: 2024-03-06 | End: 2024-03-06

## 2024-03-06 RX ORDER — HEPARIN SODIUM 1000 [USP'U]/ML
INJECTION, SOLUTION INTRAVENOUS; SUBCUTANEOUS
Status: COMPLETED
Start: 2024-03-06 | End: 2024-03-06

## 2024-03-06 RX ADMIN — IOHEXOL 20 ML: 350 INJECTION, SOLUTION INTRAVENOUS at 16:53

## 2024-03-06 RX ADMIN — HEPARIN SODIUM 5000 UNITS: 5000 INJECTION, SOLUTION INTRAVENOUS; SUBCUTANEOUS at 04:37

## 2024-03-06 RX ADMIN — CARVEDILOL 25 MG: 25 TABLET, FILM COATED ORAL at 18:51

## 2024-03-06 RX ADMIN — VERAPAMIL HYDROCHLORIDE 2.5 MG: 2.5 INJECTION, SOLUTION INTRAVENOUS at 16:24

## 2024-03-06 RX ADMIN — RILPIVIRINE HYDROCHLORIDE 25 MG: 25 TABLET, FILM COATED ORAL at 09:59

## 2024-03-06 RX ADMIN — CARVEDILOL 25 MG: 25 TABLET, FILM COATED ORAL at 10:00

## 2024-03-06 RX ADMIN — FAMOTIDINE 20 MG: 20 TABLET, FILM COATED ORAL at 18:51

## 2024-03-06 RX ADMIN — MIDAZOLAM HYDROCHLORIDE 1 MG: 2 INJECTION, SOLUTION INTRAMUSCULAR; INTRAVENOUS at 17:00

## 2024-03-06 RX ADMIN — CALCITRIOL CAPSULES 0.25 MCG 0.25 MCG: 0.25 CAPSULE ORAL at 04:37

## 2024-03-06 RX ADMIN — LISINOPRIL 20 MG: 20 TABLET ORAL at 04:37

## 2024-03-06 RX ADMIN — HEPARIN SODIUM: 1000 INJECTION, SOLUTION INTRAVENOUS; SUBCUTANEOUS at 16:24

## 2024-03-06 RX ADMIN — ASPIRIN 81 MG: 81 TABLET, COATED ORAL at 04:37

## 2024-03-06 RX ADMIN — ATORVASTATIN CALCIUM 40 MG: 40 TABLET, FILM COATED ORAL at 18:50

## 2024-03-06 RX ADMIN — HEPARIN SODIUM 2000 UNITS: 200 INJECTION, SOLUTION INTRAVENOUS at 16:25

## 2024-03-06 RX ADMIN — SEVELAMER CARBONATE 1600 MG: 800 TABLET, FILM COATED ORAL at 18:51

## 2024-03-06 RX ADMIN — AMLODIPINE BESYLATE 10 MG: 10 TABLET ORAL at 04:37

## 2024-03-06 RX ADMIN — HEPARIN SODIUM 5000 UNITS: 5000 INJECTION, SOLUTION INTRAVENOUS; SUBCUTANEOUS at 21:47

## 2024-03-06 RX ADMIN — SEVELAMER CARBONATE 1600 MG: 800 TABLET, FILM COATED ORAL at 09:59

## 2024-03-06 RX ADMIN — SEVELAMER CARBONATE 1600 MG: 800 TABLET, FILM COATED ORAL at 12:22

## 2024-03-06 RX ADMIN — DOCUSATE SODIUM 50 MG AND SENNOSIDES 8.6 MG 2 TABLET: 8.6; 5 TABLET, FILM COATED ORAL at 18:50

## 2024-03-06 RX ADMIN — LIDOCAINE HYDROCHLORIDE: 20 INJECTION, SOLUTION INFILTRATION; PERINEURAL at 16:24

## 2024-03-06 RX ADMIN — FENTANYL CITRATE 50 MCG: 50 INJECTION, SOLUTION INTRAMUSCULAR; INTRAVENOUS at 16:59

## 2024-03-06 RX ADMIN — DOLUTEGRAVIR SODIUM 50 MG: 50 TABLET, FILM COATED ORAL at 04:38

## 2024-03-06 RX ADMIN — NITROGLYCERIN 10 ML: 20 INJECTION INTRAVENOUS at 16:24

## 2024-03-06 ASSESSMENT — ENCOUNTER SYMPTOMS
COUGH: 0
VOMITING: 0
NAUSEA: 0
FEVER: 0
SHORTNESS OF BREATH: 0
CHILLS: 0

## 2024-03-06 ASSESSMENT — PAIN DESCRIPTION - PAIN TYPE: TYPE: ACUTE PAIN

## 2024-03-06 NOTE — CARE PLAN
The patient is Stable - Low risk of patient condition declining or worsening    Shift Goals  Clinical Goals: NPO midnight heart cath, labs, VS stable  Patient Goals: Rest  Family Goals: none present    Progress made toward(s) clinical / shift goals:    Problem: Knowledge Deficit - Standard  Goal: Patient and family/care givers will demonstrate understanding of plan of care, disease process/condition, diagnostic tests and medications  Description: Target End Date:  1-3 days or as soon as patient condition allows    Document in Patient Education    1.  Patient and family/caregiver oriented to unit, equipment, visitation policy and means for communicating concern  2.  Complete/review Learning Assessment  3.  Assess knowledge level of disease process/condition, treatment plan, diagnostic tests and medications  4.  Explain disease process/condition, treatment plan, diagnostic tests and medications  Outcome: Progressing  Note: Pt updated on POC, all questions answered at this time.     Problem: Care Map:  Day 3 Optimal Outcome for the Heart Failure Patient  Goal: Day 3:  Optimal Care of the heart failure patient  Description: Target End Date:  end of day 3  Outcome: Progressing  Note: Pt Is and Os continuously monitored. Daily weights being taken in the morning with stand up scale. Pt is NPO at midnight for heart cath in the morning. Echo taken: EF of 25%. No edema noted at this time. Pt receives dialysis Tues, Thurs, and Saturday for ESRD. Medication regimen evaluated for appropriateness.

## 2024-03-06 NOTE — PROGRESS NOTES
Bedside report received from off going RN/tech: Nadir, assumed care of patient.     Fall Risk Score: NO RISK  Fall risk interventions in place: Provide patient/family education based on risk assessment  Bed type: Regular (Griffin Score less than 17 interventions in place)  Patient on cardiac monitor: Yes  IVF/IV medications: Not Applicable   Oxygen: How many liters 3L  Bedside sitter: Not Applicable   Isolation: Not applicable

## 2024-03-06 NOTE — PROGRESS NOTES
Monitor Summary     Rhythm:SR  Heart Rate: 75-78  Ectopy: R PAC, R PVC  Measurement: .14/.09/.37    MONITOR STRIP UNAVAILABLE

## 2024-03-06 NOTE — DIETARY
Nutrition Services: Cardiac Diet Education Consult   Day 4 of admit.  Bob Fragoso-Two is a 58 y.o. male with admitting DX of Respiratory failure (HCC) [J96.90]    RD attempted to visit pt at bedside to provide heart-healthy diet education. Pt was sleeping and did not rouse to name or knocking. Left informational handout at bedside.    Consider referral to outpatient nutrition services for continuation of education as indicated or per pt preferences.     Please re-consult RD as indicated.

## 2024-03-06 NOTE — THERAPY
Physical Therapy Contact Note    Patient Name: Bob Twenty-Two  Age:  58 y.o., Sex:  male  Medical Record #: [id number]  Today's Date: 3/6/2024    PT Cardiac Rehab eval orders received. Pt pending coronary angiogram today. Will hold and re-attempt post-procedure as appropriate    Jc Martins PT, DPT

## 2024-03-06 NOTE — PROGRESS NOTES
Nephrology Daily Progress Note    Date of Service  3/6/2024    Chief Complaint  58 y.o. male admitted 3/2/2024 with chest pain, shortness of breath, ESRD    Interval Problem Update  Events last 24 hours noted, patient is refusing care.  No new complaints of chest pain at the moment  Pt had HD yesterday  3/6 patient feels better today, no new complaints  Plan for coronary angiogram today  Review of Systems  Review of Systems   Constitutional:  Negative for chills, fever and malaise/fatigue.   Respiratory:  Negative for cough and shortness of breath.    Cardiovascular:  Negative for chest pain and leg swelling.   Gastrointestinal:  Negative for nausea and vomiting.   Genitourinary:  Negative for dysuria, frequency and urgency.        Physical Exam  Temp:  [36.2 °C (97.2 °F)-36.9 °C (98.4 °F)] (P) 36.8 °C (98.2 °F)  Pulse:  [76-97] (P) 80  Resp:  [15-18] (P) 15  BP: (102-129)/(45-75) (P) 143/86  SpO2:  [94 %-99 %] (P) 94 %    Physical Exam  Vitals and nursing note reviewed.   Constitutional:       General: He is not in acute distress.     Appearance: He is not ill-appearing.   HENT:      Head: Normocephalic and atraumatic.      Right Ear: External ear normal.      Left Ear: External ear normal.      Nose: Nose normal.   Eyes:      General:         Right eye: No discharge.         Left eye: No discharge.      Conjunctiva/sclera: Conjunctivae normal.   Cardiovascular:      Rate and Rhythm: Normal rate and regular rhythm.      Heart sounds: No murmur heard.  Pulmonary:      Effort: Pulmonary effort is normal. No respiratory distress.      Breath sounds: Normal breath sounds. No wheezing.   Musculoskeletal:         General: No tenderness or deformity.      Right lower leg: No edema.      Left lower leg: No edema.   Skin:     General: Skin is warm.   Neurological:      General: No focal deficit present.      Mental Status: He is alert and oriented to person, place, and time.   Psychiatric:         Mood and Affect: Mood  "normal.         Behavior: Behavior normal.         Fluids    Intake/Output Summary (Last 24 hours) at 3/6/2024 1406  Last data filed at 3/5/2024 2300  Gross per 24 hour   Intake 250 ml   Output --   Net 250 ml       Laboratory  Recent Labs     03/04/24  0013 03/06/24  1019   WBC 10.0 7.7   RBC 2.66* 2.57*   HEMOGLOBIN 8.9* 8.6*   HEMATOCRIT 26.5* 25.3*   MCV 99.6* 98.4*   MCH 33.5* 33.5*   MCHC 33.6 34.0   RDW 56.6* 54.7*   PLATELETCT 247 215   MPV 10.1 10.2     Recent Labs     03/04/24  0013 03/06/24  1019   SODIUM 132* 134*   POTASSIUM 5.4 4.7   CHLORIDE 93* 93*   CO2 27 25   GLUCOSE 109* 97   BUN 34* 49*   CREATININE 7.34* 8.24*   CALCIUM 8.7 8.8     Recent Labs     03/04/24  0013   APTT 41.6*   INR 1.11     No results for input(s): \"NTPROBNP\" in the last 72 hours.        Imaging  EC-ECHOCARDIOGRAM COMPLETE W/O CONT   Final Result      DX-CHEST-PORTABLE (1 VIEW)   Final Result      Patchy bilateral interstitial opacities which could be seen in setting with edema and/or infection.      DX-CHEST-PORTABLE (1 VIEW)   Final Result      1.  Diffuse ill-defined interstitial opacities are most consistent with interstitial edema with viral pneumonitis in the differential diagnosis.      CL-LEFT HEART CATHETERIZATION WITH POSSIBLE INTERVENTION    (Results Pending)         Assessment/Plan  1 ESRD  2 respiratory failure  3 cardiomyopathy  4 Anemia  no acute need for HD today  Erythropoietin with dialysis  Renal diet  Daily BMP, CBC.  Renal dose all meds  Avoid nephrotoxins like NSAIDs.  Prognosis guarded.                   "

## 2024-03-06 NOTE — PROGRESS NOTES
Hospital Medicine Daily Progress Note    Date of Service  3/6/2024    Chief Complaint  Bob Ochoa is a 58 y.o. male admitted 3/2/2024 with shortness of breath    Hospital Course  58 male with past medical history of cardiomyopathy with EF 40%, severe aortic stenosis, HIV on antiretroviral, ESRD on hemodialysis presented with chest pain.  Patient reportedly had missed hemodialysis.Significant elevated blood pressure on arrival to ED.  Noted to have significantly elevated troponin with EKG changes.  Echo noted with ejection [place] 40 to 48%, severe aortic valve stenosis.  Patient initially declined to be on heparin drip.  Cardiology evaluated and recommended for coronary angiogram .       Interval Problem Update      3/6/2024'  Patient seen and examined by me in morning around  AOx3  Denies chest pain, shortness of breath, nausea/vomiting  Labs reviewed hemoglobin 8.6, chemistry consistent with ESRD on hemodialysis      Case discussed with cardiology      Telemetry monitoring  Continue on aspirin, Coreg, Lipitor  Scheduled for coronary angiogram today      Repeat BMP in a.m. to monitor electrolytes and renal function  Repeat CBC in a.m. to monitor white count and hemoglobin    Need close monitoring of blood counts, electrolytes and renal function due to potential of organ dysfunction due to NSTEMI      High risk of deterioration into arrhythmia due to NSTEMI.  Need close telemetry monitoring    Patient has a high medical complexity, complex decision making and is at high risk of complication, morbidity and mortality          I have discussed this patient's plan of care and discharge plan at IDT rounds today with Case Management, Nursing, Nursing leadership, and other members of the IDT team.    Consultants/Specialty  cardiology    Code Status  Full Code    Disposition  The patient is not medically cleared for discharge to home or a post-acute facility.  Anticipate discharge to: home with close outpatient  follow-up    I have placed the appropriate orders for post-discharge needs.    Review of Systems  Review of Systems   Respiratory:  Negative for shortness of breath.    Cardiovascular:  Negative for chest pain.        Physical Exam  Temp:  [36.2 °C (97.2 °F)-36.9 °C (98.4 °F)] (P) 36.8 °C (98.2 °F)  Pulse:  [76-97] (P) 80  Resp:  [15-18] (P) 15  BP: (102-129)/(45-75) (P) 143/86  SpO2:  [94 %-99 %] (P) 94 %    Physical Exam  Constitutional:       Appearance: Normal appearance.   Cardiovascular:      Rate and Rhythm: Normal rate and regular rhythm.      Heart sounds: Normal heart sounds.   Pulmonary:      Effort: Pulmonary effort is normal.   Musculoskeletal:      Right lower leg: No edema.      Left lower leg: No edema.   Neurological:      General: No focal deficit present.      Mental Status: He is alert and oriented to person, place, and time.   Psychiatric:         Mood and Affect: Mood normal.         Fluids    Intake/Output Summary (Last 24 hours) at 3/6/2024 1440  Last data filed at 3/5/2024 2300  Gross per 24 hour   Intake 250 ml   Output --   Net 250 ml       Laboratory  Recent Labs     03/04/24  0013 03/06/24  1019   WBC 10.0 7.7   RBC 2.66* 2.57*   HEMOGLOBIN 8.9* 8.6*   HEMATOCRIT 26.5* 25.3*   MCV 99.6* 98.4*   MCH 33.5* 33.5*   MCHC 33.6 34.0   RDW 56.6* 54.7*   PLATELETCT 247 215   MPV 10.1 10.2     Recent Labs     03/04/24  0013 03/06/24  1019   SODIUM 132* 134*   POTASSIUM 5.4 4.7   CHLORIDE 93* 93*   CO2 27 25   GLUCOSE 109* 97   BUN 34* 49*   CREATININE 7.34* 8.24*   CALCIUM 8.7 8.8     Recent Labs     03/04/24  0013   APTT 41.6*   INR 1.11               Imaging  EC-ECHOCARDIOGRAM COMPLETE W/O CONT   Final Result      DX-CHEST-PORTABLE (1 VIEW)   Final Result      Patchy bilateral interstitial opacities which could be seen in setting with edema and/or infection.      DX-CHEST-PORTABLE (1 VIEW)   Final Result      1.  Diffuse ill-defined interstitial opacities are most consistent with interstitial  edema with viral pneumonitis in the differential diagnosis.      CL-LEFT HEART CATHETERIZATION WITH POSSIBLE INTERVENTION    (Results Pending)        Assessment/Plan  * Acute respiratory failure with hypoxia- (present on admission)  Assessment & Plan  58-year-old male on dialysis for end-stage renal disease and combined systolic and diastolic heart failure and severe aortic stenosis presented with acute respiratory distress after missed hemodialysis, hypertensive emergency.  Condition improved after correction of hypertensive crisis with nitroglycerin, Lasix and CPAP  Chest x-ray: Bilateral interstitial edema.  S/p dialysis, with symptom improvement  Weaning oxygen  3/5/2024  On hemodialysis  Continue to wean off oxygen      Severe aortic stenosis  Assessment & Plan  Echocardiogram result noted with ejection fraction 40 to 48%, grade 2 left ventricular distal dysfunction, severe aortic valve stenosis, pulmonary hypertension with RVSP 55mh hg .  Cardiology evaluated    Smoking  Assessment & Plan  Spent approx 5 mins on Tobacco cessation education . Discussed options of nicotine patch, medical treatment with wellbutrin and chantix. Discussed the benefits of quitting smoking and risks of continued smoking including cardiovascular disease, cancer and COPD.   Code 82682      Leukocytosis  Assessment & Plan  Resolved    Acute on chronic combined systolic (congestive) and diastolic (congestive) heart failure (HCC)  Assessment & Plan  Secondary to volume overload missed hemodialysis and uncontrolled blood pressure  Resume hemodialysis and blood pressure medications  Monitor input and output and daily weight  3/5/2024  Volume status improved with hemodialysis      NSTEMI (non-ST elevated myocardial infarction) (HCC)  Assessment & Plan    Telemetry monitoring  O2 2L per nasal cannula to keep saturation more than 92%  On aspirin, Coreg, lisinopril  Lipitor 40 mg HS  Cardiology evaluated  Scheduled for coronary angiogram    HIV  (human immunodeficiency virus infection) (HCC)  Assessment & Plan  Continue home regimen  Follow-up with Dr. Whyte    Hypertensive emergency  Assessment & Plan  Resolved  Resume home blood pressure medications lisinopril, carvedilol, amlodipine  Monitor blood pressure  IV labetalol as needed         VTE prophylaxis: Heparin     I have performed a physical exam and reviewed and updated ROS and Plan today (3/6/2024). In review of yesterday's note (3/5/2024), there are no changes except as documented above.         Greater than 51 minutes spent preparing to see patient (e.g. review of tests) obtaining and/or reviewing separately obtained history. Performing a medically appropriate examination and/ evaluation.  Counseling and educating the patient/family/caregiver.  Ordering medications, tests, or procedures.  Referring and communicating with other health care professionals.  Documenting clinical information in EPIC.  Independently interpreting results and communicating results to patient/family/caregiver.  Care coordination.

## 2024-03-07 LAB
ANION GAP SERPL CALC-SCNC: 19 MMOL/L (ref 7–16)
BUN SERPL-MCNC: 63 MG/DL (ref 8–22)
CALCIUM SERPL-MCNC: 8.7 MG/DL (ref 8.5–10.5)
CHLORIDE SERPL-SCNC: 93 MMOL/L (ref 96–112)
CO2 SERPL-SCNC: 23 MMOL/L (ref 20–33)
CREAT SERPL-MCNC: 9.64 MG/DL (ref 0.5–1.4)
GFR SERPLBLD CREATININE-BSD FMLA CKD-EPI: 6 ML/MIN/1.73 M 2
GLUCOSE SERPL-MCNC: 124 MG/DL (ref 65–99)
POTASSIUM SERPL-SCNC: 5 MMOL/L (ref 3.6–5.5)
SODIUM SERPL-SCNC: 135 MMOL/L (ref 135–145)

## 2024-03-07 PROCEDURE — A9270 NON-COVERED ITEM OR SERVICE: HCPCS

## 2024-03-07 PROCEDURE — 36415 COLL VENOUS BLD VENIPUNCTURE: CPT

## 2024-03-07 PROCEDURE — 700111 HCHG RX REV CODE 636 W/ 250 OVERRIDE (IP): Performed by: STUDENT IN AN ORGANIZED HEALTH CARE EDUCATION/TRAINING PROGRAM

## 2024-03-07 PROCEDURE — A9270 NON-COVERED ITEM OR SERVICE: HCPCS | Performed by: INTERNAL MEDICINE

## 2024-03-07 PROCEDURE — 700102 HCHG RX REV CODE 250 W/ 637 OVERRIDE(OP): Performed by: STUDENT IN AN ORGANIZED HEALTH CARE EDUCATION/TRAINING PROGRAM

## 2024-03-07 PROCEDURE — 99232 SBSQ HOSP IP/OBS MODERATE 35: CPT | Performed by: INTERNAL MEDICINE

## 2024-03-07 PROCEDURE — 93005 ELECTROCARDIOGRAM TRACING: CPT

## 2024-03-07 PROCEDURE — 770020 HCHG ROOM/CARE - TELE (206)

## 2024-03-07 PROCEDURE — 99233 SBSQ HOSP IP/OBS HIGH 50: CPT | Performed by: STUDENT IN AN ORGANIZED HEALTH CARE EDUCATION/TRAINING PROGRAM

## 2024-03-07 PROCEDURE — 700102 HCHG RX REV CODE 250 W/ 637 OVERRIDE(OP)

## 2024-03-07 PROCEDURE — A9270 NON-COVERED ITEM OR SERVICE: HCPCS | Performed by: STUDENT IN AN ORGANIZED HEALTH CARE EDUCATION/TRAINING PROGRAM

## 2024-03-07 PROCEDURE — 700102 HCHG RX REV CODE 250 W/ 637 OVERRIDE(OP): Performed by: INTERNAL MEDICINE

## 2024-03-07 PROCEDURE — 80048 BASIC METABOLIC PNL TOTAL CA: CPT

## 2024-03-07 PROCEDURE — 90935 HEMODIALYSIS ONE EVALUATION: CPT | Performed by: INTERNAL MEDICINE

## 2024-03-07 RX ADMIN — HEPARIN SODIUM 5000 UNITS: 5000 INJECTION, SOLUTION INTRAVENOUS; SUBCUTANEOUS at 21:10

## 2024-03-07 RX ADMIN — HEPARIN SODIUM 5000 UNITS: 5000 INJECTION, SOLUTION INTRAVENOUS; SUBCUTANEOUS at 13:59

## 2024-03-07 RX ADMIN — ASPIRIN 81 MG: 81 TABLET, COATED ORAL at 04:38

## 2024-03-07 RX ADMIN — OXYCODONE 5 MG: 5 TABLET ORAL at 12:04

## 2024-03-07 RX ADMIN — AMLODIPINE BESYLATE 10 MG: 10 TABLET ORAL at 04:38

## 2024-03-07 RX ADMIN — HEPARIN SODIUM 5000 UNITS: 5000 INJECTION, SOLUTION INTRAVENOUS; SUBCUTANEOUS at 04:38

## 2024-03-07 RX ADMIN — DOCUSATE SODIUM 50 MG AND SENNOSIDES 8.6 MG 2 TABLET: 8.6; 5 TABLET, FILM COATED ORAL at 18:22

## 2024-03-07 RX ADMIN — SEVELAMER CARBONATE 1600 MG: 800 TABLET, FILM COATED ORAL at 09:08

## 2024-03-07 RX ADMIN — DOLUTEGRAVIR SODIUM 50 MG: 50 TABLET, FILM COATED ORAL at 04:38

## 2024-03-07 RX ADMIN — CALCITRIOL CAPSULES 0.25 MCG 0.25 MCG: 0.25 CAPSULE ORAL at 04:38

## 2024-03-07 RX ADMIN — LISINOPRIL 20 MG: 20 TABLET ORAL at 04:38

## 2024-03-07 RX ADMIN — RILPIVIRINE HYDROCHLORIDE 25 MG: 25 TABLET, FILM COATED ORAL at 07:29

## 2024-03-07 RX ADMIN — ATORVASTATIN CALCIUM 40 MG: 40 TABLET, FILM COATED ORAL at 18:22

## 2024-03-07 RX ADMIN — SEVELAMER CARBONATE 1600 MG: 800 TABLET, FILM COATED ORAL at 18:23

## 2024-03-07 RX ADMIN — SEVELAMER CARBONATE 1600 MG: 800 TABLET, FILM COATED ORAL at 13:55

## 2024-03-07 RX ADMIN — CARVEDILOL 25 MG: 25 TABLET, FILM COATED ORAL at 18:22

## 2024-03-07 RX ADMIN — FAMOTIDINE 20 MG: 20 TABLET, FILM COATED ORAL at 18:22

## 2024-03-07 ASSESSMENT — ENCOUNTER SYMPTOMS
PALPITATIONS: 0
DIZZINESS: 0
NAUSEA: 0
ABDOMINAL PAIN: 0
HEADACHES: 0
SHORTNESS OF BREATH: 0
CHEST TIGHTNESS: 0

## 2024-03-07 ASSESSMENT — PAIN DESCRIPTION - PAIN TYPE
TYPE: ACUTE PAIN

## 2024-03-07 ASSESSMENT — FIBROSIS 4 INDEX: FIB4 SCORE: 2.01

## 2024-03-07 NOTE — PROGRESS NOTES
[facility]     Dialysis treatment started at 0800 and completed at 1100. Nephrologist Dr. Najjar notified of treatment start.     NET UF: 1900 mL     Patient is A&Ox4, no pain and discomfort reported. VS within normal limits. Left UA AVF has Bruitt and thrill and no signs and symptoms of infection. AVF cannulated and no access issues encountered. Lines are patent w/ good blood flow. Lab results and orders reviewed prior to treatment start.     Patient completed and tolerated dialysis treatment well w/o complications. BP dropped on last 15 mins of HD treatment. UF has to be turned off until end of treatment. VS stayed within normal limits. Left UA AVF Deaccessed, No bleeding noted after needle removal. Manual access pressure applied x 10mins. Patient and PCN instructed to monitor Left UA AVF site for bleeding and to re-enforce pressure dressing if needed.     1130 Report given to PCN TOMMY Lopez RN    See Dialysis treatment flow sheet for details.

## 2024-03-07 NOTE — PROCEDURES
Cardiac Catheterization Laboratory Procedure Note    DATE: 3/6/2024    : Manoj Cassidy MD, MPH    PROCEDURES PERFORMED:  Coronary angiography  Ultrasound-guided right radial arterial access  Moderate conscious sedation    INDICATIONS:  Cardiomyopathy  Severe aortic valve stenosis    CONSENT:  The complete alternatives, risks, and benefits of the procedure were explained to the patient. Informed consent was obtained prior to the procedure.    MEDICATIONS:  Lidocaine  Fentanyl  Midazolam  Nitroglycerin  Verapamil  Heparin    MODERATE CONSCIOUS SEDATION:  I personally supervised the administration of moderate conscious sedation by the nursing staff for 10 minutes.  Start time: 1645  End time: 1655    CONTRAST: Omnipaque 20 cc    RADIATION DOSE (Air Kerma): 81 mGy    FLUOROSCOPY TIME: 1.1 minutes    ACCESS:  6-[place] Glidesheath in the right radial artery    ESTIMATED BLOOD LOSS: <10 cc    COMPLICATIONS: None    PROCEDURE IN DETAIL:  The patient was brought to the cardiac catheterization laboratory in the fasting state. The skin over the right wrist was prepped and draped in the usual sterile fashion. Lidocaine infiltration was used to anesthetize the tissue over the right radial artery. Using the micropuncture technique, and ultrasound guidance, a 6-[place] Glidesheath was inserted in the right radial artery. A 5-Fr Terumo Tiger diagnostic catheter was then advanced over a standard J-wire into the left ventricular cavity where it was gently aspirated, flushed, and then withdrawn across the aortic valve with sequential pressures measured. This catheter was then used to engage the ostium of the right coronary artery and cineangiograms were obtained in multiple projections for complete evaluation of the right coronary system. This catheter was then used to engage the ostium of the left coronary artery and cineangiograms were obtained in multiple projections for complete evaluation of the left coronary system.  Following completion of coronary angiography, all wires, catheters, and sheaths were removed.  A TR band was placed over the right wrist using the patent hemostasis technique.     HEMODYNAMICS:  Aortic pressure: 109/62 mmHg  No LHC performed given severe aortic stenosis    CORONARY ANGIOGRAPHY:  The left main coronary artery : Large in caliber vessel with mild CAD, trifurcates to LAD, ramus intermedius and left circumflex  The left anterior descending coronary artery : Large in caliber transapical vessel with calcified 80% severe proximal/mid stenoses.  Gives rise to several small-medium caliber diagonal branches with severe ostial/proximal disease  Ramus intermedius coronary artery: Small-medium in caliber vessel  The left circumflex coronary artery : Large-caliber vessel with severe 80% ostial/proximal stenosis.  Gives rise to a bifurcating OM and the true circumflex tapers in the AV groove.  The distal circumflex has severe diffuse disease  The right coronary artery  : Large-caliber dominant vessel with severe calcified 80% mid stenosis.  Small PLB system.  Severe 80% proximal/mid right PDA.    IMPRESSION:  Severe multivessel CAD    RECOMMENDATIONS:  --Cardiothoracic surgical consultation given multivessel CAD, severe AS and ischemic cardiomyopathy LVEF ~ 45%  --TR band protocol  -- Consider heparin drip ACS protocol initiation (per report, patient refused earlier)    NOTIFICATION:  The patient's son - Sridhar -  was attempted to be called and notified of the results; no answer.    Referring Cardiologist - Dr. Pruitt - and IM hospitalist - Dr. Ramos - were notified.    Manoj Cassidy MD, MPH [facility]  Interventional Cardiologist  [facility]   of Clinical Internal Medicine - [facility] Mike EDMONDSON

## 2024-03-07 NOTE — PROGRESS NOTES
Hospital Medicine Daily Progress Note    Date of Service  3/7/2024    Chief Complaint  Bob Ochoa is a 58 y.o. male admitted 3/2/2024 with shortness of breath    Hospital Course  58 male with past medical history of cardiomyopathy with EF 40%, severe aortic stenosis, HIV on antiretroviral, ESRD on hemodialysis presented with chest pain.  Patient reportedly had missed hemodialysis.Significant elevated blood pressure on arrival to ED.  Noted to have significantly elevated troponin with EKG changes.  Echo noted with ejection [place] 40 to 48%, severe aortic valve stenosis.  Cardiac cath on 3/7 revealed severe multivessel CAD with severe aortic stenosis.  Cardiothoracic surgeon to evaluate for aortic valve repair and CABG.    Interval Problem Update        3/7/2024  Patient seen and examined in hemodialysis  Remained stable  Remain asymptomatic  Labs reviewed hemoglobin 8.6, chemistry consistent with ESRD      Case discussed with cardiology Dr. Cassidy , cardiology Dr. Pruitt       Telemetry monitoring  Continue on aspirin, Coreg, Lipitor  Cardiothoracic surgeon to evaluate for aortic valve repair and CABG.    Repeat BMP in a.m. to monitor electrolytes and renal function  Repeat CBC in a.m. to monitor white count and hemoglobin    Need close monitoring of blood counts, electrolytes and renal function due to potential of organ dysfunction due to NSTEMI      High risk of deterioration into arrhythmia due to NSTEMI.  Need close telemetry monitoring    Patient has a high medical complexity, complex decision making and is at high risk of complication, morbidity and mortality        I have discussed this patient's plan of care and discharge plan at IDT rounds today with Case Management, Nursing, Nursing leadership, and other members of the IDT team.    Consultants/Specialty  cardiology    Code Status  Full Code    Disposition  The patient is not medically cleared for discharge to home or a post-acute  facility.  Anticipate discharge to: home with close outpatient follow-up    I have placed the appropriate orders for post-discharge needs.    Review of Systems  Review of Systems   Respiratory:  Negative for shortness of breath.    Cardiovascular:  Negative for chest pain.        Physical Exam  Temp:  [36.1 °C (97 °F)-36.9 °C (98.4 °F)] 36.7 °C (98.1 °F)  Pulse:  [68-89] 83  Resp:  [14-19] 19  BP: ()/(58-71) 128/71  SpO2:  [90 %-98 %] 98 %    Physical Exam  Constitutional:       Appearance: Normal appearance.   Cardiovascular:      Rate and Rhythm: Normal rate and regular rhythm.      Heart sounds: Normal heart sounds.   Pulmonary:      Effort: Pulmonary effort is normal.   Musculoskeletal:      Right lower leg: No edema.      Left lower leg: No edema.   Neurological:      General: No focal deficit present.      Mental Status: He is alert and oriented to person, place, and time.   Psychiatric:         Mood and Affect: Mood normal.         Fluids    Intake/Output Summary (Last 24 hours) at 3/7/2024 1406  Last data filed at 3/7/2024 1130  Gross per 24 hour   Intake 1250 ml   Output 2400 ml   Net -1150 ml       Laboratory  Recent Labs     03/06/24  1019   WBC 7.7   RBC 2.57*   HEMOGLOBIN 8.6*   HEMATOCRIT 25.3*   MCV 98.4*   MCH 33.5*   MCHC 34.0   RDW 54.7*   PLATELETCT 215   MPV 10.2     Recent Labs     03/06/24  1019 03/07/24  0325   SODIUM 134* 135   POTASSIUM 4.7 5.0   CHLORIDE 93* 93*   CO2 25 23   GLUCOSE 97 124*   BUN 49* 63*   CREATININE 8.24* 9.64*   CALCIUM 8.8 8.7                     Imaging  EC-ECHOCARDIOGRAM COMPLETE W/O CONT   Final Result      DX-CHEST-PORTABLE (1 VIEW)   Final Result      Patchy bilateral interstitial opacities which could be seen in setting with edema and/or infection.      DX-CHEST-PORTABLE (1 VIEW)   Final Result      1.  Diffuse ill-defined interstitial opacities are most consistent with interstitial edema with viral pneumonitis in the differential diagnosis.      CL-LEFT  HEART CATHETERIZATION WITH POSSIBLE INTERVENTION    (Results Pending)        Assessment/Plan  * Acute respiratory failure with hypoxia- (present on admission)  Assessment & Plan  58-year-old male on dialysis for end-stage renal disease and combined systolic and diastolic heart failure and severe aortic stenosis presented with acute respiratory distress after missed hemodialysis, hypertensive emergency.  Condition improved after correction of hypertensive crisis with nitroglycerin, Lasix and CPAP  Chest x-ray: Bilateral interstitial edema.  S/p dialysis, with symptom improvement  Weaning oxygen  3/5/2024  On hemodialysis  Continue to wean off oxygen      Severe aortic stenosis  Assessment & Plan  Echocardiogram result noted with ejection fraction 40 to 48%, grade 2 left ventricular distal dysfunction, severe aortic valve stenosis, pulmonary hypertension with RVSP 55mh hg .  Cardiology evaluated    Smoking  Assessment & Plan  Spent approx 5 mins on Tobacco cessation education . Discussed options of nicotine patch, medical treatment with wellbutrin and chantix. Discussed the benefits of quitting smoking and risks of continued smoking including cardiovascular disease, cancer and COPD.   Code 03824      Leukocytosis  Assessment & Plan  Resolved    Acute on chronic combined systolic (congestive) and diastolic (congestive) heart failure (HCC)  Assessment & Plan  Secondary to volume overload missed hemodialysis and uncontrolled blood pressure  Resume hemodialysis and blood pressure medications  Monitor input and output and daily weight  3/5/2024  Volume status improved with hemodialysis      NSTEMI (non-ST elevated myocardial infarction) (HCC)  Assessment & Plan    Telemetry monitoring  O2 2L per nasal cannula to keep saturation more than 92%  On aspirin, Coreg, lisinopril  Lipitor 40 mg HS  Cardiac cath on 3/7 revealed severe multivessel CAD with severe aortic stenosis.  Cardiothoracic surgeon to evaluate for aortic valve  repair and CABG.    HIV (human immunodeficiency virus infection) ([facility])  Assessment & Plan  Continue home regimen  Follow-up with Dr. Whyte    Hypertensive emergency  Assessment & Plan  Resolved  Resume home blood pressure medications lisinopril, carvedilol, amlodipine  Monitor blood pressure  IV labetalol as needed         VTE prophylaxis: Heparin     I have performed a physical exam and reviewed and updated ROS and Plan today (3/7/2024). In review of yesterday's note (3/6/2024), there are no changes except as documented above.

## 2024-03-07 NOTE — PROGRESS NOTES
Pt taken via bed to dialysis. Pt on RA, saturating 100%. Monitor room called. Report given to dialysis NAMRATA Will.

## 2024-03-07 NOTE — PROGRESS NOTES
Monitor Summary     Rhythm: SR  Heart Rate: 67-71  Ectopy: R PVC  Measurement: .14/.08/.36

## 2024-03-07 NOTE — CARE PLAN
The patient is Stable - Low risk of patient condition declining or worsening    Shift Goals  Clinical Goals: Post ops, TR band removal  Patient Goals: Rest  Family Goals: none present    Progress made toward(s) clinical / shift goals:    Problem: Knowledge Deficit - Standard  Goal: Patient and family/care givers will demonstrate understanding of plan of care, disease process/condition, diagnostic tests and medications  Description: Target End Date:  1-3 days or as soon as patient condition allows    Document in Patient Education    1.  Patient and family/caregiver oriented to unit, equipment, visitation policy and means for communicating concern  2.  Complete/review Learning Assessment  3.  Assess knowledge level of disease process/condition, treatment plan, diagnostic tests and medications  4.  Explain disease process/condition, treatment plan, diagnostic tests and medications  Outcome: Progressing  Note: Pt updated on POC, all questions answered at this time.     Problem: Care Map:  Day 4 Optimal Outcome for the Heart Failure Patient  Goal: Day 4:  Optimal Care of the heart failure patient  Description: Target End Date:  end of day 4  Outcome: Progressing  Note: Pt Is and Os being continuously monitored. Pt is anuric due to being a dialysis patient Tues, Thcarloz, Sat. Pt medication regimen assessed for appropriateness. No edema noted on patient. Pt breath sounds clear in upper lobes and diminished in RML and lower lobes. Pt is on a cardiac diet.

## 2024-03-07 NOTE — PROGRESS NOTES
Bedside report received from Randa OTT. Pt is A&Ox4, currently in SR. Bed in lowest position, call light within reach, pt resting comfortably. Pt educated to call before getting out of bed. No further needs at this time.        Fall Risk Score: LOW RISK  Fall risk interventions in place: Place yellow fall risk ID band on patient and Provide patient/family education based on risk assessment  Bed type: Regular (Griffin Score less than 17 interventions in place)  Patient on cardiac monitor: Yes  IVF/IV medications: Not Applicable   Oxygen: How many liters 2L oxy mask  Bedside sitter: Not Applicable   Isolation: Not applicable

## 2024-03-07 NOTE — PROGRESS NOTES
Bedside report received from off going RN/tech: Jaylen, assumed care of patient.     Fall Risk Score: NO RISK  Fall risk interventions in place: Provide patient/family education based on risk assessment  Bed type: Regular (Griffin Score less than 17 interventions in place)  Patient on cardiac monitor: Yes  IVF/IV medications: Not Applicable   Oxygen: How many liters 2L  Bedside sitter: Not Applicable   Isolation: Not applicable

## 2024-03-07 NOTE — PROGRESS NOTES
Cardiology Follow Up Progress Note    Date of Service  3/7/2024    Attending Physician  Denis Ramos M.D.    HPI  History of Presenting Illness  Bob Ochoa is a 58 y.o. male with a past medical history of severe aortic stenosis, hypertension, nonischemic cardiomyopathy, end-stage renal disease, HIV  who presented 3/2/2024 brought in by EMS with fever diffuse chest pain and shortness of breath after recent hospitalization for shortness of breath, pulmonary edema and respiratory failure.     Interim Events  3/7: /65.  HR 74.  Sinus.  Tolerated coronary angiography yesterday.  Currently having no chest pain or heart failure symptoms.    Review of Systems  Review of Systems   Respiratory:  Negative for chest tightness and shortness of breath.    Cardiovascular:  Negative for palpitations.   Gastrointestinal:  Negative for abdominal pain and nausea.   Neurological:  Negative for dizziness and headaches.       Vital signs in last 24 hours  Temp:  [36.1 °C (97 °F)-36.9 °C (98.4 °F)] 36.8 °C (98.2 °F)  Pulse:  [68-83] 83  Resp:  [14-18] 16  BP: ()/(59-71) 150/71  SpO2:  [90 %-98 %] 91 %    Physical Exam  Physical Exam  Constitutional:       General: He is not in acute distress.  Neck:      Comments: Normal jugular venous pressure.  Cardiovascular:      Rate and Rhythm: Normal rate and regular rhythm.      Pulses:           Radial pulses are 1+ on the right side.      Heart sounds: S1 normal and S2 normal. Murmur heard.      No friction rub. No gallop.   Pulmonary:      Effort: Pulmonary effort is normal.      Breath sounds: Normal breath sounds. No wheezing, rhonchi or rales.   Musculoskeletal:      Right lower leg: No edema.      Left lower leg: No edema.      Comments: AV fistula left arm.   Skin:     General: Skin is warm and dry.      Nails: There is no clubbing.   Neurological:      Mental Status: He is alert and oriented to person, place, and time.   Psychiatric:         Behavior: Behavior normal.  "        Lab Review  Lab Results   Component Value Date/Time    WBC 7.7 03/06/2024 10:19 AM    RBC 2.57 (L) 03/06/2024 10:19 AM    HEMOGLOBIN 8.6 (L) 03/06/2024 10:19 AM    HEMATOCRIT 25.3 (L) 03/06/2024 10:19 AM    MCV 98.4 (H) 03/06/2024 10:19 AM    MCH 33.5 (H) 03/06/2024 10:19 AM    MCHC 34.0 03/06/2024 10:19 AM    MPV 10.2 03/06/2024 10:19 AM      Lab Results   Component Value Date/Time    SODIUM 135 03/07/2024 03:25 AM    POTASSIUM 5.0 03/07/2024 03:25 AM    CHLORIDE 93 (L) 03/07/2024 03:25 AM    CO2 23 03/07/2024 03:25 AM    GLUCOSE 124 (H) 03/07/2024 03:25 AM    BUN 63 (H) 03/07/2024 03:25 AM    CREATININE 9.64 (HH) 03/07/2024 03:25 AM      Lab Results   Component Value Date/Time    ASTSGOT 22 03/03/2024 03:49 AM    ALTSGPT 9 03/03/2024 03:49 AM     Lab Results   Component Value Date/Time    TROPONINT 961 (H) 03/04/2024 12:13 AM       No results for input(s): \"NTPROBNP\" in the last 72 hours.    Cardiac Imaging and Procedures Review  EKG:  My personal interpretation of the EKG dated 3/3/2024 is sinus rhythm with lateral ST-T wave abnormalities.    Rhythm: My personal interpretation the rhythm dated 3/7/2023 is sinus rhythm     Echocardiogram: 7/4/2020  Compared to the images of the prior study done 8/30/2017, changes   noted, LV function declined.  Left ventricular ejection fraction is visually estimated to be 40%.  Global hypokinesis with regional variation.  Grade II diastolic dysfunction.  Mild to moderate mitral regurgitation.  Estimated right ventricular systolic pressure  is 50 mmHg.        Echocardiogram:  2/25/2024  Prior study on 07/04/2020, compared to the report of the prior study,   there has been progression of aortic stenosis.  Moderately reduced left ventricular systolic function.  The left ventricular ejection fraction is visually estimated to be 40%.  The aortic valve leaflets are heavily calcified.  The aortic valve appears bileaflet.  Severe aortic valve stenosis.  Aortic valve area " calculated from the continuity equation is  0.77 cm2.  Vmax is 3.93 m/s. Transvalvular gradients are - Peak: 62 mmHg, Mean: 40   mmHg.  Estimated right ventricular systolic pressure is 50 mmHg.    Cardiac catheterization 3/6/2024  CORONARY ANGIOGRAPHY:  The left main coronary artery : Large in caliber vessel with mild CAD, trifurcates to LAD, ramus intermedius and left circumflex  The left anterior descending coronary artery : Large in caliber transapical vessel with calcified 80% severe proximal/mid stenoses.  Gives rise to several small-medium caliber diagonal branches with severe ostial/proximal disease  Ramus intermedius coronary artery: Small-medium in caliber vessel  The left circumflex coronary artery : Large-caliber vessel with severe 80% ostial/proximal stenosis.  Gives rise to a bifurcating OM and the true circumflex tapers in the AV groove.  The distal circumflex has severe diffuse disease  The right coronary artery  : Large-caliber dominant vessel with severe calcified 80% mid stenosis.  Small PLB system.  Severe 80% proximal/mid right PDA.  IMPRESSION:  Severe multivessel CAD      Imaging  Chest X-Ray: 3/3/2024  Probable edema     Stress Test: 2023   No evidence of significant jeopardized viable myocardium or prior myocardial    infarction.   Normal left ventricular size, ejection fraction, and wall motion.   ECG INTERPRETATION   Negative stress ECG for ischemia.     Assessment  NSTEMI  Severe aortic stenosis  Multivessel CAD  HFrEF 40%, chronic  Hypertension, acute on chronic  ESRD on hemodialysis  COVID-19 pneumonia 2/2024  HIV     Recommendations Discussion  The patient was found to have multivessel CAD on coronary angiography yesterday.  CT surgery has been consulted for AVR and CABG.  I explained to the patient in detail the findings of the coronary angiogram and the need for surgery related to his CAD and severe aortic stenosis.  Currently on comprehensive secondary ASCVD therapy including  aspirin, atorvastatin, carvedilol.     I personally reviewed the patient's treatment plan with Denis Ramos MD.     Thank you for allowing me to participate in the care of this patient.     Please contact me with any questions.     Austin Pruitt M.D.   Cardiologist, [facility]  [phone]

## 2024-03-07 NOTE — PROCEDURES
Pt with ESRD, presented with acute coronary syndrome.  Results of coronary angiogram were reviewed.  Seen and examined while getting HD.

## 2024-03-07 NOTE — THERAPY
Physical Therapy Contact Note    Patient Name: Bob Twenty-Two  Age:  58 y.o., Sex:  male  Medical Record #: [id number]  Today's Date: 3/7/2024         03/07/24 1531   Interdisciplinary Plan of Care Collaboration   IDT Collaboration with  Nursing   Collaboration Comments Per RN and chart review, pt is pending possible CABG this admission. Will hold and re-attempt for PT eval as appropriate/able.   Session Information   Date / Session Number  hold 3/6, 3/7  (EVAL)     Cathryn Devine, PT, DPT

## 2024-03-07 NOTE — CARE PLAN
The patient is Watcher - Medium risk of patient condition declining or worsening    Shift Goals  Clinical Goals: Diaylsis, wean off oxygen  Patient Goals: Rest  Family Goals: none present    Progress made toward(s) clinical / shift goals:    Problem: Knowledge Deficit - Standard  Goal: Patient and family/care givers will demonstrate understanding of plan of care, disease process/condition, diagnostic tests and medications  Outcome: Progressing     Problem: Pain - Standard  Goal: Alleviation of pain or a reduction in pain to the patient’s comfort goal  Outcome: Progressing     Problem: Care Map:  Admission Optimal Outcome for the Heart Failure Patient  Goal: Admission:  Optimal Care of the heart failure patient  Outcome: Progressing     Problem: Fall Risk  Goal: Patient will remain free from falls  Outcome: Progressing     Problem: Respiratory  Goal: Patient will achieve/maintain optimum respiratory ventilation and gas exchange  Outcome: Progressing       Patient is not progressing towards the following goals:

## 2024-03-08 LAB — EKG IMPRESSION: NORMAL

## 2024-03-08 PROCEDURE — 99232 SBSQ HOSP IP/OBS MODERATE 35: CPT | Performed by: STUDENT IN AN ORGANIZED HEALTH CARE EDUCATION/TRAINING PROGRAM

## 2024-03-08 PROCEDURE — 700102 HCHG RX REV CODE 250 W/ 637 OVERRIDE(OP): Performed by: STUDENT IN AN ORGANIZED HEALTH CARE EDUCATION/TRAINING PROGRAM

## 2024-03-08 PROCEDURE — 99232 SBSQ HOSP IP/OBS MODERATE 35: CPT | Performed by: INTERNAL MEDICINE

## 2024-03-08 PROCEDURE — 700102 HCHG RX REV CODE 250 W/ 637 OVERRIDE(OP)

## 2024-03-08 PROCEDURE — 700111 HCHG RX REV CODE 636 W/ 250 OVERRIDE (IP): Performed by: STUDENT IN AN ORGANIZED HEALTH CARE EDUCATION/TRAINING PROGRAM

## 2024-03-08 PROCEDURE — 93010 ELECTROCARDIOGRAM REPORT: CPT | Performed by: INTERNAL MEDICINE

## 2024-03-08 PROCEDURE — A9270 NON-COVERED ITEM OR SERVICE: HCPCS | Performed by: INTERNAL MEDICINE

## 2024-03-08 PROCEDURE — A9270 NON-COVERED ITEM OR SERVICE: HCPCS | Performed by: STUDENT IN AN ORGANIZED HEALTH CARE EDUCATION/TRAINING PROGRAM

## 2024-03-08 PROCEDURE — 700102 HCHG RX REV CODE 250 W/ 637 OVERRIDE(OP): Performed by: INTERNAL MEDICINE

## 2024-03-08 PROCEDURE — 770020 HCHG ROOM/CARE - TELE (206)

## 2024-03-08 PROCEDURE — A9270 NON-COVERED ITEM OR SERVICE: HCPCS

## 2024-03-08 RX ADMIN — AMLODIPINE BESYLATE 10 MG: 10 TABLET ORAL at 06:29

## 2024-03-08 RX ADMIN — DOLUTEGRAVIR SODIUM 50 MG: 50 TABLET, FILM COATED ORAL at 06:30

## 2024-03-08 RX ADMIN — HEPARIN SODIUM 5000 UNITS: 5000 INJECTION, SOLUTION INTRAVENOUS; SUBCUTANEOUS at 14:27

## 2024-03-08 RX ADMIN — FAMOTIDINE 20 MG: 20 TABLET, FILM COATED ORAL at 18:14

## 2024-03-08 RX ADMIN — ATORVASTATIN CALCIUM 40 MG: 40 TABLET, FILM COATED ORAL at 18:14

## 2024-03-08 RX ADMIN — SEVELAMER CARBONATE 1600 MG: 800 TABLET, FILM COATED ORAL at 08:47

## 2024-03-08 RX ADMIN — LISINOPRIL 20 MG: 20 TABLET ORAL at 06:29

## 2024-03-08 RX ADMIN — RILPIVIRINE HYDROCHLORIDE 25 MG: 25 TABLET, FILM COATED ORAL at 08:47

## 2024-03-08 RX ADMIN — SEVELAMER CARBONATE 1600 MG: 800 TABLET, FILM COATED ORAL at 18:15

## 2024-03-08 RX ADMIN — HEPARIN SODIUM 5000 UNITS: 5000 INJECTION, SOLUTION INTRAVENOUS; SUBCUTANEOUS at 06:29

## 2024-03-08 RX ADMIN — CARVEDILOL 25 MG: 25 TABLET, FILM COATED ORAL at 08:03

## 2024-03-08 RX ADMIN — DOCUSATE SODIUM 50 MG AND SENNOSIDES 8.6 MG 2 TABLET: 8.6; 5 TABLET, FILM COATED ORAL at 18:14

## 2024-03-08 RX ADMIN — CARVEDILOL 25 MG: 25 TABLET, FILM COATED ORAL at 18:15

## 2024-03-08 RX ADMIN — OXYCODONE 5 MG: 5 TABLET ORAL at 18:14

## 2024-03-08 RX ADMIN — HEPARIN SODIUM 5000 UNITS: 5000 INJECTION, SOLUTION INTRAVENOUS; SUBCUTANEOUS at 21:25

## 2024-03-08 RX ADMIN — CALCITRIOL CAPSULES 0.25 MCG 0.25 MCG: 0.25 CAPSULE ORAL at 06:30

## 2024-03-08 RX ADMIN — ASPIRIN 81 MG: 81 TABLET, COATED ORAL at 06:29

## 2024-03-08 ASSESSMENT — ENCOUNTER SYMPTOMS
DIZZINESS: 0
SHORTNESS OF BREATH: 0
FEVER: 0
HEADACHES: 0
PND: 0
DOUBLE VISION: 0
CHILLS: 0
NAUSEA: 0
BLURRED VISION: 0
WEAKNESS: 0
SHORTNESS OF BREATH: 1
NERVOUS/ANXIOUS: 0
COUGH: 0
PALPITATIONS: 0
ABDOMINAL PAIN: 0
VOMITING: 0
CHEST TIGHTNESS: 0

## 2024-03-08 ASSESSMENT — LIFESTYLE VARIABLES: SUBSTANCE_ABUSE: 0

## 2024-03-08 ASSESSMENT — FIBROSIS 4 INDEX: FIB4 SCORE: 2.01

## 2024-03-08 ASSESSMENT — PAIN DESCRIPTION - PAIN TYPE: TYPE: ACUTE PAIN;CHRONIC PAIN

## 2024-03-08 NOTE — PROGRESS NOTES
Monitor Summary     Rhythm: SR  Heart Rate: 69-80  Ectopy: R PVC, R PAC  Measurement: .16/.05/.40

## 2024-03-08 NOTE — PROGRESS NOTES
Cardiology Follow Up Progress Note    Date of Service  3/8/2024    Attending Physician  Denis Ramos M.D.    HPI  History of Presenting Illness  Bob Ochoa is a 58 y.o. male with a past medical history of severe aortic stenosis, hypertension, nonischemic cardiomyopathy, end-stage renal disease, HIV  who presented 3/2/2024 brought in by EMS with fever diffuse chest pain and shortness of breath after recent hospitalization for shortness of breath, pulmonary edema and respiratory failure.     Interim Events  3/7: /65.  HR 74.  Sinus.  Tolerated coronary angiography yesterday.  Currently having no chest pain or heart failure symptoms.  3/3: /61.  HR 74.  Sinus.  No cardiac symptoms including heart failure or angina.    Review of Systems  Review of Systems   Respiratory:  Negative for chest tightness and shortness of breath.    Cardiovascular:  Negative for palpitations.   Neurological:  Negative for headaches.       Vital signs in last 24 hours  Temp:  [36.7 °C (98.1 °F)-37.1 °C (98.8 °F)] 36.7 °C (98.1 °F)  Pulse:  [71-89] 71  Resp:  [16-19] 17  BP: ()/(46-71) 139/69  SpO2:  [91 %-98 %] 93 %    Physical Exam  Physical Exam  Constitutional:       General: He is not in acute distress.  Neck:      Comments: Normal jugular venous pressure.  Cardiovascular:      Rate and Rhythm: Normal rate and regular rhythm.      Pulses:           Radial pulses are 1+ on the right side.      Heart sounds: S1 normal and S2 normal. Murmur heard.      No friction rub. No gallop.   Pulmonary:      Effort: Pulmonary effort is normal.      Breath sounds: Normal breath sounds. No wheezing, rhonchi or rales.   Musculoskeletal:      Right lower leg: No edema.      Left lower leg: No edema.      Comments: AV fistula left arm.   Skin:     General: Skin is warm and dry.      Nails: There is no clubbing.   Neurological:      Mental Status: He is alert and oriented to person, place, and time.   Psychiatric:         Behavior:  "Behavior normal.         Lab Review  Lab Results   Component Value Date/Time    WBC 7.7 03/06/2024 10:19 AM    RBC 2.57 (L) 03/06/2024 10:19 AM    HEMOGLOBIN 8.6 (L) 03/06/2024 10:19 AM    HEMATOCRIT 25.3 (L) 03/06/2024 10:19 AM    MCV 98.4 (H) 03/06/2024 10:19 AM    MCH 33.5 (H) 03/06/2024 10:19 AM    MCHC 34.0 03/06/2024 10:19 AM    MPV 10.2 03/06/2024 10:19 AM      Lab Results   Component Value Date/Time    SODIUM 135 03/07/2024 03:25 AM    POTASSIUM 5.0 03/07/2024 03:25 AM    CHLORIDE 93 (L) 03/07/2024 03:25 AM    CO2 23 03/07/2024 03:25 AM    GLUCOSE 124 (H) 03/07/2024 03:25 AM    BUN 63 (H) 03/07/2024 03:25 AM    CREATININE 9.64 (HH) 03/07/2024 03:25 AM      Lab Results   Component Value Date/Time    ASTSGOT 22 03/03/2024 03:49 AM    ALTSGPT 9 03/03/2024 03:49 AM     Lab Results   Component Value Date/Time    TROPONINT 961 (H) 03/04/2024 12:13 AM       No results for input(s): \"NTPROBNP\" in the last 72 hours.    Cardiac Imaging and Procedures Review  EKG:  My personal interpretation of the EKG dated 3/3/2024 is sinus rhythm with lateral ST-T wave abnormalities.    Rhythm: My personal interpretation the rhythm dated 3/7/2024 is sinus rhythm    Rhythm: Patient tribulation the rhythm dated 3/8/2024 is sinus rhythm     Echocardiogram: 7/4/2020  Compared to the images of the prior study done 8/30/2017, changes   noted, LV function declined.  Left ventricular ejection fraction is visually estimated to be 40%.  Global hypokinesis with regional variation.  Grade II diastolic dysfunction.  Mild to moderate mitral regurgitation.  Estimated right ventricular systolic pressure  is 50 mmHg.        Echocardiogram:  2/25/2024  Prior study on 07/04/2020, compared to the report of the prior study,   there has been progression of aortic stenosis.  Moderately reduced left ventricular systolic function.  The left ventricular ejection fraction is visually estimated to be 40%.  The aortic valve leaflets are heavily calcified.  The " aortic valve appears bileaflet.  Severe aortic valve stenosis.  Aortic valve area calculated from the continuity equation is  0.77 cm2.  Vmax is 3.93 m/s. Transvalvular gradients are - Peak: 62 mmHg, Mean: 40   mmHg.  Estimated right ventricular systolic pressure is 50 mmHg.    Cardiac catheterization 3/6/2024  CORONARY ANGIOGRAPHY:  The left main coronary artery : Large in caliber vessel with mild CAD, trifurcates to LAD, ramus intermedius and left circumflex  The left anterior descending coronary artery : Large in caliber transapical vessel with calcified 80% severe proximal/mid stenoses.  Gives rise to several small-medium caliber diagonal branches with severe ostial/proximal disease  Ramus intermedius coronary artery: Small-medium in caliber vessel  The left circumflex coronary artery : Large-caliber vessel with severe 80% ostial/proximal stenosis.  Gives rise to a bifurcating OM and the true circumflex tapers in the AV groove.  The distal circumflex has severe diffuse disease  The right coronary artery  : Large-caliber dominant vessel with severe calcified 80% mid stenosis.  Small PLB system.  Severe 80% proximal/mid right PDA.  IMPRESSION:  Severe multivessel CAD      Imaging  Chest X-Ray: 3/3/2024  Probable edema     Stress Test: 2023   No evidence of significant jeopardized viable myocardium or prior myocardial    infarction.   Normal left ventricular size, ejection fraction, and wall motion.   ECG INTERPRETATION   Negative stress ECG for ischemia.     Assessment  NSTEMI  Severe aortic stenosis  Multivessel CAD  HFrEF 40%, chronic  Hypertension, acute on chronic  ESRD on hemodialysis  COVID-19 pneumonia 2/2024  HIV     Recommendations Discussion  Currently clinically stable with no active heart failure or angina pectoris.  Currently on comprehensive secondary ASCVD therapy including aspirin, atorvastatin, carvedilol.  Pending CT surgery consultation for AVR and multivessel CABG.  Cardiology will sign off.      I personally reviewed the patient's treatment plan with Denis Ramos MD and the patient at the bedside.     Thank you for allowing me to participate in the care of this patient.     Please contact me with any questions.     Austin Pruitt M.D.   Cardiologist, [facility]  [phone]

## 2024-03-08 NOTE — THERAPY
Physical Therapy Contact Note    Patient Name: Bob Twenty-Two  Age:  58 y.o., Sex:  male  Medical Record #: [id number]  Today's Date: 3/8/2024         03/08/24 0803   Treatment Variance   Reason For Missed Therapy Medical - Other (Please Comment)  (Pt is still pending CABG during this admission. Will re-attempt as appropriate/able.)   Session Information   Date / Session Number  hold 3/6, 3/7, 3/8     Cathryn Devine, PT, DPT

## 2024-03-08 NOTE — PROGRESS NOTES
Nephrology Daily Progress Note    Date of Service  3/8/2024    Chief Complaint  58 y.o. male admitted 3/2/2024 with chest pain, shortness of breath, ESRD    Interval Problem Update  Events last 24 hours noted, patient is refusing care.  No new complaints of chest pain at the moment  Pt had HD yesterday  3/6 patient feels better today, no new complaints  Plan for coronary angiogram today  3/8 patient has no new complaints  Review of Systems  Review of Systems   Constitutional:  Negative for chills, fever and malaise/fatigue.   Respiratory:  Negative for cough and shortness of breath.    Cardiovascular:  Negative for chest pain and leg swelling.   Gastrointestinal:  Negative for nausea and vomiting.   Genitourinary:  Negative for dysuria, frequency and urgency.        Physical Exam  Temp:  [36.7 °C (98.1 °F)-37.1 °C (98.8 °F)] 36.8 °C (98.3 °F)  Pulse:  [71-77] 73  Resp:  [16-19] 16  BP: ()/(46-69) 113/59  SpO2:  [91 %-98 %] 92 %    Physical Exam  Vitals and nursing note reviewed.   Constitutional:       General: He is not in acute distress.     Appearance: He is not ill-appearing.   HENT:      Head: Normocephalic and atraumatic.      Right Ear: External ear normal.      Left Ear: External ear normal.      Nose: Nose normal.   Eyes:      General:         Right eye: No discharge.         Left eye: No discharge.      Conjunctiva/sclera: Conjunctivae normal.   Cardiovascular:      Rate and Rhythm: Normal rate and regular rhythm.      Heart sounds: No murmur heard.  Pulmonary:      Effort: Pulmonary effort is normal. No respiratory distress.      Breath sounds: Normal breath sounds. No wheezing.   Musculoskeletal:         General: No tenderness or deformity.      Right lower leg: No edema.      Left lower leg: No edema.   Skin:     General: Skin is warm.   Neurological:      General: No focal deficit present.      Mental Status: He is alert and oriented to person, place, and time.   Psychiatric:         Mood and  "Affect: Mood normal.         Behavior: Behavior normal.         Fluids    Intake/Output Summary (Last 24 hours) at 3/8/2024 1423  Last data filed at 3/8/2024 1000  Gross per 24 hour   Intake 610 ml   Output 0 ml   Net 610 ml       Laboratory  Recent Labs     03/06/24  1019   WBC 7.7   RBC 2.57*   HEMOGLOBIN 8.6*   HEMATOCRIT 25.3*   MCV 98.4*   MCH 33.5*   MCHC 34.0   RDW 54.7*   PLATELETCT 215   MPV 10.2     Recent Labs     03/06/24  1019 03/07/24  0325   SODIUM 134* 135   POTASSIUM 4.7 5.0   CHLORIDE 93* 93*   CO2 25 23   GLUCOSE 97 124*   BUN 49* 63*   CREATININE 8.24* 9.64*   CALCIUM 8.8 8.7           No results for input(s): \"NTPROBNP\" in the last 72 hours.        Imaging  EC-ECHOCARDIOGRAM COMPLETE W/O CONT   Final Result      DX-CHEST-PORTABLE (1 VIEW)   Final Result      Patchy bilateral interstitial opacities which could be seen in setting with edema and/or infection.      DX-CHEST-PORTABLE (1 VIEW)   Final Result      1.  Diffuse ill-defined interstitial opacities are most consistent with interstitial edema with viral pneumonitis in the differential diagnosis.      CL-LEFT HEART CATHETERIZATION WITH POSSIBLE INTERVENTION    (Results Pending)         Assessment/Plan  1 ESRD  2 respiratory failure  3 cardiomyopathy  4 Anemia  5 coronary artery disease  no acute need for HD today  Erythropoietin with dialysis  Renal diet  Daily BMP, CBC.  Renal dose all meds  Avoid nephrotoxins like NSAIDs.                       "

## 2024-03-08 NOTE — PROGRESS NOTES
Bedside report received from off going RN/tech: Barbara, assumed care of patient.     Fall Risk Score: LOW RISK  Fall risk interventions in place: Provide patient/family education based on risk assessment, Educate patient/family to call staff for assistance when getting out of bed, and Place fall precaution signage outside patient door  Bed type: Regular (Griffin Score less than 17 interventions in place)  Patient on cardiac monitor: Yes  IVF/IV medications: Not Applicable   Oxygen: How many liters 3L  Bedside sitter: Not Applicable   Isolation: Not applicable

## 2024-03-08 NOTE — PROGRESS NOTES
Hospital Medicine Daily Progress Note    Date of Service  3/8/2024    Chief Complaint  Bob Ochoa is a 58 y.o. male admitted 3/2/2024 with shortness of breath    Hospital Course  58 male with past medical history of cardiomyopathy with EF 40%, severe aortic stenosis, HIV on antiretroviral, ESRD on hemodialysis presented with chest pain.  Patient reportedly had missed hemodialysis.Significant elevated blood pressure on arrival to ED.  Noted to have significantly elevated troponin with EKG changes.  Echo noted with ejection [place] 40 to 48%, severe aortic valve stenosis.  Cardiac cath on 3/7 revealed severe multivessel CAD with severe aortic stenosis.  Cardiothoracic surgeon to evaluate for aortic valve repair and CABG.    Interval Problem Update    3/8/2024  Seen and examined at bedside in morning rounds  Vitals been stable  Remain asymptomatic      Case discussed with cardiology Dr. Pruitt .  Need CT surgery consultation for AVR and multivessel CABG.      Telemetry monitoring  Continue on aspirin, Coreg, Lipitor  Cardiothoracic surgeon to evaluate for aortic valve repair and CABG.    Repeat BMP in a.m. to monitor electrolytes and renal function    Need close monitoring of blood counts, electrolytes and renal function due to potential of organ dysfunction due to NSTEMI      High risk of deterioration into arrhythmia due to NSTEMI.  Need close telemetry monitoring    Patient has a high medical complexity, complex decision making and is at high risk of complication, morbidity and mortality        I have discussed this patient's plan of care and discharge plan at IDT rounds today with Case Management, Nursing, Nursing leadership, and other members of the IDT team.    Consultants/Specialty  cardiology    Code Status  Full Code    Disposition  The patient is not medically cleared for discharge to home or a post-acute facility.  Anticipate discharge to: home with close outpatient follow-up    I have placed the  appropriate orders for post-discharge needs.    Review of Systems  Review of Systems   Respiratory:  Negative for shortness of breath.    Cardiovascular:  Negative for chest pain.        Physical Exam  Temp:  [36.7 °C (98.1 °F)-37.1 °C (98.8 °F)] 37 °C (98.6 °F)  Pulse:  [71-77] 72  Resp:  [16-19] 17  BP: ()/(46-69) 112/62  SpO2:  [91 %-98 %] 96 %    Physical Exam  Constitutional:       Appearance: Normal appearance.   Cardiovascular:      Rate and Rhythm: Normal rate and regular rhythm.      Heart sounds: Normal heart sounds.   Pulmonary:      Effort: Pulmonary effort is normal.   Musculoskeletal:      Right lower leg: No edema.      Left lower leg: No edema.   Neurological:      General: No focal deficit present.      Mental Status: He is alert and oriented to person, place, and time.   Psychiatric:         Mood and Affect: Mood normal.         Fluids    Intake/Output Summary (Last 24 hours) at 3/8/2024 1523  Last data filed at 3/8/2024 1000  Gross per 24 hour   Intake 610 ml   Output 0 ml   Net 610 ml       Laboratory  Recent Labs     03/06/24  1019   WBC 7.7   RBC 2.57*   HEMOGLOBIN 8.6*   HEMATOCRIT 25.3*   MCV 98.4*   MCH 33.5*   MCHC 34.0   RDW 54.7*   PLATELETCT 215   MPV 10.2     Recent Labs     03/06/24  1019 03/07/24  0325   SODIUM 134* 135   POTASSIUM 4.7 5.0   CHLORIDE 93* 93*   CO2 25 23   GLUCOSE 97 124*   BUN 49* 63*   CREATININE 8.24* 9.64*   CALCIUM 8.8 8.7                     Imaging  EC-ECHOCARDIOGRAM COMPLETE W/O CONT   Final Result      DX-CHEST-PORTABLE (1 VIEW)   Final Result      Patchy bilateral interstitial opacities which could be seen in setting with edema and/or infection.      DX-CHEST-PORTABLE (1 VIEW)   Final Result      1.  Diffuse ill-defined interstitial opacities are most consistent with interstitial edema with viral pneumonitis in the differential diagnosis.      CL-LEFT HEART CATHETERIZATION WITH POSSIBLE INTERVENTION    (Results Pending)        Assessment/Plan  * Acute  respiratory failure with hypoxia- (present on admission)  Assessment & Plan  58-year-old male on dialysis for end-stage renal disease and combined systolic and diastolic heart failure and severe aortic stenosis presented with acute respiratory distress after missed hemodialysis, hypertensive emergency.  Condition improved after correction of hypertensive crisis with nitroglycerin, Lasix and CPAP  Chest x-ray: Bilateral interstitial edema.  S/p dialysis, with symptom improvement  Oxygen requirement improved now on room air    Severe aortic stenosis  Assessment & Plan  Echocardiogram result noted with ejection fraction 40 to 48%, grade 2 left ventricular distal dysfunction, severe aortic valve stenosis, pulmonary hypertension with RVSP 55mh hg .  Cardiology evaluated    Smoking  Assessment & Plan  Spent approx 5 mins on Tobacco cessation education . Discussed options of nicotine patch, medical treatment with wellbutrin and chantix. Discussed the benefits of quitting smoking and risks of continued smoking including cardiovascular disease, cancer and COPD.   Code 75112      Leukocytosis  Assessment & Plan  Resolved    Acute on chronic combined systolic (congestive) and diastolic (congestive) heart failure (HCC)  Assessment & Plan  Secondary to volume overload missed hemodialysis and uncontrolled blood pressure  Resume hemodialysis and blood pressure medications  Monitor input and output and daily weight  Volume status improved with hemodialysis      NSTEMI (non-ST elevated myocardial infarction) ([facility])  Assessment & Plan    Telemetry monitoring  O2 2L per nasal cannula to keep saturation more than 92%  On aspirin, Coreg, lisinopril  Lipitor 40 mg HS  Cardiac cath on 3/7 revealed severe multivessel CAD with severe aortic stenosis.  Cardiothoracic surgeon to evaluate for aortic valve repair and CABG.    HIV (human immunodeficiency virus infection) ([facility])  Assessment & Plan  Continue home regimen  Follow-up with   Isabell    Hypertensive emergency  Assessment & Plan  Resolved  Resume home blood pressure medications lisinopril, carvedilol, amlodipine  Monitor blood pressure  IV labetalol as needed         VTE prophylaxis: Heparin     I have performed a physical exam and reviewed and updated ROS and Plan today (3/8/2024). In review of yesterday's note (3/7/2024), there are no changes except as documented above.

## 2024-03-09 ENCOUNTER — APPOINTMENT (OUTPATIENT)
Dept: RADIOLOGY | Facility: MEDICAL CENTER | Age: 59
DRG: 280 | End: 2024-03-09
Attending: STUDENT IN AN ORGANIZED HEALTH CARE EDUCATION/TRAINING PROGRAM
Payer: MEDICAID

## 2024-03-09 LAB
ANION GAP SERPL CALC-SCNC: 16 MMOL/L (ref 7–16)
BASOPHILS # BLD AUTO: 0.5 % (ref 0–1.8)
BASOPHILS # BLD: 0.03 K/UL (ref 0–0.12)
BUN SERPL-MCNC: 46 MG/DL (ref 8–22)
CALCIUM SERPL-MCNC: 8.8 MG/DL (ref 8.5–10.5)
CHLORIDE SERPL-SCNC: 92 MMOL/L (ref 96–112)
CO2 SERPL-SCNC: 26 MMOL/L (ref 20–33)
CREAT SERPL-MCNC: 9.58 MG/DL (ref 0.5–1.4)
EOSINOPHIL # BLD AUTO: 0.23 K/UL (ref 0–0.51)
EOSINOPHIL NFR BLD: 3.6 % (ref 0–6.9)
ERYTHROCYTE [DISTWIDTH] IN BLOOD BY AUTOMATED COUNT: 56 FL (ref 35.9–50)
GFR SERPLBLD CREATININE-BSD FMLA CKD-EPI: 6 ML/MIN/1.73 M 2
GLUCOSE SERPL-MCNC: 111 MG/DL (ref 65–99)
HCT VFR BLD AUTO: 25.7 % (ref 42–52)
HGB BLD-MCNC: 8.3 G/DL (ref 14–18)
IMM GRANULOCYTES # BLD AUTO: 0.04 K/UL (ref 0–0.11)
IMM GRANULOCYTES NFR BLD AUTO: 0.6 % (ref 0–0.9)
LYMPHOCYTES # BLD AUTO: 1.43 K/UL (ref 1–4.8)
LYMPHOCYTES NFR BLD: 22.5 % (ref 22–41)
MCH RBC QN AUTO: 32.4 PG (ref 27–33)
MCHC RBC AUTO-ENTMCNC: 32.3 G/DL (ref 32.3–36.5)
MCV RBC AUTO: 100.4 FL (ref 81.4–97.8)
MONOCYTES # BLD AUTO: 0.43 K/UL (ref 0–0.85)
MONOCYTES NFR BLD AUTO: 6.8 % (ref 0–13.4)
NEUTROPHILS # BLD AUTO: 4.19 K/UL (ref 1.82–7.42)
NEUTROPHILS NFR BLD: 66 % (ref 44–72)
NRBC # BLD AUTO: 0 K/UL
NRBC BLD-RTO: 0 /100 WBC (ref 0–0.2)
PLATELET # BLD AUTO: 246 K/UL (ref 164–446)
PMV BLD AUTO: 10.4 FL (ref 9–12.9)
POTASSIUM SERPL-SCNC: 4.5 MMOL/L (ref 3.6–5.5)
PROCALCITONIN SERPL-MCNC: 1.62 NG/ML
RBC # BLD AUTO: 2.56 M/UL (ref 4.7–6.1)
SODIUM SERPL-SCNC: 134 MMOL/L (ref 135–145)
WBC # BLD AUTO: 6.4 K/UL (ref 4.8–10.8)

## 2024-03-09 PROCEDURE — 36415 COLL VENOUS BLD VENIPUNCTURE: CPT

## 2024-03-09 PROCEDURE — 99232 SBSQ HOSP IP/OBS MODERATE 35: CPT | Performed by: INTERNAL MEDICINE

## 2024-03-09 PROCEDURE — 700102 HCHG RX REV CODE 250 W/ 637 OVERRIDE(OP)

## 2024-03-09 PROCEDURE — 99233 SBSQ HOSP IP/OBS HIGH 50: CPT | Performed by: STUDENT IN AN ORGANIZED HEALTH CARE EDUCATION/TRAINING PROGRAM

## 2024-03-09 PROCEDURE — A9270 NON-COVERED ITEM OR SERVICE: HCPCS | Performed by: STUDENT IN AN ORGANIZED HEALTH CARE EDUCATION/TRAINING PROGRAM

## 2024-03-09 PROCEDURE — 700111 HCHG RX REV CODE 636 W/ 250 OVERRIDE (IP): Performed by: STUDENT IN AN ORGANIZED HEALTH CARE EDUCATION/TRAINING PROGRAM

## 2024-03-09 PROCEDURE — A9270 NON-COVERED ITEM OR SERVICE: HCPCS

## 2024-03-09 PROCEDURE — 84145 PROCALCITONIN (PCT): CPT

## 2024-03-09 PROCEDURE — 85025 COMPLETE CBC W/AUTO DIFF WBC: CPT

## 2024-03-09 PROCEDURE — 80048 BASIC METABOLIC PNL TOTAL CA: CPT

## 2024-03-09 PROCEDURE — 700102 HCHG RX REV CODE 250 W/ 637 OVERRIDE(OP): Performed by: STUDENT IN AN ORGANIZED HEALTH CARE EDUCATION/TRAINING PROGRAM

## 2024-03-09 PROCEDURE — 700102 HCHG RX REV CODE 250 W/ 637 OVERRIDE(OP): Performed by: INTERNAL MEDICINE

## 2024-03-09 PROCEDURE — 770020 HCHG ROOM/CARE - TELE (206)

## 2024-03-09 PROCEDURE — 71045 X-RAY EXAM CHEST 1 VIEW: CPT

## 2024-03-09 PROCEDURE — 99255 IP/OBS CONSLTJ NEW/EST HI 80: CPT | Performed by: THORACIC SURGERY (CARDIOTHORACIC VASCULAR SURGERY)

## 2024-03-09 PROCEDURE — A9270 NON-COVERED ITEM OR SERVICE: HCPCS | Performed by: INTERNAL MEDICINE

## 2024-03-09 RX ORDER — DOXYCYCLINE 100 MG/1
100 TABLET ORAL EVERY 12 HOURS
Status: DISCONTINUED | OUTPATIENT
Start: 2024-03-09 | End: 2024-03-12 | Stop reason: HOSPADM

## 2024-03-09 RX ORDER — AMOXICILLIN AND CLAVULANATE POTASSIUM 500; 125 MG/1; MG/1
1 TABLET, FILM COATED ORAL EVERY 24 HOURS
Status: DISCONTINUED | OUTPATIENT
Start: 2024-03-09 | End: 2024-03-12 | Stop reason: HOSPADM

## 2024-03-09 RX ORDER — AMOXICILLIN AND CLAVULANATE POTASSIUM 875; 125 MG/1; MG/1
1 TABLET, FILM COATED ORAL EVERY 12 HOURS
Status: DISCONTINUED | OUTPATIENT
Start: 2024-03-09 | End: 2024-03-09

## 2024-03-09 RX ADMIN — CARVEDILOL 25 MG: 25 TABLET, FILM COATED ORAL at 08:31

## 2024-03-09 RX ADMIN — OXYCODONE 5 MG: 5 TABLET ORAL at 06:14

## 2024-03-09 RX ADMIN — RILPIVIRINE HYDROCHLORIDE 25 MG: 25 TABLET, FILM COATED ORAL at 08:33

## 2024-03-09 RX ADMIN — SEVELAMER CARBONATE 1600 MG: 800 TABLET, FILM COATED ORAL at 08:31

## 2024-03-09 RX ADMIN — HEPARIN SODIUM 5000 UNITS: 5000 INJECTION, SOLUTION INTRAVENOUS; SUBCUTANEOUS at 04:53

## 2024-03-09 RX ADMIN — DOLUTEGRAVIR SODIUM 50 MG: 50 TABLET, FILM COATED ORAL at 04:53

## 2024-03-09 RX ADMIN — LISINOPRIL 20 MG: 20 TABLET ORAL at 04:53

## 2024-03-09 RX ADMIN — CARVEDILOL 25 MG: 25 TABLET, FILM COATED ORAL at 18:04

## 2024-03-09 RX ADMIN — HEPARIN SODIUM 5000 UNITS: 5000 INJECTION, SOLUTION INTRAVENOUS; SUBCUTANEOUS at 21:28

## 2024-03-09 RX ADMIN — CALCITRIOL CAPSULES 0.25 MCG 0.25 MCG: 0.25 CAPSULE ORAL at 04:53

## 2024-03-09 RX ADMIN — ATORVASTATIN CALCIUM 40 MG: 40 TABLET, FILM COATED ORAL at 18:04

## 2024-03-09 RX ADMIN — DOXYCYCLINE 100 MG: 100 TABLET, FILM COATED ORAL at 18:06

## 2024-03-09 RX ADMIN — SEVELAMER CARBONATE 1600 MG: 800 TABLET, FILM COATED ORAL at 18:04

## 2024-03-09 RX ADMIN — FAMOTIDINE 20 MG: 20 TABLET, FILM COATED ORAL at 18:04

## 2024-03-09 RX ADMIN — ACETAMINOPHEN 650 MG: 325 TABLET, FILM COATED ORAL at 15:05

## 2024-03-09 RX ADMIN — ASPIRIN 81 MG: 81 TABLET, COATED ORAL at 04:53

## 2024-03-09 RX ADMIN — AMOXICILLIN AND CLAVULANATE POTASSIUM 1 TABLET: 500; 125 TABLET, FILM COATED ORAL at 19:26

## 2024-03-09 RX ADMIN — AMLODIPINE BESYLATE 10 MG: 10 TABLET ORAL at 04:53

## 2024-03-09 ASSESSMENT — ENCOUNTER SYMPTOMS
COUGH: 0
FEVER: 0
SHORTNESS OF BREATH: 0
NAUSEA: 0
CHILLS: 0
VOMITING: 0

## 2024-03-09 ASSESSMENT — FIBROSIS 4 INDEX: FIB4 SCORE: 1.76

## 2024-03-09 ASSESSMENT — PAIN DESCRIPTION - PAIN TYPE: TYPE: ACUTE PAIN

## 2024-03-09 NOTE — PROGRESS NOTES
Hospital Medicine Daily Progress Note    Date of Service  3/9/2024    Chief Complaint  Bob Ochoa is a 58 y.o. male admitted 3/2/2024 with shortness of breath    Hospital Course  58 male with past medical history of cardiomyopathy with EF 40%, severe aortic stenosis, HIV on antiretroviral, ESRD on hemodialysis presented with chest pain.  Patient reportedly had missed hemodialysis.Significant elevated blood pressure on arrival to ED.  Noted to have significantly elevated troponin with EKG changes.  Echo noted with ejection [place] 40 to 48%, severe aortic valve stenosis.  Cardiac cath on 3/7 revealed severe multivessel CAD with severe aortic stenosis.  CTS evaluated and felt he is high risk for CABG given multiple comorbidities .       Interval Problem Update    3/9/2024  Vitals remained stable  He is requiring 2 L oxygen  on ambulation  Remained asymptomatic  Labs noted with normal white count, hemoglobin 8.3 sodium 134, potassium 3.5  Chest x-ray revealed cardiomegaly with interstitial infiltrate consistent with interstitial edema, atypical infection or chronic interstitial lung disease    Case discussed with cardiology Dr. Pruitt .  Okay to discharge with aspirin from cardiology standpoint.  Discussed with nephrology Dr. Najjar .      Telemetry monitoring  Not a candidate for CABG per CTS  Continue on aspirin, Coreg, Lipitor  Continue to require 2 to oxygen ambulation.  Continue to wean off oxygen.  Incentive spirometry  Check procalcitonin, if elevated will consider empirical antibiotic  Home O2 evaluation in a.m. if fails to improve we will discharge him with oxygen  High risk of deterioration into arrhythmia due to NSTEMI.  Need close telemetry monitoring          I have discussed this patient's plan of care and discharge plan at IDT rounds today with Case Management, Nursing, Nursing leadership, and other members of the IDT team.    Consultants/Specialty  cardiology    Code Status  Full  Code    Disposition  The patient is not medically cleared for discharge to home or a post-acute facility.  Anticipate discharge to: home with close outpatient follow-up    I have placed the appropriate orders for post-discharge needs.    Review of Systems  Review of Systems   Respiratory:  Negative for shortness of breath.    Cardiovascular:  Negative for chest pain.        Physical Exam  Temp:  [36.4 °C (97.5 °F)-37 °C (98.6 °F)] 36.4 °C (97.6 °F)  Pulse:  [68-79] 72  Resp:  [16-17] 17  BP: (107-125)/(56-62) 114/56  SpO2:  [94 %-100 %] 94 %    Physical Exam  Constitutional:       Appearance: Normal appearance.   Cardiovascular:      Rate and Rhythm: Normal rate and regular rhythm.      Heart sounds: Normal heart sounds.   Pulmonary:      Effort: Pulmonary effort is normal.   Musculoskeletal:      Right lower leg: No edema.      Left lower leg: No edema.   Neurological:      General: No focal deficit present.      Mental Status: He is alert and oriented to person, place, and time.   Psychiatric:         Mood and Affect: Mood normal.         Fluids  No intake or output data in the 24 hours ending 03/09/24 1419      Laboratory  Recent Labs     03/09/24  0147   WBC 6.4   RBC 2.56*   HEMOGLOBIN 8.3*   HEMATOCRIT 25.7*   .4*   MCH 32.4   MCHC 32.3   RDW 56.0*   PLATELETCT 246   MPV 10.4     Recent Labs     03/07/24  0325 03/09/24  0147   SODIUM 135 134*   POTASSIUM 5.0 4.5   CHLORIDE 93* 92*   CO2 23 26   GLUCOSE 124* 111*   BUN 63* 46*   CREATININE 9.64* 9.58*   CALCIUM 8.7 8.8                     Imaging  DX-CHEST-LIMITED (1 VIEW)   Final Result      1.  Cardiomegaly with again seen interstitial infiltrates consistent with interstitial edema versus atypical infection or chronic interstitial lung disease.      2.  Linear bilateral atelectasis.      3.  Minimal right pleural effusion.      EC-ECHOCARDIOGRAM COMPLETE W/O CONT   Final Result      DX-CHEST-PORTABLE (1 VIEW)   Final Result      Patchy bilateral  interstitial opacities which could be seen in setting with edema and/or infection.      DX-CHEST-PORTABLE (1 VIEW)   Final Result      1.  Diffuse ill-defined interstitial opacities are most consistent with interstitial edema with viral pneumonitis in the differential diagnosis.      CL-LEFT HEART CATHETERIZATION WITH POSSIBLE INTERVENTION    (Results Pending)        Assessment/Plan  * Acute respiratory failure with hypoxia- (present on admission)  Assessment & Plan  58-year-old male on dialysis for end-stage renal disease and combined systolic and diastolic heart failure and severe aortic stenosis presented with acute respiratory distress after missed hemodialysis, hypertensive emergency.  Condition improved after correction of hypertensive crisis with nitroglycerin, Lasix and CPAP  Chest x-ray: Bilateral interstitial edema.  S/p dialysis, with symptom  some improvement  Incentive spirometry  Check procalcitonin  Home O2 evaluation in a.m.        Severe aortic stenosis  Assessment & Plan  Echocardiogram result noted with ejection fraction 40 to 48%, grade 2 left ventricular distal dysfunction, severe aortic valve stenosis, pulmonary hypertension with RVSP 55mh hg .  Cardiology evaluated    Smoking  Assessment & Plan  Spent approx 5 mins on Tobacco cessation education . Discussed options of nicotine patch, medical treatment with wellbutrin and chantix. Discussed the benefits of quitting smoking and risks of continued smoking including cardiovascular disease, cancer and COPD.   Code 34289      Leukocytosis  Assessment & Plan  Resolved    Acute on chronic combined systolic (congestive) and diastolic (congestive) heart failure (HCC)  Assessment & Plan  Secondary to volume overload missed hemodialysis and uncontrolled blood pressure  Resume hemodialysis and blood pressure medications  Monitor input and output and daily weight  Volume status improved with hemodialysis      NSTEMI (non-ST elevated myocardial infarction)  ([facility])  Assessment & Plan    Telemetry monitoring  O2 2L per nasal cannula to keep saturation more than 92%  On aspirin, Coreg, lisinopril  Lipitor 40 mg HS  Cardiac cath on 3/7 revealed severe multivessel CAD with severe aortic stenosis.  Cardiothoracic surgeon to evaluate for aortic valve repair and CABG.  3/9/2024   CTS evaluated and felt he is high risk for CABG given multiple comorbidities .     HIV (human immunodeficiency virus infection) ([facility])  Assessment & Plan  Continue home regimen  Follow-up with Dr. Whyte    Hypertensive emergency  Assessment & Plan  Resolved  Resume home blood pressure medications lisinopril, carvedilol, amlodipine  Monitor blood pressure  IV labetalol as needed         VTE prophylaxis: Heparin     I have performed a physical exam and reviewed and updated ROS and Plan today (3/9/2024). In review of yesterday's note (3/8/2024), there are no changes except as documented above.          Greater than 51 minutes spent preparing to see patient (e.g. review of tests) obtaining and/or reviewing separately obtained history. Performing a medically appropriate examination and/ evaluation.  Counseling and educating the patient/family/caregiver.  Ordering medications, tests, or procedures.  Referring and communicating with other health care professionals.  Documenting clinical information in EPIC.  Independently interpreting results and communicating results to patient/family/caregiver.  Care coordination.

## 2024-03-09 NOTE — PROGRESS NOTES
Assumed care of patient. Bedside report received from NAMRATA Tolentino. Patient updated on plan of care.  No c/o chest pain at this time. Call light is within reach. Fall precautions and hourly rounding in place.

## 2024-03-09 NOTE — PROGRESS NOTES
Monitor Summary     Rhythm: SR  Heart Rate: 64-73  Ectopy: R PVC, R PAC  Measurement: .18/.08/.42

## 2024-03-09 NOTE — CARE PLAN
The patient is Stable - Low risk of patient condition declining or worsening    Shift Goals  Clinical Goals: Hemodynamic stability  Patient Goals: Wean oxygen  Family Goals: LEX    Progress made toward(s) clinical / shift goals:  Patient's vitals are stable. No c/o chest pain. No increased need of supplemental oxygen.     Patient is not progressing towards the following goals: Patient's surgical consult is still pending.      Problem: Respiratory  Goal: Patient will achieve/maintain optimum respiratory ventilation and gas exchange  Outcome: Not Progressing   Maintaining 2L oxygen. Encouraged ambulation and IS use.

## 2024-03-09 NOTE — PROGRESS NOTES
Nephrology Daily Progress Note    Date of Service  3/9/2024    Chief Complaint  58 y.o. male admitted 3/2/2024 with chest pain, shortness of breath, ESRD    Interval Problem Update  Events last 24 hours noted, patient is refusing care.  No new complaints of chest pain at the moment  Pt had HD yesterday  3/6 patient feels better today, no new complaints  Plan for coronary angiogram today  3/8 patient has no new complaints  3/9 patient has no chest pain or shortness of breath  CT surgery input was reviewed  Review of Systems  Review of Systems   Constitutional:  Negative for chills, fever and malaise/fatigue.   Respiratory:  Negative for cough and shortness of breath.    Cardiovascular:  Negative for chest pain and leg swelling.   Gastrointestinal:  Negative for nausea and vomiting.   Genitourinary:  Negative for dysuria, frequency and urgency.        Physical Exam  Temp:  [36.4 °C (97.5 °F)-37 °C (98.6 °F)] 36.4 °C (97.6 °F)  Pulse:  [68-79] 72  Resp:  [16-17] 17  BP: (107-125)/(56-62) 114/56  SpO2:  [94 %-100 %] 94 %    Physical Exam  Vitals and nursing note reviewed.   Constitutional:       General: He is not in acute distress.     Appearance: He is not ill-appearing.   HENT:      Head: Normocephalic and atraumatic.      Right Ear: External ear normal.      Left Ear: External ear normal.      Nose: Nose normal.   Eyes:      General:         Right eye: No discharge.         Left eye: No discharge.      Conjunctiva/sclera: Conjunctivae normal.   Cardiovascular:      Rate and Rhythm: Normal rate and regular rhythm.      Heart sounds: No murmur heard.  Pulmonary:      Effort: Pulmonary effort is normal. No respiratory distress.      Breath sounds: Normal breath sounds. No wheezing.   Musculoskeletal:         General: No tenderness or deformity.      Right lower leg: No edema.      Left lower leg: No edema.   Skin:     General: Skin is warm.   Neurological:      General: No focal deficit present.      Mental Status: He  "is alert and oriented to person, place, and time.   Psychiatric:         Mood and Affect: Mood normal.         Behavior: Behavior normal.         Fluids  No intake or output data in the 24 hours ending 03/09/24 1256      Laboratory  Recent Labs     03/09/24  0147   WBC 6.4   RBC 2.56*   HEMOGLOBIN 8.3*   HEMATOCRIT 25.7*   .4*   MCH 32.4   MCHC 32.3   RDW 56.0*   PLATELETCT 246   MPV 10.4     Recent Labs     03/07/24  0325 03/09/24  0147   SODIUM 135 134*   POTASSIUM 5.0 4.5   CHLORIDE 93* 92*   CO2 23 26   GLUCOSE 124* 111*   BUN 63* 46*   CREATININE 9.64* 9.58*   CALCIUM 8.7 8.8           No results for input(s): \"NTPROBNP\" in the last 72 hours.        Imaging  EC-ECHOCARDIOGRAM COMPLETE W/O CONT   Final Result      DX-CHEST-PORTABLE (1 VIEW)   Final Result      Patchy bilateral interstitial opacities which could be seen in setting with edema and/or infection.      DX-CHEST-PORTABLE (1 VIEW)   Final Result      1.  Diffuse ill-defined interstitial opacities are most consistent with interstitial edema with viral pneumonitis in the differential diagnosis.      CL-LEFT HEART CATHETERIZATION WITH POSSIBLE INTERVENTION    (Results Pending)         Assessment/Plan  1 ESRD  2 respiratory failure  3 cardiomyopathy  4 Anemia  5 coronary artery disease  no acute need for HD  Erythropoietin with dialysis  Renal diet  Daily BMP, CBC.  Renal dose all meds  Avoid nephrotoxins like NSAIDs.  Prognosis guarded.  Okay to discharge from renal point  Plan discussed with Dr. Ramos                       "

## 2024-03-09 NOTE — PROGRESS NOTES
Bedside report received from off going RN/tech: Aide assumed care of patient.     Fall Risk Score: NO RISK  Fall risk interventions in place: Provide patient/family education based on risk assessment  Bed type: Regular ([place]iff[place] Score less than 17 interventions in place)  Patient on cardiac monitor: Yes  IVF/IV medications: Not Applicable   Oxygen: How many liters 2L  Bedside sitter: Not Applicable   Isolation: Not applicable

## 2024-03-09 NOTE — CARE PLAN
The patient is Stable - Low risk of patient condition declining or worsening    Shift Goals  Clinical Goals: wean O2, hemodynamic stability  Patient Goals: pain control  Family Goals: LEX    Progress made toward(s) clinical / shift goals:    Problem: Knowledge Deficit - Standard  Goal: Patient and family/care givers will demonstrate understanding of plan of care, disease process/condition, diagnostic tests and medications  Description: Target End Date:  1-3 days or as soon as patient condition allows    Document in Patient Education    1.  Patient and family/caregiver oriented to unit, equipment, visitation policy and means for communicating concern  2.  Complete/review Learning Assessment  3.  Assess knowledge level of disease process/condition, treatment plan, diagnostic tests and medications  4.  Explain disease process/condition, treatment plan, diagnostic tests and medications  Outcome: Progressing  Note: Pt updated on POC, all questions answered at this time.     Problem: Care Map:  Day 6 Optimal Outcome for the Heart Failure Patient  Goal: Day 6:  Optimal Care of the heart failure patient  Description: Target End Date:  end of day 6  Outcome: Progressing  Note: Pt Is and Os being continuously monitored and documented. Pt is anuric from dialysis. Edema assessed q shift. No edema noted. Medications assessed for appropriateness. Pt is on a cardiac diet. Appropriate labs ordered by provider.

## 2024-03-10 PROBLEM — R07.9 CHEST PAIN: Status: ACTIVE | Noted: 2024-03-10

## 2024-03-10 LAB
EKG IMPRESSION: NORMAL
TROPONIN T SERPL-MCNC: 1902 NG/L (ref 6–19)

## 2024-03-10 PROCEDURE — 700102 HCHG RX REV CODE 250 W/ 637 OVERRIDE(OP)

## 2024-03-10 PROCEDURE — 84484 ASSAY OF TROPONIN QUANT: CPT

## 2024-03-10 PROCEDURE — 99233 SBSQ HOSP IP/OBS HIGH 50: CPT | Performed by: INTERNAL MEDICINE

## 2024-03-10 PROCEDURE — A9270 NON-COVERED ITEM OR SERVICE: HCPCS | Performed by: INTERNAL MEDICINE

## 2024-03-10 PROCEDURE — 93010 ELECTROCARDIOGRAM REPORT: CPT | Performed by: INTERNAL MEDICINE

## 2024-03-10 PROCEDURE — 700102 HCHG RX REV CODE 250 W/ 637 OVERRIDE(OP): Performed by: STUDENT IN AN ORGANIZED HEALTH CARE EDUCATION/TRAINING PROGRAM

## 2024-03-10 PROCEDURE — 700111 HCHG RX REV CODE 636 W/ 250 OVERRIDE (IP): Performed by: STUDENT IN AN ORGANIZED HEALTH CARE EDUCATION/TRAINING PROGRAM

## 2024-03-10 PROCEDURE — A9270 NON-COVERED ITEM OR SERVICE: HCPCS | Performed by: STUDENT IN AN ORGANIZED HEALTH CARE EDUCATION/TRAINING PROGRAM

## 2024-03-10 PROCEDURE — 99233 SBSQ HOSP IP/OBS HIGH 50: CPT | Performed by: STUDENT IN AN ORGANIZED HEALTH CARE EDUCATION/TRAINING PROGRAM

## 2024-03-10 PROCEDURE — 93005 ELECTROCARDIOGRAM TRACING: CPT | Performed by: STUDENT IN AN ORGANIZED HEALTH CARE EDUCATION/TRAINING PROGRAM

## 2024-03-10 PROCEDURE — 700102 HCHG RX REV CODE 250 W/ 637 OVERRIDE(OP): Performed by: INTERNAL MEDICINE

## 2024-03-10 PROCEDURE — 99232 SBSQ HOSP IP/OBS MODERATE 35: CPT | Performed by: INTERNAL MEDICINE

## 2024-03-10 PROCEDURE — A9270 NON-COVERED ITEM OR SERVICE: HCPCS

## 2024-03-10 PROCEDURE — 36415 COLL VENOUS BLD VENIPUNCTURE: CPT

## 2024-03-10 PROCEDURE — 770020 HCHG ROOM/CARE - TELE (206)

## 2024-03-10 RX ADMIN — LISINOPRIL 20 MG: 20 TABLET ORAL at 05:06

## 2024-03-10 RX ADMIN — DOXYCYCLINE 100 MG: 100 TABLET, FILM COATED ORAL at 05:06

## 2024-03-10 RX ADMIN — HEPARIN SODIUM 5000 UNITS: 5000 INJECTION, SOLUTION INTRAVENOUS; SUBCUTANEOUS at 05:06

## 2024-03-10 RX ADMIN — SEVELAMER CARBONATE 1600 MG: 800 TABLET, FILM COATED ORAL at 09:31

## 2024-03-10 RX ADMIN — SEVELAMER CARBONATE 1600 MG: 800 TABLET, FILM COATED ORAL at 14:04

## 2024-03-10 RX ADMIN — AMOXICILLIN AND CLAVULANATE POTASSIUM 1 TABLET: 500; 125 TABLET, FILM COATED ORAL at 19:26

## 2024-03-10 RX ADMIN — AMLODIPINE BESYLATE 10 MG: 10 TABLET ORAL at 05:06

## 2024-03-10 RX ADMIN — NITROGLYCERIN 0.4 MG: 0.4 TABLET, ORALLY DISINTEGRATING SUBLINGUAL at 07:37

## 2024-03-10 RX ADMIN — ATORVASTATIN CALCIUM 40 MG: 40 TABLET, FILM COATED ORAL at 19:26

## 2024-03-10 RX ADMIN — DOLUTEGRAVIR SODIUM 50 MG: 50 TABLET, FILM COATED ORAL at 05:06

## 2024-03-10 RX ADMIN — DOXYCYCLINE 100 MG: 100 TABLET, FILM COATED ORAL at 19:25

## 2024-03-10 RX ADMIN — FAMOTIDINE 20 MG: 20 TABLET, FILM COATED ORAL at 19:26

## 2024-03-10 RX ADMIN — CALCITRIOL CAPSULES 0.25 MCG 0.25 MCG: 0.25 CAPSULE ORAL at 05:06

## 2024-03-10 RX ADMIN — CARVEDILOL 25 MG: 25 TABLET, FILM COATED ORAL at 19:25

## 2024-03-10 RX ADMIN — HEPARIN SODIUM 5000 UNITS: 5000 INJECTION, SOLUTION INTRAVENOUS; SUBCUTANEOUS at 21:35

## 2024-03-10 RX ADMIN — ASPIRIN 81 MG: 81 TABLET, COATED ORAL at 05:06

## 2024-03-10 RX ADMIN — RILPIVIRINE HYDROCHLORIDE 25 MG: 25 TABLET, FILM COATED ORAL at 09:31

## 2024-03-10 RX ADMIN — CARVEDILOL 25 MG: 25 TABLET, FILM COATED ORAL at 09:31

## 2024-03-10 RX ADMIN — SEVELAMER CARBONATE 1600 MG: 800 TABLET, FILM COATED ORAL at 19:25

## 2024-03-10 RX ADMIN — HEPARIN SODIUM 5000 UNITS: 5000 INJECTION, SOLUTION INTRAVENOUS; SUBCUTANEOUS at 14:04

## 2024-03-10 ASSESSMENT — ENCOUNTER SYMPTOMS
SHORTNESS OF BREATH: 0
COUGH: 0
VOMITING: 0
SPUTUM PRODUCTION: 1
FEVER: 0
CHILLS: 0
HEADACHES: 0
PALPITATIONS: 0
CHEST TIGHTNESS: 0
NAUSEA: 0

## 2024-03-10 ASSESSMENT — PAIN DESCRIPTION - PAIN TYPE
TYPE: ACUTE PAIN
TYPE: ACUTE PAIN

## 2024-03-10 ASSESSMENT — FIBROSIS 4 INDEX: FIB4 SCORE: 1.76

## 2024-03-10 NOTE — ASSESSMENT & PLAN NOTE
The ASCVD Risk score (Abelardo GRAHAM, et al., 2019) failed to calculate for the following reasons:    The patient has a prior MI or stroke diagnosis  Cardiac cath on 3/7 revealed severe multivessel CAD with severe aortic stenosis.  CTS evaluated and felt he is high risk for CABG given multiple comorbidities .   Had a chest pain lasted for 20 minutes on 3/10  Elevated troponin, recent angiogram, EKG with no significant ST changes    Continue on telemetry monitoring  Continue aspirin  Continue beta-blocker  Cardiology to evaluate further recommendation

## 2024-03-10 NOTE — PROGRESS NOTES
Bedside report received from off going RN/tech: Earnest assumed care of patient. Patient is AxO 4, pain 0/10.    Fall Risk Score: NO RISK  Fall risk interventions in place: Provide patient/family education based on risk assessment and Educate patient/family to call staff for assistance when getting out of bed  Bed type: Regular ([place]iff[place] Score less than 17 interventions in place)  Patient on cardiac monitor: Yes  IVF/IV medications: Not Applicable   Oxygen: How many liters 2L  Bedside sitter: Not Applicable   Isolation: Not applicable

## 2024-03-10 NOTE — CARE PLAN
The patient is Stable - Low risk of patient condition declining or worsening    Shift Goals  Clinical Goals: wean O2, monitor labs  Patient Goals: discharge  Family Goals: na    Progress made toward(s) clinical / shift goals:    Problem: Care Map:  Day Before Discharge Optimal Outcome for the Heart Failure Patient  Goal: Day Before Discharge:  Optimal Care of the heart failure patient  Outcome: Progressing  Note: Echo completed, cath completed, CT surgery recommends medical management, patient is on heart failure meds.         Patient is not progressing towards the following goals:

## 2024-03-10 NOTE — PROGRESS NOTES
Hospital Medicine Daily Progress Note    Date of Service  3/10/2024    Chief Complaint  Bob Ochoa is a 58 y.o. male admitted 3/2/2024 with shortness of breath    Hospital Course  58 male with past medical history of cardiomyopathy with EF 40%, severe aortic stenosis, HIV on antiretroviral, ESRD on hemodialysis presented with chest pain.  Patient reportedly had missed hemodialysis.Significant elevated blood pressure on arrival to ED.  Noted to have significantly elevated troponin with EKG changes.  Echo noted with ejection [place] 40 to 48%, severe aortic valve stenosis.  Cardiac cath on 3/7 revealed severe multivessel CAD with severe aortic stenosis.  CTS evaluated and felt he is high risk for CABG given multiple comorbidities .       Interval Problem Update      3/10/2024  Vitals remained stable  Patient had episode of chest pain which lasted for 20-minute  Repeat EKG unremarkable  Troponin significant elevated which could be also from recent cath  Noted to have elevated procalcitonin  Patient been denying to me on heparin drip so far    Case discussed with cardiology Dr. Pruitt .    Cardiology to evaluate for further recommendation and possible stent.      Telemetry monitoring  Not a candidate for CABG per CTS  Continue on aspirin, Coreg, Lipitor  Started on oral antibiotic with Augmentin and Doxy  Incentive spirometry  High risk of deterioration into arrhythmia due to NSTEMI.  Need close telemetry monitoring.      I have discussed this patient's plan of care and discharge plan at IDT rounds today with Case Management, Nursing, Nursing leadership, and other members of the IDT team.    Consultants/Specialty  cardiology    Code Status  Full Code    Disposition  The patient is not medically cleared for discharge to home or a post-acute facility.  Anticipate discharge to: home with close outpatient follow-up    I have placed the appropriate orders for post-discharge needs.    Review of Systems  Review of  Systems   Respiratory:  Negative for shortness of breath.    Cardiovascular:  Positive for chest pain.        Physical Exam  Temp:  [35.9 °C (96.7 °F)-37 °C (98.6 °F)] 36.7 °C (98 °F)  Pulse:  [69-75] 71  Resp:  [16-18] 18  BP: ()/(51-75) 109/71  SpO2:  [93 %-100 %] 96 %    Physical Exam  Constitutional:       Appearance: Normal appearance.   Cardiovascular:      Rate and Rhythm: Normal rate and regular rhythm.      Heart sounds: Normal heart sounds.   Pulmonary:      Effort: Pulmonary effort is normal.   Musculoskeletal:      Right lower leg: No edema.      Left lower leg: No edema.   Neurological:      General: No focal deficit present.      Mental Status: He is alert and oriented to person, place, and time.   Psychiatric:         Mood and Affect: Mood normal.         Fluids    Intake/Output Summary (Last 24 hours) at 3/10/2024 1435  Last data filed at 3/9/2024 1720  Gross per 24 hour   Intake 500 ml   Output 1000 ml   Net -500 ml         Laboratory  Recent Labs     03/09/24  0147   WBC 6.4   RBC 2.56*   HEMOGLOBIN 8.3*   HEMATOCRIT 25.7*   .4*   MCH 32.4   MCHC 32.3   RDW 56.0*   PLATELETCT 246   MPV 10.4     Recent Labs     03/09/24  0147   SODIUM 134*   POTASSIUM 4.5   CHLORIDE 92*   CO2 26   GLUCOSE 111*   BUN 46*   CREATININE 9.58*   CALCIUM 8.8                     Imaging  DX-CHEST-LIMITED (1 VIEW)   Final Result      1.  Cardiomegaly with again seen interstitial infiltrates consistent with interstitial edema versus atypical infection or chronic interstitial lung disease.      2.  Linear bilateral atelectasis.      3.  Minimal right pleural effusion.      EC-ECHOCARDIOGRAM COMPLETE W/O CONT   Final Result      DX-CHEST-PORTABLE (1 VIEW)   Final Result      Patchy bilateral interstitial opacities which could be seen in setting with edema and/or infection.      DX-CHEST-PORTABLE (1 VIEW)   Final Result      1.  Diffuse ill-defined interstitial opacities are most consistent with interstitial edema  with viral pneumonitis in the differential diagnosis.      CL-LEFT HEART CATHETERIZATION WITH POSSIBLE INTERVENTION    (Results Pending)        Assessment/Plan  * Acute respiratory failure with hypoxia- (present on admission)  Assessment & Plan  58-year-old male on dialysis for end-stage renal disease and combined systolic and diastolic heart failure and severe aortic stenosis presented with acute respiratory distress after missed hemodialysis, hypertensive emergency.  Condition improved after correction of hypertensive crisis with nitroglycerin, Lasix and CPAP  Chest x-ray: Bilateral interstitial edema.  S/p dialysis, with symptom  some improvement  Incentive spirometry  Check procalcitonin  Home O2 evaluation in a.m.        Chest pain  Assessment & Plan  The ASCVD Risk score (Abelardo GRAHAM, et al., 2019) failed to calculate for the following reasons:    The patient has a prior MI or stroke diagnosis  Cardiac cath on 3/7 revealed severe multivessel CAD with severe aortic stenosis.  CTS evaluated and felt he is high risk for CABG given multiple comorbidities .   Had a chest pain lasted for 20 minutes on 3/10  Elevated troponin, recent angiogram, EKG with no significant ST changes    Continue on telemetry monitoring  Continue aspirin  Continue beta-blocker  Cardiology to evaluate further recommendation      Severe aortic stenosis  Assessment & Plan  Echocardiogram result noted with ejection fraction 40 to 48%, grade 2 left ventricular distal dysfunction, severe aortic valve stenosis, pulmonary hypertension with RVSP 55mh hg .  Cardiology evaluated    Smoking  Assessment & Plan  Spent approx 5 mins on Tobacco cessation education . Discussed options of nicotine patch, medical treatment with wellbutrin and chantix. Discussed the benefits of quitting smoking and risks of continued smoking including cardiovascular disease, cancer and COPD.   Code 64698      Leukocytosis  Assessment & Plan  Resolved    Acute on chronic  combined systolic (congestive) and diastolic (congestive) heart failure (HCC)  Assessment & Plan  Secondary to volume overload missed hemodialysis and uncontrolled blood pressure  Resume hemodialysis and blood pressure medications  Monitor input and output and daily weight  Volume status improved with hemodialysis      NSTEMI (non-ST elevated myocardial infarction) ([facility])  Assessment & Plan    Telemetry monitoring  O2 2L per nasal cannula to keep saturation more than 92%  On aspirin, Coreg, lisinopril  Lipitor 40 mg HS  Cardiac cath on 3/7 revealed severe multivessel CAD with severe aortic stenosis.  Cardiothoracic surgeon to evaluate for aortic valve repair and CABG.  3/9/2024   CTS evaluated and felt he is high risk for CABG given multiple comorbidities .     HIV (human immunodeficiency virus infection) ([facility])  Assessment & Plan  Continue home regimen  Follow-up with Dr. Whyte    Hypertensive emergency  Assessment & Plan  Resolved  Resume home blood pressure medications lisinopril, carvedilol, amlodipine  Monitor blood pressure  IV labetalol as needed         VTE prophylaxis: Heparin     I have performed a physical exam and reviewed and updated ROS and Plan today (3/10/2024). In review of yesterday's note (3/9/2024), there are no changes except as documented above.

## 2024-03-10 NOTE — PROGRESS NOTES
[facility] Services Progress Note         Extra HD today x 2 hours per Dr. Najjar for fluid removal but patient refused per PCN. Education was given and Dr. Najjar was informed.

## 2024-03-10 NOTE — PROGRESS NOTES
[facility] Services Progress Note     Hemodialysis treatment ordered today per Dr. Fadi Najjar x 3 hours.   Treatment initiated at 1403 completed at 1703.      Patient tolerated tx; see electronic flow sheet for details.      Net  mL.   Limited by low SBP 80-9's pre-HD asymptomatic  Improved last hour of tx   Medicated for headache during tx; pain relief post HD     Needles removed from access site. Dressings applied and sites held x 10 minutes; verified no bleeding. Positive bruit/thrill post tx.   Staff RN to monitor AVF/G for breakthrough bleeding. Should breakthrough bleeding occur, staff RN to apply pressure to access sites until bleeding resolved.   Notify Dialysis and Nephrologist for follow-up.     Report given to Primary RN Earnest Boone.

## 2024-03-10 NOTE — PROGRESS NOTES
"Bedside report received from off going RN/tech: ramon , assumed care of patient.     Fall Risk Score: NO RISK  Fall risk interventions in place: Place yellow fall risk ID band on patient, Provide patient/family education based on risk assessment, Educate patient/family to call staff for assistance when getting out of bed, Place fall precaution signage outside patient door, and Place patient in room close to nursing station  Bed type: Regular (Griffin Score less than 17 interventions in place)  Patient on cardiac monitor: Yes  IVF/IV medications: Not Applicable   Oxygen: How many liters 2L  Bedside sitter: Not Applicable   Isolation: Not applicable    After report, pt has been having chest pain. Said it feels like it has been getting worse.    /71   Pulse 72   Temp 36.8 °C (98.2 °F) (Temporal)   Resp 16   Ht 1.626 m (5' 4\")   Wt 66 kg (145 lb 8.1 oz)   SpO2 94%   BMI 24.98 kg/m²     STAT EKG ordered and nitro administered. MD notified.   "

## 2024-03-10 NOTE — PROGRESS NOTES
Nephrology Daily Progress Note    Date of Service  3/10/2024    Chief Complaint  58 y.o. male admitted 3/2/2024 with chest pain, shortness of breath, ESRD    Interval Problem Update  Events last 24 hours noted, patient is refusing care.  No new complaints of chest pain at the moment  Pt had HD yesterday  3/6 patient feels better today, no new complaints  Plan for coronary angiogram today  3/8 patient has no new complaints  3/9 patient has no chest pain or shortness of breath  CT surgery input was reviewed  3/10 patient developed respiratory failure this morning, he is on facemask  Review of Systems  Review of Systems   Constitutional:  Negative for chills, fever and malaise/fatigue.   Respiratory:  Positive for sputum production. Negative for cough and shortness of breath.    Cardiovascular:  Negative for chest pain and leg swelling.   Gastrointestinal:  Negative for nausea and vomiting.   Genitourinary:  Negative for dysuria, frequency and urgency.        Physical Exam  Temp:  [35.9 °C (96.7 °F)-37 °C (98.6 °F)] 36.7 °C (98 °F)  Pulse:  [67-75] 71  Resp:  [16-18] 18  BP: ()/(47-75) 109/71  SpO2:  [93 %-100 %] 96 %    Physical Exam  Vitals and nursing note reviewed.   Constitutional:       General: He is awake. He is not in acute distress.     Appearance: He is ill-appearing.      Interventions: Face mask in place.   HENT:      Head: Normocephalic and atraumatic.      Right Ear: External ear normal.      Left Ear: External ear normal.      Nose: Nose normal.      Mouth/Throat:      Pharynx: No oropharyngeal exudate or posterior oropharyngeal erythema.   Eyes:      General:         Right eye: No discharge.         Left eye: No discharge.      Conjunctiva/sclera: Conjunctivae normal.   Cardiovascular:      Rate and Rhythm: Normal rate and regular rhythm.      Heart sounds: Murmur heard.   Pulmonary:      Effort: Respiratory distress present.      Breath sounds: Rhonchi present. No wheezing.   Abdominal:       "General: Abdomen is flat. Bowel sounds are normal.   Musculoskeletal:         General: No tenderness or deformity.      Cervical back: No rigidity. No muscular tenderness.      Right lower leg: No edema.      Left lower leg: No edema.   Skin:     General: Skin is warm and dry.      Coloration: Skin is not jaundiced.      Findings: No lesion or rash.   Neurological:      General: No focal deficit present.      Mental Status: He is alert and oriented to person, place, and time. Mental status is at baseline.   Psychiatric:         Mood and Affect: Mood normal.         Behavior: Behavior normal.         Thought Content: Thought content normal.         Fluids    Intake/Output Summary (Last 24 hours) at 3/10/2024 1238  Last data filed at 3/9/2024 1720  Gross per 24 hour   Intake 500 ml   Output 1000 ml   Net -500 ml         Laboratory  Recent Labs     03/09/24  0147   WBC 6.4   RBC 2.56*   HEMOGLOBIN 8.3*   HEMATOCRIT 25.7*   .4*   MCH 32.4   MCHC 32.3   RDW 56.0*   PLATELETCT 246   MPV 10.4     Recent Labs     03/09/24  0147   SODIUM 134*   POTASSIUM 4.5   CHLORIDE 92*   CO2 26   GLUCOSE 111*   BUN 46*   CREATININE 9.58*   CALCIUM 8.8           No results for input(s): \"NTPROBNP\" in the last 72 hours.        Imaging  DX-CHEST-LIMITED (1 VIEW)   Final Result      1.  Cardiomegaly with again seen interstitial infiltrates consistent with interstitial edema versus atypical infection or chronic interstitial lung disease.      2.  Linear bilateral atelectasis.      3.  Minimal right pleural effusion.      EC-ECHOCARDIOGRAM COMPLETE W/O CONT   Final Result      DX-CHEST-PORTABLE (1 VIEW)   Final Result      Patchy bilateral interstitial opacities which could be seen in setting with edema and/or infection.      DX-CHEST-PORTABLE (1 VIEW)   Final Result      1.  Diffuse ill-defined interstitial opacities are most consistent with interstitial edema with viral pneumonitis in the differential diagnosis.      CL-LEFT HEART " CATHETERIZATION WITH POSSIBLE INTERVENTION    (Results Pending)         Assessment/Plan  1 ESRD  2 respiratory failure  3 cardiomyopathy  4 Anemia  5 coronary artery disease  Plan for urgent HD to manage respiratory failure  Continue to evaluate daily  Erythropoietin with dialysis  Renal diet  Daily BMP, CBC.  Renal dose all meds  Avoid nephrotoxins like NSAIDs.  Prognosis guarded.

## 2024-03-11 ENCOUNTER — APPOINTMENT (OUTPATIENT)
Dept: RADIOLOGY | Facility: MEDICAL CENTER | Age: 59
DRG: 280 | End: 2024-03-11
Attending: STUDENT IN AN ORGANIZED HEALTH CARE EDUCATION/TRAINING PROGRAM
Payer: MEDICAID

## 2024-03-11 PROBLEM — E87.70 VOLUME OVERLOAD: Status: ACTIVE | Noted: 2024-03-11

## 2024-03-11 PROBLEM — E87.5 HYPERKALEMIA: Status: ACTIVE | Noted: 2024-03-11

## 2024-03-11 LAB
ALBUMIN SERPL BCP-MCNC: 3.8 G/DL (ref 3.2–4.9)
BUN SERPL-MCNC: 45 MG/DL (ref 8–22)
CALCIUM ALBUM COR SERPL-MCNC: 9.4 MG/DL (ref 8.5–10.5)
CALCIUM SERPL-MCNC: 9.2 MG/DL (ref 8.5–10.5)
CHLORIDE SERPL-SCNC: 93 MMOL/L (ref 96–112)
CO2 SERPL-SCNC: 23 MMOL/L (ref 20–33)
CREAT SERPL-MCNC: 9.16 MG/DL (ref 0.5–1.4)
EKG IMPRESSION: NORMAL
ERYTHROCYTE [DISTWIDTH] IN BLOOD BY AUTOMATED COUNT: 56.1 FL (ref 35.9–50)
GFR SERPLBLD CREATININE-BSD FMLA CKD-EPI: 6 ML/MIN/1.73 M 2
GLUCOSE SERPL-MCNC: 90 MG/DL (ref 65–99)
HCT VFR BLD AUTO: 27.8 % (ref 42–52)
HGB BLD-MCNC: 9.1 G/DL (ref 14–18)
MCH RBC QN AUTO: 32.7 PG (ref 27–33)
MCHC RBC AUTO-ENTMCNC: 32.7 G/DL (ref 32.3–36.5)
MCV RBC AUTO: 100 FL (ref 81.4–97.8)
PHOSPHATE SERPL-MCNC: 5.3 MG/DL (ref 2.5–4.5)
PLATELET # BLD AUTO: 281 K/UL (ref 164–446)
PMV BLD AUTO: 10.2 FL (ref 9–12.9)
POTASSIUM SERPL-SCNC: 4.5 MMOL/L (ref 3.6–5.5)
POTASSIUM SERPL-SCNC: 5.9 MMOL/L (ref 3.6–5.5)
RBC # BLD AUTO: 2.78 M/UL (ref 4.7–6.1)
SODIUM SERPL-SCNC: 133 MMOL/L (ref 135–145)
WBC # BLD AUTO: 11.1 K/UL (ref 4.8–10.8)

## 2024-03-11 PROCEDURE — 700102 HCHG RX REV CODE 250 W/ 637 OVERRIDE(OP)

## 2024-03-11 PROCEDURE — 93005 ELECTROCARDIOGRAM TRACING: CPT | Performed by: STUDENT IN AN ORGANIZED HEALTH CARE EDUCATION/TRAINING PROGRAM

## 2024-03-11 PROCEDURE — A9270 NON-COVERED ITEM OR SERVICE: HCPCS

## 2024-03-11 PROCEDURE — 71045 X-RAY EXAM CHEST 1 VIEW: CPT

## 2024-03-11 PROCEDURE — 85027 COMPLETE CBC AUTOMATED: CPT

## 2024-03-11 PROCEDURE — 90935 HEMODIALYSIS ONE EVALUATION: CPT

## 2024-03-11 PROCEDURE — C9399 UNCLASSIFIED DRUGS OR BIOLOG: HCPCS | Performed by: STUDENT IN AN ORGANIZED HEALTH CARE EDUCATION/TRAINING PROGRAM

## 2024-03-11 PROCEDURE — 99233 SBSQ HOSP IP/OBS HIGH 50: CPT | Performed by: STUDENT IN AN ORGANIZED HEALTH CARE EDUCATION/TRAINING PROGRAM

## 2024-03-11 PROCEDURE — A9270 NON-COVERED ITEM OR SERVICE: HCPCS | Performed by: INTERNAL MEDICINE

## 2024-03-11 PROCEDURE — 36415 COLL VENOUS BLD VENIPUNCTURE: CPT

## 2024-03-11 PROCEDURE — 770020 HCHG ROOM/CARE - TELE (206)

## 2024-03-11 PROCEDURE — 700102 HCHG RX REV CODE 250 W/ 637 OVERRIDE(OP): Performed by: INTERNAL MEDICINE

## 2024-03-11 PROCEDURE — 90935 HEMODIALYSIS ONE EVALUATION: CPT | Performed by: INTERNAL MEDICINE

## 2024-03-11 PROCEDURE — 80069 RENAL FUNCTION PANEL: CPT

## 2024-03-11 PROCEDURE — 700102 HCHG RX REV CODE 250 W/ 637 OVERRIDE(OP): Performed by: STUDENT IN AN ORGANIZED HEALTH CARE EDUCATION/TRAINING PROGRAM

## 2024-03-11 PROCEDURE — 84132 ASSAY OF SERUM POTASSIUM: CPT

## 2024-03-11 PROCEDURE — 99233 SBSQ HOSP IP/OBS HIGH 50: CPT | Performed by: INTERNAL MEDICINE

## 2024-03-11 PROCEDURE — 700111 HCHG RX REV CODE 636 W/ 250 OVERRIDE (IP): Performed by: STUDENT IN AN ORGANIZED HEALTH CARE EDUCATION/TRAINING PROGRAM

## 2024-03-11 PROCEDURE — 700111 HCHG RX REV CODE 636 W/ 250 OVERRIDE (IP)

## 2024-03-11 PROCEDURE — 93010 ELECTROCARDIOGRAM REPORT: CPT | Performed by: INTERNAL MEDICINE

## 2024-03-11 PROCEDURE — A9270 NON-COVERED ITEM OR SERVICE: HCPCS | Performed by: STUDENT IN AN ORGANIZED HEALTH CARE EDUCATION/TRAINING PROGRAM

## 2024-03-11 RX ORDER — HEPARIN SODIUM 1000 [USP'U]/ML
1500 INJECTION, SOLUTION INTRAVENOUS; SUBCUTANEOUS
Status: DISCONTINUED | OUTPATIENT
Start: 2024-03-11 | End: 2024-03-12 | Stop reason: HOSPADM

## 2024-03-11 RX ORDER — HEPARIN SODIUM 1000 [USP'U]/ML
INJECTION, SOLUTION INTRAVENOUS; SUBCUTANEOUS
Status: COMPLETED
Start: 2024-03-11 | End: 2024-03-11

## 2024-03-11 RX ORDER — CARVEDILOL 25 MG/1
25 TABLET ORAL 2 TIMES DAILY WITH MEALS
Status: DISCONTINUED | OUTPATIENT
Start: 2024-03-11 | End: 2024-03-12 | Stop reason: HOSPADM

## 2024-03-11 RX ORDER — HYDROXYZINE HYDROCHLORIDE 25 MG/1
25 TABLET, FILM COATED ORAL 3 TIMES DAILY PRN
Status: DISCONTINUED | OUTPATIENT
Start: 2024-03-11 | End: 2024-03-12 | Stop reason: HOSPADM

## 2024-03-11 RX ORDER — DEXTROSE MONOHYDRATE 25 G/50ML
25 INJECTION, SOLUTION INTRAVENOUS ONCE
Status: DISPENSED | OUTPATIENT
Start: 2024-03-11 | End: 2024-03-12

## 2024-03-11 RX ADMIN — NITROGLYCERIN 0.4 MG: 0.4 TABLET, ORALLY DISINTEGRATING SUBLINGUAL at 04:21

## 2024-03-11 RX ADMIN — HEPARIN SODIUM 5000 UNITS: 5000 INJECTION, SOLUTION INTRAVENOUS; SUBCUTANEOUS at 20:45

## 2024-03-11 RX ADMIN — SEVELAMER CARBONATE 1600 MG: 800 TABLET, FILM COATED ORAL at 16:43

## 2024-03-11 RX ADMIN — HEPARIN SODIUM 1500 UNITS: 1000 INJECTION, SOLUTION INTRAVENOUS; SUBCUTANEOUS at 08:33

## 2024-03-11 RX ADMIN — ASPIRIN 81 MG: 81 TABLET, COATED ORAL at 05:48

## 2024-03-11 RX ADMIN — FAMOTIDINE 20 MG: 20 TABLET, FILM COATED ORAL at 16:43

## 2024-03-11 RX ADMIN — AMOXICILLIN AND CLAVULANATE POTASSIUM 1 TABLET: 500; 125 TABLET, FILM COATED ORAL at 16:43

## 2024-03-11 RX ADMIN — CARVEDILOL 25 MG: 25 TABLET, FILM COATED ORAL at 20:45

## 2024-03-11 RX ADMIN — HEPARIN SODIUM 5000 UNITS: 5000 INJECTION, SOLUTION INTRAVENOUS; SUBCUTANEOUS at 14:32

## 2024-03-11 RX ADMIN — ATORVASTATIN CALCIUM 40 MG: 40 TABLET, FILM COATED ORAL at 16:43

## 2024-03-11 RX ADMIN — LISINOPRIL 20 MG: 20 TABLET ORAL at 05:48

## 2024-03-11 RX ADMIN — DOXYCYCLINE 100 MG: 100 TABLET, FILM COATED ORAL at 16:43

## 2024-03-11 RX ADMIN — HEPARIN SODIUM 5000 UNITS: 5000 INJECTION, SOLUTION INTRAVENOUS; SUBCUTANEOUS at 05:49

## 2024-03-11 RX ADMIN — HYDROXYZINE HYDROCHLORIDE 25 MG: 25 TABLET, FILM COATED ORAL at 05:49

## 2024-03-11 RX ADMIN — AMLODIPINE BESYLATE 10 MG: 10 TABLET ORAL at 05:48

## 2024-03-11 RX ADMIN — SODIUM ZIRCONIUM CYCLOSILICATE 10 G: 10 POWDER, FOR SUSPENSION ORAL at 12:08

## 2024-03-11 RX ADMIN — RILPIVIRINE HYDROCHLORIDE 25 MG: 25 TABLET, FILM COATED ORAL at 05:51

## 2024-03-11 RX ADMIN — NICOTINE TRANSDERMAL SYSTEM 21 MG: 21 PATCH, EXTENDED RELEASE TRANSDERMAL at 05:48

## 2024-03-11 RX ADMIN — CALCITRIOL CAPSULES 0.25 MCG 0.25 MCG: 0.25 CAPSULE ORAL at 05:48

## 2024-03-11 RX ADMIN — DOXYCYCLINE 100 MG: 100 TABLET, FILM COATED ORAL at 05:48

## 2024-03-11 RX ADMIN — CARVEDILOL 25 MG: 25 TABLET, FILM COATED ORAL at 12:07

## 2024-03-11 RX ADMIN — DOLUTEGRAVIR SODIUM 50 MG: 50 TABLET, FILM COATED ORAL at 05:52

## 2024-03-11 RX ADMIN — DOCUSATE SODIUM 50 MG AND SENNOSIDES 8.6 MG 2 TABLET: 8.6; 5 TABLET, FILM COATED ORAL at 16:43

## 2024-03-11 RX ADMIN — SEVELAMER CARBONATE 1600 MG: 800 TABLET, FILM COATED ORAL at 12:08

## 2024-03-11 ASSESSMENT — PAIN DESCRIPTION - PAIN TYPE
TYPE: ACUTE PAIN

## 2024-03-11 ASSESSMENT — ENCOUNTER SYMPTOMS: SHORTNESS OF BREATH: 0

## 2024-03-11 ASSESSMENT — FIBROSIS 4 INDEX: FIB4 SCORE: 1.54

## 2024-03-11 NOTE — PROGRESS NOTES
Patient complained of squeezing chest pain rated 4/10 and increasing SOB. Vitals stable.  Administered SL nitro which relieved pain. Stat EKG ordered with SR result.  LETA [place] updated. Order received for additional labs.

## 2024-03-11 NOTE — PROGRESS NOTES
Patient has been complaining of worsening SOB since the beginning of shift.  Patient SpO2 is 93% on 4 L via NC and respirations are 20/min.  Respiratory therapy assessed patient and on call APRN was also made aware of patient complaint. This RN has educated patient on purse-lip breathing and promoting energy conservation.  Patient has been positioned upright in  recliner chair and a calm, quiet environment maintained. Plan to monitor respiratory status and O2 needs closely.  Patient denies chest pain.

## 2024-03-11 NOTE — PROGRESS NOTES
"Cardiology Follow Up Progress Note    Date of Service  3/10/2024    Attending Physician  Denis Ramos M.D.    HPI  History of Presenting Illness  Bob Ochoa is a 58 y.o. male with a past medical history of severe aortic stenosis, hypertension, nonischemic cardiomyopathy, end-stage renal disease, HIV  who presented 3/2/2024 brought in by EMS with fever diffuse chest pain and shortness of breath after recent hospitalization for shortness of breath, pulmonary edema and respiratory failure.     Interim Events  3/7: /65.  HR 74.  Sinus.  Tolerated coronary angiography yesterday.  Currently having no chest pain or heart failure symptoms.  3/8: /61.  HR 74.  Sinus.  No cardiac symptoms including heart failure or angina.  3/19: /71.  HR 71.  Sinus.  Call back to evaluate the patient for diffuse chest pain and shortness of breath earlier this morning around 7 AM resolved with sublingual nitroglycerin.  Seen by CT surgery yesterday and assessed that \"currently, benefits of surgery do not seem to outweigh risk in this patient\".  Currently having no chest pain.  However as at times shortness of breath.    Review of Systems  Review of Systems   Respiratory:  Negative for chest tightness and shortness of breath.    Cardiovascular:  Negative for palpitations.   Neurological:  Negative for headaches.       Vital signs in last 24 hours  Temp:  [36.7 °C (98 °F)-37 °C (98.6 °F)] 36.7 °C (98.1 °F)  Pulse:  [68-74] 68  Resp:  [16-18] 18  BP: ()/(51-72) 102/62  SpO2:  [93 %-97 %] 95 %    Physical Exam  Physical Exam  Constitutional:       General: He is not in acute distress.  Neck:      Comments: Normal jugular venous pressure.  Cardiovascular:      Rate and Rhythm: Normal rate and regular rhythm.      Pulses:           Radial pulses are 1+ on the right side.      Heart sounds: S1 normal and S2 normal. Murmur heard.      No friction rub. No gallop.   Pulmonary:      Effort: Pulmonary effort is normal.      " "Breath sounds: Normal breath sounds. No wheezing, rhonchi or rales.   Musculoskeletal:      Right lower leg: No edema.      Left lower leg: No edema.      Comments: AV fistula left arm.   Skin:     General: Skin is warm and dry.      Nails: There is no clubbing.   Neurological:      General: No focal deficit present.      Mental Status: He is alert.   Psychiatric:         Behavior: Behavior normal.         Lab Review  Lab Results   Component Value Date/Time    WBC 6.4 03/09/2024 01:47 AM    RBC 2.56 (L) 03/09/2024 01:47 AM    HEMOGLOBIN 8.3 (L) 03/09/2024 01:47 AM    HEMATOCRIT 25.7 (L) 03/09/2024 01:47 AM    .4 (H) 03/09/2024 01:47 AM    MCH 32.4 03/09/2024 01:47 AM    MCHC 32.3 03/09/2024 01:47 AM    MPV 10.4 03/09/2024 01:47 AM      Lab Results   Component Value Date/Time    SODIUM 134 (L) 03/09/2024 01:47 AM    POTASSIUM 4.5 03/09/2024 01:47 AM    CHLORIDE 92 (L) 03/09/2024 01:47 AM    CO2 26 03/09/2024 01:47 AM    GLUCOSE 111 (H) 03/09/2024 01:47 AM    BUN 46 (H) 03/09/2024 01:47 AM    CREATININE 9.58 (HH) 03/09/2024 01:47 AM      Lab Results   Component Value Date/Time    ASTSGOT 22 03/03/2024 03:49 AM    ALTSGPT 9 03/03/2024 03:49 AM     Lab Results   Component Value Date/Time    TROPONINT 1902 (H) 03/10/2024 08:15 AM       No results for input(s): \"NTPROBNP\" in the last 72 hours.    Cardiac Imaging and Procedures Review  EKG:  My personal interpretation of the EKG dated 3/3/2024 is sinus rhythm with lateral ST-T wave abnormalities.    Rhythm: My personal interpretation the rhythm dated 3/7/2024 is sinus rhythm    Rhythm: My personal interpretation of the rhythm dated 3/8/2024 is sinus rhythm    Rhythm: My personal interpretation of the rhythm dated 3/10/2024 is sinus rhythm     Echocardiogram: 7/4/2020  Compared to the images of the prior study done 8/30/2017, changes   noted, LV function declined.  Left ventricular ejection fraction is visually estimated to be 40%.  Global hypokinesis with regional " variation.  Grade II diastolic dysfunction.  Mild to moderate mitral regurgitation.  Estimated right ventricular systolic pressure  is 50 mmHg.        Echocardiogram:  2/25/2024  Prior study on 07/04/2020, compared to the report of the prior study,   there has been progression of aortic stenosis.  Moderately reduced left ventricular systolic function.  The left ventricular ejection fraction is visually estimated to be 40%.  The aortic valve leaflets are heavily calcified.  The aortic valve appears bileaflet.  Severe aortic valve stenosis.  Aortic valve area calculated from the continuity equation is  0.77 cm2.  Vmax is 3.93 m/s. Transvalvular gradients are - Peak: 62 mmHg, Mean: 40   mmHg.  Estimated right ventricular systolic pressure is 50 mmHg.    Cardiac catheterization 3/6/2024  CORONARY ANGIOGRAPHY:  The left main coronary artery : Large in caliber vessel with mild CAD, trifurcates to LAD, ramus intermedius and left circumflex  The left anterior descending coronary artery : Large in caliber transapical vessel with calcified 80% severe proximal/mid stenoses.  Gives rise to several small-medium caliber diagonal branches with severe ostial/proximal disease  Ramus intermedius coronary artery: Small-medium in caliber vessel  The left circumflex coronary artery : Large-caliber vessel with severe 80% ostial/proximal stenosis.  Gives rise to a bifurcating OM and the true circumflex tapers in the AV groove.  The distal circumflex has severe diffuse disease  The right coronary artery  : Large-caliber dominant vessel with severe calcified 80% mid stenosis.  Small PLB system.  Severe 80% proximal/mid right PDA.  IMPRESSION:  Severe multivessel CAD      Imaging  Chest X-Ray: 3/3/2024  Probable edema     Stress Test: 2023   No evidence of significant jeopardized viable myocardium or prior myocardial    infarction.   Normal left ventricular size, ejection fraction, and wall motion.   ECG INTERPRETATION   Negative stress ECG  for ischemia.     Assessment  Post MI angina and shortness of breath.  NSTEMI  Severe aortic stenosis  Multivessel CAD  HFrEF 40%, chronic  Hypertension, acute on chronic  ESRD on hemodialysis  COVID-19 pneumonia 2/2024  HIV     Recommendations Discussion  Recurrent angina pectoris and shortness of breath post MI with severe aortic stenosis, multivessel CAD declined by surgery.  Reviewed and discussed with the patient.  Have reviewed the coronary angiograms.  Will discuss with my interventional colleagues as to the nonsurgical alternative with regards to amenable coronary artery lesions for catheter-based intervention and valvular intervention with TAVR or bridging valvuloplasty.  Currently on comprehensive secondary ASCVD therapy including aspirin, atorvastatin, carvedilol.     I personally reviewed the patient's treatment plan with Denis Ramos MD and the patient at the bedside.     Thank you for allowing me to participate in the care of this patient.     Please contact me with any questions.     Austin Pruitt M.D.   Cardiologist, [facility]  [phone]

## 2024-03-11 NOTE — CARE PLAN
Problem: Knowledge Deficit - Standard  Goal: Patient and family/care givers will demonstrate understanding of plan of care, disease process/condition, diagnostic tests and medications  Outcome: Not Progressing     Problem: Pain - Standard  Goal: Alleviation of pain or a reduction in pain to the patient’s comfort goal  Outcome: Not Progressing   The patient is Watcher - Medium risk of patient condition declining or worsening    Shift Goals  Clinical Goals: promote sleep, monitor vitals, labs, and tele  Patient Goals: breathe better  Family Goals: na    Progress made toward(s) clinical / shift goals:  Dialysis    Patient is not progressing towards the following goals:      Problem: Knowledge Deficit - Standard  Goal: Patient and family/care givers will demonstrate understanding of plan of care, disease process/condition, diagnostic tests and medications  Outcome: Not Progressing     Problem: Pain - Standard  Goal: Alleviation of pain or a reduction in pain to the patient’s comfort goal  Outcome: Not Progressing

## 2024-03-11 NOTE — PROGRESS NOTES
Monitor Summary  Rhythm: Sinus Rhythm  Rate: 64-75, Lowest at one point of 49.  Ectopy: 0  .16 / .06 / .33

## 2024-03-11 NOTE — PROGRESS NOTES
Extra HD today started @ 0833 and completed @ 1134,tx well tolerated,VSS.Net UF = 3000ml,breathing and feeling better post tx per patient.LUAAVF + B/T,cannulation sites covered with DD,CDI.Report given to Attila Edmondson RN.

## 2024-03-11 NOTE — THERAPY
Physical Therapy Contact Note    Patient Name: Bob Twenty-Two  Age:  58 y.o., Sex:  male  Medical Record #: [id number]  Today's Date: 3/11/2024         03/11/24 0802   Treatment Variance   Reason For Missed Therapy Medical - Other (Please Comment)  (Per chart review, cardiology is determining surgical vs non-surgical management. Will hold and re-attempt as able.)   Session Information   Date / Session Number  hold 3/6, 3/7, 3/8, 3/10, 3/11     Cathryn Devine, PT, DPT

## 2024-03-11 NOTE — PROCEDURES
Diagnosis: End-Stage Renal Disease. Patient seen and examined on hemodialysis at the end of treatment. Patient is stable, tolerating hemodialysis. Denies chest pain, still with some SOB but better than prior to dialysis. Orders updated as needed. Please refer to flowsheet for full details.    Access: left UE AVF  UF goal: 3L    Plan: Extra HD today for volume overload and mildly high K. Plan on next HD tomorrow per Tuesday Thursday Saturday schedule.     Bernabe Beasley MD  Nephrology   [facility]

## 2024-03-11 NOTE — HOSPITAL COURSE
58 male with past medical history of cardiomyopathy with EF 40%, severe aortic stenosis, HIV on antiretroviral, ESRD on hemodialysis presented with chest pain.  Patient reportedly had missed hemodialysis.Significant elevated blood pressure on arrival to ED.  Noted to have significantly elevated troponin with EKG changes.  Echo noted with ejection [place] 40 to 48%, severe aortic valve stenosis.  Cardiac cath on 3/7 revealed severe multivessel CAD with severe aortic stenosis.  CTS evaluated and felt he is high risk for CABG given multiple comorbidities .  Patient continued to have intermittent anginal chest pain and shortness of breath .  Cardiology deciding for possible intervention.

## 2024-03-11 NOTE — THERAPY
Physical Therapy Contact Note    Patient Name: Bob Twenty-Two  Age:  58 y.o., Sex:  male  Medical Record #: [id number]  Today's Date: 3/10/2024       03/10/24 1419   Interdisciplinary Plan of Care Collaboration   Collaboration Comments CTS not recommending CABG. Pt had chest pain this AM and is in respiratory failure. Awaiting cardiology consult again after this mornings episode. Will hold and monitor EMR and eval when appropriate.   Session Information   Date / Session Number  hold 3/6, 3/7, 3/8, 3/10

## 2024-03-11 NOTE — PROGRESS NOTES
Hospital Medicine Daily Progress Note    Date of Service  3/11/2024    Chief Complaint  Bob Ochoa is a 58 y.o. male admitted 3/2/2024 with shortness of breath    Hospital Course  58 male with past medical history of cardiomyopathy with EF 40%, severe aortic stenosis, HIV on antiretroviral, ESRD on hemodialysis presented with chest pain.  Patient reportedly had missed hemodialysis.Significant elevated blood pressure on arrival to ED.  Noted to have significantly elevated troponin with EKG changes.  Echo noted with ejection [place] 40 to 48%, severe aortic valve stenosis.  Cardiac cath on 3/7 revealed severe multivessel CAD with severe aortic stenosis.  CTS evaluated and felt he is high risk for CABG given multiple comorbidities .  Patient continued to have intermittent anginal chest pain and shortness of breath .  Cardiology deciding for possible intervention.      Interval Problem Update    3/11/2024  Vitals remained stable, on 4 L oxygen  Labs reviewed noted leukocytosis, hemoglobin 9.1, chemistry with sodium 133, potassium 5.9  Chest x-ray is worsening interval opacity likely pulmonary edema.      Case discussed with cardiologist Dr. Enciso.    Telemetry monitoring  Initiated urgent hemodialysis for hyperkalemia and volume overload this a.m.  Not a candidate for CABG per CTS  Continue on aspirin, Coreg, Lipitor  Started on oral antibiotic with Augmentin and Doxy  Cardiology planning for possible intervention due to recurrent angina.  Incentive spirometry  High risk of deterioration into arrhythmia due to NSTEMI.  Need close telemetry monitoring.      I have discussed this patient's plan of care and discharge plan at IDT rounds today with Case Management, Nursing, Nursing leadership, and other members of the IDT team.    Consultants/Specialty  cardiology    Code Status  Full Code    Disposition  The patient is not medically cleared for discharge to home or a post-acute facility.  Anticipate discharge to:  home with close outpatient follow-up    I have placed the appropriate orders for post-discharge needs.    Review of Systems  Review of Systems   Respiratory:  Negative for shortness of breath.    Cardiovascular:  Positive for chest pain.        Physical Exam  Temp:  [36.1 °C (97 °F)-37.1 °C (98.8 °F)] 36.3 °C (97.4 °F)  Pulse:  [69-83] 83  Resp:  [17-18] 18  BP: (100-148)/(49-80) 115/49  SpO2:  [94 %-98 %] 94 %    Physical Exam  Constitutional:       Appearance: Normal appearance.   Cardiovascular:      Rate and Rhythm: Normal rate and regular rhythm.      Heart sounds: Normal heart sounds.   Pulmonary:      Effort: Pulmonary effort is normal.   Musculoskeletal:      Right lower leg: No edema.      Left lower leg: No edema.   Neurological:      General: No focal deficit present.      Mental Status: He is alert and oriented to person, place, and time.   Psychiatric:         Mood and Affect: Mood normal.         Fluids    Intake/Output Summary (Last 24 hours) at 3/11/2024 1646  Last data filed at 3/11/2024 1134  Gross per 24 hour   Intake 1200 ml   Output 3500 ml   Net -2300 ml         Laboratory  Recent Labs     03/09/24  0147 03/11/24  0459   WBC 6.4 11.1*   RBC 2.56* 2.78*   HEMOGLOBIN 8.3* 9.1*   HEMATOCRIT 25.7* 27.8*   .4* 100.0*   MCH 32.4 32.7   MCHC 32.3 32.7   RDW 56.0* 56.1*   PLATELETCT 246 281   MPV 10.4 10.2     Recent Labs     03/09/24  0147 03/11/24  0459   SODIUM 134* 133*   POTASSIUM 4.5 5.9*   CHLORIDE 92* 93*   CO2 26 23   GLUCOSE 111* 90   BUN 46* 45*   CREATININE 9.58* 9.16*   CALCIUM 8.8 9.2                     Imaging  DX-CHEST-LIMITED (1 VIEW)   Final Result      Worsening interstitial opacities likely pulmonary edema probably superimposed on chronic changes. Small bilateral pleural effusions.      DX-CHEST-LIMITED (1 VIEW)   Final Result      1.  Cardiomegaly with again seen interstitial infiltrates consistent with interstitial edema versus atypical infection or chronic interstitial  lung disease.      2.  Linear bilateral atelectasis.      3.  Minimal right pleural effusion.      EC-ECHOCARDIOGRAM COMPLETE W/O CONT   Final Result      DX-CHEST-PORTABLE (1 VIEW)   Final Result      Patchy bilateral interstitial opacities which could be seen in setting with edema and/or infection.      DX-CHEST-PORTABLE (1 VIEW)   Final Result      1.  Diffuse ill-defined interstitial opacities are most consistent with interstitial edema with viral pneumonitis in the differential diagnosis.      CL-LEFT HEART CATHETERIZATION WITH POSSIBLE INTERVENTION    (Results Pending)        Assessment/Plan  * Acute respiratory failure with hypoxia- (present on admission)  Assessment & Plan  58-year-old male on dialysis for end-stage renal disease and combined systolic and diastolic heart failure and severe aortic stenosis presented with acute respiratory distress after missed hemodialysis, hypertensive emergency.  Condition improved after correction of hypertensive crisis with nitroglycerin, Lasix and CPAP  Chest x-ray: Bilateral interstitial edema.  S/p dialysis, with symptom  some improvement  Incentive spirometry  Check procalcitonin  Home O2 evaluation in a.m.        Hyperkalemia  Assessment & Plan  Had a session of hemodialysis today  Repeat labs now    Volume overload  Assessment & Plan  Improved with hemodialysis      Chest pain  Assessment & Plan  The ASCVD Risk score ([place] DK, et al., 2019) failed to calculate for the following reasons:    The patient has a prior MI or stroke diagnosis  Cardiac cath on 3/7 revealed severe multivessel CAD with severe aortic stenosis.  CTS evaluated and felt he is high risk for CABG given multiple comorbidities .   Had a chest pain lasted for 20 minutes on 3/10  Elevated troponin, recent angiogram, EKG with no significant ST changes    Continue on telemetry monitoring  Continue aspirin  Continue beta-blocker  Cardiology to evaluate further recommendation      Severe aortic  stenosis  Assessment & Plan  Echocardiogram result noted with ejection fraction 40 to 48%, grade 2 left ventricular distal dysfunction, severe aortic valve stenosis, pulmonary hypertension with RVSP 55mh hg .  Cardiology evaluated    Smoking  Assessment & Plan  Spent approx 5 mins on Tobacco cessation education . Discussed options of nicotine patch, medical treatment with wellbutrin and chantix. Discussed the benefits of quitting smoking and risks of continued smoking including cardiovascular disease, cancer and COPD.   Code 01547      Leukocytosis  Assessment & Plan  Resolved    Acute on chronic combined systolic (congestive) and diastolic (congestive) heart failure (HCC)  Assessment & Plan  Secondary to volume overload missed hemodialysis and uncontrolled blood pressure  Resume hemodialysis and blood pressure medications  Monitor input and output and daily weight  Volume status improved with hemodialysis      NSTEMI (non-ST elevated myocardial infarction) ([facility])  Assessment & Plan    Telemetry monitoring  O2 2L per nasal cannula to keep saturation more than 92%  On aspirin, Coreg, lisinopril  Lipitor 40 mg HS  Cardiac cath on 3/7 revealed severe multivessel CAD with severe aortic stenosis.  Cardiothoracic surgeon to evaluate for aortic valve repair and CABG.   \CTS evaluated and felt he is high risk for CABG given multiple comorbidities .   Patient continued to have intermittent anginal chest pain and shortness of breath .  Cardiology deciding for possible intervention.    HIV (human immunodeficiency virus infection) ([facility])  Assessment & Plan  Continue home regimen  Follow-up with Dr. Whyte    Hypertensive emergency  Assessment & Plan  Resolved  Resume home blood pressure medications lisinopril, carvedilol, amlodipine  Monitor blood pressure  IV labetalol as needed         VTE prophylaxis: Heparin     I have performed a physical exam and reviewed and updated ROS and Plan today (3/11/2024). In review of yesterday's  note (3/10/2024), there are no changes except as documented above.

## 2024-03-11 NOTE — CARE PLAN
The patient is Watcher - Medium risk of patient condition declining or worsening    Shift Goals  Clinical Goals: promote sleep, monitor vitals, labs, and tele  Patient Goals: breathe better  Family Goals: na    Progress made toward(s) clinical / shift goals:    Problem: Knowledge Deficit - Standard  Goal: Patient and family/care givers will demonstrate understanding of plan of care, disease process/condition, diagnostic tests and medications  Outcome: Progressing     Problem: Pain - Standard  Goal: Alleviation of pain or a reduction in pain to the patient’s comfort goal  Outcome: Progressing     Problem: Fall Risk  Goal: Patient will remain free from falls  Outcome: Progressing     Problem: Respiratory  Goal: Patient will achieve/maintain optimum respiratory ventilation and gas exchange  Outcome: Progressing       Patient is not progressing towards the following goals:

## 2024-03-11 NOTE — PROGRESS NOTES
Bedside report received from off going RN/tech: Steffi, assumed care of patient.     Fall Risk Score: LOW RISK  Fall risk interventions in place: Place yellow fall risk ID band on patient, Provide patient/family education based on risk assessment, Educate patient/family to call staff for assistance when getting out of bed, Place fall precaution signage outside patient door, and Place patient in room close to nursing station  Bed type: Regular (Griffin Score less than 17 interventions in place)  Patient on cardiac monitor: Yes  IVF/IV medications: Not Applicable   Oxygen: How many liters 4L  Bedside sitter: Not Applicable   Isolation: Not applicable

## 2024-03-12 ENCOUNTER — PHARMACY VISIT (OUTPATIENT)
Dept: PHARMACY | Facility: MEDICAL CENTER | Age: 59
End: 2024-03-12
Payer: COMMERCIAL

## 2024-03-12 ENCOUNTER — TELEPHONE (OUTPATIENT)
Dept: CARDIOLOGY | Facility: MEDICAL CENTER | Age: 59
End: 2024-03-12
Payer: MEDICAID

## 2024-03-12 VITALS
HEART RATE: 66 BPM | TEMPERATURE: 98.6 F | WEIGHT: 138.01 LBS | SYSTOLIC BLOOD PRESSURE: 97 MMHG | RESPIRATION RATE: 18 BRPM | HEIGHT: 64 IN | OXYGEN SATURATION: 95 % | DIASTOLIC BLOOD PRESSURE: 59 MMHG | BODY MASS INDEX: 23.56 KG/M2

## 2024-03-12 PROBLEM — E87.5 HYPERKALEMIA: Status: RESOLVED | Noted: 2024-03-11 | Resolved: 2024-03-12

## 2024-03-12 PROBLEM — I16.1 HYPERTENSIVE EMERGENCY: Status: RESOLVED | Noted: 2024-03-02 | Resolved: 2024-03-12

## 2024-03-12 PROBLEM — E78.2 MIXED HYPERLIPIDEMIA: Status: ACTIVE | Noted: 2024-03-12

## 2024-03-12 PROBLEM — D72.829 LEUKOCYTOSIS: Status: RESOLVED | Noted: 2024-03-02 | Resolved: 2024-03-12

## 2024-03-12 PROBLEM — J18.9 COMMUNITY ACQUIRED PNEUMONIA OF LEFT LOWER LOBE OF LUNG: Status: ACTIVE | Noted: 2024-03-12

## 2024-03-12 LAB
ANION GAP SERPL CALC-SCNC: 13 MMOL/L (ref 7–16)
BASOPHILS # BLD AUTO: 0.3 % (ref 0–1.8)
BASOPHILS # BLD: 0.02 K/UL (ref 0–0.12)
BUN SERPL-MCNC: 36 MG/DL (ref 8–22)
CALCIUM SERPL-MCNC: 9 MG/DL (ref 8.5–10.5)
CHLORIDE SERPL-SCNC: 90 MMOL/L (ref 96–112)
CO2 SERPL-SCNC: 29 MMOL/L (ref 20–33)
CREAT SERPL-MCNC: 7.64 MG/DL (ref 0.5–1.4)
EOSINOPHIL # BLD AUTO: 0.22 K/UL (ref 0–0.51)
EOSINOPHIL NFR BLD: 3 % (ref 0–6.9)
ERYTHROCYTE [DISTWIDTH] IN BLOOD BY AUTOMATED COUNT: 54.1 FL (ref 35.9–50)
GFR SERPLBLD CREATININE-BSD FMLA CKD-EPI: 8 ML/MIN/1.73 M 2
GLUCOSE SERPL-MCNC: 109 MG/DL (ref 65–99)
HCT VFR BLD AUTO: 26.4 % (ref 42–52)
HGB BLD-MCNC: 8.8 G/DL (ref 14–18)
IMM GRANULOCYTES # BLD AUTO: 0.03 K/UL (ref 0–0.11)
IMM GRANULOCYTES NFR BLD AUTO: 0.4 % (ref 0–0.9)
LYMPHOCYTES # BLD AUTO: 1.65 K/UL (ref 1–4.8)
LYMPHOCYTES NFR BLD: 22.5 % (ref 22–41)
MCH RBC QN AUTO: 32.6 PG (ref 27–33)
MCHC RBC AUTO-ENTMCNC: 33.3 G/DL (ref 32.3–36.5)
MCV RBC AUTO: 97.8 FL (ref 81.4–97.8)
MONOCYTES # BLD AUTO: 0.52 K/UL (ref 0–0.85)
MONOCYTES NFR BLD AUTO: 7.1 % (ref 0–13.4)
NEUTROPHILS # BLD AUTO: 4.9 K/UL (ref 1.82–7.42)
NEUTROPHILS NFR BLD: 66.7 % (ref 44–72)
NRBC # BLD AUTO: 0 K/UL
NRBC BLD-RTO: 0 /100 WBC (ref 0–0.2)
PLATELET # BLD AUTO: 287 K/UL (ref 164–446)
PMV BLD AUTO: 10.3 FL (ref 9–12.9)
POTASSIUM SERPL-SCNC: 4.4 MMOL/L (ref 3.6–5.5)
RBC # BLD AUTO: 2.7 M/UL (ref 4.7–6.1)
SODIUM SERPL-SCNC: 132 MMOL/L (ref 135–145)
WBC # BLD AUTO: 7.3 K/UL (ref 4.8–10.8)

## 2024-03-12 PROCEDURE — 90935 HEMODIALYSIS ONE EVALUATION: CPT

## 2024-03-12 PROCEDURE — 80048 BASIC METABOLIC PNL TOTAL CA: CPT

## 2024-03-12 PROCEDURE — 99232 SBSQ HOSP IP/OBS MODERATE 35: CPT | Performed by: INTERNAL MEDICINE

## 2024-03-12 PROCEDURE — 700111 HCHG RX REV CODE 636 W/ 250 OVERRIDE (IP): Performed by: STUDENT IN AN ORGANIZED HEALTH CARE EDUCATION/TRAINING PROGRAM

## 2024-03-12 PROCEDURE — 36415 COLL VENOUS BLD VENIPUNCTURE: CPT

## 2024-03-12 PROCEDURE — 99239 HOSP IP/OBS DSCHRG MGMT >30: CPT | Performed by: INTERNAL MEDICINE

## 2024-03-12 PROCEDURE — 700102 HCHG RX REV CODE 250 W/ 637 OVERRIDE(OP)

## 2024-03-12 PROCEDURE — A9270 NON-COVERED ITEM OR SERVICE: HCPCS

## 2024-03-12 PROCEDURE — RXMED WILLOW AMBULATORY MEDICATION CHARGE: Performed by: INTERNAL MEDICINE

## 2024-03-12 PROCEDURE — 700111 HCHG RX REV CODE 636 W/ 250 OVERRIDE (IP): Mod: JZ

## 2024-03-12 PROCEDURE — A9270 NON-COVERED ITEM OR SERVICE: HCPCS | Performed by: INTERNAL MEDICINE

## 2024-03-12 PROCEDURE — 700102 HCHG RX REV CODE 250 W/ 637 OVERRIDE(OP): Performed by: INTERNAL MEDICINE

## 2024-03-12 PROCEDURE — A9270 NON-COVERED ITEM OR SERVICE: HCPCS | Performed by: STUDENT IN AN ORGANIZED HEALTH CARE EDUCATION/TRAINING PROGRAM

## 2024-03-12 PROCEDURE — 700102 HCHG RX REV CODE 250 W/ 637 OVERRIDE(OP): Performed by: STUDENT IN AN ORGANIZED HEALTH CARE EDUCATION/TRAINING PROGRAM

## 2024-03-12 PROCEDURE — 85025 COMPLETE CBC W/AUTO DIFF WBC: CPT

## 2024-03-12 PROCEDURE — 90935 HEMODIALYSIS ONE EVALUATION: CPT | Performed by: INTERNAL MEDICINE

## 2024-03-12 RX ORDER — ATORVASTATIN CALCIUM 40 MG/1
40 TABLET, FILM COATED ORAL NIGHTLY
Qty: 30 TABLET | Refills: 0 | Status: SHIPPED | OUTPATIENT
Start: 2024-03-12

## 2024-03-12 RX ORDER — DOXYCYCLINE 100 MG/1
100 TABLET ORAL EVERY 12 HOURS
Qty: 6 TABLET | Refills: 0 | Status: ACTIVE | OUTPATIENT
Start: 2024-03-12 | End: 2024-03-15

## 2024-03-12 RX ORDER — CARVEDILOL 25 MG/1
25 TABLET ORAL 2 TIMES DAILY WITH MEALS
Qty: 60 TABLET | Refills: 0 | Status: SHIPPED | OUTPATIENT
Start: 2024-03-12

## 2024-03-12 RX ORDER — AMOXICILLIN AND CLAVULANATE POTASSIUM 500; 125 MG/1; MG/1
1 TABLET, FILM COATED ORAL EVERY 24 HOURS
Qty: 3 TABLET | Refills: 0 | Status: ACTIVE | OUTPATIENT
Start: 2024-03-12 | End: 2024-03-15

## 2024-03-12 RX ADMIN — AMLODIPINE BESYLATE 10 MG: 10 TABLET ORAL at 04:33

## 2024-03-12 RX ADMIN — ASPIRIN 81 MG: 81 TABLET, COATED ORAL at 04:35

## 2024-03-12 RX ADMIN — DOLUTEGRAVIR SODIUM 50 MG: 50 TABLET, FILM COATED ORAL at 04:33

## 2024-03-12 RX ADMIN — CALCITRIOL CAPSULES 0.25 MCG 0.25 MCG: 0.25 CAPSULE ORAL at 04:35

## 2024-03-12 RX ADMIN — NICOTINE TRANSDERMAL SYSTEM 21 MG: 21 PATCH, EXTENDED RELEASE TRANSDERMAL at 04:33

## 2024-03-12 RX ADMIN — AMOXICILLIN AND CLAVULANATE POTASSIUM 1 TABLET: 500; 125 TABLET, FILM COATED ORAL at 17:44

## 2024-03-12 RX ADMIN — DOXYCYCLINE 100 MG: 100 TABLET, FILM COATED ORAL at 17:44

## 2024-03-12 RX ADMIN — HEPARIN SODIUM 5000 UNITS: 5000 INJECTION, SOLUTION INTRAVENOUS; SUBCUTANEOUS at 04:33

## 2024-03-12 RX ADMIN — ATORVASTATIN CALCIUM 40 MG: 40 TABLET, FILM COATED ORAL at 17:44

## 2024-03-12 RX ADMIN — DOXYCYCLINE 100 MG: 100 TABLET, FILM COATED ORAL at 04:34

## 2024-03-12 RX ADMIN — FAMOTIDINE 20 MG: 20 TABLET, FILM COATED ORAL at 17:44

## 2024-03-12 RX ADMIN — EPOETIN ALFA-EPBX 3000 UNITS: 3000 INJECTION, SOLUTION INTRAVENOUS; SUBCUTANEOUS at 12:33

## 2024-03-12 RX ADMIN — SEVELAMER CARBONATE 1600 MG: 800 TABLET, FILM COATED ORAL at 17:44

## 2024-03-12 RX ADMIN — LISINOPRIL 20 MG: 20 TABLET ORAL at 04:33

## 2024-03-12 ASSESSMENT — FIBROSIS 4 INDEX: FIB4 SCORE: 1.51

## 2024-03-12 NOTE — TELEPHONE ENCOUNTER
Referral from: Monika GILLETTE for sev AS    Patient called on 3/12/2024. Only phone number for patient [phone] is not in service.     Called and LVM for patient's son Sridhar [phone] and patient's son uDong [phone] to call back and discuss.     Letter mailed to address on file

## 2024-03-12 NOTE — PROGRESS NOTES
Cardiology Progress Note    Date of Service  3/11/2024    Attending Physician  Denis Ramos M.D.    HPI  The patient is a 58 y.o. male with a past medical history of severe aortic stenosis, hypertension, severe coronary artery disease, end-stage renal disease on HD, and HIV  who presented 3/2/2024 brought in by EMS with fever, chest pain, and shortness of breath after recent hospitalization for shortness of breath, pulmonary edema and respiratory failure with about 1 week of missed dialysis. He was found to have severe aortic stenosis as well as significant coronary artery disease, but was not felt to be a surgical candidate.     Subjective  Feels much better following dialysis, not currently having significant chest pain.    Vital signs in last 24 hours  Temp:  [36.1 °C (97 °F)-37.1 °C (98.8 °F)] 37 °C (98.6 °F)  Pulse:  [74-83] 74  Resp:  [17-18] 17  BP: (100-148)/(49-80) 111/61  SpO2:  [90 %-96 %] 90 %    Physical Exam  General: Somewhat frail, but in no acute distress  Eyes: Extraocular movements intact, anicteric  Neck: Full range of motion, no gross jugular venous distension  Pulmonary: Normal respiratory effort, no distress  Cardiovascular: Regular rate, regular rhythm  Extremities: Warm and well perfused, no significant lower extremity edema  Neurological: Alert and oriented, normal speech  Psychiatric: Normal affect, normal judgment    Laboratories  Lab Results   Component Value Date/Time    WBC 11.1 (H) 03/11/2024 04:59 AM    RBC 2.78 (L) 03/11/2024 04:59 AM    HEMOGLOBIN 9.1 (L) 03/11/2024 04:59 AM    HEMATOCRIT 27.8 (L) 03/11/2024 04:59 AM    .0 (H) 03/11/2024 04:59 AM    MCH 32.7 03/11/2024 04:59 AM    MCHC 32.7 03/11/2024 04:59 AM    MPV 10.2 03/11/2024 04:59 AM      Lab Results   Component Value Date/Time    SODIUM 133 (L) 03/11/2024 04:59 AM    POTASSIUM 4.5 03/11/2024 05:35 PM    CHLORIDE 93 (L) 03/11/2024 04:59 AM    CO2 23 03/11/2024 04:59 AM    GLUCOSE 90 03/11/2024 04:59 AM    BUN 45 (H)  "03/11/2024 04:59 AM    CREATININE 9.16 (HH) 03/11/2024 04:59 AM      Lab Results   Component Value Date/Time    ASTSGOT 22 03/03/2024 03:49 AM    ALTSGPT 9 03/03/2024 03:49 AM     Lab Results   Component Value Date/Time    TROPONINT 1902 (H) 03/10/2024 08:15 AM       No results for input(s): \"NTPROBNP\" in the last 72 hours.    Cardiac Imaging and Procedures Review  TTE (2/25/2024)  Moderately reduced left ventricular systolic function.  The left ventricular ejection fraction is visually estimated to be 40%.  The aortic valve leaflets are heavily calcified.  The aortic valve appears bileaflet.  Severe aortic valve stenosis.  Aortic valve area calculated from the continuity equation is  0.77 cm2.  Vmax is 3.93 m/s. Transvalvular gradients are - Peak: 62 mmHg, Mean: 40   mmHg.  Estimated right ventricular systolic pressure is 50 mmHg.    TTE (3/5/2024)  Normal LV size and thickness with mildly reduced LV systolic function:   Estimated LVEF 45-50%  Grade II LV diastolic dysfunction  Normal RV size and systolic function  Thickened posterior mitral valve leaflet, either calcifications or vegetation  Moderate MR, Moderate AI  Severe aortic valve stenosis: Vmax 4.5m/s  Estimated RVSP 55 mmHg    CAG (3/6/2024)  The left main coronary artery: Large in caliber vessel with mild CAD, trifurcates to LAD, ramus intermedius and left circumflex  The left anterior descending coronary artery: Large in caliber transapical vessel with calcified 80% severe proximal/mid stenoses.  Gives rise to several small-medium caliber diagonal branches with severe ostial/proximal disease  Ramus intermedius coronary artery: Small-medium in caliber vessel  The left circumflex coronary artery : Large-caliber vessel with severe 80% ostial/proximal stenosis.  Gives rise to a bifurcating OM and the true circumflex tapers in the AV groove.  The distal circumflex has severe diffuse disease  The right coronary artery: Large-caliber dominant vessel with severe " calcified 80% mid stenosis.  Small PLB system.  Severe 80% proximal/mid right PDA.    Assessment  Demand ischemia, Type II MI  Severe aortic stenosis  Multivessel coronary artery disease  Mild systolic dysfunction, chronic heart failure with reduced ejection fraction  Hypertension  ESRD on hemodialysis  COVID-19 infection (2/2024)  Chronic HIV     Recommendations  Symptomatically improved with dialysis, etiology of symptoms likely volume overload from missed dialysis and severe AS  Coronary lesions are somewhat challenging to revascularize and are reasonable to medically manage in a chronic dialysis patient prior to TAVR   Pending symptoms tomorrow, will likely defer possible revascularization to outpatient structural heart team  Continue aspirin and statin  Continue carvedilol, amlodipine, and lisinopril     Cardiology will continue to follow. Please contact me with any questions.     Sukhdeep Enciso M.D.   Interventional Cardiologist  [facility]

## 2024-03-12 NOTE — DISCHARGE INSTRUCTIONS
Discharge Instructions per CARYL Aguilar.O.    DIET: Diet Order Diet: Cardiac    ACTIVITY: As tolerated    A proper diet that is low in grease, fat, and salt, along with 30 minutes of exercise per day will lead to weight loss, and better controlled blood sugar and blood pressure.    DIAGNOSIS: Respiratory failure (HCC)    Follow up with your Primary Care Provider Pcp as scheduled or sooner if your symptoms persist or worsen.  Return to Emergency Room for sever chest pain, shortness of breath, signs of a stroke, or any other emergencies.      HF Patient Discharge Instructions  Monitor your weight daily, and maintain a weight chart, to track your weight changes.   Activity as tolerated, unless your Doctor has ordered otherwise. Other activity order: advance as tolerated .  Follow a low fat, low cholesterol, low salt diet unless instructed otherwise by your Doctor. Read the labels on the back of food products and track your intake of fat, cholesterol and salt.   Fluid Restriction No. If a Fluid Restriction has been ordered by your Doctor, measure fluids with a measuring cup to ensure that you are not exceeding the restriction.   No smoking.  Oxygen No. If your Doctor has ordered that you wear Oxygen at home, it is important to wear it as ordered.  Did you receive an explanation from staff on the importance of taking each of your medications and why it is necessary to keep taking them unless your doctor says to stop? Yes  Were all of your questions answered about how to manage your heart failure and what to do if you have increased signs and symptoms after you go home? Yes  Do you feel like your heart failure care team involved you in the care treatment plan and allowed you to make decisions regarding your care while in the hospital and addressed any discharge needs you might have? Yes    See the educational handout provided at discharge for more information on monitoring your daily weight, activity and diet.  This also explains more about Heart Failure, symptoms of a flare-up and some of the tests that you have undergone.     Warning Signs of a Flare-Up include:  Swelling in the ankles or lower legs.  Shortness of breath, while at rest, or while doing normal activities.   Shortness of breath at night when in bed, or coughing in bed.   Requiring more pillows to sleep at night, or needing to sit up at night to sleep.  Feeling weak, dizzy or fatigued.     When to call your Doctor:  Call your Primary Care Physician or Cardiologist if:   You experience any pain radiating to your jaw or neck.  You have any difficulty breathing.  You experience weight gain of 3 lbs in a day or 5 lbs in a week.   You feel any palpitations or irregular heartbeats.  You become dizzy or lose consciousness.   If you have had an angiogram or had a pacemaker or AICD placed, and experience:  Bleeding, drainage or swelling at the surgical / puncture site.  Fever greater than 100.0 F  Shock from internal defibrillator.  Cool and / or numb extremities.     Please access the [facility]HA My HF Guide/Heart Failure Interactive Workbook:   [email or web address]  Diagnosis:  Acute Coronary Syndrome (ACS) is a diagnosis that encompasses cardiac-related chest pain and heart attack. ACS occurs when the blood flow to the heart muscle is severely reduced or cut off completely due to a slow process called atherosclerosis.  Atherosclerosis is a disease in which the coronary arteries become narrow from a buildup of fat, cholesterol, and other substances that combine to form plaque. If the plaque breaks, a blood clot will form and block the blood flow to the heart muscle. This lack of blood flow can cause damage or death to the heart muscle which is called a heart attack or Myocardial Infarction (MI). There are two different types of MIs:  ST Elevation Myocardial Infarction or STEMI (the most severe type of heart attack) and Non-ST Elevation Myocardial  Infarction or NSTEMI.    Treatment Plan:  Cardiac Diet  - Low fat, low salt, low cholesterol   Cardiac Rehab  - Your doctor has ordered you a referral to [facility].  Call [phone] to schedule an appointment.  Attend my follow-up appointment with my Cardiologist.  Take my medications as prescribed by my doctor  Exercise daily  Quit Smoking, Lower my bad cholesterol and raise my good cholesterol, lower my blood pressure, Reduce stress, and Control my diabetes    Medications:  Certain medications are used to treat ACS.  Remember to always take medications as prescribed and never stop talking medications unless told by your doctor.    You have been prescribed the following medicatons:    Aspirin - Aspirin is used as a blood thinning medication and you will require this medication indefinitely.  Beta-Blocker - Beta-Blocker coreg is used to lower blood pressure and heart rate, and/or helps your heart heal after a heart attack.  Statin - Statin lipitor is used to lower cholesterol.  Angiotensin Converting Enzyme (ACE) Inhibitor - Angiotensin Converting Enzyme Inhibitor lisinopril is used to lower blood pressure and treat heart failure.

## 2024-03-12 NOTE — DISCHARGE SUMMARY
Discharge Summary    CHIEF COMPLAINT ON ADMISSION  Chief Complaint   Patient presents with    Shortness of Breath     Chest pain & shortness of breath started at 1720. Missed dialysis today - last was on Thursday. Sats 80% on room air, given 1 inch of nitro paste by BRETT, decreased BP from 200/100 to 145/84. Arrives on CPAP.       Reason for Admission  ems     Admission Date  3/2/2024    CODE STATUS  Full Code    HPI & HOSPITAL COURSE  58 male with past medical history of cardiomyopathy with EF 40%, severe aortic stenosis, HIV on antiretroviral, ESRD on hemodialysis presented with chest pain.  Patient reportedly had missed hemodialysis.Significant elevated blood pressure on arrival to ED.  Noted to have significantly elevated troponin with EKG changes.    Nephrology was following for routine dialysis     Echocardiogram result noted with ejection fraction 40 to 48%, grade 2 left ventricular distal dysfunction, severe aortic valve stenosis, pulmonary hypertension with RVSP 55 mm hg .      Cardiac cath on 3/7 revealed severe multivessel CAD with severe aortic stenosis.  CTS evaluated and felt he is high risk for CABG given multiple comorbidities .  Patient continued to have intermittent anginal chest pain and shortness of breath.       There was concern for bacterial/atypical pneumonia as patient is also immunosuppressed.  Patient was started on a course of Augmentin and doxycycline which he can complete outpatient.  Patient did require 4 L supplemental oxygen at the time of discharge.     Patient was deemed too high risk for CABG.  Cardiology recommended TAVR prior to PCI for severe multivessel coronary artery disease.    Medically stable to discharge home.  Follow-up with primary care, cardiology as outpatient.    Therefore, he is discharged in fair and stable condition to home with close outpatient follow-up.    The patient met 2-midnight criteria for an inpatient stay at the time of discharge.    Discharge  Date  3/12/2024      FOLLOW UP ITEMS POST DISCHARGE  None    DISCHARGE DIAGNOSES  Principal Problem:    Acute respiratory failure with hypoxia (POA: Yes)  Active Problems:    HIV (human immunodeficiency virus infection) (HCC) (POA: Yes)    NSTEMI (non-ST elevated myocardial infarction) (HCC) (POA: Yes)    Acute on chronic combined systolic (congestive) and diastolic (congestive) heart failure (HCC) (POA: Yes)    Smoking (POA: Yes)    Severe aortic stenosis (POA: Yes)    Chest pain (POA: Yes)    Volume overload (POA: Yes)    Mixed hyperlipidemia (POA: Unknown)    Community acquired pneumonia of left lower lobe of lung (POA: Unknown)  Resolved Problems:    Hypertensive emergency (POA: Yes)    Leukocytosis (POA: Yes)    Hyperkalemia (POA: Yes)      FOLLOW UP  No future appointments.  [facility]  [address]  [facility] [address]  [phone]  Go to  Go to your appointment with Dr. Bah on Thursday March 14th 2024 at 10:30 for your 10:40 appointment    Austin Pruitt M.D.  [address]  [address]  [facility] [id number] [phone]    Follow up in 1 week(s)      [facility]  [address]  [address]  [facility] [address] [phone]  Call  Your doctor has referred you for Cardiac Rehab which is important in your recovery. Please call to make an appointment for after TAVR evaluation      MEDICATIONS ON DISCHARGE     Medication List        START taking these medications        Instructions   amoxicillin-clavulanate 500-125 MG Tabs  Commonly known as: Augmentin   Take 1 Tablet by mouth every 24 hours for 3 days.  Dose: 1 Tablet     atorvastatin 40 MG Tabs  Commonly known as: Lipitor   Take 1 Tablet by mouth every evening.  Dose: 40 mg     doxycycline monohydrate 100 MG tablet  Commonly known as: Adoxa   Take 1 Tablet by mouth every 12 hours for 3 days.  Dose: 100 mg            CONTINUE taking these medications        Instructions   amLODIPine 10 MG Tabs  Commonly  known as: Norvasc   Take 10 mg by mouth every day.  Dose: 10 mg     Aspirin 81 81 MG EC tablet  Generic drug: aspirin   Take 81 mg by mouth every day.  Dose: 81 mg     carvedilol 25 MG Tabs  Commonly known as: Coreg   Take 1 Tablet by mouth 2 times a day with meals.  Dose: 25 mg     Dolutegravir-Rilpivirine 50-25 MG Tabs   Take 1 Tablet by mouth every day.  Dose: 1 Tablet     lisinopril 20 MG Tabs  Commonly known as: Prinivil   Take 20 mg by mouth every day.  Dose: 20 mg     Renvela 800 MG Tabs tablet  Generic drug: sevelamer carbonate   Take 1,600 mg by mouth 3 times a day with meals.  Dose: 1,600 mg              Allergies  No Known Allergies    DIET  Orders Placed This Encounter   Procedures    Diet Order Diet: Cardiac     Standing Status:   Standing     Number of Occurrences:   1     Order Specific Question:   Diet:     Answer:   Cardiac [6]       ACTIVITY  As tolerated.  Weight bearing as tolerated    CONSULTATIONS  Cardiology, nephrology    PROCEDURES  Cardiac cath    LABORATORY  Lab Results   Component Value Date    SODIUM 132 (L) 03/12/2024    POTASSIUM 4.4 03/12/2024    CHLORIDE 90 (L) 03/12/2024    CO2 29 03/12/2024    GLUCOSE 109 (H) 03/12/2024    BUN 36 (H) 03/12/2024    CREATININE 7.64 (HH) 03/12/2024        Lab Results   Component Value Date    WBC 7.3 03/12/2024    HEMOGLOBIN 8.8 (L) 03/12/2024    HEMATOCRIT 26.4 (L) 03/12/2024    PLATELETCT 287 03/12/2024        I discussed medications and side effects with the patient.  I discussed prognosis and importance of medical compliance with the patient.  I counseled the patient about diet, exercise, weight loss, smoking cessation, and life style modifications.  All questions and concerns have been addressed.  Total time of the discharge process was 39 minutes.

## 2024-03-12 NOTE — DISCHARGE PLANNING
Received Choice Form @: 9014  Agency/Facility Name: [facility]  Referral Sent Per Choice Form @: [phone]  DPA spoke with Estefany at [facility] and was informed that the O2 order is being processed and O2 should be delivered to [facility] within an hour or so. Voalte message sent to update ED CM Usman and bedside RN.

## 2024-03-12 NOTE — PROGRESS NOTES
Cardiology Progress Note    Date of Service  3/12/2024    Attending Physician  Mata Gore D.o.    HPI  The patient is a 58 y.o. male with a past medical history of severe aortic stenosis, hypertension, severe coronary artery disease, end-stage renal disease on HD, and HIV  who presented 3/2/2024 brought in by EMS with fever, chest pain, and shortness of breath after recent hospitalization for shortness of breath, pulmonary edema and respiratory failure with about 1 week of missed dialysis. He was found to have severe aortic stenosis as well as significant coronary artery disease, but was not felt to be a surgical candidate.     Subjective  Again reports being chest pain-free with no significant shortness of breath, however, has not ambulated significantly this morning.    Vital signs in last 24 hours  Temp:  [36.3 °C (97.4 °F)-37 °C (98.6 °F)] 36.5 °C (97.7 °F)  Pulse:  [66-83] 66  Resp:  [16-18] 16  BP: (101-118)/(49-80) 101/57  SpO2:  [90 %-100 %] 100 %    Physical Exam  General: Somewhat frail, but in no acute distress  Eyes: Extraocular movements intact, anicteric  Neck: Full range of motion, no gross jugular venous distension  Pulmonary: Normal respiratory effort, no distress  Cardiovascular: Regular rate, regular rhythm  Extremities: Warm and well perfused, no significant lower extremity edema  Neurological: Alert and oriented, normal speech  Psychiatric: Normal affect, normal judgment    Laboratories  Lab Results   Component Value Date/Time    WBC 7.3 03/12/2024 03:22 AM    RBC 2.70 (L) 03/12/2024 03:22 AM    HEMOGLOBIN 8.8 (L) 03/12/2024 03:22 AM    HEMATOCRIT 26.4 (L) 03/12/2024 03:22 AM    MCV 97.8 03/12/2024 03:22 AM    MCH 32.6 03/12/2024 03:22 AM    MCHC 33.3 03/12/2024 03:22 AM    MPV 10.3 03/12/2024 03:22 AM      Lab Results   Component Value Date/Time    SODIUM 132 (L) 03/12/2024 03:22 AM    POTASSIUM 4.4 03/12/2024 03:22 AM    CHLORIDE 90 (L) 03/12/2024 03:22 AM    CO2 29 03/12/2024 03:22 AM  "   GLUCOSE 109 (H) 03/12/2024 03:22 AM    BUN 36 (H) 03/12/2024 03:22 AM    CREATININE 7.64 (HH) 03/12/2024 03:22 AM      Lab Results   Component Value Date/Time    ASTSGOT 22 03/03/2024 03:49 AM    ALTSGPT 9 03/03/2024 03:49 AM     Lab Results   Component Value Date/Time    TROPONINT 1902 (H) 03/10/2024 08:15 AM       No results for input(s): \"NTPROBNP\" in the last 72 hours.    Cardiac Imaging and Procedures Review  TTE (2/25/2024)  Moderately reduced left ventricular systolic function.  The left ventricular ejection fraction is visually estimated to be 40%.  The aortic valve leaflets are heavily calcified.  The aortic valve appears bileaflet.  Severe aortic valve stenosis.  Aortic valve area calculated from the continuity equation is  0.77 cm2.  Vmax is 3.93 m/s. Transvalvular gradients are - Peak: 62 mmHg, Mean: 40   mmHg.  Estimated right ventricular systolic pressure is 50 mmHg.    TTE (3/5/2024)  Normal LV size and thickness with mildly reduced LV systolic function:   Estimated LVEF 45-50%  Grade II LV diastolic dysfunction  Normal RV size and systolic function  Thickened posterior mitral valve leaflet, either calcifications or vegetation  Moderate MR, Moderate AI  Severe aortic valve stenosis: Vmax 4.5m/s  Estimated RVSP 55 mmHg    CAG (3/6/2024)  The left main coronary artery: Large in caliber vessel with mild CAD, trifurcates to LAD, ramus intermedius and left circumflex  The left anterior descending coronary artery: Large in caliber transapical vessel with calcified 80% severe proximal/mid stenoses.  Gives rise to several small-medium caliber diagonal branches with severe ostial/proximal disease  Ramus intermedius coronary artery: Small-medium in caliber vessel  The left circumflex coronary artery : Large-caliber vessel with severe 80% ostial/proximal stenosis.  Gives rise to a bifurcating OM and the true circumflex tapers in the AV groove.  The distal circumflex has severe diffuse disease  The right " coronary artery: Large-caliber dominant vessel with severe calcified 80% mid stenosis.  Small PLB system.  Severe 80% proximal/mid right PDA.    Assessment  Demand ischemia, Type II MI  Severe aortic stenosis  Multivessel coronary artery disease  Mild systolic dysfunction, chronic heart failure with reduced ejection fraction  Hypertension  ESRD on hemodialysis  COVID-19 infection (2/2024)  Chronic HIV     Recommendations  Symptomatically improved with dialysis, etiology of symptoms likely volume overload from missed dialysis and severe AS  Coronary lesions are somewhat challenging to revascularize and are reasonable to medically manage in a chronic dialysis patient prior to TAVR   Is able to ambulate without significant symptoms this morning, we will plan to defer planning of possible revascularization decision to outpatient structural heart team  Continue aspirin and statin  Continue carvedilol, amlodipine, and lisinopril     Cardiology will continue to follow. Please contact me with any questions.     Sukhdeep Enciso M.D.   Interventional Cardiologist  [facility]

## 2024-03-12 NOTE — CARE PLAN
The patient is Watcher - Medium risk of patient condition declining or worsening    Shift Goals  Clinical Goals: VSS, monitor labs, NPO at midnight  Patient Goals: Get dialysis again tomorrow  Family Goals: na    Progress made toward(s) clinical / shift goals:    Problem: Pain - Standard  Goal: Alleviation of pain or a reduction in pain to the patient’s comfort goal  Outcome: Progressing  Note: Patient denies pain at this time.     Problem: Care Map:  Day 3 Optimal Outcome for the Heart Failure Patient  Goal: Day 3:  Optimal Care of the heart failure patient  Outcome: Progressing  Note: Daily weights obtained, BMP ordered. Provided education regarding worsening signs and symptoms.       Patient is not progressing towards the following goals:

## 2024-03-12 NOTE — FACE TO FACE
"Face to Face Note  -  Durable Medical Equipment    Mata Gore D.O. - NPI: [id number]  I certify that this patient is under my care and that they had a durable medical equipment(DME)face to face encounter by myself that meets the physician DME face-to-face encounter requirements with this patient on:    Date of encounter:   Patient:                    MRN:                       YOB: 2025  Bob Ochoa  [id number]  1965     The encounter with the patient was in whole, or in part, for the following medical condition, which is the primary reason for durable medical equipment:  CHF    I certify that, based on my findings, the following durable medical equipment is medically necessary:    Oxygen   HOME O2 Saturation Measurements:(Values must be present for Home Oxygen orders)  Room air sat at rest: 92  Room air sat with amb: 74  With liters of O2: 2, O2 sat at rest with O2: 96  With Liters of O2: 4, O2 sat with amb with O2 : 92  Is the patient mobile?: Yes  If patient feels more short of breath, they can go up to 6 liters per minute and contact healthcare provider.    Supporting Symptoms: The patient requires supplemental oxygen, as the following interventions have been tried with limited or no improvement: \"Ambulation with oximetry.    My Clinical findings support the need for the above equipment due to:  Hypoxia  "

## 2024-03-12 NOTE — PROGRESS NOTES
3hr HD started @ 1026 and completed @ 1330,net UF = 1000ml only limited by low bp,patient asymptomatic.LUAAVF + B/T,cannulation sites covered with DD,CDI.Report given to Enio Lin RN.

## 2024-03-12 NOTE — PROGRESS NOTES
Bedside report received. Patient A/Ox 4. VS -130's. Oxygen requirements 4L. Telemetry monitoring SR. No complaints of pain currently. POC discussed with patient. Pt verbalizes understanding. Call light and belongings within reach. Bed locked and lowest position and fall precautions in place.    Bedside report received from off going RN/tech: NAMRATA Aiken assumed care of patient.     Fall Risk Score: LOW RISK  Fall risk interventions in place: Place yellow fall risk ID band on patient, Provide patient/family education based on risk assessment, Educate patient/family to call staff for assistance when getting out of bed, and Place fall precaution signage outside patient door  Bed type: Regular (Griffin Score less than 17 interventions in place)  Patient on cardiac monitor: Yes  IVF/IV medications: Not Applicable   Oxygen: How many liters 4L  Bedside sitter: Not Applicable   Isolation: Not applicable

## 2024-03-12 NOTE — PROCEDURES
Diagnosis: End-Stage Renal Disease admitted with chest pain noted to have multivessel CAD and severe aortic stenosis, not a surgical candidate. Patient seen and examined on hemodialysis during treatment. Patient is stable, tolerating hemodialysis. Denies chest pain and shortness of breath. Orders updated as needed. Please refer to flowsheet for full details.    Access: LUE AVF  UF goal: 0L as patient is hypotensive during dialysis.     Plan: Will continue HD on a Tuesday Thursday Saturday schedule. Next dialysis planned 3/14/24.     Prognosis poor.    OK to discharge from Nephrology standpoint.   There is no need for outpatient nephrology clinic follow up appointment, as the patient will be seen by a nephrologist at their outpatient dialysis center.     Bernabe Beasley MD  Nephrology   [facility]

## 2024-03-12 NOTE — PROGRESS NOTES
Bedside report received from off going RN/tech: Terri, assumed care of patient. pt is pt is A&Ox4. Strict NPO pending possible cardiac procedure, plan for Dialysis this morning.  Overnight Events: no overnight events per night RN  SBP Ranged: low 100's-1 teen's   Fall Risk Score: (P) NO RISK  Fall risk interventions in place: Provide patient/family education based on risk assessment, Educate patient/family to call staff for assistance when getting out of bed, Utilize bed/chair fall alarm, and Refuses - escalate to charge  Bed type: Regular (Griffin Score less than 17 interventions in place)  Patient on cardiac monitor: Yes SR 60's-70's  IVF/IV medications: Not Applicable   Oxygen: How many liters 4L RA at baseline, plan for home 02 evaluation today  Bedside sitter: Not Applicable   Isolation: Not applicable

## 2024-03-12 NOTE — DISCHARGE PLANNING
Case Management Discharge Planning    Admission Date: 3/2/2024  GMLOS: 4.2  ALOS: 10    6-Clicks ADL Score: 20  6-Clicks Mobility Score: 19      Anticipated Discharge Dispo: Discharge Disposition: Discharged to home/self care (01)    DME Needed: Yes    DME Ordered: Yes    Action(s) Taken: Choice obtained Patient discussed in rounds, home 02 eval completed at 1500 today. Pt will need O2 set up to discharge home.     RN CM met with patient at bedside to obtain DEM choice. Pt choose 1; [facility], 2; [facility]. Choice form completed and faxed to [facility].     DME referral sent out by [facility].  RN CM gave handoff to ED case manager Usman to follow-up with O2 delivery.    Escalations Completed: None    Medically Clear: No    Next Steps: Awaiting oxygen delivery by [facility].     Barriers to Discharge: Oxygen Delivery    Is the patient up for discharge tomorrow: No

## 2024-03-13 NOTE — PROGRESS NOTES
Meds-to beds delivered to bedside  Home oxygen delivered   IV and tele monitor removed and checked in  Discharge instructions and medication reviewed   Pt waiting on daughter to pick him up

## 2024-03-13 NOTE — DISCHARGE PLANNING
ER  Note:  Received Voalte from bedside RN requesting home oxygen delivery update. Bedside RN informed that Per DPA, [facility] processing the order and home oxygen should be delivered within an hour.

## 2024-03-21 ENCOUNTER — APPOINTMENT (OUTPATIENT)
Dept: RADIOLOGY | Facility: MEDICAL CENTER | Age: 59
DRG: 974 | End: 2024-03-21
Attending: EMERGENCY MEDICINE
Payer: MEDICAID

## 2024-03-21 ENCOUNTER — HOSPITAL ENCOUNTER (INPATIENT)
Facility: MEDICAL CENTER | Age: 59
LOS: 4 days | DRG: 974 | End: 2024-03-25
Attending: EMERGENCY MEDICINE | Admitting: HOSPITALIST
Payer: MEDICAID

## 2024-03-21 DIAGNOSIS — E83.41 HYPERMAGNESEMIA: ICD-10-CM

## 2024-03-21 DIAGNOSIS — J96.01 ACUTE HYPOXEMIC RESPIRATORY FAILURE (HCC): ICD-10-CM

## 2024-03-21 DIAGNOSIS — I10 HTN (HYPERTENSION), MALIGNANT: ICD-10-CM

## 2024-03-21 DIAGNOSIS — B33.8 RSV INFECTION: ICD-10-CM

## 2024-03-21 DIAGNOSIS — I35.0 NONRHEUMATIC AORTIC VALVE STENOSIS: ICD-10-CM

## 2024-03-21 DIAGNOSIS — I16.1 HYPERTENSIVE EMERGENCY: ICD-10-CM

## 2024-03-21 DIAGNOSIS — E87.5 HYPERKALEMIA: ICD-10-CM

## 2024-03-21 PROBLEM — N18.6 ESRD (END STAGE RENAL DISEASE) (HCC): Status: ACTIVE | Noted: 2024-03-21

## 2024-03-21 LAB
ALBUMIN SERPL BCP-MCNC: 4.2 G/DL (ref 3.2–4.9)
ALBUMIN/GLOB SERPL: 0.7 G/DL
ALP SERPL-CCNC: 108 U/L (ref 30–99)
ALT SERPL-CCNC: 18 U/L (ref 2–50)
ANION GAP SERPL CALC-SCNC: 17 MMOL/L (ref 7–16)
ANION GAP SERPL CALC-SCNC: 17 MMOL/L (ref 7–16)
AST SERPL-CCNC: 24 U/L (ref 12–45)
BASE EXCESS BLDV CALC-SCNC: 3 MMOL/L
BASOPHILS # BLD AUTO: 0.1 % (ref 0–1.8)
BASOPHILS # BLD: 0.02 K/UL (ref 0–0.12)
BILIRUB SERPL-MCNC: 0.5 MG/DL (ref 0.1–1.5)
BODY TEMPERATURE: 37 CENTIGRADE
BUN SERPL-MCNC: 18 MG/DL (ref 8–22)
BUN SERPL-MCNC: 40 MG/DL (ref 8–22)
CALCIUM ALBUM COR SERPL-MCNC: 8.4 MG/DL (ref 8.5–10.5)
CALCIUM SERPL-MCNC: 8.6 MG/DL (ref 8.5–10.5)
CALCIUM SERPL-MCNC: 9.3 MG/DL (ref 8.5–10.5)
CHLORIDE SERPL-SCNC: 94 MMOL/L (ref 96–112)
CHLORIDE SERPL-SCNC: 97 MMOL/L (ref 96–112)
CO2 SERPL-SCNC: 25 MMOL/L (ref 20–33)
CO2 SERPL-SCNC: 28 MMOL/L (ref 20–33)
CREAT SERPL-MCNC: 5.29 MG/DL (ref 0.5–1.4)
CREAT SERPL-MCNC: 9.77 MG/DL (ref 0.5–1.4)
EKG IMPRESSION: NORMAL
EOSINOPHIL # BLD AUTO: 0.51 K/UL (ref 0–0.51)
EOSINOPHIL NFR BLD: 3.7 % (ref 0–6.9)
ERYTHROCYTE [DISTWIDTH] IN BLOOD BY AUTOMATED COUNT: 62.3 FL (ref 35.9–50)
FLUAV RNA SPEC QL NAA+PROBE: NEGATIVE
FLUBV RNA SPEC QL NAA+PROBE: NEGATIVE
GFR SERPLBLD CREATININE-BSD FMLA CKD-EPI: 12 ML/MIN/1.73 M 2
GFR SERPLBLD CREATININE-BSD FMLA CKD-EPI: 6 ML/MIN/1.73 M 2
GLOBULIN SER CALC-MCNC: 5.7 G/DL (ref 1.9–3.5)
GLUCOSE SERPL-MCNC: 173 MG/DL (ref 65–99)
GLUCOSE SERPL-MCNC: 90 MG/DL (ref 65–99)
HCO3 BLDV-SCNC: 29 MMOL/L (ref 24–28)
HCT VFR BLD AUTO: 33.2 % (ref 42–52)
HGB BLD-MCNC: 10.2 G/DL (ref 14–18)
IMM GRANULOCYTES # BLD AUTO: 0.06 K/UL (ref 0–0.11)
IMM GRANULOCYTES NFR BLD AUTO: 0.4 % (ref 0–0.9)
INHALED O2 FLOW RATE: ABNORMAL L/MIN
LACTATE SERPL-SCNC: 2.1 MMOL/L (ref 0.5–2)
LYMPHOCYTES # BLD AUTO: 0.76 K/UL (ref 1–4.8)
LYMPHOCYTES NFR BLD: 5.5 % (ref 22–41)
MAGNESIUM SERPL-MCNC: 2.3 MG/DL (ref 1.5–2.5)
MCH RBC QN AUTO: 32.7 PG (ref 27–33)
MCHC RBC AUTO-ENTMCNC: 30.7 G/DL (ref 32.3–36.5)
MCV RBC AUTO: 106.4 FL (ref 81.4–97.8)
MONOCYTES # BLD AUTO: 0.47 K/UL (ref 0–0.85)
MONOCYTES NFR BLD AUTO: 3.4 % (ref 0–13.4)
NEUTROPHILS # BLD AUTO: 12.02 K/UL (ref 1.82–7.42)
NEUTROPHILS NFR BLD: 86.9 % (ref 44–72)
NRBC # BLD AUTO: 0 K/UL
NRBC BLD-RTO: 0 /100 WBC (ref 0–0.2)
NT-PROBNP SERPL IA-MCNC: ABNORMAL PG/ML (ref 0–125)
PCO2 BLDV: 57 MMHG (ref 41–51)
PCO2 TEMP ADJ BLDV: 57 MMHG (ref 41–51)
PH BLDV: 7.33 [PH] (ref 7.31–7.45)
PH TEMP ADJ BLDV: 7.33 [PH] (ref 7.31–7.45)
PHOSPHATE SERPL-MCNC: 8 MG/DL (ref 2.5–4.5)
PLATELET # BLD AUTO: 271 K/UL (ref 164–446)
PMV BLD AUTO: 9.5 FL (ref 9–12.9)
PO2 BLDV: 24 MMHG (ref 25–40)
PO2 TEMP ADJ BLDV: 24 MMHG (ref 25–40)
POTASSIUM SERPL-SCNC: 4.7 MMOL/L (ref 3.6–5.5)
POTASSIUM SERPL-SCNC: 6.5 MMOL/L (ref 3.6–5.5)
PROCALCITONIN SERPL-MCNC: 0.26 NG/ML
PROT SERPL-MCNC: 9.9 G/DL (ref 6–8.2)
RBC # BLD AUTO: 3.12 M/UL (ref 4.7–6.1)
RSV RNA SPEC QL NAA+PROBE: POSITIVE
SAO2 % BLDV: 32.7 %
SARS-COV-2 RNA RESP QL NAA+PROBE: NOTDETECTED
SODIUM SERPL-SCNC: 139 MMOL/L (ref 135–145)
SODIUM SERPL-SCNC: 139 MMOL/L (ref 135–145)
TROPONIN T SERPL-MCNC: 366 NG/L (ref 6–19)
WBC # BLD AUTO: 13.8 K/UL (ref 4.8–10.8)

## 2024-03-21 PROCEDURE — 93005 ELECTROCARDIOGRAM TRACING: CPT | Performed by: EMERGENCY MEDICINE

## 2024-03-21 PROCEDURE — 83880 ASSAY OF NATRIURETIC PEPTIDE: CPT

## 2024-03-21 PROCEDURE — 96372 THER/PROPH/DIAG INJ SC/IM: CPT

## 2024-03-21 PROCEDURE — 82803 BLOOD GASES ANY COMBINATION: CPT

## 2024-03-21 PROCEDURE — A9270 NON-COVERED ITEM OR SERVICE: HCPCS | Performed by: HOSPITALIST

## 2024-03-21 PROCEDURE — 36415 COLL VENOUS BLD VENIPUNCTURE: CPT

## 2024-03-21 PROCEDURE — 770000 HCHG ROOM/CARE - INTERMEDIATE ICU *

## 2024-03-21 PROCEDURE — 5A1D70Z PERFORMANCE OF URINARY FILTRATION, INTERMITTENT, LESS THAN 6 HOURS PER DAY: ICD-10-PCS | Performed by: INTERNAL MEDICINE

## 2024-03-21 PROCEDURE — 87040 BLOOD CULTURE FOR BACTERIA: CPT

## 2024-03-21 PROCEDURE — 86360 T CELL ABSOLUTE COUNT/RATIO: CPT

## 2024-03-21 PROCEDURE — 99291 CRITICAL CARE FIRST HOUR: CPT | Performed by: INTERNAL MEDICINE

## 2024-03-21 PROCEDURE — 0241U HCHG SARS-COV-2 COVID-19 NFCT DS RESP RNA 4 TRGT ED POC: CPT

## 2024-03-21 PROCEDURE — 80053 COMPREHEN METABOLIC PANEL: CPT

## 2024-03-21 PROCEDURE — 96365 THER/PROPH/DIAG IV INF INIT: CPT

## 2024-03-21 PROCEDURE — 700102 HCHG RX REV CODE 250 W/ 637 OVERRIDE(OP): Mod: UD | Performed by: STUDENT IN AN ORGANIZED HEALTH CARE EDUCATION/TRAINING PROGRAM

## 2024-03-21 PROCEDURE — 700111 HCHG RX REV CODE 636 W/ 250 OVERRIDE (IP): Performed by: HOSPITALIST

## 2024-03-21 PROCEDURE — 99223 1ST HOSP IP/OBS HIGH 75: CPT | Performed by: HOSPITALIST

## 2024-03-21 PROCEDURE — 700111 HCHG RX REV CODE 636 W/ 250 OVERRIDE (IP): Mod: JZ,UD | Performed by: STUDENT IN AN ORGANIZED HEALTH CARE EDUCATION/TRAINING PROGRAM

## 2024-03-21 PROCEDURE — 96375 TX/PRO/DX INJ NEW DRUG ADDON: CPT

## 2024-03-21 PROCEDURE — 700105 HCHG RX REV CODE 258: Performed by: STUDENT IN AN ORGANIZED HEALTH CARE EDUCATION/TRAINING PROGRAM

## 2024-03-21 PROCEDURE — 83735 ASSAY OF MAGNESIUM: CPT

## 2024-03-21 PROCEDURE — 71045 X-RAY EXAM CHEST 1 VIEW: CPT

## 2024-03-21 PROCEDURE — 94660 CPAP INITIATION&MGMT: CPT

## 2024-03-21 PROCEDURE — 84484 ASSAY OF TROPONIN QUANT: CPT

## 2024-03-21 PROCEDURE — 80048 BASIC METABOLIC PNL TOTAL CA: CPT

## 2024-03-21 PROCEDURE — 84100 ASSAY OF PHOSPHORUS: CPT

## 2024-03-21 PROCEDURE — 700102 HCHG RX REV CODE 250 W/ 637 OVERRIDE(OP): Performed by: STUDENT IN AN ORGANIZED HEALTH CARE EDUCATION/TRAINING PROGRAM

## 2024-03-21 PROCEDURE — A9270 NON-COVERED ITEM OR SERVICE: HCPCS | Performed by: STUDENT IN AN ORGANIZED HEALTH CARE EDUCATION/TRAINING PROGRAM

## 2024-03-21 PROCEDURE — 700111 HCHG RX REV CODE 636 W/ 250 OVERRIDE (IP): Performed by: INTERNAL MEDICINE

## 2024-03-21 PROCEDURE — 99255 IP/OBS CONSLTJ NEW/EST HI 80: CPT | Performed by: INTERNAL MEDICINE

## 2024-03-21 PROCEDURE — 96367 TX/PROPH/DG ADDL SEQ IV INF: CPT

## 2024-03-21 PROCEDURE — A9270 NON-COVERED ITEM OR SERVICE: HCPCS | Mod: UD | Performed by: STUDENT IN AN ORGANIZED HEALTH CARE EDUCATION/TRAINING PROGRAM

## 2024-03-21 PROCEDURE — 700102 HCHG RX REV CODE 250 W/ 637 OVERRIDE(OP): Performed by: HOSPITALIST

## 2024-03-21 PROCEDURE — 83605 ASSAY OF LACTIC ACID: CPT

## 2024-03-21 PROCEDURE — 84145 PROCALCITONIN (PCT): CPT

## 2024-03-21 PROCEDURE — 86359 T CELLS TOTAL COUNT: CPT

## 2024-03-21 PROCEDURE — 90935 HEMODIALYSIS ONE EVALUATION: CPT

## 2024-03-21 PROCEDURE — 85025 COMPLETE CBC W/AUTO DIFF WBC: CPT

## 2024-03-21 PROCEDURE — 99285 EMERGENCY DEPT VISIT HI MDM: CPT

## 2024-03-21 PROCEDURE — 93005 ELECTROCARDIOGRAM TRACING: CPT

## 2024-03-21 RX ORDER — ASPIRIN 325 MG
325 TABLET ORAL ONCE
Status: COMPLETED | OUTPATIENT
Start: 2024-03-21 | End: 2024-03-21

## 2024-03-21 RX ORDER — DOXYCYCLINE HYCLATE 100 MG
100 TABLET ORAL 2 TIMES DAILY
Status: ON HOLD | COMMUNITY
Start: 2024-03-12 | End: 2024-03-25

## 2024-03-21 RX ORDER — AMOXICILLIN AND CLAVULANATE POTASSIUM 500; 125 MG/1; MG/1
1 TABLET, FILM COATED ORAL DAILY
Status: ON HOLD | COMMUNITY
Start: 2024-03-12 | End: 2024-03-25

## 2024-03-21 RX ORDER — HEPARIN SODIUM 5000 [USP'U]/ML
5000 INJECTION, SOLUTION INTRAVENOUS; SUBCUTANEOUS EVERY 8 HOURS
Status: DISCONTINUED | OUTPATIENT
Start: 2024-03-22 | End: 2024-03-21

## 2024-03-21 RX ORDER — METHOXY POLYETHYLENE GLYCOL-EPOETIN BETA 30 UG/.3ML
30 INJECTION, SOLUTION INTRAVENOUS
COMMUNITY
Start: 2024-02-28

## 2024-03-21 RX ORDER — BENZONATATE 100 MG/1
100 CAPSULE ORAL 3 TIMES DAILY PRN
Status: DISCONTINUED | OUTPATIENT
Start: 2024-03-21 | End: 2024-03-25 | Stop reason: HOSPADM

## 2024-03-21 RX ORDER — ACETAMINOPHEN 325 MG/1
650 TABLET ORAL EVERY 6 HOURS PRN
Status: DISCONTINUED | OUTPATIENT
Start: 2024-03-21 | End: 2024-03-25 | Stop reason: HOSPADM

## 2024-03-21 RX ORDER — HEPARIN SODIUM 1000 [USP'U]/ML
1500 INJECTION, SOLUTION INTRAVENOUS; SUBCUTANEOUS
Status: DISCONTINUED | OUTPATIENT
Start: 2024-03-21 | End: 2024-03-25 | Stop reason: HOSPADM

## 2024-03-21 RX ORDER — ASPIRIN 81 MG/1
81 TABLET ORAL EVERY EVENING
Status: DISCONTINUED | OUTPATIENT
Start: 2024-03-21 | End: 2024-03-25 | Stop reason: HOSPADM

## 2024-03-21 RX ORDER — ATORVASTATIN CALCIUM 40 MG/1
40 TABLET, FILM COATED ORAL NIGHTLY
Status: DISCONTINUED | OUTPATIENT
Start: 2024-03-21 | End: 2024-03-25 | Stop reason: HOSPADM

## 2024-03-21 RX ORDER — AZITHROMYCIN 250 MG/1
500 TABLET, FILM COATED ORAL ONCE
Status: COMPLETED | OUTPATIENT
Start: 2024-03-21 | End: 2024-03-21

## 2024-03-21 RX ORDER — CARVEDILOL 25 MG/1
25 TABLET ORAL 2 TIMES DAILY WITH MEALS
Status: DISCONTINUED | OUTPATIENT
Start: 2024-03-21 | End: 2024-03-23

## 2024-03-21 RX ORDER — AZITHROMYCIN 500 MG/5ML
500 INJECTION, POWDER, LYOPHILIZED, FOR SOLUTION INTRAVENOUS ONCE
Status: DISCONTINUED | OUTPATIENT
Start: 2024-03-21 | End: 2024-03-21

## 2024-03-21 RX ORDER — HEPARIN SODIUM 5000 [USP'U]/ML
5000 INJECTION, SOLUTION INTRAVENOUS; SUBCUTANEOUS EVERY 8 HOURS
Status: DISCONTINUED | OUTPATIENT
Start: 2024-03-21 | End: 2024-03-25 | Stop reason: HOSPADM

## 2024-03-21 RX ORDER — AMLODIPINE BESYLATE 5 MG/1
10 TABLET ORAL EVERY EVENING
Status: DISCONTINUED | OUTPATIENT
Start: 2024-03-21 | End: 2024-03-23

## 2024-03-21 RX ORDER — CALCITRIOL 0.25 UG/1
0.25 CAPSULE, LIQUID FILLED ORAL DAILY
Status: DISCONTINUED | OUTPATIENT
Start: 2024-03-21 | End: 2024-03-21

## 2024-03-21 RX ORDER — AZITHROMYCIN 250 MG/1
500 TABLET, FILM COATED ORAL DAILY
Qty: 4 TABLET | Refills: 0 | Status: COMPLETED | OUTPATIENT
Start: 2024-03-22 | End: 2024-03-23

## 2024-03-21 RX ORDER — CALCIUM GLUCONATE 20 MG/ML
2 INJECTION, SOLUTION INTRAVENOUS ONCE
Status: DISCONTINUED | OUTPATIENT
Start: 2024-03-21 | End: 2024-03-21

## 2024-03-21 RX ORDER — IPRATROPIUM BROMIDE AND ALBUTEROL SULFATE 2.5; .5 MG/3ML; MG/3ML
3 SOLUTION RESPIRATORY (INHALATION)
Status: DISCONTINUED | OUTPATIENT
Start: 2024-03-21 | End: 2024-03-25 | Stop reason: HOSPADM

## 2024-03-21 RX ORDER — SEVELAMER CARBONATE 800 MG/1
1600 TABLET, FILM COATED ORAL 3 TIMES DAILY
Status: CANCELLED | OUTPATIENT
Start: 2024-03-21

## 2024-03-21 RX ORDER — SEVELAMER CARBONATE 800 MG/1
1600 TABLET, FILM COATED ORAL
Status: CANCELLED | OUTPATIENT
Start: 2024-03-21

## 2024-03-21 RX ORDER — SEVELAMER CARBONATE 800 MG/1
1600 TABLET, FILM COATED ORAL
Status: DISCONTINUED | OUTPATIENT
Start: 2024-03-21 | End: 2024-03-25 | Stop reason: HOSPADM

## 2024-03-21 RX ORDER — FUROSEMIDE 10 MG/ML
40 INJECTION INTRAMUSCULAR; INTRAVENOUS ONCE
Status: COMPLETED | OUTPATIENT
Start: 2024-03-21 | End: 2024-03-21

## 2024-03-21 RX ADMIN — DOLUTEGRAVIR SODIUM 50 MG: 50 TABLET, FILM COATED ORAL at 17:12

## 2024-03-21 RX ADMIN — AMPICILLIN AND SULBACTAM 3 G: 1; 2 INJECTION, POWDER, FOR SOLUTION INTRAMUSCULAR; INTRAVENOUS at 14:13

## 2024-03-21 RX ADMIN — ATORVASTATIN CALCIUM 40 MG: 40 TABLET, FILM COATED ORAL at 21:09

## 2024-03-21 RX ADMIN — AZITHROMYCIN DIHYDRATE 500 MG: 250 TABLET, FILM COATED ORAL at 13:05

## 2024-03-21 RX ADMIN — CARVEDILOL 25 MG: 25 TABLET, FILM COATED ORAL at 17:12

## 2024-03-21 RX ADMIN — HEPARIN SODIUM 1500 UNITS: 1000 INJECTION, SOLUTION INTRAVENOUS; SUBCUTANEOUS at 16:42

## 2024-03-21 RX ADMIN — AMLODIPINE BESYLATE 10 MG: 10 TABLET ORAL at 17:12

## 2024-03-21 RX ADMIN — HEPARIN SODIUM 5000 UNITS: 5000 INJECTION, SOLUTION INTRAVENOUS; SUBCUTANEOUS at 16:15

## 2024-03-21 RX ADMIN — FUROSEMIDE 40 MG: 10 INJECTION INTRAMUSCULAR; INTRAVENOUS at 11:21

## 2024-03-21 RX ADMIN — RILPIVIRINE HYDROCHLORIDE 25 MG: 25 TABLET, FILM COATED ORAL at 17:12

## 2024-03-21 RX ADMIN — BENZONATATE 100 MG: 100 CAPSULE ORAL at 21:09

## 2024-03-21 RX ADMIN — SEVELAMER CARBONATE 1600 MG: 800 TABLET, FILM COATED ORAL at 17:12

## 2024-03-21 RX ADMIN — ACETAMINOPHEN 650 MG: 325 TABLET, FILM COATED ORAL at 22:56

## 2024-03-21 RX ADMIN — HEPARIN SODIUM 5000 UNITS: 5000 INJECTION, SOLUTION INTRAVENOUS; SUBCUTANEOUS at 21:09

## 2024-03-21 RX ADMIN — ASPIRIN 81 MG: 81 TABLET, COATED ORAL at 17:11

## 2024-03-21 RX ADMIN — ASPIRIN 325 MG: 325 TABLET ORAL at 13:05

## 2024-03-21 RX ADMIN — CALCIUM GLUCONATE 2 G: 20 INJECTION, SOLUTION INTRAVENOUS at 13:09

## 2024-03-21 ASSESSMENT — PAIN DESCRIPTION - PAIN TYPE
TYPE: ACUTE PAIN

## 2024-03-21 ASSESSMENT — ENCOUNTER SYMPTOMS
BLURRED VISION: 0
MYALGIAS: 1
BACK PAIN: 0
DIARRHEA: 0
SORE THROAT: 0
ABDOMINAL PAIN: 0
VOMITING: 0
DIZZINESS: 0
CHILLS: 1
LOSS OF CONSCIOUSNESS: 0
SHORTNESS OF BREATH: 1
COUGH: 1
DOUBLE VISION: 0
FEVER: 0
HEADACHES: 0
NAUSEA: 0
PALPITATIONS: 0
SPUTUM PRODUCTION: 1

## 2024-03-21 ASSESSMENT — COPD QUESTIONNAIRES
HAVE YOU SMOKED AT LEAST 100 CIGARETTES IN YOUR ENTIRE LIFE: YES
COPD SCREENING SCORE: 6
DURING THE PAST 4 WEEKS HOW MUCH DID YOU FEEL SHORT OF BREATH: SOME OF THE TIME
DO YOU EVER COUGH UP ANY MUCUS OR PHLEGM?: YES, A FEW DAYS A WEEK OR MONTH

## 2024-03-21 ASSESSMENT — PULMONARY FUNCTION TESTS
EPAP_CMH2O: 8

## 2024-03-21 ASSESSMENT — FIBROSIS 4 INDEX: FIB4 SCORE: 1.51

## 2024-03-21 NOTE — CARE PLAN
The patient is Watcher - Medium risk of patient condition declining or worsening         Progress made toward(s) clinical / shift goals:    Problem: Hemodynamics  Goal: Patient's hemodynamics, fluid balance and neurologic status will be stable or improve  Outcome: Progressing     Problem: Fluid Volume  Goal: Fluid volume balance will be maintained  Outcome: Progressing     Problem: Respiratory  Goal: Patient will achieve/maintain optimum respiratory ventilation and gas exchange  Outcome: Progressing     Problem: Pain - Standard  Goal: Alleviation of pain or a reduction in pain to the patient’s comfort goal  Outcome: Progressing

## 2024-03-21 NOTE — CONSULTS
Critical Care Consultation    Date of consult: 3/21/2024    Referring Physician  Chang Schofield M.D.    Reason for Consultation  Respiratory failure    History of Presenting Illness  58M ho ESRD due for dialysis today, HFrEF EF 40%, CAD, severe AS, HIV on HAART presenting to the ER with dyspnea and orthopnea.  He has hypertensive urgency, pulmonary edema, elevated BNP, and hyperkalemia.  He is on bipap.  He is RSV+ and being treated for CAP.  Dr. Bailey has been consulted for urgent dialysis. He was admitted recently and found to have severe multivessel CAD and severe AS.  He was deemed too high risk for surgery by [facility] but likely a candidate for TAVR following by consideration of PCI.     Seamus reports he is feeling fine on bipap.  He started feeling dyspneic last night.  He also has a productive cough.  He's been feeling roughly the same since his recent discharge. No fevers.  He had chest pain last night but this has resolved.  He smokes 2-4 cigarettes daily and has never been diagnosed with lung disease.     [facility] admission is requested.     Code Status  Prior    Review of Systems  Review of Systems   Respiratory:  Positive for cough and shortness of breath.    Cardiovascular:  Positive for chest pain.       Past Medical History   has a past medical history of Dialysis patient ([facility]) (05/17/2023), Heart failure ([facility]), HIV disease ([facility]) (01/01/1995), Hypertension (05/17/2023), Pneumonia (05/17/2023), Renal disorder, Seizure ([facility]) (05/17/2023), and Stroke ([facility]) (2015).    Surgical History   has a past surgical history that includes cholecystectomy (1980s) and other (Left).    Family History  family history includes Diabetes in his father, mother, and sister; No Known Problems in his sister, sister, and sister; Other in his brother and daughter.    Social History   reports that he quit smoking about 13 years ago. His smoking use included cigarettes. He started smoking about 43 years ago. He has a 7.5 pack-year smoking  history. He has never used smokeless tobacco. He reports that he does not drink alcohol and does not use drugs.    Medications  Home Medications       Reviewed by Maribeth Caldwell R.N. (Registered Nurse) on 03/21/24 at 1106  Med List Status: Not Addressed     Medication Last Dose Status   amLODIPine (NORVASC) 10 MG Tab  Active   amLODIPine (NORVASC) 10 MG Tab  Active   aspirin (ASA) 81 MG Chew Tab chewable tablet  Active   aspirin (ASPIRIN 81) 81 MG EC tablet  Active   atorvastatin (LIPITOR) 40 MG Tab  Active   calcitRIOL (ROCALTROL) 0.25 MCG Cap  Active   carvedilol (COREG) 25 MG Tab  Active   carvedilol (COREG) 25 MG Tab  Active   dolutegravir (TIVICAY) 50 MG Tab tablet  Active   Dolutegravir-Rilpivirine 50-25 MG Tab  Active   JULUCA 50-25 MG Tab  Active   lisinopril (PRINIVIL) 20 MG Tab  Active   lisinopril (PRINIVIL) 20 MG Tab  Active   nicotine (NICOTINE STEP 2) 14 MG/24HR PATCH 24 HR  Active   rilpivirine (EDURANT) 25 MG Tab tablet  Active   sevelamer carbonate (RENVELA) 800 MG Tab tablet  Active   sevelamer carbonate (RENVELA) 800 MG Tab tablet  Active                  Current Facility-Administered Medications   Medication Dose Route Frequency Provider Last Rate Last Admin    ampicillin/sulbactam (Unasyn) 3 g in  mL IVPB  3 g Intravenous Once Cathryn Franks M.D.         Current Outpatient Medications   Medication Sig Dispense Refill    carvedilol (COREG) 25 MG Tab Take 1 Tablet by mouth 2 times a day with meals. 60 Tablet 0    atorvastatin (LIPITOR) 40 MG Tab Take 1 Tablet by mouth every evening. 30 Tablet 0    aspirin (ASPIRIN 81) 81 MG EC tablet Take 81 mg by mouth every day.      lisinopril (PRINIVIL) 20 MG Tab Take 20 mg by mouth every day.      amLODIPine (NORVASC) 10 MG Tab Take 10 mg by mouth every day.      sevelamer carbonate (RENVELA) 800 MG Tab tablet Take 1,600 mg by mouth 3 times a day with meals.      Dolutegravir-Rilpivirine 50-25 MG Tab Take 1 Tablet by mouth every day.       rilpivirine (EDURANT) 25 MG Tab tablet Take 1 Tablet by mouth every morning with breakfast. 30 Tablet 0    lisinopril (PRINIVIL) 20 MG Tab Take 1 Tablet by mouth every day. 30 Tablet 6    dolutegravir (TIVICAY) 50 MG Tab tablet Take 1 Tablet by mouth every day. 30 Tablet 0    calcitRIOL (ROCALTROL) 0.25 MCG Cap Take 1 Capsule by mouth every day. 30 Capsule 0    aspirin (ASA) 81 MG Chew Tab chewable tablet Chew 1 Tablet every day. 100 Tablet 3    sevelamer carbonate (RENVELA) 800 MG Tab tablet Take 1,600 mg by mouth in the morning, at noon, and at bedtime. 1,600 MG = 2 TABS      nicotine (NICOTINE STEP 2) 14 MG/24HR PATCH 24 HR Place 1 Patch on the skin every 24 hours. 30 Patch 1    carvedilol (COREG) 25 MG Tab Take 1 Tablet by mouth 2 times a day with meals. 180 Tablet 3    JULUCA 50-25 MG Tab Take 1 Tablet by mouth every day at 6 PM. With food      amLODIPine (NORVASC) 10 MG Tab Take 1 Tablet by mouth every day. 90 Tablet 3       Allergies  No Known Allergies    Vital Signs last 24 hours  Temp:  [36.6 °C (97.9 °F)] 36.6 °C (97.9 °F)  Pulse:  [] 80  Resp:  [24-44] 24  BP: (157-195)/() 157/78  SpO2:  [92 %-98 %] 98 %    Physical Exam  Physical Exam  Constitutional:       General: He is not in acute distress.     Appearance: Normal appearance.   HENT:      Head: Normocephalic and atraumatic.      Mouth/Throat:      Comments: Bipap in place  Eyes:      Pupils: Pupils are equal, round, and reactive to light.   Cardiovascular:      Rate and Rhythm: Normal rate and regular rhythm.      Pulses: Normal pulses.      Heart sounds: Murmur heard.   Pulmonary:      Effort: Pulmonary effort is normal.      Breath sounds: Wheezing and rales present.      Comments: Comfortable work of breathing on bipap  Abdominal:      General: Abdomen is flat. Bowel sounds are normal.      Palpations: Abdomen is soft.   Musculoskeletal:      Cervical back: No rigidity.      Right lower leg: No edema.      Left lower leg: No edema.    Skin:     General: Skin is warm and dry.   Neurological:      General: No focal deficit present.      Mental Status: He is alert and oriented to person, place, and time.   Psychiatric:         Mood and Affect: Mood normal.         Fluids  No intake or output data in the 24 hours ending 24 1352    Laboratory  Recent Results (from the past 48 hour(s))   EKG    Collection Time: 24 10:40 AM   Result Value Ref Range    Report       [facility] Emergency Dept.    Test Date:  2024  Pt Name:    SONI ABBASI                  Department: ER  MRN:        [id number]                      Room:        12  Gender:     Male                         Technician: [id number]  :        1965                   Requested By:ER TRIAGE PROTOCOL  Order #:    [id number]                    Reading MD:    Measurements  Intervals                                Axis  Rate:       103                          P:          66  NH:         168                          QRS:        10  QRSD:       90                           T:          117  QT:         359  QTc:        470    Interpretive Statements  Sinus tachycardia  LVH with secondary repolarization abnormality  Artifact in lead(s) II,III,aVR,aVF,V4,V5  Compared to ECG 2024 04:32:56  Sinus rhythm no longer present     CBC WITH DIFFERENTIAL    Collection Time: 24 10:56 AM   Result Value Ref Range    WBC 13.8 (H) 4.8 - 10.8 K/uL    RBC 3.12 (L) 4.70 - 6.10 M/uL    Hemoglobin 10.2 (L) 14.0 - 18.0 g/dL    Hematocrit 33.2 (L) 42.0 - 52.0 %    .4 (H) 81.4 - 97.8 fL    MCH 32.7 27.0 - 33.0 pg    MCHC 30.7 (L) 32.3 - 36.5 g/dL    RDW 62.3 (H) 35.9 - 50.0 fL    Platelet Count 271 164 - 446 K/uL    MPV 9.5 9.0 - 12.9 fL    Neutrophils-Polys 86.90 (H) 44.00 - 72.00 %    Lymphocytes 5.50 (L) 22.00 - 41.00 %    Monocytes 3.40 0.00 - 13.40 %    Eosinophils 3.70 0.00 - 6.90 %    Basophils 0.10 0.00 - 1.80 %    Immature Granulocytes 0.40 0.00 - 0.90 %     Nucleated RBC 0.00 0.00 - 0.20 /100 WBC    Neutrophils (Absolute) 12.02 (H) 1.82 - 7.42 K/uL    Lymphs (Absolute) 0.76 (L) 1.00 - 4.80 K/uL    Monos (Absolute) 0.47 0.00 - 0.85 K/uL    Eos (Absolute) 0.51 0.00 - 0.51 K/uL    Baso (Absolute) 0.02 0.00 - 0.12 K/uL    Immature Granulocytes (abs) 0.06 0.00 - 0.11 K/uL    NRBC (Absolute) 0.00 K/uL   Comp Metabolic Panel    Collection Time: 03/21/24 10:56 AM   Result Value Ref Range    Sodium 139 135 - 145 mmol/L    Potassium 6.5 (H) 3.6 - 5.5 mmol/L    Chloride 97 96 - 112 mmol/L    Co2 25 20 - 33 mmol/L    Anion Gap 17.0 (H) 7.0 - 16.0    Glucose 173 (H) 65 - 99 mg/dL    Bun 40 (H) 8 - 22 mg/dL    Creatinine 9.77 (HH) 0.50 - 1.40 mg/dL    Calcium 8.6 8.5 - 10.5 mg/dL    Correct Calcium 8.4 (L) 8.5 - 10.5 mg/dL    AST(SGOT) 24 12 - 45 U/L    ALT(SGPT) 18 2 - 50 U/L    Alkaline Phosphatase 108 (H) 30 - 99 U/L    Total Bilirubin 0.5 0.1 - 1.5 mg/dL    Albumin 4.2 3.2 - 4.9 g/dL    Total Protein 9.9 (H) 6.0 - 8.2 g/dL    Globulin 5.7 (H) 1.9 - 3.5 g/dL    A-G Ratio 0.7 g/dL   TROPONIN    Collection Time: 03/21/24 10:56 AM   Result Value Ref Range    Troponin T 366 (H) 6 - 19 ng/L   Procalcitonin    Collection Time: 03/21/24 10:56 AM   Result Value Ref Range    Procalcitonin 0.26 (H) <0.25 ng/mL   LACTIC ACID    Collection Time: 03/21/24 10:56 AM   Result Value Ref Range    Lactic Acid 2.1 (H) 0.5 - 2.0 mmol/L   MAGNESIUM    Collection Time: 03/21/24 10:56 AM   Result Value Ref Range    Magnesium 2.3 1.5 - 2.5 mg/dL   PHOSPHORUS    Collection Time: 03/21/24 10:56 AM   Result Value Ref Range    Phosphorus 8.0 (H) 2.5 - 4.5 mg/dL   proBrain Natriuretic Peptide, NT    Collection Time: 03/21/24 10:56 AM   Result Value Ref Range    NT-proBNP >01314 (H) 0 - 125 pg/mL   VENOUS BLOOD GAS    Collection Time: 03/21/24 10:56 AM   Result Value Ref Range    Venous Bg Ph 7.33 7.31 - 7.45    Venous Bg Ph Temp Corrected 7.33 7.31 - 7.45    Venous Bg Pco2 57.0 (H) 41.0 - 51.0 mmHg    Venous  Bg Pco2 Temp Corrected 57.0 (H) 41.0 - 51.0 mmHg    Venous Bg Po2 24.0 (L) 25.0 - 40.0 mmHg    Venous Bg Po2 Temp Corrected 24.0 (L) 25.0 - 40.0 mmHg    Venous Bg O2 Saturation 32.7 %    Venous Bg Hco3 29 (H) 24 - 28 mmol/L    Venous Bg Base Excess 3 mmol/L    Body Temp 37.0 Centigrade    O2 Therapy unknown    ESTIMATED GFR    Collection Time: 03/21/24 10:56 AM   Result Value Ref Range    GFR (CKD-EPI) 6 (A) >60 mL/min/1.73 m 2   POC CoV-2, FLU A/B, RSV by PCR    Collection Time: 03/21/24 12:10 PM   Result Value Ref Range    POC Influenza A RNA, PCR Negative Negative    POC Influenza B RNA, PCR Negative Negative    POC RSV, by PCR POSITIVE (A) Negative    POC SARS-CoV-2, PCR NotDetected        Imaging  DX-CHEST-PORTABLE (1 VIEW)   Final Result         Mild interstitial opacities in the right mid and lower lung and left lung base could relate to chronic change. Superimposed infection cannot be excluded.          Assessment/Plan  Acute respiratory failure with hypoxia- (present on admission)  Assessment & Plan  Likely d/t pulmonary edema from known HFrEF, severe AS and ESRD with mild volume overload.  Not grossly overloaded  RSV contributing  Much less likely that this is an OI, but I dont see a recent CD4 for have ordered this.  He reports compliance with HAART  Agree with CAP coverage. Trend procal, can hopefully d/c antibiotics quickly  He's a bit tight and wheezy on exam  Agree with diuretics (but need higher doses) and urgent dialysis  I added nebulizers  Could consider adding steroids for bronchospasm associated with RSV if he stays wheezy after dialysis  Cont bipap  Appropriate for IMCU.  I suspect his respiratory failure will markedly improve with HD          Discussed patient condition and risk of morbidity and/or mortality with RN and Patient.    The patient remains critically ill on bipap.  Critical care time = 35 minutes in directly providing and coordinating critical care and extensive data review.  No  time overlap and excludes procedures.

## 2024-03-21 NOTE — PROGRESS NOTES
4 Eyes Skin Assessment Completed by NAMRATA Thomas and NAMRATA sanchez.    Head WDL  Ears WDL  Nose Redness  Mouth WDL  Neck WDL  Breast/Chest Bruising  Shoulder Blades Redness and Blanching  Spine Redness and Blanching  (R) Arm/Elbow/Hand Bruising  (L) Arm/Elbow/Hand Bruising  Abdomen Scar  Groin WDL  Scrotum/Coccyx/Buttocks Redness and Blanching  (R) Leg Bruising  (L) Leg Bruising  (R) Heel/Foot/Toe Redness and Blanching  (L) Heel/Foot/Toe Redness and Blanching          Devices In Places ECG, Blood Pressure Cuff, Pulse Ox, and Bipap      Interventions In Place Bipap Protecta-Gel, Pillows, Q2 Turns, and Low Air Loss Mattress    Possible Skin Injury No    Pictures Uploaded Into Epic N/A  Wound Consult Placed N/A  RN Wound Prevention Protocol Ordered Yes

## 2024-03-21 NOTE — H&P
Hospital Medicine History & Physical Note    Date of Service  3/21/2024    Primary Care Physician  Pcp Pt States None    Consultants  nephrology    Specialist Names: Nylk    Code Status  Prior    Chief Complaint  Chief Complaint   Patient presents with    Shortness of Breath     Since last night with orthopnea.  Shortness of breath has progressively gotten worse over the night.  Per EMS pt wears 2-4L NC at baseline and was found to be desatting at 80% on his 4L NC upon arrival.      Chest Pain     Since last night.  Pt received 0.8 nitroglycerin SL for chest pain and hypertension from EMS.         History of Presenting Illness  Seamus Palencia is a 58 y.o. male who presented 3/21/2024 with  previous medical history that includes end-stage renal disease on hemodialysis Tuesday Thursday and Saturday, anemia of chronic disease, combined systolic and diastolic heart failure, HIV positive, dyslipidemia, hypertension, distant history of stroke.    Patient presents for shortness of breath.  He reports he went to his normal dialysis on Tuesday.  On Wednesday night he started to get cough, body aches, and chills.  This progressed and today he developed shortness of breath.  He was feeling too ill to go to his scheduled dialysis, and so came to the emergency room.  Here he was found to be tachycardic around 100, hypertensive with a systolic of 195, satting in the low 80s and requiring BiPAP.  Workup shows a white count of 13.8, hemoglobin 10.2, potassium 6.5, CO2 25, creatinine 9.77, lactic acid 2.1.  His viral panel was positive for RSV.  Patient has been given IV calcium, and azithromycin nephrology has been consulted.    I discussed the plan of care with patient.    Review of Systems  Review of Systems   Constitutional:  Positive for chills and malaise/fatigue. Negative for fever.   HENT:  Negative for nosebleeds and sore throat.    Eyes:  Negative for blurred vision and double vision.   Respiratory:  Positive for cough,  sputum production and shortness of breath.    Cardiovascular:  Negative for chest pain, palpitations and leg swelling.   Gastrointestinal:  Negative for abdominal pain, diarrhea, nausea and vomiting.   Genitourinary:  Negative for dysuria and urgency.   Musculoskeletal:  Positive for myalgias. Negative for back pain.   Skin:  Negative for rash.   Neurological:  Negative for dizziness, loss of consciousness and headaches.       Past Medical History   has a past medical history of Dialysis patient ([facility]) (05/17/2023), Heart failure ([facility]), HIV disease ([facility]) (01/01/1995), Hypertension (05/17/2023), Pneumonia (05/17/2023), Renal disorder, Seizure ([facility]) (05/17/2023), and Stroke ([facility]) (2015).    Surgical History   has a past surgical history that includes cholecystectomy (1980s) and other (Left).     Family History  family history includes Diabetes in his father, mother, and sister; No Known Problems in his sister, sister, and sister; Other in his brother and daughter.   Family history reviewed with patient. There is no family history that is pertinent to the chief complaint.     Social History   reports that he quit smoking about 13 years ago. His smoking use included cigarettes. He started smoking about 43 years ago. He has a 7.5 pack-year smoking history. He has never used smokeless tobacco. He reports that he does not drink alcohol and does not use drugs.    Allergies  No Known Allergies    Medications  Prior to Admission Medications   Prescriptions Last Dose Informant Patient Reported? Taking?   Dolutegravir-Rilpivirine 50-25 MG Tab   Yes No   Sig: Take 1 Tablet by mouth every day.   JULUCA 50-25 MG Tab  Patient's Home Pharmacy, Patient Yes No   Sig: Take 1 Tablet by mouth every day at 6 PM. With food   amLODIPine (NORVASC) 10 MG Tab  Patient's Home Pharmacy, Patient No No   Sig: Take 1 Tablet by mouth every day.   amLODIPine (NORVASC) 10 MG Tab   Yes No   Sig: Take 10 mg by mouth every day.   aspirin (ASA) 81 MG Chew  Tab chewable tablet   No No   Sig: Chew 1 Tablet every day.   aspirin (ASPIRIN 81) 81 MG EC tablet   Yes No   Sig: Take 81 mg by mouth every day.   atorvastatin (LIPITOR) 40 MG Tab   No No   Sig: Take 1 Tablet by mouth every evening.   calcitRIOL (ROCALTROL) 0.25 MCG Cap   No No   Sig: Take 1 Capsule by mouth every day.   carvedilol (COREG) 25 MG Tab  Patient's Home Pharmacy, Patient No No   Sig: Take 1 Tablet by mouth 2 times a day with meals.   carvedilol (COREG) 25 MG Tab   No No   Sig: Take 1 Tablet by mouth 2 times a day with meals.   dolutegravir (TIVICAY) 50 MG Tab tablet   No No   Sig: Take 1 Tablet by mouth every day.   lisinopril (PRINIVIL) 20 MG Tab   No No   Sig: Take 1 Tablet by mouth every day.   lisinopril (PRINIVIL) 20 MG Tab   Yes No   Sig: Take 20 mg by mouth every day.   nicotine (NICOTINE STEP 2) 14 MG/24HR PATCH 24 HR  Patient's Home Pharmacy No No   Sig: Place 1 Patch on the skin every 24 hours.   rilpivirine (EDURANT) 25 MG Tab tablet   No No   Sig: Take 1 Tablet by mouth every morning with breakfast.   sevelamer carbonate (RENVELA) 800 MG Tab tablet  Patient Yes No   Sig: Take 1,600 mg by mouth in the morning, at noon, and at bedtime. 1,600 MG = 2 TABS   sevelamer carbonate (RENVELA) 800 MG Tab tablet   Yes No   Sig: Take 1,600 mg by mouth 3 times a day with meals.      Facility-Administered Medications: None       Physical Exam  Temp:  [36.6 °C (97.9 °F)] 36.6 °C (97.9 °F)  Pulse:  [] 80  Resp:  [24-44] 24  BP: (157-195)/() 157/78  SpO2:  [92 %-98 %] 98 %  Blood Pressure: (!) 157/78   Temperature: 36.6 °C (97.9 °F)   Pulse: 80   Respiration: (!) 24   Pulse Oximetry: 98 %       Physical Exam  Constitutional:       General: He is not in acute distress.     Appearance: He is well-developed. He is not diaphoretic.   HENT:      Head: Normocephalic and atraumatic.   Eyes:      Conjunctiva/sclera: Conjunctivae normal.   Neck:      Vascular: No JVD.   Cardiovascular:      Rate and  Rhythm: Normal rate.      Heart sounds: No murmur heard.     No gallop.   Pulmonary:      Effort: Pulmonary effort is normal. No respiratory distress.      Breath sounds: No stridor. Rhonchi present. No wheezing or rales.   Abdominal:      Palpations: Abdomen is soft.      Tenderness: There is no abdominal tenderness. There is no guarding or rebound.   Musculoskeletal:      Right lower leg: No edema.      Left lower leg: No edema.   Skin:     General: Skin is warm and dry.      Findings: No rash.   Neurological:      Mental Status: He is oriented to person, place, and time.   Psychiatric:         Mood and Affect: Mood normal.         Behavior: Behavior normal.         Thought Content: Thought content normal.         Laboratory:  Recent Labs     03/21/24  1056   WBC 13.8*   RBC 3.12*   HEMOGLOBIN 10.2*   HEMATOCRIT 33.2*   .4*   MCH 32.7   MCHC 30.7*   RDW 62.3*   PLATELETCT 271   MPV 9.5     Recent Labs     03/21/24  1056   SODIUM 139   POTASSIUM 6.5*   CHLORIDE 97   CO2 25   GLUCOSE 173*   BUN 40*   CREATININE 9.77*   CALCIUM 8.6     Recent Labs     03/21/24  1056   ALTSGPT 18   ASTSGOT 24   ALKPHOSPHAT 108*   TBILIRUBIN 0.5   GLUCOSE 173*         Recent Labs     03/21/24  1056   NTPROBNP >66746*         Recent Labs     03/21/24  1056   TROPONINT 366*       Imaging:  DX-CHEST-PORTABLE (1 VIEW)   Final Result         Mild interstitial opacities in the right mid and lower lung and left lung base could relate to chronic change. Superimposed infection cannot be excluded.          X-Ray:  I have personally reviewed the images and compared with prior images.  EKG:  I have personally reviewed the images and compared with prior images.    Assessment/Plan:  Justification for Admission Status  I anticipate this patient will require at least two midnights for appropriate medical management, necessitating inpatient admission because acute hypoxic respiratory failure    Patient will need a Intermediate Care (Adult and  Pediatrics) bed on MEDICAL service .  The need is secondary to acute hypoxic respiratory failure.    * ESRD (end stage renal disease) (HCC)- (present on admission)  Assessment & Plan  Gets his dialysis on Tuesday Thursday and Saturday, has been compliant up until today  Nephrology consulted    RSV infection  Assessment & Plan  In setting of patient with appropriately treated HIV  Supportive care  Procalcitonin is elevated so we will continue azithromycin    Hyperkalemia- (present on admission)  Assessment & Plan  Secondary to missing his dialysis  EKG personally reviewed, does not demonstrate any changes consistent with hyperkalemia.  Patient is s/p administration of IV calcium gluconate  As he is not acidotic, further bicarb would not be beneficial.  Nephrology consulted for stat HD  Given nebulized beta agonist, insulin and D50 pending HD  Admit to IMCU on monitored bed      Volume overload- (present on admission)  Assessment & Plan  IV Lasix, stat HD    Acute respiratory failure with hypoxia- (present on admission)  Assessment & Plan  Commendation of volume overload secondary to missing dialysis, and RSV positive  Admit to IMCU on BiPAP  Nephrology consulted for stat hemodialysis  IV Lasix  Supportive care with O2 and RT protocols    Anemia due to chronic kidney disease- (present on admission)  Assessment & Plan  Hemoglobin stable around 2    Chronic combined systolic and diastolic heart failure (HCC)- (present on admission)  Assessment & Plan  Patient presents with volume overload secondary to lack of dialysis  Continue his baseline beta-blocker, ACE inhibitor  Volume management per nephrology via HD    Primary hypertension- (present on admission)  Assessment & Plan  Resume patient's home regimen    HIV (human immunodeficiency virus infection) (HCC)- (present on admission)  Assessment & Plan  Continue antiretroviral therapy        VTE prophylaxis: heparin ppx

## 2024-03-21 NOTE — ASSESSMENT & PLAN NOTE
Now euvolemic following diuresis and hemodialysis, but still struggling with fluid shifts    -Hold home amlodipine, hydralazine  -Home carvedilol and lisinopril resumed at reduced doses with holding parameters, uptitrate as tolerated

## 2024-03-21 NOTE — ED NOTES
Med rec is complete per Patient at bedside and [address] -[phone].   Pt has dialysis Tuesdays, Thursdays, and Saturdays. Pt last had Dialysis 3/19/24. Pt last had Mircera 3/14/24 and gets every 4 weeks.  Allergies reviewed.    Has patient had any outside antibiotics in the last 30 days? Y    Any Anticoagulants (rivaroxaban, apixaban, edoxaban, dabigatran, warfarin, enoxaparin) taken in the last 14 days? N       Pharmacy/Pharmacies Pt utilizes : [facility] [phone]

## 2024-03-21 NOTE — ED NOTES
Pt switched over to 15L oxy mask per ERP request and bipap discontinued.  Pt's breathing is even and unlabored at this time. Pt satting in upper 90s.  RT updated that bipap discontinued for now.

## 2024-03-21 NOTE — ASSESSMENT & PLAN NOTE
Likely d/t pulmonary edema from known HFrEF, severe AS and ESRD with mild volume overload.  Not grossly overloaded  RSV contributing  Much less likely that this is an OI, but I dont see a recent CD4 for have ordered this.  He reports compliance with HAART  Agree with CAP coverage. Trend procal, can hopefully d/c antibiotics quickly  He's a bit tight and wheezy on exam  Agree with diuretics (but need higher doses) and urgent dialysis  I added nebulizers  Could consider adding steroids for bronchospasm associated with RSV if he stays wheezy after dialysis  Cont bipap  Appropriate for IMCU.  I suspect his respiratory failure will markedly improve with HD

## 2024-03-21 NOTE — PROGRESS NOTES
[facility] progress Note:    ELIZABET Pizano RN : Dr Wilburn prefers doing HD in IMCU, per IMCU room needs to be cleaned first. Dr Bailey notified, new HD orders received, updated in epic.

## 2024-03-21 NOTE — ASSESSMENT & PLAN NOTE
Home dialysis schedule is Tuesday, Thursday, and Saturday    -Nephrology following, no needs for HD today  -Reach out to nephro early tmw to see patient before he leaves

## 2024-03-21 NOTE — ASSESSMENT & PLAN NOTE
Resolved following diuresis, dialysis, and supportive treatment of RSV. Also s/p 3 days of azithromycin

## 2024-03-21 NOTE — CONSULTS
DATE OF SERVICE:  03/21/2024     NEPHROLOGY CONSULTATION     REQUESTING PHYSICIAN:  Cathryn Franks MD     REASON FOR CONSULTATION:  To evaluate and provide dialysis for patient with   respiratory failure, on BiPAP and severe hyperkalemia.     HISTORY OF PRESENT ILLNESS:  The patient is a 58-year-old male with past   medical history significant for end-stage renal disease, on hemodialysis on   Tuesday, Thursday, Saturday schedule, also congestive heart failure and HIV.    The patient presented to emergency room with worsening shortness of breath,   chest discomfort.  States having also cough with productive sputum.  No fever   or chills.  Due to respiratory distress, placed on BiPAP.  Laboratory results   revealed severe hyperkalemia at 6.5.     REVIEW OF SYSTEMS:    GENERAL:  Positive for fatigue, malaise.  No fever or chills.  HEENT:  No nose bleeds, no sore throat, no sinus pain.  NECK:  No pain, no stiffness.  EYES:  No double or blurry vision, no eye pain.  RESPIRATORY:  Positive for shortness of breath, cough, productive wheezes, no   hemoptysis.  CARDIOVASCULAR:  Positive for dyspnea, no edema, no chest pain.  GASTROINTESTINAL:  No abdominal pain, no nausea, vomiting, diarrhea.     All other systems reviewed, all negative.     PAST MEDICAL HISTORY:  End-stage renal disease, on hemodialysis, congestive   heart failure, hypertension, pneumonia, seizures, stroke, HIV.     SOCIAL HISTORY:  Used to smoke, quit.  No alcohol or drug use.     FAMILY HISTORY:  Positive for diabetes mellitus type 2.  No history of kidney   disease.     PAST SURGICAL HISTORY:  Cholecystectomy, AV fistula creation.     ALLERGIES:  No known drug allergies.     OUTPATIENT MEDICATIONS:  Reviewed.     PHYSICAL EXAMINATION:    VITAL SIGNS:  Blood pressure 164/79, pulse 72, temperature 36.6 Celsius.  GENERAL APPEARANCE:  Well-developed, well-nourished male in respiratory   distress, on BiPAP.  HEENT:  Normocephalic, atraumatic.  Pupils equal,  round, reactive to light.    Extraocular movement intact.  Oropharynx covered with BiPAP mask.  No   lymphadenopathy, no thyromegaly appreciated.  LUNGS:  Technique:  Respiratory distress, bilateral wheezes and rales.  HEART:  Tachycardic.  No rub or gallop.  ABDOMEN:  Soft, nontender, nondistended.  Bowel sounds present.  No palpable   mass.  SKIN:  Warm, dry.  No erythema or rash.  NEUROLOGIC:  Alert, oriented x3, no focal deficit.  Cranial nerves II-XII   grossly intact.     LABORATORY DATA:  Reviewed, revealed hemoglobin 10.2.  Sodium 139, potassium   6.5, chloride 97, CO2 25, BUN 40 and creatinine level 9.7.     Chest x-ray, mild interstitial opacities.     ASSESSMENT AND PLAN:  The patient is a 58-year-old male with multiple medical   problems, end-stage renal disease, on hemodialysis, admitted with   hypervolemia, hyperkalemia for emergent dialysis.  1.  End-stage renal disease.  We will provide emergent dialysis today,   continue daily.  2.  Electrolytes, hyperkalemia, severe.  We will be correcting with dialysis,   to provide low potassium diet.  3.  Volume overload.  We will attempt ultrafiltration with hemodialysis as   blood pressure tolerates.  4.  Hypertension.  Elevated blood pressure, hopefully improved with   ultrafiltration with hemodialysis to monitor closely.  5.  Anemia.  Hemoglobin level at goal, to monitor.     RECOMMENDATIONS:    1.  Emergent dialysis today and continue daily.  To Adjust all medications to   renal doses.  To provide renal diet.  To monitor daily hemoglobin level, basic   metabolic panel.  We will follow the patient closely.     Thank you for the consult.  Above plan was discussed with .        ______________________________  MD JIMMY FREEDMAN/JOSE JUAN    DD:  03/21/2024 13:50  DT:  03/21/2024 15:05    Job#:  [id number]

## 2024-03-21 NOTE — ED PROVIDER NOTES
ED Provider Note    CHIEF COMPLAINT  Chief Complaint   Patient presents with    Shortness of Breath     Since last night with orthopnea.  Shortness of breath has progressively gotten worse over the night.  Per EMS pt wears 2-4L NC at baseline and was found to be desatting at 80% on his 4L NC upon arrival.      Chest Pain     Since last night.  Pt received 0.8 nitroglycerin SL for chest pain and hypertension from EMS.         HPI  Seamus Palencia is a 58 y.o. male with a past medical history significant for ESRD (HD TThS), HFrEF (45-50% 3/24), HIV (on Dolutegravir-Rilpivirine) presents to the emergency department today with progressive shortness of breath and chest pain. He reports difficulty lying flat. He has not attended dialysis today. He self-reported urinating well daily. He reports being compliant with HIV medications and all other medications.  Patient uses oxygen at home with a baseline 2-4 L.  He reports fever, chills, and night sweats last night.  He reports a history of a productive cough with white sputum.    EXTERNAL RECORDS REVIEWED  Inpatient Notes [facility] admision on 3/2/24 for acute hypoxemic respiratory failure.  The patient underwent cardiac cath which revealed multivessel coronary disease with severe aortic stenosis.  Patient was found to be too high risk for CABG.  Cardiology recommended TAVR prior to PCI.  Patient was discharged home with Augmentin and doxycycline for bacterial/atypical pneumonia.    ROS negative unless stated in HPI.      LIMITATION TO HISTORY   Select: : None  OUTSIDE HISTORIAN(S):  None        PAST FAM HISTORY  Family History   Problem Relation Age of Onset    Diabetes Mother         cause of death    Diabetes Father         cause of death    Diabetes Sister     Other Brother         down syndrome    No Known Problems Sister     No Known Problems Sister     No Known Problems Sister     Other Daughter         5 daughters, 11 sons     Clotting Disorder Neg Hx     Stroke Neg  "Hx        PAST MEDICAL HISTORY   has a past medical history of Dialysis patient (HCC) (2023), Heart failure (HCC), HIV disease (HCC) (1995), Hypertension (2023), Pneumonia (2023), Renal disorder, Seizure (HCC) (2023), and Stroke ([facility]) ().    SOCIAL HISTORY  Social History     Tobacco Use    Smoking status: Former     Current packs/day: 0.00     Average packs/day: 0.3 packs/day for 30.0 years (7.5 ttl pk-yrs)     Types: Cigarettes     Start date:      Quit date:      Years since quittin.2    Smokeless tobacco: Never    Tobacco comments:     3-4 CIGARETTES A DAY   Vaping Use    Vaping Use: Never used   Substance and Sexual Activity    Alcohol use: No    Drug use: No    Sexual activity: Not on file       SURGICAL HISTORY   has a past surgical history that includes cholecystectomy (1980s) and other (Left).    CURRENT MEDICATIONS  Home Medications       Reviewed by Nael Del Cid (Pharmacy Tech) on 24 at 1507  Med List Status: Complete     Medication Last Dose Status   amLODIPine (NORVASC) 10 MG Tab 3/20/2024 Active   amoxicillin-clavulanate (AUGMENTIN) 500-125 MG Tab unk Active   aspirin (ASA) 81 MG Chew Tab chewable tablet 3/20/2024 Active   atorvastatin (LIPITOR) 40 MG Tab 3/20/2024 Active   carvedilol (COREG) 25 MG Tab 3/20/2024 Active   doxycycline (VIBRAMYCIN) 100 MG Tab unk Active   JULUCA 50-25 MG Tab 3/20/2024 Active   lisinopril (PRINIVIL) 20 MG Tab 3/20/2024 Active   Methoxy PEG-Epoetin Beta (MIRCERA) 30 MCG/0.3ML Solution Prefilled Syringe 3/14/2024 Active   sevelamer carbonate (RENVELA) 800 MG Tab tablet 3/20/2024 Active                     ALLERGIES  No Known Allergies    PHYSICAL EXAM  VITAL SIGNS: BP (!) 156/77   Pulse 81   Temp 36.6 °C (97.9 °F) (Temporal)   Resp (!) 22   Ht 1.626 m (5' 4\")   Wt 62.6 kg (138 lb)   SpO2 98%   BMI 23.69 kg/m²    Physical Exam  Vitals reviewed.   Constitutional:       General: He is awake. He is in acute " distress.      Appearance: He is normal weight.      Comments: On BiPAP   HENT:      Head: Normocephalic and atraumatic.   Cardiovascular:      Rate and Rhythm: Regular rhythm. Tachycardia present.      Heart sounds: No murmur heard.  Pulmonary:      Effort: Tachypnea, accessory muscle usage and respiratory distress present.      Breath sounds: Examination of the right-upper field reveals wheezing. Examination of the left-upper field reveals wheezing. Examination of the right-middle field reveals rales. Examination of the left-middle field reveals rales. Examination of the right-lower field reveals rales. Examination of the left-lower field reveals rales. Wheezing and rales present.   Skin:     General: Skin is warm and dry.      Capillary Refill: Capillary refill takes less than 2 seconds.   Neurological:      General: No focal deficit present.      Mental Status: He is alert and oriented to person, place, and time.   Psychiatric:         Behavior: Behavior normal. Behavior is cooperative.         ED observation? No    LABS  Results for orders placed or performed during the hospital encounter of 03/21/24   CBC WITH DIFFERENTIAL   Result Value Ref Range    WBC 13.8 (H) 4.8 - 10.8 K/uL    RBC 3.12 (L) 4.70 - 6.10 M/uL    Hemoglobin 10.2 (L) 14.0 - 18.0 g/dL    Hematocrit 33.2 (L) 42.0 - 52.0 %    .4 (H) 81.4 - 97.8 fL    MCH 32.7 27.0 - 33.0 pg    MCHC 30.7 (L) 32.3 - 36.5 g/dL    RDW 62.3 (H) 35.9 - 50.0 fL    Platelet Count 271 164 - 446 K/uL    MPV 9.5 9.0 - 12.9 fL    Neutrophils-Polys 86.90 (H) 44.00 - 72.00 %    Lymphocytes 5.50 (L) 22.00 - 41.00 %    Monocytes 3.40 0.00 - 13.40 %    Eosinophils 3.70 0.00 - 6.90 %    Basophils 0.10 0.00 - 1.80 %    Immature Granulocytes 0.40 0.00 - 0.90 %    Nucleated RBC 0.00 0.00 - 0.20 /100 WBC    Neutrophils (Absolute) 12.02 (H) 1.82 - 7.42 K/uL    Lymphs (Absolute) 0.76 (L) 1.00 - 4.80 K/uL    Monos (Absolute) 0.47 0.00 - 0.85 K/uL    Eos (Absolute) 0.51 0.00 - 0.51  K/uL    Baso (Absolute) 0.02 0.00 - 0.12 K/uL    Immature Granulocytes (abs) 0.06 0.00 - 0.11 K/uL    NRBC (Absolute) 0.00 K/uL   Comp Metabolic Panel   Result Value Ref Range    Sodium 139 135 - 145 mmol/L    Potassium 6.5 (H) 3.6 - 5.5 mmol/L    Chloride 97 96 - 112 mmol/L    Co2 25 20 - 33 mmol/L    Anion Gap 17.0 (H) 7.0 - 16.0    Glucose 173 (H) 65 - 99 mg/dL    Bun 40 (H) 8 - 22 mg/dL    Creatinine 9.77 (HH) 0.50 - 1.40 mg/dL    Calcium 8.6 8.5 - 10.5 mg/dL    Correct Calcium 8.4 (L) 8.5 - 10.5 mg/dL    AST(SGOT) 24 12 - 45 U/L    ALT(SGPT) 18 2 - 50 U/L    Alkaline Phosphatase 108 (H) 30 - 99 U/L    Total Bilirubin 0.5 0.1 - 1.5 mg/dL    Albumin 4.2 3.2 - 4.9 g/dL    Total Protein 9.9 (H) 6.0 - 8.2 g/dL    Globulin 5.7 (H) 1.9 - 3.5 g/dL    A-G Ratio 0.7 g/dL   TROPONIN   Result Value Ref Range    Troponin T 366 (H) 6 - 19 ng/L   Procalcitonin   Result Value Ref Range    Procalcitonin 0.26 (H) <0.25 ng/mL   LACTIC ACID   Result Value Ref Range    Lactic Acid 2.1 (H) 0.5 - 2.0 mmol/L   MAGNESIUM   Result Value Ref Range    Magnesium 2.3 1.5 - 2.5 mg/dL   PHOSPHORUS   Result Value Ref Range    Phosphorus 8.0 (H) 2.5 - 4.5 mg/dL   proBrain Natriuretic Peptide, NT   Result Value Ref Range    NT-proBNP >47520 (H) 0 - 125 pg/mL   VENOUS BLOOD GAS   Result Value Ref Range    Venous Bg Ph 7.33 7.31 - 7.45    Venous Bg Ph Temp Corrected 7.33 7.31 - 7.45    Venous Bg Pco2 57.0 (H) 41.0 - 51.0 mmHg    Venous Bg Pco2 Temp Corrected 57.0 (H) 41.0 - 51.0 mmHg    Venous Bg Po2 24.0 (L) 25.0 - 40.0 mmHg    Venous Bg Po2 Temp Corrected 24.0 (L) 25.0 - 40.0 mmHg    Venous Bg O2 Saturation 32.7 %    Venous Bg Hco3 29 (H) 24 - 28 mmol/L    Venous Bg Base Excess 3 mmol/L    Body Temp 37.0 Centigrade    O2 Therapy unknown    ESTIMATED GFR   Result Value Ref Range    GFR (CKD-EPI) 6 (A) >60 mL/min/1.73 m 2   EKG   Result Value Ref Range    Report       [facility] Emergency Dept.    Test Date:  2024-03-21  Pt  Name:    SONI ABBASI                  Department: ER  MRN:        [id number]                      Room:       RD 12  Gender:     Male                         Technician: [id number]  :        1965                   Requested By:ER TRIAGE PROTOCOL  Order #:    [id number]                    Reading MD:    Measurements  Intervals                                Axis  Rate:       103                          P:          66  LA:         168                          QRS:        10  QRSD:       90                           T:          117  QT:         359  QTc:        470    Interpretive Statements  Sinus tachycardia  LVH with secondary repolarization abnormality  Artifact in lead(s) II,III,aVR,aVF,V4,V5  Compared to ECG 2024 04:32:56  Sinus rhythm no longer present     POC CoV-2, FLU A/B, RSV by PCR   Result Value Ref Range    POC Influenza A RNA, PCR Negative Negative    POC Influenza B RNA, PCR Negative Negative    POC RSV, by PCR POSITIVE (A) Negative    POC SARS-CoV-2, PCR NotDetected      I have independently interpreted this EKG  Sinus tachycardia rate of 103, intervals appear to be within normal limits, there are no significant changes when compared EKG performed on the 11 of this month.  There are no ST or T wave changes to convincingly suggest ischemia and no ectopy.  I have independently interpreted the diagnostic imaging associated with this visit and am waiting the final reading from the radiologist.   My preliminary interpretation is a follows: Opacities present bilateral lower lobes.    RADIOLOGY  DX-CHEST-PORTABLE (1 VIEW)   Final Result         Mild interstitial opacities in the right mid and lower lung and left lung base could relate to chronic change. Superimposed infection cannot be excluded.            COURSE & MEDICAL DECISION MAKING  Pertinent Labs & Imaging studies reviewed. (See chart for details)    The patient presented to the ED with acute hypoxemic respiratory failure requiring BiPAP.  BiPAP was continued in the ED. The patient had associated orthopnea with history of elevated RVSP and HFrEF increasing my concern for ADHF. Seeing as the patient continues to make urine, he was provided 40 mg of lasix IV. Additionally, Dr. Bailey was consulted to continue dialysis as the patient is scheduled for routine HD today. CXR completed and revealed bilateral mild interstitial opacities which may be chronic or a superimposed infection. He additionally had leukocytosis with an elevated procalcitonin. As such, the patient underwent respiratory viral pane testing and blood cultures were collected. The patient was empirically started on azithromycin and Unasyn. The patient reports compliance with his antiretroviral medication regimen and his ANC was within an acceptable range hence the CAP coverage.  Patient was found to be hyperkalemic at 6.5 with peaked T waves present in the V3.  As such, the patient was provided 2 g of calcium gluconate IV. Seeing as a patient has history of coronary artery disease s/p Cath Lab recommending CABG however the patient is too high risk and with an elevated troponin today (downtrending as compared to previous hospitalization) patient was provided 325 of aspirin.  The patient appeared to have improvement of his vital signs, decreased work of breathing, and acceptable tidal volume while on BiPAP.  Patient was placed was discontinued from BiPAP and placed on a nonrebreather mask.  The patient will be admitted to telemetry under the care of the hospitalist.    1:30 PM  Patient began having increased work of breathing and was maxed out on oxygen per mask.  He had to be placed on BiPAP and will now need to be discussed with the ICU provider to determine whether he needs ICU or IMCU.    1:47 PM  Case discussed with the ICU provider, Dr. Harkins, who agreed with the plan for IMCU admission.  Patient also appears to be RSV positive.  Patient will be discussed with the hospitalist in charge of  the IMCU.  He is currently on BiPAP again and appears more comfortable.    Upon Reevaluation, the patient's condition has: not improved; and will be escalated to hospitalization.    In addition to the chief complaint, the following problems were addressed:     RSV infection  Hyperkalemia  Hyperphosphatemia  Lactic acidosis, elevated anion gap  Elevated troponin  Elevated BNP    I have discussed management of the patient with the following physicians and SHIRA's:        Decision tools and Rx drugs considered including, but not limited to :Antibiotics Unasyn and azithromycin      Discussion of management with other QHP or appropriate source(s): Dr. Bailey, nephrology and ED pharmacist    The patient will not drink alcohol nor drive with prescribed medications. The patient will return for worsening symptoms and is stable at the time of discharge. The patient verbalizes understanding and will comply.    FINAL IMPRESSION  1. Hypertensive emergency    2. Acute hypoxemic respiratory failure (HCC)    3. Hyperkalemia    4. Hypermagnesemia    5. RSV infection        Electronically signed by: Bneji Wilburn M.D., 3/21/2024 10:45 AM

## 2024-03-21 NOTE — ED NOTES
Rounded on pt. Pt's breathing appears to have improved. Pt states his breathing is feeling a little better.

## 2024-03-21 NOTE — ED TRIAGE NOTES
"Chief Complaint   Patient presents with    Shortness of Breath     Since last night with orthopnea.  Shortness of breath has progressively gotten worse over the night.  Per EMS pt wears 2-4L NC at baseline and was found to be desatting at 80% on his 4L NC upon arrival.      Chest Pain     Since last night.  Pt received 0.8 nitroglycerin SL for chest pain and hypertension from EMS.       BP (!) 195/107   Pulse (!) 105   Temp 36.6 °C (97.9 °F) (Temporal)   Resp (!) 44   Ht 1.626 m (5' 4\")   Wt 62.6 kg (138 lb)   SpO2 92%   BMI 23.69 kg/m²     ERP paged to bedside. RT at bedside to switch pt to hospital bipap machine.  Pt placed on the monitor. EKG completed. PIV established using US machine.  First set of blood cutlures drawn. Phlebotomy at bedside to draw second set.  Pt is a dialysis pt. Fistula present to left arm.  Pt gets dialysis Tues/TH/Sat.  Pt denies missing any appointments.    "

## 2024-03-21 NOTE — ED NOTES
Pt oxygen increased to 15 liters per [employer] attending, work of breathing getting worse, RT was called to have pt go back on bipap

## 2024-03-22 LAB
ANION GAP SERPL CALC-SCNC: 16 MMOL/L (ref 7–16)
BUN SERPL-MCNC: 25 MG/DL (ref 8–22)
CALCIUM SERPL-MCNC: 9.1 MG/DL (ref 8.5–10.5)
CHLORIDE SERPL-SCNC: 91 MMOL/L (ref 96–112)
CO2 SERPL-SCNC: 26 MMOL/L (ref 20–33)
CREAT SERPL-MCNC: 6.99 MG/DL (ref 0.5–1.4)
GFR SERPLBLD CREATININE-BSD FMLA CKD-EPI: 8 ML/MIN/1.73 M 2
GLUCOSE SERPL-MCNC: 94 MG/DL (ref 65–99)
POTASSIUM SERPL-SCNC: 4.8 MMOL/L (ref 3.6–5.5)
SODIUM SERPL-SCNC: 133 MMOL/L (ref 135–145)

## 2024-03-22 PROCEDURE — 770001 HCHG ROOM/CARE - MED/SURG/GYN PRIV*

## 2024-03-22 PROCEDURE — 700111 HCHG RX REV CODE 636 W/ 250 OVERRIDE (IP): Mod: JZ | Performed by: HOSPITALIST

## 2024-03-22 PROCEDURE — 700102 HCHG RX REV CODE 250 W/ 637 OVERRIDE(OP): Performed by: HOSPITALIST

## 2024-03-22 PROCEDURE — 99232 SBSQ HOSP IP/OBS MODERATE 35: CPT | Performed by: HOSPITALIST

## 2024-03-22 PROCEDURE — A9270 NON-COVERED ITEM OR SERVICE: HCPCS | Performed by: HOSPITALIST

## 2024-03-22 PROCEDURE — 80048 BASIC METABOLIC PNL TOTAL CA: CPT

## 2024-03-22 PROCEDURE — 700102 HCHG RX REV CODE 250 W/ 637 OVERRIDE(OP): Performed by: STUDENT IN AN ORGANIZED HEALTH CARE EDUCATION/TRAINING PROGRAM

## 2024-03-22 PROCEDURE — 99232 SBSQ HOSP IP/OBS MODERATE 35: CPT | Performed by: INTERNAL MEDICINE

## 2024-03-22 PROCEDURE — A9270 NON-COVERED ITEM OR SERVICE: HCPCS | Performed by: STUDENT IN AN ORGANIZED HEALTH CARE EDUCATION/TRAINING PROGRAM

## 2024-03-22 PROCEDURE — A9270 NON-COVERED ITEM OR SERVICE: HCPCS | Performed by: INTERNAL MEDICINE

## 2024-03-22 PROCEDURE — 700102 HCHG RX REV CODE 250 W/ 637 OVERRIDE(OP): Performed by: INTERNAL MEDICINE

## 2024-03-22 RX ORDER — GUAIFENESIN/DEXTROMETHORPHAN 100-10MG/5
5 SYRUP ORAL EVERY 6 HOURS PRN
Status: DISCONTINUED | OUTPATIENT
Start: 2024-03-22 | End: 2024-03-25 | Stop reason: HOSPADM

## 2024-03-22 RX ORDER — SODIUM CHLORIDE 9 MG/ML
250 INJECTION, SOLUTION INTRAVENOUS ONCE
Status: ACTIVE | OUTPATIENT
Start: 2024-03-22 | End: 2024-03-23

## 2024-03-22 RX ADMIN — HEPARIN SODIUM 5000 UNITS: 5000 INJECTION, SOLUTION INTRAVENOUS; SUBCUTANEOUS at 14:36

## 2024-03-22 RX ADMIN — ATORVASTATIN CALCIUM 40 MG: 40 TABLET, FILM COATED ORAL at 20:35

## 2024-03-22 RX ADMIN — ACETAMINOPHEN 650 MG: 325 TABLET, FILM COATED ORAL at 20:35

## 2024-03-22 RX ADMIN — SEVELAMER CARBONATE 1600 MG: 800 TABLET, FILM COATED ORAL at 14:36

## 2024-03-22 RX ADMIN — CARVEDILOL 25 MG: 25 TABLET, FILM COATED ORAL at 18:03

## 2024-03-22 RX ADMIN — SEVELAMER CARBONATE 1600 MG: 800 TABLET, FILM COATED ORAL at 09:59

## 2024-03-22 RX ADMIN — BENZONATATE 100 MG: 100 CAPSULE ORAL at 10:42

## 2024-03-22 RX ADMIN — AMLODIPINE BESYLATE 10 MG: 10 TABLET ORAL at 18:03

## 2024-03-22 RX ADMIN — DOLUTEGRAVIR SODIUM 50 MG: 50 TABLET, FILM COATED ORAL at 18:03

## 2024-03-22 RX ADMIN — ASPIRIN 81 MG: 81 TABLET, COATED ORAL at 18:03

## 2024-03-22 RX ADMIN — HEPARIN SODIUM 5000 UNITS: 5000 INJECTION, SOLUTION INTRAVENOUS; SUBCUTANEOUS at 05:05

## 2024-03-22 RX ADMIN — GUAIFENESIN SYRUP AND DEXTROMETHORPHAN 5 ML: 100; 10 SYRUP ORAL at 06:20

## 2024-03-22 RX ADMIN — AZITHROMYCIN DIHYDRATE 500 MG: 250 TABLET, FILM COATED ORAL at 05:06

## 2024-03-22 RX ADMIN — RILPIVIRINE HYDROCHLORIDE 25 MG: 25 TABLET, FILM COATED ORAL at 18:03

## 2024-03-22 RX ADMIN — SEVELAMER CARBONATE 1600 MG: 800 TABLET, FILM COATED ORAL at 18:03

## 2024-03-22 RX ADMIN — BENZONATATE 100 MG: 100 CAPSULE ORAL at 03:02

## 2024-03-22 RX ADMIN — ACETAMINOPHEN 650 MG: 325 TABLET, FILM COATED ORAL at 01:39

## 2024-03-22 ASSESSMENT — ENCOUNTER SYMPTOMS
DIARRHEA: 0
DIZZINESS: 0
SPUTUM PRODUCTION: 1
COUGH: 1
BACK PAIN: 0
WEIGHT LOSS: 0
VOMITING: 0
WHEEZING: 0
HEMOPTYSIS: 0
LOSS OF CONSCIOUSNESS: 0
CHILLS: 0
PALPITATIONS: 0
EYES NEGATIVE: 1
NAUSEA: 0
SHORTNESS OF BREATH: 1
SINUS PAIN: 0
FEVER: 0
ABDOMINAL PAIN: 0
MYALGIAS: 1
HEADACHES: 0
ORTHOPNEA: 0

## 2024-03-22 ASSESSMENT — PAIN DESCRIPTION - PAIN TYPE
TYPE: ACUTE PAIN
TYPE: ACUTE PAIN

## 2024-03-22 NOTE — CARE PLAN
The patient is Watcher - Medium risk of patient condition declining or worsening    Shift Goals  Clinical Goals: hemodynamic stability, decreased o2 demand  Patient Goals: leave  Family Goals: siena    Progress made toward(s) clinical / shift goals:    Problem: Knowledge Deficit - Standard  Goal: Patient and family/care givers will demonstrate understanding of plan of care, disease process/condition, diagnostic tests and medications  Outcome: Progressing     Problem: Hemodynamics  Goal: Patient's hemodynamics, fluid balance and neurologic status will be stable or improve  Outcome: Progressing     Problem: Pain - Standard  Goal: Alleviation of pain or a reduction in pain to the patient’s comfort goal  Outcome: Progressing     Problem: Skin Integrity  Goal: Skin integrity is maintained or improved  Outcome: Progressing     Problem: Fall Risk  Goal: Patient will remain free from falls  Outcome: Progressing       Patient is not progressing towards the following goals:

## 2024-03-22 NOTE — PROGRESS NOTES
Hospital Medicine Daily Progress Note    Date of Service  3/22/2024    Chief Complaint  Seamus Palencia is a 58 y.o. male admitted 3/21/2024 with SOB    Hospital Course  Seamus Palencia is a 58 y.o. male who presented 3/21/2024 with  previous medical history that includes end-stage renal disease on hemodialysis Tuesday Thursday and Saturday, anemia of chronic disease, combined systolic and diastolic heart failure, HIV positive, dyslipidemia, hypertension, distant history of stroke.   Presented with upper resp symptoms.  He missed HD on the day of presentation as he was too SOB to get to his appointment,  In ED RSV+ and vol overloaded    Interval Problem Update  Patient states he is feeling better today.  Still has a cough and some congestion but much less short of breath than he was yesterday.    I have discussed this patient's plan of care and discharge plan at IDT rounds today with Case Management, Nursing, Nursing leadership, and other members of the IDT team.    Consultants/Specialty  nephrology    Code Status  Full Code    Disposition  The patient is not medically cleared for discharge to home or a post-acute facility.      I have placed the appropriate orders for post-discharge needs.    Review of Systems  Review of Systems   Constitutional:  Positive for malaise/fatigue. Negative for chills and fever.   HENT:  Positive for congestion.    Respiratory:  Positive for cough, sputum production and shortness of breath.    Cardiovascular:  Negative for chest pain, palpitations and leg swelling.   Gastrointestinal:  Negative for abdominal pain, diarrhea, nausea and vomiting.   Genitourinary:  Negative for dysuria and urgency.   Musculoskeletal:  Positive for myalgias. Negative for back pain.   Skin:  Negative for rash.   Neurological:  Negative for dizziness, loss of consciousness and headaches.        Physical Exam  Temp:  [36.1 °C (97 °F)-36.8 °C (98.2 °F)] 36.8 °C (98.2 °F)  Pulse:  [] 72  Resp:  [18-44]  28  BP: ()/() 104/57  SpO2:  [92 %-99 %] 99 %    Physical Exam  Constitutional:       General: He is not in acute distress.     Appearance: He is well-developed. He is not diaphoretic.   HENT:      Head: Normocephalic and atraumatic.   Eyes:      Conjunctiva/sclera: Conjunctivae normal.   Neck:      Vascular: No JVD.   Cardiovascular:      Rate and Rhythm: Normal rate.      Heart sounds: No murmur heard.     No gallop.   Pulmonary:      Effort: Pulmonary effort is normal. No respiratory distress.      Breath sounds: No stridor. Rhonchi present. No wheezing or rales.   Abdominal:      Palpations: Abdomen is soft.      Tenderness: There is no abdominal tenderness. There is no guarding or rebound.   Musculoskeletal:      Right lower leg: No edema.      Left lower leg: No edema.   Skin:     General: Skin is warm and dry.      Findings: No rash.   Neurological:      Mental Status: He is oriented to person, place, and time.   Psychiatric:         Mood and Affect: Mood normal.         Behavior: Behavior normal.         Thought Content: Thought content normal.         Fluids    Intake/Output Summary (Last 24 hours) at 3/22/2024 0636  Last data filed at 3/22/2024 0600  Gross per 24 hour   Intake 1114.64 ml   Output 4800 ml   Net -3685.36 ml       Laboratory  Recent Labs     03/21/24  1056   WBC 13.8*   RBC 3.12*   HEMOGLOBIN 10.2*   HEMATOCRIT 33.2*   .4*   MCH 32.7   MCHC 30.7*   RDW 62.3*   PLATELETCT 271   MPV 9.5     Recent Labs     03/21/24  1056 03/21/24  2115 03/22/24  0514   SODIUM 139 139 133*   POTASSIUM 6.5* 4.7 4.8   CHLORIDE 97 94* 91*   CO2 25 28 26   GLUCOSE 173* 90 94   BUN 40* 18 25*   CREATININE 9.77* 5.29* 6.99*   CALCIUM 8.6 9.3 9.1                   Imaging  DX-CHEST-PORTABLE (1 VIEW)   Final Result         Mild interstitial opacities in the right mid and lower lung and left lung base could relate to chronic change. Superimposed infection cannot be excluded.            Assessment/Plan  * ESRD (end stage renal disease) (HCC)- (present on admission)  Assessment & Plan  Gets his dialysis on Tuesday Thursday and Saturday, has been compliant up until today  Nephrology consulted    RSV infection  Assessment & Plan  In setting of patient with appropriately treated HIV  Supportive care  Procalcitonin is elevated so we will continue azithromycin    Hyperkalemia- (present on admission)  Assessment & Plan  Secondary to missing his dialysis  Back in the normal range now s/p hemodialysis  Okay to DC telemetry and transferred to floor  Continue to monitor daily      Volume overload- (present on admission)  Assessment & Plan  IV Lasix, stat HD    Acute respiratory failure with hypoxia- (present on admission)  Assessment & Plan  Combination of volume overload secondary to missing dialysis, and RSV positive  S/p diuresis yesterday, patient is doing better and off of high flow  Continue volume management via HD and IV Lasix  Continue O2 and RT protocols      Anemia due to chronic kidney disease- (present on admission)  Assessment & Plan  Hemoglobin stable     Chronic combined systolic and diastolic heart failure (HCC)- (present on admission)  Assessment & Plan  Patient presents with volume overload secondary to lack of dialysis  Continue his baseline beta-blocker, ACE inhibitor  Volume management per nephrology via HD    Primary hypertension- (present on admission)  Assessment & Plan  Resume patient's home regimen    HIV (human immunodeficiency virus infection) (HCC)- (present on admission)  Assessment & Plan  Continue antiretroviral therapy         VTE prophylaxis: VTE Selection    I have performed a physical exam and reviewed and updated ROS and Plan today (3/22/2024). In review of yesterday's note (3/21/2024), there are no changes except as documented above.

## 2024-03-22 NOTE — PROGRESS NOTES
[facility] Progress Note     Hemodialysis treatment ordered today per Dr. Bailey x 3 hours.  Treatment initiated at 1653 completed at 1953.      Pt A/Ox3, labored breathing and constant coughing with thin white secretions noted, Pt on BiPap, O2 sat : 96-98% , VSS, procedure explained, verbalized understanding. Consent for HD signed by patient. LUE AVF (+) B/T, patent, no issues with cannulation noted.     BP gradually trended down,UFG adjusted as BP tolerates, BP improved post tx. Pt still constantly coughing while on BiPap, requiring to detach BiPap from Pt whenever he needs to spit out.Primary RN notified.; see electronic flow sheet for details.        Net UF 3,500 mL.      Needles removed from LUE AVF access site. Dressings applied and sites held x 10 minutes; verified no bleeding. Positive bruit/thrill post tx.   Staff RN to monitor AVF for breakthrough bleeding. Should breakthrough bleeding occur, staff RN to apply pressure to access sites until bleeding resolved.   Notify Dialysis and Nephrologist for follow-up.     Report given to Primary RN.

## 2024-03-23 PROBLEM — I95.9 HYPOTENSION: Status: ACTIVE | Noted: 2024-03-23

## 2024-03-23 PROBLEM — I25.10 CORONARY ARTERY DISEASE: Status: ACTIVE | Noted: 2024-03-23

## 2024-03-23 LAB
ANION GAP SERPL CALC-SCNC: 18 MMOL/L (ref 7–16)
ANNOTATION COMMENT IMP: ABNORMAL
BUN SERPL-MCNC: 40 MG/DL (ref 8–22)
CALCIUM SERPL-MCNC: 9.1 MG/DL (ref 8.5–10.5)
CD3 CELLS # BLD: 624 CELLS/UL (ref 570–2400)
CD3+CD4+ CELLS # BLD: 74 CELLS/UL (ref 430–1800)
CD3+CD4+ CELLS/CD3+CD8+ CLL BLD: 0.14 RATIO (ref 0.8–3.9)
CD3+CD8+ CELLS # BLD: 541 CELLS/UL (ref 210–1200)
CHLORIDE SERPL-SCNC: 89 MMOL/L (ref 96–112)
CO2 SERPL-SCNC: 26 MMOL/L (ref 20–33)
CREAT SERPL-MCNC: 10.58 MG/DL (ref 0.5–1.4)
ERYTHROCYTE [DISTWIDTH] IN BLOOD BY AUTOMATED COUNT: 55 FL (ref 35.9–50)
GFR SERPLBLD CREATININE-BSD FMLA CKD-EPI: 5 ML/MIN/1.73 M 2
GLUCOSE SERPL-MCNC: 93 MG/DL (ref 65–99)
HCT VFR BLD AUTO: 29.3 % (ref 42–52)
HGB BLD-MCNC: 9.7 G/DL (ref 14–18)
MCH RBC QN AUTO: 33 PG (ref 27–33)
MCHC RBC AUTO-ENTMCNC: 33.1 G/DL (ref 32.3–36.5)
MCV RBC AUTO: 99.7 FL (ref 81.4–97.8)
PLATELET # BLD AUTO: 204 K/UL (ref 164–446)
PMV BLD AUTO: 9.9 FL (ref 9–12.9)
POTASSIUM SERPL-SCNC: 4.7 MMOL/L (ref 3.6–5.5)
RBC # BLD AUTO: 2.94 M/UL (ref 4.7–6.1)
SODIUM SERPL-SCNC: 133 MMOL/L (ref 135–145)
WBC # BLD AUTO: 8.1 K/UL (ref 4.8–10.8)

## 2024-03-23 PROCEDURE — A9270 NON-COVERED ITEM OR SERVICE: HCPCS | Performed by: HOSPITALIST

## 2024-03-23 PROCEDURE — 80048 BASIC METABOLIC PNL TOTAL CA: CPT

## 2024-03-23 PROCEDURE — 770001 HCHG ROOM/CARE - MED/SURG/GYN PRIV*

## 2024-03-23 PROCEDURE — 700102 HCHG RX REV CODE 250 W/ 637 OVERRIDE(OP): Performed by: INTERNAL MEDICINE

## 2024-03-23 PROCEDURE — A9270 NON-COVERED ITEM OR SERVICE: HCPCS | Performed by: STUDENT IN AN ORGANIZED HEALTH CARE EDUCATION/TRAINING PROGRAM

## 2024-03-23 PROCEDURE — 700111 HCHG RX REV CODE 636 W/ 250 OVERRIDE (IP): Performed by: INTERNAL MEDICINE

## 2024-03-23 PROCEDURE — 36415 COLL VENOUS BLD VENIPUNCTURE: CPT

## 2024-03-23 PROCEDURE — 90935 HEMODIALYSIS ONE EVALUATION: CPT

## 2024-03-23 PROCEDURE — 700111 HCHG RX REV CODE 636 W/ 250 OVERRIDE (IP): Performed by: HOSPITALIST

## 2024-03-23 PROCEDURE — A9270 NON-COVERED ITEM OR SERVICE: HCPCS

## 2024-03-23 PROCEDURE — 700102 HCHG RX REV CODE 250 W/ 637 OVERRIDE(OP): Performed by: HOSPITALIST

## 2024-03-23 PROCEDURE — 700102 HCHG RX REV CODE 250 W/ 637 OVERRIDE(OP)

## 2024-03-23 PROCEDURE — A9270 NON-COVERED ITEM OR SERVICE: HCPCS | Performed by: INTERNAL MEDICINE

## 2024-03-23 PROCEDURE — 99232 SBSQ HOSP IP/OBS MODERATE 35: CPT | Mod: GC | Performed by: INTERNAL MEDICINE

## 2024-03-23 PROCEDURE — 90935 HEMODIALYSIS ONE EVALUATION: CPT | Performed by: INTERNAL MEDICINE

## 2024-03-23 PROCEDURE — 85027 COMPLETE CBC AUTOMATED: CPT

## 2024-03-23 PROCEDURE — 700102 HCHG RX REV CODE 250 W/ 637 OVERRIDE(OP): Performed by: STUDENT IN AN ORGANIZED HEALTH CARE EDUCATION/TRAINING PROGRAM

## 2024-03-23 RX ORDER — CARVEDILOL 6.25 MG/1
3.12 TABLET ORAL 2 TIMES DAILY WITH MEALS
Status: DISCONTINUED | OUTPATIENT
Start: 2024-03-23 | End: 2024-03-25 | Stop reason: HOSPADM

## 2024-03-23 RX ADMIN — BENZONATATE 100 MG: 100 CAPSULE ORAL at 16:22

## 2024-03-23 RX ADMIN — HEPARIN SODIUM 1500 UNITS: 1000 INJECTION, SOLUTION INTRAVENOUS; SUBCUTANEOUS at 12:15

## 2024-03-23 RX ADMIN — AZITHROMYCIN DIHYDRATE 500 MG: 250 TABLET, FILM COATED ORAL at 06:20

## 2024-03-23 RX ADMIN — RILPIVIRINE HYDROCHLORIDE 25 MG: 25 TABLET, FILM COATED ORAL at 16:23

## 2024-03-23 RX ADMIN — ATORVASTATIN CALCIUM 40 MG: 40 TABLET, FILM COATED ORAL at 20:00

## 2024-03-23 RX ADMIN — BENZONATATE 100 MG: 100 CAPSULE ORAL at 08:26

## 2024-03-23 RX ADMIN — SEVELAMER CARBONATE 1600 MG: 800 TABLET, FILM COATED ORAL at 08:20

## 2024-03-23 RX ADMIN — BENZOCAINE AND MENTHOL 1 LOZENGE: 15; 3.6 LOZENGE ORAL at 20:01

## 2024-03-23 RX ADMIN — GUAIFENESIN SYRUP AND DEXTROMETHORPHAN 5 ML: 100; 10 SYRUP ORAL at 08:26

## 2024-03-23 RX ADMIN — BENZOCAINE AND MENTHOL 1 LOZENGE: 15; 3.6 LOZENGE ORAL at 11:50

## 2024-03-23 RX ADMIN — CARVEDILOL 3.12 MG: 6.25 TABLET, FILM COATED ORAL at 16:39

## 2024-03-23 RX ADMIN — SEVELAMER CARBONATE 1600 MG: 800 TABLET, FILM COATED ORAL at 11:50

## 2024-03-23 RX ADMIN — ASPIRIN 81 MG: 81 TABLET, COATED ORAL at 16:22

## 2024-03-23 RX ADMIN — ACETAMINOPHEN 650 MG: 325 TABLET, FILM COATED ORAL at 20:00

## 2024-03-23 RX ADMIN — BENZOCAINE AND MENTHOL 1 LOZENGE: 15; 3.6 LOZENGE ORAL at 17:25

## 2024-03-23 RX ADMIN — DOLUTEGRAVIR SODIUM 50 MG: 50 TABLET, FILM COATED ORAL at 16:23

## 2024-03-23 RX ADMIN — HEPARIN SODIUM 5000 UNITS: 5000 INJECTION, SOLUTION INTRAVENOUS; SUBCUTANEOUS at 06:21

## 2024-03-23 RX ADMIN — GUAIFENESIN SYRUP AND DEXTROMETHORPHAN 5 ML: 100; 10 SYRUP ORAL at 16:22

## 2024-03-23 RX ADMIN — HEPARIN SODIUM 5000 UNITS: 5000 INJECTION, SOLUTION INTRAVENOUS; SUBCUTANEOUS at 16:39

## 2024-03-23 RX ADMIN — SEVELAMER CARBONATE 1600 MG: 800 TABLET, FILM COATED ORAL at 16:22

## 2024-03-23 ASSESSMENT — ENCOUNTER SYMPTOMS
SHORTNESS OF BREATH: 0
NEUROLOGICAL NEGATIVE: 1
GASTROINTESTINAL NEGATIVE: 1
SPUTUM PRODUCTION: 0
MUSCULOSKELETAL NEGATIVE: 1
CONSTITUTIONAL NEGATIVE: 1
PSYCHIATRIC NEGATIVE: 1
COUGH: 1
CARDIOVASCULAR NEGATIVE: 1

## 2024-03-23 ASSESSMENT — PAIN DESCRIPTION - PAIN TYPE: TYPE: ACUTE PAIN

## 2024-03-23 ASSESSMENT — FIBROSIS 4 INDEX: FIB4 SCORE: 1.64

## 2024-03-23 NOTE — ASSESSMENT & PLAN NOTE
Suspect secondary to fluid shifts from diuresis, dialysis, acute illness. Monitor closely, as patient needs sufficient preload to overcome severe aortic stenosis. Remains asymptomatic    -Nephrology aware  -Hold home amlodipine, hydralazine  -Home carvedilol and lisinopril resumed at reduced doses with holding parameters, uptitrate as tolerated

## 2024-03-23 NOTE — PROGRESS NOTES
[facility]     Dialysis treatment started at 1215 and completed at 1515. Nephrologist Dr. Bailey notified of treatment start.     NET UF: 1000 mL     Patient is A&Ox4, no pain and discomfort reported. VS within normal limits. Left UA AVF has Bruitt and thrill and no signs and symptoms of infection. AVF cannulated and no access issues encountered. Lines are patent w/ good blood flow. Lab results and orders reviewed prior to treatment start.     Patient completed and tolerated dialysis treatment well w/o complications. VS stayed within normal limits. Left UA AVF Deaccessed, No bleeding noted after needle removal. Manual access pressure applied x 15 minutes. Patient and PCN instructed to monitor Left UA AVF site for bleeding and to re-enforce pressure dressing if needed.     1545 Report given to PCN TOBIN Rosa RN    See Dialysis treatment flow sheet for details.

## 2024-03-23 NOTE — PROGRESS NOTES
[facility] Internal Medicine Daily Progress Note    Date of Service  3/23/2024    [facility] Team: [facility] IM Green Team   Attending: Christelle Falcon M.d.  Senior Resident: Dr. Marr  Intern:  Dr. Vang  Contact Number: [phone]    Chief Complaint  Seamus Palencia is a 58 y.o. male admitted 3/21/2024 with shortness of breath, cough, myalgias    Hospital Course  58 year old male with ESRD on hemodialysis (T, R, Sa), combined systolic and diastolic HF (EF 40-45%), severe aortic stenosis, medically managed multivessel CAD, HIV positive, HTN, who presented on 3/21 for shortness of breath, cough, and myalgias. He felt to ill to go to his scheduled dialysis and therefore presented to the hospital instead. Viral panel panel was positive for RSV. He initially required 15L of supplemental O2, now much improved following diuresis, a course of azithromycin and supportive care. He was noted to be hypotensive following dialysis the day prior, will monitor pressures closely during dialysis today given need for sufficient preload to overcome severe AS, appreciate nephrology help    Interval Problem Update  Patient reports headache, congestion and cough. Overall, he feels improved from admission. Denies fevers, chills, lightheadedness, chest pressure.    Hypotensive since dialysis on 3/21, nephrology aware for dialysis today. Review of records reveals a history of CAD that is being medically managed and severe aortic stenosis with plans for TAVR. Will discontinue amlodipine, continue low dose carvedilol. Plan to resume low dose lisinopril once his pressures tolerate.    I have discussed this patient's plan of care and discharge plan at IDT rounds today with Case Management, Nursing, Nursing leadership, and other members of the IDT team.    Consultants/Specialty  Nephrology  Critical care    Code Status  Full Code    Disposition  The patient is not medically cleared for discharge to home or a post-acute facility.      I have placed the  appropriate orders for post-discharge needs.    Review of Systems  Review of Systems   Constitutional: Negative.    HENT: Negative.     Respiratory:  Positive for cough. Negative for sputum production and shortness of breath.    Cardiovascular: Negative.  Negative for chest pain.   Gastrointestinal: Negative.    Genitourinary: Negative.    Musculoskeletal: Negative.    Neurological: Negative.    Psychiatric/Behavioral: Negative.          Physical Exam  Temp:  [36.7 °C (98.1 °F)-37 °C (98.6 °F)] 37 °C (98.6 °F)  Pulse:  [63-77] 71  Resp:  [18-45] 18  BP: ()/(46-63) 85/50  SpO2:  [88 %-99 %] 88 %    Physical Exam  Vitals and nursing note reviewed.   Constitutional:       Appearance: Normal appearance. He is normal weight.   HENT:      Head: Normocephalic and atraumatic.      Nose: Nose normal.      Mouth/Throat:      Mouth: Mucous membranes are moist.   Eyes:      Extraocular Movements: Extraocular movements intact.      Pupils: Pupils are equal, round, and reactive to light.   Cardiovascular:      Rate and Rhythm: Normal rate and regular rhythm.      Pulses: Normal pulses.   Pulmonary:      Breath sounds: No stridor. Rales present. No wheezing.      Comments: Scattered diffuse rales  Abdominal:      General: Abdomen is flat.      Palpations: Abdomen is soft.   Musculoskeletal:         General: Normal range of motion.      Cervical back: Normal range of motion.      Right lower leg: No edema.      Left lower leg: No edema.   Skin:     General: Skin is warm.      Capillary Refill: Capillary refill takes less than 2 seconds.   Neurological:      General: No focal deficit present.      Mental Status: He is alert and oriented to person, place, and time.   Psychiatric:         Mood and Affect: Mood normal.         Fluids    Intake/Output Summary (Last 24 hours) at 3/23/2024 1320  Last data filed at 3/23/2024 1113  Gross per 24 hour   Intake 120 ml   Output 0 ml   Net 120 ml       Laboratory  Recent Labs      03/21/24  1056 03/23/24  0930   WBC 13.8* 8.1   RBC 3.12* 2.94*   HEMOGLOBIN 10.2* 9.7*   HEMATOCRIT 33.2* 29.3*   .4* 99.7*   MCH 32.7 33.0   MCHC 30.7* 33.1   RDW 62.3* 55.0*   PLATELETCT 271 204   MPV 9.5 9.9     Recent Labs     03/21/24  2115 03/22/24  0514 03/23/24  0930   SODIUM 139 133* 133*   POTASSIUM 4.7 4.8 4.7   CHLORIDE 94* 91* 89*   CO2 28 26 26   GLUCOSE 90 94 93   BUN 18 25* 40*   CREATININE 5.29* 6.99* 10.58*   CALCIUM 9.3 9.1 9.1                   Imaging  DX-CHEST-PORTABLE (1 VIEW)   Final Result         Mild interstitial opacities in the right mid and lower lung and left lung base could relate to chronic change. Superimposed infection cannot be excluded.           Assessment/Plan  Problem Representation:    * ESRD (end stage renal disease) (HCC)- (present on admission)  Assessment & Plan  On dialysis Tuesday, Thursday, and Saturday    -Nephrology on board, aware of hypotension and need for sufficient preload with severe AS  -Continue home dialysis schedule    Hypotension  Assessment & Plan  Suspect acute on chronic hypotension due to recent diuresis and dialysis (UF 3.5L on 3/21). Monitor closely, as patient needs sufficient preload to overcome severe aortic stenosis. Currently asymptomatic    -Nephrology made aware for dialysis today  -Discontinue amlodipine  -Continue carvedilol at 3.25 mg bid  -Consider resuming home lisinopril once pressures tolerate    Acute respiratory failure with hypoxia- (present on admission)  Assessment & Plan  Improved. Combination of volume overload secondary to missing dialysis, and RSV infection. S/p diuresis and 3 days of azithromycin.     -Titrate O2 to 88-92%  -RT protocol  -Continue volume management via HD    Chronic combined systolic and diastolic heart failure (HCC)- (present on admission)  Assessment & Plan  Patient volume overload on presentation secondary to lack of dialysis    -Continue home carvedilol at reduced dose  -Consider resuming home  lisinopril once pressures tolerate  -Hold home amlodipine, hydralazine  -Volume management per nephrology via HD    Severe aortic stenosis- (present on admission)  Assessment & Plan  First noted on Echo in 2/25/24, Vmax 3.93m/s. Plans for possible TAVR with cardiology    Multivessel coronary artery disease  Assessment & Plan  Not a candidate for CABG, thus medically managing    RSV infection  Assessment & Plan  In setting of patient with appropriately treated HIV. Status post 3 days of azithromycin.     -Supportive care with tessalon perles, guaifenesin and lozenges    Hyperkalemia- (present on admission)  Assessment & Plan  Resolved following dialysis    Volume overload- (present on admission)  Assessment & Plan  Resolved following IV Lasix and resuming HD    Anemia due to chronic kidney disease- (present on admission)  Assessment & Plan  Hemoglobin stable     Primary hypertension- (present on admission)  Assessment & Plan  Antihypertensives as above    HIV (human immunodeficiency virus infection) (HCC)- (present on admission)  Assessment & Plan  Continue home antiretroviral therapy       VTE prophylaxis: heparin ppx    I have performed a physical exam and reviewed and updated ROS and Plan today (3/23/2024). In review of yesterday's note (3/22/2024), there are no changes except as documented above.    Yuliana Vang DO  IM PGY-1

## 2024-03-23 NOTE — HOSPITAL COURSE
58 year old male with ESRD on hemodialysis (T, R, Sa), combined systolic and diastolic HF (EF 40-45%), severe aortic stenosis, medically managed multivessel CAD, HIV positive, HTN, who presented on 3/21 for shortness of breath, cough, and myalgias. He felt too ill to go to his scheduled dialysis and therefore presented to the hospital instead. Viral panel panel was positive for RSV. He initially required 15L of supplemental O2, now back on room air following diuresis, a course of azithromycin and supportive care. He has remained on his home dialysis scheduled while here but notably became hypotensive following dialysis x 2. Continuing to monitor pressures while resuming patient on home antihypertensives given heart failure despite need for sufficient preload to overcome severe AS. Patient willing to remain hospitalized for blood pressure monitoring but wants to discharge before 10 am 3/25 to  his daughter in [place]. Hopefully nephrology can see him before he goes.

## 2024-03-23 NOTE — PROGRESS NOTES
Nephrology Daily Progress Note    Date of Service  3/22/2024    Chief Complaint  58 y.o. male with ESRD/HD,  admitted 3/21/2024 with worsening SOB, cough -RSV    Interval Problem Update  3/22 -doing better, improved SOB, still c/o cough  Refused dialysis today  Scheduled for HD tomorrow    Review of Systems  Review of Systems   Constitutional:  Negative for chills, fever, malaise/fatigue and weight loss.   HENT:  Negative for congestion, hearing loss and sinus pain.    Eyes: Negative.    Respiratory:  Positive for cough and shortness of breath. Negative for hemoptysis and wheezing.    Cardiovascular:  Negative for chest pain, palpitations, orthopnea and leg swelling.   Gastrointestinal:  Negative for nausea and vomiting.   Skin: Negative.    All other systems reviewed and are negative.       Physical Exam  Temp:  [36.1 °C (97 °F)-36.8 °C (98.2 °F)] 36.8 °C (98.2 °F)  Pulse:  [63-95] 67  Resp:  [18-51] 27  BP: ()/(51-97) 105/57  SpO2:  [89 %-100 %] 98 %    Physical Exam  Vitals reviewed.   Constitutional:       General: He is not in acute distress.     Appearance: Normal appearance. He is well-developed. He is not diaphoretic.   HENT:      Head: Normocephalic and atraumatic.      Nose: Nose normal.      Mouth/Throat:      Mouth: Mucous membranes are moist.      Pharynx: Oropharynx is clear.   Eyes:      Extraocular Movements: Extraocular movements intact.      Conjunctiva/sclera: Conjunctivae normal.      Pupils: Pupils are equal, round, and reactive to light.   Cardiovascular:      Rate and Rhythm: Normal rate and regular rhythm.      Pulses: Normal pulses.      Heart sounds: Normal heart sounds.   Pulmonary:      Effort: Pulmonary effort is normal. No respiratory distress.      Breath sounds: Wheezing and rales present.   Abdominal:      General: Bowel sounds are normal. There is no distension.      Palpations: Abdomen is soft. There is no mass.      Tenderness: There is no abdominal tenderness. There is no  right CVA tenderness or left CVA tenderness.   Musculoskeletal:      Cervical back: Normal range of motion and neck supple.      Right lower leg: No edema.      Left lower leg: No edema.   Skin:     General: Skin is warm.      Coloration: Skin is not pale.      Findings: No erythema or rash.   Neurological:      General: No focal deficit present.      Mental Status: He is alert and oriented to person, place, and time.      Cranial Nerves: No cranial nerve deficit.      Coordination: Coordination normal.   Psychiatric:         Mood and Affect: Mood normal.         Behavior: Behavior normal.         Thought Content: Thought content normal.         Judgment: Judgment normal.         Fluids    Intake/Output Summary (Last 24 hours) at 3/22/2024 1702  Last data filed at 3/22/2024 0600  Gross per 24 hour   Intake 900 ml   Output 4800 ml   Net -3900 ml       Laboratory  Recent Labs     03/21/24  1056   WBC 13.8*   RBC 3.12*   HEMOGLOBIN 10.2*   HEMATOCRIT 33.2*   .4*   MCH 32.7   MCHC 30.7*   RDW 62.3*   PLATELETCT 271   MPV 9.5     Recent Labs     03/21/24  1056 03/21/24  2115 03/22/24  0514   SODIUM 139 139 133*   POTASSIUM 6.5* 4.7 4.8   CHLORIDE 97 94* 91*   CO2 25 28 26   GLUCOSE 173* 90 94   BUN 40* 18 25*   CREATININE 9.77* 5.29* 6.99*   CALCIUM 8.6 9.3 9.1         Recent Labs     03/21/24  1056   NTPROBNP >40979*           Imaging  DX-CHEST-PORTABLE (1 VIEW)   Final Result         Mild interstitial opacities in the right mid and lower lung and left lung base could relate to chronic change. Superimposed infection cannot be excluded.           Assessment/Plan     The patient is a 58-year-old male with multiple medical   problems, end-stage renal disease, on hemodialysis, admitted with   hypervolemia, hyperkalemia for emergent dialysis.  1.  End-stage renal disease: will dialyze tomorrow  2.  Electrolytes, hyperkalemia: corrected with dialysis  3.  Volume overload - -ultrafiltration with hemodialysis as   blood  pressure tolerates.  4.  Hypertension.  Elevated blood pressure improved -to monitor  5.  Anemia.  Hemoglobin level at goal, to monitor.     RECOMMENDATIONS:    HD tomorrow  To Adjust all medications to renal doses.    To provide renal diet.    To monitor daily hemoglobin level, basic metabolic panel.    We will follow the patient closely.

## 2024-03-23 NOTE — CARE PLAN
Problem: Knowledge Deficit - Standard  Goal: Patient and family/care givers will demonstrate understanding of plan of care, disease process/condition, diagnostic tests and medications  Outcome: Progressing     Problem: Hemodynamics  Goal: Patient's hemodynamics, fluid balance and neurologic status will be stable or improve  Outcome: Progressing     Problem: Pain - Standard  Goal: Alleviation of pain or a reduction in pain to the patient’s comfort goal  Outcome: Progressing   The patient is Stable - Low risk of patient condition declining or worsening    Shift Goals  Clinical Goals: hemodynamic stability, decreased o2 demand  Patient Goals: leave  Family Goals: siena    Progress made toward(s) clinical / shift goals:      Patient is not progressing towards the following goals:

## 2024-03-23 NOTE — PROCEDURES
Nephrology/Hemodialysis note    Patient with ESRD/HD admitted with RSV  Seen and examined during dialysis treatment  Low BP -reduced UF to 1-2 L  Lab results reviewed  Please see dialysis flow sheet for details

## 2024-03-23 NOTE — CARE PLAN
The patient is Watcher - Medium risk of patient condition declining or worsening    Shift Goals  Clinical Goals: decrease o2 demands, hemodialysis  Patient Goals: sleep  Family Goals: siena    Progress made toward(s) clinical / shift goals:    Problem: Fluid Volume  Goal: Fluid volume balance will be maintained  Description: Target End Date:  Prior to discharge or change in level of care    Document on I/O flowsheet    1.  Monitor intake and output as ordered  2.  Promote oral intake as appropriate  3.  Report inadequate intake or output to physician  4.  Administer IV therapy as ordered  5.  Weights per provider order  6.  Assess for signs and symptoms of bleeding  7.  Monitor for signs of fluid overload (respiratory changes, edema, weight gain, increased abdominal girth)  8.  Monitor of signs for inadequate fluid volume (poor skin turgor, dry mucous membranes)  9.  Instruct patient on adherence to fluid restrictions  Outcome: Progressing     Problem: Respiratory  Goal: Patient will achieve/maintain optimum respiratory ventilation and gas exchange  Description: Target End Date:  Prior to discharge or change in level of care    Document on Assessment flowsheet    1.  Assess and monitor rate, rhythm, depth and effort of respiration  2.  Breath sounds assessed qshift and/or as needed  3.  Assess O2 saturation, administer/titrate oxygen as ordered  4.  Position patient for maximum ventilatory efficiency  5.  Turn, cough, and deep breath with splinting to improve effectiveness  6.  Collaborate with RT to administer medication/treatments per order  7.  Encourage use of incentive spirometer and encourage patient to cough after use and utilize splinting techniques if applicable  8.  Airway suctioning  9.  Monitor sputum production for changes in color, consistency and frequency  10. Perform frequent oral hygiene  11. Alternate physical activity with rest periods  Outcome: Progressing     Problem: Pain - Standard  Goal:  Alleviation of pain or a reduction in pain to the patient’s comfort goal  Description: Target End Date:  Prior to discharge or change in level of care    Document on Vitals flowsheet    1.  Document pain using the appropriate pain scale per order or unit policy  2.  Educate and implement non-pharmacologic comfort measures (i.e. relaxation, distraction, massage, cold/heat therapy, etc.)  3.  Pain management medications as ordered  4.  Reassess pain after pain med administration per policy  5.  If opiods administered assess patient's response to pain medication is appropriate per POSS sedation scale  6.  Follow pain management plan developed in collaboration with patient and interdisciplinary team (including palliative care or pain specialists if applicable)  Outcome: Progressing       Patient is not progressing towards the following goals:

## 2024-03-24 PROCEDURE — 700111 HCHG RX REV CODE 636 W/ 250 OVERRIDE (IP): Performed by: HOSPITALIST

## 2024-03-24 PROCEDURE — 97162 PT EVAL MOD COMPLEX 30 MIN: CPT

## 2024-03-24 PROCEDURE — 770001 HCHG ROOM/CARE - MED/SURG/GYN PRIV*

## 2024-03-24 PROCEDURE — A9270 NON-COVERED ITEM OR SERVICE: HCPCS | Performed by: HOSPITALIST

## 2024-03-24 PROCEDURE — 99232 SBSQ HOSP IP/OBS MODERATE 35: CPT | Performed by: INTERNAL MEDICINE

## 2024-03-24 PROCEDURE — 700102 HCHG RX REV CODE 250 W/ 637 OVERRIDE(OP): Performed by: HOSPITALIST

## 2024-03-24 PROCEDURE — 700102 HCHG RX REV CODE 250 W/ 637 OVERRIDE(OP): Performed by: INTERNAL MEDICINE

## 2024-03-24 PROCEDURE — A9270 NON-COVERED ITEM OR SERVICE: HCPCS | Performed by: STUDENT IN AN ORGANIZED HEALTH CARE EDUCATION/TRAINING PROGRAM

## 2024-03-24 PROCEDURE — A9270 NON-COVERED ITEM OR SERVICE: HCPCS

## 2024-03-24 PROCEDURE — 99232 SBSQ HOSP IP/OBS MODERATE 35: CPT | Mod: GC | Performed by: INTERNAL MEDICINE

## 2024-03-24 PROCEDURE — 700102 HCHG RX REV CODE 250 W/ 637 OVERRIDE(OP)

## 2024-03-24 PROCEDURE — A9270 NON-COVERED ITEM OR SERVICE: HCPCS | Performed by: INTERNAL MEDICINE

## 2024-03-24 PROCEDURE — 97535 SELF CARE MNGMENT TRAINING: CPT

## 2024-03-24 PROCEDURE — 700102 HCHG RX REV CODE 250 W/ 637 OVERRIDE(OP): Performed by: STUDENT IN AN ORGANIZED HEALTH CARE EDUCATION/TRAINING PROGRAM

## 2024-03-24 RX ORDER — LISINOPRIL 5 MG/1
2.5 TABLET ORAL
Status: DISCONTINUED | OUTPATIENT
Start: 2024-03-24 | End: 2024-03-25 | Stop reason: HOSPADM

## 2024-03-24 RX ADMIN — SEVELAMER CARBONATE 1600 MG: 800 TABLET, FILM COATED ORAL at 11:55

## 2024-03-24 RX ADMIN — BENZOCAINE AND MENTHOL 1 LOZENGE: 15; 3.6 LOZENGE ORAL at 21:25

## 2024-03-24 RX ADMIN — BENZONATATE 100 MG: 100 CAPSULE ORAL at 08:19

## 2024-03-24 RX ADMIN — SEVELAMER CARBONATE 1600 MG: 800 TABLET, FILM COATED ORAL at 08:18

## 2024-03-24 RX ADMIN — RILPIVIRINE HYDROCHLORIDE 25 MG: 25 TABLET, FILM COATED ORAL at 16:20

## 2024-03-24 RX ADMIN — HEPARIN SODIUM 5000 UNITS: 5000 INJECTION, SOLUTION INTRAVENOUS; SUBCUTANEOUS at 21:25

## 2024-03-24 RX ADMIN — GUAIFENESIN SYRUP AND DEXTROMETHORPHAN 5 ML: 100; 10 SYRUP ORAL at 00:28

## 2024-03-24 RX ADMIN — GUAIFENESIN SYRUP AND DEXTROMETHORPHAN 5 ML: 100; 10 SYRUP ORAL at 16:19

## 2024-03-24 RX ADMIN — DOLUTEGRAVIR SODIUM 50 MG: 50 TABLET, FILM COATED ORAL at 16:20

## 2024-03-24 RX ADMIN — HEPARIN SODIUM 5000 UNITS: 5000 INJECTION, SOLUTION INTRAVENOUS; SUBCUTANEOUS at 00:28

## 2024-03-24 RX ADMIN — LISINOPRIL 2.5 MG: 5 TABLET ORAL at 12:45

## 2024-03-24 RX ADMIN — ATORVASTATIN CALCIUM 40 MG: 40 TABLET, FILM COATED ORAL at 21:25

## 2024-03-24 RX ADMIN — CARVEDILOL 3.12 MG: 6.25 TABLET, FILM COATED ORAL at 16:20

## 2024-03-24 RX ADMIN — GUAIFENESIN SYRUP AND DEXTROMETHORPHAN 5 ML: 100; 10 SYRUP ORAL at 08:18

## 2024-03-24 RX ADMIN — HEPARIN SODIUM 5000 UNITS: 5000 INJECTION, SOLUTION INTRAVENOUS; SUBCUTANEOUS at 14:02

## 2024-03-24 RX ADMIN — CARVEDILOL 3.12 MG: 6.25 TABLET, FILM COATED ORAL at 08:19

## 2024-03-24 RX ADMIN — HEPARIN SODIUM 5000 UNITS: 5000 INJECTION, SOLUTION INTRAVENOUS; SUBCUTANEOUS at 08:21

## 2024-03-24 RX ADMIN — SEVELAMER CARBONATE 1600 MG: 800 TABLET, FILM COATED ORAL at 16:20

## 2024-03-24 RX ADMIN — BENZONATATE 100 MG: 100 CAPSULE ORAL at 16:20

## 2024-03-24 RX ADMIN — ASPIRIN 81 MG: 81 TABLET, COATED ORAL at 16:20

## 2024-03-24 RX ADMIN — BENZOCAINE AND MENTHOL 1 LOZENGE: 15; 3.6 LOZENGE ORAL at 00:28

## 2024-03-24 ASSESSMENT — ENCOUNTER SYMPTOMS
PSYCHIATRIC NEGATIVE: 1
COUGH: 0
NEUROLOGICAL NEGATIVE: 1
SPUTUM PRODUCTION: 0
PALPITATIONS: 0
CONSTITUTIONAL NEGATIVE: 1
MUSCULOSKELETAL NEGATIVE: 1
CARDIOVASCULAR NEGATIVE: 1
WHEEZING: 0
FEVER: 0
GASTROINTESTINAL NEGATIVE: 1
ABDOMINAL PAIN: 0
COUGH: 1
VOMITING: 0
CHILLS: 0
WEIGHT LOSS: 0
EYES NEGATIVE: 1
NAUSEA: 0
HEMOPTYSIS: 0
SHORTNESS OF BREATH: 1
SINUS PAIN: 0
SHORTNESS OF BREATH: 0

## 2024-03-24 ASSESSMENT — COGNITIVE AND FUNCTIONAL STATUS - GENERAL
SUGGESTED CMS G CODE MODIFIER MOBILITY: CJ
WALKING IN HOSPITAL ROOM: A LITTLE
CLIMB 3 TO 5 STEPS WITH RAILING: A LITTLE
MOBILITY SCORE: 22

## 2024-03-24 ASSESSMENT — PAIN DESCRIPTION - PAIN TYPE: TYPE: ACUTE PAIN

## 2024-03-24 ASSESSMENT — GAIT ASSESSMENTS
DISTANCE (FEET): 100
GAIT LEVEL OF ASSIST: SUPERVISED

## 2024-03-24 ASSESSMENT — FIBROSIS 4 INDEX: FIB4 SCORE: 1.64

## 2024-03-24 NOTE — PROGRESS NOTES
Nephrology Daily Progress Note    Date of Service  3/24/2024    Chief Complaint  58 y.o. male with ESRD/HD,  admitted 3/21/2024 with worsening SOB, cough -RSV    Interval Problem Update  3/22 -doing better, improved SOB, still c/o cough  Refused dialysis today  Scheduled for HD tomorrow  3/24 -doing better, no acute events, no new complaints  HD on TTS schedule  Review of Systems  Review of Systems   Constitutional:  Negative for chills, fever, malaise/fatigue and weight loss.   HENT:  Negative for congestion, hearing loss and sinus pain.    Eyes: Negative.    Respiratory:  Positive for cough and shortness of breath. Negative for hemoptysis and wheezing.    Cardiovascular:  Negative for chest pain, palpitations and leg swelling.   Gastrointestinal:  Negative for abdominal pain, nausea and vomiting.   Skin: Negative.    All other systems reviewed and are negative.       Physical Exam  Temp:  [36.4 °C (97.5 °F)-37 °C (98.6 °F)] 37 °C (98.6 °F)  Pulse:  [66-79] 78  Resp:  [18] 18  BP: ()/(55-65) 111/63  SpO2:  [90 %-100 %] 90 %    Physical Exam  Vitals reviewed.   Constitutional:       General: He is not in acute distress.     Appearance: Normal appearance. He is well-developed. He is not diaphoretic.   HENT:      Head: Normocephalic and atraumatic.      Nose: Nose normal.      Mouth/Throat:      Mouth: Mucous membranes are moist.      Pharynx: Oropharynx is clear.   Eyes:      Extraocular Movements: Extraocular movements intact.      Conjunctiva/sclera: Conjunctivae normal.      Pupils: Pupils are equal, round, and reactive to light.   Cardiovascular:      Rate and Rhythm: Normal rate and regular rhythm.      Pulses: Normal pulses.      Heart sounds: Normal heart sounds.   Pulmonary:      Effort: Pulmonary effort is normal. No respiratory distress.      Breath sounds: Rales present. No wheezing.   Abdominal:      General: Bowel sounds are normal. There is no distension.      Palpations: Abdomen is soft. There is  no mass.      Tenderness: There is no abdominal tenderness.   Musculoskeletal:      Cervical back: Normal range of motion and neck supple.      Right lower leg: No edema.      Left lower leg: No edema.   Skin:     General: Skin is warm and dry.      Coloration: Skin is not pale.      Findings: No erythema or rash.   Neurological:      General: No focal deficit present.      Mental Status: He is alert and oriented to person, place, and time.      Cranial Nerves: No cranial nerve deficit.      Coordination: Coordination normal.   Psychiatric:         Mood and Affect: Mood normal.         Behavior: Behavior normal.         Thought Content: Thought content normal.         Judgment: Judgment normal.         Fluids    Intake/Output Summary (Last 24 hours) at 3/24/2024 1420  Last data filed at 3/24/2024 1200  Gross per 24 hour   Intake 1000 ml   Output 1500 ml   Net -500 ml       Laboratory  Recent Labs     03/23/24  0930   WBC 8.1   RBC 2.94*   HEMOGLOBIN 9.7*   HEMATOCRIT 29.3*   MCV 99.7*   MCH 33.0   MCHC 33.1   RDW 55.0*   PLATELETCT 204   MPV 9.9     Recent Labs     03/21/24  2115 03/22/24  0514 03/23/24  0930   SODIUM 139 133* 133*   POTASSIUM 4.7 4.8 4.7   CHLORIDE 94* 91* 89*   CO2 28 26 26   GLUCOSE 90 94 93   BUN 18 25* 40*   CREATININE 5.29* 6.99* 10.58*   CALCIUM 9.3 9.1 9.1                   Imaging  DX-CHEST-PORTABLE (1 VIEW)   Final Result         Mild interstitial opacities in the right mid and lower lung and left lung base could relate to chronic change. Superimposed infection cannot be excluded.           Assessment/Plan     The patient is a 58-year-old male with multiple medical   problems, end-stage renal disease, on hemodialysis, admitted with   hypervolemia, hyperkalemia for emergent dialysis, RSV  1.  End-stage renal disease on HD -on TTS schedule  2.  Electrolytes, hyperkalemia: corrected with dialysis  3.  Volume overload - improved with ultrafiltration with hemodialysis as   blood pressure  tolerates.  4.  Hypertension.  Low BP -to monitor  5.  Anemia.  Hemoglobin level stable to monitor.     RECOMMENDATIONS:    HD -TTS  To Adjust all medications to renal doses.    To provide renal diet.    To monitor daily hemoglobin level, basic metabolic panel.    We will follow the patient closely.  D/w

## 2024-03-24 NOTE — PROGRESS NOTES
[facility] Internal Medicine Daily Progress Note    Date of Service  3/24/2024    R Team: R IM Green Team   Attending: Christelle Falcon M.d.  Senior Resident: Dr. Marr  Intern:  Dr. Vang  Contact Number: [phone]    Chief Complaint  Seamus Palencia is a 58 y.o. male admitted 3/21/2024 with shortness of breath, cough, myalgias    Hospital Course  58 year old male with ESRD on hemodialysis (T, R, Sa), combined systolic and diastolic HF (EF 40-45%), severe aortic stenosis, medically managed multivessel CAD, HIV positive, HTN, who presented on 3/21 for shortness of breath, cough, and myalgias. He felt too ill to go to his scheduled dialysis and therefore presented to the hospital instead. Viral panel panel was positive for RSV. He initially required 15L of supplemental O2, now back on room air following diuresis, a course of azithromycin and supportive care. He has remained on his home dialysis scheduled while here but notably became hypotensive following dialysis x 2. Continuing to monitor pressures while resuming patient on home antihypertensives given heart failure despite need for sufficient preload to overcome severe AS. Patient willing to remain hospitalized for blood pressure monitoring but wants to discharge before 10 am 3/25 to  his daughter in [place]. Hopefully nephrology can see him before he goes.    Interval Problem Update  Patient feels much better and was successfully weaned off oxygen. Denies lightheadedness or shortness of breath while walking but still very fatigued and apparently requiring a walker. He remains somewhat hypotensive, likely secondary to acute illness in setting of ESRD and HF. Will continue to monitor and resume home antihypertensives as appropriate, will need close outpatient follow up    He initially wanted to discharge today but after discussion, was agreeable to early discharge before 10 am tomorrow (has to  his 10 yo daughter in [place] tomorrow). Will try to  make that happen. No PT needs on discharge, recommended a walker but patient declined.    I have discussed this patient's plan of care and discharge plan at IDT rounds today with Case Management, Nursing, Nursing leadership, and other members of the IDT team.    Consultants/Specialty  Nephrology  Critical care    Code Status  Full Code    Disposition  The patient is not medically cleared for discharge to home or a post-acute facility.      I have placed the appropriate orders for post-discharge needs.    Review of Systems  Review of Systems   Constitutional: Negative.    HENT: Negative.     Respiratory:  Negative for cough, sputum production and shortness of breath.    Cardiovascular: Negative.  Negative for chest pain.   Gastrointestinal: Negative.    Genitourinary: Negative.    Musculoskeletal: Negative.    Neurological: Negative.    Psychiatric/Behavioral: Negative.          Physical Exam  Temp:  [36.4 °C (97.5 °F)-37 °C (98.6 °F)] 37 °C (98.6 °F)  Pulse:  [66-79] 78  Resp:  [18] 18  BP: ()/(55-65) 111/63  SpO2:  [90 %-100 %] 90 %    Physical Exam  Vitals and nursing note reviewed.   Constitutional:       Appearance: Normal appearance. He is normal weight.   HENT:      Head: Normocephalic and atraumatic.      Nose: Nose normal.      Mouth/Throat:      Mouth: Mucous membranes are moist.   Eyes:      Extraocular Movements: Extraocular movements intact.      Pupils: Pupils are equal, round, and reactive to light.   Cardiovascular:      Rate and Rhythm: Normal rate and regular rhythm.      Pulses: Normal pulses.   Pulmonary:      Breath sounds: No stridor. No wheezing or rales.      Comments: Scattered diffuse rales  Abdominal:      General: Abdomen is flat.      Palpations: Abdomen is soft.   Musculoskeletal:         General: Normal range of motion.      Cervical back: Normal range of motion.      Right lower leg: No edema.      Left lower leg: No edema.   Skin:     General: Skin is warm.      Capillary  Refill: Capillary refill takes less than 2 seconds.   Neurological:      General: No focal deficit present.      Mental Status: He is alert and oriented to person, place, and time.   Psychiatric:         Mood and Affect: Mood normal.         Fluids    Intake/Output Summary (Last 24 hours) at 3/24/2024 1454  Last data filed at 3/24/2024 1200  Gross per 24 hour   Intake 1000 ml   Output 1500 ml   Net -500 ml       Laboratory  Recent Labs     03/23/24  0930   WBC 8.1   RBC 2.94*   HEMOGLOBIN 9.7*   HEMATOCRIT 29.3*   MCV 99.7*   MCH 33.0   MCHC 33.1   RDW 55.0*   PLATELETCT 204   MPV 9.9     Recent Labs     03/21/24  2115 03/22/24  0514 03/23/24  0930   SODIUM 139 133* 133*   POTASSIUM 4.7 4.8 4.7   CHLORIDE 94* 91* 89*   CO2 28 26 26   GLUCOSE 90 94 93   BUN 18 25* 40*   CREATININE 5.29* 6.99* 10.58*   CALCIUM 9.3 9.1 9.1                   Imaging  DX-CHEST-PORTABLE (1 VIEW)   Final Result         Mild interstitial opacities in the right mid and lower lung and left lung base could relate to chronic change. Superimposed infection cannot be excluded.           Assessment/Plan  Problem Representation:    * ESRD (end stage renal disease) (HCC)- (present on admission)  Assessment & Plan  Home dialysis schedule is Tuesday, Thursday, and Saturday    -Nephrology following, no needs for HD today  -Reach out to nephro early tmw to see patient before he leaves    Hypotension  Assessment & Plan  Suspect secondary to fluid shifts from diuresis, dialysis, acute illness. Monitor closely, as patient needs sufficient preload to overcome severe aortic stenosis. Remains asymptomatic    -Nephrology aware  -Hold home amlodipine, hydralazine  -Home carvedilol and lisinopril resumed at reduced doses with holding parameters, uptitrate as tolerated    Chronic combined systolic and diastolic heart failure (HCC)- (present on admission)  Assessment & Plan  Now euvolemic following diuresis and hemodialysis, but still struggling with fluid  shifts    -Hold home amlodipine, hydralazine  -Home carvedilol and lisinopril resumed at reduced doses with holding parameters, uptitrate as tolerated    Severe aortic stenosis- (present on admission)  Assessment & Plan  First noted on Echo in 2/25/24, Vmax 3.93m/s. Plans for possible TAVR with cardiology    Acute respiratory failure with hypoxia- (present on admission)  Assessment & Plan  Resolved following diuresis, dialysis, and supportive treatment of RSV. Also s/p 3 days of azithromycin    Multivessel coronary artery disease  Assessment & Plan  Not a candidate for CABG, thus medically managing    RSV infection  Assessment & Plan  In setting of patient with appropriately treated HIV. Cough improved, now on room air    Hyperkalemia- (present on admission)  Assessment & Plan  Resolved following dialysis    Volume overload- (present on admission)  Assessment & Plan  Resolved following IV Lasix and resuming HD    Anemia due to chronic kidney disease- (present on admission)  Assessment & Plan  Hemoglobin stable     Primary hypertension- (present on admission)  Assessment & Plan  Antihypertensives as above    HIV (human immunodeficiency virus infection) (HCC)- (present on admission)  Assessment & Plan  Continue home antiretroviral therapy       VTE prophylaxis: heparin ppx    I have performed a physical exam and reviewed and updated ROS and Plan today (3/24/2024). In review of yesterday's note (3/23/2024), there are no changes except as documented above.    Yuliana Vang DO  IM PGY-1

## 2024-03-24 NOTE — PROGRESS NOTES
"Pt is refusing labs and agrees to try later.  Pt refuses O2 ambulating test at this time and will try after 10 am   10:00- ambulatory O2 sats done- see VS flow sheet.  Pt still refusing labs. Pt states \"I don't have a kidney doctor. I never saw a kidney doctor. I never saw a doctor!\"  Dr Falcon and Taj notified- they go to bedside to talk to pt. Dr Bailey comes to bedside and talks to pt. Pt still refuses labs. Teams aware. After speaking with the nephrologist, Dr Vang says that he does not need dialysis today, so it is okay to not have new lab work today.    "

## 2024-03-24 NOTE — THERAPY
"Physical Therapy   Initial Evaluation and Discharge     Patient Name: Seamus Palencia  Age:  58 y.o., Sex:  male  Medical Record #: [id number]  Today's Date: 3/24/2024     Precautions  Precautions: Fall Risk  Comments: L knee hyperextension in stance phase    Assessment  Patient is 58 y.o. male admitted with SOB, cough, malaise; RSV positive. PMHx of of extensive cardiac history, currently being medically managed as not candidate for CABG, ESRD on HD, HIV positive, HTN. Pt mobilized as detailed below, appears to be at his baseline. /70, denied lightheadedness, dizziness. Reports he would not utilize a FWW if provided one. Recommend home with no needs.     Patient will not be actively followed for physical therapy services at this time, however may be seen if requested by physician for 1 more visit within 30 days to address any discharge or equipment needs.    Plan    Physical Therapy Initial Treatment Plan   Duration: Evaluation only    DC Equipment Recommendations: None (reports would not use FWW)  Discharge Recommendations: Anticipate that the patient will have no further physical therapy needs after discharge from the hospital       Subjective    \"Physical therapy!? I don't need physical therapy!\"      Objective     03/24/24 1332   Vitals   O2 Delivery Device None - Room Air   Pain 0 - 10 Group   Therapist Pain Assessment Post Activity Pain Same as Prior to Activity;Nurse Notified;0   Prior Living Situation   Housing / Facility 1 Story Apartment / Condo   Steps Into Home 0   Steps In Home 0   Equipment Owned None   Lives with - Patient's Self Care Capacity Alone and Able to Care For Self   Comments Son lives in area as well   Prior Level of Functional Mobility   Bed Mobility Independent   Transfer Status Independent   Ambulation Independent   Ambulation Distance   (community)   Assistive Devices Used None   Stairs Independent   Cognition    Cognition / Consciousness X   Level of Consciousness Alert   Comments " Pleasant and cooperative. Impaired insight overall   Active ROM Upper Body   Active ROM Upper Body  WDL   Strength Upper Body   Upper Body Strength  WDL   Active ROM Lower Body    Active ROM Lower Body  WDL   Strength Lower Body   Lower Body Strength  X   Comments L knee ext weakness as noted by knee hyperextension in stance phase ambulation, reports baseline 2 yrs after prior CVA   Balance Assessment   Sitting Balance (Static) Good   Sitting Balance (Dynamic) Good   Standing Balance (Static) Fair +   Standing Balance (Dynamic) Fair   Weight Shift Sitting Good   Weight Shift Standing Fair   Comments no AD or LOB, some furniture walking at times   Bed Mobility    Comments NT, up in chair pre/post   Gait Analysis   Gait Level Of Assist Supervised   Assistive Device None   Distance (Feet) 100   # of Times Distance was Traveled 1   Deviation   (L knee hyperextension, mild steppage gait)   Comments would likely benefit from FWW for enhanced balance, support given LLE instability. Pt reporting he would not use it   Functional Mobility   Sit to Stand Supervised   Bed, Chair, Wheelchair Transfer Supervised   Transfer Method Stand Step   Mobility no AD in room due to isolation precautions   6 Clicks Assessment - How much HELP from from another person do you currently need... (If the patient hasn't done an activity recently, how much help from another person do you think he/she would need if he/she tried?)   Turning from your back to your side while in a flat bed without using bedrails? 4   Moving from lying on your back to sitting on the side of a flat bed without using bedrails? 4   Moving to and from a bed to a chair (including a wheelchair)? 4   Standing up from a chair using your arms (e.g., wheelchair, or bedside chair)? 4   Walking in hospital room? 3   Climbing 3-5 steps with a railing? 3   6 clicks Mobility Score 22   Activity Tolerance   Comments denies lightheadedness, dizziness, SOB   Education Group   Education  Provided Role of Physical Therapist;Gait Training;Use of Assistive Device   Role of Physical Therapist Patient Response Patient;Acceptance;Explanation;Verbal Demonstration   Gait Training Patient Response Patient;Acceptance;Explanation;Action Demonstration   Use of Assistive Device Patient Response Patient;Nonacceptance;Explanation;Teaching Refused   Additional Comments Receptive to self management and compensatory strategies with mobility (outside of AD use)   Physical Therapy Initial Treatment Plan    Duration Evaluation only   Problem List    Problems Impaired Balance;Impaired Ambulation   Anticipated Discharge Equipment and Recommendations   DC Equipment Recommendations None  (reports would not use FWW)   Discharge Recommendations Anticipate that the patient will have no further physical therapy needs after discharge from the hospital   Interdisciplinary Plan of Care Collaboration   IDT Collaboration with  Nursing   Patient Position at End of Therapy Seated;Chair Alarm On;Call Light within Reach;Tray Table within Reach;Phone within Reach   Collaboration Comments RN updated   Session Information   Date / Session Number  3/24: eval only, d/c needs

## 2024-03-24 NOTE — DISCHARGE SUMMARY
[facility] Discharge Summary    Attending: Christelle Falcon M.d.  Senior Resident: Dr. Valle  Intern:  Dr. May  Contact Number: [phone]    CHIEF COMPLAINT ON ADMISSION  Chief Complaint   Patient presents with    Shortness of Breath     Since last night with orthopnea.  Shortness of breath has progressively gotten worse over the night.  Per EMS pt wears 2-4L NC at baseline and was found to be desatting at 80% on his 4L NC upon arrival.      Chest Pain     Since last night.  Pt received 0.8 nitroglycerin SL for chest pain and hypertension from EMS.         Reason for Admission  ems     Admission Date  3/21/2024    CODE STATUS  Full Code    HPI & HOSPITAL COURSE  This is a 58 y.o. male here with ESRD on hemodialysis (Tue, Thu, Sat), combined systolic and diastolic HF (EF 40-45%), severe aortic stenosis with plans for possible TAVR, medically managed multivessel CAD, HIV positive, HTN, who presented on 3/21 for shortness of breath, cough, and myalgias. He felt too ill to go to his scheduled dialysis and therefore presented to the hospital instead. Viral panel panel was positive for RSV. He initially required 15L of supplemental O2, now back on room air following diuresis, a course of azithromycin and supportive care. He has remained on his home dialysis schedule while here but notably became hypotensive following dialysis x 2, likely due to too much fluid offloading. His home antihypertensives were optimized around this. We advised him to resume lisinopril and carvedilol on hospital discharge and to check his home BP, if it is remaining above 110/70 to resume amlodipine and continue to monitor for hypotension/symptoms for a few days after, and to hold amlodipine if his BP drops below that value or if he becomes symptomatic. PCP and nephrology referrals placed - per chart review, patient has not been seen in a while. Finally, patient was recommended use of a walker on discharge, but he declined. Continue to  follow with ID for management of his HIV. We briefly discussed the role of prophylaxis given his persistently low CD4 counts with his managing physician Dr. Whyte but given undetectable viral load it was determined not to initiate prophylaxis at this time.      Therefore, he is discharged in fair and stable condition to home with close outpatient follow-up.    The patient met 2-midnight criteria for an inpatient stay at the time of discharge.    Discharge Date  3/25/24    Physical Exam on Day of Discharge  Physical Exam  Vitals and nursing note reviewed.   Constitutional:       Appearance: Normal appearance. He is normal weight.   HENT:      Head: Normocephalic and atraumatic.      Nose: Nose normal.      Mouth/Throat:      Mouth: Mucous membranes are moist.   Eyes:      Extraocular Movements: Extraocular movements intact.      Pupils: Pupils are equal, round, and reactive to light.   Cardiovascular:      Rate and Rhythm: Normal rate and regular rhythm.      Pulses: Normal pulses.      Heart sounds: Murmur heard.   Pulmonary:      Breath sounds: No stridor. No wheezing or rales.   Abdominal:      General: Abdomen is flat.      Palpations: Abdomen is soft.   Musculoskeletal:         General: Normal range of motion.      Cervical back: Normal range of motion.      Right lower leg: No edema.      Left lower leg: No edema.   Skin:     General: Skin is warm.      Capillary Refill: Capillary refill takes less than 2 seconds.   Neurological:      General: No focal deficit present.      Mental Status: He is alert and oriented to person, place, and time.   Psychiatric:         Mood and Affect: Mood normal.         FOLLOW UP ITEMS POST DISCHARGE  Resume home antihypertensives, follow-up with PCP, cardiology, ID    DISCHARGE DIAGNOSES  Principal Problem:    ESRD (end stage renal disease) (HCC) (POA: Yes)  Active Problems:    Chronic combined systolic and diastolic heart failure (HCC) (POA: Yes)    Hypotension (POA:  Unknown)    Acute respiratory failure with hypoxia (POA: Yes)    Severe aortic stenosis (POA: Yes)    HIV (human immunodeficiency virus infection) (HCC) (POA: Yes)    Primary hypertension (POA: Yes)    Anemia due to chronic kidney disease (POA: Yes)    Volume overload (POA: Yes)    Hyperkalemia (POA: Yes)    Mixed hyperlipidemia (POA: Yes)    RSV infection (POA: Unknown)    Multivessel coronary artery disease (POA: Unknown)  Resolved Problems:    * No resolved hospital problems. *      FOLLOW UP  No future appointments.  No follow-up provider specified.    MEDICATIONS ON DISCHARGE     Medication List        ASK your doctor about these medications        Instructions   amLODIPine 10 MG Tabs  Commonly known as: Norvasc  Ask about: Which instructions should I use?   Olancha 1 tableta por vía oral diariamente.  (Take 1 Tablet by mouth every day.)  Dose: 10 mg     amoxicillin-clavulanate 500-125 MG Tabs  Commonly known as: Augmentin   Take 1 Tablet by mouth every day. X 3 days  Dose: 1 Tablet     aspirin 81 MG Chew chewable tablet  Commonly known as: Asa  Ask about: Which instructions should I use?   Chew 1 Tablet every day.  Dose: 81 mg     atorvastatin 40 MG Tabs  Commonly known as: Lipitor   Take 1 Tablet by mouth every evening.  Dose: 40 mg     carvedilol 25 MG Tabs  Commonly known as: Coreg  Ask about: Which instructions should I use?   Take 1 Tablet by mouth 2 times a day with meals.  Dose: 25 mg     doxycycline 100 MG Tabs  Commonly known as: Vibramycin   Take 100 mg by mouth 2 times a day. X 3 days  Dose: 100 mg     Juluca 50-25 MG Tabs  Generic drug: Dolutegravir-Rilpivirine  Ask about: Which instructions should I use?   Take 1 Tablet by mouth every day at 6 PM. With food  Dose: 1 Tablet     lisinopril 20 MG Tabs  Commonly known as: Prinivil  Ask about: Which instructions should I use?   Doctor's comments: This Rx has been ordered by a pharmacist working under a collaborative practice agreement.  Take 1 Tablet by  mouth every day.  Dose: 20 mg     Mircera 30 MCG/0.3ML Sosy  Generic drug: Methoxy PEG-Epoetin Beta   Inject 30 mcg as directed every 4 weeks. Kristina 2 [address]  Mike SCHAEFFER [phone]  Dose: 30 mcg     Renvela 800 MG Tabs tablet  Generic drug: sevelamer carbonate  Ask about: Which instructions should I use?   Take 1,600 mg by mouth 3 times a day with meals.  Dose: 1,600 mg              Allergies  No Known Allergies    DIET  Orders Placed This Encounter   Procedures    Diet Order Diet: Regular     Standing Status:   Standing     Number of Occurrences:   1     Order Specific Question:   Diet:     Answer:   Regular [1]       ACTIVITY  As tolerated.  Weight bearing as tolerated    CONSULTATIONS  Nephrology    PROCEDURES  Dialysis    LABORATORY  Lab Results   Component Value Date    SODIUM 133 (L) 03/23/2024    POTASSIUM 4.7 03/23/2024    CHLORIDE 89 (L) 03/23/2024    CO2 26 03/23/2024    GLUCOSE 93 03/23/2024    BUN 40 (H) 03/23/2024    CREATININE 10.58 (HH) 03/23/2024        Lab Results   Component Value Date    WBC 8.1 03/23/2024    HEMOGLOBIN 9.7 (L) 03/23/2024    HEMATOCRIT 29.3 (L) 03/23/2024    PLATELETCT 204 03/23/2024        Total time of the discharge process exceeds 40 minutes.

## 2024-03-24 NOTE — CARE PLAN
The patient is Stable - Low risk of patient condition declining or worsening    Shift Goals  Clinical Goals: hemodynamic stability and monitor respiratory needs  Patient Goals: stop coughing and rest  Family Goals: siena    Progress made toward(s) clinical / shift goals:      Problem: Hemodynamics  Goal: Patient's hemodynamics, fluid balance and neurologic status will be stable or improve  Outcome: Progressing  Patient educated on POC. Pt will remain NAEO.  Monitor vital signs (q8h/or as needed)  Hemodynamic monitoring (P, BMP)  Monitored intake and output q4h through the shift  Peripheral pulses and capillary refill were assessed  Color and body temperature were assessed  Positioned patient for maximum circulation/cardiac output  Signs/symptoms of excessive bleeding were monitored  Assessed mental status, restlessness and changes in level of consciousness  Monitored temperature (report fever or hypothermia to provider immediately).      Problem: Respiratory  Goal: Patient will achieve/maintain optimum respiratory ventilation and gas exchange  Outcome: Progressing  Patient educated on POC.  Assessed and monitored rate, rhythm, depth and effort of respiration  Breath sounds assessed q shift and/or as needed  Assessed O2 saturation, administer/titrate oxygen as ordered  Positioned patient for maximum ventilatory efficiency  Turn, cough, and deep breath with splinting to improve effectiveness  Encouraged use of incentive spirometer and to cough after use  Continuous Pulse Oximeter in place for monitoring.   Cough medications were administered as ordered.     Problem: Skin Integrity  Goal: Skin integrity is maintained or improved  Outcome: Progressing  Pt updated on POC:  Assessed and documented LISA AVF site   Provided incision/wound care per orders   Managed surgical drains q4h and/or as needed  Encouraged adequate nutrition to promote wound healing  Collaborate with Clinical Dietician  Opened LDA and uploaded clinical  images.  Wound care provided  Confirmed/Placed wound care consult order      Problem: Fall Risk  Goal: Patient will remain free from falls  Outcome: Progressing  Fall Prevention Protocol  To be followed on all patients at risk for fall  Patient Name: Seamus DUKES    Guidelines for patients at risk to fall       Environmental precautions in use:       treaded slipper socks are on patient       Bed is locked and in lowest position       Personal belongings, wastebasket, call bell, urinal are in easy reach       Transferred patient toward stronger side       Report is given to CNA regarding patient's fall risk    The following are considered:     The side rail closest to bathroom is down     Bed rails:  Critical access hospital Fall program emphasizes bed rail reduction.  Bed rails contribute to patient fall risk by creating barriers to patient transfer in and out of beds.  Use of bed rails must be assessed specifically to individual patient needs.  When possible, use alternative pillows and positioning devices to avail the use of bed rails.  (Remember:  Four (4) side rails are considered a restraint).    Guidelines for high fall risk patients    Fall risk guidelines as outlined above      Fall risk armband on patient      Fall risk sign is placed by the door - Moderate Fall Risk      Frequent toileting offered     Consider the following:      Patient room is close to the nurse's station      Bed alarm on

## 2024-03-24 NOTE — PROGRESS NOTES
11:51- BP is 82/56. Lisinopril held for now.   12:38- BP is rechecked and is 111/63 (after ambulating in [place])  Lisinopril given.  Dr Vang notified of the above and the following: Pt walks in the [place] with a limp. Pt states he has chronic left knee pain and was supposed to see a doctor as an outpatient but has not been able to get an appointment yet. Pt furniture grabs while walking, RN makes pt use FWW to ambulate in [place]. Pt states he does not use a cane or walker at home. Dr Vang says she will order PT. Dr Vang asks if PT can see pt ASAP since he will be an early discharge. PT notified of the above. Pt refuses tylenol. Pt says he can not rate his left knee pain

## 2024-03-25 ENCOUNTER — PHARMACY VISIT (OUTPATIENT)
Dept: PHARMACY | Facility: MEDICAL CENTER | Age: 59
End: 2024-03-25
Payer: COMMERCIAL

## 2024-03-25 VITALS
OXYGEN SATURATION: 93 % | BODY MASS INDEX: 21.75 KG/M2 | RESPIRATION RATE: 18 BRPM | SYSTOLIC BLOOD PRESSURE: 149 MMHG | HEIGHT: 64 IN | TEMPERATURE: 97.6 F | WEIGHT: 127.43 LBS | DIASTOLIC BLOOD PRESSURE: 86 MMHG | HEART RATE: 82 BPM

## 2024-03-25 LAB
ANION GAP SERPL CALC-SCNC: 17 MMOL/L (ref 7–16)
BUN SERPL-MCNC: 52 MG/DL (ref 8–22)
CALCIUM SERPL-MCNC: 9 MG/DL (ref 8.5–10.5)
CHLORIDE SERPL-SCNC: 91 MMOL/L (ref 96–112)
CO2 SERPL-SCNC: 25 MMOL/L (ref 20–33)
CREAT SERPL-MCNC: 10.09 MG/DL (ref 0.5–1.4)
ERYTHROCYTE [DISTWIDTH] IN BLOOD BY AUTOMATED COUNT: 53.8 FL (ref 35.9–50)
GFR SERPLBLD CREATININE-BSD FMLA CKD-EPI: 5 ML/MIN/1.73 M 2
GLUCOSE SERPL-MCNC: 91 MG/DL (ref 65–99)
HCT VFR BLD AUTO: 30.3 % (ref 42–52)
HGB BLD-MCNC: 10 G/DL (ref 14–18)
MAGNESIUM SERPL-MCNC: 2.7 MG/DL (ref 1.5–2.5)
MCH RBC QN AUTO: 32.6 PG (ref 27–33)
MCHC RBC AUTO-ENTMCNC: 33 G/DL (ref 32.3–36.5)
MCV RBC AUTO: 98.7 FL (ref 81.4–97.8)
PHOSPHATE SERPL-MCNC: 6.9 MG/DL (ref 2.5–4.5)
PLATELET # BLD AUTO: 230 K/UL (ref 164–446)
PMV BLD AUTO: 10.3 FL (ref 9–12.9)
POTASSIUM SERPL-SCNC: 4.7 MMOL/L (ref 3.6–5.5)
RBC # BLD AUTO: 3.07 M/UL (ref 4.7–6.1)
SODIUM SERPL-SCNC: 133 MMOL/L (ref 135–145)
WBC # BLD AUTO: 7.4 K/UL (ref 4.8–10.8)

## 2024-03-25 PROCEDURE — 84100 ASSAY OF PHOSPHORUS: CPT

## 2024-03-25 PROCEDURE — A9270 NON-COVERED ITEM OR SERVICE: HCPCS

## 2024-03-25 PROCEDURE — 700102 HCHG RX REV CODE 250 W/ 637 OVERRIDE(OP): Performed by: INTERNAL MEDICINE

## 2024-03-25 PROCEDURE — A9270 NON-COVERED ITEM OR SERVICE: HCPCS | Performed by: HOSPITALIST

## 2024-03-25 PROCEDURE — 99239 HOSP IP/OBS DSCHRG MGMT >30: CPT | Performed by: INTERNAL MEDICINE

## 2024-03-25 PROCEDURE — 700102 HCHG RX REV CODE 250 W/ 637 OVERRIDE(OP)

## 2024-03-25 PROCEDURE — 85027 COMPLETE CBC AUTOMATED: CPT

## 2024-03-25 PROCEDURE — RXMED WILLOW AMBULATORY MEDICATION CHARGE

## 2024-03-25 PROCEDURE — 700111 HCHG RX REV CODE 636 W/ 250 OVERRIDE (IP): Performed by: HOSPITALIST

## 2024-03-25 PROCEDURE — 83735 ASSAY OF MAGNESIUM: CPT

## 2024-03-25 PROCEDURE — A9270 NON-COVERED ITEM OR SERVICE: HCPCS | Performed by: INTERNAL MEDICINE

## 2024-03-25 PROCEDURE — 700102 HCHG RX REV CODE 250 W/ 637 OVERRIDE(OP): Performed by: HOSPITALIST

## 2024-03-25 PROCEDURE — 700102 HCHG RX REV CODE 250 W/ 637 OVERRIDE(OP): Performed by: STUDENT IN AN ORGANIZED HEALTH CARE EDUCATION/TRAINING PROGRAM

## 2024-03-25 PROCEDURE — 99232 SBSQ HOSP IP/OBS MODERATE 35: CPT | Performed by: INTERNAL MEDICINE

## 2024-03-25 PROCEDURE — 80048 BASIC METABOLIC PNL TOTAL CA: CPT

## 2024-03-25 PROCEDURE — A9270 NON-COVERED ITEM OR SERVICE: HCPCS | Performed by: STUDENT IN AN ORGANIZED HEALTH CARE EDUCATION/TRAINING PROGRAM

## 2024-03-25 RX ORDER — AMLODIPINE BESYLATE 10 MG/1
10 TABLET ORAL DAILY
Qty: 90 TABLET | Refills: 0 | Status: SHIPPED | OUTPATIENT
Start: 2024-03-25

## 2024-03-25 RX ADMIN — BENZOCAINE AND MENTHOL 1 LOZENGE: 15; 3.6 LOZENGE ORAL at 02:26

## 2024-03-25 RX ADMIN — BENZONATATE 100 MG: 100 CAPSULE ORAL at 06:28

## 2024-03-25 RX ADMIN — CARVEDILOL 3.12 MG: 6.25 TABLET, FILM COATED ORAL at 07:30

## 2024-03-25 RX ADMIN — ACETAMINOPHEN 650 MG: 325 TABLET, FILM COATED ORAL at 02:26

## 2024-03-25 RX ADMIN — HEPARIN SODIUM 5000 UNITS: 5000 INJECTION, SOLUTION INTRAVENOUS; SUBCUTANEOUS at 06:27

## 2024-03-25 RX ADMIN — SEVELAMER CARBONATE 1600 MG: 800 TABLET, FILM COATED ORAL at 08:00

## 2024-03-25 RX ADMIN — GUAIFENESIN SYRUP AND DEXTROMETHORPHAN 5 ML: 100; 10 SYRUP ORAL at 02:26

## 2024-03-25 RX ADMIN — LISINOPRIL 2.5 MG: 5 TABLET ORAL at 06:28

## 2024-03-25 ASSESSMENT — ENCOUNTER SYMPTOMS
COUGH: 0
SHORTNESS OF BREATH: 0
FEVER: 0
CHILLS: 0
NAUSEA: 0
VOMITING: 0

## 2024-03-25 ASSESSMENT — PATIENT HEALTH QUESTIONNAIRE - PHQ9
SUM OF ALL RESPONSES TO PHQ9 QUESTIONS 1 AND 2: 0
1. LITTLE INTEREST OR PLEASURE IN DOING THINGS: NOT AT ALL
2. FEELING DOWN, DEPRESSED, IRRITABLE, OR HOPELESS: NOT AT ALL

## 2024-03-25 ASSESSMENT — PAIN DESCRIPTION - PAIN TYPE: TYPE: ACUTE PAIN

## 2024-03-25 ASSESSMENT — FIBROSIS 4 INDEX: FIB4 SCORE: 1.64

## 2024-03-25 NOTE — PROGRESS NOTES
Discharge instructions reviewed and signed, all questions answered, PIV removed on floor, Meds to Beds delivered.

## 2024-03-25 NOTE — PROGRESS NOTES
"Patient explained discharge lounge protocol. Patient verbalized understanding. Patients IV removed. Patients belongings present on admission present on discharge. Patient stated his daughter would be coming to pick him up in 20 minutes. Patient was dressed and toileted prior to transport arriving. Patient denied any further questions and told the RN and nursing apprentice, \"get the fuck out of my room you fucking bitch.\"   "

## 2024-03-25 NOTE — PROGRESS NOTES
Nephrology Daily Progress Note    Date of Service  3/25/2024    Chief Complaint  58 y.o. male with ESRD/HD,  admitted 3/21/2024 with worsening SOB, cough -RSV    Interval Problem Update  3/22 -doing better, improved SOB, still c/o cough  Refused dialysis today  Scheduled for HD tomorrow  3/24 -doing better, no acute events, no new complaints  HD on TTS schedule  3/25 patient has no new complaints  Review of Systems  Review of Systems   Constitutional:  Negative for chills, fever and malaise/fatigue.   Respiratory:  Negative for cough and shortness of breath.    Cardiovascular:  Negative for chest pain and leg swelling.   Gastrointestinal:  Negative for nausea and vomiting.   Genitourinary:  Negative for dysuria, frequency and urgency.        Physical Exam  Temp:  [36.4 °C (97.6 °F)-37.1 °C (98.8 °F)] 36.4 °C (97.6 °F)  Pulse:  [78-82] 82  Resp:  [18] 18  BP: (107-149)/(58-86) 149/86  SpO2:  [92 %-95 %] 93 %    Physical Exam  Vitals and nursing note reviewed.   Constitutional:       General: He is not in acute distress.     Appearance: He is not ill-appearing.   HENT:      Head: Normocephalic and atraumatic.      Right Ear: External ear normal.      Left Ear: External ear normal.      Nose: Nose normal.   Eyes:      General:         Right eye: No discharge.         Left eye: No discharge.      Conjunctiva/sclera: Conjunctivae normal.   Cardiovascular:      Rate and Rhythm: Normal rate and regular rhythm.      Heart sounds: No murmur heard.  Pulmonary:      Effort: Pulmonary effort is normal. No respiratory distress.      Breath sounds: Normal breath sounds. No wheezing.   Musculoskeletal:         General: No tenderness or deformity.      Right lower leg: No edema.      Left lower leg: No edema.   Skin:     General: Skin is warm.   Neurological:      General: No focal deficit present.      Mental Status: He is alert and oriented to person, place, and time.   Psychiatric:         Mood and Affect: Mood normal.          Behavior: Behavior normal.         Fluids    Intake/Output Summary (Last 24 hours) at 3/25/2024 1205  Last data filed at 3/25/2024 1000  Gross per 24 hour   Intake 365 ml   Output 0 ml   Net 365 ml       Laboratory  Recent Labs     03/23/24  0930 03/25/24  0550   WBC 8.1 7.4   RBC 2.94* 3.07*   HEMOGLOBIN 9.7* 10.0*   HEMATOCRIT 29.3* 30.3*   MCV 99.7* 98.7*   MCH 33.0 32.6   MCHC 33.1 33.0   RDW 55.0* 53.8*   PLATELETCT 204 230   MPV 9.9 10.3     Recent Labs     03/23/24  0930 03/25/24  0550   SODIUM 133* 133*   POTASSIUM 4.7 4.7   CHLORIDE 89* 91*   CO2 26 25   GLUCOSE 93 91   BUN 40* 52*   CREATININE 10.58* 10.09*   CALCIUM 9.1 9.0                   Imaging  DX-CHEST-PORTABLE (1 VIEW)   Final Result         Mild interstitial opacities in the right mid and lower lung and left lung base could relate to chronic change. Superimposed infection cannot be excluded.           Assessment/Plan     The patient is a 58-year-old male with multiple medical   problems, end-stage renal disease, on hemodialysis, admitted with   hypervolemia, hyperkalemia for emergent dialysis, RSV  1.  End-stage renal disease on HD -on TTS schedule  2.  Electrolytes, hyperkalemia: corrected with dialysis  3.  Volume overload - improved with ultrafiltration with hemodialysis as   blood pressure tolerates.  4.  Hypertension.  Low BP -to monitor  5.  Anemia.  Hemoglobin level stable to monitor.     no acute need for HD today.  Renal diet  Okay to discharge from renal point  Renal dose all meds  Avoid nephrotoxins like NSAIDs.  D/w

## 2024-03-25 NOTE — DISCHARGE INSTRUCTIONS
You were hospitalized and treated with dialysis. You also had a respiratory viral infection. Please continue receiving dialysis as scheduled. Follow up with cardiology regarding your hardened aortic valve, you will be evaluated for an aortic valve replacement. Do not take amlodipine today (3/25). Please measure your blood pressure tomorrow, if it is greater than 110/70, resume your amlodipine daily. Take all other medications as prescribed. Monitor your blood pressure daily and write them down to show your primary care doctor and cardiologist. Return to the ER for shortness of breath, chest pain, dizziness, lightheadedness, lack of energy.

## 2024-03-25 NOTE — CARE PLAN
The patient is Stable - Low risk of patient condition declining or worsening    Shift Goals  Clinical Goals: VS and Labs monitoring, safety, and Pending home dc in the am  Patient Goals: go home  Family Goals: siena    Progress made toward(s) clinical / shift goals:      Problem: Hemodynamics  Goal: Patient's hemodynamics, fluid balance and neurologic status will be stable or improve  Outcome: Progressing  Patient educated on POC. Pt will remain NAEO.  Monitor vital signs (q8h/or as needed)  Pulse oximetry and continuous cardiac monitor are in use. report abnormalities to the provider  Hemodynamic monitoring (CBC, BMP, Mg, P)  Monitored intake and output q4h through the shift  Peripheral pulses and capillary refill were assessed  Color and body temperature were assessed  Positioned patient for maximum circulation/cardiac output  Signs/symptoms of excessive bleeding were monitored  Assessed mental status, restlessness and changes in level of consciousness  Monitored temperature (report fever or hypothermia to provider immediately).       Problem: Respiratory  Goal: Patient will achieve/maintain optimum respiratory ventilation and gas exchange  Outcome: Progressing  Patient educated on POC.  Assessed and monitored rate, rhythm, depth and effort of respiration  Breath sounds assessed q shift and/or as needed  Assessed O2 saturation, administer/titrate oxygen as ordered  Positioned patient for maximum ventilatory efficiency  Turn, cough, and deep breath with splinting to improve effectiveness  PRN Cough medications are administered as ordered.    Problem: Fall Risk  Goal: Patient will remain free from falls  Outcome: Progressing  Fall Prevention Protocol  To be followed on all patients at risk for fall  Patient Name: Seamus DUKES    Guidelines for patients at risk to fall       Environmental precautions in use:       treaded slipper socks are on patient       Bed is locked and in lowest position       Personal belongings,  wastebasket, call bell, urinal are in easy reach       Transferred patient toward stronger side       Report is given to CNA regarding patient's fall risk    The following are considered:       The side rail closest to bathroom is down     Bed rails:  [facility] Fall program emphasizes bed rail reduction.  Bed rails contribute to patient fall risk by creating barriers to patient transfer in and out of beds.  Use of bed rails must be assessed specifically to individual patient needs.  When possible, use alternative pillows and positioning devices to avail the use of bed rails.  (Remember:  Four (4) side rails are considered a restraint).    Guidelines for high fall risk patients    Fall risk guidelines as outlined above      Fall risk armband on patient      Fall risk sign is placed by the door - Moderate Fall Risk      Frequent toileting offered or bedside commode or urinal or condom cath/purewick or indwelling catheter in use    Consider the following:      Patient room is close to the nurse's station      Bed alarm on      Physical Therapy/Occupational Therapy consultation for strengthening and mobility, including assessment of home care needs
